# Patient Record
Sex: FEMALE | Race: WHITE | NOT HISPANIC OR LATINO | Employment: OTHER
[De-identification: names, ages, dates, MRNs, and addresses within clinical notes are randomized per-mention and may not be internally consistent; named-entity substitution may affect disease eponyms.]

---

## 2017-01-06 ENCOUNTER — RECORDS - HEALTHEAST (OUTPATIENT)
Dept: ADMINISTRATIVE | Facility: OTHER | Age: 72
End: 2017-01-06

## 2017-01-18 ENCOUNTER — AMBULATORY - HEALTHEAST (OUTPATIENT)
Dept: PODIATRY | Facility: CLINIC | Age: 72
End: 2017-01-18

## 2017-01-18 DIAGNOSIS — L60.0 INGROWN TOENAIL: ICD-10-CM

## 2017-02-10 ENCOUNTER — OFFICE VISIT - HEALTHEAST (OUTPATIENT)
Dept: INTERNAL MEDICINE | Facility: CLINIC | Age: 72
End: 2017-02-10

## 2017-02-10 ENCOUNTER — AMBULATORY - HEALTHEAST (OUTPATIENT)
Dept: INTERNAL MEDICINE | Facility: CLINIC | Age: 72
End: 2017-02-10

## 2017-02-10 DIAGNOSIS — Z01.818 PREOP EXAMINATION: ICD-10-CM

## 2017-02-10 DIAGNOSIS — M17.32 POST-TRAUMATIC OSTEOARTHRITIS OF LEFT KNEE: ICD-10-CM

## 2017-02-10 LAB
ATRIAL RATE - MUSE: 63 BPM
DIASTOLIC BLOOD PRESSURE - MUSE: NORMAL MMHG
INTERPRETATION ECG - MUSE: NORMAL
P AXIS - MUSE: 38 DEGREES
PR INTERVAL - MUSE: 174 MS
QRS DURATION - MUSE: 72 MS
QT - MUSE: 364 MS
QTC - MUSE: 372 MS
R AXIS - MUSE: 23 DEGREES
SYSTOLIC BLOOD PRESSURE - MUSE: NORMAL MMHG
T AXIS - MUSE: 37 DEGREES
VENTRICULAR RATE- MUSE: 63 BPM

## 2017-02-10 ASSESSMENT — MIFFLIN-ST. JEOR: SCORE: 1153.6

## 2017-02-13 ENCOUNTER — COMMUNICATION - HEALTHEAST (OUTPATIENT)
Dept: INTERNAL MEDICINE | Facility: CLINIC | Age: 72
End: 2017-02-13

## 2017-03-02 ENCOUNTER — SURGERY - HEALTHEAST (OUTPATIENT)
Dept: SURGERY | Facility: CLINIC | Age: 72
End: 2017-03-02

## 2017-03-02 ENCOUNTER — HOME CARE/HOSPICE - HEALTHEAST (OUTPATIENT)
Dept: HOME HEALTH SERVICES | Facility: HOME HEALTH | Age: 72
End: 2017-03-02

## 2017-03-02 ENCOUNTER — ANESTHESIA - HEALTHEAST (OUTPATIENT)
Dept: SURGERY | Facility: CLINIC | Age: 72
End: 2017-03-02

## 2017-03-02 ASSESSMENT — MIFFLIN-ST. JEOR
SCORE: 1018.09
SCORE: 1130.92

## 2017-03-06 ENCOUNTER — HOME CARE/HOSPICE - HEALTHEAST (OUTPATIENT)
Dept: HOME HEALTH SERVICES | Facility: HOME HEALTH | Age: 72
End: 2017-03-06

## 2017-03-08 ENCOUNTER — HOME CARE/HOSPICE - HEALTHEAST (OUTPATIENT)
Dept: HOME HEALTH SERVICES | Facility: HOME HEALTH | Age: 72
End: 2017-03-08

## 2017-03-10 ENCOUNTER — HOME CARE/HOSPICE - HEALTHEAST (OUTPATIENT)
Dept: HOME HEALTH SERVICES | Facility: HOME HEALTH | Age: 72
End: 2017-03-10

## 2017-03-13 ENCOUNTER — HOME CARE/HOSPICE - HEALTHEAST (OUTPATIENT)
Dept: HOME HEALTH SERVICES | Facility: HOME HEALTH | Age: 72
End: 2017-03-13

## 2017-03-15 ENCOUNTER — HOME CARE/HOSPICE - HEALTHEAST (OUTPATIENT)
Dept: HOME HEALTH SERVICES | Facility: HOME HEALTH | Age: 72
End: 2017-03-15

## 2017-03-16 ENCOUNTER — HOME CARE/HOSPICE - HEALTHEAST (OUTPATIENT)
Dept: HOME HEALTH SERVICES | Facility: HOME HEALTH | Age: 72
End: 2017-03-16

## 2017-03-20 ENCOUNTER — HOME CARE/HOSPICE - HEALTHEAST (OUTPATIENT)
Dept: HOME HEALTH SERVICES | Facility: HOME HEALTH | Age: 72
End: 2017-03-20

## 2017-04-26 ENCOUNTER — OFFICE VISIT - HEALTHEAST (OUTPATIENT)
Dept: FAMILY MEDICINE | Facility: CLINIC | Age: 72
End: 2017-04-26

## 2017-04-26 DIAGNOSIS — D64.9 ANEMIA: ICD-10-CM

## 2017-04-26 DIAGNOSIS — J01.90: ICD-10-CM

## 2017-04-27 ENCOUNTER — COMMUNICATION - HEALTHEAST (OUTPATIENT)
Dept: INTERNAL MEDICINE | Facility: CLINIC | Age: 72
End: 2017-04-27

## 2017-04-27 ENCOUNTER — OFFICE VISIT - HEALTHEAST (OUTPATIENT)
Dept: INTERNAL MEDICINE | Facility: CLINIC | Age: 72
End: 2017-04-27

## 2017-04-27 DIAGNOSIS — I10 ESSENTIAL HYPERTENSION WITH GOAL BLOOD PRESSURE LESS THAN 140/90: ICD-10-CM

## 2017-04-27 ASSESSMENT — MIFFLIN-ST. JEOR: SCORE: 1153.6

## 2017-05-03 ENCOUNTER — AMBULATORY - HEALTHEAST (OUTPATIENT)
Dept: FAMILY MEDICINE | Facility: CLINIC | Age: 72
End: 2017-05-03

## 2017-05-03 ENCOUNTER — COMMUNICATION - HEALTHEAST (OUTPATIENT)
Dept: INTERNAL MEDICINE | Facility: CLINIC | Age: 72
End: 2017-05-03

## 2017-05-03 ENCOUNTER — AMBULATORY - HEALTHEAST (OUTPATIENT)
Dept: INTERNAL MEDICINE | Facility: CLINIC | Age: 72
End: 2017-05-03

## 2017-05-03 DIAGNOSIS — K29.70 GASTRITIS: ICD-10-CM

## 2017-05-03 DIAGNOSIS — J31.0 RHINITIS: ICD-10-CM

## 2017-05-03 DIAGNOSIS — K92.2 GI BLEED: ICD-10-CM

## 2017-05-05 ENCOUNTER — RECORDS - HEALTHEAST (OUTPATIENT)
Dept: ADMINISTRATIVE | Facility: OTHER | Age: 72
End: 2017-05-05

## 2017-05-08 ENCOUNTER — RECORDS - HEALTHEAST (OUTPATIENT)
Dept: ADMINISTRATIVE | Facility: OTHER | Age: 72
End: 2017-05-08

## 2017-06-01 ENCOUNTER — HOSPITAL ENCOUNTER (OUTPATIENT)
Dept: MAMMOGRAPHY | Facility: CLINIC | Age: 72
Discharge: HOME | End: 2017-06-01
Attending: INTERNAL MEDICINE

## 2017-06-01 DIAGNOSIS — Z12.31 VISIT FOR SCREENING MAMMOGRAM: ICD-10-CM

## 2017-06-27 ENCOUNTER — COMMUNICATION - HEALTHEAST (OUTPATIENT)
Dept: INTERNAL MEDICINE | Facility: CLINIC | Age: 72
End: 2017-06-27

## 2017-06-27 ENCOUNTER — OFFICE VISIT - HEALTHEAST (OUTPATIENT)
Dept: INTERNAL MEDICINE | Facility: CLINIC | Age: 72
End: 2017-06-27

## 2017-06-27 DIAGNOSIS — I10 ESSENTIAL HYPERTENSION WITH GOAL BLOOD PRESSURE LESS THAN 140/90: ICD-10-CM

## 2017-06-27 ASSESSMENT — MIFFLIN-ST. JEOR: SCORE: 1117.31

## 2017-07-28 ENCOUNTER — OFFICE VISIT - HEALTHEAST (OUTPATIENT)
Dept: INTERNAL MEDICINE | Facility: CLINIC | Age: 72
End: 2017-07-28

## 2017-07-28 DIAGNOSIS — I10 ESSENTIAL HYPERTENSION WITH GOAL BLOOD PRESSURE LESS THAN 140/90: ICD-10-CM

## 2017-07-28 ASSESSMENT — MIFFLIN-ST. JEOR: SCORE: 1112.77

## 2017-08-22 ENCOUNTER — OFFICE VISIT - HEALTHEAST (OUTPATIENT)
Dept: INTERNAL MEDICINE | Facility: CLINIC | Age: 72
End: 2017-08-22

## 2017-08-22 DIAGNOSIS — Z01.810 PREOP CARDIOVASCULAR EXAM: ICD-10-CM

## 2017-08-22 ASSESSMENT — MIFFLIN-ST. JEOR: SCORE: 1094.63

## 2017-08-24 ENCOUNTER — COMMUNICATION - HEALTHEAST (OUTPATIENT)
Dept: INTERNAL MEDICINE | Facility: CLINIC | Age: 72
End: 2017-08-24

## 2017-11-17 ENCOUNTER — AMBULATORY - HEALTHEAST (OUTPATIENT)
Dept: NURSING | Facility: CLINIC | Age: 72
End: 2017-11-17

## 2017-11-20 ENCOUNTER — COMMUNICATION - HEALTHEAST (OUTPATIENT)
Dept: TELEHEALTH | Facility: CLINIC | Age: 72
End: 2017-11-20

## 2017-11-20 ENCOUNTER — COMMUNICATION - HEALTHEAST (OUTPATIENT)
Dept: INTERNAL MEDICINE | Facility: CLINIC | Age: 72
End: 2017-11-20

## 2017-11-20 ENCOUNTER — HOSPITAL ENCOUNTER (OUTPATIENT)
Dept: CT IMAGING | Facility: CLINIC | Age: 72
Discharge: HOME | End: 2017-11-20
Attending: INTERNAL MEDICINE

## 2017-11-20 ENCOUNTER — OFFICE VISIT - HEALTHEAST (OUTPATIENT)
Dept: INTERNAL MEDICINE | Facility: CLINIC | Age: 72
End: 2017-11-20

## 2017-11-20 DIAGNOSIS — R10.9 ABDOMINAL PAIN: ICD-10-CM

## 2017-11-20 ASSESSMENT — MIFFLIN-ST. JEOR: SCORE: 1090.09

## 2017-11-21 ENCOUNTER — COMMUNICATION - HEALTHEAST (OUTPATIENT)
Dept: INTERNAL MEDICINE | Facility: CLINIC | Age: 72
End: 2017-11-21

## 2017-11-27 ENCOUNTER — OFFICE VISIT - HEALTHEAST (OUTPATIENT)
Dept: INTERNAL MEDICINE | Facility: CLINIC | Age: 72
End: 2017-11-27

## 2017-11-27 DIAGNOSIS — I10 ESSENTIAL HYPERTENSION: ICD-10-CM

## 2017-11-27 ASSESSMENT — MIFFLIN-ST. JEOR: SCORE: 1108.24

## 2017-12-07 ENCOUNTER — RECORDS - HEALTHEAST (OUTPATIENT)
Dept: ADMINISTRATIVE | Facility: OTHER | Age: 72
End: 2017-12-07

## 2018-01-04 ENCOUNTER — OFFICE VISIT - HEALTHEAST (OUTPATIENT)
Dept: INTERNAL MEDICINE | Facility: CLINIC | Age: 73
End: 2018-01-04

## 2018-01-04 DIAGNOSIS — I10 ESSENTIAL HYPERTENSION: ICD-10-CM

## 2018-01-04 ASSESSMENT — MIFFLIN-ST. JEOR: SCORE: 1112.77

## 2018-03-19 ENCOUNTER — RECORDS - HEALTHEAST (OUTPATIENT)
Dept: ADMINISTRATIVE | Facility: OTHER | Age: 73
End: 2018-03-19

## 2018-06-19 ENCOUNTER — RECORDS - HEALTHEAST (OUTPATIENT)
Dept: ADMINISTRATIVE | Facility: OTHER | Age: 73
End: 2018-06-19

## 2018-06-25 ENCOUNTER — HOSPITAL ENCOUNTER (OUTPATIENT)
Dept: MAMMOGRAPHY | Facility: CLINIC | Age: 73
Discharge: HOME | End: 2018-06-25
Attending: INTERNAL MEDICINE

## 2018-06-25 DIAGNOSIS — Z12.31 VISIT FOR SCREENING MAMMOGRAM: ICD-10-CM

## 2018-08-06 ENCOUNTER — RECORDS - HEALTHEAST (OUTPATIENT)
Dept: ADMINISTRATIVE | Facility: OTHER | Age: 73
End: 2018-08-06

## 2018-08-30 ENCOUNTER — COMMUNICATION - HEALTHEAST (OUTPATIENT)
Dept: INTERNAL MEDICINE | Facility: CLINIC | Age: 73
End: 2018-08-30

## 2018-08-31 ENCOUNTER — OFFICE VISIT - HEALTHEAST (OUTPATIENT)
Dept: INTERNAL MEDICINE | Facility: CLINIC | Age: 73
End: 2018-08-31

## 2018-08-31 DIAGNOSIS — G62.9 PERIPHERAL NEUROPATHY: ICD-10-CM

## 2018-08-31 ASSESSMENT — MIFFLIN-ST. JEOR: SCORE: 1103.7

## 2018-09-12 ENCOUNTER — RECORDS - HEALTHEAST (OUTPATIENT)
Dept: ADMINISTRATIVE | Facility: OTHER | Age: 73
End: 2018-09-12

## 2018-09-14 ENCOUNTER — RECORDS - HEALTHEAST (OUTPATIENT)
Dept: ADMINISTRATIVE | Facility: OTHER | Age: 73
End: 2018-09-14

## 2018-11-02 ENCOUNTER — COMMUNICATION - HEALTHEAST (OUTPATIENT)
Dept: GENERAL RADIOLOGY | Facility: CLINIC | Age: 73
End: 2018-11-02

## 2018-11-05 ENCOUNTER — OFFICE VISIT - HEALTHEAST (OUTPATIENT)
Dept: INTERNAL MEDICINE | Facility: CLINIC | Age: 73
End: 2018-11-05

## 2018-11-05 DIAGNOSIS — Z00.00 ROUTINE GENERAL MEDICAL EXAMINATION AT A HEALTH CARE FACILITY: ICD-10-CM

## 2018-11-05 DIAGNOSIS — Z23 NEED FOR IMMUNIZATION AGAINST INFLUENZA: ICD-10-CM

## 2018-11-05 DIAGNOSIS — R00.2 PALPITATIONS: ICD-10-CM

## 2018-11-05 LAB
ALBUMIN SERPL-MCNC: 4 G/DL (ref 3.5–5)
ALBUMIN UR-MCNC: NEGATIVE MG/DL
ALP SERPL-CCNC: 94 U/L (ref 45–120)
ALT SERPL W P-5'-P-CCNC: 18 U/L (ref 0–45)
ANION GAP SERPL CALCULATED.3IONS-SCNC: 8 MMOL/L (ref 5–18)
APPEARANCE UR: CLEAR
AST SERPL W P-5'-P-CCNC: 19 U/L (ref 0–40)
BILIRUB SERPL-MCNC: 1.3 MG/DL (ref 0–1)
BILIRUB UR QL STRIP: NEGATIVE
BUN SERPL-MCNC: 10 MG/DL (ref 8–28)
CALCIUM SERPL-MCNC: 10.2 MG/DL (ref 8.5–10.5)
CHLORIDE BLD-SCNC: 105 MMOL/L (ref 98–107)
CHOLEST SERPL-MCNC: 196 MG/DL
CO2 SERPL-SCNC: 26 MMOL/L (ref 22–31)
COLOR UR AUTO: YELLOW
CREAT SERPL-MCNC: 0.65 MG/DL (ref 0.6–1.1)
ERYTHROCYTE [DISTWIDTH] IN BLOOD BY AUTOMATED COUNT: 13.3 % (ref 11–14.5)
FASTING STATUS PATIENT QL REPORTED: YES
GFR SERPL CREATININE-BSD FRML MDRD: >60 ML/MIN/1.73M2
GLUCOSE BLD-MCNC: 106 MG/DL (ref 70–125)
GLUCOSE UR STRIP-MCNC: NEGATIVE MG/DL
HCT VFR BLD AUTO: 48.4 % (ref 35–47)
HDLC SERPL-MCNC: 60 MG/DL
HGB BLD-MCNC: 16.2 G/DL (ref 12–16)
HGB UR QL STRIP: NEGATIVE
KETONES UR STRIP-MCNC: NEGATIVE MG/DL
LDLC SERPL CALC-MCNC: 114 MG/DL
LEUKOCYTE ESTERASE UR QL STRIP: NEGATIVE
MCH RBC QN AUTO: 32.6 PG (ref 27–34)
MCHC RBC AUTO-ENTMCNC: 33.4 G/DL (ref 32–36)
MCV RBC AUTO: 97 FL (ref 80–100)
NITRATE UR QL: NEGATIVE
PH UR STRIP: 7.5 [PH] (ref 5–8)
PLATELET # BLD AUTO: 290 THOU/UL (ref 140–440)
PMV BLD AUTO: 6.7 FL (ref 7–10)
POTASSIUM BLD-SCNC: 4 MMOL/L (ref 3.5–5)
PROT SERPL-MCNC: 7.3 G/DL (ref 6–8)
RBC # BLD AUTO: 4.97 MILL/UL (ref 3.8–5.4)
SODIUM SERPL-SCNC: 139 MMOL/L (ref 136–145)
SP GR UR STRIP: 1.01 (ref 1–1.03)
TRIGL SERPL-MCNC: 112 MG/DL
TSH SERPL DL<=0.005 MIU/L-ACNC: 0.8 UIU/ML (ref 0.3–5)
UROBILINOGEN UR STRIP-ACNC: NORMAL
VIT B12 SERPL-MCNC: >2000 PG/ML (ref 213–816)
WBC: 6.1 THOU/UL (ref 4–11)

## 2018-11-05 RX ORDER — LOTEPREDNOL ETABONATE 5 MG/ML
1 SUSPENSION/ DROPS OPHTHALMIC
Refills: 4 | Status: SHIPPED | COMMUNITY
Start: 2018-10-26

## 2018-11-05 ASSESSMENT — MIFFLIN-ST. JEOR: SCORE: 1090.09

## 2018-11-06 ENCOUNTER — COMMUNICATION - HEALTHEAST (OUTPATIENT)
Dept: INTERNAL MEDICINE | Facility: CLINIC | Age: 73
End: 2018-11-06

## 2018-11-06 LAB — 25(OH)D3 SERPL-MCNC: 21.9 NG/ML (ref 30–80)

## 2018-11-14 ENCOUNTER — OFFICE VISIT - HEALTHEAST (OUTPATIENT)
Dept: CARDIOLOGY | Facility: CLINIC | Age: 73
End: 2018-11-14

## 2018-11-14 DIAGNOSIS — R00.2 PALPITATIONS: ICD-10-CM

## 2018-11-14 DIAGNOSIS — I10 ESSENTIAL HYPERTENSION: ICD-10-CM

## 2018-11-14 ASSESSMENT — MIFFLIN-ST. JEOR: SCORE: 1099.17

## 2018-11-15 ENCOUNTER — HOSPITAL ENCOUNTER (OUTPATIENT)
Dept: CARDIOLOGY | Facility: CLINIC | Age: 73
Discharge: HOME | End: 2018-11-15
Attending: INTERNAL MEDICINE

## 2018-11-15 DIAGNOSIS — R00.2 PALPITATIONS: ICD-10-CM

## 2018-11-19 ENCOUNTER — COMMUNICATION - HEALTHEAST (OUTPATIENT)
Dept: CARDIOLOGY | Facility: CLINIC | Age: 73
End: 2018-11-19

## 2018-11-19 DIAGNOSIS — I10 ESSENTIAL HYPERTENSION: ICD-10-CM

## 2018-12-04 ENCOUNTER — COMMUNICATION - HEALTHEAST (OUTPATIENT)
Dept: CARDIOLOGY | Facility: CLINIC | Age: 73
End: 2018-12-04

## 2018-12-06 ENCOUNTER — OFFICE VISIT - HEALTHEAST (OUTPATIENT)
Dept: CARDIOLOGY | Facility: CLINIC | Age: 73
End: 2018-12-06

## 2018-12-06 DIAGNOSIS — I10 UNCONTROLLED HYPERTENSION: ICD-10-CM

## 2018-12-06 ASSESSMENT — MIFFLIN-ST. JEOR: SCORE: 1099.17

## 2018-12-14 ENCOUNTER — COMMUNICATION - HEALTHEAST (OUTPATIENT)
Dept: CARDIOLOGY | Facility: CLINIC | Age: 73
End: 2018-12-14

## 2018-12-14 DIAGNOSIS — I10 UNCONTROLLED HYPERTENSION: ICD-10-CM

## 2018-12-22 ENCOUNTER — AMBULATORY - HEALTHEAST (OUTPATIENT)
Dept: CARDIOLOGY | Facility: CLINIC | Age: 73
End: 2018-12-22

## 2018-12-22 DIAGNOSIS — I10 ESSENTIAL HYPERTENSION: ICD-10-CM

## 2019-01-07 ENCOUNTER — OFFICE VISIT - HEALTHEAST (OUTPATIENT)
Dept: INTERNAL MEDICINE | Facility: CLINIC | Age: 74
End: 2019-01-07

## 2019-01-07 DIAGNOSIS — I10 ESSENTIAL HYPERTENSION: ICD-10-CM

## 2019-01-07 ASSESSMENT — MIFFLIN-ST. JEOR: SCORE: 1099.17

## 2019-01-30 ENCOUNTER — OFFICE VISIT - HEALTHEAST (OUTPATIENT)
Dept: CARDIOLOGY | Facility: CLINIC | Age: 74
End: 2019-01-30

## 2019-01-30 DIAGNOSIS — I10 ESSENTIAL HYPERTENSION: ICD-10-CM

## 2019-01-30 DIAGNOSIS — R00.2 PALPITATIONS: ICD-10-CM

## 2019-01-30 ASSESSMENT — MIFFLIN-ST. JEOR: SCORE: 1108.24

## 2019-02-19 ENCOUNTER — RECORDS - HEALTHEAST (OUTPATIENT)
Dept: ADMINISTRATIVE | Facility: OTHER | Age: 74
End: 2019-02-19

## 2019-03-13 ENCOUNTER — RECORDS - HEALTHEAST (OUTPATIENT)
Dept: ADMINISTRATIVE | Facility: OTHER | Age: 74
End: 2019-03-13

## 2019-03-26 ENCOUNTER — SURGERY - HEALTHEAST (OUTPATIENT)
Dept: SURGERY | Facility: CLINIC | Age: 74
End: 2019-03-26

## 2019-03-26 ENCOUNTER — ANESTHESIA - HEALTHEAST (OUTPATIENT)
Dept: SURGERY | Facility: CLINIC | Age: 74
End: 2019-03-26

## 2019-03-27 ENCOUNTER — COMMUNICATION - HEALTHEAST (OUTPATIENT)
Dept: CARE COORDINATION | Facility: CLINIC | Age: 74
End: 2019-03-27

## 2019-03-29 ENCOUNTER — RECORDS - HEALTHEAST (OUTPATIENT)
Dept: ADMINISTRATIVE | Facility: OTHER | Age: 74
End: 2019-03-29

## 2019-05-06 ENCOUNTER — RECORDS - HEALTHEAST (OUTPATIENT)
Dept: ADMINISTRATIVE | Facility: OTHER | Age: 74
End: 2019-05-06

## 2019-05-09 ENCOUNTER — OFFICE VISIT - HEALTHEAST (OUTPATIENT)
Dept: INTERNAL MEDICINE | Facility: CLINIC | Age: 74
End: 2019-05-09

## 2019-05-09 DIAGNOSIS — I10 ESSENTIAL HYPERTENSION: ICD-10-CM

## 2019-05-09 ASSESSMENT — MIFFLIN-ST. JEOR: SCORE: 1121.85

## 2019-05-17 ENCOUNTER — RECORDS - HEALTHEAST (OUTPATIENT)
Dept: ADMINISTRATIVE | Facility: OTHER | Age: 74
End: 2019-05-17

## 2019-06-14 ENCOUNTER — COMMUNICATION - HEALTHEAST (OUTPATIENT)
Dept: CARDIOLOGY | Facility: CLINIC | Age: 74
End: 2019-06-14

## 2019-06-14 DIAGNOSIS — I10 ESSENTIAL HYPERTENSION: ICD-10-CM

## 2019-08-08 ENCOUNTER — HOSPITAL ENCOUNTER (OUTPATIENT)
Dept: MAMMOGRAPHY | Facility: CLINIC | Age: 74
Discharge: HOME | End: 2019-08-08
Attending: INTERNAL MEDICINE

## 2019-08-08 DIAGNOSIS — Z12.31 VISIT FOR SCREENING MAMMOGRAM: ICD-10-CM

## 2019-08-10 ENCOUNTER — COMMUNICATION - HEALTHEAST (OUTPATIENT)
Dept: CARDIOLOGY | Facility: CLINIC | Age: 74
End: 2019-08-10

## 2019-08-10 DIAGNOSIS — I10 ESSENTIAL HYPERTENSION: ICD-10-CM

## 2019-08-12 ENCOUNTER — COMMUNICATION - HEALTHEAST (OUTPATIENT)
Dept: CARDIOLOGY | Facility: CLINIC | Age: 74
End: 2019-08-12

## 2019-08-12 DIAGNOSIS — I10 ESSENTIAL HYPERTENSION: ICD-10-CM

## 2019-08-20 ENCOUNTER — OFFICE VISIT - HEALTHEAST (OUTPATIENT)
Dept: INTERNAL MEDICINE | Facility: CLINIC | Age: 74
End: 2019-08-20

## 2019-08-20 ENCOUNTER — COMMUNICATION - HEALTHEAST (OUTPATIENT)
Dept: INTERNAL MEDICINE | Facility: CLINIC | Age: 74
End: 2019-08-20

## 2019-08-20 DIAGNOSIS — I10 UNCONTROLLED HYPERTENSION: ICD-10-CM

## 2019-08-20 DIAGNOSIS — I10 ESSENTIAL HYPERTENSION: ICD-10-CM

## 2019-08-20 LAB
CHOLEST SERPL-MCNC: 193 MG/DL
FASTING STATUS PATIENT QL REPORTED: YES
HDLC SERPL-MCNC: 68 MG/DL
LDLC SERPL CALC-MCNC: 102 MG/DL
TRIGL SERPL-MCNC: 117 MG/DL

## 2019-08-20 ASSESSMENT — MIFFLIN-ST. JEOR: SCORE: 1117.31

## 2019-08-23 ENCOUNTER — RECORDS - HEALTHEAST (OUTPATIENT)
Dept: ADMINISTRATIVE | Facility: OTHER | Age: 74
End: 2019-08-23

## 2019-09-28 ENCOUNTER — RECORDS - HEALTHEAST (OUTPATIENT)
Dept: ADMINISTRATIVE | Facility: OTHER | Age: 74
End: 2019-09-28

## 2019-10-08 ENCOUNTER — OFFICE VISIT - HEALTHEAST (OUTPATIENT)
Dept: FAMILY MEDICINE | Facility: CLINIC | Age: 74
End: 2019-10-08

## 2019-10-08 ENCOUNTER — COMMUNICATION - HEALTHEAST (OUTPATIENT)
Dept: SCHEDULING | Facility: CLINIC | Age: 74
End: 2019-10-08

## 2019-10-08 DIAGNOSIS — J01.00 ACUTE NON-RECURRENT MAXILLARY SINUSITIS: ICD-10-CM

## 2019-10-16 ENCOUNTER — OFFICE VISIT - HEALTHEAST (OUTPATIENT)
Dept: FAMILY MEDICINE | Facility: CLINIC | Age: 74
End: 2019-10-16

## 2019-10-16 DIAGNOSIS — J01.40 ACUTE NON-RECURRENT PANSINUSITIS: ICD-10-CM

## 2019-10-16 DIAGNOSIS — H65.02 NON-RECURRENT ACUTE SEROUS OTITIS MEDIA OF LEFT EAR: ICD-10-CM

## 2019-10-28 ENCOUNTER — COMMUNICATION - HEALTHEAST (OUTPATIENT)
Dept: SCHEDULING | Facility: CLINIC | Age: 74
End: 2019-10-28

## 2019-10-28 ENCOUNTER — COMMUNICATION - HEALTHEAST (OUTPATIENT)
Dept: INTERNAL MEDICINE | Facility: CLINIC | Age: 74
End: 2019-10-28

## 2019-10-29 ENCOUNTER — OFFICE VISIT - HEALTHEAST (OUTPATIENT)
Dept: INTERNAL MEDICINE | Facility: CLINIC | Age: 74
End: 2019-10-29

## 2019-10-29 DIAGNOSIS — R05.9 COUGH: ICD-10-CM

## 2019-10-29 ASSESSMENT — MIFFLIN-ST. JEOR: SCORE: 1121.85

## 2019-12-27 ENCOUNTER — OFFICE VISIT - HEALTHEAST (OUTPATIENT)
Dept: FAMILY MEDICINE | Facility: CLINIC | Age: 74
End: 2019-12-27

## 2019-12-27 DIAGNOSIS — R30.0 DYSURIA: ICD-10-CM

## 2019-12-27 LAB
ALBUMIN UR-MCNC: NEGATIVE MG/DL
AMORPH CRY #/AREA URNS HPF: ABNORMAL /[HPF]
APPEARANCE UR: CLEAR
BACTERIA #/AREA URNS HPF: ABNORMAL HPF
BILIRUB UR QL STRIP: NEGATIVE
COLOR UR AUTO: YELLOW
GLUCOSE UR STRIP-MCNC: NEGATIVE MG/DL
HGB UR QL STRIP: NEGATIVE
KETONES UR STRIP-MCNC: NEGATIVE MG/DL
LEUKOCYTE ESTERASE UR QL STRIP: ABNORMAL
MUCOUS THREADS #/AREA URNS LPF: ABNORMAL LPF
NITRATE UR QL: NEGATIVE
PH UR STRIP: 7 [PH] (ref 5–8)
RBC #/AREA URNS AUTO: ABNORMAL HPF
SP GR UR STRIP: 1.02 (ref 1–1.03)
SQUAMOUS #/AREA URNS AUTO: ABNORMAL LPF
UROBILINOGEN UR STRIP-ACNC: ABNORMAL
WBC #/AREA URNS AUTO: ABNORMAL HPF

## 2019-12-28 LAB — BACTERIA SPEC CULT: NO GROWTH

## 2020-01-03 ENCOUNTER — RECORDS - HEALTHEAST (OUTPATIENT)
Dept: ADMINISTRATIVE | Facility: OTHER | Age: 75
End: 2020-01-03

## 2020-01-06 ENCOUNTER — COMMUNICATION - HEALTHEAST (OUTPATIENT)
Dept: SCHEDULING | Facility: CLINIC | Age: 75
End: 2020-01-06

## 2020-01-07 ENCOUNTER — COMMUNICATION - HEALTHEAST (OUTPATIENT)
Dept: TELEHEALTH | Facility: CLINIC | Age: 75
End: 2020-01-07

## 2020-01-07 ENCOUNTER — OFFICE VISIT - HEALTHEAST (OUTPATIENT)
Dept: INTERNAL MEDICINE | Facility: CLINIC | Age: 75
End: 2020-01-07

## 2020-01-07 DIAGNOSIS — I10 ESSENTIAL HYPERTENSION: ICD-10-CM

## 2020-01-07 ASSESSMENT — MIFFLIN-ST. JEOR: SCORE: 1121.85

## 2020-02-05 ENCOUNTER — COMMUNICATION - HEALTHEAST (OUTPATIENT)
Dept: CARDIOLOGY | Facility: CLINIC | Age: 75
End: 2020-02-05

## 2020-02-05 DIAGNOSIS — I10 ESSENTIAL HYPERTENSION: ICD-10-CM

## 2020-02-24 ENCOUNTER — OFFICE VISIT - HEALTHEAST (OUTPATIENT)
Dept: INTERNAL MEDICINE | Facility: CLINIC | Age: 75
End: 2020-02-24

## 2020-02-24 ENCOUNTER — COMMUNICATION - HEALTHEAST (OUTPATIENT)
Dept: TELEHEALTH | Facility: CLINIC | Age: 75
End: 2020-02-24

## 2020-02-24 DIAGNOSIS — Z00.00 ROUTINE GENERAL MEDICAL EXAMINATION AT A HEALTH CARE FACILITY: ICD-10-CM

## 2020-02-24 LAB
ALBUMIN SERPL-MCNC: 3.9 G/DL (ref 3.5–5)
ALBUMIN UR-MCNC: NEGATIVE MG/DL
ALP SERPL-CCNC: 82 U/L (ref 45–120)
ALT SERPL W P-5'-P-CCNC: 18 U/L (ref 0–45)
ANION GAP SERPL CALCULATED.3IONS-SCNC: 8 MMOL/L (ref 5–18)
APPEARANCE UR: CLEAR
AST SERPL W P-5'-P-CCNC: 17 U/L (ref 0–40)
BILIRUB SERPL-MCNC: 0.9 MG/DL (ref 0–1)
BILIRUB UR QL STRIP: NEGATIVE
BUN SERPL-MCNC: 13 MG/DL (ref 8–28)
CALCIUM SERPL-MCNC: 10 MG/DL (ref 8.5–10.5)
CHLORIDE BLD-SCNC: 102 MMOL/L (ref 98–107)
CHOLEST SERPL-MCNC: 186 MG/DL
CO2 SERPL-SCNC: 27 MMOL/L (ref 22–31)
COLOR UR AUTO: YELLOW
CREAT SERPL-MCNC: 0.7 MG/DL (ref 0.6–1.1)
ERYTHROCYTE [DISTWIDTH] IN BLOOD BY AUTOMATED COUNT: 14.1 % (ref 11–14.5)
FASTING STATUS PATIENT QL REPORTED: YES
GFR SERPL CREATININE-BSD FRML MDRD: >60 ML/MIN/1.73M2
GLUCOSE BLD-MCNC: 102 MG/DL (ref 70–125)
GLUCOSE UR STRIP-MCNC: NEGATIVE MG/DL
HCT VFR BLD AUTO: 41.2 % (ref 35–47)
HDLC SERPL-MCNC: 64 MG/DL
HGB BLD-MCNC: 13.7 G/DL (ref 12–16)
HGB UR QL STRIP: NEGATIVE
KETONES UR STRIP-MCNC: NEGATIVE MG/DL
LDLC SERPL CALC-MCNC: 104 MG/DL
LEUKOCYTE ESTERASE UR QL STRIP: NEGATIVE
MCH RBC QN AUTO: 29.6 PG (ref 27–34)
MCHC RBC AUTO-ENTMCNC: 33.3 G/DL (ref 32–36)
MCV RBC AUTO: 89 FL (ref 80–100)
NITRATE UR QL: NEGATIVE
PH UR STRIP: 7 [PH] (ref 5–8)
PLATELET # BLD AUTO: 291 THOU/UL (ref 140–440)
PMV BLD AUTO: 6.5 FL (ref 7–10)
POTASSIUM BLD-SCNC: 4.3 MMOL/L (ref 3.5–5)
PROT SERPL-MCNC: 6.8 G/DL (ref 6–8)
RBC # BLD AUTO: 4.62 MILL/UL (ref 3.8–5.4)
SODIUM SERPL-SCNC: 137 MMOL/L (ref 136–145)
SP GR UR STRIP: 1.01 (ref 1–1.03)
TRIGL SERPL-MCNC: 88 MG/DL
TSH SERPL DL<=0.005 MIU/L-ACNC: 0.53 UIU/ML (ref 0.3–5)
UROBILINOGEN UR STRIP-ACNC: NORMAL
WBC: 6.9 THOU/UL (ref 4–11)

## 2020-02-24 ASSESSMENT — MIFFLIN-ST. JEOR: SCORE: 1131.48

## 2020-02-25 ENCOUNTER — COMMUNICATION - HEALTHEAST (OUTPATIENT)
Dept: INTERNAL MEDICINE | Facility: CLINIC | Age: 75
End: 2020-02-25

## 2020-03-12 ENCOUNTER — COMMUNICATION - HEALTHEAST (OUTPATIENT)
Dept: ADMINISTRATIVE | Facility: CLINIC | Age: 75
End: 2020-03-12

## 2020-05-05 ENCOUNTER — COMMUNICATION - HEALTHEAST (OUTPATIENT)
Dept: CARDIOLOGY | Facility: CLINIC | Age: 75
End: 2020-05-05

## 2020-05-05 DIAGNOSIS — I10 ESSENTIAL HYPERTENSION: ICD-10-CM

## 2020-06-29 ENCOUNTER — RECORDS - HEALTHEAST (OUTPATIENT)
Dept: ADMINISTRATIVE | Facility: OTHER | Age: 75
End: 2020-06-29

## 2020-08-03 ENCOUNTER — COMMUNICATION - HEALTHEAST (OUTPATIENT)
Dept: INTERNAL MEDICINE | Facility: CLINIC | Age: 75
End: 2020-08-03

## 2020-08-03 DIAGNOSIS — I10 ESSENTIAL HYPERTENSION: ICD-10-CM

## 2020-08-03 DIAGNOSIS — I10 UNCONTROLLED HYPERTENSION: ICD-10-CM

## 2020-08-03 RX ORDER — METOPROLOL SUCCINATE 25 MG/1
TABLET, EXTENDED RELEASE ORAL
Qty: 180 TABLET | Refills: 3 | Status: SHIPPED | OUTPATIENT
Start: 2020-08-03 | End: 2021-08-22

## 2020-08-27 ENCOUNTER — HOSPITAL ENCOUNTER (OUTPATIENT)
Dept: MAMMOGRAPHY | Facility: CLINIC | Age: 75
Discharge: HOME | End: 2020-08-27
Attending: INTERNAL MEDICINE

## 2020-08-27 DIAGNOSIS — Z12.31 SCREENING MAMMOGRAM, ENCOUNTER FOR: ICD-10-CM

## 2020-08-31 ENCOUNTER — COMMUNICATION - HEALTHEAST (OUTPATIENT)
Dept: CARDIOLOGY | Facility: CLINIC | Age: 75
End: 2020-08-31

## 2020-09-15 ENCOUNTER — COMMUNICATION - HEALTHEAST (OUTPATIENT)
Dept: CARDIOLOGY | Facility: CLINIC | Age: 75
End: 2020-09-15

## 2020-09-16 ENCOUNTER — OFFICE VISIT - HEALTHEAST (OUTPATIENT)
Dept: CARDIOLOGY | Facility: CLINIC | Age: 75
End: 2020-09-16

## 2020-09-16 DIAGNOSIS — I10 ESSENTIAL HYPERTENSION: ICD-10-CM

## 2020-09-16 DIAGNOSIS — R00.2 PALPITATIONS: ICD-10-CM

## 2020-09-16 ASSESSMENT — MIFFLIN-ST. JEOR: SCORE: 1099.73

## 2020-09-25 ENCOUNTER — RECORDS - HEALTHEAST (OUTPATIENT)
Dept: ADMINISTRATIVE | Facility: OTHER | Age: 75
End: 2020-09-25

## 2020-12-02 ENCOUNTER — RECORDS - HEALTHEAST (OUTPATIENT)
Dept: ADMINISTRATIVE | Facility: OTHER | Age: 75
End: 2020-12-02

## 2020-12-15 ENCOUNTER — COMMUNICATION - HEALTHEAST (OUTPATIENT)
Dept: INTERNAL MEDICINE | Facility: CLINIC | Age: 75
End: 2020-12-15

## 2020-12-17 ENCOUNTER — OFFICE VISIT - HEALTHEAST (OUTPATIENT)
Dept: INTERNAL MEDICINE | Facility: CLINIC | Age: 75
End: 2020-12-17

## 2020-12-17 DIAGNOSIS — D64.9 ANEMIA, UNSPECIFIED TYPE: ICD-10-CM

## 2020-12-17 LAB
BASOPHILS # BLD AUTO: 0.1 THOU/UL (ref 0–0.2)
BASOPHILS NFR BLD AUTO: 2 % (ref 0–2)
CREAT SERPL-MCNC: 0.71 MG/DL (ref 0.6–1.1)
EOSINOPHIL # BLD AUTO: 0.3 THOU/UL (ref 0–0.4)
EOSINOPHIL NFR BLD AUTO: 3 % (ref 0–6)
ERYTHROCYTE [DISTWIDTH] IN BLOOD BY AUTOMATED COUNT: 20.6 % (ref 11–14.5)
FERRITIN SERPL-MCNC: 12 NG/ML (ref 10–130)
GFR SERPL CREATININE-BSD FRML MDRD: >60 ML/MIN/1.73M2
HCT VFR BLD AUTO: 38.3 % (ref 35–47)
HGB BLD-MCNC: 10.8 G/DL (ref 12–16)
IMM GRANULOCYTES # BLD: 0 THOU/UL
IMM GRANULOCYTES NFR BLD: 0 %
LDH SERPL L TO P-CCNC: 231 U/L (ref 125–220)
LYMPHOCYTES # BLD AUTO: 2.4 THOU/UL (ref 0.8–4.4)
LYMPHOCYTES NFR BLD AUTO: 28 % (ref 20–40)
MCH RBC QN AUTO: 23 PG (ref 27–34)
MCHC RBC AUTO-ENTMCNC: 28.2 G/DL (ref 32–36)
MCV RBC AUTO: 82 FL (ref 80–100)
MONOCYTES # BLD AUTO: 0.5 THOU/UL (ref 0–0.9)
MONOCYTES NFR BLD AUTO: 6 % (ref 2–10)
NEUTROPHILS # BLD AUTO: 5.1 THOU/UL (ref 2–7.7)
NEUTROPHILS NFR BLD AUTO: 61 % (ref 50–70)
OVALOCYTES: ABNORMAL
PLAT MORPH BLD: NORMAL
PLATELET # BLD AUTO: 356 THOU/UL (ref 140–440)
PMV BLD AUTO: 9.3 FL (ref 8.5–12.5)
POLYCHROMASIA BLD QL SMEAR: ABNORMAL
RBC # BLD AUTO: 4.7 MILL/UL (ref 3.8–5.4)
RETICS # AUTO: 0.11 MILL/UL (ref 0.01–0.11)
RETICS/RBC NFR AUTO: 2.43 % (ref 0.8–2.7)
TSH SERPL DL<=0.005 MIU/L-ACNC: 2.37 UIU/ML (ref 0.3–5)
VIT B12 SERPL-MCNC: >2000 PG/ML (ref 213–816)
WBC: 8.4 THOU/UL (ref 4–11)

## 2020-12-17 ASSESSMENT — MIFFLIN-ST. JEOR: SCORE: 1081.59

## 2020-12-18 ENCOUNTER — RECORDS - HEALTHEAST (OUTPATIENT)
Dept: ADMINISTRATIVE | Facility: OTHER | Age: 75
End: 2020-12-18

## 2020-12-18 LAB
ALBUMIN PERCENT: 64.3 % (ref 51–67)
ALBUMIN SERPL ELPH-MCNC: 4.6 G/DL (ref 3.2–4.7)
ALPHA 1 PERCENT: 2.7 % (ref 2–4)
ALPHA 2 PERCENT: 8.6 % (ref 5–13)
ALPHA1 GLOB SERPL ELPH-MCNC: 0.2 G/DL (ref 0.1–0.3)
ALPHA2 GLOB SERPL ELPH-MCNC: 0.6 G/DL (ref 0.4–0.9)
B-GLOBULIN SERPL ELPH-MCNC: 0.8 G/DL (ref 0.7–1.2)
BETA PERCENT: 11.5 % (ref 10–17)
DAT, IGG, C3D (HISTORICAL CONVERSION): NORMAL
GAMMA GLOB SERPL ELPH-MCNC: 0.9 G/DL (ref 0.6–1.4)
GAMMA GLOBULIN PERCENT: 12.9 % (ref 9–20)
PATH ICD:: NORMAL
PROT PATTERN SERPL ELPH-IMP: NORMAL
PROT SERPL-MCNC: 7.1 G/DL (ref 6–8)
REVIEWING PATHOLOGIST: NORMAL

## 2020-12-22 LAB
TTG IGA SER-ACNC: 0.4 U/ML
TTG IGG SER-ACNC: <0.6 U/ML

## 2020-12-28 ENCOUNTER — COMMUNICATION - HEALTHEAST (OUTPATIENT)
Dept: INTERNAL MEDICINE | Facility: CLINIC | Age: 75
End: 2020-12-28

## 2021-01-08 ENCOUNTER — COMMUNICATION - HEALTHEAST (OUTPATIENT)
Dept: ADMINISTRATIVE | Facility: HOSPITAL | Age: 76
End: 2021-01-08

## 2021-01-08 ENCOUNTER — OFFICE VISIT - HEALTHEAST (OUTPATIENT)
Dept: INTERNAL MEDICINE | Facility: CLINIC | Age: 76
End: 2021-01-08

## 2021-01-08 DIAGNOSIS — D64.9 ANEMIA, UNSPECIFIED TYPE: ICD-10-CM

## 2021-01-08 ASSESSMENT — PATIENT HEALTH QUESTIONNAIRE - PHQ9: SUM OF ALL RESPONSES TO PHQ QUESTIONS 1-9: 0

## 2021-01-28 ENCOUNTER — AMBULATORY - HEALTHEAST (OUTPATIENT)
Dept: INFUSION THERAPY | Facility: HOSPITAL | Age: 76
End: 2021-01-28

## 2021-01-28 ENCOUNTER — OFFICE VISIT - HEALTHEAST (OUTPATIENT)
Dept: ONCOLOGY | Facility: HOSPITAL | Age: 76
End: 2021-01-28

## 2021-01-28 DIAGNOSIS — Z86.2 HISTORY OF IRON DEFICIENCY ANEMIA: ICD-10-CM

## 2021-01-28 DIAGNOSIS — D50.0 IRON DEFICIENCY ANEMIA DUE TO CHRONIC BLOOD LOSS: ICD-10-CM

## 2021-01-28 LAB
BASOPHILS # BLD AUTO: 0.1 THOU/UL (ref 0–0.2)
BASOPHILS NFR BLD AUTO: 1 % (ref 0–2)
EOSINOPHIL # BLD AUTO: 0.2 THOU/UL (ref 0–0.4)
EOSINOPHIL NFR BLD AUTO: 2 % (ref 0–6)
ERYTHROCYTE [DISTWIDTH] IN BLOOD BY AUTOMATED COUNT: 22.9 % (ref 11–14.5)
HCT VFR BLD AUTO: 43 % (ref 35–47)
HGB BLD-MCNC: 12.2 G/DL (ref 12–16)
IMM GRANULOCYTES # BLD: 0.1 THOU/UL
IMM GRANULOCYTES NFR BLD: 1 %
IRON SATN MFR SERPL: 52 % (ref 20–50)
IRON SERPL-MCNC: 234 UG/DL (ref 42–175)
LYMPHOCYTES # BLD AUTO: 1.8 THOU/UL (ref 0.8–4.4)
LYMPHOCYTES NFR BLD AUTO: 21 % (ref 20–40)
MCH RBC QN AUTO: 23.8 PG (ref 27–34)
MCHC RBC AUTO-ENTMCNC: 28.4 G/DL (ref 32–36)
MCV RBC AUTO: 84 FL (ref 80–100)
MONOCYTES # BLD AUTO: 0.5 THOU/UL (ref 0–0.9)
MONOCYTES NFR BLD AUTO: 6 % (ref 2–10)
NEUTROPHILS # BLD AUTO: 5.8 THOU/UL (ref 2–7.7)
NEUTROPHILS NFR BLD AUTO: 69 % (ref 50–70)
PLATELET # BLD AUTO: 374 THOU/UL (ref 140–440)
PMV BLD AUTO: 9 FL (ref 8.5–12.5)
RBC # BLD AUTO: 5.12 MILL/UL (ref 3.8–5.4)
TIBC SERPL-MCNC: 450 UG/DL (ref 313–563)
TRANSFERRIN SERPL-MCNC: 360 MG/DL (ref 212–360)
WBC: 8.4 THOU/UL (ref 4–11)

## 2021-02-03 ENCOUNTER — AMBULATORY - HEALTHEAST (OUTPATIENT)
Dept: INFUSION THERAPY | Facility: HOSPITAL | Age: 76
End: 2021-02-03

## 2021-02-03 DIAGNOSIS — D50.0 IRON DEFICIENCY ANEMIA DUE TO CHRONIC BLOOD LOSS: ICD-10-CM

## 2021-02-03 DIAGNOSIS — Z86.2 HISTORY OF IRON DEFICIENCY ANEMIA: ICD-10-CM

## 2021-02-03 LAB
HEMOCCULT STL QL: NEGATIVE

## 2021-02-04 ENCOUNTER — COMMUNICATION - HEALTHEAST (OUTPATIENT)
Dept: ONCOLOGY | Facility: HOSPITAL | Age: 76
End: 2021-02-04

## 2021-02-04 ENCOUNTER — COMMUNICATION - HEALTHEAST (OUTPATIENT)
Dept: INTERNAL MEDICINE | Facility: CLINIC | Age: 76
End: 2021-02-04

## 2021-02-08 ENCOUNTER — AMBULATORY - HEALTHEAST (OUTPATIENT)
Dept: NURSING | Facility: CLINIC | Age: 76
End: 2021-02-08

## 2021-02-18 ENCOUNTER — RECORDS - HEALTHEAST (OUTPATIENT)
Dept: ADMINISTRATIVE | Facility: OTHER | Age: 76
End: 2021-02-18

## 2021-02-19 ENCOUNTER — COMMUNICATION - HEALTHEAST (OUTPATIENT)
Dept: ONCOLOGY | Facility: HOSPITAL | Age: 76
End: 2021-02-19

## 2021-02-25 ENCOUNTER — AMBULATORY - HEALTHEAST (OUTPATIENT)
Dept: INFUSION THERAPY | Facility: HOSPITAL | Age: 76
End: 2021-02-25

## 2021-02-25 ENCOUNTER — OFFICE VISIT - HEALTHEAST (OUTPATIENT)
Dept: ONCOLOGY | Facility: HOSPITAL | Age: 76
End: 2021-02-25

## 2021-02-25 ENCOUNTER — RECORDS - HEALTHEAST (OUTPATIENT)
Dept: RADIOLOGY | Facility: CLINIC | Age: 76
End: 2021-02-25

## 2021-02-25 DIAGNOSIS — C21.1 MALIGNANT NEOPLASM OF ANAL CANAL (H): ICD-10-CM

## 2021-02-25 LAB — HIV 1+2 AB+HIV1 P24 AG SERPL QL IA: NEGATIVE

## 2021-02-26 ENCOUNTER — OFFICE VISIT - HEALTHEAST (OUTPATIENT)
Dept: RADIATION ONCOLOGY | Facility: CLINIC | Age: 76
End: 2021-02-26

## 2021-02-26 DIAGNOSIS — C21.1 MALIGNANT NEOPLASM OF ANAL CANAL (H): ICD-10-CM

## 2021-03-01 ENCOUNTER — AMBULATORY - HEALTHEAST (OUTPATIENT)
Dept: NURSING | Facility: CLINIC | Age: 76
End: 2021-03-01

## 2021-03-03 ENCOUNTER — RECORDS - HEALTHEAST (OUTPATIENT)
Dept: ADMINISTRATIVE | Facility: OTHER | Age: 76
End: 2021-03-03

## 2021-03-04 ENCOUNTER — AMBULATORY - HEALTHEAST (OUTPATIENT)
Dept: RADIATION ONCOLOGY | Facility: HOSPITAL | Age: 76
End: 2021-03-04

## 2021-03-04 DIAGNOSIS — C21.1 MALIGNANT NEOPLASM OF ANAL CANAL (H): ICD-10-CM

## 2021-03-05 ENCOUNTER — HOSPITAL ENCOUNTER (OUTPATIENT)
Dept: PET IMAGING | Facility: HOSPITAL | Age: 76
Setting detail: RADIATION/ONCOLOGY SERIES
Discharge: STILL ADMITTED OR ADMINISTRATIVE | End: 2021-03-05
Attending: STUDENT IN AN ORGANIZED HEALTH CARE EDUCATION/TRAINING PROGRAM

## 2021-03-05 DIAGNOSIS — C21.1 MALIGNANT NEOPLASM OF ANAL CANAL (H): ICD-10-CM

## 2021-03-05 LAB — GLUCOSE BLDC GLUCOMTR-MCNC: 109 MG/DL (ref 70–139)

## 2021-03-05 ASSESSMENT — MIFFLIN-ST. JEOR: SCORE: 1074.21

## 2021-03-09 ENCOUNTER — COMMUNICATION - HEALTHEAST (OUTPATIENT)
Dept: ONCOLOGY | Facility: HOSPITAL | Age: 76
End: 2021-03-09

## 2021-03-09 ENCOUNTER — OFFICE VISIT - HEALTHEAST (OUTPATIENT)
Dept: ONCOLOGY | Facility: CLINIC | Age: 76
End: 2021-03-09

## 2021-03-09 ENCOUNTER — COMMUNICATION - HEALTHEAST (OUTPATIENT)
Dept: ONCOLOGY | Facility: CLINIC | Age: 76
End: 2021-03-09

## 2021-03-09 ENCOUNTER — AMBULATORY - HEALTHEAST (OUTPATIENT)
Dept: ONCOLOGY | Facility: CLINIC | Age: 76
End: 2021-03-09

## 2021-03-09 DIAGNOSIS — H15.003 SCLERITIS, BILATERAL: ICD-10-CM

## 2021-03-09 DIAGNOSIS — C21.1 MALIGNANT NEOPLASM OF ANAL CANAL (H): ICD-10-CM

## 2021-03-09 DIAGNOSIS — Z11.59 ENCOUNTER FOR SCREENING FOR OTHER VIRAL DISEASES: ICD-10-CM

## 2021-03-09 LAB
ALBUMIN SERPL-MCNC: 4.1 G/DL (ref 3.5–5)
ALP SERPL-CCNC: 95 U/L (ref 45–120)
ALT SERPL W P-5'-P-CCNC: 19 U/L (ref 0–45)
ANION GAP SERPL CALCULATED.3IONS-SCNC: 7 MMOL/L (ref 5–18)
AST SERPL W P-5'-P-CCNC: 17 U/L (ref 0–40)
BILIRUB SERPL-MCNC: 0.6 MG/DL (ref 0–1)
BUN SERPL-MCNC: 10 MG/DL (ref 8–28)
CALCIUM SERPL-MCNC: 9.9 MG/DL (ref 8.5–10.5)
CHLORIDE BLD-SCNC: 103 MMOL/L (ref 98–107)
CO2 SERPL-SCNC: 26 MMOL/L (ref 22–31)
CREAT SERPL-MCNC: 0.7 MG/DL (ref 0.6–1.1)
GFR SERPL CREATININE-BSD FRML MDRD: >60 ML/MIN/1.73M2
GLUCOSE BLD-MCNC: 90 MG/DL (ref 70–125)
POTASSIUM BLD-SCNC: 4 MMOL/L (ref 3.5–5)
PROT SERPL-MCNC: 7.4 G/DL (ref 6–8)
SODIUM SERPL-SCNC: 136 MMOL/L (ref 136–145)

## 2021-03-09 ASSESSMENT — MIFFLIN-ST. JEOR: SCORE: 1082.84

## 2021-03-12 ENCOUNTER — AMBULATORY - HEALTHEAST (OUTPATIENT)
Dept: LAB | Facility: CLINIC | Age: 76
End: 2021-03-12

## 2021-03-12 DIAGNOSIS — Z11.59 ENCOUNTER FOR SCREENING FOR OTHER VIRAL DISEASES: ICD-10-CM

## 2021-03-13 LAB
SARS-COV-2 PCR COMMENT: NORMAL
SARS-COV-2 RNA SPEC QL NAA+PROBE: NEGATIVE
SARS-COV-2 VIRUS SPECIMEN SOURCE: NORMAL

## 2021-03-14 ENCOUNTER — COMMUNICATION - HEALTHEAST (OUTPATIENT)
Dept: SCHEDULING | Facility: CLINIC | Age: 76
End: 2021-03-14

## 2021-03-15 ENCOUNTER — HOSPITAL ENCOUNTER (OUTPATIENT)
Dept: INTERVENTIONAL RADIOLOGY/VASCULAR | Facility: HOSPITAL | Age: 76
Setting detail: RADIATION/ONCOLOGY SERIES
Discharge: STILL ADMITTED OR ADMINISTRATIVE | End: 2021-03-15
Attending: INTERNAL MEDICINE

## 2021-03-15 DIAGNOSIS — C21.1 MALIGNANT NEOPLASM OF ANAL CANAL (H): ICD-10-CM

## 2021-03-15 ASSESSMENT — MIFFLIN-ST. JEOR: SCORE: 1081.02

## 2021-03-19 ENCOUNTER — AMBULATORY - HEALTHEAST (OUTPATIENT)
Dept: LAB | Facility: CLINIC | Age: 76
End: 2021-03-19

## 2021-03-19 DIAGNOSIS — C21.1 MALIGNANT NEOPLASM OF ANAL CANAL (H): ICD-10-CM

## 2021-03-21 ENCOUNTER — COMMUNICATION - HEALTHEAST (OUTPATIENT)
Dept: SCHEDULING | Facility: CLINIC | Age: 76
End: 2021-03-21

## 2021-03-22 ENCOUNTER — AMBULATORY - HEALTHEAST (OUTPATIENT)
Dept: RADIATION ONCOLOGY | Facility: CLINIC | Age: 76
End: 2021-03-22

## 2021-03-22 ENCOUNTER — AMBULATORY - HEALTHEAST (OUTPATIENT)
Dept: INFUSION THERAPY | Facility: CLINIC | Age: 76
End: 2021-03-22

## 2021-03-22 ENCOUNTER — OFFICE VISIT - HEALTHEAST (OUTPATIENT)
Dept: ONCOLOGY | Facility: CLINIC | Age: 76
End: 2021-03-22

## 2021-03-22 ENCOUNTER — INFUSION - HEALTHEAST (OUTPATIENT)
Dept: INFUSION THERAPY | Facility: CLINIC | Age: 76
End: 2021-03-22

## 2021-03-22 DIAGNOSIS — Z51.11 ENCOUNTER FOR ANTINEOPLASTIC CHEMOTHERAPY: ICD-10-CM

## 2021-03-22 DIAGNOSIS — C21.1 MALIGNANT NEOPLASM OF ANAL CANAL (H): ICD-10-CM

## 2021-03-22 LAB
ALBUMIN SERPL-MCNC: 3.9 G/DL (ref 3.5–5)
ALP SERPL-CCNC: 90 U/L (ref 45–120)
ALT SERPL W P-5'-P-CCNC: 18 U/L (ref 0–45)
ANION GAP SERPL CALCULATED.3IONS-SCNC: 7 MMOL/L (ref 5–18)
AST SERPL W P-5'-P-CCNC: 20 U/L (ref 0–40)
BASOPHILS # BLD AUTO: 0.1 THOU/UL (ref 0–0.2)
BASOPHILS NFR BLD AUTO: 1 % (ref 0–2)
BILIRUB SERPL-MCNC: 0.7 MG/DL (ref 0–1)
BUN SERPL-MCNC: 12 MG/DL (ref 8–28)
CALCIUM SERPL-MCNC: 10.2 MG/DL (ref 8.5–10.5)
CHLORIDE BLD-SCNC: 105 MMOL/L (ref 98–107)
CO2 SERPL-SCNC: 24 MMOL/L (ref 22–31)
CREAT SERPL-MCNC: 0.65 MG/DL (ref 0.6–1.1)
EOSINOPHIL # BLD AUTO: 0.2 THOU/UL (ref 0–0.4)
EOSINOPHIL NFR BLD AUTO: 3 % (ref 0–6)
ERYTHROCYTE [DISTWIDTH] IN BLOOD BY AUTOMATED COUNT: 20.5 % (ref 11–14.5)
GFR SERPL CREATININE-BSD FRML MDRD: >60 ML/MIN/1.73M2
GLUCOSE BLD-MCNC: 86 MG/DL (ref 70–125)
HCT VFR BLD AUTO: 46 % (ref 35–47)
HGB BLD-MCNC: 14 G/DL (ref 12–16)
IMM GRANULOCYTES # BLD: 0 THOU/UL
IMM GRANULOCYTES NFR BLD: 0 %
LYMPHOCYTES # BLD AUTO: 1.1 THOU/UL (ref 0.8–4.4)
LYMPHOCYTES NFR BLD AUTO: 17 % (ref 20–40)
MCH RBC QN AUTO: 26.7 PG (ref 27–34)
MCHC RBC AUTO-ENTMCNC: 30.4 G/DL (ref 32–36)
MCV RBC AUTO: 88 FL (ref 80–100)
MONOCYTES # BLD AUTO: 0.6 THOU/UL (ref 0–0.9)
MONOCYTES NFR BLD AUTO: 8 % (ref 2–10)
NEUTROPHILS # BLD AUTO: 4.6 THOU/UL (ref 2–7.7)
NEUTROPHILS NFR BLD AUTO: 70 % (ref 50–70)
PLATELET # BLD AUTO: 179 THOU/UL (ref 140–440)
PMV BLD AUTO: 8.9 FL (ref 8.5–12.5)
POTASSIUM BLD-SCNC: 4.5 MMOL/L (ref 3.5–5)
PROT SERPL-MCNC: 7.3 G/DL (ref 6–8)
RBC # BLD AUTO: 5.24 MILL/UL (ref 3.8–5.4)
SODIUM SERPL-SCNC: 136 MMOL/L (ref 136–145)
WBC: 6.6 THOU/UL (ref 4–11)

## 2021-03-24 ENCOUNTER — AMBULATORY - HEALTHEAST (OUTPATIENT)
Dept: RADIATION ONCOLOGY | Facility: CLINIC | Age: 76
End: 2021-03-24

## 2021-03-24 ENCOUNTER — INFUSION - HEALTHEAST (OUTPATIENT)
Dept: INFUSION THERAPY | Facility: CLINIC | Age: 76
End: 2021-03-24

## 2021-03-24 DIAGNOSIS — C21.1 MALIGNANT NEOPLASM OF ANAL CANAL (H): ICD-10-CM

## 2021-03-25 ENCOUNTER — COMMUNICATION - HEALTHEAST (OUTPATIENT)
Dept: ONCOLOGY | Facility: CLINIC | Age: 76
End: 2021-03-25

## 2021-03-25 ENCOUNTER — OFFICE VISIT - HEALTHEAST (OUTPATIENT)
Dept: RADIATION ONCOLOGY | Facility: CLINIC | Age: 76
End: 2021-03-25

## 2021-03-25 DIAGNOSIS — C21.1 MALIGNANT NEOPLASM OF ANAL CANAL (H): ICD-10-CM

## 2021-03-26 ENCOUNTER — COMMUNICATION - HEALTHEAST (OUTPATIENT)
Dept: ONCOLOGY | Facility: HOSPITAL | Age: 76
End: 2021-03-26

## 2021-03-26 ENCOUNTER — OFFICE VISIT - HEALTHEAST (OUTPATIENT)
Dept: ONCOLOGY | Facility: CLINIC | Age: 76
End: 2021-03-26

## 2021-03-26 ENCOUNTER — INFUSION - HEALTHEAST (OUTPATIENT)
Dept: INFUSION THERAPY | Facility: CLINIC | Age: 76
End: 2021-03-26

## 2021-03-26 DIAGNOSIS — T45.1X5A CHEMOTHERAPY-INDUCED NAUSEA: ICD-10-CM

## 2021-03-26 DIAGNOSIS — E83.42 HYPOMAGNESEMIA: ICD-10-CM

## 2021-03-26 DIAGNOSIS — R11.0 NAUSEA: ICD-10-CM

## 2021-03-26 DIAGNOSIS — C21.1 MALIGNANT NEOPLASM OF ANAL CANAL (H): ICD-10-CM

## 2021-03-26 DIAGNOSIS — R11.0 CHEMOTHERAPY-INDUCED NAUSEA: ICD-10-CM

## 2021-03-26 DIAGNOSIS — E87.1 HYPONATREMIA: ICD-10-CM

## 2021-03-26 LAB
ANION GAP SERPL CALCULATED.3IONS-SCNC: 9 MMOL/L (ref 5–18)
BASOPHILS # BLD AUTO: 0 THOU/UL (ref 0–0.2)
BASOPHILS NFR BLD AUTO: 0 % (ref 0–2)
BUN SERPL-MCNC: 16 MG/DL (ref 8–28)
CALCIUM SERPL-MCNC: 9.5 MG/DL (ref 8.5–10.5)
CHLORIDE BLD-SCNC: 82 MMOL/L (ref 98–107)
CO2 SERPL-SCNC: 26 MMOL/L (ref 22–31)
CREAT SERPL-MCNC: 0.69 MG/DL (ref 0.6–1.1)
EOSINOPHIL # BLD AUTO: 0 THOU/UL (ref 0–0.4)
EOSINOPHIL NFR BLD AUTO: 0 % (ref 0–6)
ERYTHROCYTE [DISTWIDTH] IN BLOOD BY AUTOMATED COUNT: 17.9 % (ref 11–14.5)
GFR SERPL CREATININE-BSD FRML MDRD: >60 ML/MIN/1.73M2
GLUCOSE BLD-MCNC: 101 MG/DL (ref 70–125)
HCT VFR BLD AUTO: 36.7 % (ref 35–47)
HGB BLD-MCNC: 11.9 G/DL (ref 12–16)
IMM GRANULOCYTES # BLD: 0.1 THOU/UL
IMM GRANULOCYTES NFR BLD: 1 %
LYMPHOCYTES # BLD AUTO: 0.5 THOU/UL (ref 0.8–4.4)
LYMPHOCYTES NFR BLD AUTO: 5 % (ref 20–40)
MAGNESIUM SERPL-MCNC: 1.4 MG/DL (ref 1.8–2.6)
MCH RBC QN AUTO: 26.9 PG (ref 27–34)
MCHC RBC AUTO-ENTMCNC: 32.4 G/DL (ref 32–36)
MCV RBC AUTO: 83 FL (ref 80–100)
MONOCYTES # BLD AUTO: 0.1 THOU/UL (ref 0–0.9)
MONOCYTES NFR BLD AUTO: 1 % (ref 2–10)
NEUTROPHILS # BLD AUTO: 8 THOU/UL (ref 2–7.7)
NEUTROPHILS NFR BLD AUTO: 92 % (ref 50–70)
PLAT MORPH BLD: NORMAL
PLATELET # BLD AUTO: 153 THOU/UL (ref 140–440)
PMV BLD AUTO: 8.6 FL (ref 8.5–12.5)
POTASSIUM BLD-SCNC: 3.5 MMOL/L (ref 3.5–5)
RBC # BLD AUTO: 4.43 MILL/UL (ref 3.8–5.4)
REACTIVE LYMPHS: ABNORMAL
SODIUM SERPL-SCNC: 117 MMOL/L (ref 136–145)
WBC: 8.6 THOU/UL (ref 4–11)

## 2021-03-28 ENCOUNTER — COMMUNICATION - HEALTHEAST (OUTPATIENT)
Dept: SCHEDULING | Facility: CLINIC | Age: 76
End: 2021-03-28

## 2021-03-29 ENCOUNTER — COMMUNICATION - HEALTHEAST (OUTPATIENT)
Dept: ONCOLOGY | Facility: CLINIC | Age: 76
End: 2021-03-29

## 2021-03-29 DIAGNOSIS — K12.31 MUCOSITIS DUE TO CHEMOTHERAPY: ICD-10-CM

## 2021-03-30 ENCOUNTER — COMMUNICATION - HEALTHEAST (OUTPATIENT)
Dept: INTERNAL MEDICINE | Facility: CLINIC | Age: 76
End: 2021-03-30

## 2021-03-30 ENCOUNTER — AMBULATORY - HEALTHEAST (OUTPATIENT)
Dept: INFUSION THERAPY | Facility: CLINIC | Age: 76
End: 2021-03-30

## 2021-03-30 ENCOUNTER — OFFICE VISIT - HEALTHEAST (OUTPATIENT)
Dept: ONCOLOGY | Facility: CLINIC | Age: 76
End: 2021-03-30

## 2021-03-30 DIAGNOSIS — T45.1X5A CHEMOTHERAPY-INDUCED NAUSEA: ICD-10-CM

## 2021-03-30 DIAGNOSIS — I10 ESSENTIAL HYPERTENSION: ICD-10-CM

## 2021-03-30 DIAGNOSIS — E87.1 HYPONATREMIA: ICD-10-CM

## 2021-03-30 DIAGNOSIS — R11.0 CHEMOTHERAPY-INDUCED NAUSEA: ICD-10-CM

## 2021-03-30 DIAGNOSIS — C21.1 MALIGNANT NEOPLASM OF ANAL CANAL (H): ICD-10-CM

## 2021-03-30 LAB
ANION GAP SERPL CALCULATED.3IONS-SCNC: 9 MMOL/L (ref 5–18)
BUN SERPL-MCNC: 11 MG/DL (ref 8–28)
CALCIUM SERPL-MCNC: 8.8 MG/DL (ref 8.5–10.5)
CHLORIDE BLD-SCNC: 98 MMOL/L (ref 98–107)
CO2 SERPL-SCNC: 24 MMOL/L (ref 22–31)
CREAT SERPL-MCNC: 0.61 MG/DL (ref 0.6–1.1)
ERYTHROCYTE [DISTWIDTH] IN BLOOD BY AUTOMATED COUNT: 17.4 % (ref 11–14.5)
GFR SERPL CREATININE-BSD FRML MDRD: >60 ML/MIN/1.73M2
GLUCOSE BLD-MCNC: 54 MG/DL (ref 70–125)
HCT VFR BLD AUTO: 40.2 % (ref 35–47)
HGB BLD-MCNC: 12.5 G/DL (ref 12–16)
MCH RBC QN AUTO: 27.2 PG (ref 27–34)
MCHC RBC AUTO-ENTMCNC: 31.1 G/DL (ref 32–36)
MCV RBC AUTO: 87 FL (ref 80–100)
PLATELET # BLD AUTO: 133 THOU/UL (ref 140–440)
PMV BLD AUTO: 8 FL (ref 8.5–12.5)
POTASSIUM BLD-SCNC: 3.4 MMOL/L (ref 3.5–5)
RBC # BLD AUTO: 4.6 MILL/UL (ref 3.8–5.4)
SODIUM SERPL-SCNC: 131 MMOL/L (ref 136–145)
WBC: 2.6 THOU/UL (ref 4–11)

## 2021-04-02 ENCOUNTER — OFFICE VISIT - HEALTHEAST (OUTPATIENT)
Dept: RADIATION ONCOLOGY | Facility: CLINIC | Age: 76
End: 2021-04-02

## 2021-04-02 ENCOUNTER — AMBULATORY - HEALTHEAST (OUTPATIENT)
Dept: RADIATION ONCOLOGY | Facility: HOSPITAL | Age: 76
End: 2021-04-02

## 2021-04-02 DIAGNOSIS — C21.1 MALIGNANT NEOPLASM OF ANAL CANAL (H): ICD-10-CM

## 2021-04-02 DIAGNOSIS — K12.31 MUCOSITIS DUE TO CHEMOTHERAPY: ICD-10-CM

## 2021-04-05 ENCOUNTER — AMBULATORY - HEALTHEAST (OUTPATIENT)
Dept: INFUSION THERAPY | Facility: CLINIC | Age: 76
End: 2021-04-05

## 2021-04-05 DIAGNOSIS — C21.1 MALIGNANT NEOPLASM OF ANAL CANAL (H): ICD-10-CM

## 2021-04-05 LAB
ALBUMIN SERPL-MCNC: 3.4 G/DL (ref 3.5–5)
ALP SERPL-CCNC: 60 U/L (ref 45–120)
ALT SERPL W P-5'-P-CCNC: 16 U/L (ref 0–45)
ANION GAP SERPL CALCULATED.3IONS-SCNC: 8 MMOL/L (ref 5–18)
AST SERPL W P-5'-P-CCNC: 16 U/L (ref 0–40)
BASOPHILS # BLD AUTO: 0 THOU/UL (ref 0–0.2)
BASOPHILS NFR BLD AUTO: 2 % (ref 0–2)
BILIRUB SERPL-MCNC: 1 MG/DL (ref 0–1)
BUN SERPL-MCNC: 4 MG/DL (ref 8–28)
CALCIUM SERPL-MCNC: 9.2 MG/DL (ref 8.5–10.5)
CHLORIDE BLD-SCNC: 98 MMOL/L (ref 98–107)
CO2 SERPL-SCNC: 24 MMOL/L (ref 22–31)
CREAT SERPL-MCNC: 0.63 MG/DL (ref 0.6–1.1)
EOSINOPHIL COUNT (ABSOLUTE): 0 THOU/UL (ref 0–0.4)
EOSINOPHIL NFR BLD AUTO: 6 % (ref 0–6)
ERYTHROCYTE [DISTWIDTH] IN BLOOD BY AUTOMATED COUNT: 16.9 % (ref 11–14.5)
GFR SERPL CREATININE-BSD FRML MDRD: >60 ML/MIN/1.73M2
GLUCOSE BLD-MCNC: 102 MG/DL (ref 70–125)
HCT VFR BLD AUTO: 36 % (ref 35–47)
HGB BLD-MCNC: 11.2 G/DL (ref 12–16)
IMMATURE GRANULOCYTE % - MAN (DIFF): 0 %
IMMATURE GRANULOCYTE ABSOLUTE - MAN (DIFF): 0 THOU/UL
LYMPHOCYTES # BLD AUTO: 0.2 THOU/UL (ref 0.8–4.4)
LYMPHOCYTES NFR BLD AUTO: 40 % (ref 20–40)
MCH RBC QN AUTO: 27.2 PG (ref 27–34)
MCHC RBC AUTO-ENTMCNC: 31.1 G/DL (ref 32–36)
MCV RBC AUTO: 87 FL (ref 80–100)
MONOCYTES # BLD AUTO: 0.1 THOU/UL (ref 0–0.9)
MONOCYTES NFR BLD AUTO: 28 % (ref 2–10)
PLAT MORPH BLD: ABNORMAL
PLATELET # BLD AUTO: 59 THOU/UL (ref 140–440)
PMV BLD AUTO: 9.5 FL (ref 8.5–12.5)
POTASSIUM BLD-SCNC: 3.7 MMOL/L (ref 3.5–5)
PROT SERPL-MCNC: 6.3 G/DL (ref 6–8)
RBC # BLD AUTO: 4.12 MILL/UL (ref 3.8–5.4)
SODIUM SERPL-SCNC: 130 MMOL/L (ref 136–145)
TOTAL NEUTROPHILS-ABS(DIFF): 0.1 THOU/UL (ref 2–7.7)
TOTAL NEUTROPHILS-REL(DIFF): 24 % (ref 50–70)
WBC: 0.4 THOU/UL (ref 4–11)

## 2021-04-06 ENCOUNTER — COMMUNICATION - HEALTHEAST (OUTPATIENT)
Dept: RADIATION ONCOLOGY | Facility: CLINIC | Age: 76
End: 2021-04-06

## 2021-04-07 ENCOUNTER — COMMUNICATION - HEALTHEAST (OUTPATIENT)
Dept: RADIATION ONCOLOGY | Facility: CLINIC | Age: 76
End: 2021-04-07

## 2021-04-07 ENCOUNTER — COMMUNICATION - HEALTHEAST (OUTPATIENT)
Dept: SCHEDULING | Facility: CLINIC | Age: 76
End: 2021-04-07

## 2021-04-08 ENCOUNTER — OFFICE VISIT - HEALTHEAST (OUTPATIENT)
Dept: RADIATION ONCOLOGY | Facility: CLINIC | Age: 76
End: 2021-04-08

## 2021-04-08 ENCOUNTER — AMBULATORY - HEALTHEAST (OUTPATIENT)
Dept: RADIATION ONCOLOGY | Facility: CLINIC | Age: 76
End: 2021-04-08

## 2021-04-08 DIAGNOSIS — C21.1 MALIGNANT NEOPLASM OF ANAL CANAL (H): ICD-10-CM

## 2021-04-09 ENCOUNTER — INFUSION - HEALTHEAST (OUTPATIENT)
Dept: INFUSION THERAPY | Facility: CLINIC | Age: 76
End: 2021-04-09

## 2021-04-09 ENCOUNTER — COMMUNICATION - HEALTHEAST (OUTPATIENT)
Dept: INTERNAL MEDICINE | Facility: CLINIC | Age: 76
End: 2021-04-09

## 2021-04-09 DIAGNOSIS — C21.1 MALIGNANT NEOPLASM OF ANAL CANAL (H): ICD-10-CM

## 2021-04-12 ENCOUNTER — AMBULATORY - HEALTHEAST (OUTPATIENT)
Dept: RADIATION ONCOLOGY | Facility: CLINIC | Age: 76
End: 2021-04-12

## 2021-04-16 ENCOUNTER — OFFICE VISIT - HEALTHEAST (OUTPATIENT)
Dept: RADIATION ONCOLOGY | Facility: CLINIC | Age: 76
End: 2021-04-16

## 2021-04-16 DIAGNOSIS — C21.1 MALIGNANT NEOPLASM OF ANAL CANAL (H): ICD-10-CM

## 2021-04-19 ENCOUNTER — AMBULATORY - HEALTHEAST (OUTPATIENT)
Dept: INFUSION THERAPY | Facility: CLINIC | Age: 76
End: 2021-04-19

## 2021-04-19 ENCOUNTER — INFUSION - HEALTHEAST (OUTPATIENT)
Dept: INFUSION THERAPY | Facility: CLINIC | Age: 76
End: 2021-04-19

## 2021-04-19 ENCOUNTER — OFFICE VISIT - HEALTHEAST (OUTPATIENT)
Dept: ONCOLOGY | Facility: CLINIC | Age: 76
End: 2021-04-19

## 2021-04-19 DIAGNOSIS — C21.1 MALIGNANT NEOPLASM OF ANAL CANAL (H): ICD-10-CM

## 2021-04-19 DIAGNOSIS — Z51.11 ENCOUNTER FOR ANTINEOPLASTIC CHEMOTHERAPY: ICD-10-CM

## 2021-04-19 DIAGNOSIS — K12.31 MUCOSITIS DUE TO CHEMOTHERAPY: ICD-10-CM

## 2021-04-19 DIAGNOSIS — Z76.89 PREVENTION OF CHEMOTHERAPY-INDUCED NEUTROPENIA: ICD-10-CM

## 2021-04-19 LAB
ALBUMIN SERPL-MCNC: 3.2 G/DL (ref 3.5–5)
ALP SERPL-CCNC: 67 U/L (ref 45–120)
ALT SERPL W P-5'-P-CCNC: 33 U/L (ref 0–45)
ANION GAP SERPL CALCULATED.3IONS-SCNC: 9 MMOL/L (ref 5–18)
AST SERPL W P-5'-P-CCNC: 19 U/L (ref 0–40)
BASOPHILS # BLD AUTO: 0 THOU/UL (ref 0–0.2)
BASOPHILS NFR BLD AUTO: 1 % (ref 0–2)
BILIRUB SERPL-MCNC: 0.4 MG/DL (ref 0–1)
BUN SERPL-MCNC: 10 MG/DL (ref 8–28)
CALCIUM SERPL-MCNC: 9.1 MG/DL (ref 8.5–10.5)
CHLORIDE BLD-SCNC: 108 MMOL/L (ref 98–107)
CO2 SERPL-SCNC: 22 MMOL/L (ref 22–31)
CREAT SERPL-MCNC: 0.59 MG/DL (ref 0.6–1.1)
EOSINOPHIL # BLD AUTO: 0.3 THOU/UL (ref 0–0.4)
EOSINOPHIL NFR BLD AUTO: 6 % (ref 0–6)
ERYTHROCYTE [DISTWIDTH] IN BLOOD BY AUTOMATED COUNT: 20.8 % (ref 11–14.5)
GFR SERPL CREATININE-BSD FRML MDRD: >60 ML/MIN/1.73M2
GLUCOSE BLD-MCNC: 107 MG/DL (ref 70–125)
HCT VFR BLD AUTO: 35.8 % (ref 35–47)
HGB BLD-MCNC: 11 G/DL (ref 12–16)
IMM GRANULOCYTES # BLD: 0 THOU/UL
IMM GRANULOCYTES NFR BLD: 1 %
LYMPHOCYTES # BLD AUTO: 0.6 THOU/UL (ref 0.8–4.4)
LYMPHOCYTES NFR BLD AUTO: 14 % (ref 20–40)
MCH RBC QN AUTO: 28.2 PG (ref 27–34)
MCHC RBC AUTO-ENTMCNC: 30.7 G/DL (ref 32–36)
MCV RBC AUTO: 92 FL (ref 80–100)
MONOCYTES # BLD AUTO: 0.4 THOU/UL (ref 0–0.9)
MONOCYTES NFR BLD AUTO: 8 % (ref 2–10)
NEUTROPHILS # BLD AUTO: 3.1 THOU/UL (ref 2–7.7)
NEUTROPHILS NFR BLD AUTO: 71 % (ref 50–70)
PLATELET # BLD AUTO: 321 THOU/UL (ref 140–440)
PMV BLD AUTO: 8.1 FL (ref 8.5–12.5)
POTASSIUM BLD-SCNC: 4 MMOL/L (ref 3.5–5)
PROT SERPL-MCNC: 6.2 G/DL (ref 6–8)
RBC # BLD AUTO: 3.9 MILL/UL (ref 3.8–5.4)
SODIUM SERPL-SCNC: 139 MMOL/L (ref 136–145)
WBC: 4.4 THOU/UL (ref 4–11)

## 2021-04-19 RX ORDER — AMLODIPINE BESYLATE 10 MG/1
10 TABLET ORAL DAILY
Status: SHIPPED | COMMUNITY
Start: 2021-04-19 | End: 2021-07-14

## 2021-04-19 RX ORDER — LISINOPRIL 20 MG/1
20 TABLET ORAL DAILY
Status: SHIPPED | COMMUNITY
Start: 2021-04-19 | End: 2021-08-23

## 2021-04-21 ENCOUNTER — AMBULATORY - HEALTHEAST (OUTPATIENT)
Dept: INFUSION THERAPY | Facility: CLINIC | Age: 76
End: 2021-04-21

## 2021-04-21 ENCOUNTER — OFFICE VISIT - HEALTHEAST (OUTPATIENT)
Dept: ONCOLOGY | Facility: CLINIC | Age: 76
End: 2021-04-21

## 2021-04-21 DIAGNOSIS — R11.0 CHEMOTHERAPY-INDUCED NAUSEA: ICD-10-CM

## 2021-04-21 DIAGNOSIS — C21.1 MALIGNANT NEOPLASM OF ANAL CANAL (H): ICD-10-CM

## 2021-04-21 DIAGNOSIS — T45.1X5A CHEMOTHERAPY-INDUCED NAUSEA: ICD-10-CM

## 2021-04-21 DIAGNOSIS — Z76.89 PREVENTION OF CHEMOTHERAPY-INDUCED NEUTROPENIA: ICD-10-CM

## 2021-04-21 LAB
ANION GAP SERPL CALCULATED.3IONS-SCNC: 6 MMOL/L (ref 5–18)
BASOPHILS # BLD AUTO: 0 THOU/UL (ref 0–0.2)
BASOPHILS NFR BLD AUTO: 1 % (ref 0–2)
BUN SERPL-MCNC: 9 MG/DL (ref 8–28)
CALCIUM SERPL-MCNC: 9.7 MG/DL (ref 8.5–10.5)
CHLORIDE BLD-SCNC: 107 MMOL/L (ref 98–107)
CO2 SERPL-SCNC: 26 MMOL/L (ref 22–31)
CREAT SERPL-MCNC: 0.65 MG/DL (ref 0.6–1.1)
EOSINOPHIL # BLD AUTO: 0.1 THOU/UL (ref 0–0.4)
EOSINOPHIL NFR BLD AUTO: 4 % (ref 0–6)
ERYTHROCYTE [DISTWIDTH] IN BLOOD BY AUTOMATED COUNT: 21.2 % (ref 11–14.5)
GFR SERPL CREATININE-BSD FRML MDRD: >60 ML/MIN/1.73M2
GLUCOSE BLD-MCNC: 98 MG/DL (ref 70–125)
HCT VFR BLD AUTO: 38.9 % (ref 35–47)
HGB BLD-MCNC: 11.8 G/DL (ref 12–16)
IMM GRANULOCYTES # BLD: 0 THOU/UL
IMM GRANULOCYTES NFR BLD: 0 %
LYMPHOCYTES # BLD AUTO: 0.4 THOU/UL (ref 0.8–4.4)
LYMPHOCYTES NFR BLD AUTO: 16 % (ref 20–40)
MCH RBC QN AUTO: 28.4 PG (ref 27–34)
MCHC RBC AUTO-ENTMCNC: 30.3 G/DL (ref 32–36)
MCV RBC AUTO: 94 FL (ref 80–100)
MONOCYTES # BLD AUTO: 0.2 THOU/UL (ref 0–0.9)
MONOCYTES NFR BLD AUTO: 10 % (ref 2–10)
NEUTROPHILS # BLD AUTO: 1.6 THOU/UL (ref 2–7.7)
NEUTROPHILS NFR BLD AUTO: 69 % (ref 50–70)
PLAT MORPH BLD: NORMAL
PLATELET # BLD AUTO: 267 THOU/UL (ref 140–440)
PMV BLD AUTO: 8.1 FL (ref 8.5–12.5)
POTASSIUM BLD-SCNC: 4.4 MMOL/L (ref 3.5–5)
RBC # BLD AUTO: 4.16 MILL/UL (ref 3.8–5.4)
SODIUM SERPL-SCNC: 139 MMOL/L (ref 136–145)
WBC: 2.3 THOU/UL (ref 4–11)

## 2021-04-23 ENCOUNTER — OFFICE VISIT - HEALTHEAST (OUTPATIENT)
Dept: RADIATION ONCOLOGY | Facility: CLINIC | Age: 76
End: 2021-04-23

## 2021-04-23 ENCOUNTER — INFUSION - HEALTHEAST (OUTPATIENT)
Dept: INFUSION THERAPY | Facility: CLINIC | Age: 76
End: 2021-04-23

## 2021-04-23 DIAGNOSIS — Z76.89 PREVENTION OF CHEMOTHERAPY-INDUCED NEUTROPENIA: ICD-10-CM

## 2021-04-23 DIAGNOSIS — C21.1 MALIGNANT NEOPLASM OF ANAL CANAL (H): ICD-10-CM

## 2021-04-26 ENCOUNTER — COMMUNICATION - HEALTHEAST (OUTPATIENT)
Dept: ONCOLOGY | Facility: CLINIC | Age: 76
End: 2021-04-26

## 2021-04-26 ENCOUNTER — AMBULATORY - HEALTHEAST (OUTPATIENT)
Dept: ONCOLOGY | Facility: CLINIC | Age: 76
End: 2021-04-26

## 2021-04-26 DIAGNOSIS — C21.1 MALIGNANT NEOPLASM OF ANAL CANAL (H): ICD-10-CM

## 2021-04-27 ENCOUNTER — AMBULATORY - HEALTHEAST (OUTPATIENT)
Dept: INFUSION THERAPY | Facility: CLINIC | Age: 76
End: 2021-04-27

## 2021-04-27 ENCOUNTER — COMMUNICATION - HEALTHEAST (OUTPATIENT)
Dept: RADIATION ONCOLOGY | Facility: CLINIC | Age: 76
End: 2021-04-27

## 2021-04-27 ENCOUNTER — OFFICE VISIT - HEALTHEAST (OUTPATIENT)
Dept: ONCOLOGY | Facility: CLINIC | Age: 76
End: 2021-04-27

## 2021-04-27 DIAGNOSIS — K12.31 MUCOSITIS DUE TO CHEMOTHERAPY: ICD-10-CM

## 2021-04-27 DIAGNOSIS — L30.9 PERIANAL DERMATITIS: ICD-10-CM

## 2021-04-27 DIAGNOSIS — C21.1 MALIGNANT NEOPLASM OF ANAL CANAL (H): ICD-10-CM

## 2021-04-27 LAB
ANION GAP SERPL CALCULATED.3IONS-SCNC: 9 MMOL/L (ref 5–18)
BASOPHILS # BLD AUTO: 0 THOU/UL (ref 0–0.2)
BASOPHILS NFR BLD AUTO: 0 % (ref 0–2)
BUN SERPL-MCNC: 10 MG/DL (ref 8–28)
CALCIUM SERPL-MCNC: 9.2 MG/DL (ref 8.5–10.5)
CHLORIDE BLD-SCNC: 104 MMOL/L (ref 98–107)
CO2 SERPL-SCNC: 21 MMOL/L (ref 22–31)
CREAT SERPL-MCNC: 0.74 MG/DL (ref 0.6–1.1)
EOSINOPHIL COUNT (ABSOLUTE): 0.4 THOU/UL (ref 0–0.4)
EOSINOPHIL NFR BLD AUTO: 8 % (ref 0–6)
ERYTHROCYTE [DISTWIDTH] IN BLOOD BY AUTOMATED COUNT: 20 % (ref 11–14.5)
GFR SERPL CREATININE-BSD FRML MDRD: >60 ML/MIN/1.73M2
GLUCOSE BLD-MCNC: 137 MG/DL (ref 70–125)
HCT VFR BLD AUTO: 33 % (ref 35–47)
HGB BLD-MCNC: 10.3 G/DL (ref 12–16)
IMMATURE GRANULOCYTE % - MAN (DIFF): 0 %
IMMATURE GRANULOCYTE ABSOLUTE - MAN (DIFF): 0 THOU/UL
LYMPHOCYTES # BLD AUTO: 0.4 THOU/UL (ref 0.8–4.4)
LYMPHOCYTES NFR BLD AUTO: 8 % (ref 20–40)
MCH RBC QN AUTO: 28.7 PG (ref 27–34)
MCHC RBC AUTO-ENTMCNC: 31.2 G/DL (ref 32–36)
MCV RBC AUTO: 92 FL (ref 80–100)
MONOCYTES # BLD AUTO: 0.1 THOU/UL (ref 0–0.9)
MONOCYTES NFR BLD AUTO: 1 % (ref 2–10)
PLAT MORPH BLD: NORMAL
PLATELET # BLD AUTO: 132 THOU/UL (ref 140–440)
PMV BLD AUTO: 8.6 FL (ref 8.5–12.5)
POTASSIUM BLD-SCNC: 3.7 MMOL/L (ref 3.5–5)
RBC # BLD AUTO: 3.59 MILL/UL (ref 3.8–5.4)
SODIUM SERPL-SCNC: 134 MMOL/L (ref 136–145)
TOTAL NEUTROPHILS-ABS(DIFF): 4.2 THOU/UL (ref 2–7.7)
TOTAL NEUTROPHILS-REL(DIFF): 83 % (ref 50–70)
WBC: 5 THOU/UL (ref 4–11)

## 2021-04-29 ENCOUNTER — OFFICE VISIT - HEALTHEAST (OUTPATIENT)
Dept: RADIATION ONCOLOGY | Facility: CLINIC | Age: 76
End: 2021-04-29

## 2021-04-29 DIAGNOSIS — C21.1 MALIGNANT NEOPLASM OF ANAL CANAL (H): ICD-10-CM

## 2021-05-01 ENCOUNTER — HEALTH MAINTENANCE LETTER (OUTPATIENT)
Age: 76
End: 2021-05-01

## 2021-05-07 ENCOUNTER — COMMUNICATION - HEALTHEAST (OUTPATIENT)
Dept: ONCOLOGY | Facility: CLINIC | Age: 76
End: 2021-05-07

## 2021-05-10 ENCOUNTER — COMMUNICATION - HEALTHEAST (OUTPATIENT)
Dept: RADIATION ONCOLOGY | Facility: CLINIC | Age: 76
End: 2021-05-10

## 2021-05-11 ENCOUNTER — AMBULATORY - HEALTHEAST (OUTPATIENT)
Dept: INFUSION THERAPY | Facility: CLINIC | Age: 76
End: 2021-05-11

## 2021-05-11 ENCOUNTER — INFUSION - HEALTHEAST (OUTPATIENT)
Dept: INFUSION THERAPY | Facility: CLINIC | Age: 76
End: 2021-05-11

## 2021-05-11 ENCOUNTER — OFFICE VISIT - HEALTHEAST (OUTPATIENT)
Dept: ONCOLOGY | Facility: CLINIC | Age: 76
End: 2021-05-11

## 2021-05-11 DIAGNOSIS — L30.9 PERIANAL DERMATITIS: ICD-10-CM

## 2021-05-11 DIAGNOSIS — C21.1 MALIGNANT NEOPLASM OF ANAL CANAL (H): ICD-10-CM

## 2021-05-11 DIAGNOSIS — K12.31 MUCOSITIS DUE TO CHEMOTHERAPY: ICD-10-CM

## 2021-05-11 DIAGNOSIS — R11.0 NAUSEA: ICD-10-CM

## 2021-05-11 DIAGNOSIS — I10 ESSENTIAL HYPERTENSION: ICD-10-CM

## 2021-05-11 LAB
ANION GAP SERPL CALCULATED.3IONS-SCNC: 8 MMOL/L (ref 5–18)
BASOPHILS # BLD AUTO: 0.1 THOU/UL (ref 0–0.2)
BASOPHILS NFR BLD AUTO: 1 % (ref 0–2)
BUN SERPL-MCNC: 8 MG/DL (ref 8–28)
CALCIUM SERPL-MCNC: 9.1 MG/DL (ref 8.5–10.5)
CHLORIDE BLD-SCNC: 105 MMOL/L (ref 98–107)
CO2 SERPL-SCNC: 23 MMOL/L (ref 22–31)
CREAT SERPL-MCNC: 0.55 MG/DL (ref 0.6–1.1)
EOSINOPHIL # BLD AUTO: 0.1 THOU/UL (ref 0–0.4)
EOSINOPHIL NFR BLD AUTO: 2 % (ref 0–6)
ERYTHROCYTE [DISTWIDTH] IN BLOOD BY AUTOMATED COUNT: 23.5 % (ref 11–14.5)
GFR SERPL CREATININE-BSD FRML MDRD: >60 ML/MIN/1.73M2
GLUCOSE BLD-MCNC: 94 MG/DL (ref 70–125)
HCT VFR BLD AUTO: 32.1 % (ref 35–47)
HGB BLD-MCNC: 10.1 G/DL (ref 12–16)
IMM GRANULOCYTES # BLD: 0 THOU/UL
IMM GRANULOCYTES NFR BLD: 0 %
LYMPHOCYTES # BLD AUTO: 1.3 THOU/UL (ref 0.8–4.4)
LYMPHOCYTES NFR BLD AUTO: 27 % (ref 20–40)
MCH RBC QN AUTO: 30.1 PG (ref 27–34)
MCHC RBC AUTO-ENTMCNC: 31.5 G/DL (ref 32–36)
MCV RBC AUTO: 96 FL (ref 80–100)
MONOCYTES # BLD AUTO: 0.6 THOU/UL (ref 0–0.9)
MONOCYTES NFR BLD AUTO: 12 % (ref 2–10)
NEUTROPHILS # BLD AUTO: 2.8 THOU/UL (ref 2–7.7)
NEUTROPHILS NFR BLD AUTO: 58 % (ref 50–70)
PLATELET # BLD AUTO: 343 THOU/UL (ref 140–440)
PMV BLD AUTO: 8.6 FL (ref 8.5–12.5)
POTASSIUM BLD-SCNC: 4.3 MMOL/L (ref 3.5–5)
RBC # BLD AUTO: 3.36 MILL/UL (ref 3.8–5.4)
SODIUM SERPL-SCNC: 136 MMOL/L (ref 136–145)
WBC: 4.8 THOU/UL (ref 4–11)

## 2021-05-14 ENCOUNTER — OFFICE VISIT - HEALTHEAST (OUTPATIENT)
Dept: RADIATION ONCOLOGY | Facility: CLINIC | Age: 76
End: 2021-05-14

## 2021-05-14 DIAGNOSIS — C21.1 MALIGNANT NEOPLASM OF ANAL CANAL (H): ICD-10-CM

## 2021-05-26 ENCOUNTER — RECORDS - HEALTHEAST (OUTPATIENT)
Dept: ADMINISTRATIVE | Facility: CLINIC | Age: 76
End: 2021-05-26

## 2021-05-26 ENCOUNTER — COMMUNICATION - HEALTHEAST (OUTPATIENT)
Dept: SCHEDULING | Facility: CLINIC | Age: 76
End: 2021-05-26

## 2021-05-27 ENCOUNTER — RECORDS - HEALTHEAST (OUTPATIENT)
Dept: ADMINISTRATIVE | Facility: CLINIC | Age: 76
End: 2021-05-27

## 2021-05-27 VITALS — DIASTOLIC BLOOD PRESSURE: 73 MMHG | SYSTOLIC BLOOD PRESSURE: 113 MMHG | OXYGEN SATURATION: 100 % | HEART RATE: 74 BPM

## 2021-05-27 VITALS
SYSTOLIC BLOOD PRESSURE: 99 MMHG | DIASTOLIC BLOOD PRESSURE: 70 MMHG | TEMPERATURE: 98.2 F | HEART RATE: 82 BPM | OXYGEN SATURATION: 98 %

## 2021-05-27 VITALS — HEART RATE: 93 BPM | DIASTOLIC BLOOD PRESSURE: 93 MMHG | SYSTOLIC BLOOD PRESSURE: 137 MMHG | OXYGEN SATURATION: 98 %

## 2021-05-27 VITALS
TEMPERATURE: 98.3 F | DIASTOLIC BLOOD PRESSURE: 83 MMHG | OXYGEN SATURATION: 99 % | HEART RATE: 83 BPM | WEIGHT: 135 LBS | SYSTOLIC BLOOD PRESSURE: 136 MMHG

## 2021-05-27 VITALS
OXYGEN SATURATION: 100 % | DIASTOLIC BLOOD PRESSURE: 73 MMHG | TEMPERATURE: 97.7 F | HEART RATE: 81 BPM | SYSTOLIC BLOOD PRESSURE: 131 MMHG | WEIGHT: 134.9 LBS

## 2021-05-27 ASSESSMENT — PATIENT HEALTH QUESTIONNAIRE - PHQ9: SUM OF ALL RESPONSES TO PHQ QUESTIONS 1-9: 0

## 2021-05-27 NOTE — ANESTHESIA CARE TRANSFER NOTE
Last vitals:   Vitals:    03/26/19 0820   BP: 132/85   Pulse: 93   Resp: 16   Temp: 37.8  C (100  F)   SpO2: 96%     Patient's level of consciousness is drowsy  Spontaneous respirations: yes  Maintains airway independently: yes  Dentition unchanged: yes  Oropharynx: oropharynx clear of all foreign objects    QCDR Measures:  ASA# 20 - Surgical Safety Checklist: WHO surgical safety checklist completed prior to induction    PQRS# 430 - Adult PONV Prevention: 4558F - Pt received => 2 anti-emetic agents (different classes) preop & intraop  ASA# 8 - Peds PONV Prevention: NA - Not pediatric patient, not GA or 2 or more risk factors NOT present  PQRS# 424 - Raisa-op Temp Management: 4559F - At least one body temp DOCUMENTED => 35.5C or 95.9F within required timeframe  PQRS# 426 - PACU Transfer Protocol: - Transfer of care checklist used  ASA# 14 - Acute Post-op Pain: ASA14B - Patient did NOT experience pain >= 7 out of 10

## 2021-05-27 NOTE — PROGRESS NOTES
TCM DISCHARGE FOLLOW UP CALL    Discharge Date:  3/26/2019  Reason for hospital stay (discharge diagnosis)::  (R) hip dislocation s/p closed reduction  Are you feeling better, the same or worse since your discharge?:  Patient is feeling better (hip is sore, Tylenol provides relief. Pt is very careful about hip precautions.)  Do you feel like you have a plan in the event of a health emergency?: Yes (Pt talks with her dtr daily.  Pt is aware of triage.)    (RN) Patient provided with information to call us in the event of a health emergency: No (Pt has clinic phone number)    As part of your discharge plan, were  home care services ordered for you?: No    Did you receive any new medications, or was there a change to your medications?: No    Do you have any follow up visits scheduled with your PCP or Specialist?:  No and Yes, with Specialist (Pt requested 4/2 appt be cancelled. Appt with Ortho 3/29)  I'm glad to hear you're doing well and we want you to continue to do well. Your PCP would like to see you for a follow-up visit. Can we help set that up for your today?: No    (RN) Provided patient the PCP's phone number to call if they have any questions or concerns?: Yes (Pt has clinic phone number)

## 2021-05-27 NOTE — ANESTHESIA POSTPROCEDURE EVALUATION
Patient: Oscar Zhao  CLOSED REDUCTION, HIP  Anesthesia type: No value filed.    Patient location: PACU  Last vitals:   Vitals:    03/26/19 1205   BP: 115/68   Pulse: 74   Resp: 16   Temp: 36.8  C (98.2  F)   SpO2: 93%     Post vital signs: stable  Level of consciousness: awake and responds to simple questions  Post-anesthesia pain: pain controlled  Post-anesthesia nausea and vomiting: no  Pulmonary: unassisted, return to baseline  Cardiovascular: stable and blood pressure at baseline  Hydration: adequate  Anesthetic events: no    QCDR Measures:  ASA# 11 - Raisa-op Cardiac Arrest: ASA11B - Patient did NOT experience unanticipated cardiac arrest  ASA# 12 - Raisa-op Mortality Rate: ASA12B - Patient did NOT die  ASA# 13 - PACU Re-Intubation Rate: NA - No ETT / LMA used for case  ASA# 10 - Composite Anes Safety: ASA10A - No serious adverse event    Additional Notes:

## 2021-05-27 NOTE — PROGRESS NOTES
Hospital discharge follow up call to pt, no answer.  Left VM message reminding pt of appt on 4/2 with Dr. Jones. RN will attempt call back at another time.

## 2021-05-27 NOTE — ANESTHESIA PREPROCEDURE EVALUATION
Anesthesia Evaluation      Patient summary reviewed   History of anesthetic complications     Airway   Mallampati: II  Neck ROM: full   Pulmonary - negative ROS and normal exam    breath sounds clear to auscultation  (-) shortness of breath, sleep apnea, not a smoker                         Cardiovascular - normal exam  Exercise tolerance: > or = 4 METS  (+) hypertension, ,     (-) angina  Rhythm: regular  Rate: normal,      ROS comment: Walks 3 - 4 miles/day       Neuro/Psych - negative ROS     Endo/Other    (+) arthritis,   (-) no obesity, not pregnant     GI/Hepatic/Renal    (+) hiatal hernia,             Dental - normal exam                        Anesthesia Plan  Planned anesthetic: total IV anesthesia and general mask    ASA 2   Induction: intravenous   Anesthetic plan and risks discussed with: patient  Anesthesia plan special considerations: antiemetics,   Post-op plan: routine recovery

## 2021-05-28 ENCOUNTER — RECORDS - HEALTHEAST (OUTPATIENT)
Dept: ADMINISTRATIVE | Facility: CLINIC | Age: 76
End: 2021-05-28

## 2021-05-28 NOTE — PROGRESS NOTES
Office Visit - Follow up    Oscar Zhao   74 y.o. female    Date of Visit: 5/9/2019    Chief Complaint   Patient presents with     Hospital Visit Follow Up     right hip dislocation - closed reduction     Hypertension       Subjective: Hypertension.    Recent hospital stay overnight for right hip dislocation closed reduction right hip excessive bruising.  Prior history of 4 years ago right total hip arthroplasty.    Seen by cardiology Dr. Mercer for hypertension.  Antihypertensive meds reviewed including amlodipine lisinopril and metoprolol.  No blood in stool or urine medication list reviewed well-tolerated normal effects.    Colonoscopy normal on September 7, 2011.  Mammogram allCLEAR June 25, 2018.    Otherwise no new problems she does have multiple drug allergies and these were reviewed including brimonidine or timolol plus levofloxacin and tetracycline and one other medication.    ROS: A comprehensive review of systems was performed and was otherwise negative    Medications:  Prior to Admission medications    Medication Sig Start Date End Date Taking? Authorizing Provider   amLODIPine (NORVASC) 5 MG tablet Take 2 tablets (10 mg total) by mouth daily. 12/14/18  Yes Royce Perez MD   cholecalciferol, vitamin D3, (VITAMIN D3) 2,000 unit capsule Take 2,000 Units by mouth daily.   Yes PROVIDER, HISTORICAL   cyanocobalamin 1000 MCG tablet Take 1,000 mcg by mouth daily.   Yes PROVIDER, HISTORICAL   folic acid (FOLVITE) 1 MG tablet Take 1 mg by mouth daily.  5/2/16  Yes PROVIDER, HISTORICAL   lisinopril (PRINIVIL,ZESTRIL) 20 MG tablet Take 1 tablet (20 mg total) by mouth daily. 12/22/18  Yes Royce Perez MD   LOTEMAX 0.5 % ophthalmic suspension APPLY 1 DROP IN BOTH EYES ONCE A DAY 10/26/18  Yes PROVIDER, HISTORICAL   methotrexate 2.5 MG tablet Take 10 mg by mouth once a week. (mondays)       9/21/17  Yes PROVIDER, HISTORICAL   metoprolol succinate (TOPROL XL) 25 MG Take 2 tablets (50 mg total) by mouth daily. 11/20/18   Yes Royce Perez MD   timolol (BETIMOL) 0.5 % ophthalmic solution Administer 1 drop to both eyes every morning.          Yes PROVIDER, HISTORICAL   acetaminophen (TYLENOL) 325 MG tablet Take 2 tablets (650 mg total) by mouth every 4 (four) hours as needed. 3/26/19 5/9/19  Ezio Hyman,        Allergies:   Allergies   Allergen Reactions     Adalimumab Myalgia     Brimonidine-Timolol Unknown     Redness itching in eyes     Levaquin [Levofloxacin]      Skin burn and couldn't get out bed for 5 days     Tetracyclines Hives       Immunizations:   Immunization History   Administered Date(s) Administered     DT (pediatric) 12/23/1998     Influenza P1t5-70, 01/11/2010     Influenza high dose, seasonal 10/15/2015, 11/28/2016, 11/17/2017, 11/05/2018     Influenza, Seasonal, Inj PF IIV3 12/08/2009     Influenza, inj, historic,unspecified 12/08/2009, 11/02/2011     Influenza, seasonal,quad inj 6-35 mos 11/09/2012, 10/02/2014     Influenza,seasonal, Inj IIV3 11/03/2005, 11/29/2007, 10/28/2008, 10/01/2010, 11/02/2011, 11/09/2012, 11/13/2013, 10/02/2014     Pneumo Conj 13-V (2010&after) 10/15/2015     Pneumo Polysac 23-V 03/06/2009     Td, Adult, Absorbed 06/30/2006     Td,adult,historic,unspecified 06/30/2006     Tdap 09/13/2006, 05/18/2012     ZOSTER, LIVE 08/27/2009       Exam Chest clear to auscultation and percussion.  Heart tones regular rhythm without murmur rub or gallop.  Abdomen soft nontender no organomegaly.  No peritoneal signs.  Extremities free of edema cyanosis or clubbing.  Neck veins nondistended no thyromegaly or scleral icterus noted, carotids full.  Skin warm and dry easily conversant good spirited.  Normal intelligence.  Neurologically intact no gross localizing findings.  120/72 pulse 68 respirations 18 O2 sats 99%.    Assessment and Plan  Hypertension well controlled.  120/72.  RTC 3 months time.    Multiple drug allergies listed and reviewed.    Hip dislocation right side with post procedure  bruising extensive now able to bear weight.  One night hospitalization and now relocated no bruising and discussed ways to prevent.  Increase core strengthening muscle  exercises ordered.    Status post right total hip arthroplasty 4 years prior.    Overweight BMI 26.  Discussed see below.    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

## 2021-05-29 ENCOUNTER — RECORDS - HEALTHEAST (OUTPATIENT)
Dept: ADMINISTRATIVE | Facility: CLINIC | Age: 76
End: 2021-05-29

## 2021-05-29 NOTE — TELEPHONE ENCOUNTER
RN cannot approve Refill Request    RN can NOT refill this medication med is not covered by policy/route to provider. Last office visit: 5/9/2019 Jak Jones MD Last Physical: 11/5/2018 Last MTM visit: Visit date not found Last visit same specialty: 1/30/2019 Royce Perez MD.  Next visit within 3 mo: Visit date not found  Next physical within 3 mo: Visit date not found      Masha Pastrana, [facility] Triage/Med Refill 6/15/2019    Requested Prescriptions   Pending Prescriptions Disp Refills     lisinopril (PRINIVIL,ZESTRIL) 20 MG tablet [Pharmacy Med Name: LISINOPRIL 20MG TABLETS] 30 tablet 0     Sig: TAKE 1 TABLET(20 MG) BY MOUTH DAILY       There is no refill protocol information for this order

## 2021-05-30 ENCOUNTER — RECORDS - HEALTHEAST (OUTPATIENT)
Dept: ADMINISTRATIVE | Facility: CLINIC | Age: 76
End: 2021-05-30

## 2021-05-30 VITALS — BODY MASS INDEX: 26.93 KG/M2 | HEIGHT: 63 IN | WEIGHT: 152 LBS

## 2021-05-30 VITALS — BODY MASS INDEX: 26.05 KG/M2 | HEIGHT: 63 IN | WEIGHT: 147 LBS

## 2021-05-30 VITALS — WEIGHT: 152 LBS | HEIGHT: 63 IN | BODY MASS INDEX: 26.93 KG/M2

## 2021-05-30 VITALS — WEIGHT: 155.1 LBS | BODY MASS INDEX: 27.47 KG/M2

## 2021-05-31 VITALS — BODY MASS INDEX: 25.34 KG/M2 | HEIGHT: 63 IN | WEIGHT: 143 LBS

## 2021-05-31 VITALS — BODY MASS INDEX: 24.45 KG/M2 | HEIGHT: 63 IN | WEIGHT: 138 LBS

## 2021-05-31 VITALS — BODY MASS INDEX: 24.63 KG/M2 | HEIGHT: 63 IN | WEIGHT: 139 LBS

## 2021-05-31 VITALS — HEIGHT: 63 IN | WEIGHT: 142 LBS | BODY MASS INDEX: 25.16 KG/M2

## 2021-05-31 VITALS — WEIGHT: 144 LBS | HEIGHT: 63 IN | BODY MASS INDEX: 25.52 KG/M2

## 2021-05-31 NOTE — TELEPHONE ENCOUNTER
RN cannot approve Refill Request    RN can NOT refill this medication med is not covered by policy/route to provider. Last office visit: 5/9/2019 Jak Jones MD Last Physical: 11/5/2018 Last MTM visit: Visit date not found Last visit same specialty: 1/30/2019 Royce Perez MD.  Next visit within 3 mo: Visit date not found  Next physical within 3 mo: Visit date not found      Paola Dueñas, [facility] Triage/Med Refill 8/10/2019    Requested Prescriptions   Pending Prescriptions Disp Refills     lisinopril (PRINIVIL,ZESTRIL) 20 MG tablet [Pharmacy Med Name: LISINOPRIL 20MG TABLETS] 30 tablet 0     Sig: TAKE 1 TABLET(20 MG) BY MOUTH DAILY       There is no refill protocol information for this order

## 2021-06-01 ENCOUNTER — OFFICE VISIT - HEALTHEAST (OUTPATIENT)
Dept: INTERNAL MEDICINE | Facility: CLINIC | Age: 76
End: 2021-06-01

## 2021-06-01 VITALS — HEIGHT: 63 IN | WEIGHT: 141 LBS | BODY MASS INDEX: 24.98 KG/M2

## 2021-06-01 DIAGNOSIS — N39.0 URINARY TRACT INFECTION WITHOUT HEMATURIA, SITE UNSPECIFIED: ICD-10-CM

## 2021-06-01 LAB
ALBUMIN UR-MCNC: NEGATIVE G/DL
APPEARANCE UR: CLEAR
BACTERIA #/AREA URNS HPF: ABNORMAL /[HPF]
BILIRUB UR QL STRIP: NEGATIVE
COLOR UR AUTO: YELLOW
GLUCOSE UR STRIP-MCNC: NEGATIVE MG/DL
HGB UR QL STRIP: NEGATIVE
KETONES UR STRIP-MCNC: NEGATIVE MG/DL
LEUKOCYTE ESTERASE UR QL STRIP: ABNORMAL
NITRATE UR QL: NEGATIVE
PH UR STRIP: 6.5 [PH] (ref 5–8)
RBC #/AREA URNS AUTO: ABNORMAL HPF
SP GR UR STRIP: 1.02 (ref 1–1.03)
SQUAMOUS #/AREA URNS AUTO: ABNORMAL LPF
UROBILINOGEN UR STRIP-ACNC: ABNORMAL
WBC #/AREA URNS AUTO: ABNORMAL HPF

## 2021-06-01 RX ORDER — AZITHROMYCIN 250 MG/1
TABLET, FILM COATED ORAL
Qty: 6 TABLET | Refills: 1 | Status: SHIPPED | OUTPATIENT
Start: 2021-06-01 | End: 2021-07-14

## 2021-06-01 ASSESSMENT — MIFFLIN-ST. JEOR: SCORE: 1076.49

## 2021-06-02 ENCOUNTER — RECORDS - HEALTHEAST (OUTPATIENT)
Dept: ADMINISTRATIVE | Facility: CLINIC | Age: 76
End: 2021-06-02

## 2021-06-02 ENCOUNTER — RECORDS - HEALTHEAST (OUTPATIENT)
Dept: ADMINISTRATIVE | Facility: OTHER | Age: 76
End: 2021-06-02

## 2021-06-02 VITALS — BODY MASS INDEX: 24.8 KG/M2 | HEIGHT: 63 IN | WEIGHT: 140 LBS

## 2021-06-02 VITALS — HEIGHT: 63 IN | BODY MASS INDEX: 25.16 KG/M2 | WEIGHT: 142 LBS

## 2021-06-02 VITALS — HEIGHT: 63 IN | BODY MASS INDEX: 24.45 KG/M2 | WEIGHT: 138 LBS

## 2021-06-02 VITALS — WEIGHT: 140 LBS | BODY MASS INDEX: 24.8 KG/M2 | HEIGHT: 63 IN

## 2021-06-02 VITALS — WEIGHT: 145 LBS | HEIGHT: 63 IN | BODY MASS INDEX: 25.69 KG/M2

## 2021-06-02 VITALS — HEIGHT: 63 IN | WEIGHT: 140 LBS | BODY MASS INDEX: 24.8 KG/M2

## 2021-06-02 LAB — BACTERIA SPEC CULT: NO GROWTH

## 2021-06-02 NOTE — PROGRESS NOTES
Walk In Care Note                                                                                 Date of Visit: 10/16/2019     Chief Complaint   Oscar Zhao is a(n) 74 y.o. White or  female who presents to Walk In Care with the following complaint(s):  Ear Pain (seen 9 days ago for sinus infection - now left ear quite painful has 1 day left of antibx )       Assessment and Plan   1. Non-recurrent acute serous otitis media of left ear  - amoxicillin-clavulanate (AUGMENTIN) 875-125 mg per tablet; Take 1 tablet by mouth 2 (two) times a day for 10 days.  Dispense: 20 tablet; Refill: 0    2. Acute non-recurrent pansinusitis  - amoxicillin-clavulanate (AUGMENTIN) 875-125 mg per tablet; Take 1 tablet by mouth 2 (two) times a day for 10 days.  Dispense: 20 tablet; Refill: 0      Patient with persistent left ear pain and improving sinus pain/pressure after being treated for sinusitis with amoxicillin 875 mg twice daily x10 days on 10/8/2019.  Patient does have persistent effusion of the left middle ear as well as persistent tenderness of the maxillary sinuses, left greater than right.  Extending antibiotic therapy with Augmentin as listed above.  Recommended that patient take a probiotic while on Augmentin.  Also recommended that patient continue using fluticasone nasal spray for at least the next month.  She may also take pseudoephedrine as needed for ear pressure/congestion.    Counseled patient regarding assessment and plan for evaluation and treatment. Questions were answered. See AVS for the specific written instructions and educational handout(s) regarding middle ear infection and sinusitis that were provided at the conclusion of the visit.     Discussed signs / symptoms that warrant urgent / emergent medical attention.     Follow up with primary care in 2 weeks if symptoms have not improved.     History of Present Illness   Primary symptom: Ear pain  Onset: 2 weeks  Laterality: Left  Progression:  Persisting  Decreased hearing: No  Ear discharge: No  Fevers: No  Chills: No  Upper respiratory symptoms: Has had sinus symptoms, which are improving since starting amoxicillin on 10/8/2019.   Home therapies utilized: Acetaminophen, diphenhydramine, and Flonase.   History of otitis media: No  History of tympanostomy tubes: No  History of otitis externa: No  Recent swimming: No  History of cerumen impaction: Yes  Tobacco user / exposure: No     Review of Systems   Review of Systems   All other systems reviewed and are negative.       Physical Exam   Vitals:    10/16/19 0858   BP: 114/72   Patient Site: Right Arm   Patient Position: Sitting   Cuff Size: Adult Regular   Pulse: 67   Resp: 20   Temp: 98.2  F (36.8  C)   TempSrc: Oral   SpO2: 99%   Weight: 146 lb 8 oz (66.5 kg)     Physical Exam  Vitals signs and nursing note reviewed.   Constitutional:       General: She is not in acute distress.     Appearance: She is well-developed and normal weight. She is not ill-appearing or toxic-appearing.   HENT:      Head: Normocephalic and atraumatic.      Right Ear: Tympanic membrane, ear canal and external ear normal.      Left Ear: Ear canal and external ear normal. A middle ear effusion (cloudy) is present. Tympanic membrane is not erythematous.      Nose: No mucosal edema or rhinorrhea.      Right Sinus: Maxillary sinus tenderness present. No frontal sinus tenderness.      Left Sinus: Maxillary sinus tenderness present. No frontal sinus tenderness.      Mouth/Throat:      Mouth: Mucous membranes are moist. No oral lesions.      Pharynx: No oropharyngeal exudate or posterior oropharyngeal erythema.   Eyes:      General: Lids are normal.      Conjunctiva/sclera: Conjunctivae normal.   Neck:      Musculoskeletal: Neck supple. No edema or erythema.   Cardiovascular:      Rate and Rhythm: Normal rate and regular rhythm.      Heart sounds: S1 normal and S2 normal. No murmur. No friction rub. No gallop.    Pulmonary:      Effort:  Pulmonary effort is normal.      Breath sounds: Normal breath sounds. No stridor. No wheezing, rhonchi or rales.   Lymphadenopathy:      Cervical: No cervical adenopathy.   Skin:     General: Skin is warm and dry.      Coloration: Skin is not pale.      Findings: No rash.   Neurological:      General: No focal deficit present.      Mental Status: She is alert and oriented to person, place, and time.          Diagnostic Studies   Laboratory:  N/A  Radiology:  N/A  Electrocardiogram:  N/A     Procedure Note   N/A     Pertinent History   The following portions of the patient's history were reviewed and updated as appropriate: allergies, current medications, past family history, past medical history, past social history, past surgical history and problem list.    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    Patient has a past medical history of Arthritis, Basal cell carcinoma of anterior chest, Breast cyst, History of anesthesia complications, Osteoporosis, PONV (postoperative nausea and vomiting), and Squamous cell carcinoma.    Patient has a past surgical history that includes pr total abdom hysterectomy; pr total abdom hysterectomy; Hysterectomy; Oophorectomy; Appendectomy; Total hip arthroplasty (Right, 8/18/2015); pr total knee arthroplasty (Left, 3/2/2017); Closed reduction hip dislocation (Right, 3/26/2019); and Breast cyst excision (Right, 1981).    Patient's family history is not on file.    Patient reports that she has never smoked. She has never used smokeless tobacco. She reports current alcohol use of about 14.0 standard drinks of alcohol per week. She reports that she does not use drugs.     Portions of this note have been dictated using voice recognition software. Any grammatical  or context distortions are unintentional and inherent to the software.     Jn Yin MD  [facility] [place] [facility]

## 2021-06-02 NOTE — TELEPHONE ENCOUNTER
Triage note:    74 year old female called with concerns about persistent left ear pain despite treatment for ear infection.    She was treated for sinus infection 10/8/19 with amoxicilline, then she was seen again 10/16/19 for left ear infection and was treated with Augmentin.   Her sinus symptoms having improved, but her ear symptoms have not.      She has constant throbbing pain in her ear which is unchanged since 10/16/19 diagnosis. She rates the pain a '7' on a scale of 1-10. The pain is worse when laying down. She takes Tylenol which helps. She also uses benadryl, coricidin and flonase.  No fever. No other symptoms.    RN triaged to discuss with PCP. Do you want to see her? If so, when?  Or can you give additional advice on treating left ear pain?       *Ok to leave a detailed message upon call back      Reason for Disposition    Earache lasts > 1 hour    Protocols used: EAR - CONGESTION-A-[place]    Padmini Gil RN, [facility] Med Refill/Triage, 10/28/2019 3:00 PM

## 2021-06-02 NOTE — PROGRESS NOTES
Office Visit - Follow up    Oscar Zhao   74 y.o. female    Date of Visit: 10/29/2019    Chief Complaint   Patient presents with     Ear Pain     left       Subjective: Cough.    Ear pain left side.  2 courses of amoxicillin one was clavulanate for 9 days each.    Sinus infection.  Previously seen by a dentist.    Walk-in care at [facility].  Initially prescribed amoxicillin and second visit amoxicillin clavulanate.  Both for 9 days.  Sinus congestion better ear pain left side.    No blood in stool or urine medication list reviewed well-tolerated normal effects.  Flu shot given right deltoid.    ROS: A comprehensive review of systems was performed and was otherwise negative    Medications:  Prior to Admission medications    Medication Sig Start Date End Date Taking? Authorizing Provider   amLODIPine (NORVASC) 5 MG tablet Take 2 tablets (10 mg total) by mouth daily. 8/20/19  Yes Jak Jones MD   cholecalciferol, vitamin D3, (VITAMIN D3) 2,000 unit capsule Take 2,000 Units by mouth daily.   Yes PROVIDER, HISTORICAL   cyanocobalamin 1000 MCG tablet Take 1,000 mcg by mouth daily.   Yes PROVIDER, HISTORICAL   folic acid (FOLVITE) 1 MG tablet Take 1 mg by mouth daily.  5/2/16  Yes PROVIDER, HISTORICAL   lisinopril (PRINIVIL,ZESTRIL) 20 MG tablet Take 1 tablet (20 mg total) by mouth daily. 8/20/19  Yes Jak Jones MD   LOTEMAX 0.5 % ophthalmic suspension APPLY 1 DROP IN BOTH EYES ONCE A DAY 10/26/18  Yes PROVIDER, HISTORICAL   methotrexate 2.5 MG tablet Take 10 mg by mouth once a week. (mondays)       9/21/17  Yes PROVIDER, HISTORICAL   metoprolol succinate (TOPROL XL) 25 MG Take 2 tablets (50 mg total) by mouth daily. 8/12/19  Yes Royce Perez MD   timolol (BETIMOL) 0.5 % ophthalmic solution Administer 1 drop to both eyes every morning.          Yes PROVIDER, HISTORICAL   predniSONE (DELTASONE) 10 mg tablet Take 30 mg by mouth daily for 5 days. 10/29/19 11/3/19  Jak Jones MD        Allergies:   Allergies   Allergen Reactions     Adalimumab Myalgia     Brimonidine-Timolol Unknown     Redness itching in eyes     Levaquin [Levofloxacin]      Skin burn and couldn't get out bed for 5 days     Tetracyclines Hives       Immunizations:   Immunization History   Administered Date(s) Administered     DT (pediatric) 12/23/1998     Influenza [id number], 01/11/2010     Influenza high dose,seasonal,PF, 65+ yrs 10/15/2015, 11/28/2016, 11/17/2017, 11/05/2018, 10/29/2019     Influenza, Seasonal, Inj PF IIV3 12/08/2009     Influenza, inj, historic,unspecified 12/08/2009, 11/02/2011     Influenza, seasonal,quad inj 6-35 mos 11/09/2012, 10/02/2014     Influenza,seasonal, Inj IIV3 11/03/2005, 11/29/2007, 10/28/2008, 10/01/2010, 11/02/2011, 11/09/2012, 11/13/2013, 10/02/2014     Pneumo Conj 13-V (2010&after) 10/15/2015     Pneumo Polysac 23-V 03/06/2009     Td, Adult, Absorbed 06/30/2006     Td,adult,historic,unspecified 06/30/2006     Tdap 09/13/2006, 05/18/2012     ZOSTER, LIVE 08/27/2009       Exam Chest clear to auscultation and percussion.  Heart tones regular rhythm without murmur rub or gallop.  Abdomen soft nontender no organomegaly.  No peritoneal signs.  Extremities free of edema cyanosis or clubbing.  Neck veins nondistended no thyromegaly or scleral icterus noted, carotids full.  Skin warm and dry easily conversant good spirited.  Normal intelligence.  Neurologically intact no gross localizing findings.  Both ear canals were examined and clear but canals were small exam was limited.  Left tympanic membrane closely examined not hot not red light reflex is preserved.    116/70 pulse 68 respirations 18 O2 sats 98% on room air.  BMI is 25.69 weight down 1 pound from previous at 145.    Assessment and Plan  Otitis media left side after 18 days of amoxicillin or amoxicillin clavulanate.  Sinus infection is improved.  Add prednisone 30 mg daily for 5 days.  Flu shot given right deltoid today.    Hypertension  controlled at 116/70.  History of iritis and multiple drug allergies including levofloxacin tetracycline.    If no better in terms of your pain in the next 7 days I did advise [facility] consultation with Dr. COSMO Boss and or Dr. Claude Sears at [phone].      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

## 2021-06-02 NOTE — TELEPHONE ENCOUNTER
Calling and says she has a sinus infection x 5-6 days.  Has had sinus infection before.    No ha.  Nose is running, jaw and teeth aching  Fever unkown.    PCp is out all week so patient decided to go to Walk In Clinic today    Kaylie Dhaliwal, RN, [facility] Nurse Triage/Med Refills RN     Reason for Disposition    Sinus congestion as part of a cold, present < 10 days    Protocols used: SINUS PAIN AND CONGESTION-A-OH

## 2021-06-02 NOTE — TELEPHONE ENCOUNTER
New Appointment Needed  What is the reason for the visit:    Dr Jones told patient to come in at 12:40-to be seen at 1pm on 10/29 Tuesday by him, but his schedule is blocked off and I can't schedule the patient. Can someone please unlblock that slot per Dr. Jones/Patient Please? Thanks.  For Ear Pain   Provider Preference: 12:40 per Dr. Jones   How soon do you need to be seen?:10/29 Tuesday  Waitlist offered?:No  Okay to leave a detailed message:  Yes

## 2021-06-02 NOTE — TELEPHONE ENCOUNTER
Left detailed message for the patient letting her know that the appointment tomorrow afternoon has been scheduled for her to see Dr. Jones.  Asked that she call if she has any further questions.  Lisandra PAREDES CMA/KHUSHBOO....................4:04 PM

## 2021-06-03 ENCOUNTER — COMMUNICATION - HEALTHEAST (OUTPATIENT)
Dept: INTERNAL MEDICINE | Facility: CLINIC | Age: 76
End: 2021-06-03

## 2021-06-03 VITALS
DIASTOLIC BLOOD PRESSURE: 70 MMHG | SYSTOLIC BLOOD PRESSURE: 116 MMHG | BODY MASS INDEX: 25.69 KG/M2 | WEIGHT: 145 LBS | OXYGEN SATURATION: 98 % | HEART RATE: 68 BPM | HEIGHT: 63 IN

## 2021-06-03 VITALS
RESPIRATION RATE: 16 BRPM | HEART RATE: 67 BPM | DIASTOLIC BLOOD PRESSURE: 89 MMHG | OXYGEN SATURATION: 98 % | SYSTOLIC BLOOD PRESSURE: 137 MMHG | TEMPERATURE: 98.7 F | BODY MASS INDEX: 25.77 KG/M2 | WEIGHT: 145.5 LBS

## 2021-06-03 VITALS
DIASTOLIC BLOOD PRESSURE: 68 MMHG | HEIGHT: 63 IN | OXYGEN SATURATION: 100 % | BODY MASS INDEX: 25.69 KG/M2 | WEIGHT: 145 LBS | HEART RATE: 82 BPM | SYSTOLIC BLOOD PRESSURE: 114 MMHG

## 2021-06-03 VITALS
WEIGHT: 146.5 LBS | TEMPERATURE: 98.2 F | DIASTOLIC BLOOD PRESSURE: 72 MMHG | OXYGEN SATURATION: 99 % | HEART RATE: 67 BPM | SYSTOLIC BLOOD PRESSURE: 114 MMHG | RESPIRATION RATE: 20 BRPM | BODY MASS INDEX: 25.95 KG/M2

## 2021-06-03 VITALS
OXYGEN SATURATION: 99 % | DIASTOLIC BLOOD PRESSURE: 80 MMHG | SYSTOLIC BLOOD PRESSURE: 125 MMHG | WEIGHT: 145 LBS | RESPIRATION RATE: 17 BRPM | BODY MASS INDEX: 25.69 KG/M2 | TEMPERATURE: 98.6 F | HEART RATE: 66 BPM

## 2021-06-03 VITALS — WEIGHT: 144 LBS | HEIGHT: 63 IN | BODY MASS INDEX: 25.52 KG/M2

## 2021-06-04 VITALS
HEIGHT: 63 IN | BODY MASS INDEX: 24.98 KG/M2 | WEIGHT: 141 LBS | RESPIRATION RATE: 16 BRPM | SYSTOLIC BLOOD PRESSURE: 120 MMHG | HEART RATE: 68 BPM | DIASTOLIC BLOOD PRESSURE: 80 MMHG

## 2021-06-04 VITALS
OXYGEN SATURATION: 99 % | WEIGHT: 148 LBS | HEART RATE: 65 BPM | SYSTOLIC BLOOD PRESSURE: 114 MMHG | DIASTOLIC BLOOD PRESSURE: 68 MMHG | BODY MASS INDEX: 26.22 KG/M2 | HEIGHT: 63 IN

## 2021-06-05 VITALS
DIASTOLIC BLOOD PRESSURE: 81 MMHG | HEART RATE: 76 BPM | WEIGHT: 140.4 LBS | SYSTOLIC BLOOD PRESSURE: 126 MMHG | BODY MASS INDEX: 25.07 KG/M2 | TEMPERATURE: 98.6 F | OXYGEN SATURATION: 98 %

## 2021-06-05 VITALS
HEART RATE: 80 BPM | OXYGEN SATURATION: 98 % | TEMPERATURE: 97.8 F | DIASTOLIC BLOOD PRESSURE: 76 MMHG | SYSTOLIC BLOOD PRESSURE: 116 MMHG | BODY MASS INDEX: 23.52 KG/M2 | WEIGHT: 132.8 LBS

## 2021-06-05 VITALS
DIASTOLIC BLOOD PRESSURE: 81 MMHG | OXYGEN SATURATION: 98 % | SYSTOLIC BLOOD PRESSURE: 127 MMHG | BODY MASS INDEX: 24.17 KG/M2 | HEART RATE: 81 BPM | HEIGHT: 63 IN | TEMPERATURE: 98.1 F | WEIGHT: 136.4 LBS

## 2021-06-05 VITALS
SYSTOLIC BLOOD PRESSURE: 133 MMHG | WEIGHT: 133.9 LBS | DIASTOLIC BLOOD PRESSURE: 88 MMHG | BODY MASS INDEX: 23.72 KG/M2 | OXYGEN SATURATION: 100 % | HEART RATE: 101 BPM | TEMPERATURE: 97.7 F

## 2021-06-05 VITALS
BODY MASS INDEX: 23.63 KG/M2 | DIASTOLIC BLOOD PRESSURE: 87 MMHG | WEIGHT: 133.4 LBS | TEMPERATURE: 97.5 F | SYSTOLIC BLOOD PRESSURE: 136 MMHG | HEART RATE: 91 BPM | OXYGEN SATURATION: 100 %

## 2021-06-05 VITALS
SYSTOLIC BLOOD PRESSURE: 144 MMHG | HEART RATE: 74 BPM | DIASTOLIC BLOOD PRESSURE: 81 MMHG | OXYGEN SATURATION: 99 % | BODY MASS INDEX: 24.64 KG/M2 | TEMPERATURE: 98.2 F | WEIGHT: 138 LBS

## 2021-06-05 VITALS
WEIGHT: 141 LBS | HEART RATE: 75 BPM | BODY MASS INDEX: 25.18 KG/M2 | DIASTOLIC BLOOD PRESSURE: 80 MMHG | TEMPERATURE: 98.7 F | SYSTOLIC BLOOD PRESSURE: 130 MMHG | OXYGEN SATURATION: 99 %

## 2021-06-05 VITALS
WEIGHT: 134.2 LBS | DIASTOLIC BLOOD PRESSURE: 71 MMHG | TEMPERATURE: 97.9 F | HEART RATE: 87 BPM | OXYGEN SATURATION: 99 % | BODY MASS INDEX: 23.77 KG/M2 | SYSTOLIC BLOOD PRESSURE: 127 MMHG

## 2021-06-05 VITALS
TEMPERATURE: 97.8 F | DIASTOLIC BLOOD PRESSURE: 77 MMHG | WEIGHT: 137.6 LBS | HEART RATE: 77 BPM | OXYGEN SATURATION: 98 % | SYSTOLIC BLOOD PRESSURE: 119 MMHG | BODY MASS INDEX: 24.37 KG/M2

## 2021-06-05 VITALS
WEIGHT: 137.3 LBS | HEART RATE: 71 BPM | SYSTOLIC BLOOD PRESSURE: 132 MMHG | TEMPERATURE: 98.2 F | DIASTOLIC BLOOD PRESSURE: 76 MMHG | BODY MASS INDEX: 24.32 KG/M2 | OXYGEN SATURATION: 99 %

## 2021-06-05 VITALS
SYSTOLIC BLOOD PRESSURE: 118 MMHG | HEART RATE: 70 BPM | WEIGHT: 137 LBS | HEIGHT: 63 IN | DIASTOLIC BLOOD PRESSURE: 70 MMHG | OXYGEN SATURATION: 99 % | TEMPERATURE: 97.2 F | BODY MASS INDEX: 24.27 KG/M2

## 2021-06-05 VITALS
BODY MASS INDEX: 23.77 KG/M2 | OXYGEN SATURATION: 100 % | WEIGHT: 134.2 LBS | HEART RATE: 75 BPM | TEMPERATURE: 97.5 F | DIASTOLIC BLOOD PRESSURE: 96 MMHG | SYSTOLIC BLOOD PRESSURE: 187 MMHG

## 2021-06-05 VITALS — WEIGHT: 136 LBS | BODY MASS INDEX: 24.1 KG/M2 | HEIGHT: 63 IN

## 2021-06-05 VITALS — HEIGHT: 62 IN | WEIGHT: 138 LBS | BODY MASS INDEX: 25.4 KG/M2

## 2021-06-05 VITALS
DIASTOLIC BLOOD PRESSURE: 78 MMHG | SYSTOLIC BLOOD PRESSURE: 91 MMHG | WEIGHT: 132.2 LBS | TEMPERATURE: 97.5 F | DIASTOLIC BLOOD PRESSURE: 57 MMHG | OXYGEN SATURATION: 99 % | SYSTOLIC BLOOD PRESSURE: 142 MMHG | BODY MASS INDEX: 23.26 KG/M2 | OXYGEN SATURATION: 97 % | TEMPERATURE: 98.5 F | HEART RATE: 107 BPM | WEIGHT: 131.3 LBS | HEART RATE: 83 BPM | BODY MASS INDEX: 23.42 KG/M2

## 2021-06-05 VITALS — BODY MASS INDEX: 23.83 KG/M2 | WEIGHT: 134.5 LBS

## 2021-06-05 NOTE — PROGRESS NOTES
Office Visit - Follow up    Oscar Zhao   74 y.o. female    Date of Visit: 1/7/2020    Chief Complaint   Patient presents with     Urinary Tract Infection     follow up        Subjective: Hypertension.    Follow-up urinary tract infection.  Seen at the urgency room.  12/27/2019.  Treated with cephalexin for a 10-day period.  Urine culture finally came back all clear.  Still has lower abdominal discomfort but better today.  She is status post total abdominal hysterectomy.  Annual physical examinations are done through our office including a breast check this coming February 2020.  The patient has symptoms of a bladder infection with urinary urgency and lower pelvic pain the patient had been at [facility] urgent care.  A few white cells were seen on the urinalysis and cephalexin 500 mg I believe 3 times a day were started.  She denies any backache or fever chills no severe dysuria no weight loss or hematuria.  We did discuss ways to prevent urinary tract infection in detail.    Mammogram -August 8, 2019 colonoscopy normal with Dr. Orr September 7, 2011.    No blood in stool or urine no chest pain shortness of breath.  Medication list reviewed reviewed and reconciled.  Multiple drug allergies including adalimumab  brimonidine timolol Levaquin and tetracycline.    ROS: A comprehensive review of systems was performed and was otherwise negative    Medications:  Prior to Admission medications    Medication Sig Start Date End Date Taking? Authorizing Provider   amLODIPine (NORVASC) 5 MG tablet Take 2 tablets (10 mg total) by mouth daily. 8/20/19  Yes Jak Jones MD   cholecalciferol, vitamin D3, (VITAMIN D3) 2,000 unit capsule Take 2,000 Units by mouth daily.   Yes PROVIDER, HISTORICAL   cyanocobalamin 1000 MCG tablet Take 1,000 mcg by mouth daily.   Yes PROVIDER, HISTORICAL   folic acid (FOLVITE) 1 MG tablet Take 1 mg by mouth daily.  5/2/16  Yes PROVIDER, HISTORICAL   lisinopril (PRINIVIL,ZESTRIL) 20 MG  tablet Take 1 tablet (20 mg total) by mouth daily. 8/20/19  Yes Jak Jones MD   LOTEMAX 0.5 % ophthalmic suspension APPLY 1 DROP IN BOTH EYES ONCE A DAY 10/26/18  Yes PROVIDER, HISTORICAL   methotrexate 2.5 MG tablet Take 10 mg by mouth once a week. (mondays)       9/21/17  Yes PROVIDER, HISTORICAL   metoprolol succinate (TOPROL XL) 25 MG Take 2 tablets (50 mg total) by mouth daily. 8/12/19  Yes Royce Perez MD   timolol (BETIMOL) 0.5 % ophthalmic solution Administer 1 drop to both eyes every morning.          Yes PROVIDER, HISTORICAL   cranberry fruit concentrate (AZO CRANBERRY ORAL) Take by mouth.  1/7/20 Yes PROVIDER, HISTORICAL   cephalexin (KEFLEX) 500 MG capsule Take 1 capsule (500 mg total) by mouth 2 (two) times a day for 10 days. For UTI. 12/27/19 1/6/20  Sidney Hodges PA-C       Allergies:   Allergies   Allergen Reactions     Adalimumab Myalgia     Brimonidine-Timolol Unknown     Redness itching in eyes     Levaquin [Levofloxacin]      Skin burn and couldn't get out bed for 5 days     Tetracyclines Hives       Immunizations:   Immunization History   Administered Date(s) Administered     DT (pediatric) 12/23/1998     Influenza [id number], 01/11/2010     Influenza high dose,seasonal,PF, 65+ yrs 10/15/2015, 11/28/2016, 11/17/2017, 11/05/2018, 10/29/2019     Influenza, Seasonal, Inj PF IIV3 12/08/2009     Influenza, inj, historic,unspecified 12/08/2009, 11/02/2011     Influenza, seasonal,quad inj 6-35 mos 11/09/2012, 10/02/2014     Influenza,seasonal, Inj IIV3 11/03/2005, 11/29/2007, 10/28/2008, 10/01/2010, 11/02/2011, 11/09/2012, 11/13/2013, 10/02/2014     Pneumo Conj 13-V (2010&after) 10/15/2015     Pneumo Polysac 23-V 03/06/2009     Td, Adult, Absorbed 06/30/2006     Td,adult,historic,unspecified 06/30/2006     Tdap 09/13/2006, 05/18/2012     ZOSTER, LIVE 08/27/2009       Exam Chest clear to auscultation and percussion.  Heart tones regular rhythm without murmur rub or gallop.  Abdomen soft nontender  no organomegaly.  No peritoneal signs.  Extremities free of edema cyanosis or clubbing.  Neck veins nondistended no thyromegaly or scleral icterus noted, carotids full.  Skin warm and dry easily conversant good spirited.  Normal intelligence.  Neurologically intact no gross localizing findings.  114/68 pulse 82 respirations 18 O2 sats 100% BMI 26 weight 145 pounds unchanged stable.    Assessment and Plan  Hypertension controlled stable 114/68 same meds and cares.    Multiple drug allergies.    Urinary tract infection with negative urine culture.  Better are the symptoms at first cephalexin stable.  Discussed ways to prevent urinary tract infection like avoiding bubble baths plus plenty of fluids frequent urination and or reticulocyte acid ascorbic acid.    Status post hysterectomy for benign disease stable.  Return to clinic February 2020 for full physical exam with breast check.  No need for pelvic or Pap as she is status post hysterectomy.    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

## 2021-06-05 NOTE — TELEPHONE ENCOUNTER
Patient calling.She reports, she was treated with medication, KEFLEX , at the [facility], and she reports, she took it for 10 days, and she still has symptoms of     Frequency, urgency   Slight pain in lower abdomen.  Right side flank now, today.  Afebrile.  Still feels like there is something wrong down there.    Appointment made with Dr. Jones for tomorrow at 1:00pm.    Pamella Tian RN  [facility] Triage/refill nurse

## 2021-06-06 NOTE — PROGRESS NOTES
Annual wellness visit  Assessment and Plan:   Annual wellness visit    1. Routine general medical examination at a health care facility  Annual wellness visit  - 2(CBC w/o Differential)  - Comprehensive Metabolic Panel  - Lipid Cascade  - Thyroid Stimulating Hormone (TSH)  - Urinalysis-UC if Indicated     The patient's current medical problems were reviewed.    I have had an Advance Directives discussion with the patient.  The following health maintenance schedule was reviewed with the patient and provided in printed form in the after visit summary:   Health Maintenance   Topic Date Due     ZOSTER VACCINES (2 of 3) 10/22/2009     DXA SCAN  2011     PNEUMOCOCCAL IMMUNIZATION 65+ LOW/MEDIUM RISK (2 of 2 - PPSV23) 10/15/2016     MEDICARE ANNUAL WELLNESS VISIT  2019     FALL RISK ASSESSMENT  10/29/2020     MAMMOGRAM  2021     COLONOSCOPY  2021     TD 18+ HE  2022     ADVANCE CARE PLANNING  2023     LIPID  2024     HEPATITIS C SCREENING  Completed     INFLUENZA VACCINE RULE BASED  Completed        Subjective:   Chief Complaint: Oscar Zhao is an 75 y.o. female here for an Annual Wellness visit.   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    Non-smoker.    1 glass of wine per day.    Allergies include Humira plus brimonidine plus levofloxacin plus tetracycline the latter causing hives.    Hysterectomy and left total knee arthroplasty and right total hip arthroplasty in the remote past appendectomy.    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 Langston and rheumatologist Dr. Luke Wan.    Mammogram -2019 last colonoscopy dated 2011 normal by Dr. Orr.    Mother  91 dementia Alzheimer's.    Father  92 congestive heart failure.    69 cardiac arrhythmia.  One daughter age 38 very successful in her  career.    Review of Systems:    Please see above.  The rest of the review of systems are negative for all systems.    Patient Care Team:  Jak Jones MD as PCP - General  Reed, Marija PAGE MD as Physician (Hematology and Oncology)  Jak Jones MD as Assigned PCP     Patient Active Problem List   Diagnosis     Essential hypertension     Difficulty Breathing (Dyspnea)     Chest Pain     Osteoarthrosis     S/P total hip arthroplasty     Cough     Primary osteoarthritis of left knee     Dislocation of internal right hip prosthesis (H)     Hip dislocation, right (H)     Past Medical History:   Diagnosis Date     Arthritis      Basal cell carcinoma of anterior chest      Breast cyst      History of anesthesia complications      Osteoporosis      PONV (postoperative nausea and vomiting)      Squamous cell carcinoma       Past Surgical History:   Procedure Laterality Date     APPENDECTOMY       BREAST CYST EXCISION Right 1981    benign     CLOSED REDUCTION HIP DISLOCATION Right 3/26/2019    Procedure: CLOSED REDUCTION, HIP;  Surgeon: Jak Ruiz DO;  Location: [facility];  Service: Orthopedics     HYSTERECTOMY       OOPHORECTOMY       RI TOTAL ABDOM HYSTERECTOMY      Description: Hysterectomy;  Recorded: 08/19/2007;     RI TOTAL ABDOM HYSTERECTOMY      Description: Hysterectomy;  Recorded: 08/19/2007;     RI TOTAL KNEE ARTHROPLASTY Left 3/2/2017    Procedure: LEFT TOTAL KNEE ARTHROPLASTY;  Surgeon: Star Du MD;  Location: [facility] OR;  Service: Orthopedics     TOTAL HIP ARTHROPLASTY Right 8/18/2015    Procedure: HIP TOTAL ARTHROPLASTY, RIGHT;  Surgeon: Star Du MD;  Location: [facility] OR;  Service:       Family History   Problem Relation Age of Onset     Anesthesia problems Neg Hx       Social History     Socioeconomic History     Marital status:      Spouse name: Not on file     Number of children: Not on file     Years of education: Not on file      Highest education level: Not on file   Occupational History     Not on file   Social Needs     Financial resource strain: Not on file     Food insecurity:     Worry: Not on file     Inability: Not on file     Transportation needs:     Medical: Not on file     Non-medical: Not on file   Tobacco Use     Smoking status: Never Smoker     Smokeless tobacco: Never Used   Substance and Sexual Activity     Alcohol use: Yes     Alcohol/week: 14.0 standard drinks     Types: 14 Glasses of wine per week     Drug use: No     Sexual activity: Never   Lifestyle     Physical activity:     Days per week: Not on file     Minutes per session: Not on file     Stress: Not on file   Relationships     Social connections:     Talks on phone: Not on file     Gets together: Not on file     Attends Nondenominational service: Not on file     Active member of club or organization: Not on file     Attends meetings of clubs or organizations: Not on file     Relationship status: Not on file     Intimate partner violence:     Fear of current or ex partner: Not on file     Emotionally abused: Not on file     Physically abused: Not on file     Forced sexual activity: Not on file   Other Topics Concern     Not on file   Social History Narrative     Not on file      Current Outpatient Medications   Medication Sig Dispense Refill     amLODIPine (NORVASC) 5 MG tablet Take 2 tablets (10 mg total) by mouth daily. 180 tablet 3     ascorbic acid, vitamin C, (ASCORBIC ACID WITH MATT HIPS) 500 MG tablet Take 500 mg by mouth daily.       cholecalciferol, vitamin D3, (VITAMIN D3) 2,000 unit capsule Take 2,000 Units by mouth daily.       cyanocobalamin 1000 MCG tablet Take 1,000 mcg by mouth daily.       folic acid (FOLVITE) 1 MG tablet Take 1 mg by mouth daily.        lisinopril (PRINIVIL,ZESTRIL) 20 MG tablet Take 1 tablet (20 mg total) by mouth daily. 90 tablet 3     LOTEMAX 0.5 % ophthalmic suspension APPLY 1 DROP IN BOTH EYES ONCE A DAY  4     methotrexate 2.5  "MG tablet Take 10 mg by mouth once a week. (mondays)             metoprolol succinate (TOPROL XL) 25 MG Take 2 tablets (50 mg total) by mouth daily. 180 tablet 0     timolol (BETIMOL) 0.5 % ophthalmic solution Administer 1 drop to both eyes every morning.              No current facility-administered medications for this visit.       Objective:   Vital Signs:   Visit Vitals  /68 (Patient Site: Right Arm, Patient Position: Sitting)   Pulse 65   Ht 5' 2.75\" (1.594 m)   Wt 148 lb (67.1 kg)   SpO2 99%   BMI 26.43 kg/m         VisionScreening:  No exam data present     PHYSICAL EXAM  Chest clear to auscultation and percussion.  Heart tones regular rhythm without murmur rub or gallop.  Abdomen soft nontender no organomegaly.  No peritoneal signs.  Extremities free of edema cyanosis or clubbing.  Neck veins nondistended no thyromegaly or scleral icterus noted, carotids full.  Skin warm and dry easily conversant good spirited.  Normal intelligence.  Neurologically intact no gross localizing findings.  Skin negative lymph negative neuro negative psych normal HEENT negative back straight no severe spine tenderness rest of examination negative in its entirety including breasts pelvic rectal deferred as she is status post hysterectomy and did not want a Pap pelvic or rectal exam HEENT negative no evidence for scleritis or iritis at this juncture.  No eye injection or discharge.  Good pulse in all 4 extremities no carotid bruits thyromegaly.  The patient has not had any signs or symptoms of spondylitis.  There was no severe spine tenderness spine was straight.  No curvature or scoliosis or kyphosis noted.  Normal lumbar lordosis noted.    Assessment Results 2/24/2020   Activities of Daily Living No help needed   Instrumental Activities of Daily Living No help needed   Mini Cog Total Score 5   Some recent data might be hidden     A Mini-Cog score of 0-2 suggests the possibility of dementia, score of 3-5 suggests no " dementia    Identified Health Risks:     Patient's advanced directive was discussed and I am comfortable with the patient's wishes.

## 2021-06-06 NOTE — PATIENT INSTRUCTIONS - HE
Advance Directive  Patient s advance directive was discussed and I am comfortable with the patient s wishes.  Patient Education   Personalized Prevention Plan  You are due for the preventive services outlined below.  Your care team is available to assist you in scheduling these services.  If you have already completed any of these items, please share that information with your care team to update in your medical record.  Health Maintenance   Topic Date Due     ZOSTER VACCINES (2 of 3) 10/22/2009     DXA SCAN  01/21/2011     PNEUMOCOCCAL IMMUNIZATION 65+ LOW/MEDIUM RISK (2 of 2 - PPSV23) 10/15/2016     MEDICARE ANNUAL WELLNESS VISIT  11/05/2019     FALL RISK ASSESSMENT  10/29/2020     MAMMOGRAM  08/08/2021     COLONOSCOPY  09/07/2021     TD 18+ HE  05/18/2022     ADVANCE CARE PLANNING  11/05/2023     LIPID  08/20/2024     HEPATITIS C SCREENING  Completed     INFLUENZA VACCINE RULE BASED  Completed

## 2021-06-08 NOTE — PROGRESS NOTES
Subjective findings: The patient return to the clinic today for partial nail excision and matrixectomy of the medial and lateral borders of the right great toenail.  She was previously diagnosed with onychocryptosis.    Description: The patient was taken to the treatment room and placed on the treatment table.  Under local anesthesia of 2% lidocaine plain the right fourth prepped and draped in usual manner.  Following the application of a digital tourniquet to the right great told to follow procedure was performed.  Attention was directed to the medial and lateral borders of the right great toenail where utilizing an English anvil nail splitter borders were split from distal to proximal to the level of the eponychial.  The medial and lateral borders were then removed.  Next 3, 30 second applications of phenol were applied to the matrix.  It was then flushed with copious amount of alcohol.  A sterile dressing was applied comprising of bacitracin, Telfa, 2 inch Joon and gauze.  The tourniquet was removed and normal color returned to the right great toe.  The patient appeared to tolerate the procedure and anesthesia well and was discharged in good condition.  She was given both written and verbal postoperative instructions.

## 2021-06-08 NOTE — PROGRESS NOTES
Office Visit - Physical    Oscar Zhao   72 y.o. female    Date of Visit: 2/10/2017    Chief Complaint   Patient presents with     Pre-op Exam     Left total knee arthroplasty  Dr. Rosales Du at [facility] on 3/2/2017       Subjective: Preoperative examination.    Left total knee arthroplasty at [facility] March 2, 2017    Nasal congestion no cough.  Sinusitis.  Z-Colby will be tried.    72-year-old female who just 5-6 days prior to [place] in 2016 she fractured the tibial plateau of her left knee.  Underlying osteoarthritis.  An episode of scleritis with negative rheumatology workup Dr. Luke Wan.  Negative for lupus negative for rheumatoid arthritis.  Anticipates left total knee arthroplasty.    Mammogram negative May 5, 2016.    Colonoscopy negative or normal September 7, 2011 colorectal surgery group presiding.    ROS: A comprehensive review of systems was performed and was otherwise negative    Medications:   Prior to Admission medications    Medication Sig Start Date End Date Taking? Authorizing Provider   folic acid (FOLVITE) 1 MG tablet Take 1 mg by mouth. 5/2/16  Yes PROVIDER, HISTORICAL   methotrexate 25 mg/mL injection  10/4/16  Yes PROVIDER, HISTORICAL   timolol maleate 0.5 % DrpD Place 1 Drop into both eyes once daily. 5/2/16  Yes PROVIDER, HISTORICAL   azithromycin (ZITHROMAX Z-COLBY) 250 MG tablet Take 2 tablets (500 mg) on  Day 1,  followed by 1 tablet (250 mg) once daily on Days 2 through 5. 2/10/17 2/15/17  Jak Jones MD   predniSONE (DELTASONE) 10 MG tablet Take 7 mg by mouth daily. On 8 mgs daily 12/30/15   PROVIDER, HISTORICAL       Allergies:  Allergies   Allergen Reactions     Adalimumab Myalgia     Brimonidine-Timolol Unknown     Redness itching in eyes     Tetracyclines Hives       Immunizations:   Immunization History   Administered Date(s) Administered     DT (pediatric) 12/23/1998     Influenza [id number], 01/11/2010     Influenza high dose, seasonal 10/15/2015,  2016     Influenza, inj, historic 2009, 2011     Influenza, seasonal,quad inj 6-35 mos 2012, 10/02/2014     Pneumo Conj 13-V (2010&after) 10/15/2015     Pneumo Polysac 23-V 2009     Td, historic 2006     Tdap 2012     ZOSTER 2009       Health Maintenance: Immunizations reviewed and up-to-date.    Nonsmoker.    Alcohol intake quite light allergies listed and reviewed.  Note tetracycline caused hives.    Past Medical History: Scleritis.    [facility]  As well as rheumatology consults at least to a number.  HLA-B27 testing has been positive but no other stigmata of ankylosing spondylitis.  Negative serologic workup for lupus and rheumatoid arthritis with Dr. Luke Wan of late per patient's report.    Past Surgical History: Hysterectomy.    Right total hip arthroplasty.    Appendectomy.    Family History: Mother  92.    Father  also 92.  One daughter lives in [place].    age 67;10 years prior.    Social History: Teaches third grade.  Former dancer.    Exam Chest clear to auscultation and percussion.  Heart tones regular rhythm without murmur rub or gallop.  Abdomen soft nontender no organomegaly.  No peritoneal signs.  Extremities free of edema cyanosis or clubbing.  Neck veins nondistended no thyromegaly or scleral icterus noted, carotids full.  Skin warm and dry easily conversant good spirited.  Normal intelligence.  Neurologically intact no gross localizing findings.      Assessment and Plan  Osteoarthrosis with tibial plateau fracture left side.  Needs left total knee arthroplasty.  Electrocardiogram showed sinus mechanism rate 63 normal EKG.  Preoperatively also check hemogram urinalysis and basic metabolic profile today.    Scleritis with history of HLA-B27 positive    Hysterectomy and prior left total hip arthroplasty and appendectomy without anesthetic complications.    Hypertension with mild systolic elevation see  above 142/84.    Total time spent with the patient today was 40 minutes of which greater than 50% was spent in counseling and coordination of care.    Jak Jones MD    Patient Active Problem List   Diagnosis     Essential Hypertension     Difficulty Breathing (Dyspnea)     Chest Pain     Osteoarthrosis     S/P total hip arthroplasty     Cough

## 2021-06-09 NOTE — ANESTHESIA CARE TRANSFER NOTE
Last vitals:   Vitals:    03/02/17 1206   BP: 116/78   Pulse: 92   Resp: 12   Temp: 36.6  C (97.8  F)   SpO2: 99%     Patient's level of consciousness is awake  Spontaneous respirations: yes  Maintains airway independently: yes  Dentition unchanged: yes  Oropharynx: oropharynx clear of all foreign objects    QCDR Measures:  ASA# 20 - Surgical Safety Checklist: ASA20A - Safety Checks Done  PQRS# 430 - Adult PONV Prevention: NA - Not adult patient, not GA or 3 or more risk factors NOT present  ASA# 8 - Peds PONV Prevention: NA - Not pediatric patient, not GA or 2 or more risk factors NOT present  PQRS# 424 - Raisa-op Temp Management: 4559F - At least one body temp DOCUMENTED => 35.5C or 95.9F within required timeframe  PQRS# 426 - PACU Transfer Protocol: - Transfer of care checklist used  ASA# 14 - Acute Post-op Pain: ASA14B - Patient did NOT experience pain >= 7 out of 10    I completed my SBAR handoff to the receiving nurse per policy and procedure.

## 2021-06-09 NOTE — ANESTHESIA PROCEDURE NOTES
Spinal Block    Start time: 3/2/2017 10:28 AM  End time: 3/2/2017 10:33 AM    Staffing:  Performing  Anesthesiologist: ANGEL PALMA    Preanesthetic Checklist  Completed: patient identified, risks, benefits, and alternatives discussed, timeout performed, consent obtained, airway assessed, oxygen available, suction available, emergency drugs available and hand hygiene performed  Spinal Block  Patient position: sitting  Prep: ChloraPrep  Patient monitoring: heart rate, cardiac monitor, continuous pulse ox and blood pressure  Approach: midline  Location: L4-5  Injection technique: single-shot  Needle type: Quincke   Needle gauge: 24 G    Assessment  Sensory level: T8

## 2021-06-09 NOTE — ANESTHESIA POSTPROCEDURE EVALUATION
Patient: Oscar Zhao  LEFT TOTAL KNEE ARTHROPLASTY  Anesthesia type: spinal    Patient location: PACU  Last vitals:   Vitals:    03/02/17 1330   BP:    Pulse: 78   Resp: 11   Temp:    SpO2: 95%     Post vital signs: stable  Level of consciousness: awake and responds to simple questions  Post-anesthesia pain: pain controlled  Post-anesthesia nausea and vomiting: no  Pulmonary: unassisted, return to baseline  Cardiovascular: stable and blood pressure at baseline  Hydration: adequate  Anesthetic events: no    QCDR Measures:  ASA# 11 - Raisa-op Cardiac Arrest: ASA11B - Patient did NOT experience unanticipated cardiac arrest  ASA# 12 - Raisa-op Mortality Rate: ASA12B - Patient did NOT die  ASA# 13 - PACU Re-Intubation Rate: NA - No ETT / LMA used for case  ASA# 10 - Composite Anes Safety: ASA10A - No serious adverse event  ASA# 38 - New Corneal Injury: ASA38A - No new exposure keratitis or corneal abrasion in PACU    Additional Notes:

## 2021-06-09 NOTE — ANESTHESIA PREPROCEDURE EVALUATION
Anesthesia Evaluation      History of anesthetic complications     Airway   Mallampati: I  Neck ROM: full   Pulmonary - negative ROS and normal exam                          Cardiovascular - normal exam  Exercise tolerance: > or = 10 METS  ECG reviewed  Rate: normal,         Neuro/Psych - negative ROS     Endo/Other - negative ROS      GI/Hepatic/Renal - negative ROS      Other findings: Scleritis of left eye, on chronic prednisone therapy. Currently taking 15 mg daily.  Will require stress dose of hydrocortisone.      Dental - normal exam                          Anesthesia Plan  Planned anesthetic: spinal and peripheral nerve block  Popliteal PNB  Adductor Canal PNB for post-operative analgesia  SAB with 25 mcg fentanyl intraoperatively  ASA 2     Anesthetic plan and risks discussed with: patient  Anesthesia plan special considerations: antiemetics,   Post-op plan: routine recovery        Plan SAB for procedure.  Plan and risks discussed, questions answered.    Komal Choudhury M.D.

## 2021-06-09 NOTE — ANESTHESIA PROCEDURE NOTES
Peripheral Block    Patient location during procedure: pre-op  Start time: 3/2/2017 9:15 AM  End time: 3/2/2017 9:20 AM  post-op analgesia per surgeon order as noted in medical record  Staffing:  Performing  Anesthesiologist: ANGEL PALMA  Preanesthetic Checklist  Completed: patient identified, site marked, risks, benefits, and alternatives discussed, timeout performed, consent obtained, airway assessed, oxygen available, suction available, emergency drugs available and hand hygiene performed  Peripheral Block  Block type: other, tibial  Prep: ChloraPrep  Patient position: supine  Patient monitoring: cardiac monitor, continuous pulse oximetry, heart rate and blood pressure  Laterality: left  Injection technique: ultrasound guided    Ultrasound used to visualize needle placement in proximity to nerve being blocked: yes   Permanent ultrasound image captured for medical record    Needle  Needle type: Stimuplex   Needle gauge: 22 G  Needle length: 4 in  no peripheral nerve catheter placed  Assessment  Injection assessment: no difficulty with injection, negative aspiration for heme, no paresthesia on injection and incremental injection

## 2021-06-09 NOTE — ANESTHESIA PROCEDURE NOTES
Peripheral Block    Start time: 3/2/2017 9:20 AM  End time: 3/2/2017 9:25 AM  post-op analgesia per surgeon order as noted in medical record  Staffing:  Performing  Anesthesiologist: ANGEL PALMA  Preanesthetic Checklist  Completed: patient identified, site marked, risks, benefits, and alternatives discussed, timeout performed, consent obtained, airway assessed, oxygen available, suction available, emergency drugs available and hand hygiene performed  Peripheral Block  Block type: saphenous, adductor canal block  Prep: ChloraPrep  Patient position: supine  Patient monitoring: cardiac monitor, continuous pulse oximetry, heart rate and blood pressure  Laterality: left  Injection technique: ultrasound guided    Ultrasound used to visualize needle placement in proximity to nerve being blocked: yes   Permanent ultrasound image captured for medical record    Needle  Needle type: Stimuplex   Needle gauge: 22 G  Needle length: 4 in  no peripheral nerve catheter placed  Assessment  Injection assessment: no difficulty with injection, negative aspiration for heme, no paresthesia on injection and incremental injection

## 2021-06-10 NOTE — PROGRESS NOTES
Office Visit - Follow up    Oscar Zhao   72 y.o. female    Date of Visit: 4/27/2017    Chief Complaint   Patient presents with     Hypertension     blood pressure elevated       Subjective: Hypertension.    Seen yesterday at [facility] walk-in clinic blood pressure 150/100.  Hemoglobin low at 9.5 rheumatologist.  Other testing done there confirms the presence of iron deficiency anemia with serum iron low at 11 ferritin low at 11 and transferrin level 344 with percent saturation low.  Vitamin B12 and folate levels were normal.  The patient was instructed regarding Fergon over-the-counter 324 mg once daily.  Also to do Hemoccult testing ×3 and get [facility] consult for upper GI endoscopy colonoscopy.  Last colonoscopy done 6 years prior normal.  Date of last colonoscopy 9/7/2011 with Dr. Orr mammogram negative 5/5/2016.    No blood in stool no blood in urine medication list reviewed.  Patient had been using nonsteroidal anti-inflammatory drugs after leg fracture with aspirin and walk-in clinic physician thought gastric irritation with esophagitis gastritis duodenitis related to NSAIDs may be the culprit for anemia.  That is iron deficiency I concur.    ROS: A comprehensive review of systems was performed and was otherwise negative    Medications:  Prior to Admission medications    Medication Sig Start Date End Date Taking? Authorizing Provider   cefuroxime (CEFTIN) 500 MG tablet Take 1 tablet (500 mg total) by mouth 2 (two) times a day for 10 days. 4/26/17 5/6/17 Yes Nicky Jimenes MD   folic acid (FOLVITE) 1 MG tablet Take 1 mg by mouth daily.  5/2/16  Yes PROVIDER, HISTORICAL   methotrexate 25 mg/mL injection Inject 25 mg into the shoulder, thigh, or buttocks every 7 days. On Tuesdays 10/4/16  Yes PROVIDER, HISTORICAL   predniSONE (DELTASONE) 10 MG tablet Take 15 mg by mouth daily.  12/30/15  Yes PROVIDER, HISTORICAL   timolol (BETIMOL) 0.5 % ophthalmic solution Administer 1 drop to both  eyes 2 (two) times a day.   Yes PROVIDER, HISTORICAL   omeprazole (PRILOSEC) 40 MG capsule Take 1 capsule (40 mg total) by mouth daily. 4/26/17   Nicky Jimenes MD   aspirin 325 MG tablet Take 1 tablet (325 mg total) by mouth 2 (two) times a day. 3/3/17 4/27/17  Ailyn Matute PA-C   HYDROcodone-acetaminophen 5-325 mg per tablet Take 1 tablet by mouth every 4 (four) hours as needed for pain. 3/4/17 4/27/17  Santiago Guerrero MD   senna-docusate (PERICOLACE) 8.6-50 mg tablet Take 1 tablet by mouth 2 (two) times a day. 3/3/17 4/27/17  Ailyn Matute PA-C       Allergies:   Allergies   Allergen Reactions     Adalimumab Myalgia     Brimonidine-Timolol Unknown     Redness itching in eyes     Tetracyclines Hives       Immunizations:   Immunization History   Administered Date(s) Administered     DT (pediatric) 12/23/1998     Influenza Y2p6-24, 01/11/2010     Influenza high dose, seasonal 10/15/2015, 11/28/2016     Influenza, inj, historic 12/08/2009, 11/02/2011     Influenza, seasonal,quad inj 6-35 mos 11/09/2012, 10/02/2014     Pneumo Conj 13-V (2010&after) 10/15/2015     Pneumo Polysac 23-V 03/06/2009     Td, historic 06/30/2006     Tdap 05/18/2012     ZOSTER 08/27/2009       Exam Chest clear to auscultation and percussion.  Heart tones regular rhythm without murmur rub or gallop.  Abdomen soft nontender no organomegaly.  No peritoneal signs.  Extremities free of edema cyanosis or clubbing.  Neck veins nondistended no thyromegaly or scleral icterus noted, carotids full.  Skin warm and dry easily conversant good spirited.  Normal intelligence.  Neurologically intact no gross localizing findings.    Assessment and Plan  Hypertension control today 136/79 likely labile.  Discussed salt restriction and diet plus regular exercise and weight loss BMI elevated at 29.63.  Overweight.    Iron deficiency anemia related to esophagitis gastritis and/or duodenitis from nonsteroidal anti-inflammatory  drugs like aspirin taken with recent leg fracture.  Hold NSAIDs all NSAIDs and seek out [facility] consult for EGD\colonoscopy.  Do Hemoccult testing ×3 RTC 2 months time for hemoglobin blood pressure checked outside blood pressure measurements encourage.  Not on any antihypertensive meds at this juncture.  RTC 2 months    Time: total time spent with the patient was 25 minutes of which >50% was spent in counseling and coordination of care    The following high BMI interventions were performed this visit: encouragement to exercise    Jak Jones MD    Patient Active Problem List   Diagnosis     Essential Hypertension     Difficulty Breathing (Dyspnea)     Chest Pain     Osteoarthrosis     S/P total hip arthroplasty     Cough     Primary osteoarthritis of left knee

## 2021-06-10 NOTE — PROGRESS NOTES
Called patient with results today - 2 of 3 stool hemoccults positive for blood.  She has had no further black tarry stools, no significant epigastric pains.  Has not yet started the omeprazole due to possible interaction with her methotrexate according to the pharmacist.  She has also not yet scheduled the upper endoscopy - was waiting to see if blood in her stool.  She will schedule that now.    Continues to have nasal congestion.  Suggested that we start the flonase once or twice daily  Suggested she start ranitidine twice a day   These were sent in to the pharmacy of her choice.   Continue iron supplements  Follow up as needed.

## 2021-06-11 NOTE — TELEPHONE ENCOUNTER
Spoke to pt and she has not missed any doses of Metoprolol. Will continue and see Dr. Perez in mid-Sept.

## 2021-06-11 NOTE — TELEPHONE ENCOUNTER
----- Message from Colbyotto Rodriguez sent at 8/31/2020 11:46 AM CDT -----  Regarding: NASRE[facility] PT / RECENT HOSPITALIZATION  General phone call:    Caller: Oscar Robb     Primary cardiologist: NASREEN     Detailed reason for call: Pt was at [facility] yesterday for palpitations and is feeling better today. She's requesting a call back to discuss if it's ok to wait until her 9/16 appt to see NASREEN or should pt be seen sooner? Please advise and call pt back.     Best phone number: [phone]    Best time to contact: Anytime     Ok to leave a detailed message? Yes     Device? No     Additional Info:

## 2021-06-11 NOTE — PROGRESS NOTES
Office Visit - Follow up    Oscar Zhao   72 y.o. female    Date of Visit: 6/27/2017    Chief Complaint   Patient presents with     Hypertension     blood pressusre check       Subjective: Hypertension.  Follow-up anemia as well upper GI endoscopy showed reactive gastropathy colonoscopy is slated for the future.  The patient anticipates selling her A-frame home she is building a home as well.  Before she does a colonoscopy she must get an offer on her home that she currently is living in.    She denies blood in stool or urine or sputum medication list is reviewed generally well-tolerated overall feels better.  Allergies noted in the chart to multiple drugs starting with tetracycline brimonidine and ADA L IM UMAB.  Myalgia.    ROS: A comprehensive review of systems was performed and was otherwise negative    Medications:  Prior to Admission medications    Medication Sig Start Date End Date Taking? Authorizing Provider   folic acid (FOLVITE) 1 MG tablet Take 1 mg by mouth daily.  5/2/16  Yes PROVIDER, HISTORICAL   methotrexate 25 mg/mL injection Inject 25 mg into the shoulder, thigh, or buttocks every 7 days. On Tuesdays 10/4/16  Yes PROVIDER, HISTORICAL   predniSONE (DELTASONE) 10 MG tablet Take 15 mg by mouth daily. Taking 7mg daily 12/30/15  Yes PROVIDER, HISTORICAL   timolol (BETIMOL) 0.5 % ophthalmic solution Administer 1 drop to both eyes 2 (two) times a day.   Yes PROVIDER, HISTORICAL   fluticasone (FLONASE) 50 mcg/actuation nasal spray 1 spray into each nostril daily. Or twice daily 5/3/17   Nicky Jimenes MD   prednisoLONE acetate (PRED-FORTE) 1 % ophthalmic suspension  6/17/17   PROVIDER, HISTORICAL   omeprazole (PRILOSEC) 40 MG capsule Take 1 capsule (40 mg total) by mouth daily. 4/26/17 6/27/17  Nicky Jimenes MD   ranitidine (ZANTAC) 150 MG tablet Take 1 tablet (150 mg total) by mouth 2 (two) times a day. 5/3/17 6/27/17  Nicky Jimenes MD        Allergies:   Allergies   Allergen Reactions     Adalimumab Myalgia     Brimonidine-Timolol Unknown     Redness itching in eyes     Levaquin [Levofloxacin]      Skin burn and couldn't get out bed for 5 days     Tetracyclines Hives       Immunizations:   Immunization History   Administered Date(s) Administered     DT (pediatric) 12/23/1998     Influenza [id number], 01/11/2010     Influenza high dose, seasonal 10/15/2015, 11/28/2016     Influenza, inj, historic 12/08/2009, 11/02/2011     Influenza, seasonal,quad inj 6-35 mos 11/09/2012, 10/02/2014     Pneumo Conj 13-V (2010&after) 10/15/2015     Pneumo Polysac 23-V 03/06/2009     Td, historic 06/30/2006     Tdap 05/18/2012     ZOSTER 08/27/2009       Exam Chest clear to auscultation and percussion.  Heart tones regular rhythm without murmur rub or gallop.  Abdomen soft nontender no organomegaly.  No peritoneal signs.  Extremities free of edema cyanosis or clubbing.  Neck veins nondistended no thyromegaly or scleral icterus noted, carotids full.  Skin warm and dry easily conversant good spirited.  Normal intelligence.  Neurologically intact no gross localizing findings.  No acute distress easily conversing good spirited.  Daughter returning home from [facility] after 4-1/2 years of being away.    No blood in stool or urine medication list reviewed generally well-tolerated.    Assessment and Plan  Hypertension with history of anemia and negative upper GI endoscopy except for reactive gastropathy.    Multiple drug allergies.  Reactive gastropathy negative for H. pylori.  Colonoscopy was normal on 9/7/2011 mammogram allCLEAR June 1, 2017 outside endoscopy report reviewed in detail.  H. pylori negative on reactive gastropathy.  RTC 1 month's time for mild elevation in blood pressure initially 158/85 recheck 128/85 pulse 72 and regular.  RTC 1 month    Time: total time spent with the patient was 25 minutes of which >50% was spent in counseling and  coordination of care    The following high BMI interventions were performed this visit: encouragement to exercise    Jak Jones MD    Patient Active Problem List   Diagnosis     Essential Hypertension     Difficulty Breathing (Dyspnea)     Chest Pain     Osteoarthrosis     S/P total hip arthroplasty     Cough     Primary osteoarthritis of left knee

## 2021-06-11 NOTE — TELEPHONE ENCOUNTER
Wellness Screening Tool  Symptom Screening:  Do you have one of the following NEW symptoms:    Fever (subjective or >100.0)?  No    A new cough?  No    Shortness of breath?  No     Chills? No     New loss of taste or smell? No     Generalized body aches? No     New persistent headache? No     New sore throat? No     Nausea, vomiting, or diarrhea?  No    Within the past 2 weeks, have you been exposed to someone with a known positive illness below:    COVID-19 (known or suspected)?  No    Chicken pox?  No    Mealses?  No    Pertussis?  No    Patient notified of visitor policy- They may have one person accompany them to their appointment, but they will need to wear a mask and will be screened upon arrival for symptoms: Yes  Pt informed to wear a mask: Yes  Pt notified if they develop any symptoms listed above, prior to their appointment, they are to call the clinic directly at [phone] for further instructions.  Yes  Patient's appointment status: Patient will be seen in clinic as scheduled on 9/16

## 2021-06-11 NOTE — TELEPHONE ENCOUNTER
"Dr Perez - please review ED record.  Pt will see you 9/16, unless you feel she needs sooner appt.  Did you want any testing done prior to the appt?  -librado  -----------------------------------------------------------------------    Pt went to ED because she felt her heart racing and was having palpitations.  Pt currently asymptomatic.  Pt takes 50 mg metoprolol succinate daily.      ED suggested episode was brought on by the \"[employer] cookies she wolfed down\" because she usually doesn't eat sweets and only has 1 cup of coffee in the morning.  Review of ED record shows HR > 100 during her time there.  -librado  "

## 2021-06-12 NOTE — PROGRESS NOTES
Office Visit - Follow up    Oscra Zhao   72 y.o. female    Date of Visit: 7/28/2017    Chief Complaint   Patient presents with     Hypertension     blood pressure check     Tingling     left arm on and off       Subjective: Hypertension.  Left arm tingles.  Previous on higher doses of prednisone for episcleritis per Dr. Langston from [place] [facility].  No blood in stool or urine medication list reviewed generally well-tolerated.    ROS: A comprehensive review of systems was performed and was otherwise negative    Medications:  Prior to Admission medications    Medication Sig Start Date End Date Taking? Authorizing Provider   folic acid (FOLVITE) 1 MG tablet Take 1 mg by mouth daily.  5/2/16  Yes PROVIDER, HISTORICAL   methotrexate 25 mg/mL injection Inject 25 mg into the shoulder, thigh, or buttocks every 7 days. On Tuesdays 10/4/16  Yes PROVIDER, HISTORICAL   prednisoLONE acetate (PRED-FORTE) 1 % ophthalmic suspension  6/17/17  Yes PROVIDER, HISTORICAL   predniSONE (DELTASONE) 1 MG tablet Take 1 mg by mouth daily. 4 tablets daily   Yes PROVIDER, HISTORICAL   timolol (BETIMOL) 0.5 % ophthalmic solution Administer 1 drop to both eyes 2 (two) times a day.   Yes PROVIDER, HISTORICAL   fluticasone (FLONASE) 50 mcg/actuation nasal spray 1 spray into each nostril daily. Or twice daily 5/3/17 7/28/17  Nicky Jimenes MD   predniSONE (DELTASONE) 10 MG tablet Take 15 mg by mouth daily. Taking 7mg daily 12/30/15 7/28/17  PROVIDER, HISTORICAL       Allergies:   Allergies   Allergen Reactions     Adalimumab Myalgia     Brimonidine-Timolol Unknown     Redness itching in eyes     Levaquin [Levofloxacin]      Skin burn and couldn't get out bed for 5 days     Tetracyclines Hives       Immunizations:   Immunization History   Administered Date(s) Administered     DT (pediatric) 12/23/1998     Influenza B1y1-41, 01/11/2010     Influenza high dose, seasonal 10/15/2015, 11/28/2016     Influenza, inj, historic  12/08/2009, 11/02/2011     Influenza, seasonal,quad inj 6-35 mos 11/09/2012, 10/02/2014     Pneumo Conj 13-V (2010&after) 10/15/2015     Pneumo Polysac 23-V 03/06/2009     Td, historic 06/30/2006     Tdap 05/18/2012     ZOSTER 08/27/2009       Exam Chest clear to auscultation and percussion.  Heart tones regular rhythm without murmur rub or gallop.  Abdomen soft nontender no organomegaly.  No peritoneal signs.  Extremities free of edema cyanosis or clubbing.  Neck veins nondistended no thyromegaly or scleral icterus noted, carotids full.  Skin warm and dry easily conversant good spirited.  Normal intelligence.  Neurologically intact no gross localizing findings.    Assessment and Plan  Labile hypertension.  Today's pressure 126/74.    Episcleritis.  On prednisone plus methotrexate and steroid drops under the direction of [place] [facility] Dr. Langston    Multiple drug allergies including brimonidine levofloxacin and tetracycline and see chart    Negative mammogram June 1, 2017.    Time: total time spent with the patient was 25 minutes of which >50% was spent in counseling and coordination of care    The following high BMI interventions were performed this visit: encouragement to exercise    Jak Jones MD    Patient Active Problem List   Diagnosis     Essential Hypertension     Difficulty Breathing (Dyspnea)     Chest Pain     Osteoarthrosis     S/P total hip arthroplasty     Cough     Primary osteoarthritis of left knee

## 2021-06-12 NOTE — PROGRESS NOTES
Office Visit - Physical    Oscar Zhao   72 y.o. female    Date of Visit: 8/22/2017    Chief Complaint   Patient presents with     Pre-op Exam     Cataract right 8/30/2017 left 9/13/2017 Dr. Gil at [facility]  fax [phone]       Subjective: Preoperative examination in anticipation of cataract surgery first on the right eye August 30, 2017 done on the left eye September 13, 2017 Dr. Langston [facility] fax number there [phone].    72-year-old female who has had a history of scleritis involving her eyes and requiring steroid therapy presents with impaired vision and cataract formation.  Preoperative examination is needed.    Mammogram done June 1, 2017 allCLEAR colonoscopy normal September 7, 2011.  [facility] presiding.    Non-smoker alcohol intake light social.    ROS: A comprehensive review of systems was performed and was otherwise negative    Medications:   Prior to Admission medications    Medication Sig Start Date End Date Taking? Authorizing Provider   folic acid (FOLVITE) 1 MG tablet Take 1 mg by mouth daily.  5/2/16  Yes PROVIDER, HISTORICAL   methotrexate 25 mg/mL injection Inject 25 mg into the shoulder, thigh, or buttocks every 7 days. On Tuesdays 10/4/16  Yes PROVIDER, HISTORICAL   prednisoLONE acetate (PRED-FORTE) 1 % ophthalmic suspension  6/17/17  Yes PROVIDER, HISTORICAL   predniSONE (DELTASONE) 1 MG tablet Take 1 mg by mouth daily. 4 tablets daily   Yes PROVIDER, HISTORICAL   timolol (BETIMOL) 0.5 % ophthalmic solution Administer 1 drop to both eyes 2 (two) times a day.   Yes PROVIDER, HISTORICAL       Allergies:  Allergies   Allergen Reactions     Adalimumab Myalgia     Brimonidine-Timolol Unknown     Redness itching in eyes     Levaquin [Levofloxacin]      Skin burn and couldn't get out bed for 5 days     Tetracyclines Hives       Immunizations:   Immunization History   Administered Date(s) Administered     DT (pediatric) 12/23/1998     Influenza S3c5-10, 01/11/2010      Influenza high dose, seasonal 10/15/2015, 2016     Influenza, inj, historic 2009, 2011     Influenza, seasonal,quad inj 6-35 mos 2012, 10/02/2014     Pneumo Conj 13-V (2010&after) 10/15/2015     Pneumo Polysac 23-V 2009     Td, historic 2006     Tdap 2012     ZOSTER 2009       Health Maintenance: Immunizations reviewed and up-to-date.  Cycling causes hives    Past Medical History: Scleritis.  Followed at the [place] [facility].  HLA-B27 positive.  Previous negative workup per rheumatologist Dr. Luke Wan for lupus and rheumatoid arthritis.    Past Surgical History: Hysterectomy.    Right total hip arthroplasty and left total knee arthroplasty.    Appendectomy.      Family history mother  age 92 father  also same age.    69 cardiac arrhythmia.  One daughter previously in [place] now in [place].        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  Former dancer.    Exam Chest clear to auscultation and percussion.  Heart tones regular rhythm without murmur rub or gallop.  Abdomen soft nontender no organomegaly.  No peritoneal signs.  Extremities free of edema cyanosis or clubbing.  Neck veins nondistended no thyromegaly or scleral icterus noted, carotids full.  Skin warm and dry easily conversant good spirited.  Normal intelligence.  Neurologically intact no gross localizing findings.    Assessment and Plan  Bilateral cataracts need surgical intervention.  Exacerbated by prednisone therapy for prior history of episcleritis and scleritis involving eyes.  HLA-B27 positive.  Hysterectomy and hypertension the latter control.    Check potassium preop; medically acceptable risk for anticipated surgery.    Total time spent with the patient today was 40 minutes of which greater than 50% was spent in counseling and coordination of care.    Jak Jones MD    Patient Active  Problem List   Diagnosis     Essential Hypertension     Difficulty Breathing (Dyspnea)     Chest Pain     Osteoarthrosis     S/P total hip arthroplasty     Cough     Primary osteoarthritis of left knee

## 2021-06-13 NOTE — TELEPHONE ENCOUNTER
"Patient returning message she received from \"Giselle\" regarding changing her appointment time currently 5:20 pm on 12/17/20 to an earlier time. She is able to come in at the 4:xx time you suggested if you want to update that.  And she thanks you for your assist.  "

## 2021-06-13 NOTE — PROGRESS NOTES
Office Visit - Follow up    Oscar Zhao   75 y.o. female    Date of Visit: 12/17/2020    Chief Complaint   Patient presents with     Hypertension     Follow-up     labs from Dr. Luke Wan     Fatigue     and cold       Subjective: Anemia    Feels cold has fatigue.    Seen by Dr. Luke Wan.  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  In the past she has been treated for uveitis episcleritis no history of pleuritis pericarditis or peritonitis.  The patient has been evaluated by rheumatologist Dr. Luke Wan and most recently found to have a hemoglobin of 10.2 with changing red cell indices that suggest iron deficiency.  We did discuss briefly upper GI endoscopy and colonoscopy but the patient would hear none of that and prefers not to pursue this line of evaluation stool for occult blood will be done.    No gross blood in the stool or urine previously has had melena and a negative work-up the patient also denies any blood in the urine or's gross blood in the stool medication list reviewed reconciled.  She denies chest pain or shortness of breath.  She has had drug allergies including that to ranitidine atenolol plus Levaquin tetracycline and another med ending in and may be causing myalgia.  See chart.    ROS: A comprehensive review of systems was performed and was otherwise negative    Medications:  Prior to Admission medications    Medication Sig Start Date End Date Taking? Authorizing Provider   amLODIPine (NORVASC) 5 MG tablet TAKE 2 TABLETS BY MOUTH DAILY 8/3/20  Yes Jak Jones MD   ascorbic acid, vitamin C, (ASCORBIC ACID WITH MATT HIPS) 500 MG tablet Take 500 mg by mouth daily.   Yes PROVIDER, HISTORICAL   cholecalciferol, vitamin D3, (VITAMIN D3) 2,000 unit capsule Take 2,000 Units by mouth daily.   Yes PROVIDER, HISTORICAL   cyanocobalamin 1000 MCG tablet Take 1,000 mcg by mouth daily.   Yes PROVIDER, HISTORICAL   folic acid (FOLVITE) 1 MG tablet Take 1 mg by mouth daily.  5/2/16   Yes PROVIDER, HISTORICAL   lisinopriL (PRINIVIL,ZESTRIL) 20 MG tablet TAKE 1 TABLET(20 MG) BY MOUTH DAILY 8/3/20  Yes Jak Jones MD   LOTEMAX 0.5 % ophthalmic suspension APPLY 1 DROP IN BOTH EYES ONCE A DAY 10/26/18  Yes PROVIDER, HISTORICAL   methotrexate 2.5 MG tablet Take 10 mg by mouth once a week. (mondays)       9/21/17  Yes PROVIDER, HISTORICAL   metoprolol succinate (TOPROL-XL) 25 MG TAKE 2 TABLETS(50 MG) BY MOUTH DAILY 8/3/20  Yes Jak Jones MD   timolol (BETIMOL) 0.5 % ophthalmic solution Administer 1 drop to both eyes every morning.          Yes PROVIDER, HISTORICAL       Allergies:   Allergies   Allergen Reactions     Adalimumab Myalgia     Brimonidine-Timolol Unknown     Redness itching in eyes     Levaquin [Levofloxacin]      Skin burn and couldn't get out bed for 5 days     Tetracyclines Hives       Immunizations:   Immunization History   Administered Date(s) Administered     DT (pediatric) 12/23/1998     Influenza [id number], 01/11/2010     Influenza high dose,seasonal,PF, 65+ yrs 10/15/2015, 11/28/2016, 11/17/2017, 11/05/2018, 10/29/2019     Influenza, Seasonal, Inj PF IIV3 12/08/2009     Influenza, inj, historic,unspecified 12/08/2009, 11/02/2011     Influenza, seasonal,quad inj 6-35 mos 11/09/2012, 10/02/2014     Influenza,seasonal, Inj IIV3 11/03/2005, 11/29/2007, 10/28/2008, 10/01/2010, 11/02/2011, 11/09/2012, 11/13/2013, 10/02/2014     Pneumo Conj 13-V (2010&after) 10/15/2015     Pneumo Polysac 23-V 03/06/2009     Td, Adult, Absorbed 06/30/2006     Td,adult,historic,unspecified 06/30/2006     Tdap 09/13/2006, 05/18/2012     ZOSTER, LIVE 08/27/2009       Exam Chest clear to auscultation and percussion.  Heart tones regular rhythm without murmur rub or gallop.  Abdomen soft nontender no organomegaly.  No peritoneal signs.  Extremities free of edema cyanosis or clubbing.  Neck veins nondistended no thyromegaly or scleral icterus noted, carotids full.  Skin warm and dry easily  conversant good spirited.  Normal intelligence.  Neurologically intact no gross localizing findings.    62 and three-quarter inches tall 137 pounds down 8 pounds from last visit.  BMI is 24+    118/70 pulse 70 respirations 18 O2 sats 99% on room air temperature this afternoon 97.2 she is neatly attired easily conversant good spirited happy and intelligent the patient does not appear acutely or chronically ill.  Not cachectic.    Assessment and Plan  Anemia uncertain etiology with indices that suggest microcytic hypochromic comprehensive laboratory evaluation of anemia ordered.  Patient was reluctant to pursue upper GI endoscopy or colonoscopy at this juncture.  Stool for occult blood was ordered plus additional blood test.    History of uveitis iritis and episcleritis HLA-B27 related seen by ophthalmology [facility] Dr. Langston as well as rheumatologist Dr. Luke Wan.  No history of chronic kidney disease.    Prior history of melena with negative gastrointestinal work-up including upper GI endoscopy PillCam study and colonoscopy.  Questions regarding iron supplement and whether or not she should take it at this time will be deferred pending results of lab test.    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

## 2021-06-14 NOTE — PROGRESS NOTES
Office Visit - Follow up    Oscar Zhao   72 y.o. female    Date of Visit: 11/27/2017    Chief Complaint   Patient presents with     Abdominal Pain     follow up       Subjective: Hypertension and abdominal pain follow-up.  Symptoms of abdominal pain have subsided.    Suggest [facility] consult.    Significant past health history includes scleritis with methotrexate usage plus low-dose prednisone.    Positive HLA-B27.  Prior history of GI bleed and transient leukocytosis.    No blood in stool or urine medication list reviewed well-tolerated.  Denies chest pain or shortness of breath.  Prior history of GI bleed requiring hospital stay secondary to aspirin and/or medical medications taken postoperative bili.    ROS: A comprehensive review of systems was performed and was otherwise negative    Medications:  Prior to Admission medications    Medication Sig Start Date End Date Taking? Authorizing Provider   folic acid (FOLVITE) 1 MG tablet Take 1 mg by mouth daily.  5/2/16  Yes PROVIDER, HISTORICAL   methotrexate 2.5 MG tablet Take 25 mg by mouth. 9/21/17  Yes PROVIDER, HISTORICAL   prednisoLONE acetate (PRED-FORTE) 1 % ophthalmic suspension  6/17/17  Yes PROVIDER, HISTORICAL   predniSONE (DELTASONE) 1 MG tablet Take 1 mg by mouth daily. 2mg daily   Yes PROVIDER, HISTORICAL   timolol (BETIMOL) 0.5 % ophthalmic solution Administer 1 drop to both eyes 2 (two) times a day.   Yes PROVIDER, HISTORICAL       Allergies:   Allergies   Allergen Reactions     Adalimumab Myalgia     Brimonidine-Timolol Unknown     Redness itching in eyes     Levaquin [Levofloxacin]      Skin burn and couldn't get out bed for 5 days     Tetracyclines Hives       Immunizations:   Immunization History   Administered Date(s) Administered     DT (pediatric) 12/23/1998     Influenza [id number], 01/11/2010     Influenza high dose, seasonal 10/15/2015, 11/28/2016, 11/17/2017     Influenza, Seasonal, Inj PF IIV3 12/08/2009     Influenza, inj,  historic,unspecified 12/08/2009, 11/02/2011     Influenza, seasonal,quad inj 6-35 mos 11/09/2012, 10/02/2014     Influenza,seasonal, Inj IIV3 11/03/2005, 11/29/2007, 10/28/2008, 10/01/2010, 11/02/2011, 11/09/2012, 11/13/2013, 10/02/2014     Pneumo Conj 13-V (2010&after) 10/15/2015     Pneumo Polysac 23-V 03/06/2009     Td, Adult, Absorbed 06/30/2006     Td,adult,historic,unspecified 06/30/2006     Tdap 09/13/2006, 05/18/2012     ZOSTER 08/27/2009       Exam Chest clear to auscultation and percussion.  Heart tones regular rhythm without murmur rub or gallop.  Abdomen soft nontender no organomegaly.  No peritoneal signs.  Extremities free of edema cyanosis or clubbing.  Neck veins nondistended no thyromegaly or scleral icterus noted, carotids full.  Skin warm and dry easily conversant good spirited.  Normal intelligence.  Neurologically intact no gross localizing findings.    Assessment and Plan  Abdominal pain with history of GI bleed and history of scleritis with HLA-B27 positivity.  Remote history of GI bleed related to aspirin and postoperative medication usage.  Multiple drug allergies.  Followed by ophthalmology as well as rheumatology.  Overweight BMI 25+.  Discussed see below RTC 4-6 weeks time after [facility] consult for abdominal pain.    Time: total time spent with the patient was 25 minutes of which >50% was spent in counseling and coordination of care    The following high BMI interventions were performed this visit: encouragement to exercise    Jak Jones MD    Patient Active Problem List   Diagnosis     Essential Hypertension     Difficulty Breathing (Dyspnea)     Chest Pain     Osteoarthrosis     S/P total hip arthroplasty     Cough     Primary osteoarthritis of left knee

## 2021-06-14 NOTE — PROGRESS NOTES
Chief Complaint   Patient presents with     Flu Vaccine     Flu consent and contraindication forms are given/ signed/ reviewed and sent to medical records to scan.   Pt changed her mind and was asking for the high dose flu since she has been getting it last couple years and no reactions to it.   Poonam Berg, [facility] 11/17/2017 9:19 AM

## 2021-06-14 NOTE — PROGRESS NOTES
Oscar Zhao is a 75 y.o. female who is being evaluated via a billable telephone visit.      What phone number would you like to be contacted at? [phone]  How would you like to obtain your AVS? AVS Preference: Mail a copy.  Assessment & Plan     Anemia unspecified.  Ferritin level was low normal.  Reticulocyte count is minimally elevated.  LDH is minimally elevated.  This may point to a hemolytic process mild.  Latest hemoglobin 10.8 with normal red cell indices normal white count and normal platelet count.  The patient has some months yet unspecified specifically autoimmune process that is involved her high iritis episcleritis uveitis that she has been seen at [facility] as well as by rheumatology.  Perhaps this is an autoimmune disease associated with mild chronic anemia.  Discussed in detail with the patient.  She is up-to-date on mammography as well as it was recommended to her she have a colonoscopy repeated.  Famotidine safe for dyspepsia.  In addition I did advise her to take ferrous gluconate plus see hematologist Dr. Altaf Larose in our system for further evaluation and treatment and recommendations regarding this mild anemia.  Hemoglobin 10.8.              21 minutes spent on the date of the encounter doing chart review, review of test results, interpretation of tests, patient visit and documentation            No follow-ups on file.    Jak Jones MD  [facility]     Oscar Zhao is 75 y.o. and presents to clinic today for the following health issues   HPI             Review of Systems  No blood in stool or urine no chest pain or shortness of breath medication list reviewed reconciled in the chart.  The patient does not smoke or abuse alcohol.  We had a good discussion she is a  with one daughter.  The patient feels well generally and she has had no weight loss or lack of appetite.  Occasional dyspepsia better with famotidine.       Objective       Vitals:  No vitals were obtained today due to virtual visit.    Physical Exam  No physical examination done this was a phone visit.            Phone call duration: 21 minutes

## 2021-06-14 NOTE — PROGRESS NOTES
Office Visit - Follow up    Oscar Zhao   72 y.o. female    Date of Visit: 11/20/2017    Chief Complaint   Patient presents with     Abdominal Pain     for one week       Subjective: Abdominal pain.  Mid abdominal pain periumbilical or just superior to the umbilicus.    1 week duration.    Excessive gas no nausea or vomiting pain comes and goes no alleviating factors.  Some radiation to the back no associated diarrhea.    No blood in stool no melena no hematochezia no hematemesis.    History of scleritis followed by Dr. Luke Wan from rheumatology.  [facility].  On methotrexate 25 mg every week.  Seen also at [facility] by Dr. Langston.  She is HLA-B27 positive.  She is on low-dose prednisone by mouth 2 mg daily with 1 drop of Pred Forte into her eyes for episcleritis.    Prior history of GI bleed hemoglobin got as low as 9 no transfusional support.  Uncertain source of the GI bleeding.  In the remote past appendectomy.  No other abdominal surgeries.    Colonoscopy done September 7, 2011 normal.  Prior upper GI endoscopy negative.    Previously the patient had been on methotrexate infusion or injectable now on oral tablets.    Allergies include adalimumab plus brimonidine and levofloxacin and tetracycline the latter causing hives.    ROS: A comprehensive review of systems was performed and was otherwise negative    Medications:  Prior to Admission medications    Medication Sig Start Date End Date Taking? Authorizing Provider   folic acid (FOLVITE) 1 MG tablet Take 1 mg by mouth daily.  5/2/16  Yes PROVIDER, HISTORICAL   methotrexate 2.5 MG tablet Take 25 mg by mouth. 9/21/17  Yes PROVIDER, HISTORICAL   prednisoLONE acetate (PRED-FORTE) 1 % ophthalmic suspension  6/17/17  Yes PROVIDER, HISTORICAL   predniSONE (DELTASONE) 1 MG tablet Take 1 mg by mouth daily. 2mg daily   Yes PROVIDER, HISTORICAL   timolol (BETIMOL) 0.5 % ophthalmic solution Administer 1 drop to both eyes 2 (two) times a day.   Yes  PROVIDER, HISTORICAL   methotrexate 25 mg/mL injection Inject 25 mg into the shoulder, thigh, or buttocks every 7 days. On Tuesdays 10/4/16 11/20/17  PROVIDER, HISTORICAL       Allergies:   Allergies   Allergen Reactions     Adalimumab Myalgia     Brimonidine-Timolol Unknown     Redness itching in eyes     Levaquin [Levofloxacin]      Skin burn and couldn't get out bed for 5 days     Tetracyclines Hives       Immunizations:   Immunization History   Administered Date(s) Administered     DT (pediatric) 12/23/1998     Influenza [id number], 01/11/2010     Influenza high dose, seasonal 10/15/2015, 11/28/2016, 11/17/2017     Influenza, Seasonal, Inj PF IIV3 12/08/2009     Influenza, inj, historic,unspecified 12/08/2009, 11/02/2011     Influenza, seasonal,quad inj 6-35 mos 11/09/2012, 10/02/2014     Influenza,seasonal, Inj IIV3 11/03/2005, 11/29/2007, 10/28/2008, 10/01/2010, 11/02/2011, 11/09/2012, 11/13/2013, 10/02/2014     Pneumo Conj 13-V (2010&after) 10/15/2015     Pneumo Polysac 23-V 03/06/2009     Td, Adult, Absorbed 06/30/2006     Td,adult,historic,unspecified 06/30/2006     Tdap 09/13/2006, 05/18/2012     ZOSTER 08/27/2009       Exam Chest clear to auscultation and percussion.  Heart tones regular rhythm without murmur rub or gallop.  Abdomen soft nontender no organomegaly.  No peritoneal signs.  Extremities free of edema cyanosis or clubbing.  Neck veins nondistended no thyromegaly or scleral icterus noted, carotids full.  Skin warm and dry easily conversant good spirited.  Normal intelligence.  Neurologically intact no gross localizing findings.  Dominant examination showed no areas of extreme tenderness or rebound bowel sounds were active extremities are free of edema the patient denies any diarrhea.    Assessment and Plan  Abdominal pain uncertain etiology needs CT of abdomen plus Pepcid 20 mg twice daily OTC at lunch and with evening snack.  Also check hemogram plus basic metabolic profile [place] profile  urinalysis and serum lipase today.    Scleritis on methotrexate and prednisone.    HLA-B27 positive.    Appendectomy in the remote past.    History of GI bleed.    Multiple drug allergies return to clinic 7-10 days time.    Hypertension controlled.    Time: total time spent with the patient was 40 minutes of which >50% was spent in counseling and coordination of care    The following high BMI interventions were performed this visit: encouragement to exercise    Jak Jones MD    Patient Active Problem List   Diagnosis     Essential Hypertension     Difficulty Breathing (Dyspnea)     Chest Pain     Osteoarthrosis     S/P total hip arthroplasty     Cough     Primary osteoarthritis of left knee

## 2021-06-14 NOTE — CONSULTS
Consults   [facility] Hematology and Oncology Consult Note    Patient: Oscar Zhao  MRN: [id number]  Date of Service: 01/28/2021        Reason for Visit     1. History of iron deficiency anemia  Iron and Transferrin Iron Binding Capacity    Occult Blood, Fecal    HM1(CBC and Differential)    Ambulatory referral to Gastroenterology    HM1(CBC and Differential)    Iron and Transferrin Iron Binding Capacity   2. Iron deficiency anemia due to chronic blood loss   Occult Blood, Fecal    Ambulatory referral to Gastroenterology         Assessment     1.  A very pleasant 76 years old woman who looks at least 10 years younger than her stated age, referred because of slight worsening of her anemia.  2.  Her current hemoglobin done in our clinic today actually shows anemia to have completely resolved and her microcytosis is also improved.  3.  She is due for colonoscopy however.  4.  Currently on oral iron supplementation.  Lab work-up suggesting that she has no trouble with absorption of iron.  Her serum iron is quite elevated most likely from the oral iron supplementation that she is taking.  5.  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  She is on a small dose of methotrexate.    Plan     1.  I would recommend stool occult testing for blood.  2.  We will also check iron studies to make sure she is not iron deficient.  Her last ferritin was 12 which is on the lower limit of normal.  3.  Continue with oral iron supplementation at this time.  4.  Depending upon what the GI work-up shows that she has any ulcer or a bleeding lesion then that would need to be treated.  5.  Follow-up depending upon the GI results.  6.  Continued oral iron supplementation.    Staging History     Cancer Staging  No matching staging information was found for the patient.    History     Patient is a very pleasant 76 years old woman who has been referred to me for evaluation of microcytic anemia  by Dr. Martinez.  The patient otherwise has been in usual state of health.  She went and saw Dr. Jones for her annual visit.  On that visit she was noted to have a hemoglobin down to 10.8.  Interestingly over the last year or so her hemoglobin has been trending slowly downwards.  In addition her mean corpuscular volume has also been going down suggesting iron deficiency as the most likely cause of her anemia.  The patient has been taking some iron pill for the past few weeks.  Clinically she does not feel a whole lot of symptoms from her anemia.  Patient denies any GI bleeding.    The patient does have a past medical history of GI bleeding due to some gastric ulcers etc.  She had colonoscopy in EGD and that confirmed that she was having some GI bleeding.  She was treated and it was resolved.    She was also noted to have some erythrocytosis few years ago which also resolved on its own.    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 for which she sees rheumatologist as well as an ophthalmologist.  She is on methotrexate 10 mg a week.  Previous to that she was taking 25 mg a week.  She also takes multivitamins folic acid etc.  Overall in excellent shape and great health.    Past Medical History     Past Medical History:   Diagnosis Date     Anemia      Arthritis      Basal cell carcinoma of anterior chest      Breast cyst      History of anesthesia complications      Hypertension      Osteoporosis      PONV (postoperative nausea and vomiting)      Squamous cell carcinoma        Past Social History     Social History     Socioeconomic History     Marital status:      Spouse name: Not on file     Number of children: 1     Years of education: Not on file     Highest education level: Not on file   Occupational History     Occupation: Retired Educator   Social Needs     Financial resource strain: Not on file     Food insecurity     Worry: Not on file     Inability: Not on file     Transportation needs     Medical:  Not on file     Non-medical: Not on file   Tobacco Use     Smoking status: Never Smoker     Smokeless tobacco: Never Used   Substance and Sexual Activity     Alcohol use: Yes     Alcohol/week: 4.0 standard drinks     Types: 4 Glasses of wine per week     Drug use: No     Sexual activity: Never   Lifestyle     Physical activity     Days per week: Not on file     Minutes per session: Not on file     Stress: Not on file   Relationships     Social connections     Talks on phone: Not on file     Gets together: Not on file     Attends Sikhism service: Not on file     Active member of club or organization: Not on file     Attends meetings of clubs or organizations: Not on file     Relationship status: Not on file     Intimate partner violence     Fear of current or ex partner: Not on file     Emotionally abused: Not on file     Physically abused: Not on file     Forced sexual activity: Not on file   Other Topics Concern     Not on file   Social History Narrative     Not on file       Family History     Family History   Problem Relation Age of Onset     Alzheimer's disease Mother      Heart disease Father      Pancreatic cancer Brother      Cancer Brother      No Medical Problems Daughter      Anesthesia problems Neg Hx        Medication List     Prior to Admission medications    Medication Sig Start Date End Date Taking? Authorizing Provider   amLODIPine (NORVASC) 5 MG tablet TAKE 2 TABLETS BY MOUTH DAILY 8/3/20  Yes Jak Jones MD   ascorbic acid, vitamin C, (ASCORBIC ACID WITH MATT HIPS) 500 MG tablet Take 500 mg by mouth daily.   Yes PROVIDER, HISTORICAL   cholecalciferol, vitamin D3, (VITAMIN D3) 2,000 unit capsule Take 2,000 Units by mouth daily.   Yes PROVIDER, HISTORICAL   cyanocobalamin 1000 MCG tablet Take 1,000 mcg by mouth daily.   Yes PROVIDER, HISTORICAL   folic acid (FOLVITE) 1 MG tablet Take 1 mg by mouth daily.  5/2/16  Yes PROVIDER, HISTORICAL   lisinopriL (PRINIVIL,ZESTRIL) 20 MG tablet TAKE 1  TABLET(20 MG) BY MOUTH DAILY 8/3/20  Yes Jak Jones MD   LOTEMAX 0.5 % ophthalmic suspension APPLY 1 DROP IN BOTH EYES ONCE A DAY 10/26/18  Yes PROVIDER, HISTORICAL   methotrexate 2.5 MG tablet Take 10 mg by mouth once a week. (mondays)       9/21/17  Yes PROVIDER, HISTORICAL   metoprolol succinate (TOPROL-XL) 25 MG TAKE 2 TABLETS(50 MG) BY MOUTH DAILY 8/3/20  Yes Jak Jones MD   prednisoLONE acetate (PRED-FORTE) 1 % ophthalmic suspension Apply to eye as needed. 12/18/20  Yes PROVIDER, HISTORICAL   timolol (BETIMOL) 0.5 % ophthalmic solution Administer 1 drop to both eyes every morning.          Yes PROVIDER, HISTORICAL       Allergies     Allergies   Allergen Reactions     Adalimumab Myalgia     Brimonidine-Timolol Unknown     Redness itching in eyes     Levaquin [Levofloxacin]      Skin burn and couldn't get out bed for 5 days     Tetracyclines Hives       Review of Systems   A comprehensive review of 14 systems was done. Pertinent findings noted here and in history of present illness. All the rest negative.    General  General (WDL): Exceptions to WDL  Fatigue: Yes - Recent (Less than 3 months)  ENT  ENT (WDL): Exceptions to WDL  Sinus Congestion/Drainage: Yes - Recent (Less than 3 months)  Respiratory  Respiratory (WDL): All respiratory elements are within defined limits  Cardiovascular  Cardiovascular (WDL): All cardiovascular elements are within defined limits  Endocrine  Endocrine (WDL): All endocrine elements are within defined limits  Gastrointestinal  Gastrointestinal (WDL): All gastrointestinal elements are within defined limits  Musculoskeletal  Musculoskeletal (WDL): All musculoskeletal elements are within defined limits  Neurological  Neurological (WDL): All neurological elements are within defined limits  Dominant Hand: Right  Psychological/Emotional  Psychological/Emotional (WDL): All psychological/emotional elements are within defined  limits  Hematological/Lymphatic  Hematological/Lymphatic (WDL): All hematological/lymphatic elements are within defined limits  Dermatological  Dermatologic (WDL): All dermatological elements are within defined limits  Genitourinary/Reproductive  Genitourinary/Reproductive (WDL): All genitourinary/reproductive elements are within defined limits  Reproductive (Females only)     Pain  Currently in Pain: No/denies         Physical Exam     Recent Vitals 1/28/2021   Height -   Weight 140 lbs 6 oz   BSA (m2) 1.68 m2   /81   Pulse 76   Temp 98.6   Temp src 1   SpO2 98   Some recent data might be hidden       Constitutional: Oriented to person, place, and time and appears well-developed.   HEENT:  Normocephalic and atraumatic.  Eyes: Conjunctivae and EOM are normal. Pupils are equal, round, and reactive to light. No discharge.  No scleral icterus. Nose normal. Mouth/Throat: Oropharynx is clear and moist. No oropharyngeal exudate.    NECK: Normal range of motion. Neck supple. No JVD present. No tracheal deviation present. No thyromegaly present.   CARDIOVASCULAR: Normal rate, regular rhythm and intact distal pulses.  Exam reveals no gallop and no friction rub.  Systolic murmur present.  PULMONARY: Effort normal and breath sounds normal. No respiratory distress.No Wheezing or rales.  ABDOMEN: Soft. Bowel sounds are normal. No distension and no mass.  There is no tenderness. There is no rebound and no guarding. No HSM.  MUSCULOSKELETAL: Normal range of motion. No edema and no tenderness. Mild kyphosis, no tenderness.  LYMPH NODES: Has no cervical, supraclavicular, axillary and groin adenopathy.   NEUROLOGICAL: Alert and oriented to person, place, and time. No cranial nerve deficit.  Normal muscle tone. Coordination normal.   GENITOURINARY: Deferred exam.  SKIN: Skin is warm and dry. No rash noted. No erythema. No pallor.   EXTREMITIES: No cyanosis, no clubbing, no edema. No Deformity.  PSYCHIATRIC: Normal mood, affect  and behavior.      Lab Results     Recent Results (from the past 48 hour(s))   Iron and Transferrin Iron Binding Capacity   Result Value Ref Range    Iron 234 (H) 42 - 175 ug/dL    Transferrin 360 212 - 360 mg/dL    Transferrin Saturation, Calculated 52 (H) 20 - 50 %    Transferrin IBC, Calculated 450 313 - 563 ug/dL   HM1 (CBC with Diff)   Result Value Ref Range    WBC 8.4 4.0 - 11.0 thou/uL    RBC 5.12 3.80 - 5.40 mill/uL    Hemoglobin 12.2 12.0 - 16.0 g/dL    Hematocrit 43.0 35.0 - 47.0 %    MCV 84 80 - 100 fL    MCH 23.8 (L) 27.0 - 34.0 pg    MCHC 28.4 (L) 32.0 - 36.0 g/dL    RDW 22.9 (H) 11.0 - 14.5 %    Platelets 374 140 - 440 thou/uL    MPV 9.0 8.5 - 12.5 fL    Neutrophils % 69 50 - 70 %    Lymphocytes % 21 20 - 40 %    Monocytes % 6 2 - 10 %    Eosinophils % 2 0 - 6 %    Basophils % 1 0 - 2 %    Immature Granulocyte % 1 (H) <=0 %    Neutrophils Absolute 5.8 2.0 - 7.7 thou/uL    Lymphocytes Absolute 1.8 0.8 - 4.4 thou/uL    Monocytes Absolute 0.5 0.0 - 0.9 thou/uL    Eosinophils Absolute 0.2 0.0 - 0.4 thou/uL    Basophils Absolute 0.1 0.0 - 0.2 thou/uL    Immature Granulocyte Absolute 0.1 (H) <=0.0 thou/uL       Lab Results   Component Value Date    ALBUMIN 3.9 02/24/2020    ALT 18 02/24/2020    AST 17 02/24/2020    BUN 13 08/30/2020    CALCIUM 10.0 08/30/2020     08/30/2020    CHOL 186 02/24/2020    CO2 19 (L) 08/30/2020    CREATININE 0.71 12/17/2020    GFRAA >60 12/17/2020    GFRNONAA >60 12/17/2020     (H) 08/30/2020    HCT 43.0 01/28/2021    WBC 8.4 01/28/2021    HGB 12.2 01/28/2021    MG 1.9 08/30/2020    PHOS 3.0 01/05/2012     01/28/2021    K 3.8 08/30/2020     08/30/2020     (H) 12/17/2020           Imaging Results     No results found.    I have personally reviewed the images with the patient and compared them to the prior scans and appreciated the difference.  All the patient's questions were answered      Total time spent was 60 minutes, more than half of it was in  face-to-face counseling regarding disease state, review of available images, laboratory values, pathological reports, treatment, options, their side effects and management.    Altaf Jasso MD

## 2021-06-15 NOTE — PROGRESS NOTES
[facility] Hematology and Oncology Consult Note    Patient: Oscar Zhao  MRN: [id number]  Date of Service: 03/09/2021        Reason for Visit    Anal squamous cell cancer.    Assessment/Plan    Ms. Oscar Zhao is a 76 y.o. 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  She had almost no symptoms.  She would just see a little blood on the toilet paper now and then which is thought to be due to hemorrhoids.  Bowel movements are otherwise quite normal.  She is a physically active person even though she is 76 years old.  On physical examination she looks quite good.  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  She has been treated in the past with prednisone, methotrexate, Humira and rituximab for this condition.    I have gone through her past medical records in detail.  I have also reviewed her medical records from colorectal surgery, [place] [facility], [place] [facility], [facility] and [facility].  I have reviewed her labs.  I have personally reviewed the images of the MRI pelvis and the PET CT scan.    1.  I discussed with her that the anal canal cancer is possibly a consequence of HPV infection and also because of the chronic immunosuppression that she has from the autoimmune scleritis due to HLA-B27 positivity and also the treatments that she received for the scleritis which would have caused some long-term immunosuppression.  This is similar to the skin cancers that she has had.  She voiced understanding.    2.  I reviewed with her the images and the report of the PET CT scan 3/5/2021.  Showed her the PET positive lesion  in the anal canal.  I discussed with her that with the information that we have from all the work-up that has been done, we can see that there is no distant metastatic disease.  We are also not seeing any local lymph nodes that are involved.  The lesion itself appears not to invade through the muscularis propria in the MRI.  With all this information, this cancer can be staged as stage II (T2, N0, M0).    I discussed with her about the standard treatment for anal canal cancer.  I discussed with her that surgery is technically possible but the problem is that she will lose the sphincter which will likely result in her having to have a colostomy long-term.  That is why we first recommend going for chemotherapy and radiation with which there is a high chance of a complete response and may be a cure.  If that happens then she does not need surgery but only follow-up.  She was happy to hear that.    3.  I discussed with her about what to expect with radiation treatment for the anal cancer.  We discussed about the local skin and mucosal reaction which can cause significant mucositis and pain.  She feels that she is up to getting through the treatment.  I discussed with her that we will watch her closely and help her with supportive treatments for the pain.    4.  I then discussed with her about the chemotherapy that we will use along with radiation to shrink the cancer and make the radiation more effective.  Discussed with her about how radiation works much better when there is chemotherapy given along with it.  I discussed with her about the standard chemotherapy with mitomycin and infusional 5-FU.  Discussed with her that other chemotherapy regimens have been tried but none have so far been shown to work better than the tried and tested chemotherapy regimen.  We then discussed about the dosages with mitomycin 10 mg per metered square given on day 1 and then fluorouracil 1000 mg per metered square per day in a continuous  infusion given over 4 days.  This is again repeated in the same manner from day 29 to day 36.    5. I discussed with Oscar Zhao about the risks of chemotherapy.  We discussed about the reduction in blood counts. Anemia can cause tiredness, a low platelet count can lead to bleeding problems. A blood transfusion may be needed for anemia or low platelet count. A low white blood count puts a person at risk for infections which may even be life threatening. If there is a fever of 100.5 F or above the Cancer clinic needs to be called immediately day or night as inpatient admission and antibiotics may be needed.    Chemotherapy may cause body aches. It may cause hair loss which can even be permanent. When hair grows back after chemo it may have a different color or texture.  Chemotherapy may cause problems with oral ulcers or pain on swallowing. It may cause nausea and vomiting but and we use antinausea medications to control the nausea.  I discussed with her that occasionally patients with chemotherapy can have a chest pain due to a coronary vasospasm.  This should be considered equivalent to a heart attack.  If she suddenly has chest pain she should go straight to the ER.  It may cause diarrhea or constipation.  Chemotherapy may cause toxicity to the liver or kidneys.    Chemotherapy may cause neuropathy causing numbness and tingling in fingers and toes and unsteadiness on walking. Chemotherapy doses may need to be modified for this problem.  Occasionally chemotherapy may cause problems for the brain. Some people experience problems with memory. Rarely there can be serious brain damage.    Occasionally chemotherapy can cause damage to the bone marrow after some time with reduced blood counts- called myelodysplasia. Rarely chemotherapy can cause a second cancer years later, like a leukemia.    Thus chemotherapy can have serious and even life threatening side effects. Some people can have side effects that are different  from what we discussed as people can react differently to chemotherapy- what we have discussed is not an exhaustive list. However chemotherapy is being recommended as the expected benefits outweigh the risks.    After the detailed discussion Oscar Zhao signed the consent form for chemotherapy.  She was given printed information about the mitomycin and fluorouracil chemotherapy to read.    6.  Discussed with her about hand-foot syndrome that can happen with chemotherapy.  I advised her to start using moisturizing lotion on her palms and soles a couple of times a day.    7.  I asked her to hold the methotrexate after today's dose.  Explained to her that methotrexate along with the other chemotherapy may cause extra bone marrow suppression.  Most likely the above chemotherapy will keep the cellulitis under control because it is by itself immunosuppressive.  She can likely restart methotrexate after she is done with chemotherapy and radiation.    8.  I discussed with her that she will need a Port-A-Cath for chemotherapy.  Explained the rationale to her.  She was agreeable.  I have requested this through interventional radiology.  This will be scheduled before she starts chemotherapy and radiation.    9.  Chemotherapy orders have been initiated.  I have sent prescriptions for prochlorperazine and EMLA cream to her pharmacy.  I discussed with her about how to take the antinausea medications.    10.  I discussed with her that when she completes chemotherapy and radiation, she will have a time of rest for a few weeks.  We will plan to repeat scans 8 to 12 weeks out from completion of chemotherapy and radiation.  She will also need a detailed examination by the colorectal surgeon.  If she has a complete response at that point, she will need only follow-up.  If she does not have a complete response at that point, we may have to consider surgery.  She voiced understanding.    11.  CBC differential and platelets from  1/28/2021 is essentially normal with a hemoglobin of 12.2, normal white blood cell count and normal platelet count.  She has not had a recent CMP done.  This was drawn today and reviewed.  CMP is also essentially within normal limits with a normal kidney function and LFTs.    12.  I have asked for her to be scheduled to start chemotherapy and radiation together.  After discussion with the chemotherapy nurses, this will need to be on a Monday so that the above chemotherapy regimen can be coordinated correctly, because of the need for the continuous IV infusion of 5-FU.  Thus I am anticipating starting on 3/22/2021.  She will have chemotherapy education on that day.  Labs according to treatment plan.  Appointment with MD or NP on the first day.    Time spend >54 minutes total time for the patient.           ECOG Performance   ECOG Performance Status: 0  Distress Assessment  Distress Assessment Score: 1        Problem List    1. Malignant neoplasm of anal canal (H)  IR Port Placement 5+ Years    Education (Chemo Class)    Other (See Scheduling Instructions)    prochlorperazine (COMPAZINE) 10 MG tablet    lidocaine-prilocaine (EMLA) cream    CC OFFICE VISIT LONG    Infusion Appointment    Comprehensive Metabolic Panel   2. Scleritis, bilateral             CC: Jak Jones MD Elizabeth Ester, MD Aneel Damle, MD      ______________________________________________________________________________      Staging History    Cancer Staging  Malignant neoplasm of anal canal (H)  Staging form: Anus, AJCC 8th Edition  - Clinical: Stage IIA (cT2, cN0, cM0) - Signed by Altaf Jasso MD on 2/25/2021      History    Ms. Oscar Zhao is a 76-year-old woman was recently diagnosed with a squamous cell cancer of the anal canal.  She had her colonoscopy for colorectal cancer screening done on 2/18/2021.  She was found to have  a 16 to 20 mm tubular adenoma in the ascending colon which was removed.  She was found to have an  ulcerated mass lesion in the canal which on biopsy showed a moderately differentiated squamous cell cancer.  In the anoscopy examination done by Dr. Reynolds she had a 1 cm x 1 cm ulcerated mass in the right posterior lateral position.  It did not feel it tethered to the sphincter complex.  Secondly she has had an MRI of the pelvis and a PET CT scan done to complete staging.  She has met with Dr. Spencer of radiation oncology and she is planned to get started on radiation in 1- 2 weeks time.    She states that she really did not have any symptoms before the colonoscopy.  She would see occasional bleeding with blood on the toilet paper on wiping after a bowel movement that she thought was from hemorrhoids.  She did not have any pain or irritation in the rectal area.  Even now she really has no symptoms with bowel movements.    She has not noticed any other lumps or bumps.    She is physically quite active.  She lives alone and is able to take care of all her activities.  She was working out at the gym before the COVID-19 pandemic.  Nowadays she walks 3 to 5 miles.  No difficulty going up a flight of stairs.  She believes that she can go up to 3 flights without any problems.    She has had a longstanding problem with scleritis in both eyes.  She has been on different medications to control the autoimmune phenomenon.  She is HLA-B27 positive.  She was on prednisone until recently which was stopped last week.  She is currently taking methotrexate once a week.  Currently both eyes are quiet without any visual problems or eye irritation or discomfort.    No fevers or night sweats.  No lumps or bumps anywhere.  No unusual headaches.  Weight has been stable.  No mouth sores.  No swallowing difficulty.  No nausea or vomiting.  Breathing is fine.  No chest pain or cough.  No abdominal pain.  No difficulty with urination.  No skin rashes.  She does have some numbness and tingling in her forearms and in her lower legs for some time.   She has had a work-up done for peripheral neuropathy in the past but nothing clearly came back positive.  These symptoms have remained stable.    Please see below.  A 14 point review of system is otherwise completely negative.    Past History  Past Medical History:   Diagnosis Date     Anemia      Basal cell carcinoma of anterior chest      Breast cyst      History of anesthesia complications      HLA B27 (HLA B27 positive)      Hypertension      Osteoarthritis      Osteoporosis 2011     Peripheral neuropathy 2018     PONV (postoperative nausea and vomiting)      Scleritis, bilateral     Left .  Treated with corticosteroids,, methotrexate and Humira.  .  Rituximab.     Squamous cell carcinoma of skin of chest      Steroid induced glaucoma, both eyes      Past Surgical History:   Procedure Laterality Date     BREAST CYST EXCISION Right     benign     CATARACT EXTRACTION W/  INTRAOCULAR LENS IMPLANT Right 2017     CATARACT EXTRACTION W/  INTRAOCULAR LENS IMPLANT Left 2017      SECTION       CLOSED REDUCTION HIP DISLOCATION Right 2019    Procedure: CLOSED REDUCTION, HIP;  Surgeon: Jak Ruiz DO;  Location: [facility];  Service: Orthopedics     COLONOSCOPY  2011     COLONOSCOPY  2005     COLONOSCOPY W/ BIOPSIES AND POLYPECTOMY  2021    16 to 20 mm polyp:tubular adenoma in the ascending colon.  Ulcerated mass in the anal canal: Moderately differentiated squamous cell cancer.     ESOPHAGOGASTRODUODENOSCOPY  2017    Large hiatal hernia.  Reactive gastropathy with mucosal erosion.  H. pylori negative.     LAPAROSCOPIC APPENDECTOMY  2011     VA TOTAL KNEE ARTHROPLASTY Left 2017    Procedure: LEFT TOTAL KNEE ARTHROPLASTY;  Surgeon: Star Du MD;  Location: [facility];  Service: Orthopedics     TOTAL ABDOMINAL HYSTERECTOMY W/ BILATERAL SALPINGOOPHORECTOMY       TOTAL HIP ARTHROPLASTY Right  08/18/2015    Procedure: HIP TOTAL ARTHROPLASTY, RIGHT;  Surgeon: Star Du MD;  Location: [facility] OR;  Service:      Family History   Problem Relation Age of Onset     Alzheimer's disease Mother      Heart disease Father      Pancreatic cancer Brother 72     No Medical Problems Daughter      Anesthesia problems Neg Hx      Social History     Socioeconomic History     Marital status:      Spouse name: None     Number of children: 1     Years of education: None     Highest education level: None   Occupational History     Occupation: Retired Educator   Social Needs     Financial resource strain: None     Food insecurity     Worry: None     Inability: None     Transportation needs     Medical: None     Non-medical: None   Tobacco Use     Smoking status: Never Smoker     Smokeless tobacco: Never Used   Substance and Sexual Activity     Alcohol use: Yes     Alcohol/week: 4.0 standard drinks     Types: 4 Glasses of wine per week     Drug use: No     Sexual activity: Never   Lifestyle     Physical activity     Days per week: None     Minutes per session: None     Stress: None   Relationships     Social connections     Talks on phone: None     Gets together: None     Attends Hoahaoism service: None     Active member of club or organization: None     Attends meetings of clubs or organizations: None     Relationship status: None     Intimate partner violence     Fear of current or ex partner: None     Emotionally abused: None     Physically abused: None     Forced sexual activity: None   Other Topics Concern     None   Social History Narrative     None     Allergies    Allergies   Allergen Reactions     Adalimumab Myalgia     Brimonidine-Timolol Unknown     Redness itching in eyes     Levaquin [Levofloxacin]      Skin burn and couldn't get out bed for 5 days     Tetracyclines Hives       Review of Systems  Constitutional  Constitutional (WDL): Exceptions to WDL  Weight Loss: to <10% from baseline,  "intervention not indicated(2 # \"nerves\")  Neurosensory  Neurosensory (WDL): Exceptions to WDL  Peripheral Motor Neuropathy: None  Ataxia: None  Peripheral Sensory Neuropathy: Asymptomatic, loss of deep tendon reflexes or paresthesia(senspry around wrists and ankles-burning, intermittent)  Confusion: None  Syncope: None  Eye   Eye Disorder (WDL): Exceptions to WDL(hx iritis x 40 years, also scleritis)  Ear  Ear Disorder (WDL): All ear disorder elements are within defined limits  Cardiovascular  Cardiovascular (WDL): All cardiovascular elements are within defined limits  Pulmonary  Respiratory (WDL): Within Defined Limits  Gastrointestinal  Gastrointestinal (WDL): Exceptions to WDL  Anorexia: Loss of appetite without alteration in eating habits(with stress)  Genitourinary  Genitourinary (WDL): All genitourinary elements are within defined limits  Lymphatic  Lymph (WDL): All lymph disorder elements are within defined limits  Musculoskeletal and Connective Tissue  Musculoskeletal and Connetive Tissue Disorders (WDL): All Musculoskeletal and Connetive Tissue Disorder elements are within defined limits  Integumentary  Integumentary (WDL): Exceptions to WDL  Rash Maculo-Papular: Macules/papules covering <10% BSA with or without symptoms (e.g., pruritus, burning, tightness)(slight on back \"stress\")  Patient Coping  Patient Coping: Anxiety  Distress Assessment  Distress Assessment Score: 1  Accompanied by     Oral Chemo Adherence       Physical Exam    Recent Vitals 3/9/2021   Height -   Weight 136 lbs 6 oz   BSA (m2) 1.65 m2   /81   Pulse 81   Temp 98.1   Temp src 1   SpO2 98   Some recent data might be hidden       GENERAL: Alert and oriented to time place and person. Seated comfortably. In no distress.  Thin build.  Slightly anxious.  Looks well overall.    HEAD: Atraumatic and normocephalic.    EYES: JUANCARLOS, EOMI.  No pallor.  No icterus.    Oral cavity: no mucosal lesion or tonsillar enlargement.    NECK: supple. " JVP normal.  No thyroid enlargement.    LYMPH NODES: No palpable, cervical, axillary or inguinal lymphadenopathy.    CHEST: clear to auscultation bilaterally.  Resonant to percussion throughout bilaterally.  Symmetrical breath movements bilaterally.    CVS: S1 and S2 are heard. Regular rate and rhythm.  No murmur or gallop or rub heard.  No peripheral edema.    ABDOMEN: Soft. Not tender. Not distended.  No palpable hepatomegaly or splenomegaly.  No other mass palpable.  Bowel sounds heard.    EXTREMITIES: Warm.    SKIN: no rash, or bruising or purpura.  Has a full head of hair.      Lab Results    Recent Results (from the past 168 hour(s))   POCT Glucose    Specimen: Capillary; Blood   Result Value Ref Range    Glucose 109 70 - 139 mg/dL   Comprehensive Metabolic Panel   Result Value Ref Range    Sodium 136 136 - 145 mmol/L    Potassium 4.0 3.5 - 5.0 mmol/L    Chloride 103 98 - 107 mmol/L    CO2 26 22 - 31 mmol/L    Anion Gap, Calculation 7 5 - 18 mmol/L    Glucose 90 70 - 125 mg/dL    BUN 10 8 - 28 mg/dL    Creatinine 0.70 0.60 - 1.10 mg/dL    GFR MDRD Af Amer >60 >60 mL/min/1.73m2    GFR MDRD Non Af Amer >60 >60 mL/min/1.73m2    Bilirubin, Total 0.6 0.0 - 1.0 mg/dL    Calcium 9.9 8.5 - 10.5 mg/dL    Protein, Total 7.4 6.0 - 8.0 g/dL    Albumin 4.1 3.5 - 5.0 g/dL    Alkaline Phosphatase 95 45 - 120 U/L    AST 17 0 - 40 U/L    ALT 19 0 - 45 U/L       Imaging Results    Ct External Imaging Abdomen    Result Date: 2/25/2021  Images were obtained from an external facility.  Click PACS Images hyperlink to view images.  Textual results have been scanned into the media tab.    Nm Pet Ct Skull To Mid Thigh    Result Date: 3/5/2021  EXAM: NM PET CT SKULL TO MID THIGH LOCATION: [facility] DATE/TIME: 3/5/2021 2:27 PM INDICATION: Malignant neoplasm of anal canal. Anal squamous cell carcinoma, staging. Initial treatment strategy. COMPARISON: MR pelvis 02/23/2021 reviewed. TECHNIQUE: Serum glucose level  109 mg/dL. One hour post intravenous administration of 9.5 mCi F-18 FDG, PET imaging was performed from the skull base to the mid thighs utilizing attenuation correction with concurrent axial CT and PET/CT image fusion. Dose reduction techniques were used. FINDINGS: FDG avid mass in the lower anal canal estimated at 1.4 x 1.9 x 1.9 cm associated with marked uptake (SUVmax 7.4), consistent with site of known primary malignancy. A few asymmetrically prominent left axillary lymph nodes with mild activity are likely inflammatory related to recent vaccination. Normal physiologic FDG uptake elsewhere. No FDG avid adenopathy in the abdomen or pelvis. No suspicious focal uptake in the liver or skeleton. Mild senescent intracranial changes. Large esophageal hiatal hernia with much of the stomach in the chest. Mild bibasilar atelectasis and/or scarring. Mild atherosclerotic calcifications. Few colonic diverticula. Right YAMILET. Moderate scattered degenerative changes in the spine.     1. FDG avid anal mass consistent with known primary malignancy. 2. No evidence of metastasis.    Mr External Imaging Pelvis    Result Date: 2/25/2021  Images were obtained from an external facility.  Click PACS Images hyperlink to view images.  Textual results have been scanned into the media tab.        Signed by: Kristen Villa MD

## 2021-06-15 NOTE — TELEPHONE ENCOUNTER
I sent the following e-mail to Maritza:    Vasquez Caoi!    It was nice to talk with you today!  I have attached my  official  welcome letter which includes a 2nd page of handy phone numbers.  ACS ([employer]) has a  Road to Recovery  Program as I had mentioned.  Unfortunately, that is on hold until the covid situation is under better control.  I did attach the flyer for this program.  I also attached a [facility] Resource guide which I believe has a few possible resources for you should the need for transportation arise!    As I mentioned though, please feel free to reach out to me by phone or e-mail.  The staff at [facility] can send me a message too if you are in the clinic and need to talk.    Take Care12  Vanessa  [phone]      Vanessa Kebede, RN, BSN

## 2021-06-15 NOTE — PROGRESS NOTES
Patient here ambulatory for radiation consult for her anal cancer.  Patient states she saw Dr. Jasso yesterday in medical oncology but she will be switching to Dr. Mcbride so all cares can be done here at the [facility].  Patient still has a lot of chemotherapy related questions and still needs an appointment with Dr. Mcbride.  25 minutes spent in review of radiation process and potential side effects.  Written information given for review: ACS RT book, Jenifer RT handout, RT to pelvis handout, full bladder instructions reviewed and given to patient, insurance PA handout and welcome letter.   Seen by Dr. Spencer.  Plan RTC for follow up and/or CT simulation as directed by physician.

## 2021-06-15 NOTE — PROGRESS NOTES
[facility] Cancer Care Progress Note    Patient: Oscar Zhao  MRN: [id number]  Date of Service: 3/1/2021        Reason for visit      1. Malignant neoplasm of anal canal (H)        Assessment     1.  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  2.  HIV status unknown.  3.  No other high risk habits/features.  4.  Good general health otherwise.    Plan     I had lengthy discussion with the patient.  Reviewed her scans, pathology, natural history with the patient.    1.  Check HIV status.  2.  Radiation oncology consultation  3.  Rectal surgery consultation  4.  Most likely she is going require concurrent chemotherapy and radiation.  Given information about mitomycin and 5-FU.  5.  She is going to follow-up with Dr. Mcbride since she lives closer to [facility] and she is going require radiation and protracted chemotherapy for 5 days.    Clinical stage      Cancer Staging  Malignant neoplasm of anal canal (H)  Staging form: Anus, AJCC 8th Edition  - Clinical: Stage IIA (cT2, cN0, cM0) - Signed by Altaf Jasso MD on 2/25/2021      History     Oscar Zhao is a very pleasant 76 y.o. old female with a new diagnosis of squamous cell carcinoma of the anus.  This was diagnosed when she underwent endoscopy on 18 February 2021.  There was a lesion seen in the anal canal as well as some induration outside of the splinter as well.  The biopsies came back positive for squamous cell carcinoma.  P16 status is unknown.    The endoscopy was done because the patient had initially presented with some anemia type of symptoms.  She had had some occasional blood in the stools as well.  She initially thought that she was having hemorrhoids.    The patient otherwise has no high risk features or habits.    Past Medical History     Past Medical History:   Diagnosis Date     Anemia      Arthritis      Basal cell carcinoma of anterior chest      Breast cyst      History of anesthesia  complications      Hypertension      Osteoporosis      PONV (postoperative nausea and vomiting)      Squamous cell carcinoma            Review of Systems   Constitutional  Constitutional (WDL): All constitutional elements are within defined limits  Neurosensory  Neurosensory (WDL): All neurosensory elements are within defined limits(sensory neuropathy under control)  Cardiovascular  Cardiovascular (WDL): All cardiovascular elements are within defined limits  Pulmonary  Respiratory (WDL): Within Defined Limits  Gastrointestinal  Gastrointestinal (WDL): All gastrointestinal elements are within defined limits  Genitourinary  Genitourinary (WDL): All genitourinary elements are within defined limits  Integumentary  Integumentary (WDL): All integumentary elements are within defined limits  Patient Coping  Patient Coping: Accepting  Accompanied by  Accompanied by: Alone    ECOG performance status and Distress Assessment      ECOG Performance:    ECOG Performance Status: 0    Distress Assessment  Distress Assessment Score: No distress:     Pain Status  Currently in Pain: No/denies        Vital Signs     Vitals:    02/25/21 1115   BP: 130/80   Pulse: 75   Temp: 98.7  F (37.1  C)   SpO2: 99%       Physical Exam     GENERAL: No acute distress. Cooperative in conversation.   HEENT: Pupils are equal, round and reactive. Oral mucosa is clean and intact. No ulcerations or mucositis noted. No bleeding noted.  RESP:Chest symmetric lungs are clear bilaterally per auscultation. Regular respiratory rate. No wheezes or rhonchi.  CV: Normal S1 S2 Regular, rate and rhythm. No murmurs.  ABD: Nondistended, soft, nontender. Positive bowel sounds. No organomegaly.   EXTREMITIES: No lower extremity edema.   NEURO: Non- focal. Alert and oriented x3.  Cranial nerves appear intact.  PSYCH: Within normal limits. No depression or anxiety.  SKIN: Warm dry intact.    LYMPH NODES: Bilateral cervical, supraclavicular, axillary lymph node examination  was done.  Negative for any palpable adenopathy.      Lab Results     Results for orders placed or performed in visit on 02/25/21   HIV Antigen/Antibody Diagnostic [place]   Result Value Ref Range    HIV Antigen / Antibody Negative Negative         Imaging Results     Ct External Imaging Abdomen    Result Date: 2/25/2021  Images were obtained from an external facility.  Click PACS Images hyperlink to view images.  Textual results have been scanned into the media tab.    Mr External Imaging Pelvis    Result Date: 2/25/2021  Images were obtained from an external facility.  Click PACS Images hyperlink to view images.  Textual results have been scanned into the media tab.        Altaf Jasso MD

## 2021-06-15 NOTE — TELEPHONE ENCOUNTER
PA Initiation    Medication: Emla Cream  Insurance Company: [email or web address]  Pharmacy Filling Rx: [facility]  Start Date: GABO GALLO Ervin: [id number] - PA Case ID: [id number] - Rx #: [id number]

## 2021-06-15 NOTE — TELEPHONE ENCOUNTER
[employer]    [facility] records  2/23/21 & 2/18/21 - Correspondence - letter to patient Dr. Todd  2/18/21 - Colonoscopy procedure report  2/18/21 - Pathology results    Imaging - [facility]  2/23/21 - CT C/A/P and MRI Pelvis reports    Images available in Nil

## 2021-06-15 NOTE — PROGRESS NOTES
[facility] Radiation Oncology Consult Note    Patient: Oscar Zhao  MRN: [id number]  Date of Service: 02/26/2021    Assessment / Impression:  Malignant neoplasm of anal canal (H)  (primary encounter diagnosis)  Malignant neoplasm of anal canal (H)    Staging form: Anus, AJCC 8th Edition    - Clinical: Stage IIA (cT2, cN0, cM0) - Signed by Altaf Jasso MD on 2/25/2021        Plan:  Discussed definitive chemoradiation therapy with patient.  The indications for, process of, alternatives, potential side effects and complications of RT to the anus were discussed.    She asked multiple questions which were answered to her satisfaction and she verbalized understanding.    She wishes to proceed with definitive chemoradiation therapy and consent is signed today.  She will return to clinic next week for CT simulation for RT planning.   Anticipate 5400 cGy in 27 fractions with concurrent chemotherapy.    MRI pelvis reviewed with [facility] radiology-wanted to review left external iliac lymph node which is quite small but appeared to have some contrast-enhancement (series 20 image 42) that was also identified on sagittal sections measuring up to 11 mm in greatest dimension.  After discussion plan to proceed with PET/CT for lymph node staging.    She will be seeing Dr. Mcbride to discuss chemotherapy.    Reviewed role of vaginal dilator in pelvic RT and ultimately she will consider.     Thank you for allowing me to participate in the care of this patient. Feel free to contact me with all questions or concerns.   Total time of this visit, including time spent face-to-face with patient and or via video/audio, and also in preparing for today's visit for MDM and documentation. Medical decision-making included consideration and possible diagnoses, management options, complex record review, review of diagnostic tests and information, consideration and discussion of significant complications based on comorbidities, discussion with  providers involved in the care of the patient.  160 Minutes spent.     This note has been generated using voice recognition software. There may be some errors that were not identified at the time of documented.  Intent of Therapy: Curative  Side effects that may occur during or within weeks after Radiation Therapy      Fatigue and general weakness    Nausea, vomiting and decrease in appetite    Pain on urination, frequent urge to urinate, blood in the urine, difficulty starting and decrease in the urine stream, retention of urine, and leakage of urine    Diarrhea, frequent, urgent and painful bowel movements, and blood in the stool    Darkening, irritation, itchiness, redness, dryness,and peeling of the skin of the pelvis     Loss of hair on the pelvis and groin    Side effects that may occur months or years after Radiation Therapy      Development of another tumor or cancer    Thickening, telangiectasias (development of spider like blood vessels in the skin) and ulceration of the skin of the pelvis    Decrease in function of the kidneys and liver    Ulcer and bleeding from the intestine or rectum    Obstruction of the bowel    Fistula of the rectum    Swelling of the feet and legs    Poor healing after a trauma or surgery in the irradiated area    Nerve damage resulting in loss of strength and sensation    Infertiility (sterility)    Vaginal wall dryness, stenosis (hardening), narrowing or fistula formation    The risks, benefits and alternatives to radiation therapy were outlined with the patient. All questions were answered and a consent was signed.      Subjective:  HPI: Oscar Zhao is a 76 y.o. female with Patient presents with:  HE Cancer: Consult with Dr. Spencer    Due to anemia patient was referred to heme-onc 1/8/2021.  Due to being overdue for colonoscopy which she was referred to GI.  2/18/2021 patient underwent colonoscopy.  Findings: Ascending colon polyp 16 to 20 mm status post complete polypectomy.   She was noted to have an ulcerated mass in it anus which was biopsied mass was noted to be palpable hard and nodular.  She was also noted to have internal hemorrhoids without bleeding.  Pathology:  Invasive moderately differentiated squamous cell carcinoma    She underwent staging 2/23/2021 with CT chest abdomen pelvis which noted incidental 4 mm right upper lobe nodule for which follow-up was recommended.  MRI of the pelvis noted a 25 x 18 x 15 mm mucosal thickening centered in the left posterior laterally anorectal junction there were no abnormal or suspicious lymph nodes or visible involvement of the levator ani, puborectalis, or external sphincter musculature.    She reports no symptoms other than prior noticing mild hematochezia with bowel movements (blood on paper).  She denies tenderness or pain with bowel movements.  She has regular bowel movements and does not struggle with constipation or diarrhea.  She has no difficulty with continence either bladder or bowel.  She has no history of inflammatory bowel disease.  She underwent complete hysterectomy with no residual cervix.  She has good bladder function with no nocturia.  She has no sexual activity or concern.  She is self described is very active and vibrant.  She is family history of longevity.    Due to proximity of [facility] closer to home she is going to be seen by heme-onc at [facility] which she will try to schedule today.    She has not yet met with colorectal surgery but has a scheduled appointment.  She had difficulty sleeping last night due to concerns of colostomy as part of her treatment.  The thought of colostomy is very distressing to her and she does not know if she would pursue colostomy even if recommended.  Reviewed predictors for colostomy and reviewed large tumor diameter (greater than 5 cm), which she does not have, as well as current sphincter function and no visible involvement of the anal sphincter are all favorable.  (Cisplatin  based therapy has been reported to be statistically significantly associated with a higher rate of colostomy and then [facility] based therapy)    She is  but her daughter lives nearby and is willing to assist and offer support during treatment.      Prior Radiation: No  Concurrent Chemotherapy: Yes    Current Outpatient Medications:  amLODIPine (NORVASC) 5 MG tablet, TAKE 2 TABLETS BY MOUTH DAILY, Disp: 180 tablet, Rfl: 3  ascorbic acid, vitamin C, (ASCORBIC ACID WITH MATT HIPS) 500 MG tablet, Take 500 mg by mouth daily., Disp: , Rfl:   cholecalciferol, vitamin D3, (VITAMIN D3) 2,000 unit capsule, Take 2,000 Units by mouth daily., Disp: , Rfl:   cyanocobalamin 1000 MCG tablet, Take 1,000 mcg by mouth daily., Disp: , Rfl:   folic acid (FOLVITE) 1 MG tablet, Take 1 mg by mouth daily. , Disp: , Rfl:   lisinopriL (PRINIVIL,ZESTRIL) 20 MG tablet, TAKE 1 TABLET(20 MG) BY MOUTH DAILY, Disp: 90 tablet, Rfl: 3  LOTEMAX 0.5 % ophthalmic suspension, APPLY 1 DROP IN BOTH EYES ONCE A DAY, Disp: , Rfl: 4  methotrexate 2.5 MG tablet, Take 10 mg by mouth once a week. (mondays), Disp: , Rfl:   metoprolol succinate (TOPROL-XL) 25 MG, TAKE 2 TABLETS(50 MG) BY MOUTH DAILY, Disp: 180 tablet, Rfl: 3  prednisoLONE acetate (PRED-FORTE) 1 % ophthalmic suspension, Apply to eye as needed., Disp: , Rfl:   timolol (BETIMOL) 0.5 % ophthalmic solution, Administer 1 drop to both eyes every morning. , Disp: , Rfl:     No current facility-administered medications for this visit.     Past Medical History:  No date: Anemia  No date: Arthritis  No date: Basal cell carcinoma of anterior chest  No date: Breast cyst  No date: History of anesthesia complications  No date: Hypertension  No date: Osteoporosis  No date: PONV (postoperative nausea and vomiting)  No date: Squamous cell carcinoma  Past Surgical History:  No date: APPENDECTOMY  1981: BREAST CYST EXCISION; Right      Comment:  benign  3/26/2019: CLOSED REDUCTION HIP DISLOCATION; Right       Comment:  Procedure: CLOSED REDUCTION, HIP;  Surgeon: Jak Ruiz DO;  Location: [facility];  Service:                Orthopedics  02/18/2021: COLONOSCOPY  No date: HYSTERECTOMY  No date: OOPHORECTOMY  No date: AZ TOTAL ABDOM HYSTERECTOMY      Comment:  Description: Hysterectomy;  Recorded: 08/19/2007;  No date: AZ TOTAL ABDOM HYSTERECTOMY      Comment:  Description: Hysterectomy;  Recorded: 08/19/2007;  3/2/2017: AZ TOTAL KNEE ARTHROPLASTY; Left      Comment:  Procedure: LEFT TOTAL KNEE ARTHROPLASTY;  Surgeon:                Star Du MD;  Location: [facility] Main OR;                 Service: Orthopedics  8/18/2015: TOTAL HIP ARTHROPLASTY; Right      Comment:  Procedure: HIP TOTAL ARTHROPLASTY, RIGHT;  Surgeon:                Star Du MD;  Location: [facility];                 Service:   Adalimumab, Brimonidine-timolol, Levaquin [levofloxacin], and Tetracyclines  Review of patient's family history indicates:  Problem: Alzheimer's disease      Relation: Mother          Age of Onset: (Not Specified)  Problem: Heart disease      Relation: Father          Age of Onset: (Not Specified)  Problem: Pancreatic cancer      Relation: Brother          Age of Onset: (Not Specified)  Problem: Cancer      Relation: Brother          Age of Onset: (Not Specified)  Problem: No Medical Problems      Relation: Daughter          Age of Onset: (Not Specified)  Problem: Anesthesia problems      Relation: Neg Hx          Age of Onset: (Not Specified)    Social History    Socioeconomic History      Marital status:       Spouse name: Not on file      Number of children: 1      Years of education: Not on file      Highest education level: Not on file    Occupational History      Occupation: Retired Educator    Social Needs      Financial resource strain: Not on file      Food insecurity        Worry: Not on file        Inability: Not on file      Transportation needs         Medical: Not on file        Non-medical: Not on file    Tobacco Use      Smoking status: Never Smoker      Smokeless tobacco: Never Used    Substance and Sexual Activity      Alcohol use: Yes        Alcohol/week: 4.0 standard drinks        Types: 4 Glasses of wine per week      Drug use: No      Sexual activity: Never    Lifestyle      Physical activity        Days per week: Not on file        Minutes per session: Not on file      Stress: Not on file    Relationships      Social connections        Talks on phone: Not on file        Gets together: Not on file        Attends Hinduism service: Not on file        Active member of club or organization: Not on file        Attends meetings of clubs or organizations: Not on file        Relationship status: Not on file      Intimate partner violence        Fear of current or ex partner: Not on file        Emotionally abused: Not on file        Physically abused: Not on file        Forced sexual activity: Not on file    Other Topics      Concerns:        Not on file    Social History Narrative      Not on file       Objective:  Physical Exam  There were no vitals filed for this visit.  Gen: Alert, in NAD pleasant interactive, well nourished, appears younger than stated age  Eyes: EOMI, sclera anicteric     Head: NC/AT  Pulm:  No wheezing, stridor or respiratory distress  Musculoskeletal: Normal muscle bulk and tone  Skin: Normal tone and turgor  Neurologic:  face symmetric, speech fluent, no focal motor deficits, gait normal and unaided  Psychiatric: Appropriate mood and affect  Pelvic: ROSY deferred today, will be placing anal bb at time of CT Sim and will examin                                Recent Labs: Recent Results (from the past 168 hour(s))  -HIV Antigen/Antibody Diagnostic [place]       Result                                            Value                         Ref Range                       HIV Antigen / Antibody                            Negative                       Negative                           Imaging: Imaging results 6 weeks:    Personally reviewed imaging-left external iliac lymph node seen series 11 image 20 also with some contrast enhancement noted on series 20 image 42.  Can be seen more prominently series 4 image 43 measuring up to 1.1 cm.    Ct External Imaging Abdomen  Result Date: 2/25/2021  Images were obtained from an external facility.  Click PACS Images hyperlink to view images.  Textual results have been scanned into the media tab.  Chest: There is a nonspecific 4 mm opacity in the right upper lobe.  Lungs otherwise clear.  No intrathoracic lymphadenopathy by size criteria.    Abdomen: Liver contains a probable tiny cyst but otherwise within normal limits.  No abdominal pelvic lymphadenopathy by size criteria.  Negative for ascites.    Pelvis: Status post hysterectomy.  Urinary bladder is thin-walled grossly normal.    Conclusion: No evidence of maegan or distant metastasis within the chest abdomen or pelvis.  A probable tiny hepatic cyst is noted incidentally.  A nonspecific 4 mm opacity in the right lung is indeterminate etiology, but it as an isolated observation, it statistically likely benign.  Recommend follow-up CT chest in 12 months.      Mr External Imaging Pelvis  Result Date: 2/25/2021  Images were obtained from an external facility.  Click PACS Images hyperlink to view images.  Textual results have been scanned into the media tab.    Interpretation: There is a focal mucosal thickening, centered left posterior laterally at the anorectal junction with indistinct margins but measuring roughly 25 x 18 x 15 mm.  The muscularis propria appears to be grossly intact with no flank tumoral extension into the mesorectal or intersphincteric space.  No visible involvement of the levator ani, puborectalis, or external sphincter musculature.  There are no abnormal/suspicious lymph nodes in the pelvis.  No extramural vascular invasion.  No abnormal  fluid collections.  Status post hysterectomy.  Urinary bladder is thin-walled, grossly normal.    Conclusion: Focal thickening of the left posterior lateral anal rectal mucosa likely corresponds to patient's biopsy-proven SCC.  The muscularis propria appears to be intact, with no shirin extension into the mesorectal or intersphincteric space.  No visible involvement of the levator ani, puborectalis, or external sphincter musculature.  No maegan or distant metastases identified in the pelvis.    Pathology:   No results found for this or any previous visit (from the past 8760 hour(s)).  [facility] 2/18/2021:  Anal mass biopsy: Invasive moderately differentiated squamous cell carcinoma    Colon, Ascending polyp: Tubular adenoma consistent with advanced adenoma due to size; negative for high-grade dysplasia and invasive malignancy      Signed by: Patsy Spencer MD

## 2021-06-15 NOTE — TELEPHONE ENCOUNTER
Date: 03/09/2021  JULIANA GALLO  [address] [place] [address]  Member Name: JULIANA GALLO  Member ID Number: [id number]  Thank you for trusting your Medicare prescription drug coverage to [employer] ([employer]). As  our member, we want to help you get the most value from your prescription drug coverage and  help you understand how your coverage works.  As a member of [employer], we are pleased to inform you that, upon review of the  information provided by you or your doctor, we have approved the requested coverage for the  following prescription drug(s):  LIDOCAINE-PRILOCAINE Cream  Type of coverage approved: Prior Authorization  This approval authorizes your coverage from 12/09/2020 - 06/07/2021, unless we notify you  otherwise, and as long as the following conditions apply:    you remain enrolled in our Medicare Part D prescription drug plan,    your physician or other prescriber continues to prescribe the medication for you, and    the medication continues to be safe for treating your condition.  Depending upon the strength and/or formulation of the drug prescribed by your physician,  different quantity limits or safety edits may apply. Please consult your Medicare Part D plan s  formulary for the specific quantity limit.  If you have not already filled your prescription for this approved drug, you may do so at a

## 2021-06-15 NOTE — TELEPHONE ENCOUNTER
Please check on this for me and please assist w/ any all records we may have to help the upcoming consultation

## 2021-06-15 NOTE — PRE-PROCEDURE
Procedure Name: port   Date/Time: 3/15/2021 10:32 AM  Written consent obtained?: Yes  Risks and benefits: Risks, benefits and alternatives were discussed  Consent given by: patient  Expected level of sedation: moderate  ASA Class: Class 3- Severe systemic disease, definite functional limitations  Mallampati: Grade 1- soft palate, uvula, tonsillar pillars, and posterior pharyngeal wall visible  Patient states understanding of procedure being performed: Yes  Patient's understanding of procedure matches consent: Yes  Procedure consent matches procedure scheduled: Yes  Appropriately NPO: yes  Lungs: lungs clear with good breath sounds bilaterally  Heart: normal heart sounds and rate  History & Physical reviewed: Abbreviated history and physical done prior to moderate sedation  Statement of review: I have reviewed the lab findings, diagnostic data, medications, and the plan for sedation

## 2021-06-15 NOTE — TELEPHONE ENCOUNTER
Oscar had called and asked the question about taking prophylactic Amoxicillin before a dental cleaning based on a hip replacement. I told her the Amoxicillin is not an issue and dental cleanings in and of themselves are not concerning either. She does not begin tx until 3/22. I did tell her it would be great if they could get her in and done before the 22nd so it would be a mute point. She agreed and is going to call her dentist and see if it can be done. She just wanted to be sure that the Amoxicillin was ok to take and I told her it was, being that it is associated with a hip replacement and if that provider advises it then she should do what is advised. She thanked me for calling her back. Lazaro RN, OCN, CBCN

## 2021-06-15 NOTE — PROGRESS NOTES
[facility] Radiation Oncology  CT simulation    Patient: Oscar Zhao  MRN: [id number]  Date of Service: 03/04/2021    Comments:    DIAGNOSIS: Stage IIA (cT2, cN0, cM0) SCC anal canal    Simulation for a definitive course of radiotherapy with concurrent chemotherapy to the anal canal and pelvic lymph nodes.  The GTV includes the anal canal lesion.  The CTV 5400 includes GTV and margin.  CTV 4500 includes bilateral pelvic lymph nodes.  PTVs includes the respective CTVs plus a margin.    The patient was placed in the supine position on the CT simulator couch.  Vac-Wilbur bag used for immobilization.  Patient used tampon for anterior displacement anterior vaginal wall.   BB was placed on the Anal verge.  A planning CT of the pelvis with full bladder and empty rectum was performed.  The procedure was well tolerated.    Anticipate 5400 cGy in 27 fractions, start date 2/18/2021.    complete documentation for simulation, treatment planning, calculations, , final prescription can be found in ARIA.

## 2021-06-16 PROBLEM — E87.1 ACUTE HYPONATREMIA: Status: ACTIVE | Noted: 2021-03-26

## 2021-06-16 PROBLEM — Z51.11 ENCOUNTER FOR ANTINEOPLASTIC CHEMOTHERAPY: Status: ACTIVE | Noted: 2021-03-22

## 2021-06-16 PROBLEM — L30.9 PERIANAL DERMATITIS: Status: ACTIVE | Noted: 2021-04-06

## 2021-06-16 PROBLEM — Z76.89 PREVENTION OF CHEMOTHERAPY-INDUCED NEUTROPENIA: Status: ACTIVE | Noted: 2021-04-05

## 2021-06-16 PROBLEM — R11.0 NAUSEA: Status: ACTIVE | Noted: 2021-03-26

## 2021-06-16 NOTE — PROGRESS NOTES
RADIATION ONCOLOGY WEEKLY TREATMENT VISIT NOTE      Assessment / Impression       1. Malignant neoplasm of anal canal (H)       Cancer Staging  Malignant neoplasm of anal canal (H)  Staging form: Anus, AJCC 8th Edition  - Clinical: Stage IIA (cT2, cN0, cM0) - Signed by Altaf Jasso MD on 2/25/2021     Tolerating radiation therapy well.  All questions and concerns addressed.  Hospitalized for febrile neutropenia with some difficulties with mucositis and dehydration.     Plan:     Continue radiation treatment as prescribed.  Radiation: Site: Anal/Pelvis  Stereotactic Radiosurgery: No  Concurrent Therapy: Yes  Today's Dose: 2400  Total Dose for Pelvis: 5400  Today's Fraction/Total Fraction Pelvis: 12/27    Hospitalized for febrile neutropenia, discharge planned for today  Had 2 day break from treatment, Resume RT today   Oral mucositis improving, PO intake better  Continue current skin care- has topical pain creams and sitz bath supplies for when needed.     Subjective:      HPI: Oscar Zhao is a 76 y.o. female with    1. Malignant neoplasm of anal canal (H)       Hospitalized for febrile neutropenia, discharge planned for today. Has had sacral HSV (non-genital) (shingles) in the past, maybe 2x per year and has been correlated with her iritis and immune suppression.  Never had an outbreak this bad but is healing.    Dicussed this episode correlates with initiation of treatment and effect on immune system and her neutropenia.  She has not had any iritis symptoms.   No nausea since week one.  Appetite getting better, been eating well in hospital.  Mucositis is improving, using MMW.  Had diarrhea x4 while on Mag pills, once Mag discontinues her diarrhea has resolved.  No pain with BM.  Has noted small amounts of heme on TP.     The following portions of the patient's history were reviewed and updated as appropriate: allergies, current medications, past family history, past medical history, past social history,  past surgical history and problem list.    Assessment                  Body Site: Pelvis Site: Anal/Pelvis  Stereotactic Radiosurgery: No  Concurrent Therapy: Yes  Today's Dose: 2400  Total Dose for Pelvis: 5400  Today's Fraction/Total Fraction Pelvis:   Drainage: 0: Absent  Diarrhea W/O Colostomy: 1: Increase of less than 4 stools/day over pretreatment  Constipation: 0: None  Proctitis: 0: None  Urinary frequency and/or urgency: 0: Normal  Urinary Retention: 0 : Normal  Urinary Incontinence: 0: None  Dysuria: 0: None  Urine Color Change: 0: Normal                                            Sexuality Alteration                 Emotional Alteration Copin: Effective  Comfort Alteration KPS: 70% Cannot do active work, but can care for self  Fatigue (ONS scale) : 3: Mild Fatigue  Pain Location: mucositis  Pain Intensity. Rate degree of pain ranging from 0 (no pain) to 10 (severe pain) : 5  Pain Description: Burning - Burning, hot, fire type pain  Pain Intervention: 2: Nonsteroidal anti-inflammatory agents or non-opiods  Effectiveness of pain intervention: 4: Pain relieved 100%   Nutrition Alteration Anorexia: 2: Oral intake significantly decreased  Nausea: 2: Oral intake significantly decreased  Vomitin: None  Skin Alteration Skin Sensation: 1:Pruitis  Skin Reaction: 0: None  AUA Assessment                                  Accompanied by       Objective:     Exam:     Vitals:    21 1143   Weight: 134 lb 8 oz (61 kg)       Wt Readings from Last 8 Encounters:   21 132 lb (59.9 kg)   21 134 lb 8 oz (61 kg)   21 133 lb 6.4 oz (60.5 kg)   21 136 lb 11.2 oz (62 kg)   21 137 lb 9.6 oz (62.4 kg)   21 137 lb 4.8 oz (62.3 kg)   03/15/21 136 lb (61.7 kg)   21 136 lb 6.4 oz (61.9 kg)       General: Alert and oriented, in no acute distress  Jerine has mild Erythema.  With non-genital HSV crusting and healing lesions of sacral (S3-4) dermatomes     Treatment  Summary to Date    Aria chart and setup information reviewed    Patsy Spencer MD

## 2021-06-16 NOTE — TELEPHONE ENCOUNTER
PA Initiation    Medication: ondansetron  Insurance Company: [employer]  Pharmacy Filling Rx: ana maria  Start Date: bucky GALLO Ervin: [id number]

## 2021-06-16 NOTE — TELEPHONE ENCOUNTER
Placed call to inpatient floor about patient radiation treatment.  Talked with [place] and let staff know we would not be doing the radiation treatment today.  Dr. Spencer will be back in clinic on Thursday 4/8/21 and will reevaluate when to resume treatments.

## 2021-06-16 NOTE — PROGRESS NOTES
[facility] Hematology and Oncology Progress Note    Patient: Oscar Zhao  MRN: [id number]  Date of Service: 04/19/2021        Reason for Visit    Anal squamous cell cancer.    Assessment/Plan  Cancer Staging  Malignant neoplasm of anal canal (H)  Staging form: Anus, AJCC 8th Edition  - Clinical: Stage IIA (cT2, cN0, cM0) - Signed by Altaf Jasso MD on 2/25/2021      Ms. Oscar Zhao is a 76 y.o. 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 She would just see a little blood on the toilet paper now and then which is thought to be due to hemorrhoids.     With the information that we have from all the work-up that has been done, we can see that there is no distant metastatic disease.  We are also not seeing any local lymph nodes that are involved.  The lesion itself appears not to invade through the muscularis propria in the MRI.  With all this information, this cancer can be staged as stage II (T2, N0, M0).      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  She has been treated in the past with prednisone, methotrexate, Humira and rituximab for this condition.    She started on chemotherapy and radiation on 3/22/2021.  Chemotherapy was with mitomycin and fluorouracil infusion.  The fluorouracil pump was disconnected on 3/26/2021.  A week after that she was admitted to the hospital with hyponatremia thought to be due to poor oral intake from nausea and vomiting.    She had another admission to the hospital on 4/5/2021 with febrile neutropenia.  She was treated with antibiotics, antiviral cream for what appeared to be a this outbreak on her buttocks and G-CSF and  she could be discharged on 4/8/2021.    1.  She is here for the second session of chemotherapy.  She will complete radiation on 4/29/2021.  I discussed with her that she has done very well and we are in the last part of the treatment.  We are still aiming for a cure.  Even though she had some setbacks with the 2 admissions to the hospital, I think overall she has done well and has tolerated chemotherapy and radiation.  She wanted to proceed with treatment today.    2.  Labs from today were reviewed and her CMP is quite stable.  Creatinine 0.59, electrolytes are now normal.  LFTs are normal except for a mild hypoalbuminemia at 3.2 but even that is improved.  When we look at the CBC differential and platelets, hemoglobin is improved to 11.  WBC count is now normal at 4.4 with an ANC of 3.1 and the platelet count is normal at 321,000.  Labs are adequate to proceed with chemotherapy.  We will proceed with full dose of the second session of mitomycin and 5-FU chemotherapy today.    3.  Since that she had neutropenic fever with the last cycle of chemotherapy, we will give her Neulasta after this session of chemotherapy.  Orders for the on pro on body injector were signed.  She will have the one pro injector put on her when she comes for having the fluorouracil pump disconnected on Friday, 4/23/2021.    4.  I discussed with her about how to manage the nausea after chemotherapy.  Encouraged her to use the prochlorperazine and ondansetron early even when she has mild nausea.  Discussed with her about the importance of eating adequately.  She voiced understanding.    5.  I encouraged her to take the olanzapine 5 mg tablets to deal with both the anxiety and also for the nausea.  I advised her to take it around 7 PM so that she does not feel to hung over the next day.  I explained to her that feeling last for only a couple of days and then her body adjusts.  Olanzapine will be very helpful to deal with the nausea that she feels  and also the anxiety.  She will need to take it every day.  She voiced understanding.    6.  I asked her to hold the folic acid tablets that she was taking.  I explained to her that sometimes when she is on this type of chemotherapy, folic acid supplementation can make the chemotherapy caused the blood counts to fall even lower.  She voiced understanding.    7.  For the oral mucositis, she will use salt and soda rinses several times a day.  If it becomes more painful then she can use Magic mouthwash.  I asked her to give us a call when she needs a new prescription sent.    8.  She would like to be seen in the clinic jail through the chemotherapy session to manage her side effects.  Return to clinic with MD or NP on 4/21/2021.  With a CBC differential platelets and BMP and in the infusion room, in case she needs IV fluids.    Time spend >40 minutes total time for the patient.       ECOG Performance   ECOG Performance Status: 0  Distress Assessment  Distress Assessment Score: Unable to rate        Problem List    1. Malignant neoplasm of anal canal (H)  CC OFFICE VISIT LONG    Basic Metabolic Panel    HM1(CBC and Differential)    [facility] RED   2. Encounter for antineoplastic chemotherapy     3. Mucositis due to chemotherapy     4. Prevention of chemotherapy-induced neutropenia             CC: Jak Jones MD Elizabeth Ester, MD Aneel Damle, MD      ______________________________________________________________________________      History    Ms. Oscar Zhao he is here for follow-up alone.    She says that overall she feels better than when she was in the hospital.  Energy has improved.  She has been doing a lot of work at home and has also been doing a lot of gardening.  She feels that her energy is back to normal.  However she feels quite anxious about getting started on chemotherapy again.  She admits that she was having a little bit of difficulty sleeping.  She took the olanzapine tablet once and then  she states that she overslept the next day morning and so she did not take it again.    She says that there are still a couple of areas inside her mouth which feels sore.  She is eating well.  She has gained 3 pounds she is happy about that.    She states that her daughter is going to stay with her when she is receiving chemotherapy so she can have better support.    She says that her bottom is also feeling a lot better.  She is not taking any pain medications now.  She is just using the topical creams.  She feels that is working well.  Stools are quite soft and almost liquid.  She is happy with that.  She is not seeing any blood in the stools.    No fevers or night sweats.  No lumps or bumps anywhere.  No unusual headaches.  No eyesight problems.  Swallowing is fine.  No nausea or vomiting.  No chest pain or palpitation.  No shortness of breath.  No abdominal pain.  No difficulty with urination.  No skin rashes.  No numbness or tingling.    Please see below.  A 14 point review of system is otherwise completely negative.    Past History  Past Medical History:   Diagnosis Date     Anemia      Basal cell carcinoma of anterior chest      Breast cyst      History of anesthesia complications      HLA B27 (HLA B27 positive)      Hypertension      Osteoarthritis      Osteoporosis 2011     Peripheral neuropathy 2018     PONV (postoperative nausea and vomiting)      Scleritis, bilateral     Left .  Treated with corticosteroids,, methotrexate and Humira.  .  Rituximab.     Squamous cell carcinoma of skin of chest      Steroid induced glaucoma, both eyes      Past Surgical History:   Procedure Laterality Date     BREAST CYST EXCISION Right     benign     CATARACT EXTRACTION W/  INTRAOCULAR LENS IMPLANT Right 2017     CATARACT EXTRACTION W/  INTRAOCULAR LENS IMPLANT Left 2017      SECTION       CLOSED REDUCTION HIP DISLOCATION Right 2019    Procedure: CLOSED REDUCTION, HIP;   Surgeon: Jak Ruiz DO;  Location: [facility] OR;  Service: Orthopedics     COLONOSCOPY  09/07/2011     COLONOSCOPY  09/27/2005     COLONOSCOPY W/ BIOPSIES AND POLYPECTOMY  02/18/2021    16 to 20 mm polyp:tubular adenoma in the ascending colon.  Ulcerated mass in the anal canal: Moderately differentiated squamous cell cancer.     ESOPHAGOGASTRODUODENOSCOPY  05/05/2017    Large hiatal hernia.  Reactive gastropathy with mucosal erosion.  H. pylori negative.     IR PORT PLACEMENT >5 YEARS  03/15/2021    Right IJ port-a-cath.     LAPAROSCOPIC APPENDECTOMY  04/28/2011     IN TOTAL KNEE ARTHROPLASTY Left 03/02/2017    Procedure: LEFT TOTAL KNEE ARTHROPLASTY;  Surgeon: Star Du MD;  Location: [facility] OR;  Service: Orthopedics     TOTAL ABDOMINAL HYSTERECTOMY W/ BILATERAL SALPINGOOPHORECTOMY  1996     TOTAL HIP ARTHROPLASTY Right 08/18/2015    Procedure: HIP TOTAL ARTHROPLASTY, RIGHT;  Surgeon: Star Du MD;  Location: [facility] OR;  Service:          Allergies    Allergies   Allergen Reactions     Adalimumab Myalgia     Brimonidine-Timolol Unknown     Redness itching in eyes     Levaquin [Levofloxacin]      Skin burn and couldn't get out bed for 5 days     Tetracyclines Hives       Review of Systems  Constitutional  Constitutional (WDL): Exceptions to WDL  Fatigue: Fatigue not relieved by rest - Limiting instrumental ADL  Fever: None  Chills: None  Weight Gain: None  Weight Loss: None  Neurosensory  Neurosensory (WDL): All neurosensory elements are within defined limits  Eye   Eye Disorder (WDL): Assessment not pertinent to visit  Ear  Ear Disorder (WDL): Assessment not pertinent to visit  Cardiovascular  Cardiovascular (WDL): All cardiovascular elements are within defined limits  Pulmonary  Respiratory (WDL): Within Defined Limits  Gastrointestinal  Gastrointestinal (WDL): Exceptions to WDL  Diarrhea: Increase of <4 stools per day over baseline, mild increase in ostomy output  compared to baseline(varies)  Genitourinary  Genitourinary (WDL): All genitourinary elements are within defined limits  Lymphatic  Lymph (WDL): Assessment not pertinent to visit  Musculoskeletal and Connective Tissue     Integumentary  Integumentary (WDL): Exceptions to WDL  Patient Coping     Distress Assessment  Distress Assessment Score: Unable to rate  Accompanied by     Oral Chemo Adherence       Physical Exam    Recent Vitals 4/19/2021   Height -   Weight 134 lbs 3 oz   BSA (m2) 1.65 m2   /71   Pulse 87   Temp 97.9   Temp src 1   SpO2 99   Some recent data might be hidden       GENERAL: Alert and oriented to time place and person. Seated comfortably.  She is not in any physical distress but appears anxious.  Thin build.    HEAD: Atraumatic and normocephalic.    EYES: JUANCARLOS, EOMI.  No pallor.  No icterus.    Oral cavity: no tonsillar enlargement.  Few patchy areas of mucositis in her mouth but no ulceration.    NECK: supple. JVP normal.  No thyroid enlargement.    LYMPH NODES: No palpable, cervical, axillary or inguinal lymphadenopathy.    CHEST: clear to auscultation bilaterally.  Resonant to percussion throughout bilaterally.  Symmetrical breath movements bilaterally.  The Port-A-Cath over the right upper chest looks unremarkable.    CVS: S1 and S2 are heard. Regular rate and rhythm.  No murmur or gallop or rub heard.  No peripheral edema.    ABDOMEN: Soft. Not tender. Not distended.  No palpable hepatomegaly or splenomegaly.  No other mass palpable.  Bowel sounds heard.    EXTREMITIES: Warm.    SKIN: no rash, or bruising or purpura.  Has a full head of hair.        Lab Results    Recent Results (from the past 168 hour(s))   Comprehensive Metabolic Panel   Result Value Ref Range    Sodium 139 136 - 145 mmol/L    Potassium 4.0 3.5 - 5.0 mmol/L    Chloride 108 (H) 98 - 107 mmol/L    CO2 22 22 - 31 mmol/L    Anion Gap, Calculation 9 5 - 18 mmol/L    Glucose 107 70 - 125 mg/dL    BUN 10 8 - 28 mg/dL     Creatinine 0.59 (L) 0.60 - 1.10 mg/dL    GFR MDRD Af Amer >60 >60 mL/min/1.73m2    GFR MDRD Non Af Amer >60 >60 mL/min/1.73m2    Bilirubin, Total 0.4 0.0 - 1.0 mg/dL    Calcium 9.1 8.5 - 10.5 mg/dL    Protein, Total 6.2 6.0 - 8.0 g/dL    Albumin 3.2 (L) 3.5 - 5.0 g/dL    Alkaline Phosphatase 67 45 - 120 U/L    AST 19 0 - 40 U/L    ALT 33 0 - 45 U/L   HM1 (CBC with Diff)   Result Value Ref Range    WBC 4.4 4.0 - 11.0 thou/uL    RBC 3.90 3.80 - 5.40 mill/uL    Hemoglobin 11.0 (L) 12.0 - 16.0 g/dL    Hematocrit 35.8 35.0 - 47.0 %    MCV 92 80 - 100 fL    MCH 28.2 27.0 - 34.0 pg    MCHC 30.7 (L) 32.0 - 36.0 g/dL    RDW 20.8 (H) 11.0 - 14.5 %    Platelets 321 140 - 440 thou/uL    MPV 8.1 (L) 8.5 - 12.5 fL    Neutrophils % 71 (H) 50 - 70 %    Lymphocytes % 14 (L) 20 - 40 %    Monocytes % 8 2 - 10 %    Eosinophils % 6 0 - 6 %    Basophils % 1 0 - 2 %    Immature Granulocyte % 1 (H) <=0 %    Neutrophils Absolute 3.1 2.0 - 7.7 thou/uL    Lymphocytes Absolute 0.6 (L) 0.8 - 4.4 thou/uL    Monocytes Absolute 0.4 0.0 - 0.9 thou/uL    Eosinophils Absolute 0.3 0.0 - 0.4 thou/uL    Basophils Absolute 0.0 0.0 - 0.2 thou/uL    Immature Granulocyte Absolute 0.0 <=0.0 thou/uL       Imaging Results    Xr Chest 1 View Portable    Result Date: 4/6/2021  EXAM: XR CHEST 1 VIEW PORTABLE LOCATION: [facility] DATE/TIME: 4/5/2021 11:53 PM INDICATION: Neutropenic fever COMPARISON: 8/30/2020. FINDINGS: Right chest wall port. No pneumothorax. The heart size is normal. Minimal scarring at the left lung base. Lungs otherwise clear. Hiatal hernia.     No acute abnormality.        Signed by: Kristen Villa MD

## 2021-06-16 NOTE — TELEPHONE ENCOUNTER
Patient in clinic for daily treatment for her anal cancer and asked medical oncology if she could get a Rx for the magic mouthwash given her as inpatient over the weekend by Dr. Mcbride.  Request sent to covering NP for Rx to be sent electronically.

## 2021-06-16 NOTE — PATIENT INSTRUCTIONS - HE
Please stop taking the folic acid tablets for the time being.    Please take the olanzapine (Zyprexa) tablets at night around 7 PM.  I think this will help with both the nausea and the anxiety.    Take the prochlorperazine and Zofran tablets as needed even if you feel mildly nauseated.

## 2021-06-16 NOTE — TELEPHONE ENCOUNTER
Called pt to inform that we will not tx today d/t low white count and admission d/t fever. Spoke with Dr. Hoskins and agrees with plan. Pt. Appreciative of call. Informed pt that we will add on any missed days at the end. Informed pt that we will reevaluate tomorrow and make a decision day by day, pt. Verbalizes understanding.

## 2021-06-16 NOTE — PROGRESS NOTES
RADIATION ONCOLOGY WEEKLY TREATMENT VISIT NOTE      Assessment / Impression       1. Malignant neoplasm of anal canal (H)  senna-docusate (SENNOSIDES-DOCUSATE SODIUM) 8.6-50 mg tablet    magnesium citrate solution     Cancer Staging  Malignant neoplasm of anal canal (H)  Staging form: Anus, AJCC 8th Edition  - Clinical: Stage IIA (cT2, cN0, cM0) - Signed by Altaf Jasso MD on 2/25/2021     Tolerating radiation therapy well.  All questions and concerns addressed.  Constipation (no bowel movement in 3 days however patient reported back prior to leaving clinic she was able to have a bowel movement)    Plan:     Continue radiation treatment as prescribed.  Radiation: Site: Anal/Pelvis  Stereotactic Radiosurgery: No  Concurrent Therapy: Yes  Today's Dose: 4600  Total Dose for Pelvis: 5400  Today's Fraction/Total Fraction Pelvis: 23/27    Constipation-discussed bowel regimen with docusate senna and mag citrate if needed prescription sent; however, following large soft bowel movement prior to leaving clinic she has information but does not need to begin bowel regimen today    Subjective:      HPI: Oscar Zhao is a 76 y.o. female with    1. Malignant neoplasm of anal canal (H)  senna-docusate (SENNOSIDES-DOCUSATE SODIUM) 8.6-50 mg tablet    magnesium citrate solution     Patient tolerated second round of chemotherapy much better.  She has been using her antiemetics which have helped significantly.  However she is experiencing significant constipation with no bowel movement in 3 days.  She has not taken anything.  Prior to scheduled Zofran use she was having 1 soft bowel movement per day.  Oral intake is decreased she has purchased Ensure to help with increasing calories.  She denies significant pain.  She is using nonnarcotic topical cream as needed with good effect.  Hematochezia has ceased.  No significant difficulty with urination no hematuria or dysuria.  She reports some musculoskeletal discomfort in her jaw  which she attributes due to the lack of mouthguard and clenching.  She reports some chest soreness with deep breaths and with raising her arms which she attributes to muscle strain.  Stable vitals.  Her daughter is with her today and has been supportive during this second round of chemo.  Answered questions about follow-up care and plan.    The following portions of the patient's history were reviewed and updated as appropriate: allergies, current medications, past family history, past medical history, past social history, past surgical history and problem list.    Assessment                  Body Site: Pelvis Site: Anal/Pelvis  Stereotactic Radiosurgery: No  Concurrent Therapy: Yes  Today's Dose: 4600  Total Dose for Pelvis: 5400  Today's Fraction/Total Fraction Pelvis:   Drainage: 0: Absent  Diarrhea W/O Colostomy: 0: None  Constipation: 1: Requiring stool softner or dietary modifation  Proctitis: 0: None  Urinary frequency and/or urgency: 0: Normal  Urinary Retention: 0 : Normal  Urinary Incontinence: 0: None  Dysuria: 0: None  Urine Color Change: 0: Normal                                            Sexuality Alteration                 Emotional Alteration Copin: Effective  Comfort Alteration KPS: 80% Can perform normal activity with effort, some signs of disease  Fatigue (ONS scale) : 8: Extreme Fatigue  Pain Location: chest muscular  Pain Intensity. Rate degree of pain ranging from 0 (no pain) to 10 (severe pain) : 8  Pain Description: Dull intermittent - Dull type of ache which is intermittent  Pain Intervention: 0: None(repositioning)  Effectiveness of pain intervention: 4: Pain relieved 100%   Nutrition Alteration Anorexia: 2: Oral intake significantly decreased  Nausea: 2: Oral intake significantly decreased  Vomitin: None  Skin Alteration Skin Sensation: 2: Burning  Skin Reaction: 2: Bright erythema  AUA Assessment                                  Accompanied by       Objective:      Exam:     Vitals:    04/23/21 0941   BP: 142/78   Pulse: 83   Temp: 97.5  F (36.4  C)   TempSrc: Oral   SpO2: 97%   Weight: 132 lb 3.2 oz (60 kg)       Wt Readings from Last 8 Encounters:   04/23/21 132 lb 3.2 oz (60 kg)   04/21/21 132 lb 12.8 oz (60.2 kg)   04/19/21 134 lb 3.2 oz (60.9 kg)   04/16/21 134 lb 3.2 oz (60.9 kg)   04/08/21 132 lb (59.9 kg)   04/08/21 134 lb 8 oz (61 kg)   04/02/21 133 lb 6.4 oz (60.5 kg)   03/27/21 136 lb 11.2 oz (62 kg)       General: Alert and oriented, in no acute distress  Oscar has moderate Erythema.    Treatment Summary to Date    Aria chart and setup information reviewed    Patsy Spencer MD

## 2021-06-16 NOTE — PROGRESS NOTES
[facility] Hematology and Oncology Progress Note    Patient: Oscar Zhao  MRN: [id number]  Date of Service: 03/22/2021        Reason for Visit    Anal squamous cell cancer.    Assessment/Plan  Cancer Staging  Malignant neoplasm of anal canal (H)  Staging form: Anus, AJCC 8th Edition  - Clinical: Stage IIA (cT2, cN0, cM0) - Signed by Altaf Jasso MD on 2/25/2021      Ms. Oscar Zhao is a 76 y.o. 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 She would just see a little blood on the toilet paper now and then which is thought to be due to hemorrhoids.     With the information that we have from all the work-up that has been done, we can see that there is no distant metastatic disease.  We are also not seeing any local lymph nodes that are involved.  The lesion itself appears not to invade through the muscularis propria in the MRI.  With all this information, this cancer can be staged as stage II (T2, N0, M0).      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  She has been treated in the past with prednisone, methotrexate, Humira and rituximab for this condition.      1.  She is starting chemotherapy and radiation today.  She is a bit anxious about getting started with treatment but otherwise she looks well on physical examination.  Labs from today were reviewed: CBC differential and platelets are essentially normal with a hemoglobin of 14, normal white blood cell count and a normal platelet count.  Differential count is also normal.  When we look at the CMP, creatinine is normal at 0.65.  Electrolytes are normal and LFTs are normal.    2.  I went through the  schedule for a treatment with her.  I also went through the side effects with her one more time.  She was particularly worried about the possibility of having a heart attack with the 5-FU infusion.  I explained to her that it is a rare side effect.  Explained that it is caused by coronary vasospasm.  The main thing that I want her to remember is that if she has chest pain, then she needs to go to an ER promptly and then there is a good possibility that she will be treated appropriately and will recover.  She voiced understanding.  She was agreeable to proceeding to chemotherapy today.    3.  Discussed with the chemotherapy nurses.  We will proceed with the first cycle of mitomycin and 5-FU today.  She will start with chemotherapy education with the nurses.  She will receive mitomycin and then she will have the 5-FU pump put on her.  She will be going for radiation later today.  She will be coming back on Friday, March 26 to have the fluorouracil pump disconnected.    4.  Return to clinic with MD or NP in 2 weeks to see how she is doing with chemotherapy and radiation.  Labs from the treatment plan are already signed and ready.    5.  She will be scheduled for the next chemotherapy session, 4 weeks out from now.    Time spend >30 minutes total time for the patient.           ECOG Performance   ECOG Performance Status: 0  Distress Assessment  Distress Assessment Score: 2        Problem List    1. Malignant neoplasm of anal canal (H)  CC OFFICE VISIT LONG    CC pump visit (DC pump and flush port)    CC OFFICE VISIT LONG   2. Encounter for antineoplastic chemotherapy             CC: Jak Jones MD Elizabeth Ester, MD Aneel Damle, MD      ______________________________________________________________________________      History    Ms. Oscar Zhao is here for follow-up alone.    She has had the Port-A-Cath placed.  She says that it is healing well.  Some mild soreness there which is under good control.   She is a bit nervous to get started with chemotherapy and radiation today.  She has had an opportunity to read the printed information that I gave and she has some questions for me.  She was particularly worried about the possibility of having a heart attack with the fluorouracil infusion.    Otherwise she states that her energy is good.  She is eating okay.  Bowel movements are normal.  She is not complaining of any pain now.  Breathing is fine.  No chest pain or cough.  No palpitation.  No numbness or tingling.    Please see below.  A 14 point review of system is otherwise completely negative.    Past History  Past Medical History:   Diagnosis Date     Anemia      Basal cell carcinoma of anterior chest      Breast cyst      History of anesthesia complications      HLA B27 (HLA B27 positive)      Hypertension      Osteoarthritis      Osteoporosis 2011     Peripheral neuropathy 2018     PONV (postoperative nausea and vomiting)      Scleritis, bilateral     Left .  Treated with corticosteroids,, methotrexate and Humira.  .  Rituximab.     Squamous cell carcinoma of skin of chest      Steroid induced glaucoma, both eyes      Past Surgical History:   Procedure Laterality Date     BREAST CYST EXCISION Right     benign     CATARACT EXTRACTION W/  INTRAOCULAR LENS IMPLANT Right 2017     CATARACT EXTRACTION W/  INTRAOCULAR LENS IMPLANT Left 2017      SECTION       CLOSED REDUCTION HIP DISLOCATION Right 2019    Procedure: CLOSED REDUCTION, HIP;  Surgeon: Jak Ruiz DO;  Location: [facility] OR;  Service: Orthopedics     COLONOSCOPY  2011     COLONOSCOPY  2005     COLONOSCOPY W/ BIOPSIES AND POLYPECTOMY  2021    16 to 20 mm polyp:tubular adenoma in the ascending colon.  Ulcerated mass in the anal canal: Moderately differentiated squamous cell cancer.     ESOPHAGOGASTRODUODENOSCOPY  2017    Large hiatal hernia.  Reactive gastropathy  with mucosal erosion.  H. pylori negative.     IR PORT PLACEMENT >5 YEARS  03/15/2021    Right IJ port-a-cath.     LAPAROSCOPIC APPENDECTOMY  04/28/2011     [place] TOTAL KNEE ARTHROPLASTY Left 03/02/2017    Procedure: LEFT TOTAL KNEE ARTHROPLASTY;  Surgeon: Star Du MD;  Location: [facility] Main OR;  Service: Orthopedics     TOTAL ABDOMINAL HYSTERECTOMY W/ BILATERAL SALPINGOOPHORECTOMY  1996     TOTAL HIP ARTHROPLASTY Right 08/18/2015    Procedure: HIP TOTAL ARTHROPLASTY, RIGHT;  Surgeon: Star Du MD;  Location: [facility] Main OR;  Service:          Allergies    Allergies   Allergen Reactions     Adalimumab Myalgia     Brimonidine-Timolol Unknown     Redness itching in eyes     Levaquin [Levofloxacin]      Skin burn and couldn't get out bed for 5 days     Tetracyclines Hives       Review of Systems  Constitutional  Constitutional (WDL): All constitutional elements are within defined limits  Neurosensory  Neurosensory (WDL): All neurosensory elements are within defined limits  Eye   Eye Disorder (WDL): Exceptions to WDL(hx iritis)  Ear  Ear Disorder (WDL): All ear disorder elements are within defined limits  Cardiovascular  Cardiovascular (WDL): All cardiovascular elements are within defined limits  Pulmonary  Respiratory (WDL): Within Defined Limits  Gastrointestinal  Gastrointestinal (WDL): All gastrointestinal elements are within defined limits  Genitourinary  Genitourinary (WDL): All genitourinary elements are within defined limits  Lymphatic  Lymph (WDL): All lymph disorder elements are within defined limits  Musculoskeletal and Connective Tissue  Musculoskeletal and Connetive Tissue Disorders (WDL): All Musculoskeletal and Connetive Tissue Disorder elements are within defined limits  Integumentary  Integumentary (WDL): All integumentary elements are within defined limits  Patient Coping  Patient Coping: Anxiety  Distress Assessment  Distress Assessment Score: 2  Accompanied by  Accompanied by:  Alone  Oral Chemo Adherence       Physical Exam    Recent Vitals 3/22/2021   Height -   Weight 137 lbs 5 oz   BSA (m2) 1.66 m2   /76   Pulse 71   Temp 98.2   Temp src 1   SpO2 99   Some recent data might be hidden       GENERAL: Alert and oriented to time place and person. Seated comfortably. In no distress.  Normal build.  She looks well for her age.  Anxious.    HEAD: Atraumatic and normocephalic.    EYES: JUANCARLOS, EOMI.  No pallor.  No icterus.    Oral cavity: no mucosal lesion or tonsillar enlargement.    NECK: supple. JVP normal.  No thyroid enlargement.    LYMPH NODES: No palpable, cervical, axillary or inguinal lymphadenopathy.    CHEST: clear to auscultation bilaterally.  Resonant to percussion throughout bilaterally.  Symmetrical breath movements bilaterally.  The new Port-A-Cath site of the right upper chest looks unremarkable.    CVS: S1 and S2 are heard. Regular rate and rhythm.  No murmur or gallop or rub heard.  No peripheral edema.    ABDOMEN: Soft. Not tender. Not distended.  No palpable hepatomegaly or splenomegaly.  No other mass palpable.  Bowel sounds heard.    EXTREMITIES: Warm.    SKIN: no rash, or bruising or purpura.  Has a full head of hair.        Lab Results    Recent Results (from the past 168 hour(s))   COVID-19 Virus PCR MRF    Specimen: Respiratory   Result Value Ref Range    COVID-19 VIRUS SPECIMEN SOURCE Nasopharyngeal     2019-nCOV       Test received-See reflex to IDDL test SARS CoV2 (COVID-19) Virus RT-PCR   SARS-CoV-2 (COVID-19)-PCR    Specimen: Respiratory   Result Value Ref Range    SARS-CoV-2 Virus Specimen Source Nasopharyngeal     SARS-CoV-2 PCR Result NEGATIVE     SARS-COV-2 PCR COMMENT       Testing was performed using the Aptima SARS-CoV-2 Assay on the [employer]   Comprehensive Metabolic Panel   Result Value Ref Range    Sodium 136 136 - 145 mmol/L    Potassium 4.5 3.5 - 5.0 mmol/L    Chloride 105 98 - 107 mmol/L    CO2 24 22 - 31 mmol/L    Anion Gap, Calculation 7 5 -  18 mmol/L    Glucose 86 70 - 125 mg/dL    BUN 12 8 - 28 mg/dL    Creatinine 0.65 0.60 - 1.10 mg/dL    GFR MDRD Af Amer >60 >60 mL/min/1.73m2    GFR MDRD Non Af Amer >60 >60 mL/min/1.73m2    Bilirubin, Total 0.7 0.0 - 1.0 mg/dL    Calcium 10.2 8.5 - 10.5 mg/dL    Protein, Total 7.3 6.0 - 8.0 g/dL    Albumin 3.9 3.5 - 5.0 g/dL    Alkaline Phosphatase 90 45 - 120 U/L    AST 20 0 - 40 U/L    ALT 18 0 - 45 U/L   HM1 (CBC with Diff)   Result Value Ref Range    WBC 6.6 4.0 - 11.0 thou/uL    RBC 5.24 3.80 - 5.40 mill/uL    Hemoglobin 14.0 12.0 - 16.0 g/dL    Hematocrit 46.0 35.0 - 47.0 %    MCV 88 80 - 100 fL    MCH 26.7 (L) 27.0 - 34.0 pg    MCHC 30.4 (L) 32.0 - 36.0 g/dL    RDW 20.5 (H) 11.0 - 14.5 %    Platelets 179 140 - 440 thou/uL    MPV 8.9 8.5 - 12.5 fL    Neutrophils % 70 50 - 70 %    Lymphocytes % 17 (L) 20 - 40 %    Monocytes % 8 2 - 10 %    Eosinophils % 3 0 - 6 %    Basophils % 1 0 - 2 %    Immature Granulocyte % 0 <=0 %    Neutrophils Absolute 4.6 2.0 - 7.7 thou/uL    Lymphocytes Absolute 1.1 0.8 - 4.4 thou/uL    Monocytes Absolute 0.6 0.0 - 0.9 thou/uL    Eosinophils Absolute 0.2 0.0 - 0.4 thou/uL    Basophils Absolute 0.1 0.0 - 0.2 thou/uL    Immature Granulocyte Absolute 0.0 <=0.0 thou/uL       Imaging Results    Ir Port Placement 5+ Years    Result Date: 3/15/2021  [place] [facility] LOCATION: [facility] DATE: 3/15/2021 PROCEDURE: IMPLANTABLE VENOUS CHEST PORT PLACEMENT (POWER INJECTABLE) INTERVENTIONAL RADIOLOGIST: Wilfredo Hinds MD. INDICATION: 76-year-old female with colorectal carcinoma. CONSENT: The risks, benefits and alternatives of implantable venous chest port placement were discussed with the patient  in detail. All questions were answered. Informed consent was given to proceed with the procedure. MODERATE SEDATION: Versed 2 mg IV; Fentanyl 100 mcg IV.  Under physician supervision, Versed and fentanyl were administered for moderate sedation. Pulse oximetry, heart rate and  blood pressure were continuously monitored by an independent trained observer. The physician spent 30 minutes of face-to-face sedation time with the patient. CONTRAST: None. ANTIBIOTICS: Ancef 2 g IV. ADDITIONAL MEDICATIONS: None. FLUOROSCOPIC TIME: 0.3 minutes. RADIATION DOSE: Air Kerma: 7 mGy. COMPLICATIONS: No immediate complications. STERILE BARRIER TECHNIQUE: Maximum sterile barrier technique was used. Cutaneous antisepsis was performed at the operative site with application of 2% chlorhexidine and large sterile drape. Prior to the procedure, the  and assistant performed hand hygiene and wore hat, mask, sterile gown, and sterile gloves during the entire procedure. PROCEDURE:  Using real-time ultrasound guidance the right internal jugular vein was accessed. A subcutaneous pocket was created and irrigated with sterile normal saline. The catheter tubing was tunneled in an antegrade fashion from the port pocket to the dermatotomy site. Over a guidewire, and under direct fluoroscopic visualization a peel-away sheath was advanced over the wire. Through the peel-away sheath, the catheter tubing was advanced until the tip was at the cavoatrial junction. The catheter tubing was cut to length and attached firmly to the port. The port was placed within the subcutaneous pocket and tested. The port pocket incision was closed with layered absorbable suture and surgical glue. The dermatotomy site was closed with surgical glue. FINDINGS: Ultrasound demonstrates an anechoic and compressible jugular vein. A permanent image was stored. At the completion of the study the port tip lies near the cavoatrial junction.     1.  Successful implantable venous chest port placement as detailed above. 2.  The implantable venous chest port was placed under fluoroscopic guidance and is ready for use immediately. ____________________________________________________________________ CPT codes for physician reference only: 26160 [id number]  [id number]     Ct External Imaging Abdomen    Result Date: 2/25/2021  Images were obtained from an external facility.  Click PACS Images hyperlink to view images.  Textual results have been scanned into the media tab.    Nm Pet Ct Skull To Mid Thigh    Result Date: 3/5/2021  EXAM: NM PET CT SKULL TO MID THIGH LOCATION: [facility] DATE/TIME: 3/5/2021 2:27 PM INDICATION: Malignant neoplasm of anal canal. Anal squamous cell carcinoma, staging. Initial treatment strategy. COMPARISON: MR pelvis 02/23/2021 reviewed. TECHNIQUE: Serum glucose level 109 mg/dL. One hour post intravenous administration of 9.5 mCi F-18 FDG, PET imaging was performed from the skull base to the mid thighs utilizing attenuation correction with concurrent axial CT and PET/CT image fusion. Dose reduction techniques were used. FINDINGS: FDG avid mass in the lower anal canal estimated at 1.4 x 1.9 x 1.9 cm associated with marked uptake (SUVmax 7.4), consistent with site of known primary malignancy. A few asymmetrically prominent left axillary lymph nodes with mild activity are likely inflammatory related to recent vaccination. Normal physiologic FDG uptake elsewhere. No FDG avid adenopathy in the abdomen or pelvis. No suspicious focal uptake in the liver or skeleton. Mild senescent intracranial changes. Large esophageal hiatal hernia with much of the stomach in the chest. Mild bibasilar atelectasis and/or scarring. Mild atherosclerotic calcifications. Few colonic diverticula. Right YAMILET. Moderate scattered degenerative changes in the spine.     1. FDG avid anal mass consistent with known primary malignancy. 2. No evidence of metastasis.    Mr External Imaging Pelvis    Result Date: 2/25/2021  Images were obtained from an external facility.  Click PACS Images hyperlink to view images.  Textual results have been scanned into the media tab.        Signed by: Kristen Villa MD

## 2021-06-16 NOTE — PATIENT INSTRUCTIONS - HE
Recent Results (from the past 24 hour(s))   Basic Metabolic Panel   Result Value Ref Range    Sodium 139 136 - 145 mmol/L    Potassium 4.4 3.5 - 5.0 mmol/L    Chloride 107 98 - 107 mmol/L    CO2 26 22 - 31 mmol/L    Anion Gap, Calculation 6 5 - 18 mmol/L    Glucose 98 70 - 125 mg/dL    Calcium 9.7 8.5 - 10.5 mg/dL    BUN 9 8 - 28 mg/dL    Creatinine 0.65 0.60 - 1.10 mg/dL    GFR MDRD Af Amer >60 >60 mL/min/1.73m2    GFR MDRD Non Af Amer >60 >60 mL/min/1.73m2   HM1 (CBC with Diff)   Result Value Ref Range    WBC 2.3 (L) 4.0 - 11.0 thou/uL    RBC 4.16 3.80 - 5.40 mill/uL    Hemoglobin 11.8 (L) 12.0 - 16.0 g/dL    Hematocrit 38.9 35.0 - 47.0 %    MCV 94 80 - 100 fL    MCH 28.4 27.0 - 34.0 pg    MCHC 30.3 (L) 32.0 - 36.0 g/dL    RDW 21.2 (H) 11.0 - 14.5 %    Platelets 267 140 - 440 thou/uL    MPV 8.1 (L) 8.5 - 12.5 fL    Neutrophils % 69 50 - 70 %    Lymphocytes % 16 (L) 20 - 40 %    Monocytes % 10 2 - 10 %    Eosinophils % 4 0 - 6 %    Basophils % 1 0 - 2 %    Immature Granulocyte % 0 <=0 %    Neutrophils Absolute 1.6 (L) 2.0 - 7.7 thou/uL    Lymphocytes Absolute 0.4 (L) 0.8 - 4.4 thou/uL    Monocytes Absolute 0.2 0.0 - 0.9 thou/uL    Eosinophils Absolute 0.1 0.0 - 0.4 thou/uL    Basophils Absolute 0.0 0.0 - 0.2 thou/uL    Immature Granulocyte Absolute 0.0 <=0.0 thou/uL   Manual Differential   Result Value Ref Range    Platelet Estimate Normal Normal

## 2021-06-16 NOTE — PROGRESS NOTES
RADIATION ONCOLOGY WEEKLY TREATMENT VISIT NOTE      Assessment / Impression       1. Malignant neoplasm of anal canal (H)  fluconazole (DIFLUCAN) 100 MG tablet    oxyCODONE-acetaminophen (PERCOCET/ENDOCET) 5-325 mg per tablet      Tolerating radiation therapy well.  All questions and concerns addressed.    Plan:     Continue radiation treatment as prescribed.    The patient has the white patches on the oral mucosa with significant mouth sore consistent with a fungal infection.  A prescription of Diflucan is given to the patient.    The patient did experience worsening pain in the oromucosa and anal skin area.  I will prescribe Percocet for better pain control.    Subjective:      HPI: Oscar Zhao is a 76 y.o. female with    1. Malignant neoplasm of anal canal (H)  fluconazole (DIFLUCAN) 100 MG tablet    oxyCODONE-acetaminophen (PERCOCET/ENDOCET) 5-325 mg per tablet       The following portions of the patient's history were reviewed and updated as appropriate: allergies, current medications, past family history, past medical history, past social history, past surgical history and problem list.    Assessment                  Body Site: Pelvis Site: Anal/Pelvis  Stereotactic Radiosurgery: No  Concurrent Therapy: Yes  Today's Dose: 200  Total Dose for Pelvis: 5400  Today's Fraction/Total Fraction Pelvis: 10/27  Drainage: 0: Absent  Diarrhea W/O Colostomy: 1: Increase of less than 4 stools/day over pretreatment  Constipation: 0: None  Proctitis: 0: None  Urinary frequency and/or urgency: 0: Normal  Urinary Retention: 0 : Normal  Urinary Incontinence: 0: None  Dysuria: 0: None                                            Sexuality Alteration                 Emotional Alteration Copin: Effective  Comfort Alteration KPS: 80% Can perform normal activity with effort, some signs of disease  Fatigue (ONS scale) : 3: Mild Fatigue  Pain Intensity. Rate degree of pain ranging from 0 (no pain) to 10 (severe pain) :  10  Pain Description: Burning - Burning, hot, fire type pain  Pain Intervention: 2: Nonsteroidal anti-inflammatory agents or non-opiods  Effectiveness of pain intervention: 0: No relief   Nutrition Alteration Anorexia: 2: Oral intake significantly decreased  Nausea: 2: Oral intake significantly decreased  Vomitin: None  Skin Alteration Skin Sensation: 1:Pruitis(itches/hurts, rash on buttocks denisse)  AUA Assessment                                  Accompanied by       Objective:     Exam: The patient has the white patches in the oral mucosa consistent with oral fungal infection.    Vitals:    21 1101 21 1119   BP: 151/86 136/87   Pulse: 91    Temp: 97.5  F (36.4  C)    TempSrc: Oral    SpO2: 100%    Weight: 133 lb 6.4 oz (60.5 kg)        Wt Readings from Last 8 Encounters:   21 133 lb 6.4 oz (60.5 kg)   21 136 lb 11.2 oz (62 kg)   21 137 lb 9.6 oz (62.4 kg)   21 137 lb 4.8 oz (62.3 kg)   03/15/21 136 lb (61.7 kg)   21 136 lb 6.4 oz (61.9 kg)   21 138 lb (62.6 kg)   21 138 lb (62.6 kg)       General: Alert and oriented, in no acute distress  Jerine has moderate Erythema.  Aria chart and setup information reviewed    Marilu Betancur MD

## 2021-06-16 NOTE — PROGRESS NOTES
[facility] Hematology and Oncology Progress Note    Patient: Oscar Zhao  MRN: [id number]  Date of Service: 03/30/2021        Reason for Visit:    Hospital follow up - D9C1 concurrent 5FU mitomycin chemotherapy with EBRT    Assessment/Plan:    Cancer Staging    1. Malignant neoplasm of anal canal (H)    Clinical Stage IIA (cT2, cN0, cM0)     76 y.o.     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  She has been treated in the past with prednisone, methotrexate, Humira and rituximab for this condition.    One year history of blood on toilet paper when wiping.    02/18/21 colonoscopy during w/u for anemia:    1.5-2 cm polyp of the ascending colon removed - pathology c/w tubular adenoma.      Ulcerated anal canal lesion - path c/w moderately differentiated squamous cell carcinoma.    02/23/21 MRI pelvis - focal thickening (L) posterolateral anorectal mucosa.  Muscularis propria appears intact without extension into the mesorecat or intersphincteric space.  No maegan or distant mets in the pelvis.    02/25/21 consult with Ashlee Sandy - recommended definitive chemoradiation therapy.    02/26/21 consult with Elder Farmer - recommended definitive chemoradiation therapy.    03/05/21 NM PET - FDG avid anal mass. No evidence of metastasis.    03/15/21 IR - Port-A-Cath placed.      03/22/21 - D1C1 concurrent 5FU mitomycin chemotherapy with EBRT    03/26 - 03/28/21 hospitalized with nausea and dehydration.  Found to be hyponatremic @ 117.  Treated with IVFs, started on Pepcid twice daily, and discharged home.    03/30/21:    1400 cGy/7 fx of a planned 5400 cGy / 27 fx EBRT.    CBC - mild leukopenia and thrombocytopenia.  HgB WNL.    Pancytopenic precautions reviewed.    BMP - mild  "improved hyponatremia.  Mild hypokalemia.  Low BS [no breakfast].    Instructed to add free table salt to her meals.    She will add a daily banana to her diet.    Yanick presents alone. She looks and states that she feels good today, eating better and taking fluids well.  Mouth still a bit sore - managed with Magic Mouthwash.  No nausea on Pepcid twice daily.  Not needing any antiemetics.  No pain, \"some discomfort, nothing major\" not requiring any analgesia.  States she had quit eating or taking oral fluids when hospitalized D1C1 concurrent 5FU mitomycin chemotherapy with EBRT as she was having gagging due to increased phlegm and heartburn.  She is yet noting soft/formed bowel movements.      Yanick looks and feels quite good today, D9C1 concurrent 5FU mitomycin chemotherapy with EBRT.  She's eating and taking oral fluids adequately.  She continues on twice daily Pepcid.  VSS including orthostatic BP.  Not tachycardic.    I discussed adding Zyprexia for added nausea/vomiting protection for C2 concurrent 5FU mitomycin chemotherapy with EBRT - Yanick has agreed to give it a try.     A Rx for Zyprexia 5 mg at bedtime to start 3-5 days prior to C2 chemotherapy was Eprescribed [#30 with 1 refill] to her pharmacy.    She was instructed to continue with the twice daily Pepcid 20 mg until a few weeks after she completes chemotherapy.    She was instructed to continue with prior anti-emetics prn (Compazine).    Mouth sores:    G1    No candidiasis.     Managed adequately with Magic Mouthwash prn four times a day.    No rectal pain - applying topical creams as instructed by Elder.    04/05/21 D15C1 f/u with labs per Treatment Plan.    04/19/21 - f/u D1C2 concurrent 5FU mitomycin chemotherapy with EBRT with labs per Treatment Plan.    F/u Dr Reynolds, [facility], ~6 weeks after completes chemoradiation.    Restaging imaging 8-12 weeks s/p completing definitive EBRT concurrent with chemotherapy.      If she has a complete response she " "will need only follow-up.  If she does not have a complete response, surgery will be considered.    ECOG Performance:     ECOG Performance Status: 0    Distress Assessment:    Distress Assessment Score: 2        Total Time 28 minutes.    Alberto Fang, CNP     CC: Jak Jones MD Elizabeth Ester, MD Aren Reynolds MD  ______________________________________________________________________________    Interim History:    Ms. Oscar Zhao, \"Yanick\", presents alone.  She looks and states that she feels good today, eating better and taking fluids well.  Mouth still a bit sore - managed with Magic Mouthwash.  No nausea on Pepcid twice daily.  Not needing any antiemetics.  No pain, \"some discomfort, nothing major\" not requiring any analgesia.  States she had quit eating or taking oral fluids when hospitalized D1C1 concurrent 5FU mitomycin chemotherapy with EBRT as she was having gagging due to increased phlegm and heartburn.  She is yet noting soft/formed bowel movements.  She denies cough, fever, chills, unusual headaches, visual or mentation disturbance, chest pain or palpitations, numbness or tingling, rash.    Past History:    Past Medical History:   Diagnosis Date     Anemia      Basal cell carcinoma of anterior chest      Breast cyst      History of anesthesia complications      HLA B27 (HLA B27 positive)      Hypertension      Osteoarthritis      Osteoporosis 2011     Peripheral neuropathy 2018     PONV (postoperative nausea and vomiting)      Scleritis, bilateral     Left .  Treated with corticosteroids,, methotrexate and Humira.  .  Rituximab.     Squamous cell carcinoma of skin of chest      Steroid induced glaucoma, both eyes      Past Surgical History:   Procedure Laterality Date     BREAST CYST EXCISION Right     benign     CATARACT EXTRACTION W/  INTRAOCULAR LENS IMPLANT Right 2017     CATARACT EXTRACTION W/  INTRAOCULAR LENS IMPLANT Left 2017      " SECTION  1989     CLOSED REDUCTION HIP DISLOCATION Right 03/26/2019    Procedure: CLOSED REDUCTION, HIP;  Surgeon: Jak Ruiz DO;  Location: [facility] Main OR;  Service: Orthopedics     COLONOSCOPY  09/07/2011     COLONOSCOPY  09/27/2005     COLONOSCOPY W/ BIOPSIES AND POLYPECTOMY  02/18/2021    16 to 20 mm polyp:tubular adenoma in the ascending colon.  Ulcerated mass in the anal canal: Moderately differentiated squamous cell cancer.     ESOPHAGOGASTRODUODENOSCOPY  05/05/2017    Large hiatal hernia.  Reactive gastropathy with mucosal erosion.  H. pylori negative.     IR PORT PLACEMENT >5 YEARS  03/15/2021    Right IJ port-a-cath.     LAPAROSCOPIC APPENDECTOMY  04/28/2011     NM TOTAL KNEE ARTHROPLASTY Left 03/02/2017    Procedure: LEFT TOTAL KNEE ARTHROPLASTY;  Surgeon: Star Du MD;  Location: [facility] Main OR;  Service: Orthopedics     TOTAL ABDOMINAL HYSTERECTOMY W/ BILATERAL SALPINGOOPHORECTOMY  1996     TOTAL HIP ARTHROPLASTY Right 08/18/2015    Procedure: HIP TOTAL ARTHROPLASTY, RIGHT;  Surgeon: Star Du MD;  Location: [facility] OR;  Service:        Allergies:    Allergies   Allergen Reactions     Adalimumab Myalgia     Brimonidine-Timolol Unknown     Redness itching in eyes     Levaquin [Levofloxacin]      Skin burn and couldn't get out bed for 5 days     Tetracyclines Hives       Review of Systems  Constitutional  Constitutional (WDL): Exceptions to WDL  Fatigue: Fatigue relieved by rest  Neurosensory  Neurosensory (WDL): All neurosensory elements are within defined limits  Eye   Eye Disorder (WDL): Assessment not pertinent to visit  Ear  Ear Disorder (WDL): Assessment not pertinent to visit  Cardiovascular  Cardiovascular (WDL): All cardiovascular elements are within defined limits  Pulmonary  Respiratory (WDL): Within Defined Limits  Gastrointestinal  Gastrointestinal (WDL): Exceptions to WDL  Anorexia: Loss of appetite without alteration in eating habits  Nausea:  None(better with pepcid)  Vomiting: None  Genitourinary  Genitourinary (WDL): All genitourinary elements are within defined limits  Lymphatic  Lymph (WDL): Assessment not pertinent to visit  Musculoskeletal and Connective Tissue  Musculoskeletal and Connetive Tissue Disorders (WDL): All Musculoskeletal and Connetive Tissue Disorder elements are within defined limits  Integumentary  Integumentary (WDL): All integumentary elements are within defined limits  Patient Coping  Patient Coping: Open/discussion  Distress Assessment  Distress Assessment Score: 2  Accompanied by  Accompanied by: Alone  Oral Chemo Adherence       Physical Exam:    Recent Vitals 3/30/2021   Height -   Weight -   BSA (m2) -   /73   Pulse 74   Temp -   Temp src -   SpO2 100   Some recent data might be hidden       GENERAL:   Very pleasant.  Alert and oriented.  Comfortable.  In no distress.      HEENT:   Atraumatic and normocephalic.  JAVIER.  EOMI.  No pallor.  No icterus.  Mild mucositis.  No candidiasis.     LYMPH NODES:  No palpable cervical, axillary or inguinal lymphadenopathy.    CHEST:   Lungs clear to auscultation bilaterally.    Port-A-Cath site of the right upper chest looks unremarkable.    CVS:    S1 and S2 heard.  Regular rate and rhythm.  No murmur, gallop or rub heard.  No peripheral edema.    ABDOMEN:   Soft.  Not tender.  Not distended.  No palpable hepatomegaly or splenomegaly.    EXTREMITIES:  Warm.  No PHIL.    SKIN:    No rash, bruising or purpura noted.  Full head of hair.    Lab Results:    Reviewed with Yanick.    Recent Results (from the past 24 hour(s))   Basic Metabolic Panel   Result Value Ref Range    Sodium 131 (L) 136 - 145 mmol/L    Potassium 3.4 (L) 3.5 - 5.0 mmol/L    Chloride 98 98 - 107 mmol/L    CO2 24 22 - 31 mmol/L    Anion Gap, Calculation 9 5 - 18 mmol/L    Glucose 54 (LL) 70 - 125 mg/dL    Calcium 8.8 8.5 - 10.5 mg/dL    BUN 11 8 - 28 mg/dL    Creatinine 0.61 0.60 - 1.10 mg/dL    GFR MDRD Af Amer >60  >60 mL/min/1.73m2    GFR MDRD Non Af Amer >60 >60 mL/min/1.73m2   HM2(CBC w/o Differential)   Result Value Ref Range    WBC 2.6 (L) 4.0 - 11.0 thou/uL    RBC 4.60 3.80 - 5.40 mill/uL    Hemoglobin 12.5 12.0 - 16.0 g/dL    Hematocrit 40.2 35.0 - 47.0 %    MCV 87 80 - 100 fL    MCH 27.2 27.0 - 34.0 pg    MCHC 31.1 (L) 32.0 - 36.0 g/dL    RDW 17.4 (H) 11.0 - 14.5 %    Platelets 133 (L) 140 - 440 thou/uL    MPV 8.0 (L) 8.5 - 12.5 fL     Imaging Results:    No new imaging.

## 2021-06-16 NOTE — PROGRESS NOTES
Patient is here for follow-up of anal cancer, recent hospitalization.Concurrent radiation, D1C2 5FU-Mitomycin.  Tomasa Gonzalez RN

## 2021-06-16 NOTE — PROGRESS NOTES
Patient here ambulatory for daily radiation therapy treatment and asked to speak with nursing about her medications.  Patient states she was taken off 2 of her 3 blood pressure medications while in the hospital and told to resume when blood pressure comes back up.  Patient has plans to call her primary doctor to discuss this.  Told patient that as she is coming in for doctor visits to the cancer center that we will recheck her blood pressure and if the medical oncology team feels she needs to hold during chemotherapy they will direct her and notify her primary.    Patient wanted to review her medications for nausea and anxiety.  Went through these with her and when to take each.  Patient also had questions about whether she would get a medication with her chemotherapy this time to help keep her white count from dropping so low.  Patient has appointment on Monday with medical oncology before starting chemotherapy and told patient they would review this and let her know if there was going to be any changes to her treatment plan at that time.    Patient appreciative of information and left ambulatory.

## 2021-06-16 NOTE — PROGRESS NOTES
Pt here today for pump discontinue. She reports being very nauseous all week with very little food/water intake. She reports being down 2lbs since Monday. NP updated, labs drawn, NS 1L given, 2gm Mag given as well. She will be directly admitted to , report given. She took 8mg PO zofran at 1300 of her own supply before going up to the unit.

## 2021-06-16 NOTE — PATIENT INSTRUCTIONS - HE
Recent Results (from the past 24 hour(s))   Basic Metabolic Panel   Result Value Ref Range    Sodium 131 (L) 136 - 145 mmol/L    Potassium 3.4 (L) 3.5 - 5.0 mmol/L    Chloride 98 98 - 107 mmol/L    CO2 24 22 - 31 mmol/L    Anion Gap, Calculation 9 5 - 18 mmol/L    Glucose 54 (LL) 70 - 125 mg/dL    Calcium 8.8 8.5 - 10.5 mg/dL    BUN 11 8 - 28 mg/dL    Creatinine 0.61 0.60 - 1.10 mg/dL    GFR MDRD Af Amer >60 >60 mL/min/1.73m2    GFR MDRD Non Af Amer >60 >60 mL/min/1.73m2   HM2(CBC w/o Differential)   Result Value Ref Range    WBC 2.6 (L) 4.0 - 11.0 thou/uL    RBC 4.60 3.80 - 5.40 mill/uL    Hemoglobin 12.5 12.0 - 16.0 g/dL    Hematocrit 40.2 35.0 - 47.0 %    MCV 87 80 - 100 fL    MCH 27.2 27.0 - 34.0 pg    MCHC 31.1 (L) 32.0 - 36.0 g/dL    RDW 17.4 (H) 11.0 - 14.5 %    Platelets 133 (L) 140 - 440 thou/uL    MPV 8.0 (L) 8.5 - 12.5 fL

## 2021-06-16 NOTE — TELEPHONE ENCOUNTER
Received MTM referral from Hospital Discharge Transitions of Care     Patient was not reachable after several attempts, will route to MTM Pharmacist/Provider as an FYI. Left MTM scheduling information on patients voicemail.    Thank you for the referral,    Dipak Ceron, MTM coordinator

## 2021-06-16 NOTE — PROGRESS NOTES
Patient here for daily radiation therapy treatment for her anal cancer and is receiving concurrent chemotherapy.  Patient states she was having nausea and dry heaves last night and this morning felt a lump in her groin.  Patient concerned that she developed a hernia with the dry heaves.  Patient feeling better this morning after taking nausea medication.  No more dry heaves.  Patient also states she has sore muscles in her chest from the dry heaves as well.  Patient denies pain to area of the groin lump.  Assessed area while lying on treatment table and no obvious protruding lumps or discoloration noted.  Patient felt it seemed more while standing this morning when she first got up.  Told patient to watch area and let us know if it changes or becomes painful in any way.  Patient is scheduled for a visit with Dr. Spencer on Friday but she will be here tomorrow if patient needs to be seen sooner.  Patient agreeable with this and will let us know if it changes.

## 2021-06-16 NOTE — TELEPHONE ENCOUNTER
Received MTM referral from Hospital Discharge Transitions of Care     Patient declined.    Thank you for the referral,    Dipak Ceron MTM coordinator

## 2021-06-16 NOTE — PROGRESS NOTES
Called Yanick to update that she is severely neutropenic, also has thrombocytopenia and hyponatremia as below. She was given verbal instructions for neutropenic precautions, and to watch for bleeding bruising. Also to increase oral Na intake and limit free water. She expressed undersatnding. Poonam Diez    Recent Results (from the past 24 hour(s))   Comprehensive Metabolic Panel   Result Value Ref Range    Sodium 130 (L) 136 - 145 mmol/L    Potassium 3.7 3.5 - 5.0 mmol/L    Chloride 98 98 - 107 mmol/L    CO2 24 22 - 31 mmol/L    Anion Gap, Calculation 8 5 - 18 mmol/L    Glucose 102 70 - 125 mg/dL    BUN 4 (L) 8 - 28 mg/dL    Creatinine 0.63 0.60 - 1.10 mg/dL    GFR MDRD Af Amer >60 >60 mL/min/1.73m2    GFR MDRD Non Af Amer >60 >60 mL/min/1.73m2    Bilirubin, Total 1.0 0.0 - 1.0 mg/dL    Calcium 9.2 8.5 - 10.5 mg/dL    Protein, Total 6.3 6.0 - 8.0 g/dL    Albumin 3.4 (L) 3.5 - 5.0 g/dL    Alkaline Phosphatase 60 45 - 120 U/L    AST 16 0 - 40 U/L    ALT 16 0 - 45 U/L   HM1 (CBC with Diff)   Result Value Ref Range    WBC 0.4 (LL) 4.0 - 11.0 thou/uL    RBC 4.12 3.80 - 5.40 mill/uL    Hemoglobin 11.2 (L) 12.0 - 16.0 g/dL    Hematocrit 36.0 35.0 - 47.0 %    MCV 87 80 - 100 fL    MCH 27.2 27.0 - 34.0 pg    MCHC 31.1 (L) 32.0 - 36.0 g/dL    RDW 16.9 (H) 11.0 - 14.5 %    Platelets 59 (L) 140 - 440 thou/uL    MPV 9.5 8.5 - 12.5 fL   Manual Differential   Result Value Ref Range    Total Neutrophils % 24 (L) 50 - 70 %    Lymphocytes % 40 20 - 40 %    Monocytes % 28 (H) 2 - 10 %    Eosinophils %  6 0 - 6 %    Basophils % 2 0 - 2 %    Immature Granulocyte % - Manual 0 <=0 %    Total Neutrophils Absolute 0.1 (L) 2.0 - 7.7 thou/ul    Lymphocytes Absolute 0.2 (L) 0.8 - 4.4 thou/uL    Monocytes Absolute 0.1 0.0 - 0.9 thou/uL    Eosinophils Absolute 0.0 0.0 - 0.4 thou/uL    Basophils Absolute 0.0 0.0 - 0.2 thou/uL    Immature Granulocyte Absolute - Manual 0.0 <=0.0 thou/uL    Platelet Estimate Decreased (!) Normal

## 2021-06-16 NOTE — PROGRESS NOTES
Pt arrived at infusion clinic for her pump dicontinue and Neulasta On-Pro as ordered. Pt reports no issues with pump. Pump was discontinued at 1115 which is 1 hour early-ok per Dr. Villa. Pt states she began having quite a bit of diarrhea today. She had been constipated but now is having diarrhea. Pt instructed on Imodium but is hesitant to use it considering her constipation. Pt was instructed to keep hydrated and call over the weekend if diarrhea is not controlled.   Pt/daughter instructed on Neulasta On-Pro and watched video. Pt given written information and instructed to call if On-Pro leaks or falls off. Pt left infusion clinic via ambulatory and will RTC as sched.

## 2021-06-16 NOTE — PROGRESS NOTES
Oscar presents for D1C1 Mitomycin + 5FU following provider visit. Her port was accessed easily with prakash blood return. She was given verbal and written chemotherapy education and asked appropriate questions answered to the best of my ability. She tolerated the Mitocmycin over 15 minutes without issues, and the 5FU pump was connected and double checked. She will RTC  On Wed, 3/24 following her Rad tx for a battery change to the CADD pump; and RTC again on 3/26 for pump discharge at 11:30. Her total infusion is 96 hours. Pump was connected at 1130 today. Upon completion of her tx today, she was escorted to the lobby and checked in for rad onc. Poonam Diez

## 2021-06-16 NOTE — PROGRESS NOTES
Patient is here for follow-up of anal cancer.   D1C1 5Fu/mitomycin, concurrent radiation. Tomasa Gonzalez RN

## 2021-06-16 NOTE — PROGRESS NOTES
RADIATION ONCOLOGY WEEKLY TREATMENT VISIT NOTE      Assessment / Impression       1. Malignant neoplasm of anal canal (H)       Cancer Staging  Malignant neoplasm of anal canal (H)  Staging form: Anus, AJCC 8th Edition  - Clinical: Stage IIA (cT2, cN0, cM0) - Signed by Altaf Jasso MD on 2/25/2021     Tolerating radiation therapy well.  All questions and concerns addressed.  Grade 2 RT dermatitis    Plan:     Continue radiation treatment as prescribed.  Radiation: Site: Anal/Pelvis  Stereotactic Radiosurgery: No  Concurrent Therapy: Yes  Today's Dose: 3600  Total Dose for Pelvis: 5400  Today's Fraction/Total Fraction Pelvis: 18/27    Continue current skin care, has not needed morphine gel yet, but has when she needs it    Subjective:      HPI: Oscar Zhao is a 76 y.o. female with    1. Malignant neoplasm of anal canal (H)       This week, she states, is going better than expected.  She has slight increase in skin irritation but no significant pain currently only using topical gabapentin/lidocaine which is helpful and has not needed morphine gel.  Not doing sitz but has epsom salts if needed for baths.  Other skin breakout improving and healing.     The following portions of the patient's history were reviewed and updated as appropriate: allergies, current medications, past family history, past medical history, past social history, past surgical history and problem list.    Assessment                  Body Site: Pelvis Site: Anal/Pelvis  Stereotactic Radiosurgery: No  Concurrent Therapy: Yes  Today's Dose: 3600  Total Dose for Pelvis: 5400  Today's Fraction/Total Fraction Pelvis: 18/27  Drainage: 0: Absent  Diarrhea W/O Colostomy: 0: None  Constipation: 0: None  Proctitis: 0: None  Urinary frequency and/or urgency: 0: Normal  Urinary Retention: 0 : Normal  Urinary Incontinence: 0: None  Dysuria: 0: None  Urine Color Change: 0: Normal                                            Sexuality Alteration                  Emotional Alteration Copin: Effective  Comfort Alteration KPS: 90% Can perform normal activity, minor signs of disease  Fatigue (ONS scale) : 3: Mild Fatigue  Pain Location: denies- mucositis improved   Nutrition Alteration Anorexia: 0: None  Nausea: 0: None  Vomitin: None  Skin Alteration Skin Sensation: 1:Pruitis  Skin Reaction: 1: Faint erythema or dry desquamation  AUA Assessment                                  Accompanied by       Objective:     Exam:     Vitals:    21 0950   BP: (!) 187/96   Pulse: 75   Temp: 97.5  F (36.4  C)   TempSrc: Oral   SpO2: 100%   Weight: 134 lb 3.2 oz (60.9 kg)       Wt Readings from Last 8 Encounters:   21 134 lb 3.2 oz (60.9 kg)   21 132 lb (59.9 kg)   21 134 lb 8 oz (61 kg)   21 133 lb 6.4 oz (60.5 kg)   21 136 lb 11.2 oz (62 kg)   21 137 lb 9.6 oz (62.4 kg)   21 137 lb 4.8 oz (62.3 kg)   03/15/21 136 lb (61.7 kg)       General: Alert and oriented, in no acute distress  Jerine has moderate Erythema and hyperpigmentation.  No skin break down.  Scabbed lesions all healing.  No concerns for infection     Treatment Summary to Date    Aria chart and setup information reviewed    Patsy Spencer MD

## 2021-06-16 NOTE — PROGRESS NOTES
[facility] Hematology and Oncology Progress Note    Patient: Oscra Zhao  MRN: [id number]  Date of Service: 04/21/2021        Reason for Visit:    D3C2 concurrent 5FU + mitomycin chemotherapy with EBRT    Oncologic History:    1. Malignant neoplasm of anal canal (H)    Clinical Stage IIA (cT2, cN0, cM0)     76 y.o.     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  She has been treated in the past with prednisone, methotrexate, Humira and rituximab for this condition.    One year history of blood on toilet paper when wiping.    02/18/21 colonoscopy during w/u for anemia:    1.5-2 cm polyp of the ascending colon removed - pathology c/w tubular adenoma.      Ulcerated anal canal lesion - path c/w moderately differentiated squamous cell carcinoma.    02/23/21 MRI pelvis - focal thickening (L) posterolateral anorectal mucosa.  Muscularis propria appears intact without extension into the mesorecat or intersphincteric space.  No maegan or distant mets in the pelvis.    02/25/21 consult with Ashlee Sandy - recommended definitive chemoradiation therapy.    02/26/21 consult with Elder Farmer - recommended definitive chemoradiation therapy.    03/05/21 NM PET - FDG avid anal mass. No evidence of metastasis.    03/15/21 IR - Port-A-Cath placed.      03/22/21 - D1C1 concurrent 5FU mitomycin chemotherapy with EBRT.    03/26 - 03/28/21 hospitalized with nausea and dehydration.  Found to be hyponatremic @ 117.  Treated with IVFs, started on Pepcid twice daily, and discharged home.    04/05 - 04/08/21 hospitalized D15C1 with neutropenic fever [ANC 0.1].    04/19/21 - D1C2 concurrent 5FU mitomycin chemotherapy with EBRT.    Assessment/Plan:    1. 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    76 y.o.     04/21/21:    4200 cGy/21 fx of a planned 5400 cGy / 27 fx EBRT.    CBC - WBC 2.3.  ANC 1.6.  Plts WNL.  HgB increasing @ 11.8.    BMP -WNL.    Yanick presents alone. She looks and states that she feels good so far D3C2 chemotherapy.  She has an appetite, is eating better and taking fluids well - weight down ~5 pounds from start of treatment.  Last week she states she had a \"normal week\", she worked in her garden, etc.  She resumed her anti-hypertensive meds - BP and HR look good.  No mouth sores - managed with Magic Mouthwash when noted.  No nausea - has never used any Compazine and used only one Zofran preventatively this cycle, taking 5 mg Zyprexia at bedtime and Pepcid 1-2 x daily.  States the Zyprexia is giving her a full night of sleep.  No pain - not requiring any analgesia.  She is yet noting soft/formed bowel movements and occasional loose stools - does not require imodium.  States she is starting to lose some hair.  She denies cough, fever, chills, unusual headaches, visual or mentation disturbance, bowel or bladder issues, rash.       Yanick looks and feels quite good today, D3C2 concurrent 5FU mitomycin chemotherapy with EBRT.  She's eating and taking oral fluids adequately.  Metabolic panel WNL.  Mild neutropenia.  VSS.     She was hospitalized D4C1 with heartburn, nausea, uncontrolled gagging and subsequent dehydration.    She is currently eating and taking oral fluids well with no nausea or gagging.     Continue 5 mg Zyprexia at bedtime.     Continue 1-2 x/day Pepcid, 20 mg, at least until she is a few weeks out from completing chemotherapy.    Continue Zofran prn.    She was hospitalized D15C1 with neutropenic fever [ANC 0.1].    She will be given Neulasta-onpro this cycle when she has her pump removed on Friday.    Neutropenic precautions reviewed.    Mouth sores:    None at this time.    No " candidiasis.     When previously noted, managed adequately with Magic Mouthwash and ora-gel prn four times a day.    No rectal pain - applying topical creams as instructed by Bea.     She has received both SARS-covid Moderna vaccines.    21 - f/u D9C2 concurrent 5FU mitomycin chemotherapy with EBRT with CBC and BMP - if she is feeling well she may cancel.    F/u Dr Reynolds, [facility], ~6 weeks after completes chemoradiation.    Restaging imaging 8-12 weeks s/p completing definitive EBRT concurrent with chemotherapy.      If she has a complete response she will need only follow-up.  If she does not have a complete response, surgery will be considered.    ECOG Performance:     ECOG Performance Status: 1    Distress Assessment:    Distress Assessment Score: 1        Total Time 26 minutes.    Alberto Fang, CNP     CC: Jak Jones MD Elizabeth Ester, MD Aren Reynolds MD  ______________________________________________________________    Past History:    Past Medical History:   Diagnosis Date     Anemia      Basal cell carcinoma of anterior chest      Breast cyst      History of anesthesia complications      HLA B27 (HLA B27 positive)      Hypertension      Osteoarthritis      Osteoporosis 2011     Peripheral neuropathy 2018     PONV (postoperative nausea and vomiting)      Scleritis, bilateral     Left .  Treated with corticosteroids,, methotrexate and Humira.  .  Rituximab.     Squamous cell carcinoma of skin of chest      Steroid induced glaucoma, both eyes      Past Surgical History:   Procedure Laterality Date     BREAST CYST EXCISION Right     benign     CATARACT EXTRACTION W/  INTRAOCULAR LENS IMPLANT Right 2017     CATARACT EXTRACTION W/  INTRAOCULAR LENS IMPLANT Left 2017      SECTION       CLOSED REDUCTION HIP DISLOCATION Right 2019    Procedure: CLOSED REDUCTION, HIP;  Surgeon: aJk Ruiz DO;  Location: [facility] OR;   Service: Orthopedics     COLONOSCOPY  09/07/2011     COLONOSCOPY  09/27/2005     COLONOSCOPY W/ BIOPSIES AND POLYPECTOMY  02/18/2021    16 to 20 mm polyp:tubular adenoma in the ascending colon.  Ulcerated mass in the anal canal: Moderately differentiated squamous cell cancer.     ESOPHAGOGASTRODUODENOSCOPY  05/05/2017    Large hiatal hernia.  Reactive gastropathy with mucosal erosion.  H. pylori negative.     IR PORT PLACEMENT >5 YEARS  03/15/2021    Right IJ port-a-cath.     LAPAROSCOPIC APPENDECTOMY  04/28/2011     NM TOTAL KNEE ARTHROPLASTY Left 03/02/2017    Procedure: LEFT TOTAL KNEE ARTHROPLASTY;  Surgeon: Star Du MD;  Location: [facility] Main OR;  Service: Orthopedics     TOTAL ABDOMINAL HYSTERECTOMY W/ BILATERAL SALPINGOOPHORECTOMY  1996     TOTAL HIP ARTHROPLASTY Right 08/18/2015    Procedure: HIP TOTAL ARTHROPLASTY, RIGHT;  Surgeon: Star Du MD;  Location: [facility] Main OR;  Service:        Allergies:    Allergies   Allergen Reactions     Adalimumab Myalgia     Brimonidine-Timolol Unknown     Redness itching in eyes     Levaquin [Levofloxacin]      Skin burn and couldn't get out bed for 5 days     Tetracyclines Hives       Review of Systems  Constitutional  Constitutional (WDL): Exceptions to WDL  Fatigue: Fatigue not relieved by rest - Limiting instrumental ADL  Neurosensory  Neurosensory (WDL): All neurosensory elements are within defined limits  Eye   Eye Disorder (WDL): Assessment not pertinent to visit  Ear  Ear Disorder (WDL): Assessment not pertinent to visit  Cardiovascular  Cardiovascular (WDL): All cardiovascular elements are within defined limits  Pulmonary     Gastrointestinal  Gastrointestinal (WDL): Exceptions to WDL  Anorexia: Loss of appetite without alteration in eating habits  Diarrhea: Increase of <4 stools per day over baseline, mild increase in ostomy output compared to baseline  Nausea: Loss of appetite without alteration in eating habits(just this  am)  Genitourinary  Genitourinary (WDL): All genitourinary elements are within defined limits  Lymphatic  Lymph (WDL): All lymph disorder elements are within defined limits  Musculoskeletal and Connective Tissue  Musculoskeletal and Connetive Tissue Disorders (WDL): Exceptions to WDL  Muscle Weakness : Symptomatic, perceived by patient but not evident on physical exam  Integumentary  Integumentary (WDL): All integumentary elements are within defined limits  Patient Coping  Patient Coping: Accepting;Open/discussion  Distress Assessment  Distress Assessment Score: 1  Accompanied by  Accompanied by: Alone  Oral Chemo Adherence       Physical Exam:    Recent Vitals 4/21/2021   Height -   Weight 132 lbs 13 oz   BSA (m2) 1.64 m2   /76   Pulse 80   Temp 97.8   Temp src 1   SpO2 98   Some recent data might be hidden       GENERAL:   Very pleasant.  Alert and oriented.  Comfortable.  In no distress.      HEENT:   Atraumatic and normocephalic.  JAVIER.  EOMI.  No pallor.  No icterus.  Mild mucositis.  No candidiasis.     LYMPH NODES:  No palpable cervical, axillary or inguinal lymphadenopathy.    CHEST:   Lungs clear to auscultation bilaterally.    Port-A-Cath site of the right upper chest looks unremarkable.    CVS:    S1 and S2 heard.  Regular rate and rhythm.  No murmur, gallop or rub heard.  No peripheral edema.    ABDOMEN:   Soft.  Not tender or distended.  No palpable hepatomegaly or splenomegaly.    EXTREMITIES:  Warm.  No PHIL.    SKIN:    No rash, bruising or purpura noted.  Full head of hair.    Lab Results:    Reviewed with Yanick.    Recent Results (from the past 24 hour(s))   Basic Metabolic Panel   Result Value Ref Range    Sodium 139 136 - 145 mmol/L    Potassium 4.4 3.5 - 5.0 mmol/L    Chloride 107 98 - 107 mmol/L    CO2 26 22 - 31 mmol/L    Anion Gap, Calculation 6 5 - 18 mmol/L    Glucose 98 70 - 125 mg/dL    Calcium 9.7 8.5 - 10.5 mg/dL    BUN 9 8 - 28 mg/dL    Creatinine 0.65 0.60 - 1.10 mg/dL    GFR  MDRD Af Amer >60 >60 mL/min/1.73m2    GFR MDRD Non Af Amer >60 >60 mL/min/1.73m2   HM1 (CBC with Diff)   Result Value Ref Range    WBC 2.3 (L) 4.0 - 11.0 thou/uL    RBC 4.16 3.80 - 5.40 mill/uL    Hemoglobin 11.8 (L) 12.0 - 16.0 g/dL    Hematocrit 38.9 35.0 - 47.0 %    MCV 94 80 - 100 fL    MCH 28.4 27.0 - 34.0 pg    MCHC 30.3 (L) 32.0 - 36.0 g/dL    RDW 21.2 (H) 11.0 - 14.5 %    Platelets 267 140 - 440 thou/uL    MPV 8.1 (L) 8.5 - 12.5 fL    Neutrophils % 69 50 - 70 %    Lymphocytes % 16 (L) 20 - 40 %    Monocytes % 10 2 - 10 %    Eosinophils % 4 0 - 6 %    Basophils % 1 0 - 2 %    Immature Granulocyte % 0 <=0 %    Neutrophils Absolute 1.6 (L) 2.0 - 7.7 thou/uL    Lymphocytes Absolute 0.4 (L) 0.8 - 4.4 thou/uL    Monocytes Absolute 0.2 0.0 - 0.9 thou/uL    Eosinophils Absolute 0.1 0.0 - 0.4 thou/uL    Basophils Absolute 0.0 0.0 - 0.2 thou/uL    Immature Granulocyte Absolute 0.0 <=0.0 thou/uL   Manual Differential   Result Value Ref Range    Platelet Estimate Normal Normal     Imaging Results:    No new imaging.

## 2021-06-16 NOTE — PROGRESS NOTES
Patient ambulated into [facility] Care by herself. While [facility] was in the [facility], her PORT was accessed and labs drawn. Premeds administered and PPE donned. Mitomycin was double checked with hCerie Pires RN. Patient tolerated infusion without any problems. New batteries placed in CADD pump and 5 - FU chemotherapy double checked with Cherie Pires RN. Reviewed after hours call information and all questions answered. Patient will return on Wednesday 4/21/2021 to have new batteries placed in the CADD pump.

## 2021-06-16 NOTE — TELEPHONE ENCOUNTER
[facility] to report ongoing nausea with no relief from Compazine. She has also recently started shaking. She has taken the Compazine twice so far today and does not feel like it is helping. No vomiting or diarrhea.   Pt instructed to stop Compazine. Pt willing to try something else for nausea.   Spoke with Alberto HAZEL who gave verbal order for Zofran. Rx sent to pt's Pharmacy. Pt made aware.

## 2021-06-16 NOTE — PROGRESS NOTES
[facility] Hematology and Oncology Progress Note    Patient: Oscar Zhao  MRN: [id number]  Date of Service: 03/26/2021        Reason for Visit    1. Chemo-induced nausea and dehydration      Assessment/Plan  1. Chemo/RT-induced nausea and dehydration  Refractory despite compazine and Zofran. Hypotensive, likely from volume depletion. Is on 3 antihypertensives, which may need to be held temporarily with her hypotension.    Received 1 liter NS, IV Aloxi and 12 mg IV dex in infusion today.   Had recommended dex 4 mg daily Saturday and Sunday, Pepcid 20 mg daily and alternating Compazine/Zofran 8 mg every 3-4 hrs with patient ahead of decision to admit. This can be done as inpatient.    2. Anal cancer, undergoing chemoRT  Just started chemoRT 3/22 (in her first week). Receiving mitomycin + 5FU infusion with concurrent daily RT. This is curative-intent treatment. Will not need RT over weekend, but if she remains in hospital next week, would try to continue RT daily.     3.   Acute hyponatremia  Shaky and weak, no acute neuro/mental status change symptoms. No cardiac symptoms, but is hypotensive.  Mitomycin can cause renal toxicities, but creatinine is normal. Hyponatremia is not a typical side effect of either chemo she is on.   May be hypovolemic/dehydration.  Was given 1 L normal saline in infusion today, will need ongoing hydration.    Given acute change and severity, will need direct admission for close monitoring, work-up and correction as inpatient.  Consider Nephrology consult.    Dr. Maldonado accepted admission to med surg. Patient will be escorted to Admissions.     4.   Hypomagnesemia  Will give 2 gm IV while awaiting admission.  ______________________________________________________________________________    History of Present Illness/ Interval History    Ms. Oscar Zhao  is a 76 y.o. receiving concurrent chemoRT for stage IIA anal cancer.   She started both therapies this Monday. On mitomycin day 1 and  5FU over 5 day infusion.     Oscar came into Infusion today for her 5FU pump disconnect reporting refractory nausea despite taking her antiemetics scheduled. Nausea and loss of appetite/dysguesia started day 1 and has persisted every day with a few episodes of mild emesis. She started taking compazine the first few days, but no benefit. Yesterday, she was prescribed Zofran 4 mg and has been taking every 6 hrs with little benefit. She is not eating and drinking well as it does not appeal to her. History of hiatel hernia, but no clear reflux symptoms. Has not tried an entiemetic. Took her BP meds this morning. Feels overall shaky and weak. No mental status changes/lethargy. No chest pain nor palpitations.       Past History  Past Medical History:   Diagnosis Date     Anemia      Basal cell carcinoma of anterior chest      Breast cyst      History of anesthesia complications      HLA B27 (HLA B27 positive)      Hypertension      Osteoarthritis      Osteoporosis 2011     Peripheral neuropathy 2018     PONV (postoperative nausea and vomiting)      Scleritis, bilateral     Left .  Treated with corticosteroids,, methotrexate and Humira.  .  Rituximab.     Squamous cell carcinoma of skin of chest      Steroid induced glaucoma, both eyes          Past Surgical History:   Procedure Laterality Date     BREAST CYST EXCISION Right     benign     CATARACT EXTRACTION W/  INTRAOCULAR LENS IMPLANT Right 2017     CATARACT EXTRACTION W/  INTRAOCULAR LENS IMPLANT Left 2017      SECTION       CLOSED REDUCTION HIP DISLOCATION Right 2019    Procedure: CLOSED REDUCTION, HIP;  Surgeon: Jak Ruiz DO;  Location: [facility];  Service: Orthopedics     COLONOSCOPY  2011     COLONOSCOPY  2005     COLONOSCOPY W/ BIOPSIES AND POLYPECTOMY  2021    16 to 20 mm polyp:tubular adenoma in the ascending colon.  Ulcerated mass in the anal canal: Moderately  differentiated squamous cell cancer.     ESOPHAGOGASTRODUODENOSCOPY  05/05/2017    Large hiatal hernia.  Reactive gastropathy with mucosal erosion.  H. pylori negative.     IR PORT PLACEMENT >5 YEARS  03/15/2021    Right IJ port-a-cath.     LAPAROSCOPIC APPENDECTOMY  04/28/2011     [place] TOTAL KNEE ARTHROPLASTY Left 03/02/2017    Procedure: LEFT TOTAL KNEE ARTHROPLASTY;  Surgeon: Star Du MD;  Location: [facility] OR;  Service: Orthopedics     TOTAL ABDOMINAL HYSTERECTOMY W/ BILATERAL SALPINGOOPHORECTOMY  1996     TOTAL HIP ARTHROPLASTY Right 08/18/2015    Procedure: HIP TOTAL ARTHROPLASTY, RIGHT;  Surgeon: Star Du MD;  Location: [facility] OR;  Service:        Physical Exam    Recent Vitals 3/26/2021   Height -   Weight -   BSA (m2) -   BP 87/70   Pulse 82   Temp 98.2   Temp src 1   SpO2 98   Some recent data might be hidden       GENERAL: Alert and oriented to time place and person. Seated comfortably, a bit shaky. In no distress.   HEAD: Atraumatic and normocephalic. No alopecia.  EYES: JUANCARLOS, EOMI. No erythema. No icterus.  ORAL CAVITY: Moist. No mucosal lesion or tonsillar enlargement.  NECK: supple. No thyroid enlargement.  CHEST: clear to auscultation bilaterally. Resonant to percussion throughout bilaterally. Symmetrical breath movements bilaterally.  CVS: S1 and S2 are heard. Regular rate and rhythm. No murmur or gallop or rub heard.  ABDOMEN: Soft. Not tender. Not distended. No palpable hepatomegaly or splenomegaly. No other mass palpable. Bowel sounds present.  EXTREMITIES: Warm. No peripheral edema.  SKIN: no rash, or bruising or purpura.   NEURO: No gross deficit noted. Non-antalgic gait.    Lab Results    Recent Results (from the past 168 hour(s))   Comprehensive Metabolic Panel   Result Value Ref Range    Sodium 136 136 - 145 mmol/L    Potassium 4.5 3.5 - 5.0 mmol/L    Chloride 105 98 - 107 mmol/L    CO2 24 22 - 31 mmol/L    Anion Gap, Calculation 7 5 - 18 mmol/L    Glucose  86 70 - 125 mg/dL    BUN 12 8 - 28 mg/dL    Creatinine 0.65 0.60 - 1.10 mg/dL    GFR MDRD Af Amer >60 >60 mL/min/1.73m2    GFR MDRD Non Af Amer >60 >60 mL/min/1.73m2    Bilirubin, Total 0.7 0.0 - 1.0 mg/dL    Calcium 10.2 8.5 - 10.5 mg/dL    Protein, Total 7.3 6.0 - 8.0 g/dL    Albumin 3.9 3.5 - 5.0 g/dL    Alkaline Phosphatase 90 45 - 120 U/L    AST 20 0 - 40 U/L    ALT 18 0 - 45 U/L   HM1 (CBC with Diff)   Result Value Ref Range    WBC 6.6 4.0 - 11.0 thou/uL    RBC 5.24 3.80 - 5.40 mill/uL    Hemoglobin 14.0 12.0 - 16.0 g/dL    Hematocrit 46.0 35.0 - 47.0 %    MCV 88 80 - 100 fL    MCH 26.7 (L) 27.0 - 34.0 pg    MCHC 30.4 (L) 32.0 - 36.0 g/dL    RDW 20.5 (H) 11.0 - 14.5 %    Platelets 179 140 - 440 thou/uL    MPV 8.9 8.5 - 12.5 fL    Neutrophils % 70 50 - 70 %    Lymphocytes % 17 (L) 20 - 40 %    Monocytes % 8 2 - 10 %    Eosinophils % 3 0 - 6 %    Basophils % 1 0 - 2 %    Immature Granulocyte % 0 <=0 %    Neutrophils Absolute 4.6 2.0 - 7.7 thou/uL    Lymphocytes Absolute 1.1 0.8 - 4.4 thou/uL    Monocytes Absolute 0.6 0.0 - 0.9 thou/uL    Eosinophils Absolute 0.2 0.0 - 0.4 thou/uL    Basophils Absolute 0.1 0.0 - 0.2 thou/uL    Immature Granulocyte Absolute 0.0 <=0.0 thou/uL   Basic Metabolic Panel   Result Value Ref Range    Sodium 117 (LL) 136 - 145 mmol/L    Potassium 3.5 3.5 - 5.0 mmol/L    Chloride 82 (L) 98 - 107 mmol/L    CO2 26 22 - 31 mmol/L    Anion Gap, Calculation 9 5 - 18 mmol/L    Glucose 101 70 - 125 mg/dL    Calcium 9.5 8.5 - 10.5 mg/dL    BUN 16 8 - 28 mg/dL    Creatinine 0.69 0.60 - 1.10 mg/dL    GFR MDRD Af Amer >60 >60 mL/min/1.73m2    GFR MDRD Non Af Amer >60 >60 mL/min/1.73m2   Magnesium   Result Value Ref Range    Magnesium 1.4 (L) 1.8 - 2.6 mg/dL       Imaging    Ir Port Placement 5+ Years    Result Date: 3/15/2021  [facility] LOCATION: [facility] DATE: 3/15/2021 PROCEDURE: IMPLANTABLE VENOUS CHEST PORT PLACEMENT (POWER INJECTABLE) INTERVENTIONAL RADIOLOGIST:  Wilfredo Hinds MD. INDICATION: 76-year-old female with colorectal carcinoma. CONSENT: The risks, benefits and alternatives of implantable venous chest port placement were discussed with the patient  in detail. All questions were answered. Informed consent was given to proceed with the procedure. MODERATE SEDATION: Versed 2 mg IV; Fentanyl 100 mcg IV.  Under physician supervision, Versed and fentanyl were administered for moderate sedation. Pulse oximetry, heart rate and blood pressure were continuously monitored by an independent trained observer. The physician spent 30 minutes of face-to-face sedation time with the patient. CONTRAST: None. ANTIBIOTICS: Ancef 2 g IV. ADDITIONAL MEDICATIONS: None. FLUOROSCOPIC TIME: 0.3 minutes. RADIATION DOSE: Air Kerma: 7 mGy. COMPLICATIONS: No immediate complications. STERILE BARRIER TECHNIQUE: Maximum sterile barrier technique was used. Cutaneous antisepsis was performed at the operative site with application of 2% chlorhexidine and large sterile drape. Prior to the procedure, the  and assistant performed hand hygiene and wore hat, mask, sterile gown, and sterile gloves during the entire procedure. PROCEDURE:  Using real-time ultrasound guidance the right internal jugular vein was accessed. A subcutaneous pocket was created and irrigated with sterile normal saline. The catheter tubing was tunneled in an antegrade fashion from the port pocket to the dermatotomy site. Over a guidewire, and under direct fluoroscopic visualization a peel-away sheath was advanced over the wire. Through the peel-away sheath, the catheter tubing was advanced until the tip was at the cavoatrial junction. The catheter tubing was cut to length and attached firmly to the port. The port was placed within the subcutaneous pocket and tested. The port pocket incision was closed with layered absorbable suture and surgical glue. The dermatotomy site was closed with surgical glue. FINDINGS: Ultrasound  demonstrates an anechoic and compressible jugular vein. A permanent image was stored. At the completion of the study the port tip lies near the cavoatrial junction.     1.  Successful implantable venous chest port placement as detailed above. 2.  The implantable venous chest port was placed under fluoroscopic guidance and is ready for use immediately. ____________________________________________________________________ CPT codes for physician reference only: 84051 [id number]     Nm Pet Ct Skull To Mid Thigh    Result Date: 3/5/2021  EXAM: NM PET CT SKULL TO MID THIGH LOCATION: [facility] DATE/TIME: 3/5/2021 2:27 PM INDICATION: Malignant neoplasm of anal canal. Anal squamous cell carcinoma, staging. Initial treatment strategy. COMPARISON: MR pelvis 02/23/2021 reviewed. TECHNIQUE: Serum glucose level 109 mg/dL. One hour post intravenous administration of 9.5 mCi F-18 FDG, PET imaging was performed from the skull base to the mid thighs utilizing attenuation correction with concurrent axial CT and PET/CT image fusion. Dose reduction techniques were used. FINDINGS: FDG avid mass in the lower anal canal estimated at 1.4 x 1.9 x 1.9 cm associated with marked uptake (SUVmax 7.4), consistent with site of known primary malignancy. A few asymmetrically prominent left axillary lymph nodes with mild activity are likely inflammatory related to recent vaccination. Normal physiologic FDG uptake elsewhere. No FDG avid adenopathy in the abdomen or pelvis. No suspicious focal uptake in the liver or skeleton. Mild senescent intracranial changes. Large esophageal hiatal hernia with much of the stomach in the chest. Mild bibasilar atelectasis and/or scarring. Mild atherosclerotic calcifications. Few colonic diverticula. Right YAMILET. Moderate scattered degenerative changes in the spine.     1. FDG avid anal mass consistent with known primary malignancy. 2. No evidence of metastasis.      Billing  Total  time: 50 min; including review of EMR, reports, diagnostics and ordering/coordination of care    Signed by: Erin Lombardi

## 2021-06-16 NOTE — PROGRESS NOTES
RADIATION ONCOLOGY WEEKLY TREATMENT VISIT NOTE      Assessment / Impression       1. Malignant neoplasm of anal canal (H)  morphine 0.1% Gel    HE TPCL PAIN - KETO/DAYO/JEOVANNY/LIDO/PENTOX 10/2/6/5/3%     Cancer Staging  Malignant neoplasm of anal canal (H)  Staging form: Anus, AJCC 8th Edition  - Clinical: Stage IIA (cT2, cN0, cM0) - Signed by Altaf Jasso MD on 2/25/2021     Tolerating radiation therapy well.  All questions and concerns addressed.  Mild tenderness  Mild Nausea  Left inguinal bulge after dry heaves (no pain)    Plan:     Continue radiation treatment as prescribed.  Radiation: Site: Anal/Pelvis  Stereotactic Radiosurgery: No  Concurrent Therapy: Yes (5FU & mitomycin)  Today's Dose: 800  Total Dose for Pelvis: 5400  Today's Fraction/Total Fraction Pelvis: 4/27    Info on Sitz baths provided  Topical morphine gel and topical gabapentin/lidocaine mix Rx provided to take to compounding pharmacy ([place])  Will try antiemetic  Appears she has non- incarcerated inguinal hernia- discussed compressive garments and later referral to gen surgery after treatment as well as concerning symptoms that she should present to ER for urgent evaluation. Will add PCP as FYI.     Subjective:      HPI: Oscar Zhao is a 76 y.o. female with    1. Malignant neoplasm of anal canal (H)  morphine 0.1% Gel    HE TPCL PAIN - KETO/DAYO/JEOVANNY/LIDO/PENTOX 10/2/6/5/3%     She is tolerating radiation therapy well but is seen today prior to treatment as her bladder is fairly empty.  She reports having dry heaves after expectorating some clear mucus which resulted in a pop and bulge in her left groin.  She has no pain related to this bulge.  She does note some mild nausea but has not been taking her antiemetics.  She notes some slight tenderness in the perianal region but no significant pain.    The following portions of the patient's history were reviewed and updated as appropriate: allergies, current medications, past family  history, past medical history, past social history, past surgical history and problem list.    Assessment                  Body Site: Pelvis Site: Anal/Pelvis  Stereotactic Radiosurgery: No  Concurrent Therapy: Yes(5FU & mitomycin)  Today's Dose: 800  Total Dose for Pelvis: 5400  Today's Fraction/Total Fraction Pelvis:   Drainage: 0: Absent  Diarrhea W/O Colostomy: 0: None  Constipation: 0: None  Proctitis: 0: None  Urinary frequency and/or urgency: 0: Normal  Urinary Retention: 0 : Normal  Urinary Incontinence: 0: None  Dysuria: 0: None  Nocturia: 1  Urine Color Change: 0: Normal                                            Sexuality Alteration                 Emotional Alteration Copin: Effective  Comfort Alteration KPS: 70% Cannot do active work, but can care for self  Fatigue (ONS scale) : 7: Extreme Fatigue  Pain Location: denies   Nutrition Alteration Anorexia: 2: Oral intake significantly decreased  Nausea: 1: Able to eat  Vomitin: None(dry heaves)  Skin Alteration Skin Sensation: 0: No problem  Skin Reaction: 0: None  AUA Assessment                                  Accompanied by       Objective:     Exam:     Vitals:    21 0945   BP: 119/77   Pulse: 77   Temp: 97.8  F (36.6  C)   TempSrc: Oral   SpO2: 98%   Weight: 137 lb 9.6 oz (62.4 kg)       Wt Readings from Last 8 Encounters:   21 137 lb 9.6 oz (62.4 kg)   21 137 lb 4.8 oz (62.3 kg)   03/15/21 136 lb (61.7 kg)   21 136 lb 6.4 oz (61.9 kg)   21 138 lb (62.6 kg)   21 138 lb (62.6 kg)   21 141 lb (64 kg)   21 140 lb 6.4 oz (63.7 kg)       General: Alert and oriented, in no acute distress  Jerine has no Erythema.  Reducible bulge in the left inguinal region consistent with inguinal hernia, nontender.    Treatment Summary to Date    [facility] chart and setup information reviewed    Patsy Spencer MD

## 2021-06-17 NOTE — PROGRESS NOTES
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    Malignant neoplasm of anal canal (H)    Staging form: Anus, AJCC 8th Edition    - Clinical: Stage IIA (cT2, cN0, cM0) - Signed by Altaf Jasso MD on 2/25/2021        Body site: Female Pelvis   SITE TREATED: Anal canal and regional lymph nodes   TOTAL DOSE: 5400 cGy in 27 fractions to the primary  4500 cGy in 27 fractions to the pelvic lymph nodes  NUMBER OF FRACTIONS: 27  DATES COMPLETED: 3/22/2021-4/29/2021  CONCURRENT CHEMOTHERAPY: Yes-mitomycin-C and 5-FU  ADJUVANT THERAPY:No     Interval since completion of radiation therapy: Approximately 2 weeks    Recovering from acute side effects of radiation therapy      Plan:  Return to clinic in about 6 weeks following restaging scans, sooner as needed (scheduled follow-up in 2 weeks is not likely needed due to current healing)  She is scheduled with colorectal for follow-up on June 2.  She has been scheduled for restaging CT and MRI 6/25/2021.  She has seen heme-onc and has additional heme-onc follow-up 6/28/2021.    Continue current skin care  Will monitor night sweats  Reviewed fatigue should continue to improve  Discussed potential long-term side effects  Will review vaginal dilator use (was given prior to initiation of radiation) with her at next follow-up    Total time of this visit, including time spent face-to-face with patient and or via video/audio, and also in preparing for today's visit for MDM and documentation. Medical decision-making included consideration and possible diagnoses, management options, complex record review, review of diagnostic tests and information, consideration and discussion of significant complications based on comorbidities, discussion with providers involved in the  care of the patient.    50 Minutes spent.   This note has been generated using voice recognition software. There may be some errors that were not identified at the time of documented.    Subjective:     HPI: Oscar Zhao is a 76 y.o. female with Patient presents with:  HE Cancer: Follow up with Dr. Spencer  .  Oscar tolerated the treatment without unexpected side effects.  She had expected grade 3 radiation dermatitis; Grade 2 diarrhea;  Grade 1 dysuria.  She was hospitalized for 5/20/2021 with febrile neutropenia, poorly controlled nausea and dehydration, was treated with antibiotics and antiviral therapy due to concern of a shingles outbreak which healed during the course of treatment.    She reports she is recovering well from acute side effects of chemoradiation.  She reports her skin is healing well with one area not well-healed.  She has no pain/not using morphine gel.  She feels sits baths have helped significantly in the healing process.  Diarrhea has resolved.  She is having regular bowel movements with no hematochezia.  No dysuria.  No vaginal irritation or pain.  She continues to have some fatigue, mornings she feels energetic but by the afternoon she is quite tired.  She has noticed 3 episodes of night sweats.  No history of night sweats through with menopause.  She has felt well supported through this, her daughter has been a big help.  She has been gardening and is going to get back to working out.    Current Outpatient Medications:  acyclovir (ZOVIRAX) 5 % cream, Apply 4 times daily for 5 days, Disp: 5 g, Rfl: 0  alum/mag hydrox-simethicone-diphenhydramine-lidocaine (MAGIC MOUTHWASH) suspension, Swish and spit 15 mL 3 (three) times a day., Disp: 240 mL, Rfl: 2  amLODIPine (NORVASC) 10 MG tablet, Take 10 mg by mouth daily., Disp: , Rfl:    famotidine (PEPCID) 20 MG tablet, Take 1 tablet (20 mg total) by mouth 2 (two) times a day., Disp: , Rfl: 0  lisinopriL (PRINIVIL,ZESTRIL) 20 MG tablet, Take 20 mg  by mouth daily., Disp: , Rfl:   LOTEMAX 0.5 % ophthalmic suspension, Administer 1 drop to both eyes every other day. , Disp: , Rfl: 4  metoprolol succinate (TOPROL-XL) 25 MG, TAKE 2 TABLETS(50 MG) BY MOUTH DAILY, Disp: 180 tablet, Rfl: 3  OLANZapine (ZYPREXA) 5 MG tablet, Take 1 tablet (5 mg total) by mouth at bedtime. (Patient taking differently: Take 5 mg by mouth at bedtime as needed. ), Disp: 30 tablet, Rfl: 1  ondansetron (ZOFRAN) 4 MG tablet, Take 4 mg by mouth every 8 (eight) hours as needed for nausea., Disp: , Rfl:   prochlorperazine (COMPAZINE) 10 MG tablet, Take 10 mg by mouth every 6 (six) hours as needed for nausea., Disp: , Rfl:   senna-docusate (SENNOSIDES-DOCUSATE SODIUM) 8.6-50 mg tablet, Take 2 tablets by mouth 2 (two) times a day as needed for constipation., Disp: , Rfl: 0  timolol (BETIMOL) 0.5 % ophthalmic solution, Administer 1 drop to both eyes 2 (two) times a day. , Disp: , Rfl:     No current facility-administered medications for this visit.   Facility-Administered Medications Ordered in Other Visits:  sodium chloride flush 10 mL (NS), 10 mL, Intravenous, Q8H FIXED TIMES, Kristen Villa MD  sodium chloride flush 10 mL (NS), 10 mL, Intravenous, Line Care, Kristen Villa MD  sodium chloride flush 20 mL (NS), 20 mL, Intravenous, Line Care, Kristen Villa MD, 20 mL at 04/27/21 1000      The following portions of the patient's history were reviewed and updated as appropriate: allergies, current medications, past family history, past medical history, past social history, past surgical history and problem list.        Objective:    Physical Exam    There were no vitals filed for this visit.     Gen: Alert, in NAD pleasant interactive, well nourished  Eyes: EOMI, sclera anicteric  Pulm: No wheezing, stridor or respiratory distress  Skin: Healing rash and radiation dermatitis with 2 small scabs remaining on right medial buttock.  No residual additional areas of unhealed desquamation.  No concerning  signs of infection.  Neurologic: A/Ox3, face symmetric, speech fluent, Gait normal and unaided  Psychiatric: Appropriate mood and affect    Recent Labs: Recent Results (from the past 168 hour(s))  -Basic Metabolic Panel       Result                                            Value                         Ref Range                       Sodium                                            136                           136 - 145 mmol/L                Potassium                                         4.3                           3.5 - 5.0 mmol/L                Chloride                                          105                           98 - 107 mmol/L                 CO2                                               23                            22 - 31 mmol/L                  Anion Gap, Calculation                            8                             5 - 18 mmol/L                   Glucose                                           94                            70 - 125 mg/dL                  Calcium                                           9.1                           8.5 - 10.5 mg/dL                BUN                                               8                             8 - 28 mg/dL                    Creatinine                                        0.55 (L)                      0.60 - 1.10 mg/dL               GFR MDRD Af Amer                                  >60                           >60 mL/min/1.73m2               GFR MDRD Non Af Amer                              >60                           >60 mL/min/1.73m2          -HM1 (CBC with Diff)       Result                                            Value                         Ref Range                       WBC                                               4.8                           4.0 - 11.0 thou/uL              RBC                                               3.36 (L)                      3.80 - 5.40 mill/uL             Hemoglobin                                         10.1 (L)                      12.0 - 16.0 g/dL                Hematocrit                                        32.1 (L)                      35.0 - 47.0 %                   MCV                                               96                            80 - 100 fL                     MCH                                               30.1                          27.0 - 34.0 pg                  MCHC                                              31.5 (L)                      32.0 - 36.0 g/dL                RDW                                               23.5 (H)                      11.0 - 14.5 %                   Platelets                                         343                           140 - 440 thou/uL               MPV                                               8.6                           8.5 - 12.5 fL                   Neutrophils %                                     58                            50 - 70 %                       Lymphocytes %                                     27                            20 - 40 %                       Monocytes %                                       12 (H)                        2 - 10 %                        Eosinophils %                                     2                             0 - 6 %                         Basophils %                                       1                             0 - 2 %                         Immature Granulocyte %                            0                             <=0 %                           Neutrophils Absolute                              2.8                           2.0 - 7.7 thou/uL               Lymphocytes Absolute                              1.3                           0.8 - 4.4 thou/uL               Monocytes Absolute                                0.6                           0.0 - 0.9 thou/uL               Eosinophils Absolute                              0.1                            0.0 - 0.4 thou/uL               Basophils Absolute                                0.1                           0.0 - 0.2 thou/uL               Immature Granulocyte Absolute                     0.0                           <=0.0 thou/uL                Imaging: Imaging results 30 days: No results found.    Signed by: Patsy Sepncer MD

## 2021-06-17 NOTE — PROGRESS NOTES
[facility] Hematology and Oncology Progress Note    Patient: Oscar Zhao  MRN: [id number]  Date of Service: 05/11/2021        Reason for Visit    Anal squamous cell cancer.    Assessment/Plan  Cancer Staging  Malignant neoplasm of anal canal (H)  Staging form: Anus, AJCC 8th Edition  - Clinical: Stage IIA (cT2, cN0, cM0) - Signed by Altaf Jasso MD on 2/25/2021      Ms. Oscar Zhao is a 76 y.o. 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 She would just see a little blood on the toilet paper now and then which is thought to be due to hemorrhoids.     With the information that we have from all the work-up that has been done, we can see that there is no distant metastatic disease.  We are also not seeing any local lymph nodes that are involved.  The lesion itself appears not to invade through the muscularis propria in the MRI.  With all this information, this cancer can be staged as stage II (T2, N0, M0).      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  She has been treated in the past with prednisone, methotrexate, Humira and rituximab for this condition.    She started on chemotherapy and radiation on 3/22/2021.  Chemotherapy was with mitomycin and fluorouracil infusion.  The fluorouracil pump was disconnected on 3/26/2021.  A week after that she was admitted to the hospital with hyponatremia thought to be due to poor oral intake from nausea and vomiting.    She had another admission to the hospital on 4/5/2021 with febrile neutropenia.  She was treated with antibiotics, antiviral cream for what appeared to be a this outbreak on her buttocks and G-CSF and  she could be discharged on 4/8/2021.    She started the second session of chemotherapy with mitomycin and 5-FU on 4/19/2021.  The 5-FU infusion was completed on 4/23/2021 and she received a Neulasta on 4/24/2021.  She completed radiation on 4/29/2021.    1.  She is here for follow-up about 2 weeks after completion of chemotherapy and radiation.  Overall she appears to be making good progress.  She does not want me to do examination of the perianal area but she reports that the pain due to mucositis has subsided giving just 1 site with some irritation.  The skin has also healed very nicely per her report.  Given how happy she looks, I think I believe her.    2.  Labs from today were reviewed.  Basic metabolic panel is completely normal with a creatinine of 0.55 and normal electrolytes.  CBC differential and platelets show that her hemoglobin is fairly stable at 10.1.  WBC count, differential and platelets are normal.    3.  I discussed with her about continuing to use the topical creams until the irritation even that one spot subsides.  I think that will happen in just a few days to a week or so.  After that she can return to her regular activities.    4.  Discussed with her that her blood pressures are running high.  She had stopped taking the antihypertensives when her blood pressures were low when she was not eating well etc.  Since that she has recovered, her blood pressures are also gone up.  I asked her to restart all her antihypertensives.  Recommended that she should follow-up with her primary doctor.     5.  Discussed with her that she will need follow-up with colorectal surgery about 2 months after completion of chemotherapy and radiation for a total physical exam.  I will ask our  to contact colorectal surgery to coordinate that appointment for her.  Around that time she will also need a CT scan of the chest abdomen and pelvis with contrast done.  Dr. Spencer recommended an MRI scan of the pelvis  also to be done around that time.  I have ordered these scans so that they can be scheduled in time.    6.  I discussed with her that if in the above evaluation, she has a complete response to the chemotherapy and radiation for anal cancer, then we can remove the Port-A-Cath.    7.  Follow-up: Return to clinic with me towards the end of June after having the CT scan and MRI done.  Labs for that appointment: CBC differential platelets and CMP.  She will be scheduled for a Port-A-Cath flush also.    Time spend >40 minutes total time for the patient.       ECOG Performance   ECOG Performance Status: 1  Distress Assessment  Distress Assessment Score: No distress        Problem List    1. Malignant neoplasm of anal canal (H)  MR Pelvis With Without Contrast    CT Chest Abdomen Pelvis With Oral With IV Cont    HM1(CBC and Differential)    Comprehensive Metabolic Panel   2. Essential hypertension     3. Perianal dermatitis     4. Mucositis due to chemotherapy             CC: Jak Jones MD Elizabeth Ester, MD Aren Reynolds MD      ______________________________________________________________________________      History    Ms. Oscar Zhao is here for follow-up alone.    She says that she is doing a lot better.  She is very happy that she is done with chemotherapy and radiation.  The mucositis and perianal dermatitis are both improving quite a bit.  She says that she has only 1 spot in her rectal area that still causes some irritation.  Everything else has healed.  She would like to leave the examination of the perianal area to Dr. Spencer when she sees her later this week.  Diarrhea stopped about 8 days ago.  She has 1 soft bowel movement daily which she feels that she is tolerating very well.  She says that she is eating normally.  She is not losing anymore weight.  Nausea and vomiting is not a problem.    She says that her breathing is fine she.  She has an occasional cough like clearing her throat.  This  is chronic for her.  No chest pain.    No abdominal discomfort.    She admits that she has not been taking her antihypertensives.  She was a bit surprised to see the elevated blood pressure today.    Please see below.  A 14 point review of system is otherwise completely negative.      Past History  Past Medical History:   Diagnosis Date     Anemia      Basal cell carcinoma of anterior chest      Breast cyst      History of anesthesia complications      HLA B27 (HLA B27 positive)      Hypertension      Osteoarthritis      Osteoporosis 2011     Peripheral neuropathy 2018     PONV (postoperative nausea and vomiting)      Scleritis, bilateral     Left .  Treated with corticosteroids,, methotrexate and Humira.  .  Rituximab.     Squamous cell carcinoma of skin of chest      Steroid induced glaucoma, both eyes      Past Surgical History:   Procedure Laterality Date     BREAST CYST EXCISION Right     benign     CATARACT EXTRACTION W/  INTRAOCULAR LENS IMPLANT Right 2017     CATARACT EXTRACTION W/  INTRAOCULAR LENS IMPLANT Left 2017      SECTION       CLOSED REDUCTION HIP DISLOCATION Right 2019    Procedure: CLOSED REDUCTION, HIP;  Surgeon: Jak Ruiz DO;  Location: [facility];  Service: Orthopedics     COLONOSCOPY  2011     COLONOSCOPY  2005     COLONOSCOPY W/ BIOPSIES AND POLYPECTOMY  2021    16 to 20 mm polyp:tubular adenoma in the ascending colon.  Ulcerated mass in the anal canal: Moderately differentiated squamous cell cancer.     ESOPHAGOGASTRODUODENOSCOPY  2017    Large hiatal hernia.  Reactive gastropathy with mucosal erosion.  H. pylori negative.     IR PORT PLACEMENT >5 YEARS  03/15/2021    Right IJ port-a-cath.     LAPAROSCOPIC APPENDECTOMY  2011     MI TOTAL KNEE ARTHROPLASTY Left 2017    Procedure: LEFT TOTAL KNEE ARTHROPLASTY;  Surgeon: Star Du MD;  Location: [facility] OR;  Service:  Orthopedics     TOTAL ABDOMINAL HYSTERECTOMY W/ BILATERAL SALPINGOOPHORECTOMY  1996     TOTAL HIP ARTHROPLASTY Right 08/18/2015    Procedure: HIP TOTAL ARTHROPLASTY, RIGHT;  Surgeon: Star Du MD;  Location: [facility] OR;  Service:          Allergies    Allergies   Allergen Reactions     Adalimumab Myalgia     Brimonidine-Timolol Unknown     Redness itching in eyes     Levaquin [Levofloxacin]      Skin burn and couldn't get out bed for 5 days     Tetracyclines Hives       Review of Systems  Constitutional  Constitutional (WDL): Exceptions to WDL  Fatigue: Fatigue relieved by rest  Fever: None  Chills: None  Weight Gain: 5 - <10% from baseline(2LB)  Weight Loss: None  Neurosensory  Neurosensory (WDL): Exceptions to WDL  Peripheral Motor Neuropathy: None  Ataxia: None  Peripheral Sensory Neuropathy: Asymptomatic, loss of deep tendon reflexes or paresthesia  Confusion: None  Syncope: None  Eye   Eye Disorder (WDL): All eye disorder elements are within defined limits  Ear  Ear Disorder (WDL): All ear disorder elements are within defined limits  Cardiovascular  Cardiovascular (WDL): All cardiovascular elements are within defined limits  Pulmonary  Respiratory (WDL): Within Defined Limits  Gastrointestinal  Gastrointestinal (WDL): Exceptions to WDL  Anorexia: Loss of appetite without alteration in eating habits  Constipation: None  Diarrhea: None(last 8 days ago, none since)  Dysphagia: None  Esophagitis: None  Nausea: None  Pharyngitis: None  Vomiting: None  Dysgeusia: None  Dry Mouth: None  Genitourinary  Genitourinary (WDL): All genitourinary elements are within defined limits  Lymphatic  Lymph (WDL): All lymph disorder elements are within defined limits  Musculoskeletal and Connective Tissue  Musculoskeletal and Connetive Tissue Disorders (WDL): Exceptions to WDL  Arthralgia: None  Bone Pain: None  Muscle Weakness : Symptomatic, perceived by patient but not evident on physical exam  Myalgia:  None  Integumentary  Integumentary (WDL): All integumentary elements are within defined limits  Patient Coping  Patient Coping: Accepting;Open/discussion  Distress Assessment  Distress Assessment Score: No distress  Accompanied by     Oral Chemo Adherence       Physical Exam    Recent Vitals 5/11/2021   Weight 135 lbs   BSA (m2) 1.65 m2   /83   Pulse 83   Temp 98.3   Temp src 1   SpO2 99   Some recent data might be hidden       GENERAL: Alert and oriented to time place and person. Seated comfortably. In no distress.  He looks well overall.  She looks a lot more happier.    HEAD: Atraumatic and normocephalic.    EYES: JUANCARLOS, EOMI.  No pallor.  No icterus.    Oral cavity: no mucosal lesion or tonsillar enlargement.    NECK: supple. JVP normal.  No thyroid enlargement.    LYMPH NODES: No palpable, cervical, axillary or inguinal lymphadenopathy.    CHEST: clear to auscultation bilaterally.  Resonant to percussion throughout bilaterally.  Symmetrical breath movements bilaterally.  Port-A-Cath over the right chest looks unremarkable.    CVS: S1 and S2 are heard. Regular rate and rhythm.  No murmur or gallop or rub heard.  No peripheral edema.    ABDOMEN: Soft. Not tender. Not distended.  No palpable hepatomegaly or splenomegaly.  No other mass palpable.  Bowel sounds heard.    EXTREMITIES: Warm.    SKIN: no rash, or bruising or purpura.  Has a full head of hair.        Lab Results    Recent Results (from the past 24 hour(s))   Basic Metabolic Panel    Collection Time: 05/11/21 10:39 AM   Result Value Ref Range    Sodium 136 136 - 145 mmol/L    Potassium 4.3 3.5 - 5.0 mmol/L    Chloride 105 98 - 107 mmol/L    CO2 23 22 - 31 mmol/L    Anion Gap, Calculation 8 5 - 18 mmol/L    Glucose 94 70 - 125 mg/dL    Calcium 9.1 8.5 - 10.5 mg/dL    BUN 8 8 - 28 mg/dL    Creatinine 0.55 (L) 0.60 - 1.10 mg/dL    GFR MDRD Af Amer >60 >60 mL/min/1.73m2    GFR MDRD Non Af Amer >60 >60 mL/min/1.73m2   HM1 (CBC with Diff)    Collection  Time: 05/11/21 10:39 AM   Result Value Ref Range    WBC 4.8 4.0 - 11.0 thou/uL    RBC 3.36 (L) 3.80 - 5.40 mill/uL    Hemoglobin 10.1 (L) 12.0 - 16.0 g/dL    Hematocrit 32.1 (L) 35.0 - 47.0 %    MCV 96 80 - 100 fL    MCH 30.1 27.0 - 34.0 pg    MCHC 31.5 (L) 32.0 - 36.0 g/dL    RDW 23.5 (H) 11.0 - 14.5 %    Platelets 343 140 - 440 thou/uL    MPV 8.6 8.5 - 12.5 fL    Neutrophils % 58 50 - 70 %    Lymphocytes % 27 20 - 40 %    Monocytes % 12 (H) 2 - 10 %    Eosinophils % 2 0 - 6 %    Basophils % 1 0 - 2 %    Immature Granulocyte % 0 <=0 %    Neutrophils Absolute 2.8 2.0 - 7.7 thou/uL    Lymphocytes Absolute 1.3 0.8 - 4.4 thou/uL    Monocytes Absolute 0.6 0.0 - 0.9 thou/uL    Eosinophils Absolute 0.1 0.0 - 0.4 thou/uL    Basophils Absolute 0.1 0.0 - 0.2 thou/uL    Immature Granulocyte Absolute 0.0 <=0.0 thou/uL         Imaging Results    No results found.      Signed by: rKisten Villa MD

## 2021-06-17 NOTE — TELEPHONE ENCOUNTER
Telephone Encounter by Ivy Estrella at 3/26/2021 12:32 PM     Author: Ivy Estrella Service: -- Author Type: Financial Resource Guide    Filed: 3/26/2021 12:32 PM Encounter Date: 3/26/2021 Status: Signed    : Ivy Estrella (Financial Resource Guide)

## 2021-06-17 NOTE — TELEPHONE ENCOUNTER
"Patient called and LM, asking if she can come in today and get her blood pressure checked.    She was scheduled on 5/03 and cancelled.   I called patient, she said she \"just got to worrying\" about her blood pressure.   Offered appt with Erin BRITO CNP for labs and possible IV fluids.   Patient said she is feeling well. Diarrhea pretty much subsided on 5/04/21.   She gets slightly lightheaded if she moves too quickly.  She is drinking fluids, out at grocery store now, generally \"I can feel my body healing.\"  She declines to come in today. She has appointment on 5/11/21.   Tomasa Gonzalez RN     "

## 2021-06-17 NOTE — TELEPHONE ENCOUNTER
"Oscar calls today to report that she has been having diarrhea today \"all day\" about 10 episodes. She has only taken 3 tabs of imodium. She is able to eat and drink, and is taking nausea medication. She is not dizzy or weak. She says \"its just annoying.     She is also having shaking chills. She took her temp and it was 97.9. In the 5 minutes we were talking the chills settled down. I discussed with Alberto Fang NP. He advised she needs to treat the diarrhea as rx'd and take 2 after first epidode and 1 after each consecutive up to 8/day. She expressed understanding. Advised to eat BRAT diet and focus on fluids, monitor temp. Her daughter is coming to stay with her. She tells me she wants to avoid hosptialization. She will focus on staying hydrated and watch for fever of 100.5/ If she is febrile she knows to go to ED or call on call provider. She has OV with Dr. Villa in the morning. Poonam Diez    "

## 2021-06-17 NOTE — PATIENT INSTRUCTIONS - HE
Patient Instructions by Sidney Hodges PA-C at 12/27/2019 10:20 AM     Author: Sidney Hodges PA-C Service: -- Author Type: Physician Assistant    Filed: 12/27/2019 11:36 AM Encounter Date: 12/27/2019 Status: Signed    : Sidney Hodges PA-C (Physician Assistant)       Increased fluids and rest.  Discussed signs and symptoms of ascending urinary tract infection symptoms to include pyelonephritis. Instructed to turn to clinic if there are increased fever chills night sweats fatigue abdominal pain or flank pain  Antibiotic as written. Risks and benefits of medication discussed.  Indication for return to clinic.    As a result of our visit today, here are the action plans for you:    1. Medication(s) to stop: There are no discontinued medications.    2. Medication(s) to start or change:   Medications Ordered   Medications   ? cephalexin (KEFLEX) 500 MG capsule     Sig: Take 1 capsule (500 mg total) by mouth 2 (two) times a day for 10 days. For UTI.     Dispense:  20 capsule     Refill:  0       3. Other instructions: Yes      Urinary Tract Infections in Women    Urinary tract infections (UTIs) are most often caused by bacteria (germs). These bacteria enter the urinary tract. The bacteria may come from outside the body. Or they may travel from the skin outside the rectum or vagina into the urethra. Female anatomy makes it easier for bacteria from the bowel to enter a womans urinary tract, which is the most common source of UTI. This means women develop UTIs more often than men. Pain in or around the urinary tract is a common UTI symptom. But the only way to know for sure if you have a UTI for the health care provider to test your urine. The two tests that may be done are the urinalysis and urine culture.  Types of UTIs    Cystitis: A bladder infection (cystitis) is the most common UTI in women. You may have urgent or frequent urination. You may also have pain, burning when you urinate, and bloody urine.    Urethritis:  This is an inflamed urethra, which is the tube that carries urine from the bladder to outside the body. You may have lower stomach or back pain. You may also have urgent or frequent urination.    Pyelonephritis: This is a kidney infection. If not treated, it can be serious and damage your kidneys. In severe cases, you may be hospitalized. You may have a fever and lower back pain.  Medications to treat a UTI  Most UTIs are treated with antibiotics. These kill the bacteria. The length of time you need to take them depends on the type of infection. It may be as short as 3 days. If you have repeated UTIs, a low-dose antibiotic may be needed for several months. Take antibiotics exactly as directed. Dont stop taking them until all of the medication is gone. If you stop taking the antibiotic too soon, the infection may not go away, and you may develop a resistance to the antibiotic. This can make it much harder to treat.  Lifestyle changes to treat and prevent UTIs  The lifestyle changes below will help get rid of your UTI. They may also help prevent future UTIs.    Drink plenty of fluids. This includes water, juice, or other caffeine-free drinks. Fluids help flush bacteria out of your body.    Empty your bladder. Always empty your bladder when you feel the urge to urinate. And always urinate before going to sleep. Urine that stays in your bladder can lead to infection. Try to urinate before and after sex as well.    Practice good personal hygiene. Wipe yourself from front to back after using the toilet. This helps keep bacteria from getting into the urethra.    Use condoms during sex. These help prevent UTIs caused by sexually transmitted bacteria. Also, avoid using spermicides during sex. These can increase the risk of UTIs. Choose other forms of birth control instead. For women who tend to get UTIs after sex, a low-dose of a preventive antibiotic may be used. Be sure to discuss this option with your health care  provider.    Follow up with your health care provider as directed. He or she may test to make sure the infection has cleared. If necessary, additional treatment may be started.  Date Last Reviewed: 9/8/2014 2000-2016 [employer]. [address] [place] [address], [place] [address]. All rights reserved. This information is not intended as a substitute for professional medical care. Always follow your healthcare professional's instructions.

## 2021-06-17 NOTE — PROGRESS NOTES
[facility] Hematology and Oncology Progress Note    Patient: Oscar Zhao  MRN: [id number]  Date of Service: 04/27/2021        Reason for Visit    Anal squamous cell cancer.    Assessment/Plan  Cancer Staging  Malignant neoplasm of anal canal (H)  Staging form: Anus, AJCC 8th Edition  - Clinical: Stage IIA (cT2, cN0, cM0) - Signed by Altaf Jasso MD on 2/25/2021      Ms. Oscar Zhao is a 76 y.o. 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 She would just see a little blood on the toilet paper now and then which is thought to be due to hemorrhoids.     With the information that we have from all the work-up that has been done, we can see that there is no distant metastatic disease.  We are also not seeing any local lymph nodes that are involved.  The lesion itself appears not to invade through the muscularis propria in the MRI.  With all this information, this cancer can be staged as stage II (T2, N0, M0).      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  She has been treated in the past with prednisone, methotrexate, Humira and rituximab for this condition.    She started on chemotherapy and radiation on 3/22/2021.  Chemotherapy was with mitomycin and fluorouracil infusion.  The fluorouracil pump was disconnected on 3/26/2021.  A week after that she was admitted to the hospital with hyponatremia thought to be due to poor oral intake from nausea and vomiting.    She had another admission to the hospital on 4/5/2021 with febrile neutropenia.  She was treated with antibiotics, antiviral cream for what appeared to be a this outbreak on her buttocks and G-CSF and  she could be discharged on 4/8/2021.    She started the second session of chemotherapy with mitomycin and 5-FU on 4/19/2021.  The 5-FU infusion was completed on 4/23/2021 and she received a Neulasta on 4/24/2021.    1.  Today her main complaint is about the frequent small-volume loose stools that she is having because of an urge to go to the bathroom frequently.  Yesterday she had some larger volume stools like diarrhea but that has mostly subsided.  She does not look dehydrated.  Reviewed the labs from today with her.  Basic metabolic panel shows that her sodium is 134 almost normal.  Creatinine is normal at 0.74.  Other electrolytes are acceptable.  I discussed the blood counts with the laboratory tech.  Provisionally hemogram shows a hemoglobin of 10.3 which is slightly lower than last week.  Platelet count is also slightly lower at 132,000 but both are quite acceptable.  White count is normal at 5 with an ANC of 3700.  The patient was very happy to see this.    2.  For the oral mucositis I encouraged her to use salt and soda rinses frequently.  Happy to hear that the nausea and vomiting is under good control.    3.  I discussed with her that what she is feeling with frequent loose stools is actually not diarrhea but rather the irritation from the radiation of the anal area making her feel like going to the bathroom frequently.  I am not surprised that the Imodium is not very helpful in that situation.  Advised her that she is okay to go to the bathroom frequently but she has to be careful when she cleans after that.  Very frequent use of ordinary toilet paper can lead to ulceration of the skin.  Encouraged her to use wet wipes or to clean with water.  Explained to her about how to use the shower or to do sitz bath's.  After that she can use the topical creams that she is already using.  I explained to her that she can expect that this symptom is likely to get worse this week while she is undergoing radiation but  it should start improving when radiation is completed on Thursday.  Expect that things will start getting better next week.  Discussed with her about being judicious about drinking fluid.  Encouraged her to start eating again.  She can go for a more bland diet so that irritation from spicy food does not cause loose stools or perianal irritation.  She voiced understanding.    4.  I told her that we will continue to follow her closely to manage her side effects from treatment.  After that she will need evaluation with a scan at 8 to 12 weeks and also follow-up with colorectal surgery for an examination and to make sure that she has had a good response to the chemotherapy and radiation.  She voiced understanding.    5.  Follow-up: I have asked for her to be scheduled next week and a week after for a provider visit, labs and provisionally IV fluids in the infusion room.  For the next week I have ordered a CBC differential platelets and basic metabolic panel.    Time spend >30 minutes total time for the patient.         ECOG Performance   ECOG Performance Status: 1  Distress Assessment  Distress Assessment Score: 7(diarrhea issues cause stress)        Problem List    1. Malignant neoplasm of anal canal (H)  HM1(CBC and Differential)    Basic Metabolic Panel    JIC RED    HM1(CBC and Differential)    Basic Metabolic Panel   2. Mucositis due to chemotherapy     3. Perianal dermatitis             CC: Jak Jones MD Elizabeth Ester, MD Aneel Damle, MD      ______________________________________________________________________________      History    Ms. Oscar Zhao is here for follow-up alone.    She completed chemotherapy with the fluorouracil infusion on April 23 and received Neulasta through the on body injector on April 24.  She feels that she tolerated the Neulasta well.  She had only some tingling feeling in her bones after that.    Her main concern is about frequent stools.  She says that she felt  really good on Sunday and had some homemade enchilada with beans.  After that she started having some loose stools.  Initially she had some larger volume stools but that has subsided.  Now she has a frequent urge to go to the bathroom and then she has a small quantities of stool coming out.  She has been trying Imodium without much success.  She has been trying not to eat much.  She has been drinking plenty of fluids.  She is worried about her sodium level.    Nausea and vomiting has not been an issue.    Last night she had some chills but she did not have any fever.  She is also worried about infection.    She has some sores in her mouth.    The bottom does feel sore.  She states that there is one area where there is a bit of skin peeling.  That was looked at by radiation oncology and she does not want me to do a genital area examination in today.    No fevers or night sweats.  No lumps or bumps anywhere.  No dizziness.  Breathing is fine.  No chest pain or cough.  No nausea or vomiting.  No blood in stool or black stools.  No difficulty with urination.  No skin rashes.  No numbness or tingling.    Please see below.  A 14 point review of system is otherwise completely negative.    Past History  Past Medical History:   Diagnosis Date     Anemia      Basal cell carcinoma of anterior chest      Breast cyst      History of anesthesia complications      HLA B27 (HLA B27 positive)      Hypertension      Osteoarthritis      Osteoporosis 07/19/2011     Peripheral neuropathy 09/12/2018     PONV (postoperative nausea and vomiting)      Scleritis, bilateral     Left 2010.  Treated with corticosteroids,, methotrexate and Humira.  2015.  Rituximab.     Squamous cell carcinoma of skin of chest      Steroid induced glaucoma, both eyes      Past Surgical History:   Procedure Laterality Date     BREAST CYST EXCISION Right 1981    benign     CATARACT EXTRACTION W/  INTRAOCULAR LENS IMPLANT Right 08/30/2017     CATARACT EXTRACTION W/   INTRAOCULAR LENS IMPLANT Left 2017      SECTION       CLOSED REDUCTION HIP DISLOCATION Right 2019    Procedure: CLOSED REDUCTION, HIP;  Surgeon: Jak Ruiz DO;  Location: [facility] OR;  Service: Orthopedics     COLONOSCOPY  2011     COLONOSCOPY  2005     COLONOSCOPY W/ BIOPSIES AND POLYPECTOMY  2021    16 to 20 mm polyp:tubular adenoma in the ascending colon.  Ulcerated mass in the anal canal: Moderately differentiated squamous cell cancer.     ESOPHAGOGASTRODUODENOSCOPY  2017    Large hiatal hernia.  Reactive gastropathy with mucosal erosion.  H. pylori negative.     IR PORT PLACEMENT >5 YEARS  03/15/2021    Right IJ port-a-cath.     LAPAROSCOPIC APPENDECTOMY  2011     RI TOTAL KNEE ARTHROPLASTY Left 2017    Procedure: LEFT TOTAL KNEE ARTHROPLASTY;  Surgeon: Star Du MD;  Location: [facility];  Service: Orthopedics     TOTAL ABDOMINAL HYSTERECTOMY W/ BILATERAL SALPINGOOPHORECTOMY       TOTAL HIP ARTHROPLASTY Right 2015    Procedure: HIP TOTAL ARTHROPLASTY, RIGHT;  Surgeon: Star Du MD;  Location: [facility] OR;  Service:          Allergies    Allergies   Allergen Reactions     Adalimumab Myalgia     Brimonidine-Timolol Unknown     Redness itching in eyes     Levaquin [Levofloxacin]      Skin burn and couldn't get out bed for 5 days     Tetracyclines Hives       Review of Systems  Constitutional  Constitutional (WDL): Exceptions to WDL  Fatigue: Fatigue not relieved by rest - Limiting instrumental ADL  Chills: Mild sensation of cold, shivering, chattering of teeth  Neurosensory  Neurosensory (WDL): Exceptions to WDL  Peripheral Sensory Neuropathy: Asymptomatic, loss of deep tendon reflexes or paresthesia(hands chronic)  Eye   Eye Disorder (WDL): All eye disorder elements are within defined limits  Ear  Ear Disorder (WDL): All ear disorder elements are within defined  limits  Cardiovascular  Cardiovascular (WDL): All cardiovascular elements are within defined limits  Edema: No  Pulmonary  Respiratory (WDL): Within Defined Limits  Gastrointestinal  Gastrointestinal (WDL): Exceptions to WDL  Anorexia: Loss of appetite without alteration in eating habits  Diarrhea: Increase of 4 - 6 stools per day over baseline, moderate increase in ostomy output compared to baseline(diarrhea from 9am to 10pm yesterday over 15 episodes)  Nausea: None  Genitourinary  Genitourinary (WDL): All genitourinary elements are within defined limits  Lymphatic  Lymph (WDL): All lymph disorder elements are within defined limits  Musculoskeletal and Connective Tissue  Musculoskeletal and Connetive Tissue Disorders (WDL): Exceptions to WDL  Muscle Weakness : Symptomatic, perceived by patient but not evident on physical exam  Integumentary  Integumentary (WDL): All integumentary elements are within defined limits  Patient Coping  Patient Coping: Accepting;Open/discussion  Distress Assessment  Distress Assessment Score: 7(diarrhea issues cause stress)  Accompanied by  Accompanied by: Alone  Oral Chemo Adherence       Physical Exam    Recent Vitals 4/27/2021   Weight 131 lbs 5 oz   BSA (m2) 1.63 m2   BP 91/57   Pulse 107   Temp 98.5   Temp src 1   SpO2 99   Some recent data might be hidden       GENERAL: Alert and oriented to time place and person. Seated comfortably. In no distress.  Anxious but looks well overall.    HEAD: Atraumatic and normocephalic.    EYES: JUANCARLOS, EOMI.  No pallor.  No icterus.    Oral cavity: no tonsillar enlargement.  Mucositis with some superficial ulceration on the sides of the tongue.    NECK: supple. JVP normal.  No thyroid enlargement.    LYMPH NODES: No palpable, cervical, axillary or inguinal lymphadenopathy.    CHEST: clear to auscultation bilaterally.  Resonant to percussion throughout bilaterally.  Symmetrical breath movements bilaterally.    Port-A-Cath over the right upper chest  looks unremarkable.    CVS: S1 and S2 are heard. Regular rate and rhythm.  No murmur or gallop or rub heard.  No peripheral edema.    ABDOMEN: Soft. Not tender. Not distended.  No palpable hepatomegaly or splenomegaly.  No other mass palpable.  Bowel sounds heard.    EXTREMITIES: Warm.    SKIN: no rash, or bruising or purpura.  Has a full head of hair.      Lab Results    Recent Results (from the past 24 hour(s))   Basic Metabolic Panel    Collection Time: 04/27/21  8:27 AM   Result Value Ref Range    Sodium 134 (L) 136 - 145 mmol/L    Potassium 3.7 3.5 - 5.0 mmol/L    Chloride 104 98 - 107 mmol/L    CO2 21 (L) 22 - 31 mmol/L    Anion Gap, Calculation 9 5 - 18 mmol/L    Glucose 137 (H) 70 - 125 mg/dL    Calcium 9.2 8.5 - 10.5 mg/dL    BUN 10 8 - 28 mg/dL    Creatinine 0.74 0.60 - 1.10 mg/dL    GFR MDRD Af Amer >60 >60 mL/min/1.73m2    GFR MDRD Non Af Amer >60 >60 mL/min/1.73m2   HM1 (CBC with Diff)    Collection Time: 04/27/21  8:27 AM   Result Value Ref Range    WBC 5.0 4.0 - 11.0 thou/uL    RBC 3.59 (L) 3.80 - 5.40 mill/uL    Hemoglobin 10.3 (L) 12.0 - 16.0 g/dL    Hematocrit 33.0 (L) 35.0 - 47.0 %    MCV 92 80 - 100 fL    MCH 28.7 27.0 - 34.0 pg    MCHC 31.2 (L) 32.0 - 36.0 g/dL    RDW 20.0 (H) 11.0 - 14.5 %    Platelets 132 (L) 140 - 440 thou/uL    MPV 8.6 8.5 - 12.5 fL   Manual Differential    Collection Time: 04/27/21  8:27 AM   Result Value Ref Range    Total Neutrophils % 83 (H) 50 - 70 %    Lymphocytes % 8 (L) 20 - 40 %    Monocytes % 1 (L) 2 - 10 %    Eosinophils %  8 (H) 0 - 6 %    Basophils % 0 0 - 2 %    Immature Granulocyte % - Manual 0 <=0 %    Total Neutrophils Absolute 4.2 2.0 - 7.7 thou/ul    Lymphocytes Absolute 0.4 (L) 0.8 - 4.4 thou/uL    Monocytes Absolute 0.1 0.0 - 0.9 thou/uL    Eosinophils Absolute 0.4 0.0 - 0.4 thou/uL    Basophils Absolute 0.0 0.0 - 0.2 thou/uL    Immature Granulocyte Absolute - Manual 0.0 <=0.0 thou/uL    Platelet Estimate Normal Normal         Imaging Results    Xr Chest  1 View Portable    Result Date: 4/6/2021  EXAM: XR CHEST 1 VIEW PORTABLE LOCATION: [facility] DATE/TIME: 4/5/2021 11:53 PM INDICATION: Neutropenic fever COMPARISON: 8/30/2020. FINDINGS: Right chest wall port. No pneumothorax. The heart size is normal. Minimal scarring at the left lung base. Lungs otherwise clear. Hiatal hernia.     No acute abnormality.        Signed by: Kristen Villa MD

## 2021-06-17 NOTE — PROGRESS NOTES
Patient here ambulatory for follow up s/p radiation for her anal cancer.  Patient states she is doing well and has only one small area left that is open.  Patient is having some night sweats and has questions about follow up as well.  Seen by Dr. Spencer.  Plan RTC for follow up as directed by physician.

## 2021-06-17 NOTE — PROGRESS NOTES
Patient here ambulatory for final radiation therapy treatment for her anal cancer.  Patient having lots of diarrhea this morning and has taken 5 imodium to get through treatment.  States she is having some skin irritation now but has all the supplies she needs for this.  Written discharge instructions reviewed and given to patient.  Follow up with Dr. Spencer in about 4 to 6 weeks.  Patient verbalized understanding and left ambulatory.

## 2021-06-17 NOTE — TELEPHONE ENCOUNTER
Called patient for routine follow up call s/p radiation for her anal cancer.  Patient states she is healing well and doing good.  Confirmed follow up appointment with patient for this week and patient has call back for questions.

## 2021-06-17 NOTE — TELEPHONE ENCOUNTER
Patient called and left a message on the nurses confidential voicemail asking for a call back.  Called patient and she states she is having frequent watery stools every 10-15 minutes.  Patient states she thought that she wasn't emptying her bladder but states she was able to urinate a small amount.  Patient feels she is dehydrated from the frequent loose stools.  Patient took 7 Imodium pills yesterday and had just taken a couple pills before my call.  Told patient she could take up to 8 Imodium pills per day as needed to slow down her bowels.  Told patient if this doesn't help then we would ask the doctor if there is a prescription to help slow down the hyperactivity of her bowels.  Encouraged patient to drink plenty of fluids with electrolytes such as powerade or Gatorade.  Patient states she is doing that.    Reminded patient that if she is unable to urinate she should go to the ER to be catheterized.  Also reminder her that there is a doctor on call after hours if she had more questions.    Told patient to let us know tomorrow how she is doing while here in the clinic.  Patient scheduled to see Dr. Spencer on Thursday for her final radiation treatment.

## 2021-06-17 NOTE — PATIENT INSTRUCTIONS - HE
Patient Instructions by Sidney Hodges PA-C at 10/8/2019  8:50 AM     Author: Sidney Hodges PA-C Service: -- Author Type: Physician Assistant    Filed: 10/8/2019  9:32 AM Encounter Date: 10/8/2019 Status: Signed    : Sidney Hodges PA-C (Physician Assistant)       Over-the-counter nasal steroid spray, follow packaging directions  Over-the-counter Mucinex, follow packaging directions  Hot packs 3 times per day to the forehead and face over the tender sinuses  Distal saline salt water or saline irrigation with Bristol Morales  Antibiotic as written below.  Risks and benefits of the medication were gone over.  Indication for return to see urgent care or family practice provider for reevaluation and treatment.      Acute Bacterial Rhinosinusitis (ABRS)  Acute bacterial rhinosinusitis (ABRS) is an infection of your nasal cavity and sinuses. Its caused by bacteria. Acute means that youve had symptoms for less than 12 weeks.  Understanding your sinuses  The nasal cavity is the large air-filled space behind your nose. The sinuses are a group of spaces formed by the bones of your face. They connect with your nasal cavity. ABRS causes the tissue lining these spaces to become inflamed. Mucus may not drain normally. This leads to facial pain and other symptoms.  What causes ABRS?  ABRS most often follows an upper respiratory infection caused by a virus. Bacteria then infect the lining of your nasal cavity and sinuses. But you can also get ABRS if you have:    Nasal allergies    Long-term nasal swelling and congestion not caused by allergies    Blockage in the nose  Symptoms of ABRS  The symptoms of ABRS may be different for each person, and can include:    Nasal congestion    Runny nose    Fluid draining from the nose down the throat (postnasal drip)    Headache    Cough    Pain in the sinuses    Thick, colored fluid from the nose (mucus)    Fever  Diagnosing ABRS  ABRS may be diagnosed if youve had an upper respiratory  infection like a cold and cough for longer than 10 to 14 days. Your health care provider will ask about your symptoms and your medical history. The provider will check your vital signs, including your temperature. Youll have a physical exam. The health care provider will check your ears, nose, and throat. You likely wont need any tests. If ABRS comes back, you may have a culture or other tests.  Treatment for ABRS  Treatment may include:    Antibiotic medicine. This is for symptoms that last for at least 10 to 14 days.    Nasal corticosteroid medicine. Drops or spray used in the nose can lessen swelling and congestion.    Over-the-counter pain medicine. This is to lessen sinus pain and pressure.    Nasal decongestant medicine. Spray or drops may help to lessen congestion. Do not use them for more than a few days.    Salt wash (saline irrigation). This can help to loosen mucus.  Possible complications of ABRS  ABRS may come back or become long-term (chronic).  In rare cases, ABRS may cause complications such as:     Inflamed tissue around the brain and spinal cord (meningitis)    Inflamed tissue around the eyes (orbital cellulitis)    Inflamed bones around the sinuses (osteitis)  These problems may need to be treated in a hospital with intravenous (IV) antibiotic medicine or surgery.  When to call the health care provider  Call your health care provider if you have any of the following:    Symptoms that dont get better, or get worse    Symptoms that dont get better after 3 to 5 days on antibiotics    Trouble seeing    Swelling around your eyes    Confusion or trouble staying awake   Date Last Reviewed: 3/3/2015    1535-1009 [employer]. [address] [place] [address], [place] [address]. All rights reserved. This information is not intended as a substitute for professional medical care. Always follow your healthcare professional's instructions.          Use of over-the-counter Zyrtec.  Follow packaging  directions  Use of over-the-counter Flonase  Frequent swallowing drinking and chewing gum to help create low-grade suction on the eustachian tube.  Elevate head at nighttime so it does not increase pressure  Use of over-the-counter Tylenol as needed for comfort.  Return to primary care provider for reevaluation treatment if any complication or new symptoms should present.        Patient Education     Earache, No Infection (Adult)  Earaches can happen without an infection. This occurs when air and fluid build up behind the eardrum causing a feeling of fullness and discomfort and reduced hearing. This is called otitis media with effusion (OME) or serous otitis media. It means there is fluid in the middle ear. It is not the same as acute otitis media, which is typically from infection.  OME can happen when you have a cold if congestion blocks the passage that drains the middle ear. This passage is called the eustachian tube. OME may also occur with nasal allergies or after a bacterial middle ear infection.    The pain or discomfort may come and go. You may hear clicking or popping sounds when you chew or swallow. You may feel that your balance is off. Or you may hear ringing in the ear.  It often takes from several weeks up to 3 months for the fluid to clear on its own. Oral pain relievers and ear drops help if there is pain. Decongestants and antihistamines sometimes help. Antibiotics don't help since there is no infection. Your doctor may prescribe a nasal spray to help reduce swelling in the nose and eustachian tube. This can allow the ear to drain.  If your OME doesn't improve after 3 months, surgery may be used to drain the fluid and insert a small tube in the eardrum to allow continued drainage.  Because the middle ear fluid can become infected, it is important to watch for signs of an ear infection which may develop later. These signs include increased ear pain, fever, or drainage from the ear.  Home care  The  following guidelines will help you care for yourself at home:    You may use over-the-counter medicine as directed to control pain, unless another medicine was prescribed. If you have chronic liver or kidney disease or ever had a stomach ulcer or GI bleeding, talk with your doctor before using these medicines. Aspirin should never be used in anyone under 18 years of age who is ill with a fever. It may cause severe liver damage.    You may use over-the-counter decongestants such as phenylephrine or pseudoephedrine. But they are not always helpful. Don't use nasal spray decongestants more than 3 days. Longer use can make congestion worse. Prescription nasal sprays from your doctor don't typically have those restrictions.    Antihistamines may help if you are also having allergy symptoms.    You may use medicines such as guaifenesin to thin mucus and promote drainage.  Follow-up care  Follow up with your healthcare provider or as advised if you are not feeling better after 3 days.  When to seek medical advice  Call your healthcare provider right away if any of the following occur:    Your ear pain gets worse or does not start to improve     Fever of 100.4 F (38 C) or higher, or as directed by your healthcare provider    Fluid or blood draining from the ear    Headache or sinus pain    Stiff neck    Unusual drowsiness or confusion  Date Last Reviewed: 10/1/2016    5295-5591 The [employer]". [address] [place] [address], [place] [address]. All rights reserved. This information is not intended as a substitute for professional medical care. Always follow your healthcare professional's instructions.

## 2021-06-17 NOTE — PATIENT INSTRUCTIONS - HE
Recent Results (from the past 24 hour(s))   Basic Metabolic Panel    Collection Time: 04/27/21  8:27 AM   Result Value Ref Range    Sodium 134 (L) 136 - 145 mmol/L    Potassium 3.7 3.5 - 5.0 mmol/L    Chloride 104 98 - 107 mmol/L    CO2 21 (L) 22 - 31 mmol/L    Anion Gap, Calculation 9 5 - 18 mmol/L    Glucose 137 (H) 70 - 125 mg/dL    Calcium 9.2 8.5 - 10.5 mg/dL    BUN 10 8 - 28 mg/dL    Creatinine 0.74 0.60 - 1.10 mg/dL    GFR MDRD Af Amer >60 >60 mL/min/1.73m2    GFR MDRD Non Af Amer >60 >60 mL/min/1.73m2

## 2021-06-17 NOTE — PATIENT INSTRUCTIONS - HE
Patient Instructions by Jn Yin MD at 10/16/2019  8:50 AM     Author: Jn Yin MD Service: -- Author Type: Physician    Filed: 10/16/2019  9:15 AM Encounter Date: 10/16/2019 Status: Addendum    : Jn Yin MD (Physician)    Related Notes: Original Note by Jn Yin MD (Physician) filed at 10/16/2019  9:14 AM       - Take the full course of Augmentin as prescribed.   - Take a probiotic while taking this antibiotic. This can be purchased over the counter at your local pharmacy.   - Continue Flonase daily for the next month.   - May use Sudafed as needed for ear pressure / congestion.   Patient Education     Middle Ear Infection (Adult)  You have an infection of the middle ear, the space behind the eardrum. This is also called acute otitis media (AOM). Sometimes it is caused by the common cold. This is because congestion can block the internal passage (eustachian tube) that drains fluid from the middle ear. When the middle ear fills with fluid, bacteria can grow there and cause an infection. Oral antibiotics are used to treat this illness, not ear drops. Symptoms usually start to improve within 1 to 2 days of treatment.    Home care  The following are general care guidelines:    Finish all of the antibiotic medicine given, even though you may feel better after the first few days.    You may use over-the-counter medicine, such as acetaminophen or ibuprofen, to control pain and fever, unless something else was prescribed. If you have chronic liver or kidney disease or have ever had a stomach ulcer or gastrointestinal bleeding, talk with your healthcare provider before using these medicines. Do not give aspirin to anyone under 18 years of age who has a fever. It may cause severe illness or death.  Follow-up care  Follow up with your healthcare provider, or as advised, in 2 weeks if all symptoms have not gotten better, or if hearing doesn't go back to normal within 1  month.  When to seek medical advice  Call your healthcare provider right away if any of these occur:    Ear pain gets worse or does not improve after 3 days of treatment    Unusual drowsiness or confusion    Neck pain, stiff neck, or headache    Fluid or blood draining from the ear canal    Fever of 100.4 F (38 C) or as advised     Seizure  Date Last Reviewed: 6/1/2016 2000-2017 [employer]. [address] [place] [address]. All rights reserved. This information is not intended as a substitute for professional medical care. Always follow your healthcare professional's instructions.           Patient Education     Sinusitis (Antibiotic Treatment)    The sinuses are air-filled spaces within the bones of the face. They connect to the inside of the nose. Sinusitis is an inflammation of the tissue that lines the sinuses. Sinusitis can occur during a cold. It can also happen due to allergies to pollens and other particles in the air. Sinusitis can cause symptoms of sinus congestion and a feeling of fullness. A sinus infection causes fever, headache, and facial pain. There is often green or yellow fluid draining from the nose or into the back of the throat (post-nasal drip). You have been given antibiotics to treat this condition.  Home care    Take the full course of antibiotics as instructed. Do not stop taking them, even when you feel better.    Drink plenty of water, hot tea, and other liquids. This may help thin nasal mucus. It also may help your sinuses drain fluids.    Heat may help soothe painful areas of your face. Use a towel soaked in hot water. Or,  the shower and direct the warm spray onto your face. Using a vaporizer along with a menthol rub at night may also help soothe symptoms.     An expectorant with guaifenesin may help thin nasal mucus and help your sinuses drain fluids.    You can use an over-the-counter decongestant, unless a similar medicine was prescribed to you.  Nasal sprays work the fastest. Use one that contains phenylephrine or oxymetazoline. First blow your nose gently. Then use the spray. Do not use these medicines more often than directed on the label. If you do, your symptoms may get worse. You may also take pills that contain pseudoephedrine. Dont use products that combine multiple medicines. This is because side effects may be increased. Read labels. You can also ask the pharmacist for help. (People with high blood pressure should not use decongestants. They can raise blood pressure.)    Over-the-counter antihistamines may help if allergies contributed to your sinusitis.      Do not use nasal rinses or irrigation during an acute sinus infection, unless your healthcare provider tells you to. Rinsing may spread the infection to other areas in your sinuses.    Use acetaminophen or ibuprofen to control pain, unless another pain medicine was prescribed to you. If you have chronic liver or kidney disease or ever had a stomach ulcer, talk with your healthcare provider before using these medicines. (Aspirin should never be taken by anyone under age 18 who is ill with a fever. It may cause severe liver damage.)    Don't smoke. This can make symptoms worse.  Follow-up care  Follow up with your healthcare provider or our staff if you are better in 1 week.  When to seek medical advice  Call your healthcare provider if any of these occur:    Facial pain or headache that gets worse    Stiff neck    Unusual drowsiness or confusion    Swelling of your forehead or eyelids    Vision problems, such as blurred or double vision    Fever of 100.4 F (38 C) or higher, or as directed by your healthcare provider    Seizure    Breathing problems    Symptoms don't go away in 10 days  Prevention  Here are steps you can take to help prevent an infection:    Keep good hand washing habits.    Dont have close contact with people who have sore throats, colds, or other upper respiratory  infections.    Dont smoke, and stay away from secondhand smoke.    Stay up to date with of your vaccines.  Date Last Reviewed: 11/1/2017 2000-2017 [employer]. [address] [place] [address], [place] [address]. All rights reserved. This information is not intended as a substitute for professional medical care. Always follow your healthcare professional's instructions.

## 2021-06-17 NOTE — PROGRESS NOTES
Radiation Treatment Summary    Patient: Oscar Zhao   MRN: [id number]  : 1945  Care Provider: Patsy Spencer    Date of Service: 2021      HISTORY: Oscar Zhao was treated with IMRT radiation therapy.    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  She had expected grade 3 radiation dermatitis; Grade 2 diarrhea;  Grade 1 dysuria.  She was hospitalized 2021 with febrile neutropenia, poorly controlled nausea and dehydration, was treated with antibiotics and antiviral therapy for suspected shingles outbreak which healed during the course of treatment.      PLAN: Discharge instructions were given and Oscar knows to call if questions/issues arise. Oscar will be seen in f/u in 2 weeks without a scan.  She will follow up with colorectal surgery in about 6 weeks and MRI in approximately 2 months.      Signed by: Patsy Spencer MD

## 2021-06-17 NOTE — TELEPHONE ENCOUNTER
Telephone Encounter by Bethanie Ruiz RN at 2/19/2021  2:49 PM     Author: Bethanie Ruiz RN Service: -- Author Type: Registered Nurse    Filed: 2/19/2021  4:32 PM Encounter Date: 2/19/2021 Status: Signed    : Bethanie Ruiz RN (Registered Nurse)       ..New Patient Oncology Nurse Navigator Note     Referring provider: Madhu Todd MD     Referring Clinic/Organization: [facility]     Referred to (specialty): Med & Rad Onc    Requested provider (if applicable): [facility] for Med Onc, N/A for Rad Onc     Date Referral Received: 2/19/2021     Evaluation for : Anal Squamous Cell Carcinoma     Clinical History (per Nurse review of records provided):    1/28/2021 Office visit Altaf Jasso MD:  1.  A very pleasant 76 years old woman who looks at least 10 years younger than her stated age, referred because of slight worsening of her anemia.  2.  Her current hemoglobin done in our clinic today actually shows anemia to have completely resolved and her microcytosis is also improved.  3.  She is due for colonoscopy however.  4.  Currently on oral iron supplementation.  Lab work-up suggesting that she has no trouble with absorption of iron.  Her serum iron is quite elevated most likely from the oral iron supplementation that she is taking.  5.  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  She is on a small dose of methotrexate.   Ambulatory referral to Gastroenterology     2/19/2921 Referral from [facility]:            Other records not available in [facility].     Clinical Assessment / Barriers to Care (Per Nurse):   Pt is a very articulate 76 year old woman. She has an appointment with the Colon & Rectal Surgeon on 3/3/2021 and wants to have the oncology consults (Med and Rad) done prior to this if possible. She prefers the  location, but since she just saw Dr. Jasso a couple of weeks ago for benign heme, and he has the first available appointment, she agrees to go  to [place] location for appt with him.  Emailed her all appt information. Because she recently saw Dr. Jasso, she has done the pt health history intake form already.         Records Location (Care Everywhere, Media, etc.): [facility] and [facility]      Records Needed: images and path from [facility]     Additional testing needed prior to consult: CT Abdomen & Pelvis, CT chest, MRI pelvis - per [facility] referral. These per Sandra Jasso and Johanna.

## 2021-06-18 NOTE — PATIENT INSTRUCTIONS - HE
Patient Instructions by Altaf Jasso MD at 2/25/2021 10:45 AM     Author: Altaf Jasso MD Service: -- Author Type: Physician    Filed: 2/25/2021 12:13 PM Encounter Date: 2/25/2021 Status: Addendum    : Altaf Jasso MD (Physician)    Related Notes: Original Note by Altaf Jasso MD (Physician) filed at 2/25/2021 12:10 PM       Patient Education   Patient Education   Fluorouracil Solution for injection  What is this medicine?  FLUOROURACIL, 5-FU (flure oh YOOR a leta) is a chemotherapy drug. It slows the growth of cancer cells. This medicine is used to treat many types of cancer like breast cancer, colon or rectal cancer, pancreatic cancer, and stomach cancer.  This medicine may be used for other purposes; ask your health care provider or pharmacist if you have questions.  What should I tell my health care provider before I take this medicine?  They need to know if you have any of these conditions:    blood disorders    dihydropyrimidine dehydrogenase (DPD) deficiency    infection (especially a virus infection such as chickenpox, cold sores, or herpes)    kidney disease    liver disease    malnourished, poor nutrition    recent or ongoing radiation therapy    an unusual or allergic reaction to fluorouracil, other chemotherapy, other medicines, foods, dyes, or preservatives    pregnant or trying to get pregnant    breast-feeding  How should I use this medicine?  This drug is given as an infusion or injection into a vein. It is administered in a hospital or clinic by a specially trained health care professional.  Talk to your pediatrician regarding the use of this medicine in children. Special care may be needed.  Overdosage: If you think you have taken too much of this medicine contact a poison control center or emergency room at once.  NOTE: This medicine is only for you. Do not share this medicine with others.  What if I miss a dose?  It is important not to miss your dose. Call your doctor or  health care professional if you are unable to keep an appointment.  What may interact with this medicine?    allopurinol    cimetidine    dapsone    digoxin    hydroxyurea    leucovorin    levamisole    medicines for seizures like ethotoin, fosphenytoin, phenytoin    medicines to increase blood counts like filgrastim, pegfilgrastim, sargramostim    medicines that treat or prevent blood clots like warfarin, enoxaparin, and dalteparin    methotrexate    metronidazole    pyrimethamine    some other chemotherapy drugs like busulfan, cisplatin, estramustine, vinblastine    trimethoprim    trimetrexate    vaccines  Talk to your doctor or health care professional before taking any of these medicines:    acetaminophen    aspirin    ibuprofen    ketoprofen    naproxen  This list may not describe all possible interactions. Give your health care provider a list of all the medicines, herbs, non-prescription drugs, or dietary supplements you use. Also tell them if you smoke, drink alcohol, or use illegal drugs. Some items may interact with your medicine.  What should I watch for while using this medicine?  Visit your doctor for checks on your progress. This drug may make you feel generally unwell. This is not uncommon, as chemotherapy can affect healthy cells as well as cancer cells. Report any side effects. Continue your course of treatment even though you feel ill unless your doctor tells you to stop.  In some cases, you may be given additional medicines to help with side effects. Follow all directions for their use.  Call your doctor or health care professional for advice if you get a fever, chills or sore throat, or other symptoms of a cold or flu. Do not treat yourself. This drug decreases your body's ability to fight infections. Try to avoid being around people who are sick.  This medicine may increase your risk to bruise or bleed. Call your doctor or health care professional if you notice any unusual bleeding.  Be careful  brushing and flossing your teeth or using a toothpick because you may get an infection or bleed more easily. If you have any dental work done, tell your dentist you are receiving this medicine.  Avoid taking products that contain aspirin, acetaminophen, ibuprofen, naproxen, or ketoprofen unless instructed by your doctor. These medicines may hide a fever.  Do not become pregnant while taking this medicine. Women should inform their doctor if they wish to become pregnant or think they might be pregnant. There is a potential for serious side effects to an unborn child. Talk to your health care professional or pharmacist for more information. Do not breast-feed an infant while taking this medicine.  Men should inform their doctor if they wish to father a child. This medicine may lower sperm counts.  Do not treat diarrhea with over the counter products. Contact your doctor if you have diarrhea that lasts more than 2 days or if it is severe and watery.  This medicine can make you more sensitive to the sun. Keep out of the sun. If you cannot avoid being in the sun, wear protective clothing and use sunscreen. Do not use sun lamps or tanning beds/booths.  What side effects may I notice from receiving this medicine?  Side effects that you should report to your doctor or health care professional as soon as possible:    allergic reactions like skin rash, itching or hives, swelling of the face, lips, or tongue    low blood counts - this medicine may decrease the number of white blood cells, red blood cells and platelets. You may be at increased risk for infections and bleeding.    signs of infection - fever or chills, cough, sore throat, pain or difficulty passing urine    signs of decreased platelets or bleeding - bruising, pinpoint red spots on the skin, black, tarry stools, blood in the urine    signs of decreased red blood cells - unusually weak or tired, fainting spells, lightheadedness    breathing problems    changes in  vision    chest pain    mouth sores    nausea and vomiting    pain, swelling, redness at site where injected    pain, tingling, numbness in the hands or feet    redness, swelling, or sores on hands or feet    stomach pain    unusual bleeding  Side effects that usually do not require medical attention (report to your doctor or health care professional if they continue or are bothersome):    changes in finger or toe nails    diarrhea    dry or itchy skin    hair loss    headache    loss of appetite    sensitivity of eyes to the light    stomach upset    unusually teary eyes  This list may not describe all possible side effects. Call your doctor for medical advice about side effects. You may report side effects to FDA at 5-223-FDA-2356.  Where should I keep my medicine?  This drug is given in a hospital or clinic and will not be stored at home.  NOTE:This sheet is a summary. It may not cover all possible information. If you have questions about this medicine, talk to your doctor, pharmacist, or health care provider. Copyright  2015 [employer]           Mitomycin injection  Brand Name: Mutamycin  What is this medicine?  MITOMYCIN (mye toe MYE sin) is a chemotherapy drug. This medicine is used to treat cancer of the stomach and pancreas.  How should I use this medicine?  This drug is given as an injection or infusion into a vein. It is administered in a hospital or clinic by a specially trained health care professional.  Talk to your pediatrician regarding the use of this medicine in children. Special care may be needed.  What side effects may I notice from receiving this medicine?  Side effects that you should report to your doctor or health care professional as soon as possible:    allergic reactions like skin rash, itching or hives, swelling of the face, lips, or tongue    low blood counts - this medicine may decrease the number of white blood cells, red blood cells and platelets. You may be at increased risk for  infections and bleeding.    signs of infection - fever or chills, cough, sore throat, pain or difficulty passing urine    signs of decreased platelets or bleeding - bruising, pinpoint red spots on the skin, black, tarry stools, blood in the urine    signs of decreased red blood cells - unusually weak or tired, fainting spells, lightheadedness    breathing problems    changes in vision    chest pain    confusion    dry cough    high blood pressure    mouth sores    pain, swelling, redness at site where injected    pain, tingling, numbness in the hands or feet    seizures    swelling of the ankles, feet, hands    trouble passing urine or change in the amount of urine  Side effects that usually do not require medical attention (report to your doctor or health care professional if they continue or are bothersome):    diarrhea    green to blue color of urine    hair loss    loss of appetite    nausea, vomiting  What may interact with this medicine?    medicines to increase blood counts like filgrastim, pegfilgrastim, sargramostim    vaccines    What if I miss a dose?  It is important not to miss your dose. Call your doctor or health care professional if you are unable to keep an appointment.  Where should I keep my medicine?  This drug is given in a hospital or clinic and will not be stored at home.  What should I tell my health care provider before I take this medicine?  They need to know if you have any of these conditions:    anemia    bleeding disorder    infection (especially a virus infection such as chickenpox, cold sores, or herpes)    kidney disease    low blood counts like low platelets, red blood cells, white blood cells    recent radiation therapy    an unusual or allergic reaction to mitomycin, other chemotherapy agents, other medicines, foods, dyes, or preservatives    pregnant or trying to get pregnant    breast-feeding  What should I watch for while using this medicine?  Your condition will be monitored  carefully while you are receiving this medicine. You will need important blood work done while you are taking this medicine.  This drug may make you feel generally unwell. This is not uncommon, as chemotherapy can affect healthy cells as well as cancer cells. Report any side effects. Continue your course of treatment even though you feel ill unless your doctor tells you to stop.  Call your doctor or health care professional for advice if you get a fever, chills or sore throat, or other symptoms of a cold or flu. Do not treat yourself. This drug decreases your body's ability to fight infections. Try to avoid being around people who are sick.  This medicine may increase your risk to bruise or bleed. Call your doctor or health care professional if you notice any unusual bleeding.  Be careful brushing and flossing your teeth or using a toothpick because you may get an infection or bleed more easily. If you have any dental work done, tell your dentist you are receiving this medicine.  Avoid taking products that contain aspirin, acetaminophen, ibuprofen, naproxen, or ketoprofen unless instructed by your doctor. These medicines may hide a fever.  Do not become pregnant while taking this medicine. Women should inform their doctor if they wish to become pregnant or think they might be pregnant. There is a potential for serious side effects to an unborn child. Talk to your health care professional or pharmacist for more information. Do not breast-feed an infant while taking this medicine.  NOTE:This sheet is a summary. It may not cover all possible information. If you have questions about this medicine, talk to your doctor, pharmacist, or health care provider. Copyright  2018 [employer]         Patient Education     Vascular Access Port Implantation   Port implantation is surgery to place (implant) a port under the skin. For vascular access, it's placed into a vein. The port allows medicines or nutrition to be sent right into  your bloodstream. Blood can also be taken or given through the port. During the procedure, a long, thin tube (catheter) is threaded into one of your large veins. The tube is then attached to the port. This usually sits under the skin of your chest and causes a small bump. To use the port, a special needle is passed through your skin and into the port. The needle can stay in your skin for up to 7 days, if needed. A port can stay in place for weeks or months or longer.    Why is a vascular access port needed?  A vascular access port may allow healthcare providers to give you:    Chemotherapy or other cancer-fighting medicines    IV treatments, such as antibiotics or nutrition    Hemodialysis for kidney failure  The port may also be used to draw blood.  Before the procedure  Follow any instructions you are given on how to get ready.  Tell your provider about any medicines you are taking. This includes:    All prescription medicines    Over-the-counter medicines such as aspirin or ibuprofen    Herbs, vitamins, and other supplements  Also be sure your provider knows:    If you are pregnant or think you may be pregnant    If you are allergic to any medicines or substances, especially local anesthesia or iodine    Your full health history, including why you will need the port. Also tell your provider if you have a condition that make your blood more likely to form clots.    If you plan on doing any contact sports  During the procedure    Before the procedure, an IV may be put into a vein in your arm or hand. This gives you fluids and medicines. You may be given medicine through the IV to help you relax during the procedure. This is called sedation. But some surgeons place ports using general anesthesia.    The chest is used most often for the port. In some cases, your belly (abdomen) or arm will be used instead.    The skin over the insertion area is numbed with local anesthesia.    Ultrasound or X-rays are used to help  the healthcare provider guide the catheter into the correct place during the procedure.    A cut (incision) is made in the skin where the port will be placed. A small pocket for the port is formed under the skin.    A second small incision is made in the skin near the first incision. A tunnel under the skin is created. The catheter is put through the tunnel and into the blood vessel.    The skin is closed over the port. It's held shut with stitches or surgical glue or tape. The second small incision is also closed.    A chest X-ray may be done to make sure the port is placed correctly.    After the procedure  You may be taken to a recovery room to recover from the sedation. Nurses will check on you as you rest. If you have pain, nurses can give you medicine. If you are not staying in the hospital overnight, you will be sent home a few hours after the procedure is done. A healthcare provider will tell you when you can go home. An adult family member or friend will need to drive you home.  Recovering at home    Take pain medicine as directed by your healthcare provider.    Take it easy for 24 hours after the procedure. Don't do any physical activity or heavy lifting until your healthcare provider says its OK.    Keep the port clean and dry. Ask when you can shower again. You will need to keep the port dry by covering it when you shower.    Care for the insertion site as you are directed.    Dont swim, bathe, or do other activities that cause water to cover the insertion site.    Your port needs to be flushed at least every 4 to 6 weeks to keep it from getting blocked with blood clots. Talk with your healthcare provider about who will do this for you.    In some cases, you may be taught how to flush it yourself.    Risks and possible complications of implantation    Bleeding    Infection of the insertion site    Damage to a blood vessel    Nerve injury or irritation     Skin breakdown over the port    Rarely, collapsed  lung (for chest port placements)    Risks and possible complications of having a port    Blocked  port or catheter    Leakage or breakage of the port or catheter    The port moves out of position    Blood clot    Skin or bloodstream infection    Skin breakdown over the port    When to seek medical care  Call your healthcare provider right away if you have any of the following:    A fever of 100.4 F (38.0 C) or higher, or as directed by your healthcare provider    Swelling, bleeding, or a collection of blood (hematoma) around the port insertion site    The skin near the port is red, warm, swollen, or broken    Shoulder pain or arm numbness, weakness, or tingling on the side where the port is located    You feel a heart flutter or racing heart    Swollen arm, if the port is placed in your arm    Cough, shortness of breath, or trouble taking a full, deep breath  Date Last Reviewed: 1/1/2019 2000-2019 [employer] [facility]. [address] [place] [address]. All rights reserved. This information is not intended as a substitute for professional medical care. Always follow your healthcare professional's instructions.

## 2021-06-19 NOTE — LETTER
Letter by Jak Jones MD at      Author: Jak Jones MD Service: -- Author Type: --    Filed:  Encounter Date: 8/20/2019 Status: (Other)         Oscar SHAH Pavel  [address] [address]             August 20, 2019         Dear Ms. Zhao,    Below are the results from your recent visit:    Resulted Orders   Lipid Cascade   Result Value Ref Range    Cholesterol 193 <=199 mg/dL    Triglycerides 117 <=149 mg/dL    HDL Cholesterol 68 >=50 mg/dL    LDL Calculated 102 <=129 mg/dL    Patient Fasting > 8hrs? Yes        All very good results    Please call with questions or contact us using [employer].    Sincerely,        Electronically signed by Jak Jones MD

## 2021-06-20 NOTE — LETTER
Letter by Royce Perez MD at      Author: Royce Perez MD Service: -- Author Type: --    Filed:  Encounter Date: 3/12/2020 Status: (Other)         Oscar Zhao  [address] [facility] [address]      March 12, 2020      Dear Oscar,    This letter is to remind you that you will be due for your follow up appointment with Dr. Royce Perez in April, 2020 . To help ensure you are in the best health possible, a regular follow-up with your cardiologist is essential.     Please call our Patient Scheduling Line at [phone] to schedule your appointment at your earliest convenience.  If you have recently scheduled an appointment, please disregard this letter.    We look forward to seeing you again. As always, we are available at the number  above for any questions or concerns you may have.      Sincerely,     The Physicians and Staff of [facility]

## 2021-06-20 NOTE — PROGRESS NOTES
Office Visit - Follow up    Oscar Zhao   73 y.o. female    Date of Visit: 8/31/2018    Chief Complaint   Patient presents with     Numbness     more of burning feeling lower legs and lower arms  painful at times       Subjective: Peripheral neuropathy with lower legs and lower arms feel like they are burning and painful worse at night.    History of epi scleritis iritis and HLA-B27.  Previously on methotrexate now tapering off.  Followed by ophthalmology as well as rheumatology Dr. Luke Wan presfranklin.    No blood in stool or urine no chest pain or shortness of breath.    One daughter now living in the [place] having moved or transferred back for [place] from [place].    Mammogram allCLEAR June 25, 2018 colonoscopy allCLEAR normal September 7, 2011 Dr. Orr [facility]franklin [facility]    ROS: A comprehensive review of systems was performed and was otherwise negative    Medications:  Prior to Admission medications    Medication Sig Start Date End Date Taking? Authorizing Provider   folic acid (FOLVITE) 1 MG tablet Take 1 mg by mouth daily.  5/2/16  Yes PROVIDER, HISTORICAL   prednisoLONE acetate (PRED-FORTE) 1 % ophthalmic suspension  6/17/17  Yes PROVIDER, HISTORICAL   timolol (BETIMOL) 0.5 % ophthalmic solution Administer 1 drop to both eyes 2 (two) times a day.   Yes PROVIDER, HISTORICAL   methotrexate 2.5 MG tablet Take 25 mg by mouth. 4 tablets weekly 9/21/17   PROVIDER, HISTORICAL   predniSONE (DELTASONE) 1 MG tablet Take 1 mg by mouth daily. 2mg daily  8/31/18  PROVIDER, HISTORICAL       Allergies:   Allergies   Allergen Reactions     Adalimumab Myalgia     Brimonidine-Timolol Unknown     Redness itching in eyes     Levaquin [Levofloxacin]      Skin burn and couldn't get out bed for 5 days     Tetracyclines Hives       Immunizations:   Immunization History   Administered Date(s) Administered     DT (pediatric) 12/23/1998     Influenza [id number], 01/11/2010     Influenza high  dose, seasonal 10/15/2015, 11/28/2016, 11/17/2017     Influenza, Seasonal, Inj PF IIV3 12/08/2009     Influenza, inj, historic,unspecified 12/08/2009, 11/02/2011     Influenza, seasonal,quad inj 6-35 mos 11/09/2012, 10/02/2014     Influenza,seasonal, Inj IIV3 11/03/2005, 11/29/2007, 10/28/2008, 10/01/2010, 11/02/2011, 11/09/2012, 11/13/2013, 10/02/2014     Pneumo Conj 13-V (2010&after) 10/15/2015     Pneumo Polysac 23-V 03/06/2009     Td, Adult, Absorbed 06/30/2006     Td,adult,historic,unspecified 06/30/2006     Tdap 09/13/2006, 05/18/2012     ZOSTER, LIVE 08/27/2009       Exam Chest clear to auscultation and percussion.  Heart tones regular rhythm without murmur rub or gallop.  Abdomen soft nontender no organomegaly.  No peritoneal signs.  Extremities free of edema cyanosis or clubbing.  Neck veins nondistended no thyromegaly or scleral icterus noted, carotids full.  Skin warm and dry easily conversant good spirited.  Normal intelligence.  Neurologically intact no gross localizing findings.    Assessment and Plan  Peripheral neuropathy with multiple drug allergies as listed and reviewed plus history of scleritis episcleritis and iritis and history of positive HLA-B27.  Neuro consult RTC 2 months time discuss gabapentin briefly with the patient.  Possible treatment arm.  Patient declined gabapentin at this juncture.    Time: total time spent with the patient was 25 minutes of which >50% was spent in counseling and coordination of care    The following high BMI interventions were performed this visit: encouragement to exercise    Jak Jones MD    Patient Active Problem List   Diagnosis     Essential Hypertension     Difficulty Breathing (Dyspnea)     Chest Pain     Osteoarthrosis     S/P total hip arthroplasty     Cough     Primary osteoarthritis of left knee

## 2021-06-20 NOTE — LETTER
Letter by Jak Jones MD at      Author: Jak Jones MD Service: -- Author Type: --    Filed:  Encounter Date: 2/25/2020 Status: (Other)         Oscar Zhao  [address]  [facility] [address]             February 25, 2020         Dear Ms. Pavel,    Below are the results from your recent visit:    Resulted Orders   HM2(CBC w/o Differential)   Result Value Ref Range    WBC 6.9 4.0 - 11.0 thou/uL    RBC 4.62 3.80 - 5.40 mill/uL    Hemoglobin 13.7 12.0 - 16.0 g/dL    Hematocrit 41.2 35.0 - 47.0 %    MCV 89 80 - 100 fL    MCH 29.6 27.0 - 34.0 pg    MCHC 33.3 32.0 - 36.0 g/dL    RDW 14.1 11.0 - 14.5 %    Platelets 291 140 - 440 thou/uL    MPV 6.5 (L) 7.0 - 10.0 fL   Comprehensive Metabolic Panel   Result Value Ref Range    Sodium 137 136 - 145 mmol/L    Potassium 4.3 3.5 - 5.0 mmol/L    Chloride 102 98 - 107 mmol/L    CO2 27 22 - 31 mmol/L    Anion Gap, Calculation 8 5 - 18 mmol/L    Glucose 102 70 - 125 mg/dL    BUN 13 8 - 28 mg/dL    Creatinine 0.70 0.60 - 1.10 mg/dL    GFR MDRD Af Amer >60 >60 mL/min/1.73m2    GFR MDRD Non Af Amer >60 >60 mL/min/1.73m2    Bilirubin, Total 0.9 0.0 - 1.0 mg/dL    Calcium 10.0 8.5 - 10.5 mg/dL    Protein, Total 6.8 6.0 - 8.0 g/dL    Albumin 3.9 3.5 - 5.0 g/dL    Alkaline Phosphatase 82 45 - 120 U/L    AST 17 0 - 40 U/L    ALT 18 0 - 45 U/L    Narrative    Fasting Glucose reference range is 70-99 mg/dL per  American Diabetes Association (ADA) guidelines.   Lipid Cascade   Result Value Ref Range    Cholesterol 186 <=199 mg/dL    Triglycerides 88 <=149 mg/dL    HDL Cholesterol 64 >=50 mg/dL    LDL Calculated 104 <=129 mg/dL    Patient Fasting > 8hrs? Yes    Thyroid Stimulating Hormone (TSH)   Result Value Ref Range    TSH 0.53 0.30 - 5.00 uIU/mL   Urinalysis-UC if Indicated   Result Value Ref Range    Color, UA Yellow Colorless, Yellow, Straw, Light Yellow    Clarity, UA Clear Clear    Glucose, UA Negative Negative    Bilirubin, UA Negative Negative    Ketones, UA  Negative Negative    Specific Gravity, UA 1.015 1.005 - 1.030    Blood, UA Negative Negative    pH, UA 7.0 5.0 - 8.0    Protein, UA Negative Negative mg/dL    Urobilinogen, UA 0.2 E.U./dL 0.2 E.U./dL, 1.0 E.U./dL    Nitrite, UA Negative Negative    Leukocytes, UA Negative Negative    Narrative    Microscopic not indicated  UC not indicated       All very good results    Please call with questions or contact us using [employer].    Sincerely,        Electronically signed by Jak Jones MD

## 2021-06-21 NOTE — LETTER
Letter by Jak Jones MD at      Author: Jak Jones MD Service: -- Author Type: --    Filed:  Encounter Date: 12/28/2020 Status: (Other)         Oscar Zhao  [address]  [facility] [address]             December 28, 2020         Dear Ms. Zhao,    Below are the results from your recent visit:    Resulted Orders   Reticulocytes   Result Value Ref Range    Retic Absolute Count 0.114 (H) 0.010 - 0.110 mill/uL    Retic Ct Pct 2.43 0.8 - 2.7 %   Lactate Dehydrogenase (LDH)   Result Value Ref Range    LD (LDH) 231 (H) 125 - 220 U/L   Direct Antiglobulin Test   Result Value Ref Range    SHIN, IgG,-C3d NEG Negative   Ferritin   Result Value Ref Range    Ferritin 12 10 - 130 ng/mL   Vitamin B12   Result Value Ref Range    Vitamin B-12 >2,000 (H) 213 - 816 pg/mL   Thyroid Stimulating Hormone (TSH)   Result Value Ref Range    TSH 2.37 0.30 - 5.00 uIU/mL   Creatinine   Result Value Ref Range    Creatinine 0.71 0.60 - 1.10 mg/dL    GFR MDRD Af Amer >60 >60 mL/min/1.73m2    GFR MDRD Non Af Amer >60 >60 mL/min/1.73m2   Electrophoresis, Protein, Serum, Cascade   Result Value Ref Range    Albumin % 64.3 51.0 - 67.0 %    Albumin  4.6 3.2 - 4.7 g/dL    Alpha 1 % 2.7 2.0 - 4.0 %    Alpha 1 0.2 0.1 - 0.3 g/dL    Alpha 2 % 8.6 5.0 - 13.0 %    Alpha 2 0.6 0.4 - 0.9 g/dL    % Beta 11.5 10.0 - 17.0 %    Beta 0.8 0.7 - 1.2 g/dL    Gamma Globulin % 12.9 9.0 - 20.0 %    Gamma Globulin 0.9 0.6 - 1.4 g/dL    ELP Comment Normal serum protein electrophoresis      Protein, Total 7.1 6.0 - 8.0 g/dL    Path ICD: D64.9     Interpreted By: Ezio Hansen MD     Tissue Transglutaminase,IgA & IgG   Result Value Ref Range    Tissue Transglutaminase IgA AB 0.4 0.0-<7.0 U/mL    Tissue Transglutaminase IgG AB <0.6 0.0-<7.0 U/mL    Narrative      < 7 U/mL = Negative    7-10 U/mL = Equivocal    > 10 U/mL = Positive    Positive results for the tTG and/or gliadin antibodies indicate  possible celiac disease and a small intestinal  biopsy may be  indicated. Antibody levels decrease in patients on gluten-free  diets, therefore negative results do not exclude celiac disease.   HM1 (CBC with Diff)   Result Value Ref Range    WBC 8.4 4.0 - 11.0 thou/uL    RBC 4.70 3.80 - 5.40 mill/uL    Hemoglobin 10.8 (L) 12.0 - 16.0 g/dL    Hematocrit 38.3 35.0 - 47.0 %    MCV 82 80 - 100 fL    MCH 23.0 (L) 27.0 - 34.0 pg    MCHC 28.2 (L) 32.0 - 36.0 g/dL    RDW 20.6 (H) 11.0 - 14.5 %    Platelets 356 140 - 440 thou/uL    MPV 9.3 8.5 - 12.5 fL    Neutrophils % 61 50 - 70 %    Lymphocytes % 28 20 - 40 %    Monocytes % 6 2 - 10 %    Eosinophils % 3 0 - 6 %    Basophils % 2 0 - 2 %    Immature Granulocyte % 0 <=0 %    Neutrophils Absolute 5.1 2.0 - 7.7 thou/uL    Lymphocytes Absolute 2.4 0.8 - 4.4 thou/uL    Monocytes Absolute 0.5 0.0 - 0.9 thou/uL    Eosinophils Absolute 0.3 0.0 - 0.4 thou/uL    Basophils Absolute 0.1 0.0 - 0.2 thou/uL    Immature Granulocyte Absolute 0.0 <=0.0 thou/uL   Manual Differential   Result Value Ref Range    Platelet Estimate Normal Normal    Ovalocytes 1+ (!) Negative    Polychromasia 1+ (!) Negative       All very good results          Mild microcytic hypochromic anemia consistent with iron deficiency.  Any studies still pending.  No studies thus far received and reviewed not illuminating as the cause.         Please call with questions or contact us using [employer].    Sincerely,        Electronically signed by Jak Jones MD

## 2021-06-21 NOTE — PROGRESS NOTES
Assessment and Plan:   Annual wellness visit.        1. Need for immunization against influenza  Annual wellness visit  - Influenza High Dose, Seasonal 65+ yrs    2. Routine general medical examination at a health care facility  Annual wellness visit  - HM2(CBC w/o Differential)  - Comprehensive Metabolic Panel  - Lipid Cascade  - Thyroid Stimulating Hormone (TSH)  - Urinalysis-UC if Indicated  - Vitamin D, Total (25-Hydroxy)  - Vitamin B12     The patient's current medical problems were reviewed.    I have had an Advance Directives discussion with the patient.  The following health maintenance schedule was reviewed with the patient and provided in printed form in the after visit summary:   Health Maintenance   Topic Date Due     DXA SCAN  01/21/2011     FALL RISK ASSESSMENT  11/05/2019     MAMMOGRAM  06/25/2020     ADVANCE DIRECTIVES DISCUSSED WITH PATIENT  08/07/2020     COLONOSCOPY  09/07/2021     TD 18+ HE  05/18/2022     PNEUMOCOCCAL POLYSACCHARIDE VACCINE AGE 65 AND OVER  Completed     INFLUENZA VACCINE RULE BASED  Completed     PNEUMOCOCCAL CONJUGATE VACCINE FOR ADULTS (PCV13 OR PREVNAR)  Completed     ZOSTER VACCINE  Completed        Subjective:   Chief Complaint: Oscar Zhao is an 73 y.o. female here for an Annual Wellness visit.   HPI: Annual wellness visit.  Physical exam.  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  Seen by multiple specialists in the past including neurology and rheumatology.  Osteoporosis diagnosed previously DEXA scan again recommended.  Questions regarding MS.    Heart pounds at night in the middle of the night suggest cardiac consultation.  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  The patient also uses methotrexate 4 tablets weekly 2.5 mg each tab.  Allergies listed including a [place] move Mab plus brimonidine timolol and Levaquin and tetracycline.  Flu shot given left deltoid.    3D mammogram  negative 2018 dense breast but no cancer.  Normal colonoscopy 2011 with colorectal surgery group with Dr. Patsy Dominguez presiding.    Non-smoker no excess alcohol.  Allergies listed and reviewed history of cataract surgery with Dr. Flores at [facility] [place].    Immunizations reviewed and up-to-date.  Cycling causes hives.    Epi scleritis seen at the [facility].  Seen by Dr. Luke Wan from rheumatology for HLA-B27 rheumatologic ailment.    Hysterectomy and prior right total hip arthroplasty and left total knee arthroplasty have been done.    Appendectomy.    Mother  92 as did father same age.    suddenly cardiac arrhythmia.  One daughter previously in [place] now in [place] needs to lose weight.    Former  volunteer work now.  Former dancer.    Sensory neuropathy peripheral lower extremities reassurance given no evidence for motor component offered gabapentin patient declined.  Arthritis strength Tylenol permissible to use on a regular basis.  It may help neuropathic symptoms but does nothing to the underlying process which may be related to her autoimmune HLA-B27 rheumatologic element.    Review of Systems:    Please see above.  The rest of the review of systems are negative for all systems.    Patient Care Team:  Jak Jones MD as PCP - General  Marija Mcbride MD as Physician (Hematology and Oncology)     Patient Active Problem List   Diagnosis     Essential Hypertension     Difficulty Breathing (Dyspnea)     Chest Pain     Osteoarthrosis     S/P total hip arthroplasty     Cough     Primary osteoarthritis of left knee     Past Medical History:   Diagnosis Date     Arthritis      Basal cell carcinoma of anterior chest      Breast cyst      History of anesthesia complications      Osteoporosis      PONV (postoperative nausea and vomiting)      Squamous cell carcinoma       Past Surgical History:   Procedure Laterality  "Date     APPENDECTOMY       BREAST CYST EXCISION Right     benign     HYSTERECTOMY       OOPHORECTOMY       GA TOTAL ABDOM HYSTERECTOMY      Description: Hysterectomy;  Recorded: 08/19/2007;     GA TOTAL ABDOM HYSTERECTOMY      Description: Hysterectomy;  Recorded: 08/19/2007;     GA TOTAL KNEE ARTHROPLASTY Left 3/2/2017    Procedure: LEFT TOTAL KNEE ARTHROPLASTY;  Surgeon: Star Du MD;  Location: [facility] Main OR;  Service: Orthopedics     TOTAL HIP ARTHROPLASTY Right 8/18/2015    Procedure: HIP TOTAL ARTHROPLASTY, RIGHT;  Surgeon: Star Du MD;  Location: [facility] Main OR;  Service:       Family History   Problem Relation Age of Onset     Anesthesia problems Neg Hx       Social History     Social History     Marital status:      Spouse name: N/A     Number of children: N/A     Years of education: N/A     Occupational History     Not on file.     Social History Main Topics     Smoking status: Never Smoker     Smokeless tobacco: Never Used     Alcohol use 8.4 oz/week     14 Glasses of wine per week     Drug use: No     Sexual activity: No     Other Topics Concern     Not on file     Social History Narrative      Current Outpatient Prescriptions   Medication Sig Dispense Refill     folic acid (FOLVITE) 1 MG tablet Take 1 mg by mouth daily.        methotrexate 2.5 MG tablet Take 25 mg by mouth. 4 tablets weekly       timolol (BETIMOL) 0.5 % ophthalmic solution Administer 1 drop to both eyes 2 (two) times a day.       LOTEMAX 0.5 % ophthalmic suspension APPLY 1 DROP IN BOTH EYES ONCE A DAY  4     No current facility-administered medications for this visit.       Objective:   Vital Signs:   Visit Vitals     /90     Pulse 67     Ht 5' 3\" (1.6 m)     Wt 138 lb (62.6 kg)     SpO2 98%     BMI 24.45 kg/m2        VisionScreening:  No exam data present     PHYSICAL EXAM  Chest clear to auscultation and percussion.  Heart tones regular rhythm without murmur rub or gallop.  Abdomen soft " nontender no organomegaly.  No peritoneal signs.  Extremities free of edema cyanosis or clubbing.  Neck veins nondistended no thyromegaly or scleral icterus noted, carotids full.  Skin warm and dry easily conversant good spirited.  Normal intelligence.  Neurologically intact no gross localizing findings.  Skin negative lymph negative neuro negative psych normal HEENT negative back straight no severe spine tenderness.  Patient wished to defer on Pap pelvic and rectal as she has had a history and palpation good pulses noted in all 4 extremities no carotid bruits no thyromegaly.  Back straight no severe spine tenderness.      Blood pressure elevation x2 158/98 recheck 150/90.  Suggest recheck blood pressure in 1-2 months time with outside blood pressure readings to be brought in.  Advised salt restriction and diet maintenance of an optimal weight which she has already achieved.  BMI is 224.45.  Pulse 67 regular respirations 18 unlabored O2 sats 98%.  Assessment Results 11/5/2018   Activities of Daily Living No help needed   Instrumental Activities of Daily Living No help needed   Mini Cog Total Score 2   Some recent data might be hidden     A Mini-Cog score of 0-2 suggests the possibility of dementia, score of 3-5 suggests no dementia    Identified Health Risks:     The patient reports that she drinks more than one alcoholic drink per day but denies binge or excessive drinking. She was counseled and given information about possible harmful effects of excessive alcohol intake.  Patient's advanced directive was discussed and I am comfortable with the patient's wishes.        The patient was provided with appropriate referrals to address her memory problem.

## 2021-06-21 NOTE — LETTER
Letter by Jak Jones MD at      Author: Jak Jones MD Service: -- Author Type: --    Filed:  Encounter Date: 2/4/2021 Status: (Other)         Oscar Zhao  [address]  [place] [facility] [address]             February 4, 2021         Dear Ms. Zhao,    Below are the results from your recent visit:    Resulted Orders   Reticulocytes   Result Value Ref Range    Retic Absolute Count 0.114 (H) 0.010 - 0.110 mill/uL    Retic Ct Pct 2.43 0.8 - 2.7 %   Lactate Dehydrogenase (LDH)   Result Value Ref Range    LD (LDH) 231 (H) 125 - 220 U/L   Direct Antiglobulin Test   Result Value Ref Range    SHIN, IgG,-C3d NEG Negative   Ferritin   Result Value Ref Range    Ferritin 12 10 - 130 ng/mL   Vitamin B12   Result Value Ref Range    Vitamin B-12 >2,000 (H) 213 - 816 pg/mL   Thyroid Stimulating Hormone (TSH)   Result Value Ref Range    TSH 2.37 0.30 - 5.00 uIU/mL   Creatinine   Result Value Ref Range    Creatinine 0.71 0.60 - 1.10 mg/dL    GFR MDRD Af Amer >60 >60 mL/min/1.73m2    GFR MDRD Non Af Amer >60 >60 mL/min/1.73m2   Occult Blood, Fecal   Result Value Ref Range    Occult Blood, Stool #1 Negative Negative   Electrophoresis, Protein, Serum, Cascade   Result Value Ref Range    Albumin % 64.3 51.0 - 67.0 %    Albumin  4.6 3.2 - 4.7 g/dL    Alpha 1 % 2.7 2.0 - 4.0 %    Alpha 1 0.2 0.1 - 0.3 g/dL    Alpha 2 % 8.6 5.0 - 13.0 %    Alpha 2 0.6 0.4 - 0.9 g/dL    % Beta 11.5 10.0 - 17.0 %    Beta 0.8 0.7 - 1.2 g/dL    Gamma Globulin % 12.9 9.0 - 20.0 %    Gamma Globulin 0.9 0.6 - 1.4 g/dL    ELP Comment Normal serum protein electrophoresis      Protein, Total 7.1 6.0 - 8.0 g/dL    Path ICD: D64.9     Interpreted By: Ezio Hansen MD     Tissue Transglutaminase,IgA & IgG   Result Value Ref Range    Tissue Transglutaminase IgA AB 0.4 0.0-<7.0 U/mL    Tissue Transglutaminase IgG AB <0.6 0.0-<7.0 U/mL    Narrative      < 7 U/mL = Negative    7-10 U/mL = Equivocal    > 10 U/mL = Positive    Positive results for the  tTG and/or gliadin antibodies indicate  possible celiac disease and a small intestinal biopsy may be  indicated. Antibody levels decrease in patients on gluten-free  diets, therefore negative results do not exclude celiac disease.   HM1 (CBC with Diff)   Result Value Ref Range    WBC 8.4 4.0 - 11.0 thou/uL    RBC 4.70 3.80 - 5.40 mill/uL    Hemoglobin 10.8 (L) 12.0 - 16.0 g/dL    Hematocrit 38.3 35.0 - 47.0 %    MCV 82 80 - 100 fL    MCH 23.0 (L) 27.0 - 34.0 pg    MCHC 28.2 (L) 32.0 - 36.0 g/dL    RDW 20.6 (H) 11.0 - 14.5 %    Platelets 356 140 - 440 thou/uL    MPV 9.3 8.5 - 12.5 fL    Neutrophils % 61 50 - 70 %    Lymphocytes % 28 20 - 40 %    Monocytes % 6 2 - 10 %    Eosinophils % 3 0 - 6 %    Basophils % 2 0 - 2 %    Immature Granulocyte % 0 <=0 %    Neutrophils Absolute 5.1 2.0 - 7.7 thou/uL    Lymphocytes Absolute 2.4 0.8 - 4.4 thou/uL    Monocytes Absolute 0.5 0.0 - 0.9 thou/uL    Eosinophils Absolute 0.3 0.0 - 0.4 thou/uL    Basophils Absolute 0.1 0.0 - 0.2 thou/uL    Immature Granulocyte Absolute 0.0 <=0.0 thou/uL   Manual Differential   Result Value Ref Range    Platelet Estimate Normal Normal    Ovalocytes 1+ (!) Negative    Polychromasia 1+ (!) Negative       All very good results    Please call with questions or contact us using [employer].    Sincerely,        Electronically signed by Jak Jones MD

## 2021-06-21 NOTE — LETTER
Letter by Bethanie Ruiz RN at      Author: Bethanie Ruiz RN Service: -- Author Type: --    Filed:  Encounter Date: 2/19/2021 Status: (Other)       Dear Maritza,     Thank you for choosing [facility] for your care.  We are committed to providing you with the highest quality and compassionate healthcare services.  The following information pertains to your first appointment with our clinic.    Date/Time of appointment: Friday February 26, 2021 at 11:00 AM, arriving at 10:45 AM  Note: This allows time to complete forms, possible labs and for nursing assessment.    Name of your Physician: Dr. Patsy Spencer    What to bring to your appointment:  Current Medication/Allergy List   Any paperwork or films from your physician that we have asked you to bring.  Your current insurance card(s) and photo ID.    Parking:  Please refer to the map included to direct you to [facility].  The Radiation Oncology parking lot is west of the main entrance, this is free parking and is right next to the Cancer Care Entrance.  Come in the Cancer Care Entrance and check in.      We hope these instructions are helpful to you.  If you have any questions or concerns, please call us at [phone].  It is our pleasure to assist you.    Warm Regards,  Bethanie Ruiz RN  Nurse Navigator  [phone]

## 2021-06-21 NOTE — LETTER
Letter by Bethanie Ruiz RN at      Author: Bethanie Ruiz RN Service: -- Author Type: --    Filed:  Encounter Date: 2/19/2021 Status: (Other)       Dear Maritza,     Thank you for choosing [facility] for your care.  We are committed to providing you with the highest quality and compassionate healthcare services.  The following information pertains to your first appointment with our clinic.    Date/Time of appointment: Thursday February 25, 2021 at 11:00 AM, arriving at 10:30 AM  Note: This allows time to complete forms, possible labs and for nursing assessment.     Name of your Physician: Altaf Jasso MD    What to bring to your appointment:  Current Medication/Allergy List   Any paperwork or films from your physician that we have asked you to bring.  Your current insurance card(s) and photo ID.    Parking:  Please refer to the map included to direct you.  The [facility] is located at the [address] end of [facility] in [place], [place].    After turning onto [address] from [facility] [address] [facility], take a right turn at the first stop sign.  We have designated parking on the left, identified as parking for Cancer Care patients (Lot D).     The Code to Enter Lot D is: 0201. This code changes monthly and will always coincide with the current month followed by 01. For example August will be 0801.  The month will continue to change but the 01 will remain constant.  If lot D is full please use Parking Lot A, directly across the street.    Please enter the Cancer Care Center on the north end of the [facility].  You will see a sign on the building.        For Medical Oncology or Hematology appointments, please take the elevator to the second floor to check in.     Also please note appointments can last 1.5-2 hours.      We hope these instructions are helpful to you.  If you have any questions or concerns, please call us at [phone].  It is our pleasure to assist  you.    Warm Regards,  Bethanie Ruiz RN  Nurse Navigator  [phone]

## 2021-06-22 NOTE — PROGRESS NOTES
Office Visit - Follow up    Oscar Zhao   73 y.o. female    Date of Visit: 1/7/2019    Chief Complaint   Patient presents with     Hypertension     Dizziness     Vertigo     Ear Fullness     left ear aches       Subjective: Hypertension.    Emergency room follow-up from visit of December 4, 2018 1 blood pressure diastolic was over 100.    Benign positional vertigo and left ear pain also noted.  Seen by Dr. Mercer on December 6, 2018.  Amlodipine 5 mg added to lisinopril at 10 mg.  Medication list reviewed well-tolerated normal effects.    Allergy includes Idella Mab plus brimonidine plus Levaquin and tetracycline.    Mammogram allCLEAR June 25, 2018 colonoscopy normal with Dr. Orr on September 7, 2011.  No blood in stool or urine no chest pain or shortness of breath medication list reviewed well-tolerated normal effects.    ROS: A comprehensive review of systems was performed and was otherwise negative    Medications:  Prior to Admission medications    Medication Sig Start Date End Date Taking? Authorizing Provider   amLODIPine (NORVASC) 5 MG tablet Take 2 tablets (10 mg total) by mouth daily. 12/14/18  Yes Royce Perez MD   cholecalciferol, vitamin D3, (VITAMIN D3) 2,000 unit capsule Take 2,000 Units by mouth daily.   Yes PROVIDER, HISTORICAL   cyanocobalamin 1000 MCG tablet Take 1,000 mcg by mouth daily.   Yes PROVIDER, HISTORICAL   folic acid (FOLVITE) 1 MG tablet Take 1 mg by mouth daily.  5/2/16  Yes PROVIDER, HISTORICAL   lisinopril (PRINIVIL,ZESTRIL) 20 MG tablet Take 1 tablet (20 mg total) by mouth daily. 12/22/18  Yes Royce Perez MD   LOTEMAX 0.5 % ophthalmic suspension APPLY 1 DROP IN BOTH EYES ONCE A DAY 10/26/18  Yes PROVIDER, HISTORICAL   methotrexate 2.5 MG tablet Take 10 mg by mouth once a week .       9/21/17  Yes PROVIDER, HISTORICAL   metoprolol succinate (TOPROL XL) 25 MG Take 2 tablets (50 mg total) by mouth daily. 11/20/18  Yes Royce Perez MD   timolol (BETIMOL) 0.5 % ophthalmic solution  Administer 1 drop to both eyes 2 (two) times a day.   Yes PROVIDER, HISTORICAL       Allergies:   Allergies   Allergen Reactions     Adalimumab Myalgia     Brimonidine-Timolol Unknown     Redness itching in eyes     Levaquin [Levofloxacin]      Skin burn and couldn't get out bed for 5 days     Tetracyclines Hives       Immunizations:   Immunization History   Administered Date(s) Administered     DT (pediatric) 12/23/1998     Influenza [id number], 01/11/2010     Influenza high dose, seasonal 10/15/2015, 11/28/2016, 11/17/2017, 11/05/2018     Influenza, Seasonal, Inj PF IIV3 12/08/2009     Influenza, inj, historic,unspecified 12/08/2009, 11/02/2011     Influenza, seasonal,quad inj 6-35 mos 11/09/2012, 10/02/2014     Influenza,seasonal, Inj IIV3 11/03/2005, 11/29/2007, 10/28/2008, 10/01/2010, 11/02/2011, 11/09/2012, 11/13/2013, 10/02/2014     Pneumo Conj 13-V (2010&after) 10/15/2015     Pneumo Polysac 23-V 03/06/2009     Td, Adult, Absorbed 06/30/2006     Td,adult,historic,unspecified 06/30/2006     Tdap 09/13/2006, 05/18/2012     ZOSTER, LIVE 08/27/2009       Exam Chest clear to auscultation and percussion.  Heart tones regular rhythm without murmur rub or gallop.  Abdomen soft nontender no organomegaly.  No peritoneal signs.  Extremities free of edema cyanosis or clubbing.  Neck veins nondistended no thyromegaly or scleral icterus noted, carotids full.  Skin warm and dry easily conversant good spirited.  Normal intelligence.  Neurologically intact no gross localizing findings.    Assessment and Plan  Hypertension controlled.  132/74 pulse 64 respirations 18 O2 sats 98%.  Continue same meds and cares reemphasized salt restriction diet.    Vertigo benign positional reassured.    Left ear pain negative exam.    Multiple drug allergies see list above.    Time: total time spent with the patient was 25 minutes of which >50% was spent in counseling and coordination of care    The following high BMI interventions were performed  this visit: encouragement to exercise    Jak Jones MD    Patient Active Problem List   Diagnosis     Essential Hypertension     Difficulty Breathing (Dyspnea)     Chest Pain     Osteoarthrosis     S/P total hip arthroplasty     Cough     Primary osteoarthritis of left knee

## 2021-06-25 ENCOUNTER — HOSPITAL ENCOUNTER (OUTPATIENT)
Dept: CT IMAGING | Facility: CLINIC | Age: 76
Discharge: HOME | End: 2021-06-25
Attending: INTERNAL MEDICINE
Payer: MEDICARE

## 2021-06-25 ENCOUNTER — HOSPITAL ENCOUNTER (OUTPATIENT)
Dept: MRI IMAGING | Facility: CLINIC | Age: 76
Discharge: HOME | End: 2021-06-25
Attending: INTERNAL MEDICINE
Payer: MEDICARE

## 2021-06-25 DIAGNOSIS — C21.1 MALIGNANT NEOPLASM OF ANAL CANAL (H): ICD-10-CM

## 2021-06-25 LAB
CREAT BLD-MCNC: 0.7 MG/DL (ref 0.6–1.1)
GFR SERPL CREATININE-BSD FRML MDRD: >60 ML/MIN/1.73M2

## 2021-06-25 NOTE — TELEPHONE ENCOUNTER
Triage call:   Sinus symptoms that have been ongoing for 2 weeks  Cough, mucous, runny nose  Patient has pain in her upper left teeth- started a couple days ago  States mucous is clear/white but has a lot of drainage  Denies fever  Chest pain from coughing- productive clear white mucous  States she is able to breath at times through her nose after blowing it but states she blows her nose frequently  Denies additional symptoms  Advised for her sinus symptoms that she should be seen today or tomorrow- advised a virtual visit.      State she also has a hernia- LLQ  - states that it bulges out  - able to push hernia back in  - not painful  - denies additional symptoms  States hernia new within the last month and has not seen her PCP for this    Triage protocol indicates to be seen within the next 3 days. Reviewed additional care advice with patient and she verbalizes understanding. Patient only wants to see her PCP. Assisted in transferring to scheduling. No available appointments with PCP until June 8th per scheduling. Provider please advise- are there openings where patient can be seen sooner?     *Ok to leave a detailed message upon call back    Alycia Pritchard RN BSBA [facility] Triage/Med Refill 5/26/2021 1:41 PM    COVID 19 Nurse Triage Plan/Patient Instructions    Please be aware that novel coronavirus (COVID-19) may be circulating in the community. If you develop symptoms such as fever, cough, or SOB or if you have concerns about the presence of another infection including coronavirus (COVID-19), please contact your health care provider or visit  [email or web address].    Disposition/Instructions    Virtual Visit with provider recommended. Reference Visit Selection Guide. and In-Person Visit with provider recommended. Reference Visit Selection Guide.    Thank you for taking steps to prevent the spread of this virus.  o Limit your contact with others.  o Wear a simple mask to cover your  cough.  o Wash your hands well and often.    Resources    [facility]: About COVID-19: [email or web address]    CDC: What to Do If You're Sick: www.cdc.gov/coronavirus/2019-ncov/about/steps-when-sick.html    CDC: Ending Home Isolation: www.cdc.gov/coronavirus/2019-ncov/hcp/disposition-in-home-patients.html     CDC: Caring for Someone: www.cdc.gov/coronavirus/2019-ncov/if-you-are-sick/care-for-someone.html     [facility]: Interim Guidance for Hospital Discharge to Home: www.health.[email or web address].[place]/diseases/coronavirus/hcp/hospdischarge.pdf    [facility] clinical trials (COVID-19 research studies): [email or web address]-clinical-trials     Below are the COVID-19 hotlines at the [place] Department of Health ([facility]). Interpreters are available.   o For health questions: Call 113-441-9872 or 1-189.760.2830 (7 a.m. to 7 p.m.)  o For questions about schools and childcare: Call 685-312-6468 or 1-799.354.7850 (7 a.m. to 7 p.m.)         Reason for Disposition    Sinus congestion (pressure, fullness) present > 10 days    [1] New-onset hernia suspected (reducible bulge in groin or abdomen; non-tender) AND [2] NO pain or vomiting    Additional Information    Negative: Sounds like a life-threatening emergency to the triager    Negative: Difficulty breathing, and not from stuffy nose (e.g., not relieved by cleaning out the nose)    Negative: SEVERE headache and has fever    Negative: Patient sounds very sick or weak to the triager    Negative: SEVERE sinus pain    Negative: Severe headache    Negative: Redness or swelling on the cheek, forehead, or around the eye    Negative: Fever > 103 F (39.4 C)    Negative: Fever > 101 F (38.3 C) and over 60 years of age    Negative: Fever > 100.0 F (37.8 C) and has diabetes mellitus or a weak immune system (e.g., HIV positive, cancer chemotherapy, organ transplant, splenectomy, chronic steroids)    Negative: Fever > 100.0 F (37.8 C) and bedridden (e.g., nursing home  patient, stroke, chronic illness, recovering from surgery)    Negative: Fever present > 3 days (72 hours)    Negative: Fever returns after gone for over 24 hours and symptoms worse or not improved    Negative: Sinus pain (not just congestion) and fever    Negative: Earache    Negative: [1] Swelling of scrotum AND [2] has not previously been diagnosed with a hernia    Negative: SEVERE abdominal pain    Negative: Hernia is painful or tender to touch    Negative: [1] Vomiting AND [2] can't reduce the hernia    Negative: [1] Vomiting AND [2] abdomen looks much more swollen than usual    Negative: [1] Swollen lump in groin AND [2] pulsating (like heartbeat)    Negative: Patient sounds very sick or weak to the triager    Negative: [1] Constant abdominal pain AND [2] present > 2 hours  (NO pain or tenderness of hernia)    Negative: Can't reduce the hernia (NO pain, local tenderness, or vomiting)    Protocols used: SINUS PAIN AND CONGESTION-A-OH, HERNIA-A-AH

## 2021-06-25 NOTE — TELEPHONE ENCOUNTER
I contacted patient to schedule. Patient refused to see any other provider and will only see Dr. Jones for anything. Patient was willing to wait until next week. I scheduled patient with Dr. Jones.

## 2021-06-25 NOTE — PROGRESS NOTES
"    Assessment & Plan     UTI check UA UC.    Sinusitis try Z-Colby.    Anal cancer under treatment completed chemotherapy radiation therapy about 4-1/2 weeks ago asymptomatic to presentation found as a nodule on colonoscopy.    Multiple drug allergies noted.  Reviewed.    Left femoral hernia needs surgical correction with Dr. Sidney Celaya.  Reducible.    Review of external notes as documented in note  25 minutes spent on the date of the encounter doing chart review, interpretation of tests and patient visit        No follow-ups on file.    Jak Jones MD  [facility]   Oscar Zhao is 76 y.o. and presents today for the following health issues   HPI         Anal cancer completed chemotherapy and radiation therapy about 4-1/2 to 5 weeks ago.  Still weak but getting stronger.  Nausea vomiting resulted in left femoral hernia easily reducible small.    Review of Systems  No blood in stool or urine medication list reviewed reconciled.  Some minimal urinary incontinence.  Wonders about UTI.  UA UC pending      Objective    /66 (Patient Site: Right Arm, Patient Position: Sitting)   Pulse 80   Temp 97  F (36.1  C)   Ht 5' 3\" (1.6 m)   Wt 135 lb (61.2 kg)   SpO2 97%   BMI 23.91 kg/m    Body mass index is 23.91 kg/m .  Physical Exam  Chest clear neck veins nondistended no carotid bruits heart tones normal abdomen benign extremities free of edema.  Left femoral hernia noted reducible.  It is small.              "

## 2021-06-26 NOTE — PROGRESS NOTES
Progress Notes by Royce Perez MD at 12/6/2018  3:10 PM     Author: Royce Perez MD Service: -- Author Type: Physician    Filed: 12/6/2018  3:46 PM Encounter Date: 12/6/2018 Status: Signed    : Royce Perez MD (Physician)           Click to link to [facility]     [facility] HEART [facility] NOTE       Assessment/Plan:   1.  Palpitations: The patient had no palpitations.  Holter monitor was normal.  Continue to observe.    2.  Uncontrolled hypertension: Her blood pressure is still not controlled, 170/90 mmHg today.  Continue metoprolol succinate 50 mg daily.  Not able to increase metoprolol due to heart rate.  Add amlodipine 5 mg daily for better blood pressure control.  She will monitor her blood pressure and pulse in next 7 days and call me back if her blood pressure is not controlled less than 140/90 mmHg.    Thank you for the opportunity to be involved in the care of Oscar Zhao. If you have any questions, please feel free to contact me.  I will see the patient again in 2 months.    Much or all of the text in this note was generated through the use of Dragon Dictate voice-to-text software. Errors in spelling or words which seem out of context are unintentional.   Sound alike errors, in particular, may have escaped editing.       History of Present Illness:   It is my pleasure to see Oscar Zhao at the [facility] clinic for evaluation of Follow-up. Oscar Zhao is a 73 y.o. female with a medical history of arthritis and essential hypertension.    Oscar is referred to cardiology clinic for evaluation of for palpitations.  Palpitations were resolved.  Holter monitor was essentially normal.  The patient had no chest pain, shortness of breath, orthopnea, PND or leg edema.  She did have one episode of vertigo, resolved dated by itself.  Her blood pressures is still not controll.  Today it is 170/90 mmHg.  Her heart rate is a 60 bpm the patient currently is on metoprolol succinate 50 mg daily.       Past Medical History:     Patient Active Problem List   Diagnosis   ? Essential Hypertension   ? Difficulty Breathing (Dyspnea)   ? Chest Pain   ? Osteoarthrosis   ? S/P total hip arthroplasty   ? Cough   ? Primary osteoarthritis of left knee       Past Surgical History:     Past Surgical History:   Procedure Laterality Date   ? APPENDECTOMY     ? BREAST CYST EXCISION Right     benign   ? HYSTERECTOMY     ? OOPHORECTOMY     ? MO TOTAL ABDOM HYSTERECTOMY      Description: Hysterectomy;  Recorded: 08/19/2007;   ? MO TOTAL ABDOM HYSTERECTOMY      Description: Hysterectomy;  Recorded: 08/19/2007;   ? MO TOTAL KNEE ARTHROPLASTY Left 3/2/2017    Procedure: LEFT TOTAL KNEE ARTHROPLASTY;  Surgeon: Star Du MD;  Location: [facility];  Service: Orthopedics   ? TOTAL HIP ARTHROPLASTY Right 8/18/2015    Procedure: HIP TOTAL ARTHROPLASTY, RIGHT;  Surgeon: Star Du MD;  Location: [facility];  Service:        Family History:     Family History   Problem Relation Age of Onset   ? Anesthesia problems Neg Hx        Social History:    reports that  has never smoked. she has never used smokeless tobacco. She reports that she drinks about 8.4 oz of alcohol per week. She reports that she does not use drugs.    Review of Systems:   General: WNL  Eyes: WNL  Ears/Nose/Throat: WNL  Lungs: WNL  Heart: WNL  Stomach: WNL  Bladder: WNL  Muscle/Joints: WNL        Mental Health: WNL     Blood: WNL    Meds:     Current Outpatient Medications:   ?  cholecalciferol, vitamin D3, (VITAMIN D3) 2,000 unit capsule, Take 2,000 Units by mouth daily., Disp: , Rfl:   ?  cyanocobalamin 1000 MCG tablet, Take 1,000 mcg by mouth daily., Disp: , Rfl:   ?  folic acid (FOLVITE) 1 MG tablet, Take 1 mg by mouth daily. , Disp: , Rfl:   ?  LOTEMAX 0.5 % ophthalmic suspension, APPLY 1 DROP IN BOTH EYES ONCE A DAY, Disp: , Rfl: 4  ?  methotrexate 2.5 MG tablet, Take 10 mg by mouth once a week .   , Disp: , Rfl:   ?  metoprolol  "succinate (TOPROL XL) 25 MG, Take 2 tablets (50 mg total) by mouth daily., Disp: 60 tablet, Rfl: 11  ?  timolol (BETIMOL) 0.5 % ophthalmic solution, Administer 1 drop to both eyes 2 (two) times a day., Disp: , Rfl:   ?  amLODIPine (NORVASC) 5 MG tablet, Take 1 tablet (5 mg total) by mouth daily., Disp: 30 tablet, Rfl: 11     Allergies:   Adalimumab; Brimonidine-timolol; Levaquin [levofloxacin]; and Tetracyclines    Objective:      Physical Exam  140 lb (63.5 kg)  5' 3\" (1.6 m)  Body mass index is 24.8 kg/m .  BP (!) 162/100 (Patient Site: Left Arm, Patient Position: Sitting, Cuff Size: Adult Regular)   Pulse 60   Resp 12   Ht 5' 3\" (1.6 m)   Wt 140 lb (63.5 kg)   BMI 24.80 kg/m      General Appearance:   Awake, Alert, No acute distress.   HEENT:  Pupil equal, reactive to light. No scleral icterus; the mucous membranes were moist. No oral ulcers or thrush.    Neck: No cervical bruits. No JVD. No thyromegaly. No lymph node enlargement or tenderness.   Chest: The spine was straight. The chest was symmetric.   Lungs:   Respirations unlabored. Lungs are clear to auscultation. No crackles. No wheezing.   Cardiovascular:   RRR, normal first and second heart sounds with no murmurs. No rubs or gallops.    Abdomen:  Soft. No tenderness. Non-distended. Bowels sounds are present   Extremities: Equal posterior tibial pulses. No leg edema.   Skin: No rashes or ulcers. Warm, Dry.   Musculoskeletal: No tenderness. No deformity.   Neurologic: Mood and affect are appropriate. No focal deficits.         EKG:  Personally reivewed  Normal sinus rhythm  Normal ECG    Holter on 11-:  CONCLUSION:  Normal Holter.    Lab Review   Lab Results   Component Value Date     12/04/2018    K 4.0 12/04/2018     12/04/2018    CO2 26 12/04/2018    BUN 10 12/04/2018    CREATININE 0.68 12/04/2018    CALCIUM 10.0 12/04/2018     Lab Results   Component Value Date    WBC 9.3 12/04/2018    WBC 7.3 08/07/2015    HGB 15.1 12/04/2018    " HCT 46.0 12/04/2018    MCV 98 12/04/2018     12/04/2018     Lab Results   Component Value Date    CHOL 196 11/05/2018    TRIG 112 11/05/2018    HDL 60 11/05/2018     Lab Results   Component Value Date    TSH 0.89 12/04/2018

## 2021-06-26 NOTE — PROGRESS NOTES
Progress Notes by Royce Perez MD at 11/14/2018  7:50 AM     Author: Royce Perez MD Service: -- Author Type: Physician    Filed: 11/14/2018  8:33 AM Encounter Date: 11/14/2018 Status: Signed    : Royce Perez MD (Physician)           Click to link to [facility] HEART [facility] NOTE    Thank you, Dr. Jones, for asking me to see Oscar Zhao in consultation at [facility] to evaluate palpitations.      Assessment/Plan:   1.  Palpitations: The patient only had palpitation episodes during the night, not daytime.  She drinks 1-2 glass of wine almost at a daily basis.  The differential diagnosis of her palpitations could be caused by paroxysmal atrial fibrillation versus hypertension related.  The patient only had occasional episodes, could be once a month.  We discussed the evaluation.  We will control her blood pressure.  Also request a Holter monitor for evaluation of palpitations.    2.  Essential hypertension: She had multiple measurement of her blood pressure higher than normal range.  Most likely she developed essential hypertension.  Her blood pressure is 170/100 mmHg today.  Started metoprolol XL 25 mg daily.  She will monitor her blood pressure and pulse in next 10 days and call me back if her blood pressure is not controlled less than 140/90 mmHg.    Thank you for the opportunity to be involved in the care of Oscar Zhao. If you have any questions, please feel free to contact me.  I will see the patient again in 4 weeks.    Much or all of the text in this note was generated through the use of Dragon Dictate voice-to-text software. Errors in spelling or words which seem out of context are unintentional.   Sound alike errors, in particular, may have escaped editing.       History of Present Illness:   It is my pleasure to see Oscar Zhao at the [facility] for evaluation of Palpitations. Oscar Zhao is a 73 y.o. female with a medical history of  arthritis.    Oscar is referred to cardiology clinic for evaluation of for palpitations.  The patient states that she woke up from midnight with racing heart rate.  Usually she could following sleep after she drinks some water.  She is not quite sure how long her palpitation lasted.  She never had episodes during daytime.  She did not have snore.  She has been fine elevated blood pressure recently, but not on medications.  She does exercise regularly.  She had no shortness of breath, chest pain, palpitations during exercise.  She had no dizziness, orthopnea, PND or leg edema.  Her blood pressure this morning is 170/100 mmHg.  Her pause is 70s.    Past Medical History:     Patient Active Problem List   Diagnosis   ? Essential Hypertension   ? Difficulty Breathing (Dyspnea)   ? Chest Pain   ? Osteoarthrosis   ? S/P total hip arthroplasty   ? Cough   ? Primary osteoarthritis of left knee       Past Surgical History:     Past Surgical History:   Procedure Laterality Date   ? APPENDECTOMY     ? BREAST CYST EXCISION Right     benign   ? HYSTERECTOMY     ? OOPHORECTOMY     ? AL TOTAL ABDOM HYSTERECTOMY      Description: Hysterectomy;  Recorded: 08/19/2007;   ? AL TOTAL ABDOM HYSTERECTOMY      Description: Hysterectomy;  Recorded: 08/19/2007;   ? AL TOTAL KNEE ARTHROPLASTY Left 3/2/2017    Procedure: LEFT TOTAL KNEE ARTHROPLASTY;  Surgeon: Star Du MD;  Location: [facility];  Service: Orthopedics   ? TOTAL HIP ARTHROPLASTY Right 8/18/2015    Procedure: HIP TOTAL ARTHROPLASTY, RIGHT;  Surgeon: Star Du MD;  Location: [facility] OR;  Service:        Family History:     Family History   Problem Relation Age of Onset   ? Anesthesia problems Neg Hx        Social History:    reports that  has never smoked. she has never used smokeless tobacco. She reports that she drinks about 8.4 oz of alcohol per week. She reports that she does not use drugs.    Review of Systems:   General: WNL  Eyes:  "WNL  Ears/Nose/Throat: WNL  Lungs: WNL  Heart: WNL  Stomach: WNL  Bladder: WNL  Muscle/Joints: WNL  Skin: WNL  Nervous System: WNL  Mental Health: WNL     Blood: WNL    Meds:     Current Outpatient Medications:   ?  cholecalciferol, vitamin D3, (VITAMIN D3) 2,000 unit capsule, Take 2,000 Units by mouth daily., Disp: , Rfl:   ?  cyanocobalamin 1000 MCG tablet, Take 1,000 mcg by mouth daily., Disp: , Rfl:   ?  folic acid (FOLVITE) 1 MG tablet, Take 1 mg by mouth daily. , Disp: , Rfl:   ?  LOTEMAX 0.5 % ophthalmic suspension, APPLY 1 DROP IN BOTH EYES ONCE A DAY, Disp: , Rfl: 4  ?  methotrexate 2.5 MG tablet, Take 10 mg by mouth once a week .   , Disp: , Rfl:   ?  timolol (BETIMOL) 0.5 % ophthalmic solution, Administer 1 drop to both eyes 2 (two) times a day., Disp: , Rfl:   ?  metoprolol succinate (TOPROL XL) 25 MG, Take 1 tablet (25 mg total) by mouth daily., Disp: 30 tablet, Rfl: 11     Allergies:   Adalimumab; Brimonidine-timolol; Levaquin [levofloxacin]; and Tetracyclines    Objective:      Physical Exam  140 lb (63.5 kg)  5' 3\" (1.6 m)  Body mass index is 24.8 kg/m .  BP (!) 168/100 (Patient Site: Left Arm, Patient Position: Sitting, Cuff Size: Adult Regular)   Pulse 76   Resp 16   Ht 5' 3\" (1.6 m)   Wt 140 lb (63.5 kg)   BMI 24.80 kg/m      General Appearance:   Awake, Alert, No acute distress.   HEENT:  Pupil equal, reactive to light. No scleral icterus; the mucous membranes were moist. No oral ulcers or thrush.    Neck: No cervical bruits. No JVD. No thyromegaly. No lymph node enlargement or tenderness.   Chest: The spine was straight. The chest was symmetric.   Lungs:   Respirations unlabored. Lungs are clear to auscultation. No crackles. No wheezing.   Cardiovascular:   RRR, normal first and second heart sounds with no murmurs. No rubs or gallops.    Abdomen:  Soft. No tenderness. Non-distended. Bowels sounds are present   Extremities: Equal posterior tibial pulses. No leg edema.   Skin: No rashes or " ulcers. Warm, Dry.   Musculoskeletal: No tenderness. No deformity.   Neurologic: Mood and affect are appropriate. No focal deficits.         EKG:  Personally reivewed  Normal sinus rhythm  Normal ECG    Lab Review   Lab Results   Component Value Date     11/05/2018    K 4.0 11/05/2018     11/05/2018    CO2 26 11/05/2018    BUN 10 11/05/2018    CREATININE 0.65 11/05/2018    CALCIUM 10.2 11/05/2018     Lab Results   Component Value Date    WBC 6.1 11/05/2018    WBC 7.3 08/07/2015    HGB 16.2 (H) 11/05/2018    HCT 48.4 (H) 11/05/2018    MCV 97 11/05/2018     11/05/2018     Lab Results   Component Value Date    CHOL 196 11/05/2018    TRIG 112 11/05/2018    HDL 60 11/05/2018     Lab Results   Component Value Date    TSH 0.80 11/05/2018

## 2021-06-27 NOTE — PROGRESS NOTES
Progress Notes by Royce Perez MD at 1/30/2019  4:10 PM     Author: Royce Perez MD Service: -- Author Type: Physician    Filed: 1/30/2019  2:37 PM Encounter Date: 1/30/2019 Status: Signed    : Royce Perez MD (Physician)           Click to link to [facility] NOTE       Assessment/Plan:   1.  Palpitations: The patient had no palpitations.  Holter monitor was normal.  Continue to observe.    2.  Benign essential hypertension hypertension: Her blood pressure has been controlled after adding amlodipine 5 mg daily at last visit.  Continue metoprolol succinate 50 mg daily, amlodipine 5 mg daily, lisinopril 20 mg daily.         Thank you for the opportunity to be involved in the care of Oscar Zhao. If you have any questions, please feel free to contact me.  I will see the patient again in 12 months.    Much or all of the text in this note was generated through the use of Dragon Dictate voice-to-text software. Errors in spelling or words which seem out of context are unintentional.   Sound alike errors, in particular, may have escaped editing.       History of Present Illness:   It is my pleasure to see Oscar Zhao at the [facility] clinic for evaluation of Follow-up. Oscar Zhao is a 74 y.o. female with a medical history of arthritis and essential hypertension.    Oscar states that her palpitations has been now resolved.  She feels fine at this time.  Her blood pressure has been much better controlled now.  She had no chest pain, shortness of breath, orthopnea, PND or leg edema.  She didn't have vertigo since last visit.  Her blood pressures and heart rate are in normal range over last few weeks.      Past Medical History:     Patient Active Problem List   Diagnosis   ? Essential Hypertension   ? Difficulty Breathing (Dyspnea)   ? Chest Pain   ? Osteoarthrosis   ? S/P total hip arthroplasty   ? Cough   ? Primary osteoarthritis of left knee       Past Surgical  History:     Past Surgical History:   Procedure Laterality Date   ? APPENDECTOMY     ? BREAST CYST EXCISION Right     benign   ? HYSTERECTOMY     ? OOPHORECTOMY     ? AR TOTAL ABDOM HYSTERECTOMY      Description: Hysterectomy;  Recorded: 08/19/2007;   ? AR TOTAL ABDOM HYSTERECTOMY      Description: Hysterectomy;  Recorded: 08/19/2007;   ? AR TOTAL KNEE ARTHROPLASTY Left 3/2/2017    Procedure: LEFT TOTAL KNEE ARTHROPLASTY;  Surgeon: Star Du MD;  Location: [facility] Main OR;  Service: Orthopedics   ? TOTAL HIP ARTHROPLASTY Right 8/18/2015    Procedure: HIP TOTAL ARTHROPLASTY, RIGHT;  Surgeon: Star Du MD;  Location: [facility] OR;  Service:        Family History:     Family History   Problem Relation Age of Onset   ? Anesthesia problems Neg Hx        Social History:    reports that  has never smoked. she has never used smokeless tobacco. She reports that she drinks about 4.2 oz of alcohol per week. She reports that she does not use drugs.    Review of Systems:   General: WNL  Eyes: WNL  Ears/Nose/Throat: WNL  Lungs: WNL  Heart: WNL  Stomach: WNL  Bladder: WNL  Muscle/Joints: WNL  Skin: WNL  Nervous System: WNL  Mental Health: WNL     Blood: WNL    Meds:     Current Outpatient Medications:   ?  amLODIPine (NORVASC) 5 MG tablet, Take 2 tablets (10 mg total) by mouth daily., Disp: 180 tablet, Rfl: 3  ?  cholecalciferol, vitamin D3, (VITAMIN D3) 2,000 unit capsule, Take 2,000 Units by mouth daily., Disp: , Rfl:   ?  cyanocobalamin 1000 MCG tablet, Take 1,000 mcg by mouth daily., Disp: , Rfl:   ?  folic acid (FOLVITE) 1 MG tablet, Take 1 mg by mouth daily. , Disp: , Rfl:   ?  lisinopril (PRINIVIL,ZESTRIL) 20 MG tablet, Take 1 tablet (20 mg total) by mouth daily., Disp: 30 tablet, Rfl: 6  ?  LOTEMAX 0.5 % ophthalmic suspension, APPLY 1 DROP IN BOTH EYES ONCE A DAY, Disp: , Rfl: 4  ?  methotrexate 2.5 MG tablet, Take 10 mg by mouth once a week .   , Disp: , Rfl:   ?  metoprolol succinate (TOPROL XL)  "25 MG, Take 2 tablets (50 mg total) by mouth daily., Disp: 60 tablet, Rfl: 11  ?  timolol (BETIMOL) 0.5 % ophthalmic solution, Administer 1 drop to both eyes every morning.    , Disp: , Rfl:      Allergies:   Adalimumab; Brimonidine-timolol; Levaquin [levofloxacin]; and Tetracyclines    Objective:      Physical Exam  142 lb (64.4 kg)  5' 3\" (1.6 m)  Body mass index is 25.15 kg/m .  /70 (Patient Site: Right Arm, Patient Position: Sitting, Cuff Size: Adult Regular)   Pulse 68   Resp 16   Ht 5' 3\" (1.6 m)   Wt 142 lb (64.4 kg)   BMI 25.15 kg/m      General Appearance:   Awake, Alert, No acute distress.   HEENT:  Pupil equal, reactive to light. No scleral icterus; the mucous membranes were moist. No oral ulcers or thrush.    Neck: No cervical bruits. No JVD. No thyromegaly. No lymph node enlargement or tenderness.   Chest: The spine was straight. The chest was symmetric.   Lungs:   Respirations unlabored. Lungs are clear to auscultation. No crackles. No wheezing.   Cardiovascular:   RRR, normal first and second heart sounds with no murmurs. No rubs or gallops.    Abdomen:  Soft. No tenderness. Non-distended. Bowels sounds are present   Extremities: Equal posterior tibial pulses. No leg edema.   Skin: No rashes or ulcers. Warm, Dry.   Musculoskeletal: No tenderness. No deformity.   Neurologic: Mood and affect are appropriate. No focal deficits.         EKG:  Personally reivewed  Normal sinus rhythm  Normal ECG    Holter on 11-:  CONCLUSION:  Normal Holter.    Lab Review   Lab Results   Component Value Date     12/04/2018    K 4.0 12/04/2018     12/04/2018    CO2 26 12/04/2018    BUN 10 12/04/2018    CREATININE 0.68 12/04/2018    CALCIUM 10.0 12/04/2018     Lab Results   Component Value Date    WBC 9.3 12/04/2018    WBC 7.3 08/07/2015    HGB 15.1 12/04/2018    HCT 46.0 12/04/2018    MCV 98 12/04/2018     12/04/2018     Lab Results   Component Value Date    CHOL 196 11/05/2018    TRIG " 112 11/05/2018    HDL 60 11/05/2018     Lab Results   Component Value Date    TSH 0.89 12/04/2018

## 2021-06-28 ENCOUNTER — OFFICE VISIT - HEALTHEAST (OUTPATIENT)
Dept: ONCOLOGY | Facility: CLINIC | Age: 76
End: 2021-06-28

## 2021-06-28 ENCOUNTER — MEDICAL CORRESPONDENCE (OUTPATIENT)
Dept: HEALTH INFORMATION MANAGEMENT | Facility: CLINIC | Age: 76
End: 2021-06-28

## 2021-06-28 ENCOUNTER — AMBULATORY - HEALTHEAST (OUTPATIENT)
Dept: INFUSION THERAPY | Facility: CLINIC | Age: 76
End: 2021-06-28

## 2021-06-28 DIAGNOSIS — C21.1 MALIGNANT NEOPLASM OF ANAL CANAL (H): ICD-10-CM

## 2021-06-28 LAB
ALBUMIN SERPL-MCNC: 3.9 G/DL (ref 3.5–5)
ALP SERPL-CCNC: 64 U/L (ref 45–120)
ALT SERPL W P-5'-P-CCNC: 15 U/L (ref 0–45)
ANION GAP SERPL CALCULATED.3IONS-SCNC: 7 MMOL/L (ref 5–18)
AST SERPL W P-5'-P-CCNC: 17 U/L (ref 0–40)
BASOPHILS # BLD AUTO: 0.1 THOU/UL (ref 0–0.2)
BASOPHILS NFR BLD AUTO: 1 % (ref 0–2)
BILIRUB SERPL-MCNC: 1.3 MG/DL (ref 0–1)
BUN SERPL-MCNC: 9 MG/DL (ref 8–28)
CALCIUM SERPL-MCNC: 9.8 MG/DL (ref 8.5–10.5)
CHLORIDE BLD-SCNC: 106 MMOL/L (ref 98–107)
CO2 SERPL-SCNC: 24 MMOL/L (ref 22–31)
CREAT SERPL-MCNC: 0.69 MG/DL (ref 0.6–1.1)
EOSINOPHIL # BLD AUTO: 0.2 THOU/UL (ref 0–0.4)
EOSINOPHIL NFR BLD AUTO: 3 % (ref 0–6)
ERYTHROCYTE [DISTWIDTH] IN BLOOD BY AUTOMATED COUNT: 14.5 % (ref 11–14.5)
GFR SERPL CREATININE-BSD FRML MDRD: >60 ML/MIN/1.73M2
GLUCOSE BLD-MCNC: 108 MG/DL (ref 70–125)
HCT VFR BLD AUTO: 41 % (ref 35–47)
HGB BLD-MCNC: 13 G/DL (ref 12–16)
IMM GRANULOCYTES # BLD: 0 THOU/UL
IMM GRANULOCYTES NFR BLD: 0 %
LYMPHOCYTES # BLD AUTO: 1.3 THOU/UL (ref 0.8–4.4)
LYMPHOCYTES NFR BLD AUTO: 24 % (ref 20–40)
MCH RBC QN AUTO: 31.1 PG (ref 27–34)
MCHC RBC AUTO-ENTMCNC: 31.7 G/DL (ref 32–36)
MCV RBC AUTO: 98 FL (ref 80–100)
MONOCYTES # BLD AUTO: 0.6 THOU/UL (ref 0–0.9)
MONOCYTES NFR BLD AUTO: 11 % (ref 2–10)
NEUTROPHILS # BLD AUTO: 3.5 THOU/UL (ref 2–7.7)
NEUTROPHILS NFR BLD AUTO: 61 % (ref 50–70)
PLATELET # BLD AUTO: 241 THOU/UL (ref 140–440)
PMV BLD AUTO: 8.3 FL (ref 8.5–12.5)
POTASSIUM BLD-SCNC: 4.3 MMOL/L (ref 3.5–5)
PROT SERPL-MCNC: 6.7 G/DL (ref 6–8)
RBC # BLD AUTO: 4.18 MILL/UL (ref 3.8–5.4)
SODIUM SERPL-SCNC: 137 MMOL/L (ref 136–145)
WBC: 5.7 THOU/UL (ref 4–11)

## 2021-06-28 NOTE — PROGRESS NOTES
Progress Notes by Sidney Hodges PA-C at 12/27/2019 10:20 AM     Author: Sidney Hodges PA-C Service: -- Author Type: Physician Assistant    Filed: 12/27/2019 11:41 AM Encounter Date: 12/27/2019 Status: Signed    : Sidney Hodges PA-C (Physician Assistant)       Subjective:      Patient ID: Oscar Zhao is a 74 y.o. female.    Chief Complaint:    HPI  Oscar Zhao is a 74 y.o. female who presents today complaining of three day history of irritative voiding symptoms to include urinary hesitancy urgency frequency and dysuria.  Patient denies gross hematuria.  Denies fever chills night sweats fatigue or other red flag symptoms to include back or flank pain and no vaginal discharge.  She has used pyridium for discomfort on 12/24. She has had previous urinary tract infections does feel similar to how her other symptoms have been.        Past Medical History:   Diagnosis Date   ? Arthritis    ? Basal cell carcinoma of anterior chest    ? Breast cyst    ? History of anesthesia complications    ? Osteoporosis    ? PONV (postoperative nausea and vomiting)    ? Squamous cell carcinoma        Past Surgical History:   Procedure Laterality Date   ? APPENDECTOMY     ? BREAST CYST EXCISION Right 1981    benign   ? CLOSED REDUCTION HIP DISLOCATION Right 3/26/2019    Procedure: CLOSED REDUCTION, HIP;  Surgeon: Jak Ruiz DO;  Location: [facility];  Service: Orthopedics   ? HYSTERECTOMY     ? OOPHORECTOMY     ? [place] TOTAL ABDOM HYSTERECTOMY      Description: Hysterectomy;  Recorded: 08/19/2007;   ? [place] TOTAL ABDOM HYSTERECTOMY      Description: Hysterectomy;  Recorded: 08/19/2007;   ? NJ TOTAL KNEE ARTHROPLASTY Left 3/2/2017    Procedure: LEFT TOTAL KNEE ARTHROPLASTY;  Surgeon: Star Du MD;  Location: [facility] Main OR;  Service: Orthopedics   ? TOTAL HIP ARTHROPLASTY Right 8/18/2015    Procedure: HIP TOTAL ARTHROPLASTY, RIGHT;  Surgeon: Star Du MD;  Location: [facility] OR;  Service:         Family History   Problem Relation Age of Onset   ? Anesthesia problems Neg Hx        Social History     Tobacco Use   ? Smoking status: Never Smoker   ? Smokeless tobacco: Never Used   Substance Use Topics   ? Alcohol use: Yes     Alcohol/week: 14.0 standard drinks     Types: 14 Glasses of wine per week   ? Drug use: No       Review of Systems  As above in HPI, otherwise balance of Review of Systems are negative.    Objective:     /89 (Patient Site: Right Arm, Patient Position: Sitting, Cuff Size: Adult Regular)   Pulse 67   Temp 98.7  F (37.1  C) (Oral)   Resp 16   Wt 145 lb 8 oz (66 kg)   SpO2 98%   Breastfeeding No   BMI 25.77 kg/m      Physical Exam  Constitutional:       General: She is not in acute distress.     Appearance: She is well-developed. She is not diaphoretic.   HENT:      Head: Normocephalic and atraumatic.      Mouth/Throat:      Pharynx: No oropharyngeal exudate.   Eyes:      General: No scleral icterus.     Pupils: Pupils are equal, round, and reactive to light.   Neck:      Musculoskeletal: Normal range of motion and neck supple.   Cardiovascular:      Rate and Rhythm: Normal rate and regular rhythm.   Pulmonary:      Effort: Pulmonary effort is normal.      Breath sounds: Normal breath sounds.   Abdominal:      Palpations: Abdomen is soft. There is no mass.      Tenderness: There is no abdominal tenderness. There is no guarding or rebound.      Comments: No CVA tenderness to percussion  There is suprapubic tenderness to palpation and does reproduce the sense to urinate   Skin:     General: Skin is dry.      Findings: No rash.   Neurological:      Mental Status: She is alert and oriented to person, place, and time.       Lab:  Recent Results (from the past 24 hour(s))   Urinalysis-UC if Indicated   Result Value Ref Range    Color, UA Yellow Colorless, Yellow, Straw, Light Yellow    Clarity, UA Clear Clear    Glucose, UA Negative Negative    Bilirubin, UA Negative Negative     Ketones, UA Negative Negative    Specific Gravity, UA 1.025 1.005 - 1.030    Blood, UA Negative Negative    pH, UA 7.0 5.0 - 8.0    Protein, UA Negative Negative mg/dL    Urobilinogen, UA 0.2 E.U./dL 0.2 E.U./dL, 1.0 E.U./dL    Nitrite, UA Negative Negative    Leukocytes, UA Trace (!) Negative    Bacteria, UA Few (!) None Seen hpf    RBC, UA 0-2 None Seen, 0-2 hpf    WBC, UA 5-10 (!) None Seen, 0-5 hpf    Squam Epithel, UA 0-5 None Seen, 0-5 lpf    Amorphous, UA Few (!) None Seen    Mucus, UA Moderate (!) None Seen lpf     Urine culture is to follow.  Assessment:     Procedures    The encounter diagnosis was Dysuria.    Plan:     1. Dysuria  Urinalysis-UC if Indicated    cephalexin (KEFLEX) 500 MG capsule    Culture, Urine         Patient Instructions     Increased fluids and rest.  Discussed signs and symptoms of ascending urinary tract infection symptoms to include pyelonephritis. Instructed to turn to clinic if there are increased fever chills night sweats fatigue abdominal pain or flank pain  Antibiotic as written. Risks and benefits of medication discussed.  Indication for return to clinic.    As a result of our visit today, here are the action plans for you:    1. Medication(s) to stop: There are no discontinued medications.    2. Medication(s) to start or change:   Medications Ordered   Medications   ? cephalexin (KEFLEX) 500 MG capsule     Sig: Take 1 capsule (500 mg total) by mouth 2 (two) times a day for 10 days. For UTI.     Dispense:  20 capsule     Refill:  0       3. Other instructions: Yes      Urinary Tract Infections in Women    Urinary tract infections (UTIs) are most often caused by bacteria (germs). These bacteria enter the urinary tract. The bacteria may come from outside the body. Or they may travel from the skin outside the rectum or vagina into the urethra. Female anatomy makes it easier for bacteria from the bowel to enter a womans urinary tract, which is the most common source of UTI. This  means women develop UTIs more often than men. Pain in or around the urinary tract is a common UTI symptom. But the only way to know for sure if you have a UTI for the health care provider to test your urine. The two tests that may be done are the urinalysis and urine culture.  Types of UTIs    Cystitis: A bladder infection (cystitis) is the most common UTI in women. You may have urgent or frequent urination. You may also have pain, burning when you urinate, and bloody urine.    Urethritis: This is an inflamed urethra, which is the tube that carries urine from the bladder to outside the body. You may have lower stomach or back pain. You may also have urgent or frequent urination.    Pyelonephritis: This is a kidney infection. If not treated, it can be serious and damage your kidneys. In severe cases, you may be hospitalized. You may have a fever and lower back pain.  Medications to treat a UTI  Most UTIs are treated with antibiotics. These kill the bacteria. The length of time you need to take them depends on the type of infection. It may be as short as 3 days. If you have repeated UTIs, a low-dose antibiotic may be needed for several months. Take antibiotics exactly as directed. Dont stop taking them until all of the medication is gone. If you stop taking the antibiotic too soon, the infection may not go away, and you may develop a resistance to the antibiotic. This can make it much harder to treat.  Lifestyle changes to treat and prevent UTIs  The lifestyle changes below will help get rid of your UTI. They may also help prevent future UTIs.    Drink plenty of fluids. This includes water, juice, or other caffeine-free drinks. Fluids help flush bacteria out of your body.    Empty your bladder. Always empty your bladder when you feel the urge to urinate. And always urinate before going to sleep. Urine that stays in your bladder can lead to infection. Try to urinate before and after sex as well.    Practice good  personal hygiene. Wipe yourself from front to back after using the toilet. This helps keep bacteria from getting into the urethra.    Use condoms during sex. These help prevent UTIs caused by sexually transmitted bacteria. Also, avoid using spermicides during sex. These can increase the risk of UTIs. Choose other forms of birth control instead. For women who tend to get UTIs after sex, a low-dose of a preventive antibiotic may be used. Be sure to discuss this option with your health care provider.    Follow up with your health care provider as directed. He or she may test to make sure the infection has cleared. If necessary, additional treatment may be started.  Date Last Reviewed: 9/8/2014 2000-2016 [employer]. [address] [place] [address], [place] [address]. All rights reserved. This information is not intended as a substitute for professional medical care. Always follow your healthcare professional's instructions.

## 2021-06-28 NOTE — PROGRESS NOTES
Progress Notes by Sidney Hodges PA-C at 10/8/2019  8:50 AM     Author: Sidney Hodges PA-C Service: -- Author Type: Physician Assistant    Filed: 10/8/2019  9:39 AM Encounter Date: 10/8/2019 Status: Signed    : Sidney Hodges PA-C (Physician Assistant)       Subjective:      Patient ID: Oscar Zhao is a 74 y.o. female.    Chief Complaint:    HPI     Oscar Zhao is a 74 y.o. female who presents today complaining of 2 day of acute onset facial frontal and maxillary pain and pressure that is radiating to the teeth and to the jaw.  Patient has a headache that is made worse with dependency and nasal congestion and left ear pain.  Postnasal drainage.      Denies fever chills night sweats epistaxis or other constitutional symptoms.    Has used over-the-counter Benadryl and Coricidin with some relief.    She is a non-smoker not exposed any secondhand smoke.    Past Medical History:   Diagnosis Date   ? Arthritis    ? Basal cell carcinoma of anterior chest    ? Breast cyst    ? History of anesthesia complications    ? Osteoporosis    ? PONV (postoperative nausea and vomiting)    ? Squamous cell carcinoma        Past Surgical History:   Procedure Laterality Date   ? APPENDECTOMY     ? BREAST CYST EXCISION Right 1981    benign   ? CLOSED REDUCTION HIP DISLOCATION Right 3/26/2019    Procedure: CLOSED REDUCTION, HIP;  Surgeon: Jak Ruiz DO;  Location: [facility];  Service: Orthopedics   ? HYSTERECTOMY     ? OOPHORECTOMY     ? ID TOTAL ABDOM HYSTERECTOMY      Description: Hysterectomy;  Recorded: 08/19/2007;   ? ID TOTAL ABDOM HYSTERECTOMY      Description: Hysterectomy;  Recorded: 08/19/2007;   ? ID TOTAL KNEE ARTHROPLASTY Left 3/2/2017    Procedure: LEFT TOTAL KNEE ARTHROPLASTY;  Surgeon: Star Du MD;  Location: [facility] OR;  Service: Orthopedics   ? TOTAL HIP ARTHROPLASTY Right 8/18/2015    Procedure: HIP TOTAL ARTHROPLASTY, RIGHT;  Surgeon: Star Du MD;  Location: [facility] OR;  Service:        Family History   Problem Relation Age of Onset   ? Anesthesia problems Neg Hx        Social History     Tobacco Use   ? Smoking status: Never Smoker   ? Smokeless tobacco: Never Used   Substance Use Topics   ? Alcohol use: Yes     Alcohol/week: 14.0 standard drinks     Types: 14 Glasses of wine per week   ? Drug use: No       Review of Systems  As above in HPI, otherwise balance of Review of Systems are negative.    Objective:     /80   Pulse 66   Temp 98.6  F (37  C) (Oral)   Resp 17   Wt 145 lb (65.8 kg)   SpO2 99%   Breastfeeding? No   BMI 25.69 kg/m      Physical Exam  General: Patient is resting comfortably no acute distress is afebrile  HEENT: Head is normocephalic atraumatic   She has left-sided frontal and maxillary sinus tenderness to percussion with the left maxillary being the most involved.  eyes are PERRL EOMI sclera anicteric   TMs on the left is with dull cone of light reflex and fluid in the middle ear.  Throat is with mild posterior pharyngeal wall drainage  No cervical lymphadenopathy present  LUNGS: Clear to auscultation bilaterally  HEART: Regular rate and rhythm  Skin: Without rash non-diaphoretic      Assessment:     Procedures    The encounter diagnosis was Acute non-recurrent maxillary sinusitis.    Plan:     1. Acute non-recurrent maxillary sinusitis  amoxicillin (AMOXIL) 875 MG tablet       Had a conversation with the patient stating that we will treat her with an antibiotic and decongestants to include Flonase and continuation with Coricidin.  Indication for return was gone over questions were answered patient satisfaction before discharge.    Patient Instructions     Over-the-counter nasal steroid spray, follow packaging directions  Over-the-counter Mucinex, follow packaging directions  Hot packs 3 times per day to the forehead and face over the tender sinuses  Distal saline salt water or saline irrigation with Stacy Morales  Antibiotic as written  below.  Risks and benefits of the medication were gone over.  Indication for return to see urgent care or family practice provider for reevaluation and treatment.      Acute Bacterial Rhinosinusitis (ABRS)  Acute bacterial rhinosinusitis (ABRS) is an infection of your nasal cavity and sinuses. Its caused by bacteria. Acute means that youve had symptoms for less than 12 weeks.  Understanding your sinuses  The nasal cavity is the large air-filled space behind your nose. The sinuses are a group of spaces formed by the bones of your face. They connect with your nasal cavity. ABRS causes the tissue lining these spaces to become inflamed. Mucus may not drain normally. This leads to facial pain and other symptoms.  What causes ABRS?  ABRS most often follows an upper respiratory infection caused by a virus. Bacteria then infect the lining of your nasal cavity and sinuses. But you can also get ABRS if you have:    Nasal allergies    Long-term nasal swelling and congestion not caused by allergies    Blockage in the nose  Symptoms of ABRS  The symptoms of ABRS may be different for each person, and can include:    Nasal congestion    Runny nose    Fluid draining from the nose down the throat (postnasal drip)    Headache    Cough    Pain in the sinuses    Thick, colored fluid from the nose (mucus)    Fever  Diagnosing ABRS  ABRS may be diagnosed if youve had an upper respiratory infection like a cold and cough for longer than 10 to 14 days. Your health care provider will ask about your symptoms and your medical history. The provider will check your vital signs, including your temperature. Youll have a physical exam. The health care provider will check your ears, nose, and throat. You likely wont need any tests. If ABRS comes back, you may have a culture or other tests.  Treatment for ABRS  Treatment may include:    Antibiotic medicine. This is for symptoms that last for at least 10 to 14 days.    Nasal corticosteroid medicine.  Drops or spray used in the nose can lessen swelling and congestion.    Over-the-counter pain medicine. This is to lessen sinus pain and pressure.    Nasal decongestant medicine. Spray or drops may help to lessen congestion. Do not use them for more than a few days.    Salt wash (saline irrigation). This can help to loosen mucus.  Possible complications of ABRS  ABRS may come back or become long-term (chronic).  In rare cases, ABRS may cause complications such as:     Inflamed tissue around the brain and spinal cord (meningitis)    Inflamed tissue around the eyes (orbital cellulitis)    Inflamed bones around the sinuses (osteitis)  These problems may need to be treated in a hospital with intravenous (IV) antibiotic medicine or surgery.  When to call the health care provider  Call your health care provider if you have any of the following:    Symptoms that dont get better, or get worse    Symptoms that dont get better after 3 to 5 days on antibiotics    Trouble seeing    Swelling around your eyes    Confusion or trouble staying awake   Date Last Reviewed: 3/3/2015    4950-4299 [employer]. [address] [place] [address]. All rights reserved. This information is not intended as a substitute for professional medical care. Always follow your healthcare professional's instructions.          Use of over-the-counter Zyrtec.  Follow packaging directions  Use of over-the-counter Flonase  Frequent swallowing drinking and chewing gum to help create low-grade suction on the eustachian tube.  Elevate head at nighttime so it does not increase pressure  Use of over-the-counter Tylenol as needed for comfort.  Return to primary care provider for reevaluation treatment if any complication or new symptoms should present.        Patient Education     Earache, No Infection (Adult)  Earaches can happen without an infection. This occurs when air and fluid build up behind the eardrum causing a feeling of fullness  and discomfort and reduced hearing. This is called otitis media with effusion (OME) or serous otitis media. It means there is fluid in the middle ear. It is not the same as acute otitis media, which is typically from infection.  OME can happen when you have a cold if congestion blocks the passage that drains the middle ear. This passage is called the eustachian tube. OME may also occur with nasal allergies or after a bacterial middle ear infection.    The pain or discomfort may come and go. You may hear clicking or popping sounds when you chew or swallow. You may feel that your balance is off. Or you may hear ringing in the ear.  It often takes from several weeks up to 3 months for the fluid to clear on its own. Oral pain relievers and ear drops help if there is pain. Decongestants and antihistamines sometimes help. Antibiotics don't help since there is no infection. Your doctor may prescribe a nasal spray to help reduce swelling in the nose and eustachian tube. This can allow the ear to drain.  If your OME doesn't improve after 3 months, surgery may be used to drain the fluid and insert a small tube in the eardrum to allow continued drainage.  Because the middle ear fluid can become infected, it is important to watch for signs of an ear infection which may develop later. These signs include increased ear pain, fever, or drainage from the ear.  Home care  The following guidelines will help you care for yourself at home:    You may use over-the-counter medicine as directed to control pain, unless another medicine was prescribed. If you have chronic liver or kidney disease or ever had a stomach ulcer or GI bleeding, talk with your doctor before using these medicines. Aspirin should never be used in anyone under 18 years of age who is ill with a fever. It may cause severe liver damage.    You may use over-the-counter decongestants such as phenylephrine or pseudoephedrine. But they are not always helpful. Don't use  nasal spray decongestants more than 3 days. Longer use can make congestion worse. Prescription nasal sprays from your doctor don't typically have those restrictions.    Antihistamines may help if you are also having allergy symptoms.    You may use medicines such as guaifenesin to thin mucus and promote drainage.  Follow-up care  Follow up with your healthcare provider or as advised if you are not feeling better after 3 days.  When to seek medical advice  Call your healthcare provider right away if any of the following occur:    Your ear pain gets worse or does not start to improve     Fever of 100.4 F (38 C) or higher, or as directed by your healthcare provider    Fluid or blood draining from the ear    Headache or sinus pain    Stiff neck    Unusual drowsiness or confusion  Date Last Reviewed: 10/1/2016    8993-8485 [employer]. [address] [place] [address], [place] [address]. All rights reserved. This information is not intended as a substitute for professional medical care. Always follow your healthcare professional's instructions.

## 2021-06-28 NOTE — PROGRESS NOTES
Office Visit - Follow up    Oscar Zhao   72 y.o. female    Date of Visit: 1/4/2018    Chief Complaint   Patient presents with     Back Pain     lower - for one month       Subjective: Hypertension.    Follow-up after seeing [facility].  Intestinal issues I have cleared nicely with the addition of yogurt in the diet plus high fiber like Metamucil suggested.  Low back pain in new house on concrete floors discussed.    Colonoscopy dated September 7, 2011 normal with Dr. [facility].  No blood in stool or urine medication list reviewed well-tolerated.    ROS: A comprehensive review of systems was performed and was otherwise negative    Medications:  Prior to Admission medications    Medication Sig Start Date End Date Taking? Authorizing Provider   folic acid (FOLVITE) 1 MG tablet Take 1 mg by mouth daily.  5/2/16  Yes PROVIDER, HISTORICAL   methotrexate 2.5 MG tablet Take 25 mg by mouth. 9/21/17  Yes PROVIDER, HISTORICAL   prednisoLONE acetate (PRED-FORTE) 1 % ophthalmic suspension  6/17/17  Yes PROVIDER, HISTORICAL   predniSONE (DELTASONE) 1 MG tablet Take 1 mg by mouth daily. 2mg daily   Yes PROVIDER, HISTORICAL   timolol (BETIMOL) 0.5 % ophthalmic solution Administer 1 drop to both eyes 2 (two) times a day.   Yes PROVIDER, HISTORICAL       Allergies:   Allergies   Allergen Reactions     Adalimumab Myalgia     Brimonidine-Timolol Unknown     Redness itching in eyes     Levaquin [Levofloxacin]      Skin burn and couldn't get out bed for 5 days     Tetracyclines Hives       Immunizations:   Immunization History   Administered Date(s) Administered     DT (pediatric) 12/23/1998     Influenza [id number], 01/11/2010     Influenza high dose, seasonal 10/15/2015, 11/28/2016, 11/17/2017     Influenza, Seasonal, Inj PF IIV3 12/08/2009     Influenza, inj, historic,unspecified 12/08/2009, 11/02/2011     Influenza, seasonal,quad inj 6-35 mos 11/09/2012, 10/02/2014     Influenza,seasonal, Inj IIV3 11/03/2005,  11/29/2007, 10/28/2008, 10/01/2010, 11/02/2011, 11/09/2012, 11/13/2013, 10/02/2014     Pneumo Conj 13-V (2010&after) 10/15/2015     Pneumo Polysac 23-V 03/06/2009     Td, Adult, Absorbed 06/30/2006     Td,adult,historic,unspecified 06/30/2006     Tdap 09/13/2006, 05/18/2012     ZOSTER 08/27/2009       Exam Chest clear to auscultation and percussion.  Heart tones regular rhythm without murmur rub or gallop.  Abdomen soft nontender no organomegaly.  No peritoneal signs.  Extremities free of edema cyanosis or clubbing.  Neck veins nondistended no thyromegaly or scleral icterus noted, carotids full.  Skin warm and dry easily conversant good spirited.  Normal intelligence.  Neurologically intact no gross localizing findings.    Assessment and Plan  Irritable bowel syndrome better with yogurt and high-fiber diet Metamucil suggested.    Hypertension control.    Multiple drug allergies reviewed.    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  Improved at this time.    Time: total time spent with the patient was 25 minutes of which >50% was spent in counseling and coordination of care    The following high BMI interventions were performed this visit: encouragement to exercise return to clinic spring 2018 for full physical exam.    Jak Jones MD    Patient Active Problem List   Diagnosis     Essential Hypertension     Difficulty Breathing (Dyspnea)     Chest Pain     Osteoarthrosis     S/P total hip arthroplasty     Cough     Primary osteoarthritis of left knee      Patient baseline mental status

## 2021-06-29 NOTE — PROGRESS NOTES
Progress Notes by Royce Perez MD at 9/16/2020  8:50 AM     Author: Royce Perez MD Service: -- Author Type: Physician    Filed: 9/16/2020  9:16 AM Encounter Date: 9/16/2020 Status: Signed    : Royce Perez MD (Physician)           Click to link to [facility] HEART [facility] NOTE       Assessment/Plan:   1.  Palpitations: The patient's palpitations are secondary to sinus tachycardia.  The reason for this sinus tachycardia is not clear.  Her thyroid function is normal.  The patient has no anemia.  Holter monitor was normal.  Continue metoprolol XL 50 mg daily.  Heart rate are well controlled.    2.  Benign essential hypertension hypertension: Her blood pressure has been controlled with metoprolol succinate 50 mg daily, amlodipine 5 mg daily, lisinopril 20 mg daily.         Thank you for the opportunity to be involved in the care of Oscar Zhao. If you have any questions, please feel free to contact me.  I will see the patient again as needed.    Much or all of the text in this note was generated through the use of Dragon Dictate voice-to-text software. Errors in spelling or words which seem out of context are unintentional.   Sound alike errors, in particular, may have escaped editing.       History of Present Illness:   It is my pleasure to see Oscar Zhao at the [facility] for evaluation of Follow-up. Osacr Zhao is a 75 y.o. female with a medical history of arthritis, sinus tachycardia and essential hypertension.    Oscar states that her palpitations has been controlled well with metoprolol.  She developed palpitations again 2 weeks ago and had ER evaluation which was not impressive.  Finally, she found her medications were mixed up.  She restarted metoprolol again, her palpitations was resolved.   She had no chest pain, shortness of breath, orthopnea, PND or leg edema.  She didn't have vertigo since last visit.  Her blood pressures and heart rate are well  controlled.      Past Medical History:     Patient Active Problem List   Diagnosis   ? Essential hypertension   ? Difficulty Breathing (Dyspnea)   ? Chest Pain   ? Osteoarthrosis   ? S/P total hip arthroplasty   ? Cough   ? Primary osteoarthritis of left knee   ? Dislocation of internal right hip prosthesis (H)   ? Hip dislocation, right (H)       Past Surgical History:     Past Surgical History:   Procedure Laterality Date   ? APPENDECTOMY     ? BREAST CYST EXCISION Right 1981    benign   ? CLOSED REDUCTION HIP DISLOCATION Right 3/26/2019    Procedure: CLOSED REDUCTION, HIP;  Surgeon: Jak Ruiz DO;  Location: [facility];  Service: Orthopedics   ? HYSTERECTOMY     ? OOPHORECTOMY     ? SD TOTAL ABDOM HYSTERECTOMY      Description: Hysterectomy;  Recorded: 08/19/2007;   ? SD TOTAL ABDOM HYSTERECTOMY      Description: Hysterectomy;  Recorded: 08/19/2007;   ? SD TOTAL KNEE ARTHROPLASTY Left 3/2/2017    Procedure: LEFT TOTAL KNEE ARTHROPLASTY;  Surgeon: Star Du MD;  Location: [facility];  Service: Orthopedics   ? TOTAL HIP ARTHROPLASTY Right 8/18/2015    Procedure: HIP TOTAL ARTHROPLASTY, RIGHT;  Surgeon: Star Du MD;  Location: [facility];  Service:        Family History:     Family History   Problem Relation Age of Onset   ? Anesthesia problems Neg Hx        Social History:    reports that she has never smoked. She has never used smokeless tobacco. She reports current alcohol use of about 14.0 standard drinks of alcohol per week. She reports that she does not use drugs.    Review of Systems:   General: WNL  Eyes: WNL  Ears/Nose/Throat: WNL  Lungs: WNL  Heart: WNL  Stomach: WNL  Bladder: WNL  Muscle/Joints: WNL  Skin: WNL  Nervous System: WNL  Mental Health: WNL     Blood: WNL    Meds:     Current Outpatient Medications:   ?  amLODIPine (NORVASC) 5 MG tablet, TAKE 2 TABLETS BY MOUTH DAILY, Disp: 180 tablet, Rfl: 3  ?  ascorbic acid, vitamin C, (ASCORBIC ACID WITH  "MATT HIPS) 500 MG tablet, Take 500 mg by mouth daily., Disp: , Rfl:   ?  cholecalciferol, vitamin D3, (VITAMIN D3) 2,000 unit capsule, Take 2,000 Units by mouth daily., Disp: , Rfl:   ?  cyanocobalamin 1000 MCG tablet, Take 1,000 mcg by mouth daily., Disp: , Rfl:   ?  folic acid (FOLVITE) 1 MG tablet, Take 1 mg by mouth daily. , Disp: , Rfl:   ?  lisinopriL (PRINIVIL,ZESTRIL) 20 MG tablet, TAKE 1 TABLET(20 MG) BY MOUTH DAILY, Disp: 90 tablet, Rfl: 3  ?  LOTEMAX 0.5 % ophthalmic suspension, APPLY 1 DROP IN BOTH EYES ONCE A DAY, Disp: , Rfl: 4  ?  methotrexate 2.5 MG tablet, Take 10 mg by mouth once a week. (mondays)   , Disp: , Rfl:   ?  metoprolol succinate (TOPROL-XL) 25 MG, TAKE 2 TABLETS(50 MG) BY MOUTH DAILY, Disp: 180 tablet, Rfl: 3  ?  timolol (BETIMOL) 0.5 % ophthalmic solution, Administer 1 drop to both eyes every morning.    , Disp: , Rfl:      Allergies:   Adalimumab; Brimonidine-timolol; Levaquin [levofloxacin]; and Tetracyclines    Objective:      Physical Exam  141 lb (64 kg)  5' 2.75\" (1.594 m)  Body mass index is 25.18 kg/m .  /80 (Patient Site: Left Arm, Patient Position: Sitting, Cuff Size: Adult Regular)   Pulse 68   Resp 16   Ht 5' 2.75\" (1.594 m)   Wt 141 lb (64 kg)   BMI 25.18 kg/m      General Appearance:   Awake, Alert, No acute distress.   HEENT:  Pupil equal, reactive to light. No scleral icterus; the mucous membranes were moist. No oral ulcers or thrush.    Neck: No cervical bruits. No JVD. No thyromegaly. No lymph node enlargement or tenderness.   Chest: The spine was straight. The chest was symmetric.   Lungs:   Respirations unlabored. Lungs are clear to auscultation. No crackles. No wheezing.   Cardiovascular:   RRR, normal first and second heart sounds with no murmurs. No rubs or gallops.    Abdomen:  Soft. No tenderness. Non-distended. Bowels sounds are present   Extremities: Equal posterior tibial pulses. No leg edema.   Skin: No rashes or ulcers. Warm, Dry. "   Musculoskeletal: No tenderness. No deformity.   Neurologic: Mood and affect are appropriate. No focal deficits.         EKG:  Personally reivewed  Sinus tachycardia   Low voltage QRS   Cannot rule out Anterior infarct (cited on or before 04-DEC-2018)   Abnormal ECG   When compared with ECG of 26-MAR-2019 01:29,   Nonspecific T wave abnormality now evident in Lateral leads    Holter on 11-:  CONCLUSION:  Normal Holter.    Lab Review   Lab Results   Component Value Date     08/30/2020    K 3.8 08/30/2020     08/30/2020    CO2 19 (L) 08/30/2020    BUN 13 08/30/2020    CREATININE 0.74 08/30/2020    CALCIUM 10.0 08/30/2020     Lab Results   Component Value Date    WBC 6.6 08/30/2020    WBC 7.3 08/07/2015    HGB 11.9 (L) 08/30/2020    HCT 39.2 08/30/2020    MCV 83 08/30/2020     08/30/2020     Lab Results   Component Value Date    CHOL 186 02/24/2020    TRIG 88 02/24/2020    HDL 64 02/24/2020     Lab Results   Component Value Date    TSH 2.16 08/30/2020

## 2021-07-02 DIAGNOSIS — Z11.59 ENCOUNTER FOR SCREENING FOR OTHER VIRAL DISEASES: Primary | ICD-10-CM

## 2021-07-02 NOTE — H&P
H&P by Doreen Carty CNP at 3/15/2021 10:30 AM     Author: Doreen Carty CNP Service: Interventional Radiology Author Type: Nurse Practitioner    Filed: 3/15/2021 10:31 AM Date of Service: 3/15/2021 10:30 AM Status: Signed    : Doreen Carty CNP (Nurse Practitioner)         Interventional Radiology - History and Physical  3/15/2021    Procedure Requested: port placement -RIGHT side  Requesting Provider: Kristen Villa MD    HPI: Oscar Zhao is a 76 y.o. 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 Presents for port placement.    NPO Status: MN  Anticoagulation/Antiplatelets/Bleeding tendencies: NONE   Antibiotics: Ancef     Review of Systems: A comprehensive 10-point review of systems was performed. All systems were reviewed and negative with exception to those reported in the HPI.    PMH:  Past Medical History:   Diagnosis Date   ? Anemia    ? Basal cell carcinoma of anterior chest    ? Breast cyst    ? History of anesthesia complications    ? HLA B27 (HLA B27 positive)    ? Hypertension    ? Osteoarthritis    ? Osteoporosis 2011   ? Peripheral neuropathy 2018   ? PONV (postoperative nausea and vomiting)    ? Scleritis, bilateral     Left .  Treated with corticosteroids,, methotrexate and Humira.  .  Rituximab.   ? Squamous cell carcinoma of skin of chest    ? Steroid induced glaucoma, both eyes        PSH:  Past Surgical History:   Procedure Laterality Date   ? BREAST CYST EXCISION Right     benign   ? CATARACT EXTRACTION W/  INTRAOCULAR LENS IMPLANT Right 2017   ? CATARACT EXTRACTION W/  INTRAOCULAR LENS IMPLANT Left 2017   ?  SECTION     ? CLOSED REDUCTION HIP DISLOCATION Right 2019    Procedure: CLOSED REDUCTION, HIP;  Surgeon: Jak Ruiz DO;  Location: [facility] OR;  Service: Orthopedics   ? COLONOSCOPY  2011   ? COLONOSCOPY  2005   ? COLONOSCOPY W/ BIOPSIES AND POLYPECTOMY  2021     16 to 20 mm polyp:tubular adenoma in the ascending colon.  Ulcerated mass in the anal canal: Moderately differentiated squamous cell cancer.   ? ESOPHAGOGASTRODUODENOSCOPY  05/05/2017    Large hiatal hernia.  Reactive gastropathy with mucosal erosion.  H. pylori negative.   ? LAPAROSCOPIC APPENDECTOMY  04/28/2011   ? PORTACATH PLACEMENT     ? WA TOTAL KNEE ARTHROPLASTY Left 03/02/2017    Procedure: LEFT TOTAL KNEE ARTHROPLASTY;  Surgeon: Star Du MD;  Location: [facility] Main OR;  Service: Orthopedics   ? TOTAL ABDOMINAL HYSTERECTOMY W/ BILATERAL SALPINGOOPHORECTOMY  1996   ? TOTAL HIP ARTHROPLASTY Right 08/18/2015    Procedure: HIP TOTAL ARTHROPLASTY, RIGHT;  Surgeon: Star Du MD;  Location: [facility] Main OR;  Service:        ALLERGIES  Adalimumab, Brimonidine-timolol, Levaquin [levofloxacin], and Tetracyclines  Allergies   Allergen Reactions   ? Adalimumab Myalgia   ? Brimonidine-Timolol Unknown     Redness itching in eyes   ? Levaquin [Levofloxacin]      Skin burn and couldn't get out bed for 5 days   ? Tetracyclines Hives       MEDICATIONS:  Current Outpatient Medications on File Prior to Encounter   Medication Sig Dispense Refill   ? amLODIPine (NORVASC) 5 MG tablet TAKE 2 TABLETS BY MOUTH DAILY 180 tablet 3   ? LOTEMAX 0.5 % ophthalmic suspension APPLY 1 DROP IN BOTH EYES ONCE A DAY  4   ? metoprolol succinate (TOPROL-XL) 25 MG TAKE 2 TABLETS(50 MG) BY MOUTH DAILY 180 tablet 3   ? timolol (BETIMOL) 0.5 % ophthalmic solution Administer 1 drop to both eyes every morning.            ? ascorbic acid, vitamin C, (ASCORBIC ACID WITH MATT HIPS) 500 MG tablet Take 500 mg by mouth daily.     ? cholecalciferol, vitamin D3, (VITAMIN D3) 2,000 unit capsule Take 2,000 Units by mouth daily.     ? cyanocobalamin 1000 MCG tablet Take 1,000 mcg by mouth daily.     ? folic acid (FOLVITE) 1 MG tablet Take 1 mg by mouth daily.      ? lidocaine-prilocaine (EMLA) cream Apply to port site 1 hour before port  "needle access. 15 g 1   ? lisinopriL (PRINIVIL,ZESTRIL) 20 MG tablet TAKE 1 TABLET(20 MG) BY MOUTH DAILY 90 tablet 3   ? methotrexate 2.5 MG tablet Take 10 mg by mouth once a week. (mondays)           ? prednisoLONE acetate (PRED-FORTE) 1 % ophthalmic suspension Apply to eye as needed.     ? prochlorperazine (COMPAZINE) 10 MG tablet Take 1 tablet (10 mg total) by mouth every 6 (six) hours as needed (For breakthrough nausea/vomiting). 30 tablet 1     No current facility-administered medications on file prior to encounter.        LABS:  NONE   EXAM:  /86   Pulse 66   Temp 98.7  F (37.1  C)   Resp 16   Ht 5' 3\" (1.6 m)   Wt 136 lb (61.7 kg)   SpO2 96%   BMI 24.09 kg/m    General: Stable. In no acute distress  Neuro: A&O x3.  Moves all extremities equally.  Resp: Lungs CTA bilaterally.  Cardio: S1S2 and reg, without murmur, clicks or rubs.  Abdomen: +BS. Soft, non-distended and non-tender.    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  PLAN:    Port placement     The procedure, risks/benefits and moderate sedation were discussed with patient,  all questions answered and patient agrees to proceed with the procedure.      Doreen Carty, CNP  Interventional Radiology  [phone]       "

## 2021-07-04 NOTE — LETTER
Letter by Jak Jones MD at      Author: Jak Jones MD Service: -- Author Type: --    Filed:  Encounter Date: 6/3/2021 Status: (Other)         Oscar Zhao  [address] [facility] [address]             Asmita 3, 2021         Dear Ms. Pavel,    Below are the results from your recent visit:    Resulted Orders   Urinalysis-UC if Indicated   Result Value Ref Range    Color, UA Yellow Colorless, Yellow, Straw, Light Yellow    Clarity, UA Clear Clear    Glucose, UA Negative Negative    Protein, UA Negative Negative    Bilirubin, UA Negative Negative    Urobilinogen, UA 0.2 E.U./dL 0.2 E.U./dL, 1.0 E.U./dL    pH, UA 6.5 5.0 - 8.0    Blood, UA Negative Negative    Ketones, UA Negative Negative    Nitrite, UA Negative Negative    Leukocytes, UA Trace (!) Negative    Specific Gravity, UA 1.025 1.005 - 1.030    RBC, UA None Seen None Seen, 0-2 hpf    WBC, UA 0-5 None Seen, 0-5 hpf    Bacteria, UA None Seen None Seen    Squam Epithel, UA None Seen None Seen, 0-5 lpf    Narrative    Urine Culture ordered based on [facility]' criteria       All very good results    Please call with questions or contact us using [employer].    Sincerely,        Electronically signed by Jak Jones MD

## 2021-07-04 NOTE — ADDENDUM NOTE
Addendum Note by Dennise Kang RN at 4/27/2021  8:45 AM     Author: Dennise Kang RN Service: -- Author Type: Registered Nurse    Filed: 4/27/2021 10:00 AM Encounter Date: 4/27/2021 Status: Signed    : Dennise Kang RN (Registered Nurse)    Addended by: DENNISE KANG on: 4/27/2021 10:00 AM        Modules accepted: Orders, SmartSet

## 2021-07-05 PROBLEM — L30.9 PERIANAL DERMATITIS: Status: RESOLVED | Noted: 2021-04-06 | Resolved: 2021-06-28

## 2021-07-05 PROBLEM — Z76.89 PREVENTION OF CHEMOTHERAPY-INDUCED NEUTROPENIA: Status: RESOLVED | Noted: 2021-04-05 | Resolved: 2021-06-28

## 2021-07-05 PROBLEM — E87.1 ACUTE HYPONATREMIA: Status: RESOLVED | Noted: 2021-03-26 | Resolved: 2021-06-28

## 2021-07-05 PROBLEM — Z51.11 ENCOUNTER FOR ANTINEOPLASTIC CHEMOTHERAPY: Status: RESOLVED | Noted: 2021-03-22 | Resolved: 2021-06-28

## 2021-07-05 PROBLEM — R11.0 NAUSEA: Status: RESOLVED | Noted: 2021-03-26 | Resolved: 2021-06-28

## 2021-07-06 VITALS
HEIGHT: 63 IN | BODY MASS INDEX: 23.92 KG/M2 | WEIGHT: 135 LBS | SYSTOLIC BLOOD PRESSURE: 122 MMHG | HEART RATE: 80 BPM | TEMPERATURE: 97 F | OXYGEN SATURATION: 97 % | DIASTOLIC BLOOD PRESSURE: 66 MMHG

## 2021-07-06 VITALS
HEART RATE: 72 BPM | TEMPERATURE: 98.1 F | SYSTOLIC BLOOD PRESSURE: 114 MMHG | WEIGHT: 135.2 LBS | BODY MASS INDEX: 23.95 KG/M2 | OXYGEN SATURATION: 99 % | DIASTOLIC BLOOD PRESSURE: 71 MMHG

## 2021-07-07 ENCOUNTER — AMBULATORY - HEALTHEAST (OUTPATIENT)
Dept: FAMILY MEDICINE | Facility: CLINIC | Age: 76
End: 2021-07-07

## 2021-07-07 DIAGNOSIS — Z11.59 ENCOUNTER FOR SCREENING FOR OTHER VIRAL DISEASES: ICD-10-CM

## 2021-07-07 NOTE — PATIENT INSTRUCTIONS - HE
Recent Results (from the past 24 hour(s))   Comprehensive Metabolic Panel    Collection Time: 06/28/21 11:16 AM   Result Value Ref Range    Sodium 137 136 - 145 mmol/L    Potassium 4.3 3.5 - 5.0 mmol/L    Chloride 106 98 - 107 mmol/L    CO2 24 22 - 31 mmol/L    Anion Gap, Calculation 7 5 - 18 mmol/L    Glucose 108 70 - 125 mg/dL    BUN 9 8 - 28 mg/dL    Creatinine 0.69 0.60 - 1.10 mg/dL    GFR MDRD Af Amer >60 >60 mL/min/1.73m2    GFR MDRD Non Af Amer >60 >60 mL/min/1.73m2    Bilirubin, Total 1.3 (H) 0.0 - 1.0 mg/dL    Calcium 9.8 8.5 - 10.5 mg/dL    Protein, Total 6.7 6.0 - 8.0 g/dL    Albumin 3.9 3.5 - 5.0 g/dL    Alkaline Phosphatase 64 45 - 120 U/L    AST 17 0 - 40 U/L    ALT 15 0 - 45 U/L   HM1 (CBC with Diff)    Collection Time: 06/28/21 11:16 AM   Result Value Ref Range    WBC 5.7 4.0 - 11.0 thou/uL    RBC 4.18 3.80 - 5.40 mill/uL    Hemoglobin 13.0 12.0 - 16.0 g/dL    Hematocrit 41.0 35.0 - 47.0 %    MCV 98 80 - 100 fL    MCH 31.1 27.0 - 34.0 pg    MCHC 31.7 (L) 32.0 - 36.0 g/dL    RDW 14.5 11.0 - 14.5 %    Platelets 241 140 - 440 thou/uL    MPV 8.3 (L) 8.5 - 12.5 fL    Neutrophils % 61 50 - 70 %    Lymphocytes % 24 20 - 40 %    Monocytes % 11 (H) 2 - 10 %    Eosinophils % 3 0 - 6 %    Basophils % 1 0 - 2 %    Immature Granulocyte % 0 <=0 %    Neutrophils Absolute 3.5 2.0 - 7.7 thou/uL    Lymphocytes Absolute 1.3 0.8 - 4.4 thou/uL    Monocytes Absolute 0.6 0.0 - 0.9 thou/uL    Eosinophils Absolute 0.2 0.0 - 0.4 thou/uL    Basophils Absolute 0.1 0.0 - 0.2 thou/uL    Immature Granulocyte Absolute 0.0 <=0.0 thou/uL

## 2021-07-07 NOTE — PROGRESS NOTES
"Oncology Rooming Note    06/28/21 11:25 AM    Oscar Zhao is a 76 y.o. female who presents for:    Chief Complaint   Patient presents with     HE Cancer       Initial Vitals: /71   Pulse 72   Temp 98.1  F (36.7  C) (Oral)   Wt 135 lb 3.2 oz (61.3 kg)   SpO2 99%   BMI 23.95 kg/m       Estimated body mass index is 23.95 kg/m  as calculated from the following:    Height as of 6/1/21: 5' 3\" (1.6 m).    Weight as of this encounter: 135 lb 3.2 oz (61.3 kg).     Body surface area is 1.65 meters squared.      Allergies reviewed: Yes  Medications reviewed: Yes    Refills needed: No    Pharmacy name entered into EPIC:       Clinical concerns: tegan Pfeiffer RN      "

## 2021-07-07 NOTE — PROGRESS NOTES
[facility] Hematology and Oncology Progress Note    Patient: Oscar Zhao  MRN: [id number]  Date of Service: 06/28/2021        Reason for Visit    Anal squamous cell cancer.    Assessment/Plan  Cancer Staging  Malignant neoplasm of anal canal (H)  Staging form: Anus, AJCC 8th Edition  - Clinical: Stage IIA (cT2, cN0, cM0) - Signed by Altaf Jasso MD on 2/25/2021      Ms. Oscar Zhao is a 76 y.o. 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 She would just see a little blood on the toilet paper now and then which was thought to be due to hemorrhoids.     The lesion itself appears not to invade through the muscularis propria in the MRI.  Staged as stage II (T2, N0, M0).      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  She has been treated in the past with prednisone, methotrexate, Humira and rituximab for this condition.    She started on chemotherapy and radiation on 3/22/2021.  Chemotherapy was with mitomycin and fluorouracil infusion.  The fluorouracil pump was disconnected on 3/26/2021.  A week after that she was admitted to the hospital with hyponatremia thought to be due to poor oral intake from nausea and vomiting.    She had another admission to the hospital on 4/5/2021 with febrile neutropenia.  She was treated with antibiotics, antiviral cream for what appeared to be a this outbreak on her buttocks and G-CSF and she could be discharged on 4/8/2021.    She started the second session of chemotherapy with mitomycin and 5-FU on 4/19/2021.  The 5-FU infusion was completed on 4/23/2021 and she received a Neulasta on 4/24/2021.  She completed radiation on  4/29/2021.    1.  She is here for follow-up with me, about 2 months after completion of chemotherapy and radiation.  She admits to feeling anxious about the results of the CT scan and the MRI pelvis.  However on physical examination, she does look quite good.    2.  Labs from today were reviewed with her.  CBC differential and platelets show that the hemoglobin has normalized at 13.  White count is normal at 5.7 with a normal differential.  Platelets are normal at 241,000.  CMP is also stable with a creatinine of 0.69 and normal electrolytes.  LFTs are normal except for a mildly elevated bilirubin at 1.3.  She has had a similar mild bilirubin elevations in the past for a long time.  Suspect she has a slight benign problem with bilirubin excretion likely Gilbert's syndrome.  Needs only follow-up.    3.  I reviewed with her the images and the report of the CT scan of the chest abdomen and pelvis.  There was no evidence of metastatic disease anywhere.  There is only some minimal residual enhancement seen in the left side of the anus.  I then reviewed with her the images and the report of the MRI pelvis and compared it to the previous MRI that she had before start of treatment in February.  The anal mass has resolved with only a minimal wall thickening at the anorectal junction seen on the left side.  She was very happy to see the results.    4.  I discussed with her that in the imaging everything looks very good.  No progression of disease seen.  There is only some mild thickening or enhancement seen on the left side of the anal canal at the anorectal junction.  I suspect she still has some inflammation in that area from the chemotherapy and radiation as evidenced by the pain that she has on having a bowel movement.  I discussed with her that she should keep the appointment with Dr. Reynolds in July.  The detailed anal area examination is the key.  In the examination if there is no residual cancer seen, then she has had a  complete response.  She has promised to keep that appointment.    5.  She would like to have the Port-A-Cath removed.  We discussed the pros and cons.  She would rather have it removed than coming in for Port-A-Cath flushes.  I am asking for the Port-A-Cath to be removed by interventional radiology.  This will be scheduled soon.    6.  Regarding the end of mid abdominal pain that she has today, I suspect she has some anxiety about the appointment today adding on to her chronic reflux symptoms.  Advised her to continue with Pepcid.  I think this will subside as the news that the cancer response has been very good sinks in for her.  I reviewed with her that we do not see anything else suspicious in the CT scan.  She still has the large hiatal hernia that is known from earlier.  She voiced understanding.    7.  Reassured her that the hair will grow back.  I suggested to her that after she hears the good news in the evaluation with colorectal surgery, she should proceeded to have her hair styled and then she will feel much better.    8.  Advised her to start taking a tablespoon of Citrucel daily in a full glass of water.  This will make her stools soft and make it easier for her to have a bowel movement with less pain.    9.  Discussed with her about the follow-up plan that is needed for the anal cancer.  The standard is that even after she is confirmed to have a complete response in the rectal examination, she should have a ROSY every 3 to 6 months for 5 years.  Anoscopy is also needed every 6 to 12 months for 3 years.  Thus the main follow-up is with colorectal surgery.  She will need a CT scan of the chest abdomen and pelvis plus or minus an MRI pelvis annually for 3 years.  That can be done with medical oncology or with colorectal surgery according to her preference.  She says that she would like to come back and see us in 6 months.    10.  Follow-up: Return to clinic with MD or NP in 6 months.  Labs: CBC  differential platelets and CMP.    Time spend >40 minutes total time for the patient.       ECOG Performance   ECOG Performance Status: 0  Distress Assessment  Distress Assessment Score: No distress        Problem List    1. Malignant neoplasm of anal canal (H)  IR Port Removal Right    HM1(CBC and Differential)    Comprehensive Metabolic Panel    methylcellulose (CITRUCEL)           CC: Jak Jones MD Elizabeth Ester, MD Aren Reynolds MD      ______________________________________________________________________________      History    Ms. Oscar Zhao is here for follow-up alone.    She says that overall she is feeling much better but today she has some discomfort in the mid abdomen.  It is intermittent and dull.  On asking her, it is a similar to the reflux symptoms that she has had in the past.    She says that the rectal area pain has mostly subsided.  She feels a bit of pain still when she has to have a bowel movement.  She says that the bowel movements have changed somewhat.  They tend to be not over.  No blood in stool or black stools.    She did have a follow-up appointment with Dr. Reynolds on 6/2/2021.  She has another follow-up appointment in July, where the detailed examination of the anal area is planned.    She admits to feeling anxious about the results of the CT scan and MRI scan.    Has some occasional urinary leakage.  This is not really new for her.    She complains of having some thinning of hair after the chemotherapy.    Review of systems.  No fever or night sweats.  No loss of weight.  No lumps or bumps anywhere.  No unusual headaches or eyesight issues.  No dizziness.  No bleeding from the nose.  No sores in the mouth. No problems with swallowing.  No chest pain. No shortness of breath. No cough.  No nausea or vomiting.  No diarrhea or constipation.  No blood in stool or black colored stools.  No dysuria or frequency.  No numbness or tingling in hands or feet.  No skin rashes.  A  14 point review of systems is otherwise negative.          Past History  Past Medical History:   Diagnosis Date     Anemia      Basal cell carcinoma of anterior chest      Breast cyst      History of anesthesia complications      HLA B27 (HLA B27 positive)      Hypertension      Osteoarthritis      Osteoporosis 2011     Peripheral neuropathy 2018     PONV (postoperative nausea and vomiting)      Scleritis, bilateral     Left .  Treated with corticosteroids,, methotrexate and Humira.  .  Rituximab.     Squamous cell carcinoma of skin of chest      Steroid induced glaucoma, both eyes      Past Surgical History:   Procedure Laterality Date     BREAST CYST EXCISION Right     benign     CATARACT EXTRACTION W/  INTRAOCULAR LENS IMPLANT Right 2017     CATARACT EXTRACTION W/  INTRAOCULAR LENS IMPLANT Left 2017      SECTION       CLOSED REDUCTION HIP DISLOCATION Right 2019    Procedure: CLOSED REDUCTION, HIP;  Surgeon: Jak Ruiz DO;  Location: [facility] OR;  Service: Orthopedics     COLONOSCOPY  2011     COLONOSCOPY  2005     COLONOSCOPY W/ BIOPSIES AND POLYPECTOMY  2021    16 to 20 mm polyp:tubular adenoma in the ascending colon.  Ulcerated mass in the anal canal: Moderately differentiated squamous cell cancer.     ESOPHAGOGASTRODUODENOSCOPY  2017    Large hiatal hernia.  Reactive gastropathy with mucosal erosion.  H. pylori negative.     IR PORT PLACEMENT >5 YEARS  03/15/2021    Right IJ port-a-cath.     LAPAROSCOPIC APPENDECTOMY  2011     [place] TOTAL KNEE ARTHROPLASTY Left 2017    Procedure: LEFT TOTAL KNEE ARTHROPLASTY;  Surgeon: Star Du MD;  Location: [facility] OR;  Service: Orthopedics     TOTAL ABDOMINAL HYSTERECTOMY W/ BILATERAL SALPINGOOPHORECTOMY       TOTAL HIP ARTHROPLASTY Right 2015    Procedure: HIP TOTAL ARTHROPLASTY, RIGHT;  Surgeon: Star Du MD;  Location: [facility]  OR;  Service:          Allergies    Allergies   Allergen Reactions     Adalimumab Myalgia     Brimonidine-Timolol Unknown     Redness itching in eyes     Levaquin [Levofloxacin]      Skin burn and couldn't get out bed for 5 days     Tetracyclines Hives         Physical Exam    Recent Vitals 6/28/2021   Height -   Weight 135 lbs 3 oz   BSA (m2) 1.65 m2   /71   Pulse 72   Temp 98.1   Temp src 1   SpO2 99   Some recent data might be hidden       GENERAL: Alert and oriented to time place and person. Seated comfortably. In no distress.  She looks well overall.  Normal build.    HEAD: Atraumatic and normocephalic.    EYES: JUANCARLOS, EOMI.  No pallor.  No icterus.    Oral cavity: no mucosal lesion or tonsillar enlargement.    NECK: supple. JVP normal.  No thyroid enlargement.    LYMPH NODES: No palpable, cervical, axillary or inguinal lymphadenopathy.    CHEST: clear to auscultation bilaterally.  Symmetrical breath movements bilaterally.  Port-A-Cath over the the right upper chest looks unremarkable.    CVS: S1 and S2 are heard. Regular rate and rhythm.  No murmur or gallop or rub heard.  No peripheral edema.    ABDOMEN: Soft. Not tender. Not distended.  No palpable hepatomegaly or splenomegaly.  No other mass palpable.  Bowel sounds heard.    EXTREMITIES: Warm.    SKIN: no rash, or bruising or purpura.  Has a full head of hair.          Lab Results    Recent Results (from the past 24 hour(s))   Comprehensive Metabolic Panel    Collection Time: 06/28/21 11:16 AM   Result Value Ref Range    Sodium 137 136 - 145 mmol/L    Potassium 4.3 3.5 - 5.0 mmol/L    Chloride 106 98 - 107 mmol/L    CO2 24 22 - 31 mmol/L    Anion Gap, Calculation 7 5 - 18 mmol/L    Glucose 108 70 - 125 mg/dL    BUN 9 8 - 28 mg/dL    Creatinine 0.69 0.60 - 1.10 mg/dL    GFR MDRD Af Amer >60 >60 mL/min/1.73m2    GFR MDRD Non Af Amer >60 >60 mL/min/1.73m2    Bilirubin, Total 1.3 (H) 0.0 - 1.0 mg/dL    Calcium 9.8 8.5 - 10.5 mg/dL    Protein, Total 6.7  6.0 - 8.0 g/dL    Albumin 3.9 3.5 - 5.0 g/dL    Alkaline Phosphatase 64 45 - 120 U/L    AST 17 0 - 40 U/L    ALT 15 0 - 45 U/L   HM1 (CBC with Diff)    Collection Time: 06/28/21 11:16 AM   Result Value Ref Range    WBC 5.7 4.0 - 11.0 thou/uL    RBC 4.18 3.80 - 5.40 mill/uL    Hemoglobin 13.0 12.0 - 16.0 g/dL    Hematocrit 41.0 35.0 - 47.0 %    MCV 98 80 - 100 fL    MCH 31.1 27.0 - 34.0 pg    MCHC 31.7 (L) 32.0 - 36.0 g/dL    RDW 14.5 11.0 - 14.5 %    Platelets 241 140 - 440 thou/uL    MPV 8.3 (L) 8.5 - 12.5 fL    Neutrophils % 61 50 - 70 %    Lymphocytes % 24 20 - 40 %    Monocytes % 11 (H) 2 - 10 %    Eosinophils % 3 0 - 6 %    Basophils % 1 0 - 2 %    Immature Granulocyte % 0 <=0 %    Neutrophils Absolute 3.5 2.0 - 7.7 thou/uL    Lymphocytes Absolute 1.3 0.8 - 4.4 thou/uL    Monocytes Absolute 0.6 0.0 - 0.9 thou/uL    Eosinophils Absolute 0.2 0.0 - 0.4 thou/uL    Basophils Absolute 0.1 0.0 - 0.2 thou/uL    Immature Granulocyte Absolute 0.0 <=0.0 thou/uL         Imaging Results    Ct Chest Abdomen Pelvis With Oral With Iv Cont    Result Date: 6/25/2021  EXAM: CT CHEST ABDOMEN PELVIS W ORAL W IV CONTRAST LOCATION: [facility] DATE/TIME: 6/25/2021 9:47 AM INDICATION: Neoplasm: abdomen Anal cancer after completion of chemotherapy and radiation. COMPARISON: PET/CT on 03/05/2021. TECHNIQUE: CT scan of the chest, abdomen, and pelvis was performed before and after injection of IV contrast. Multiplanar reformats were obtained. Dose reduction techniques were used. CONTRAST: Iopamidol (Isovue-370) 100mL FINDINGS: LUNGS AND PLEURA: The lungs are clear. No pulmonary nodules. MEDIASTINUM/AXILLAE: Large hiatal hernia. No lymphadenopathy. CORONARY ARTERY CALCIFICATION: None. HEPATOBILIARY: Normal. PANCREAS: Normal. SPLEEN: Normal. ADRENAL GLANDS: Normal. KIDNEYS/BLADDER: Normal. BOWEL: Minimal residual enhancement left side of upper anus. Otherwise negative. LYMPH NODES: Normal. VASCULATURE: Unremarkable.  PELVIC ORGANS: Normal. MUSCULOSKELETAL: Degenerative changes in the spine. Right hip arthroplasty. No bone lesions.     No evidence of metastatic disease in the chest, abdomen, or pelvis.    Mr Pelvis With Without Contrast    Result Date: 6/25/2021  EXAM: MRI PELVIS WITHOUT AND WITH IV CONTRAST LOCATION: [facility] DATE/TIME: 6/25/2021 9:41 AM INDICATION: Malignant neoplasm, anus or anal canal. Follow-up of anal cancer after chemotherapy and radiation. COMPARISON: Outside MRI 02/23/2021 TECHNIQUE: Routine MRI pelvis without and with IV contrast. Axial, sagittal, and coronal high-resolution T2 and post gadolinium T1 with fat saturation. CONTRAST: Gadavist 6 mL FINDINGS: There is minimal wall thickening of the anorectal junction on the left, measuring 6 mm in thickness. Otherwise, previously seen upper anal mass has resolved. There is no pelvic or inguinal lymphadenopathy. Right hip arthroplasty again noted.     1.  Previously seen anal mass has nearly completely resolved. 2.  No evidence of metastatic disease in the pelvis.        Signed by: Kristen Villa MD

## 2021-07-09 ENCOUNTER — COMMUNICATION - HEALTHEAST (OUTPATIENT)
Dept: ADMINISTRATIVE | Facility: CLINIC | Age: 76
End: 2021-07-09

## 2021-07-10 ENCOUNTER — AMBULATORY - HEALTHEAST (OUTPATIENT)
Dept: FAMILY MEDICINE | Facility: CLINIC | Age: 76
End: 2021-07-10

## 2021-07-10 DIAGNOSIS — Z11.59 ENCOUNTER FOR SCREENING FOR OTHER VIRAL DISEASES: ICD-10-CM

## 2021-07-13 ENCOUNTER — RECORDS - HEALTHEAST (OUTPATIENT)
Dept: ADMINISTRATIVE | Facility: CLINIC | Age: 76
End: 2021-07-13

## 2021-07-14 ENCOUNTER — HOSPITAL ENCOUNTER (OUTPATIENT)
Dept: INTERVENTIONAL RADIOLOGY/VASCULAR | Facility: HOSPITAL | Age: 76
Discharge: HOME | End: 2021-07-14
Attending: INTERNAL MEDICINE | Admitting: RADIOLOGY
Payer: MEDICARE

## 2021-07-14 VITALS
OXYGEN SATURATION: 97 % | RESPIRATION RATE: 14 BRPM | TEMPERATURE: 97.8 F | BODY MASS INDEX: 23.92 KG/M2 | HEIGHT: 63 IN | DIASTOLIC BLOOD PRESSURE: 84 MMHG | SYSTOLIC BLOOD PRESSURE: 122 MMHG | WEIGHT: 135 LBS | HEART RATE: 83 BPM

## 2021-07-14 DIAGNOSIS — C21.1 MALIGNANT NEOPLASM OF ANAL CANAL (H): ICD-10-CM

## 2021-07-14 PROCEDURE — 250N000009 HC RX 250: Performed by: NURSE PRACTITIONER

## 2021-07-14 PROCEDURE — 99152 MOD SED SAME PHYS/QHP 5/>YRS: CPT

## 2021-07-14 PROCEDURE — 36590 REMOVAL TUNNELED CV CATH: CPT | Mod: TC

## 2021-07-14 PROCEDURE — 250N000011 HC RX IP 250 OP 636: Performed by: NURSE PRACTITIONER

## 2021-07-14 RX ORDER — AMLODIPINE BESYLATE 10 MG/1
10 TABLET ORAL DAILY
COMMUNITY
End: 2021-09-14

## 2021-07-14 RX ORDER — NALOXONE HYDROCHLORIDE 0.4 MG/ML
0.4 INJECTION, SOLUTION INTRAMUSCULAR; INTRAVENOUS; SUBCUTANEOUS
Status: DISCONTINUED | OUTPATIENT
Start: 2021-07-14 | End: 2021-07-15 | Stop reason: HOSPADM

## 2021-07-14 RX ORDER — NALOXONE HYDROCHLORIDE 0.4 MG/ML
0.2 INJECTION, SOLUTION INTRAMUSCULAR; INTRAVENOUS; SUBCUTANEOUS
Status: DISCONTINUED | OUTPATIENT
Start: 2021-07-14 | End: 2021-07-15 | Stop reason: HOSPADM

## 2021-07-14 RX ORDER — FENTANYL CITRATE 50 UG/ML
25-50 INJECTION, SOLUTION INTRAMUSCULAR; INTRAVENOUS EVERY 5 MIN PRN
Status: DISCONTINUED | OUTPATIENT
Start: 2021-07-14 | End: 2021-07-15 | Stop reason: HOSPADM

## 2021-07-14 RX ORDER — FLUMAZENIL 0.1 MG/ML
0.2 INJECTION, SOLUTION INTRAVENOUS
Status: DISCONTINUED | OUTPATIENT
Start: 2021-07-14 | End: 2021-07-15 | Stop reason: HOSPADM

## 2021-07-14 RX ADMIN — LIDOCAINE HYDROCHLORIDE 10 ML: 10 INJECTION, SOLUTION INFILTRATION; PERINEURAL at 12:35

## 2021-07-14 RX ADMIN — FENTANYL CITRATE 50 MCG: 50 INJECTION, SOLUTION INTRAMUSCULAR; INTRAVENOUS at 12:25

## 2021-07-14 RX ADMIN — FENTANYL CITRATE 25 MCG: 50 INJECTION, SOLUTION INTRAMUSCULAR; INTRAVENOUS at 12:31

## 2021-07-14 RX ADMIN — MIDAZOLAM HYDROCHLORIDE 0.5 MG: 1 INJECTION, SOLUTION INTRAMUSCULAR; INTRAVENOUS at 12:29

## 2021-07-14 RX ADMIN — MIDAZOLAM HYDROCHLORIDE 1 MG: 1 INJECTION, SOLUTION INTRAMUSCULAR; INTRAVENOUS at 12:22

## 2021-07-14 ASSESSMENT — MIFFLIN-ST. JEOR: SCORE: 1076.49

## 2021-07-14 NOTE — IP AVS SNAPSHOT
[facility] Interventional Radiology  [address] [place] [address]  Phone: [phone]  Fax: [phone]                                    After Visit Summary   7/14/2021    Oscar Zhao    MRN: [id number]           After Visit Summary Signature Page    I have received my discharge instructions, and my questions have been answered. I have discussed any challenges I see with this plan with the nurse or doctor.    ..........................................................................................................................................  Patient/Patient Representative Signature      ..........................................................................................................................................  Patient Representative Print Name and Relationship to Patient    ..................................................               ................................................  Date                                   Time    ..........................................................................................................................................  Reviewed by Signature/Title    ...................................................              ..............................................  Date                                               Time          22EPIC Rev 08/18

## 2021-07-14 NOTE — PRE-PROCEDURE
GENERAL PRE-PROCEDURE:   Procedure:  Port removal  Date/Time:  7/14/2021 11:31 AM    Written consent obtained?: Yes    Risks and benefits: Risks, benefits and alternatives were discussed    DC Plan: Appropriate discharge home plan in place for patients who are going home after procedure   Consent given by:  Patient  Patient states understanding of procedure being performed: Yes    Patient's understanding of procedure matches consent: Yes    Procedure consent matches procedure scheduled: Yes    Expected level of sedation:  Moderate  Appropriately NPO:  Yes  ASA Class:  Class 2- mild systemic disease, no acute problems, no functional limitations  Mallampati  :  Grade 1- soft palate, uvula, tonsillar pillars, and posterior pharyngeal wall visible  Lungs:  Lungs clear with good breath sounds bilaterally  Heart:  Normal heart sounds and rate  History & Physical reviewed:  History and physical reviewed and no updates needed  Statement of review:  I have reviewed the lab findings, diagnostic data, medications, and the plan for sedation

## 2021-07-14 NOTE — DISCHARGE INSTRUCTIONS
Port Removal Discharge Instructions:  You had your port-a-catheter removed today. Please follow the below instructions following your port removal:    Care Instructions:  - Avoid tub baths, Jacuzzi and pool soaks for 10 days.  - You may shower beginning tomorrow. Do not scrub site until well healed; pat dry.  - If you experience significant bleeding at site, apply pressure with hands above the clavicle bone, sit upright.    Seek medical assistance for any of the following:   - Uncontrolled bleeding.  - You have a fever (greater than 101 F (38.3C)).  - Purulent (yellow/green/foul smelling) drainage from previous catheter insertion site.  - Increasing redness at previous catheter insertion site.  - Increasing pain at previous catheter insertion site.  - Increasing swelling at previous catheter insertion site.    Call [facility] ([phone]) with questions or concerns.

## 2021-07-14 NOTE — PRE-PROCEDURE
GENERAL PRE-PROCEDURE:   Procedure:  Port removal  Date/Time:  7/14/2021 11:55 AM    Written consent obtained?: Yes    Risks and benefits: Risks, benefits and alternatives were discussed    Consent given by:  Patient  Patient states understanding of procedure being performed: Yes    Patient's understanding of procedure matches consent: Yes    Procedure consent matches procedure scheduled: Yes    Expected level of sedation:  Moderate  Appropriately NPO:  Yes  ASA Class:  Class 3- Severe systemic disease, definite functional limitations  Mallampati  :  Grade 2- soft palate, base of uvula, tonsillar pillars, and portion of posterior pharyngeal wall visible  Lungs:  Lungs clear with good breath sounds bilaterally  Heart:  Normal heart sounds and rate  History & Physical reviewed:  History and physical reviewed and no updates needed  Statement of review:  I have reviewed the lab findings, diagnostic data, medications, and the plan for sedation

## 2021-07-14 NOTE — IP AVS SNAPSHOT
After Visit Summary Template Not Found    This Print Group is only intended to be used in the After Visit Summary and can only be used in a report that uses a released After Visit Summary Template.                       MRN:[id number]                      After Visit Summary   7/14/2021    Oscar Zhao    MRN: [id number]           Visit Information        Provider Department      7/14/2021 12:00 PM [facility] RAD[facility]OLOG[facility] NURSE; J[facility]  [facility] Interventional Radiology           Review of your medicines      UNREVIEWED medicines. Ask your doctor about these medicines       Dose / Directions   amLODIPine 10 MG tablet  Commonly known as: NORVASC  Ask about: Which instructions should I use?      Dose: 10 mg  Take 10 mg by mouth daily  Refills: 0     famotidine 20 MG tablet  Commonly known as: PEPCID  Used for: Nausea      Dose: 20 mg  [FAMOTIDINE (PEPCID) 20 MG TABLET] Take 1 tablet (20 mg total) by mouth 2 (two) times a day.  Refills: 0     lisinopril 20 MG tablet  Commonly known as: ZESTRIL      Dose: 20 mg  [LISINOPRIL (PRINIVIL,ZESTRIL) 20 MG TABLET] Take 20 mg by mouth daily.  Refills: 0     Lotemax 0.5 % ophthalmic suspension  Generic drug: loteprednol      Dose: 1 drop  [LOTEMAX 0.5 % OPHTHALMIC SUSPENSION] Administer 1 drop to both eyes every other day.  Refills: 4     methylcellulose powder  Commonly known as: CITRUCEL  Used for: Malignant neoplasm of anal canal (H)      Dose: 2 g  [METHYLCELLULOSE (CITRUCEL)] Take 2 g by mouth daily. 1 tablespoon in 1 glass of water.  Refills: 0     metoprolol succinate ER 25 MG 24 hr tablet  Commonly known as: TOPROL-XL  Used for: Essential hypertension      [METOPROLOL SUCCINATE (TOPROL-XL) 25 MG] TAKE 2 TABLETS(50 MG) BY MOUTH DAILY  Quantity: 180 tablet  Refills: 3     timolol hemihydrate 0.5 % ophthalmic solution  Commonly known as: BETIMOL      Dose: 1 drop  [TIMOLOL (BETIMOL) 0.5 % OPHTHALMIC SOLUTION] Administer 1 drop to both eyes 2 (two) times  a day.  Refills: 0              Protect others around you: Learn how to safely use, store and throw away your medicines at www.disposemymeds.org.       Follow-ups after your visit       Your next 10 appointments already scheduled    Bob 10, 2022 11:00 AM  LAB with [facility] INFUSION CLINIC LAB  [facility] ([facility] ) [address] [place] [id number] [phone]   Please do not eat 10-12 hours before your appointment if you are coming in fasting for labs on lipids, cholesterol, or glucose (sugar). Does not apply to pregnant women. Water, tea and black coffee (with nothing added) is okay. Do not drink other fluids, diet soda or gum. If you have concerns about taking your medications, please send a message by clicking on Secure Messaging, Message Your Care Team.     Bob 10, 2022 11:30 AM  Return Visit with Kristen Villa MD  [facility] ([facility] ) [address] [place] [id number] [phone]         Care Instructions       Further instructions from your care team       Port Removal Discharge Instructions:  You had your port-a-catheter removed today. Please follow the below instructions following your port removal:    Care Instructions:  - Avoid tub baths, Jacuzzi and pool soaks for 10 days.  - You may shower beginning tomorrow. Do not scrub site until well healed; pat dry.  - If you experience significant bleeding at site, apply pressure with hands above the clavicle bone, sit upright.    Seek medical assistance for any of the following:   - Uncontrolled bleeding.  - You have a fever (greater than 101 F (38.3C)).  - Purulent (yellow/green/foul smelling) drainage from previous catheter insertion site.  - Increasing redness at previous catheter insertion site.  - Increasing pain at previous catheter insertion site.  - Increasing swelling at previous catheter insertion site.    Call  "[facility] ([phone]) with questions or concerns.      Additional Information About Your Visit       UXFLIPharPycno Information    [facility] gives you secure access to your electronic health record. If you see a primary care provider, you can also send messages to your care team and make appointments. If you have questions, please call your primary care clinic.  If you do not have a primary care provider, please call [phone] and they will assist you.       Care EveryWhere ID    This is your Care EveryWhere ID. This could be used by other organizations to access your [facility] medical records  [id number]       Your Vitals Were  Most recent update: 7/14/2021  1:36 PM    Blood Pressure   122/84          Pulse   83          Temperature   97.8  F (36.6  C)          Respirations   14          Height   1.6 m (5' 3\")             Weight   61.2 kg (135 lb)    Pulse Oximetry   97%    BMI (Body Mass Index)   23.91 kg/m           Primary Care Provider Office Phone # Fax #    Jak Jones [phone]      Equal Access to Services    [facility]: Hadii aad ku hadasho Soomaali, waaxda luqadaha, qaybta kaalmada adeegyada, mayank mckeon . So [facility] [phone].    ATENCIÓN: Si habla español, tiene a swift disposición servicios gratuitos de asistencia lingüística. Llame al [phone].    We comply with applicable federal and state civil rights laws, including the [place] Human Rights Act. We do not discriminate on the basis of race, color, creed, Lutheran, national origin, marital status, age, disability, sex, sexual orientation, or gender identity.    If you would like an itemization of your charges they will now be available in [facility] 30 days after discharge. To access the itemized statements in [employer] go to billing/billing summary. From there select view account. There will be multiple tabs showing an overview of your account, detail, payments, and communications. From " the communications tab you can see your monthly statements, your itemized statements, and any billing letters generated for your account. If you do not have a [facility] account and need help getting access please contact [employer] support at [phone].  If you would prefer to have your itemized statements mailed please contact our automated itemized bill request line at [phone] option  2.       Thank you!    Thank you for choosing [facility] for your care. Our goal is always to provide you with excellent care. Hearing back from our patients is one way we can continue to improve our services. Please take a few minutes to complete the written survey that you may receive in the mail after you visit with us. Thank you!            Medication List      ASK your doctor about these medications          Morning Afternoon Evening Bedtime As Needed    amLODIPine 10 MG tablet  Also known as: NORVASC  INSTRUCTIONS: Take 10 mg by mouth daily  Ask about: Which instructions should I use?                     famotidine 20 MG tablet  Also known as: PEPCID  INSTRUCTIONS: [FAMOTIDINE (PEPCID) 20 MG TABLET] Take 1 tablet (20 mg total) by mouth 2 (two) times a day.                     lisinopril 20 MG tablet  Also known as: ZESTRIL  INSTRUCTIONS: [LISINOPRIL (PRINIVIL,ZESTRIL) 20 MG TABLET] Take 20 mg by mouth daily.                     Lotemax 0.5 % ophthalmic suspension  INSTRUCTIONS: [LOTEMAX 0.5 % OPHTHALMIC SUSPENSION] Administer 1 drop to both eyes every other day.  Generic drug: loteprednol                     methylcellulose powder  Also known as: CITRUCEL  INSTRUCTIONS: [METHYLCELLULOSE (CITRUCEL)] Take 2 g by mouth daily. 1 tablespoon in 1 glass of water.                     metoprolol succinate ER 25 MG 24 hr tablet  Also known as: TOPROL-XL  INSTRUCTIONS: [METOPROLOL SUCCINATE (TOPROL-XL) 25 MG] TAKE 2 TABLETS(50 MG) BY MOUTH DAILY                     timolol hemihydrate 0.5 % ophthalmic solution  Also known as:  BETIMOL  INSTRUCTIONS: [TIMOLOL (BETIMOL) 0.5 % OPHTHALMIC SOLUTION] Administer 1 drop to both eyes 2 (two) times a day.

## 2021-07-14 NOTE — PROGRESS NOTES
Pt given discharge instructions and IV DC'd.  Site covered with gauze and coban. Pt left stable with her daughter.

## 2021-07-14 NOTE — PROCEDURES
[facility]    Procedure: IR Procedure Note    Date/Time: 7/14/2021 12:46 PM  Performed by: Pramod Garzon MD  Authorized by: Pramod Garzon MD     UNIVERSAL PROTOCOL   Site Marked: NA  Prior Images Obtained and Reviewed:  Yes  Required items: Required blood products, implants, devices and special equipment available    Patient identity confirmed:  Verbally with patient, arm band, provided demographic data and hospital-assigned identification number  Patient was reevaluated immediately before administering moderate or deep sedation or anesthesia  Confirmation Checklist:  Patient's identity using two indicators, relevant allergies, procedure was appropriate and matched the consent or emergent situation and correct equipment/implants were available  Time out: Immediately prior to the procedure a time out was called    Universal Protocol: the Joint Commission Universal Protocol was followed    Preparation: Patient was prepped and draped in usual sterile fashion    ESBL (mL):  2         ANESTHESIA    Anesthesia: Local infiltration  Local Anesthetic:  Lidocaine 1% without epinephrine and lidocaine 1% with epinephrine      SEDATION    Patient Sedated: Yes    Sedation Type:  Moderate (conscious) sedation  Sedation:  Fentanyl and midazolam  Vital signs: Vital signs monitored during sedation    See dictated procedure note for full details.  Findings: R internal jugular port removed without complication    Specimens: none    Complications: None    Condition: Stable    PROCEDURE   Patient Tolerance:  Patient tolerated the procedure well with no immediate complications    Length of time physician/provider present for 1:1 monitoring during sedation: 30

## 2021-07-14 NOTE — H&P
Interventional Radiology - Pre-Procedure Note:  7/14/2021    Procedure Requested: Port Removal  Requested by: Kristen Villa MD    History and Physical Reviewed: H&P documented within 30 days (by Patsy Spencer MD on 07/09/2021).  I have personally reviewed the patient's medical history and have updated the medical record as necessary.    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 She has completed her therapy and a port removal has been requested.      NPO: [place]  ANTICOAGULANTS: None  ANTIBIOTICS: None    ALLERGIES  Allergies   Allergen Reactions     Adalimumab Muscle Pain (Myalgia)     Brimonidine-Timolol [Brimonidine Tartrate-Timolol] Unknown     Redness itching in eyes     Levaquin [Levofloxacin] Unknown     Skin burn and couldn't get out bed for 5 days     Tetracyclines Hives         LABS:  No results found for: INR   Hemoglobin   Date Value Ref Range Status   05/11/2021 10.1 (L) 12.0 - 16.0 g/dL Final   ]  Platelet Count   Date Value Ref Range Status   05/11/2021 343 140 - 440 thou/uL Final     Creatinine   Date Value Ref Range Status   05/11/2021 0.55 (L) 0.60 - 1.10 mg/dL Final     Potassium   Date Value Ref Range Status   05/11/2021 4.3 3.5 - 5.0 mmol/L Final         EXAM:  There were no vitals taken for this visit.  General:  Stable.  In no acute distress.    Neuro:  A&O x 3. Moves all extremities equally.  Resp:  Lungs clear to auscultation bilaterally.  Cardio:  S1S2 and reg, without murmur, clicks or rubs  Skin:  Without excoriations, ecchymosis, erythema, lesions or open sores on RIGHT  .    Pre-Sedation Assessment:  PE completed by Pramod Garzon MD.       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 She has completed her therapy and a port removal has been requested.     Proceed with port removal.     Procedure, risks/benefits, details,  alternatives, and sedation reviewed with patient and she verbalized understanding. All questions answered. OK to proceed with above radiology procedure.     Consent and PE completed by Pramod Garzon MD.     Katherin Mishra CNP  Interventional Radiology

## 2021-07-21 ENCOUNTER — RECORDS - HEALTHEAST (OUTPATIENT)
Dept: ADMINISTRATIVE | Facility: CLINIC | Age: 76
End: 2021-07-21

## 2021-07-21 ENCOUNTER — TRANSFERRED RECORDS (OUTPATIENT)
Dept: HEALTH INFORMATION MANAGEMENT | Facility: CLINIC | Age: 76
End: 2021-07-21

## 2021-07-22 NOTE — LETTER
Letter by Royce Perez MD at      Author: Royce Perez MD Service: -- Author Type: --    Filed:  Encounter Date: 7/9/2021 Status: (Other)         Oscar Zhao  [address] [facility] [address]      July 9, 2021      Dear Oscar,    This letter is to remind you that you will be due for your follow up appointment with Dr. Royce Perez in September, 2021. To help ensure you are in the best health possible, a regular follow-up with your cardiologist is essential.     Please call our Patient Scheduling Line at [phone] to schedule your appointment at your earliest convenience.  If you have recently scheduled an appointment, please disregard this letter.    We look forward to seeing you again. As always, we are available at the number  above for any questions or concerns you may have.      Sincerely,     The Physicians and Staff of [facility]

## 2021-07-29 ENCOUNTER — COMMUNICATION - HEALTHEAST (OUTPATIENT)
Dept: CARDIOLOGY | Facility: CLINIC | Age: 76
End: 2021-07-29

## 2021-08-03 PROBLEM — S73.004A HIP DISLOCATION, RIGHT (H): Status: RESOLVED | Noted: 2019-03-25 | Resolved: 2021-03-09

## 2021-08-03 PROBLEM — T84.020A: Status: RESOLVED | Noted: 2019-03-26 | Resolved: 2021-03-09

## 2021-08-13 ENCOUNTER — TRANSFERRED RECORDS (OUTPATIENT)
Dept: HEALTH INFORMATION MANAGEMENT | Facility: CLINIC | Age: 76
End: 2021-08-13

## 2021-08-18 DIAGNOSIS — I10 ESSENTIAL HYPERTENSION: ICD-10-CM

## 2021-08-19 DIAGNOSIS — I10 ESSENTIAL HYPERTENSION: Primary | ICD-10-CM

## 2021-08-22 RX ORDER — METOPROLOL SUCCINATE 25 MG/1
50 TABLET, EXTENDED RELEASE ORAL DAILY
Qty: 180 TABLET | Refills: 3 | Status: SHIPPED | OUTPATIENT
Start: 2021-08-22 | End: 2022-08-15

## 2021-08-22 NOTE — TELEPHONE ENCOUNTER
"Disp Refills Start End DARIEL    metoprolol succinate (TOPROL-XL) 25  tablet 3 8/3/2020  No   Sig: TAKE 2 TABLETS(50 MG) BY MOUTH DAILY   Sent to pharmacy as: metoprolol succinate ER 25 mg tablet,extended release 24 hr (TOPROL-XL)   E-Prescribing Status: Receipt confirmed by pharmacy (8/3/2020  3:07 PM CDT)       Last Written Prescription Date:  8/3/20  Last Fill Quantity: 180,  # refills: 3   Last office visit provider:  6/1/21     Requested Prescriptions   Pending Prescriptions Disp Refills     metoprolol succinate ER (TOPROL-XL) 25 MG 24 hr tablet [Pharmacy Med Name: METOPROLOL ER SUCCINATE 25MG TABS] 180 tablet 3     Sig: TAKE 2 TABLETS(50 MG) BY MOUTH DAILY       Beta-Blockers Protocol Passed - 8/18/2021 12:09 PM        Passed - Blood pressure under 140/90 in past 12 months     BP Readings from Last 3 Encounters:   07/14/21 122/84   07/09/21 122/74   06/28/21 114/71                 Passed - Patient is age 6 or older        Passed - Recent (12 mo) or future (30 days) visit within the authorizing provider's specialty     Patient has had an office visit with the authorizing provider or a provider within the authorizing providers department within the previous 12 mos or has a future within next 30 days. See \"Patient Info\" tab in inbasket, or \"Choose Columns\" in Meds & Orders section of the refill encounter.              Passed - Medication is active on med list           lisinopril (ZESTRIL) 20 MG tablet [Pharmacy Med Name: LISINOPRIL 20MG TABLETS] 90 tablet      Sig: TAKE 1 TABLET(20 MG) BY MOUTH DAILY       ACE Inhibitors (Including Combos) Protocol Passed - 8/18/2021 12:09 PM        Passed - Blood pressure under 140/90 in past 12 months     BP Readings from Last 3 Encounters:   07/14/21 122/84   07/09/21 122/74   06/28/21 114/71                 Passed - Recent (12 mo) or future (30 days) visit within the authorizing provider's specialty     Patient has had an office visit with the authorizing provider or a " "provider within the authorizing providers department within the previous 12 mos or has a future within next 30 days. See \"Patient Info\" tab in inbasket, or \"Choose Columns\" in Meds & Orders section of the refill encounter.              Passed - Medication is active on med list        Passed - Patient is age 18 or older        Passed - No active pregnancy on record        Passed - Normal serum creatinine on file in past 12 months     Recent Labs   Lab Test 06/28/21  1116   CR 0.69       Ok to refill medication if creatinine is low          Passed - Normal serum potassium on file in past 12 months     Recent Labs   Lab Test 06/28/21  1116   POTASSIUM 4.3             Passed - No positive pregnancy test within past 12 months             Alberto May RN 08/22/21 10:55 AM  "

## 2021-08-22 NOTE — TELEPHONE ENCOUNTER
"  Disp Refills Start End DARIEL    lisinopriL (PRINIVIL,ZESTRIL) 20 MG tablet     --   Sig - Route: Take 20 mg by mouth daily. - Oral   Class: Historical Med   lisinopriL (PRINIVIL,ZESTRIL) 20 MG tablet [[id number]]  Patient-reported historical medication  Ordering date: 04/19/21 0858 Authorized by: PROVIDER, HISTORICAL   Frequency: DAILY  - Until Discontinued       Routing refill request to provider for review/approval because:  Medication is reported/historical    Last Written Prescription Date:  ???  Last Fill Quantity: ???,  # refills: ???   Last office visit provider:  6/1/21     Requested Prescriptions   Pending Prescriptions Disp Refills     lisinopril (ZESTRIL) 20 MG tablet [Pharmacy Med Name: LISINOPRIL 20MG TABLETS] 90 tablet      Sig: TAKE 1 TABLET(20 MG) BY MOUTH DAILY       ACE Inhibitors (Including Combos) Protocol Passed - 8/18/2021 12:09 PM        Passed - Blood pressure under 140/90 in past 12 months     BP Readings from Last 3 Encounters:   07/14/21 122/84   07/09/21 122/74   06/28/21 114/71                 Passed - Recent (12 mo) or future (30 days) visit within the authorizing provider's specialty     Patient has had an office visit with the authorizing provider or a provider within the authorizing providers department within the previous 12 mos or has a future within next 30 days. See \"Patient Info\" tab in inbasket, or \"Choose Columns\" in Meds & Orders section of the refill encounter.              Passed - Medication is active on med list        Passed - Patient is age 18 or older        Passed - No active pregnancy on record        Passed - Normal serum creatinine on file in past 12 months     Recent Labs   Lab Test 06/28/21  1116   CR 0.69       Ok to refill medication if creatinine is low          Passed - Normal serum potassium on file in past 12 months     Recent Labs   Lab Test 06/28/21  1116   POTASSIUM 4.3             Passed - No positive pregnancy test within past 12 months         Signed " "Prescriptions Disp Refills    metoprolol succinate ER (TOPROL-XL) 25 MG 24 hr tablet 180 tablet 3     Sig: Take 2 tablets (50 mg) by mouth daily       Beta-Blockers Protocol Passed - 8/18/2021 12:09 PM        Passed - Blood pressure under 140/90 in past 12 months     BP Readings from Last 3 Encounters:   07/14/21 122/84   07/09/21 122/74   06/28/21 114/71                 Passed - Patient is age 6 or older        Passed - Recent (12 mo) or future (30 days) visit within the authorizing provider's specialty     Patient has had an office visit with the authorizing provider or a provider within the authorizing providers department within the previous 12 mos or has a future within next 30 days. See \"Patient Info\" tab in inbasket, or \"Choose Columns\" in Meds & Orders section of the refill encounter.              Passed - Medication is active on med list             Alberto May RN 08/22/21 10:57 AM  "

## 2021-08-23 RX ORDER — LISINOPRIL 20 MG/1
TABLET ORAL
Qty: 90 TABLET | Refills: 11 | Status: SHIPPED | OUTPATIENT
Start: 2021-08-23 | End: 2022-11-16

## 2021-08-23 RX ORDER — AMLODIPINE BESYLATE 5 MG/1
TABLET ORAL
Qty: 180 TABLET | Refills: 11 | Status: SHIPPED | OUTPATIENT
Start: 2021-08-23 | End: 2022-11-16

## 2021-08-23 NOTE — TELEPHONE ENCOUNTER
" amLODIPine (NORVASC) 10 MG tablet  10 mg, DAILY         Summary: Take 10 mg by mouth daily   Dose, Route, Frequency: 10 mg, Oral, DAILY  Ord/Sold: 7/14/2021 (O)  Report  Adh:   Taking:   Long-term:   Med Dose History  Edit       Patient Sig: Take 10 mg by mouth daily       Ordered on: 7/14/2021       Authorized by: PATIENT REPORTED        Routing refill request to provider for review/approval because:  Drug not active on patient's medication list. Different dose listed on med list.  Medication is reported/historical    Last Written Prescription Date:  ???  Last Fill Quantity: ???,  # refills: ???   Last office visit provider:  06/01/2021 with Dr Jones.    Requested Prescriptions   Pending Prescriptions Disp Refills     amLODIPine (NORVASC) 5 MG tablet [Pharmacy Med Name: AMLODIPINE BESYLATE 5MG TABLETS] 180 tablet      Sig: TAKE 2 TABLETS BY MOUTH DAILY       Calcium Channel Blockers Protocol  Passed - 8/19/2021  5:14 AM        Passed - Blood pressure under 140/90 in past 12 months     BP Readings from Last 3 Encounters:   07/14/21 122/84   07/09/21 122/74   06/28/21 114/71                 Passed - Recent (12 mo) or future (30 days) visit within the authorizing provider's specialty     Patient has had an office visit with the authorizing provider or a provider within the authorizing providers department within the previous 12 mos or has a future within next 30 days. See \"Patient Info\" tab in inbasket, or \"Choose Columns\" in Meds & Orders section of the refill encounter.              Passed - Medication is active on med list        Passed - Patient is age 18 or older        Passed - No active pregnancy on record        Passed - Normal serum creatinine on file in past 12 months     Recent Labs   Lab Test 06/28/21  1116   CR 0.69       Ok to refill medication if creatinine is low          Passed - No positive pregnancy test in past 12 months             Charlotte Huerta 08/23/21 12:44 AM  "

## 2021-08-30 ENCOUNTER — TRANSFERRED RECORDS (OUTPATIENT)
Dept: HEALTH INFORMATION MANAGEMENT | Facility: CLINIC | Age: 76
End: 2021-08-30

## 2021-09-01 DIAGNOSIS — Z11.59 ENCOUNTER FOR SCREENING FOR OTHER VIRAL DISEASES: ICD-10-CM

## 2021-09-03 ENCOUNTER — LAB (OUTPATIENT)
Dept: LAB | Facility: CLINIC | Age: 76
End: 2021-09-03
Payer: MEDICARE

## 2021-09-03 DIAGNOSIS — Z11.59 ENCOUNTER FOR SCREENING FOR OTHER VIRAL DISEASES: ICD-10-CM

## 2021-09-03 PROCEDURE — U0005 INFEC AGEN DETEC AMPLI PROBE: HCPCS

## 2021-09-03 PROCEDURE — U0003 INFECTIOUS AGENT DETECTION BY NUCLEIC ACID (DNA OR RNA); SEVERE ACUTE RESPIRATORY SYNDROME CORONAVIRUS 2 (SARS-COV-2) (CORONAVIRUS DISEASE [COVID-19]), AMPLIFIED PROBE TECHNIQUE, MAKING USE OF HIGH THROUGHPUT TECHNOLOGIES AS DESCRIBED BY CMS-2020-01-R: HCPCS

## 2021-09-04 LAB — SARS-COV-2 RNA RESP QL NAA+PROBE: NEGATIVE

## 2021-09-06 ENCOUNTER — ANESTHESIA EVENT (OUTPATIENT)
Dept: SURGERY | Facility: HOSPITAL | Age: 76
End: 2021-09-06
Payer: MEDICARE

## 2021-09-07 ENCOUNTER — HOSPITAL ENCOUNTER (OUTPATIENT)
Facility: HOSPITAL | Age: 76
Discharge: HOME | End: 2021-09-07
Attending: SURGERY | Admitting: SURGERY
Payer: MEDICARE

## 2021-09-07 ENCOUNTER — ANESTHESIA (OUTPATIENT)
Dept: SURGERY | Facility: HOSPITAL | Age: 76
End: 2021-09-07
Payer: MEDICARE

## 2021-09-07 VITALS
OXYGEN SATURATION: 95 % | HEART RATE: 72 BPM | SYSTOLIC BLOOD PRESSURE: 112 MMHG | DIASTOLIC BLOOD PRESSURE: 72 MMHG | BODY MASS INDEX: 23.86 KG/M2 | WEIGHT: 134.7 LBS | RESPIRATION RATE: 16 BRPM | TEMPERATURE: 98.8 F

## 2021-09-07 DIAGNOSIS — K41.30: Primary | ICD-10-CM

## 2021-09-07 PROCEDURE — 250N000011 HC RX IP 250 OP 636: Performed by: NURSE ANESTHETIST, CERTIFIED REGISTERED

## 2021-09-07 PROCEDURE — 272N000001 HC OR GENERAL SUPPLY STERILE: Performed by: SURGERY

## 2021-09-07 PROCEDURE — 999N000141 HC STATISTIC PRE-PROCEDURE NURSING ASSESSMENT: Performed by: SURGERY

## 2021-09-07 PROCEDURE — 710N000012 HC RECOVERY PHASE 2, PER MINUTE: Performed by: SURGERY

## 2021-09-07 PROCEDURE — 370N000017 HC ANESTHESIA TECHNICAL FEE, PER MIN: Performed by: SURGERY

## 2021-09-07 PROCEDURE — 360N000075 HC SURGERY LEVEL 2, PER MIN: Performed by: SURGERY

## 2021-09-07 PROCEDURE — 250N000009 HC RX 250: Performed by: NURSE ANESTHETIST, CERTIFIED REGISTERED

## 2021-09-07 PROCEDURE — 250N000009 HC RX 250: Performed by: SURGERY

## 2021-09-07 PROCEDURE — 250N000011 HC RX IP 250 OP 636: Performed by: SURGERY

## 2021-09-07 PROCEDURE — 258N000003 HC RX IP 258 OP 636: Performed by: NURSE ANESTHETIST, CERTIFIED REGISTERED

## 2021-09-07 PROCEDURE — 258N000003 HC RX IP 258 OP 636: Performed by: ANESTHESIOLOGY

## 2021-09-07 RX ORDER — ONDANSETRON 4 MG/1
4 TABLET, ORALLY DISINTEGRATING ORAL EVERY 30 MIN PRN
Status: DISCONTINUED | OUTPATIENT
Start: 2021-09-07 | End: 2021-09-07 | Stop reason: HOSPADM

## 2021-09-07 RX ORDER — ONDANSETRON 2 MG/ML
4 INJECTION INTRAMUSCULAR; INTRAVENOUS EVERY 30 MIN PRN
Status: DISCONTINUED | OUTPATIENT
Start: 2021-09-07 | End: 2021-09-07 | Stop reason: HOSPADM

## 2021-09-07 RX ORDER — HYDROCODONE BITARTRATE AND ACETAMINOPHEN 5; 325 MG/1; MG/1
1 TABLET ORAL EVERY 6 HOURS PRN
Qty: 18 TABLET | Refills: 0 | Status: SHIPPED | OUTPATIENT
Start: 2021-09-07 | End: 2021-09-10

## 2021-09-07 RX ORDER — NALOXONE HYDROCHLORIDE 1 MG/ML
0.4 INJECTION INTRAMUSCULAR; INTRAVENOUS; SUBCUTANEOUS
Status: DISCONTINUED | OUTPATIENT
Start: 2021-09-07 | End: 2021-09-07 | Stop reason: HOSPADM

## 2021-09-07 RX ORDER — LIDOCAINE HYDROCHLORIDE 20 MG/ML
INJECTION, SOLUTION INFILTRATION; PERINEURAL PRN
Status: DISCONTINUED | OUTPATIENT
Start: 2021-09-07 | End: 2021-09-07

## 2021-09-07 RX ORDER — CLOPIDOGREL BISULFATE 75 MG/1
75 TABLET ORAL DAILY
Status: CANCELLED | OUTPATIENT
Start: 2021-09-07

## 2021-09-07 RX ORDER — KETAMINE HYDROCHLORIDE 50 MG/ML
INJECTION, SOLUTION INTRAMUSCULAR; INTRAVENOUS PRN
Status: DISCONTINUED | OUTPATIENT
Start: 2021-09-07 | End: 2021-09-07

## 2021-09-07 RX ORDER — NALOXONE HYDROCHLORIDE 1 MG/ML
0.2 INJECTION INTRAMUSCULAR; INTRAVENOUS; SUBCUTANEOUS
Status: DISCONTINUED | OUTPATIENT
Start: 2021-09-07 | End: 2021-09-07 | Stop reason: HOSPADM

## 2021-09-07 RX ORDER — HALOPERIDOL 5 MG/ML
1 INJECTION INTRAMUSCULAR
Status: DISCONTINUED | OUTPATIENT
Start: 2021-09-07 | End: 2021-09-07 | Stop reason: HOSPADM

## 2021-09-07 RX ORDER — FENTANYL CITRATE 50 UG/ML
50 INJECTION, SOLUTION INTRAMUSCULAR; INTRAVENOUS EVERY 5 MIN PRN
Status: DISCONTINUED | OUTPATIENT
Start: 2021-09-07 | End: 2021-09-07 | Stop reason: HOSPADM

## 2021-09-07 RX ORDER — DEXAMETHASONE SODIUM PHOSPHATE 10 MG/ML
INJECTION, SOLUTION INTRAMUSCULAR; INTRAVENOUS PRN
Status: DISCONTINUED | OUTPATIENT
Start: 2021-09-07 | End: 2021-09-07

## 2021-09-07 RX ORDER — FENTANYL CITRATE 50 UG/ML
INJECTION, SOLUTION INTRAMUSCULAR; INTRAVENOUS PRN
Status: DISCONTINUED | OUTPATIENT
Start: 2021-09-07 | End: 2021-09-07

## 2021-09-07 RX ORDER — ASPIRIN 81 MG/1
81 TABLET, CHEWABLE ORAL DAILY
Status: CANCELLED | OUTPATIENT
Start: 2021-09-07

## 2021-09-07 RX ORDER — CEFAZOLIN SODIUM 2 G/100ML
2 INJECTION, SOLUTION INTRAVENOUS SEE ADMIN INSTRUCTIONS
Status: DISCONTINUED | OUTPATIENT
Start: 2021-09-07 | End: 2021-09-07 | Stop reason: HOSPADM

## 2021-09-07 RX ORDER — LIDOCAINE 40 MG/G
CREAM TOPICAL
Status: DISCONTINUED | OUTPATIENT
Start: 2021-09-07 | End: 2021-09-07 | Stop reason: HOSPADM

## 2021-09-07 RX ORDER — ONDANSETRON 2 MG/ML
INJECTION INTRAMUSCULAR; INTRAVENOUS PRN
Status: DISCONTINUED | OUTPATIENT
Start: 2021-09-07 | End: 2021-09-07

## 2021-09-07 RX ORDER — SODIUM CHLORIDE, SODIUM LACTATE, POTASSIUM CHLORIDE, CALCIUM CHLORIDE 600; 310; 30; 20 MG/100ML; MG/100ML; MG/100ML; MG/100ML
INJECTION, SOLUTION INTRAVENOUS CONTINUOUS
Status: DISCONTINUED | OUTPATIENT
Start: 2021-09-07 | End: 2021-09-07 | Stop reason: HOSPADM

## 2021-09-07 RX ORDER — FENTANYL CITRATE 50 UG/ML
25 INJECTION, SOLUTION INTRAMUSCULAR; INTRAVENOUS EVERY 5 MIN PRN
Status: DISCONTINUED | OUTPATIENT
Start: 2021-09-07 | End: 2021-09-07 | Stop reason: HOSPADM

## 2021-09-07 RX ORDER — PROPOFOL 10 MG/ML
INJECTION, EMULSION INTRAVENOUS CONTINUOUS PRN
Status: DISCONTINUED | OUTPATIENT
Start: 2021-09-07 | End: 2021-09-07

## 2021-09-07 RX ORDER — CEFAZOLIN SODIUM 2 G/100ML
2 INJECTION, SOLUTION INTRAVENOUS
Status: COMPLETED | OUTPATIENT
Start: 2021-09-07 | End: 2021-09-07

## 2021-09-07 RX ORDER — HYDROMORPHONE HCL IN WATER/PF 6 MG/30 ML
0.2 PATIENT CONTROLLED ANALGESIA SYRINGE INTRAVENOUS EVERY 5 MIN PRN
Status: DISCONTINUED | OUTPATIENT
Start: 2021-09-07 | End: 2021-09-07 | Stop reason: HOSPADM

## 2021-09-07 RX ORDER — MEPERIDINE HYDROCHLORIDE 25 MG/ML
12.5 INJECTION INTRAMUSCULAR; INTRAVENOUS; SUBCUTANEOUS
Status: DISCONTINUED | OUTPATIENT
Start: 2021-09-07 | End: 2021-09-07 | Stop reason: HOSPADM

## 2021-09-07 RX ADMIN — MIDAZOLAM HYDROCHLORIDE 1 MG: 1 INJECTION, SOLUTION INTRAMUSCULAR; INTRAVENOUS at 10:12

## 2021-09-07 RX ADMIN — DEXAMETHASONE SODIUM PHOSPHATE 10 MG: 10 INJECTION, SOLUTION INTRAMUSCULAR; INTRAVENOUS at 10:30

## 2021-09-07 RX ADMIN — PROPOFOL 125 MCG/KG/MIN: 10 INJECTION, EMULSION INTRAVENOUS at 10:20

## 2021-09-07 RX ADMIN — LIDOCAINE HYDROCHLORIDE 60 MG: 20 INJECTION, SOLUTION INFILTRATION; PERINEURAL at 10:18

## 2021-09-07 RX ADMIN — FENTANYL CITRATE 25 MCG: 50 INJECTION, SOLUTION INTRAMUSCULAR; INTRAVENOUS at 10:22

## 2021-09-07 RX ADMIN — FENTANYL CITRATE 25 MCG: 50 INJECTION, SOLUTION INTRAMUSCULAR; INTRAVENOUS at 10:29

## 2021-09-07 RX ADMIN — SODIUM CHLORIDE, POTASSIUM CHLORIDE, SODIUM LACTATE AND CALCIUM CHLORIDE 100 ML/HR: 600; 310; 30; 20 INJECTION, SOLUTION INTRAVENOUS at 09:26

## 2021-09-07 RX ADMIN — PHENYLEPHRINE HYDROCHLORIDE 100 MCG: 10 INJECTION INTRAVENOUS at 10:36

## 2021-09-07 RX ADMIN — PHENYLEPHRINE HYDROCHLORIDE 100 MCG: 10 INJECTION INTRAVENOUS at 10:48

## 2021-09-07 RX ADMIN — PHENYLEPHRINE HYDROCHLORIDE 100 MCG: 10 INJECTION INTRAVENOUS at 10:42

## 2021-09-07 RX ADMIN — PHENYLEPHRINE HYDROCHLORIDE 100 MCG: 10 INJECTION INTRAVENOUS at 10:51

## 2021-09-07 RX ADMIN — CEFAZOLIN SODIUM 2 G: 2 INJECTION, SOLUTION INTRAVENOUS at 10:22

## 2021-09-07 RX ADMIN — ONDANSETRON 4 MG: 2 INJECTION INTRAMUSCULAR; INTRAVENOUS at 10:12

## 2021-09-07 RX ADMIN — KETAMINE HYDROCHLORIDE 25 MG: 50 INJECTION, SOLUTION INTRAMUSCULAR; INTRAVENOUS at 10:20

## 2021-09-07 NOTE — ANESTHESIA CARE TRANSFER NOTE
Patient: Oscar Zhao    Procedure(s):  LEFT FEMORAL HERNIA REPAIR    Diagnosis: Femoral hernia [K41.90]  Diagnosis Additional Information: No value filed.    Anesthesia Type:   MAC     Note:    Oropharynx: oropharynx clear of all foreign objects  Level of Consciousness: awake  Oxygen Supplementation: room air    Independent Airway: airway patency satisfactory and stable  Dentition: dentition unchanged  Vital Signs Stable: post-procedure vital signs reviewed and stable  Report to RN Given: handoff report given  Patient transferred to:  Recovery    Handoff Report: Identifed the Patient, Identified the Reponsible Provider, Reviewed the pertinent medical history, Discussed the surgical course, Reviewed Intra-OP anesthesia mangement and issues during anesthesia, Set expectations for post-procedure period and Allowed opportunity for questions and acknowledgement of understanding      Vitals:  Vitals Value Taken Time   /70 09/07/21 1106   Temp     Pulse 71 09/07/21 1107   Resp     SpO2 94 % 09/07/21 1107   Vitals shown include unvalidated device data.    Electronically Signed By: PARTH Estes CRNA  September 7, 2021  11:08 AM

## 2021-09-07 NOTE — ANESTHESIA POSTPROCEDURE EVALUATION
Patient: Oscar Zhao    Procedure(s):  LEFT FEMORAL HERNIA REPAIR    Diagnosis:Femoral hernia [K41.90]  Diagnosis Additional Information: No value filed.    Anesthesia Type:  MAC    Note:  Disposition: Outpatient   Postop Pain Control: Uneventful            Sign Out: Well controlled pain   PONV: No   Neuro/Psych: Uneventful            Sign Out: Acceptable/Baseline neuro status   Airway/Respiratory: Uneventful            Sign Out: Acceptable/Baseline resp. status   CV/Hemodynamics: Uneventful            Sign Out: Acceptable CV status; No obvious hypovolemia; No obvious fluid overload   Other NRE: NONE   DID A NON-ROUTINE EVENT OCCUR? No           Last vitals:  Vitals Value Taken Time   /67 09/07/21 1145   Temp 37.1  C (98.8  F) 09/07/21 1105   Pulse 68 09/07/21 1155   Resp     SpO2 95 % 09/07/21 1155   Vitals shown include unvalidated device data.    Electronically Signed By: Eleazar Moreno MD  September 7, 2021  11:56 AM

## 2021-09-07 NOTE — DISCHARGE INSTRUCTIONS
You may take Tylenol every 4 hours as needed for pain instead of your pain med, or alternate tylenol with Ibuprofen. (may take Ibuprofen every 6 hours as needed,)

## 2021-09-07 NOTE — OP NOTE
OPERATIVE REPORT    Oscar Zhao   [facility]  Medical Record #:  [id number]  YOB: 1945  Age:  76 year old    PROCEDURE DATE:  9/7/2021    PREOPERATIVE DIAGNOSIS: Left groin hernia status post still incision with significant scarring the area potential either femoral or inguinal hernia    POSTOPERATIVE DIAGNOSIS: Left inguinal hernia    PROCEDURE: Left inguinal hernia repair procedure the    OPERATING SURGEON:  Sidney Vargas MD    ASSISTANT: Technician    ANESTHESIA: MAC    DESCRIPTION OF PROCEDURE: With the patient in supine position under intravenous sedative anesthesia the abdomen is recommended sterile fashion.  Left lower quadrant incision made in the area of previous low oblique leak incision and at the partially sure is I dissected free and it became apparent that the hernia and the location of an inguinal hernia immediately above the normal canal.  External oblique aponeurosis was opened and the round ligament structures identified isolated and divided ligated with 3-0 silk suture.  Hernia is reduced and repair accomplished manner Akhil approximately transversely to 7  segment.  The wound is irrigated and closed in external Bleich layer with running 3-0 Vicryl and skin closed with a running 3-0 Vicryl running 4-0 Vicryl subcuticular suture and Dermabond.  Estimated blood loss was minimal there were no complications and the patient tolerated procedure well.  Sponge and counts are correct x2.    EBL:  [unfilled]    SPECIMENS:  * No specimens in log *    Sidney vargas md  [facility]

## 2021-09-07 NOTE — ANESTHESIA PREPROCEDURE EVALUATION
Anesthesia Pre-Procedure Evaluation    Patient: Oscar Zhao   MRN: [id number] : 1945        Preoperative Diagnosis: Femoral hernia [K41.90]   Procedure : Procedure(s):  LEFT FEMORAL HERNIA REPAIR     Past Medical History:   Diagnosis Date     Basal cell carcinoma of anterior chest      Breast cyst      History of anesthesia complications      HLA B27 (HLA B27 positive)      Hypertension      Osteoporosis 2011     Peripheral neuropathy 2018     PONV (postoperative nausea and vomiting)      Scleritis, bilateral     Left .  Treated with corticosteroids,, methotrexate and Humira.  .  Rituximab.     Squamous cell carcinoma of skin of chest      Steroid induced glaucoma, both eyes       Past Surgical History:   Procedure Laterality Date     BREAST CYST EXCISION Right     benign     C TOTAL KNEE ARTHROPLASTY Left 2017    Procedure: LEFT TOTAL KNEE ARTHROPLASTY;  Surgeon: Star Du MD;  Location: [facility] Main OR;  Service: Orthopedics      SECTION       CLOSED REDUCTION HIP Right 2019    Procedure: CLOSED REDUCTION, HIP;  Surgeon: Jak Ruiz DO;  Location: [facility] OR;  Service: Orthopedics     COLONOSCOPY  2011     COLONOSCOPY  2005     COLONOSCOPY W/ BIOPSIES AND POLYPECTOMY  2021    16 to 20 mm polyp:tubular adenoma in the ascending colon.  Ulcerated mass in the anal canal: Moderately differentiated squamous cell cancer.     ESOPHAGOSCOPY, GASTROSCOPY, DUODENOSCOPY (EGD), COMBINED  2017    Large hiatal hernia.  Reactive gastropathy with mucosal erosion.  H. pylori negative.     HYSTERECTOMY TOTAL ABDOMINAL, BILATERAL SALPINGO-OOPHORECTOMY, COMBINED       IR CHEST PORT PLACEMENT > 5 YRS OF AGE  3/15/2021     IR PORT PLACEMENT >5 YEARS  03/15/2021    Right IJ port-a-cath.     IR PORT REMOVAL RIGHT  2021     LAPAROSCOPIC APPENDECTOMY  2011     PHACOEMULSIFICATION CLEAR CORNEA WITH STANDARD  INTRAOCULAR LENS IMPLANT Right 08/30/2017     PHACOEMULSIFICATION CLEAR CORNEA WITH STANDARD INTRAOCULAR LENS IMPLANT Left 09/13/2017     TOTAL HIP ARTHROPLASTY Right 08/18/2015    Procedure: HIP TOTAL ARTHROPLASTY, RIGHT;  Surgeon: Star Du MD;  Location: [facility] Main OR;  Service:       Allergies   Allergen Reactions     Adalimumab Muscle Pain (Myalgia)     Brimonidine-Timolol [Brimonidine Tartrate-Timolol] Unknown     Redness itching in eyes     Levaquin [Levofloxacin] Unknown     Skin burn and couldn't get out bed for 5 days     Tetracyclines Hives      Social History     Tobacco Use     Smoking status: Never Smoker     Smokeless tobacco: Never Used   Substance Use Topics     Alcohol use: Yes     Alcohol/week: 4.0 standard drinks     Comment: wine      Wt Readings from Last 1 Encounters:   09/07/21 61.1 kg (134 lb 11.2 oz)        Anesthesia Evaluation   Pt has had prior anesthetic.         ROS/MED HX  ENT/Pulmonary:  - neg pulmonary ROS     Neurologic:  - neg neurologic ROS     Cardiovascular:     (+) hypertension-----    METS/Exercise Tolerance:     Hematologic:  - neg hematologic  ROS     Musculoskeletal:  - neg musculoskeletal ROS     GI/Hepatic:  - neg GI/hepatic ROS     Renal/Genitourinary:  - neg Renal ROS     Endo:  - neg endo ROS     Psychiatric/Substance Use:  - neg psychiatric ROS     Infectious Disease:  - neg infectious disease ROS     Malignancy:  - neg malignancy ROS (+) Malignancy, History of Other.Other CA Anal status post Chemo.    Other:  - neg other ROS          Physical Exam    Airway  airway exam normal      Mallampati: I   TM distance: < 3 FB   Neck ROM: full   Mouth opening: > 3 cm    Respiratory Devices and Support         Dental  no notable dental history         Cardiovascular   cardiovascular exam normal       Rhythm and rate: regular and normal     Pulmonary   pulmonary exam normal        breath sounds clear to auscultation           OUTSIDE LABS:  CBC:   Lab Results    Component Value Date    WBC 5.7 06/28/2021    WBC 4.8 05/11/2021    HGB 13.0 06/28/2021    HGB 10.1 (L) 05/11/2021    HCT 41.0 06/28/2021    HCT 32.1 (L) 05/11/2021     06/28/2021     05/11/2021     BMP:   Lab Results   Component Value Date     06/28/2021     05/11/2021    POTASSIUM 4.3 06/28/2021    POTASSIUM 4.3 05/11/2021    CHLORIDE 106 06/28/2021    CHLORIDE 105 05/11/2021    CO2 24 06/28/2021    CO2 23 05/11/2021    BUN 9 06/28/2021    BUN 8 05/11/2021    CR 0.69 06/28/2021    CR 0.7 06/25/2021     06/28/2021    GLC 94 05/11/2021     COAGS: No results found for: PTT, INR, FIBR  POC: No results found for: BGM, HCG, HCGS  HEPATIC:   Lab Results   Component Value Date    ALBUMIN 3.9 06/28/2021    PROTTOTAL 6.7 06/28/2021    ALT 15 06/28/2021    AST 17 06/28/2021    ALKPHOS 64 06/28/2021    BILITOTAL 1.3 (H) 06/28/2021     OTHER:   Lab Results   Component Value Date    LACT 0.7 04/05/2021    A1C 5.3 10/02/2014    MICHEL 9.8 06/28/2021    PHOS 3.1 04/08/2021    MAG 1.7 (L) 04/07/2021    TSH 0.46 03/26/2021    CRP 4.0 (H) 04/07/2021       Anesthesia Plan    ASA Status:  2   NPO Status:  NPO Appropriate    Anesthesia Type: MAC.     - Reason for MAC: straight local not clinically adequate              Consents    Anesthesia Plan(s) and associated risks, benefits, and realistic alternatives discussed. Questions answered and patient/representative(s) expressed understanding.     - Discussed with:  Patient      - Patient is DNR/DNI Status: No         Postoperative Care    Pain management: Multi-modal analgesia.   PONV prophylaxis: Ondansetron (or other 5HT-3)     Comments:                Eleazar Moreno MD

## 2021-09-14 ENCOUNTER — OFFICE VISIT (OUTPATIENT)
Dept: INTERNAL MEDICINE | Facility: CLINIC | Age: 76
End: 2021-09-14
Payer: MEDICARE

## 2021-09-14 ENCOUNTER — HOSPITAL ENCOUNTER (OUTPATIENT)
Dept: MAMMOGRAPHY | Facility: CLINIC | Age: 76
Discharge: HOME | End: 2021-09-14
Attending: INTERNAL MEDICINE | Admitting: INTERNAL MEDICINE
Payer: MEDICARE

## 2021-09-14 VITALS
SYSTOLIC BLOOD PRESSURE: 110 MMHG | WEIGHT: 136 LBS | HEART RATE: 82 BPM | DIASTOLIC BLOOD PRESSURE: 62 MMHG | BODY MASS INDEX: 24.1 KG/M2 | OXYGEN SATURATION: 96 % | HEIGHT: 63 IN

## 2021-09-14 DIAGNOSIS — I10 ESSENTIAL HYPERTENSION: Primary | ICD-10-CM

## 2021-09-14 DIAGNOSIS — Z12.31 VISIT FOR SCREENING MAMMOGRAM: ICD-10-CM

## 2021-09-14 PROCEDURE — 90662 IIV NO PRSV INCREASED AG IM: CPT | Performed by: INTERNAL MEDICINE

## 2021-09-14 PROCEDURE — 77063 BREAST TOMOSYNTHESIS BI: CPT

## 2021-09-14 PROCEDURE — G0008 ADMIN INFLUENZA VIRUS VAC: HCPCS | Performed by: INTERNAL MEDICINE

## 2021-09-14 PROCEDURE — 99213 OFFICE O/P EST LOW 20 MIN: CPT | Mod: 25 | Performed by: INTERNAL MEDICINE

## 2021-09-14 RX ORDER — METHYLCELLULOSE 2 G/19G
2 POWDER, FOR SOLUTION ORAL
COMMUNITY
Start: 2021-06-28 | End: 2023-06-20

## 2021-09-14 RX ORDER — TIMOLOL MALEATE 5 MG/ML
1 SOLUTION/ DROPS OPHTHALMIC DAILY
COMMUNITY

## 2021-09-14 ASSESSMENT — MIFFLIN-ST. JEOR: SCORE: 1081.02

## 2021-09-14 NOTE — PROGRESS NOTES
"Assessment/Plan:    Hypertension controlled 110/62 mild cough with lisinopril discussed reassured.  Not disabling.    Perianal rectal cancer status post treatment no sign of recurrence.  Followed by Dr. Perez colorectal specialist.    Recent femoral inguinal hernia repair left side Dr. Sidney Celaya presiding no post procedure complications.    Multiple drug allergies.    Constipation with abdominal bloating.  Recent imaging studies negative for anal cancer recurrence.  Suggest Colace 100 mg daily plus Citrucel as she is on plus plenty of fluids and exercise.    25 minutes spent on the date of the encounter doing chart review, patient visit and documentation     Subjective:  Oscar Zhao is a 76 year old female presents for the following health issues stomach upset bothers hernia repair recently left femoral September 7.  Constipation using Citrucel suggest Colace.  Bowel movements are different radiation chemotherapy for perianal cancer.  Occasional urinary incontinence mild no dysuria no blood in the urine.  Suggestions for flu vaccine now suggestions for COVID-19 Materna vaccine now Shingrix vaccine because of potential side effects of feeling ill for 2 to 4 days wait till after the first of the year suggested.    ROS:  No blood in stool or urine medication list reviewed reconciled in the chart non-smoker no excess alcohol multiple drug allergies noted reviewed.    Objective:  /62 (BP Location: Right arm, Patient Position: Sitting)   Pulse 82   Ht 1.6 m (5' 3\")   Wt 61.7 kg (136 lb)   SpO2 96%   BMI 24.09 kg/m    Chest clear neck veins nondistended no carotid bruits no lymphadenopathy of lymph bearing areas care is coming in she appears well easily conversant good spirited intelligent heart tones normal abdomen benign extremities free of edema no thyromegaly  "

## 2021-12-09 ENCOUNTER — TRANSFERRED RECORDS (OUTPATIENT)
Dept: HEALTH INFORMATION MANAGEMENT | Facility: CLINIC | Age: 76
End: 2021-12-09
Payer: MEDICARE

## 2022-01-10 ENCOUNTER — LAB (OUTPATIENT)
Dept: INFUSION THERAPY | Facility: CLINIC | Age: 77
End: 2022-01-10
Attending: INTERNAL MEDICINE
Payer: MEDICARE

## 2022-01-10 DIAGNOSIS — C21.1 MALIGNANT NEOPLASM OF ANAL CANAL (H): ICD-10-CM

## 2022-01-10 LAB
ALBUMIN SERPL-MCNC: 4 G/DL (ref 3.5–5)
ALP SERPL-CCNC: 86 U/L (ref 45–120)
ALT SERPL W P-5'-P-CCNC: 19 U/L (ref 0–45)
ANION GAP SERPL CALCULATED.3IONS-SCNC: 11 MMOL/L (ref 5–18)
AST SERPL W P-5'-P-CCNC: 18 U/L (ref 0–40)
BASOPHILS # BLD AUTO: 0 10E3/UL (ref 0–0.2)
BASOPHILS NFR BLD AUTO: 1 %
BILIRUB SERPL-MCNC: 0.6 MG/DL (ref 0–1)
BUN SERPL-MCNC: 14 MG/DL (ref 8–28)
CALCIUM SERPL-MCNC: 10.4 MG/DL (ref 8.5–10.5)
CHLORIDE BLD-SCNC: 103 MMOL/L (ref 98–107)
CO2 SERPL-SCNC: 22 MMOL/L (ref 22–31)
CREAT SERPL-MCNC: 0.76 MG/DL (ref 0.6–1.1)
EOSINOPHIL # BLD AUTO: 0.1 10E3/UL (ref 0–0.7)
EOSINOPHIL NFR BLD AUTO: 3 %
ERYTHROCYTE [DISTWIDTH] IN BLOOD BY AUTOMATED COUNT: 13.1 % (ref 10–15)
GFR SERPL CREATININE-BSD FRML MDRD: 81 ML/MIN/1.73M2
GLUCOSE BLD-MCNC: 82 MG/DL (ref 70–125)
HCT VFR BLD AUTO: 41.1 % (ref 35–47)
HGB BLD-MCNC: 12.9 G/DL (ref 11.7–15.7)
IMM GRANULOCYTES # BLD: 0 10E3/UL
IMM GRANULOCYTES NFR BLD: 0 %
LYMPHOCYTES # BLD AUTO: 1.2 10E3/UL (ref 0.8–5.3)
LYMPHOCYTES NFR BLD AUTO: 23 %
MCH RBC QN AUTO: 29.7 PG (ref 26.5–33)
MCHC RBC AUTO-ENTMCNC: 31.4 G/DL (ref 31.5–36.5)
MCV RBC AUTO: 95 FL (ref 78–100)
MONOCYTES # BLD AUTO: 0.6 10E3/UL (ref 0–1.3)
MONOCYTES NFR BLD AUTO: 11 %
NEUTROPHILS # BLD AUTO: 3.4 10E3/UL (ref 1.6–8.3)
NEUTROPHILS NFR BLD AUTO: 62 %
NRBC # BLD AUTO: 0 10E3/UL
NRBC BLD AUTO-RTO: 0 /100
PLATELET # BLD AUTO: 256 10E3/UL (ref 150–450)
POTASSIUM BLD-SCNC: 4.6 MMOL/L (ref 3.5–5)
PROT SERPL-MCNC: 7 G/DL (ref 6–8)
RBC # BLD AUTO: 4.35 10E6/UL (ref 3.8–5.2)
SODIUM SERPL-SCNC: 136 MMOL/L (ref 136–145)
WBC # BLD AUTO: 5.4 10E3/UL (ref 4–11)

## 2022-01-10 PROCEDURE — 36415 COLL VENOUS BLD VENIPUNCTURE: CPT

## 2022-01-10 PROCEDURE — 85025 COMPLETE CBC W/AUTO DIFF WBC: CPT

## 2022-01-10 PROCEDURE — 80053 COMPREHEN METABOLIC PANEL: CPT

## 2022-01-12 ENCOUNTER — HOSPITAL ENCOUNTER (OUTPATIENT)
Dept: MRI IMAGING | Facility: CLINIC | Age: 77
End: 2022-01-12
Attending: STUDENT IN AN ORGANIZED HEALTH CARE EDUCATION/TRAINING PROGRAM
Payer: MEDICARE

## 2022-01-12 ENCOUNTER — HOSPITAL ENCOUNTER (OUTPATIENT)
Dept: CT IMAGING | Facility: CLINIC | Age: 77
End: 2022-01-12
Attending: STUDENT IN AN ORGANIZED HEALTH CARE EDUCATION/TRAINING PROGRAM
Payer: MEDICARE

## 2022-01-12 DIAGNOSIS — C21.1 MALIGNANT NEOPLASM OF ANAL CANAL (H): ICD-10-CM

## 2022-01-12 PROCEDURE — A9585 GADOBUTROL INJECTION: HCPCS | Performed by: STUDENT IN AN ORGANIZED HEALTH CARE EDUCATION/TRAINING PROGRAM

## 2022-01-12 PROCEDURE — 71250 CT THORAX DX C-: CPT | Mod: MG

## 2022-01-12 PROCEDURE — 74183 MRI ABD W/O CNTR FLWD CNTR: CPT | Mod: MG

## 2022-01-12 PROCEDURE — 255N000002 HC RX 255 OP 636: Performed by: STUDENT IN AN ORGANIZED HEALTH CARE EDUCATION/TRAINING PROGRAM

## 2022-01-12 RX ORDER — GADOBUTROL 604.72 MG/ML
7 INJECTION INTRAVENOUS ONCE
Status: COMPLETED | OUTPATIENT
Start: 2022-01-12 | End: 2022-01-12

## 2022-01-12 RX ADMIN — GADOBUTROL 7 ML: 604.72 INJECTION INTRAVENOUS at 12:37

## 2022-01-13 ENCOUNTER — OFFICE VISIT (OUTPATIENT)
Dept: RADIATION ONCOLOGY | Facility: CLINIC | Age: 77
End: 2022-01-13
Attending: INTERNAL MEDICINE
Payer: MEDICARE

## 2022-01-13 VITALS
HEART RATE: 70 BPM | SYSTOLIC BLOOD PRESSURE: 111 MMHG | RESPIRATION RATE: 16 BRPM | BODY MASS INDEX: 24.76 KG/M2 | OXYGEN SATURATION: 98 % | WEIGHT: 139.8 LBS | DIASTOLIC BLOOD PRESSURE: 70 MMHG

## 2022-01-13 DIAGNOSIS — C21.1 MALIGNANT NEOPLASM OF ANAL CANAL (H): Primary | ICD-10-CM

## 2022-01-13 PROCEDURE — G0463 HOSPITAL OUTPT CLINIC VISIT: HCPCS

## 2022-01-13 PROCEDURE — 99215 OFFICE O/P EST HI 40 MIN: CPT | Performed by: STUDENT IN AN ORGANIZED HEALTH CARE EDUCATION/TRAINING PROGRAM

## 2022-01-13 ASSESSMENT — PAIN SCALES - GENERAL: PAINLEVEL: NO PAIN (0)

## 2022-01-13 NOTE — PROGRESS NOTES
Pt ambulatory to radiation clinic for follow up. CT done on 1/12. Further recommendations and orders per provider.

## 2022-01-13 NOTE — PROGRESS NOTES
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      Malignant neoplasm of anal canal (H) [C21.1]   Cancer Staging    Staging form: Anus, AJCC 8th Edition    - Clinical: Stage IIA (cT2, cN0, cM0)     Body site: Female Pelvis   SITE TREATED: Anal canal and regional lymph nodes   TOTAL DOSE: 5400 cGy in 27 fractions to the primary  4500 cGy in 27 fractions to the pelvic lymph nodes  NUMBER OF FRACTIONS: 27  DATES COMPLETED: 3/22/2021-4/29/2021  CONCURRENT CHEMOTHERAPY: Yes-mitomycin-C and 5-FU  ADJUVANT THERAPY:No     Complete response on anoscopy   CT re-staging with no evidence of disease, MRI final read pending  Recovered from acute side effects of chemoradiation and improvement in urinary incontinence with pelvic floor strengthening exercises    Plan:   Urinary incontinence improving and minor with continued Pilates. She will contact us should she desire referral to PT for pelvic floor therapy.    Continue follow-up with colorectal surgery as scheduled    Continue follow-up with heme-onc as scheduled next week    RTC in 6 months, sooner if concerns- annual restaging imaging out to 3 yrs.    Total time of this visit, including time spent face-to-face with patient and or via video/audio, and also in preparing for today's visit for MDM and documentation. Medical decision-making included consideration and possible diagnoses, management options, complex record review, review of diagnostic tests and information, consideration and discussion of significant complications based on comorbidities, discussion with providers involved in the care of the patient.     45 Minutes spent.     Subjective:      HPI: Oscar Zhao is a 76 year old female with No chief complaint on file.    She has been seen by colorectal surgery with  anoscopy which demonstrated a complete response.  She reports bowel movements have normalized she takes Colace as needed.  No hematochezia.  Urinary control has improved with minimal incontinence with Pilates exercises.  No concerns for vaginal dryness.  No perineal or perianal skin concerns.  She overall feels well and her and her daughter traveled out of the country for the holidays.    She is s/p Leftinginal hernia repair in 9/2021.  Surgery and recovery went well with resolution of left inguinal bulge and no concerns.        Current Outpatient Medications   Medication Sig Dispense Refill     amLODIPine (NORVASC) 5 MG tablet TAKE 2 TABLETS BY MOUTH DAILY 180 tablet 11     famotidine (PEPCID) 20 MG tablet [FAMOTIDINE (PEPCID) 20 MG TABLET] Take 1 tablet (20 mg total) by mouth 2 (two) times a day.  0     lisinopril (ZESTRIL) 20 MG tablet TAKE 1 TABLET(20 MG) BY MOUTH DAILY 90 tablet 11     LOTEMAX 0.5 % ophthalmic suspension [LOTEMAX 0.5 % OPHTHALMIC SUSPENSION] Administer 1 drop to both eyes every other day.   4     methylcellulose (CITRUCEL) powder Take 2 g by mouth       metoprolol succinate ER (TOPROL-XL) 25 MG 24 hr tablet Take 2 tablets (50 mg) by mouth daily 180 tablet 3     timolol maleate (TIMOPTIC) 0.5 % ophthalmic solution 1 drop         The following portions of the patient's history were reviewed and updated as appropriate: allergies, current medications, past family history, past medical history, past social history, past surgical history and problem list.    Review of Systems    General     EENT     Respiratory       Cardiovascular     Gastrointestinal     Musculoskeletal     Integumentary                 Neurological     Genitourinary/Reproductive     Lymphatic        Pain       AUA Assessment                                                                         Accompanied by         Objective:     Physical Exam    There were no vitals filed for this visit.     Gen: Alert, in NAD pleasant  interactive, well nourished  Eyes:  EOMI, sclera anicteric  Pulm: No wheezing, stridor or respiratory distress  Musculoskeletal: Normal muscle bulk and tone  Skin: Normal tone and turgor, warm, dry, intact  : Exam deferred as anoscopy scheduled and skin has fully recovered as noted at last visit  Neurologic: A/O, speech fluent, no focal motor deficits, Gait normal and unaided  Psychiatric: Appropriate mood and affect    Recent Labs:   Recent Results (from the past 168 hour(s))   Comprehensive metabolic panel   Result Value Ref Range    Sodium 136 136 - 145 mmol/L    Potassium 4.6 3.5 - 5.0 mmol/L    Chloride 103 98 - 107 mmol/L    Carbon Dioxide (CO2) 22 22 - 31 mmol/L    Anion Gap 11 5 - 18 mmol/L    Urea Nitrogen 14 8 - 28 mg/dL    Creatinine 0.76 0.60 - 1.10 mg/dL    Calcium 10.4 8.5 - 10.5 mg/dL    Glucose 82 70 - 125 mg/dL    Alkaline Phosphatase 86 45 - 120 U/L    AST 18 0 - 40 U/L    ALT 19 0 - 45 U/L    Protein Total 7.0 6.0 - 8.0 g/dL    Albumin 4.0 3.5 - 5.0 g/dL    Bilirubin Total 0.6 0.0 - 1.0 mg/dL    GFR Estimate 81 >60 mL/min/1.73m2   CBC with platelets and differential   Result Value Ref Range    WBC Count 5.4 4.0 - 11.0 10e3/uL    RBC Count 4.35 3.80 - 5.20 10e6/uL    Hemoglobin 12.9 11.7 - 15.7 g/dL    Hematocrit 41.1 35.0 - 47.0 %    MCV 95 78 - 100 fL    MCH 29.7 26.5 - 33.0 pg    MCHC 31.4 (L) 31.5 - 36.5 g/dL    RDW 13.1 10.0 - 15.0 %    Platelet Count 256 150 - 450 10e3/uL    % Neutrophils 62 %    % Lymphocytes 23 %    % Monocytes 11 %    % Eosinophils 3 %    % Basophils 1 %    % Immature Granulocytes 0 %    NRBCs per 100 WBC 0 <1 /100    Absolute Neutrophils 3.4 1.6 - 8.3 10e3/uL    Absolute Lymphocytes 1.2 0.8 - 5.3 10e3/uL    Absolute Monocytes 0.6 0.0 - 1.3 10e3/uL    Absolute Eosinophils 0.1 0.0 - 0.7 10e3/uL    Absolute Basophils 0.0 0.0 - 0.2 10e3/uL    Absolute Immature Granulocytes 0.0 <=0.4 10e3/uL    Absolute NRBCs 0.0 10e3/uL     Personally reviewed imaging official read on MRI  pending but on review of imaging personally did not appreciate concerns of disease recurrence or persistence.  Imaging: Imaging results 6 weeks:      MRI 1/12/2022:    IMPRESSION:  1.  Minimal post treatment fibrosis at the left side of the anorectal junction.     2.  Nothing concerning for residual or recurrent tumor.     3.  No lymphadenopathy.    :CT Chest w/o Contrast    Result Date: 1/12/2022  EXAM: CT CHEST W/O CONTRAST LOCATION: [facility] DATE/TIME: 1/12/2022 9:41 AM INDICATION: restaging anal SCC s/p CRT COMPARISON: 6/25/2021 and 8/15/2011. TECHNIQUE: CT chest without IV contrast. Multiplanar reformats were obtained. Dose reduction techniques were used. CONTRAST: None. FINDINGS: LUNGS AND PLEURA: Mild benign apical scarring and minimal atelectasis in the lung bases including the left lower lobe adjacent to the hiatal hernia. No suspicious pulmonary nodules. 4 mm subpleural nodule in the right upper lobe on series 4, image #103 is minimally changed from 8/15/2011 and benign. MEDIASTINUM/AXILLAE: The heart is normal in size. Thoracic aorta is nonaneurysmal. No suspicious adenopathy. Moderate size hiatal hernia is stable. CORONARY ARTERY CALCIFICATION: None. UPPER ABDOMEN: Moderately large hiatal hernia. Normal adrenal glands. No suspicious findings. MUSCULOSKELETAL: Mild scoliosis and degenerative changes of the spine. No suspicious bone lesion.     IMPRESSION: 1.  No evidence of metastatic disease within the chest.             Signed by: Patsy Spencer MD

## 2022-01-13 NOTE — LETTER
1/13/2022         RE: Oscar Zhao  [address]  [place] [address]        Dear Colleague,    Thank you for referring your patient, Oscar Zhao, to the [facility] RADIATION ONCOLOGY [place]. Please see a copy of my visit note below.      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]   Cancer Staging    Staging form: Anus, AJCC 8th Edition    - Clinical: Stage IIA (cT2, cN0, cM0)     Body site: Female Pelvis   SITE TREATED: Anal canal and regional lymph nodes   TOTAL DOSE: 5400 cGy in 27 fractions to the primary  4500 cGy in 27 fractions to the pelvic lymph nodes  NUMBER OF FRACTIONS: 27  DATES COMPLETED: 3/22/2021-4/29/2021  CONCURRENT CHEMOTHERAPY: Yes-mitomycin-C and 5-FU  ADJUVANT THERAPY:No     Complete response on anoscopy   CT re-staging with no evidence of disease, MRI final read pending  Recovered from acute side effects of chemoradiation and improvement in urinary incontinence with pelvic floor strengthening exercises    Plan:   Urinary incontinence improving and minor with continued Pilates. She will contact us should she desire referral to PT for pelvic floor therapy.    Continue follow-up with colorectal surgery as scheduled    Continue follow-up with heme-onc as scheduled next week    RTC in 6 months, sooner if concerns- annual restaging imaging out to 3 yrs.    Total time of this visit, including time spent face-to-face with patient and or via video/audio, and also in preparing for today's visit for MDM and documentation. Medical decision-making included consideration and possible diagnoses, management options, complex record review, review of diagnostic tests and information, consideration and discussion of significant  complications based on comorbidities, discussion with providers involved in the care of the patient.     45 Minutes spent.     Subjective:      HPI: Oscar Zhao is a 76 year old female with No chief complaint on file.    She has been seen by colorectal surgery with anoscopy which demonstrated a complete response.  She reports bowel movements have normalized she takes Colace as needed.  No hematochezia.  Urinary control has improved with minimal incontinence with Pilates exercises.  No concerns for vaginal dryness.  No perineal or perianal skin concerns.  She overall feels well and her and her daughter traveled out of the country for the holidays.    She is s/p Leftinginal hernia repair in 9/2021.  Surgery and recovery went well with resolution of left inguinal bulge and no concerns.        Current Outpatient Medications   Medication Sig Dispense Refill     amLODIPine (NORVASC) 5 MG tablet TAKE 2 TABLETS BY MOUTH DAILY 180 tablet 11     famotidine (PEPCID) 20 MG tablet [FAMOTIDINE (PEPCID) 20 MG TABLET] Take 1 tablet (20 mg total) by mouth 2 (two) times a day.  0     lisinopril (ZESTRIL) 20 MG tablet TAKE 1 TABLET(20 MG) BY MOUTH DAILY 90 tablet 11     LOTEMAX 0.5 % ophthalmic suspension [LOTEMAX 0.5 % OPHTHALMIC SUSPENSION] Administer 1 drop to both eyes every other day.   4     methylcellulose (CITRUCEL) powder Take 2 g by mouth       metoprolol succinate ER (TOPROL-XL) 25 MG 24 hr tablet Take 2 tablets (50 mg) by mouth daily 180 tablet 3     timolol maleate (TIMOPTIC) 0.5 % ophthalmic solution 1 drop         The following portions of the patient's history were reviewed and updated as appropriate: allergies, current medications, past family history, past medical history, past social history, past surgical history and problem list.    Review of Systems    General     EENT     Respiratory       Cardiovascular     Gastrointestinal     Musculoskeletal     Integumentary                 Neurological      Genitourinary/Reproductive     Lymphatic        Pain       AUA Assessment                                                                         Accompanied by         Objective:     Physical Exam    There were no vitals filed for this visit.     Gen: Alert, in NAD pleasant interactive, well nourished  Eyes:  EOMI, sclera anicteric  Pulm: No wheezing, stridor or respiratory distress  Musculoskeletal: Normal muscle bulk and tone  Skin: Normal tone and turgor, warm, dry, intact  : Exam deferred as anoscopy scheduled and skin has fully recovered as noted at last visit  Neurologic: A/O, speech fluent, no focal motor deficits, Gait normal and unaided  Psychiatric: Appropriate mood and affect    Recent Labs:   Recent Results (from the past 168 hour(s))   Comprehensive metabolic panel   Result Value Ref Range    Sodium 136 136 - 145 mmol/L    Potassium 4.6 3.5 - 5.0 mmol/L    Chloride 103 98 - 107 mmol/L    Carbon Dioxide (CO2) 22 22 - 31 mmol/L    Anion Gap 11 5 - 18 mmol/L    Urea Nitrogen 14 8 - 28 mg/dL    Creatinine 0.76 0.60 - 1.10 mg/dL    Calcium 10.4 8.5 - 10.5 mg/dL    Glucose 82 70 - 125 mg/dL    Alkaline Phosphatase 86 45 - 120 U/L    AST 18 0 - 40 U/L    ALT 19 0 - 45 U/L    Protein Total 7.0 6.0 - 8.0 g/dL    Albumin 4.0 3.5 - 5.0 g/dL    Bilirubin Total 0.6 0.0 - 1.0 mg/dL    GFR Estimate 81 >60 mL/min/1.73m2   CBC with platelets and differential   Result Value Ref Range    WBC Count 5.4 4.0 - 11.0 10e3/uL    RBC Count 4.35 3.80 - 5.20 10e6/uL    Hemoglobin 12.9 11.7 - 15.7 g/dL    Hematocrit 41.1 35.0 - 47.0 %    MCV 95 78 - 100 fL    MCH 29.7 26.5 - 33.0 pg    MCHC 31.4 (L) 31.5 - 36.5 g/dL    RDW 13.1 10.0 - 15.0 %    Platelet Count 256 150 - 450 10e3/uL    % Neutrophils 62 %    % Lymphocytes 23 %    % Monocytes 11 %    % Eosinophils 3 %    % Basophils 1 %    % Immature Granulocytes 0 %    NRBCs per 100 WBC 0 <1 /100    Absolute Neutrophils 3.4 1.6 - 8.3 10e3/uL    Absolute Lymphocytes 1.2 0.8 - 5.3  10e3/uL    Absolute Monocytes 0.6 0.0 - 1.3 10e3/uL    Absolute Eosinophils 0.1 0.0 - 0.7 10e3/uL    Absolute Basophils 0.0 0.0 - 0.2 10e3/uL    Absolute Immature Granulocytes 0.0 <=0.4 10e3/uL    Absolute NRBCs 0.0 10e3/uL     Personally reviewed imaging official read on MRI pending but on review of imaging personally did not appreciate concerns of disease recurrence or persistence.  Imaging: Imaging results 6 weeks:      MRI 1/12/2022:  Final read pending    :CT Chest w/o Contrast    Result Date: 1/12/2022  EXAM: CT CHEST W/O CONTRAST LOCATION: [facility] DATE/TIME: 1/12/2022 9:41 AM INDICATION: restaging anal SCC s/p CRT COMPARISON: 6/25/2021 and 8/15/2011. TECHNIQUE: CT chest without IV contrast. Multiplanar reformats were obtained. Dose reduction techniques were used. CONTRAST: None. FINDINGS: LUNGS AND PLEURA: Mild benign apical scarring and minimal atelectasis in the lung bases including the left lower lobe adjacent to the hiatal hernia. No suspicious pulmonary nodules. 4 mm subpleural nodule in the right upper lobe on series 4, image #103 is minimally changed from 8/15/2011 and benign. MEDIASTINUM/AXILLAE: The heart is normal in size. Thoracic aorta is nonaneurysmal. No suspicious adenopathy. Moderate size hiatal hernia is stable. CORONARY ARTERY CALCIFICATION: None. UPPER ABDOMEN: Moderately large hiatal hernia. Normal adrenal glands. No suspicious findings. MUSCULOSKELETAL: Mild scoliosis and degenerative changes of the spine. No suspicious bone lesion.     IMPRESSION: 1.  No evidence of metastatic disease within the chest.             Signed by: Patsy Spencer MD    Pt ambulatory to radiation clinic for follow up. CT done on 1/12. Further recommendations and orders per provider.      Again, thank you for allowing me to participate in the care of your patient.        Sincerely,        Patsy Spencer MD

## 2022-01-14 PROBLEM — C21.1 MALIGNANT NEOPLASM OF ANAL CANAL (H): Status: ACTIVE | Noted: 2021-02-25

## 2022-01-18 ENCOUNTER — ONCOLOGY VISIT (OUTPATIENT)
Dept: ONCOLOGY | Facility: CLINIC | Age: 77
End: 2022-01-18
Attending: INTERNAL MEDICINE
Payer: MEDICARE

## 2022-01-18 VITALS
SYSTOLIC BLOOD PRESSURE: 137 MMHG | OXYGEN SATURATION: 97 % | DIASTOLIC BLOOD PRESSURE: 84 MMHG | HEART RATE: 67 BPM | BODY MASS INDEX: 24.82 KG/M2 | TEMPERATURE: 97.6 F | WEIGHT: 140.1 LBS | RESPIRATION RATE: 16 BRPM

## 2022-01-18 DIAGNOSIS — C21.1 MALIGNANT NEOPLASM OF ANAL CANAL (H): Primary | ICD-10-CM

## 2022-01-18 PROCEDURE — 99214 OFFICE O/P EST MOD 30 MIN: CPT | Performed by: INTERNAL MEDICINE

## 2022-01-18 PROCEDURE — G0463 HOSPITAL OUTPT CLINIC VISIT: HCPCS

## 2022-01-18 ASSESSMENT — PAIN SCALES - GENERAL: PAINLEVEL: NO PAIN (0)

## 2022-01-18 NOTE — LETTER
1/18/2022         RE: Oscar Zhao  [address]  [place] [address]        Dear Colleague,    Thank you for referring your patient, Oscar Zhao, to the [facility] CANCER CENTER [place]. Please see a copy of my visit note below.    [facility] Hematology and Oncology Progress Note    Patient: Oscar Zhao  MRN: [id number]  Date of Service: Jan 18, 2022         Reason for Visit    Follow-up of anal squamous cell cancer.    Assessment and Plan    Cancer Staging  Malignant neoplasm of anal canal (H)  Staging form: Anus, AJCC 8th Edition  - Clinical stage from 2/20/2021: Stage IIA (cT2, cN0, cM0) - Signed by Patsy Spencer MD on 1/13/2022      ECOG Performance    0 - Independent     Pain  Pain Score: No Pain (0)      Ms.Jerine PABLO Zhao is a 76 year old woman who was diagnosed with a moderately differentiated cancer of the anal canal in the colonoscopy that was done on 2/18/2021. She would just see a little blood on the toilet paper now and then which was thought to be due to hemorrhoids.      The lesion itself appears not to invade through the muscularis propria in the MRI.  Staged as stage II (T2, N0, M0).      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  She has been treated in the past with prednisone, methotrexate, Humira and rituximab for this condition.     She started on chemotherapy and radiation on 3/22/2021.  Chemotherapy was with mitomycin and fluorouracil infusion.  The fluorouracil pump was disconnected on 3/26/2021.  A week after that she was admitted to the hospital with hyponatremia thought to be due to poor oral intake from nausea and vomiting.     She had another admission to the hospital on 4/5/2021 with  febrile neutropenia.  She was treated with antibiotics, antiviral cream for what appeared to be a this outbreak on her buttocks and G-CSF and she could be discharged on 4/8/2021.     She started the second session of chemotherapy with mitomycin and 5-FU on 4/19/2021.  The 5-FU infusion was completed on 4/23/2021 and she received a Neulasta on 4/24/2021.  She completed radiation on 4/29/2021.    1.  She is here for follow-up with me after about 7 months.  She appears to be doing well overall.  She feels that she is back to her normal activity level compared to when she was on chemotherapy and radiation and she is happy about that.  In the interim she had an inguinal hernia surgery which went well.  She has healed well.  On physical examination today everything looks good.  She has been following up with colorectal surgery and apparently they have not seen anything worrisome also.    2. She had her labs drawn on 1/10/2022.  These were reviewed with her.  CMP showed a creatinine of 0.76, normal LFTs and normal electrolytes.  In the CBC depression platelets she had a hemoglobin of 12.9, platelet count of 256,000 and a white count of 5.4 with a normal differential.    3. I reviewed with her the images and the report of the CT scan of the chest from 1/12/2022.  She had some apical scarring and a 4 mm subpleural nodule in the right upper lobe which looks unchanged.  Nothing concerning for metastatic disease anywhere.  She had an MR scan of the rectum with and without contrast also done on 1/12/2022.  I reviewed the images and the report with her.  The only finding was some minimal fibrosis seen at the left side of the anorectal junction likely from the radiation.  Nothing concerning for residual or recurrent tumor.  She was very happy to see the results.    4.  I discussed with her that she has done well with chemotherapy and radiation and has achieved a complete response.  I discussed with her about the follow-up that is  needed (per NCCN guidelines).  Recommend having a rectal exam every 3 to 6 months for 5 years.  She also needs physical exam of the inguinal regions and anoscopy every 6 to 12 months for 3 years.  This can be done with colorectal surgery.  She needs chest and abdominal/pelvic imaging with a CT scan or MRI annually for 3 years at least.  The chest imaging will follow the tiny pulmonary nodule also.  At this point she plans to continue her follow-up with Dr. Spencer of radiation oncology and also with Dr. Reynolds of colorectal surgery.    5. Today I discussed with the patient that I will be leaving the practice and moving out of state.  The last day of work is March 17.  Arrangements will be made by [employer] for continued follow-up with my colleagues if any help with medical oncology is needed down the line.  Questions were answered.  She voiced understanding.    Time spend >30 minutes total time for the patient.             Encounter Diagnoses:    Problem List Items Addressed This Visit        Digestive    Malignant neoplasm of anal canal (H) - Primary             CC: MD Patsy Degroot, MD Aren Reynolds MD  ______________________________________________________________________________    History of Present Illness    Ms. Oscar Zhao is here for follow-up alone.    She states that she is doing well overall.  She feels that she has recovered her energy from the time when she was on chemotherapy and radiation.  She is eating well.  Bowel movements are back to normal.  No complaints of pain anywhere.  She states that she has followed up with Dr. Dumont and she has also followed up with Dr. Reynolds of colorectal surgery.      Review of systems.  No fever or night sweats.  No loss of weight.  No lumps or bumps anywhere.  No unusual headaches or eyesight issues.  No dizziness.  No bleeding from the nose.  No sores in the mouth. No problems with swallowing.  No chest pain. No shortness of breath. No  cough.  No abdominal pain. No nausea or vomiting.  No diarrhea or constipation.  No blood in stool or black colored stools.  No problems passing urine.  No numbness or tingling in hands or feet.  No skin rashes.  A 14 point review of systems is otherwise negative.        Past History    Past Medical History:   Diagnosis Date     Basal cell carcinoma of anterior chest      Breast cyst      History of anesthesia complications      HLA B27 (HLA B27 positive)      Hypertension      Osteoporosis 2011     Peripheral neuropathy 2018     PONV (postoperative nausea and vomiting)      Scleritis, bilateral     Left .  Treated with corticosteroids,, methotrexate and Humira.  .  Rituximab.     Squamous cell carcinoma of skin of chest      Steroid induced glaucoma, both eyes        Past Surgical History:   Procedure Laterality Date     BREAST CYST EXCISION Right     benign      SECTION       CLOSED REDUCTION HIP Right 2019    Procedure: CLOSED REDUCTION, HIP;  Surgeon: Jak Ruiz DO;  Location: [facility];  Service: Orthopedics     COLONOSCOPY  2011     COLONOSCOPY  2005     COLONOSCOPY W/ BIOPSIES AND POLYPECTOMY  2021    16 to 20 mm polyp:tubular adenoma in the ascending colon.  Ulcerated mass in the anal canal: Moderately differentiated squamous cell cancer.     ESOPHAGOSCOPY, GASTROSCOPY, DUODENOSCOPY (EGD), COMBINED  2017    Large hiatal hernia.  Reactive gastropathy with mucosal erosion.  H. pylori negative.     HERNIORRHAPHY FEMORAL Left 2021    Procedure: LEFT FEMORAL HERNIA REPAIR;  Surgeon: Sidney Murray MD;  Location: [facility]     HYSTERECTOMY TOTAL ABDOMINAL, BILATERAL SALPINGO-OOPHORECTOMY, COMBINED       IR CHEST PORT PLACEMENT > 5 YRS OF AGE  3/15/2021     IR PORT PLACEMENT >5 YEARS  03/15/2021    Right IJ port-a-cath.     IR PORT REMOVAL RIGHT  2021     LAPAROSCOPIC APPENDECTOMY  2011      PHACOEMULSIFICATION CLEAR CORNEA WITH STANDARD INTRAOCULAR LENS IMPLANT Right 08/30/2017     PHACOEMULSIFICATION CLEAR CORNEA WITH STANDARD INTRAOCULAR LENS IMPLANT Left 09/13/2017     TOTAL HIP ARTHROPLASTY Right 08/18/2015    Procedure: HIP TOTAL ARTHROPLASTY, RIGHT;  Surgeon: Star Du MD;  Location: [facility];  Service:      [facility] TOTAL KNEE ARTHROPLASTY Left 03/02/2017    Procedure: LEFT TOTAL KNEE ARTHROPLASTY;  Surgeon: Star Du MD;  Location: [facility];  Service: Orthopedics         Physical Exam    /84 (Patient Position: Sitting)   Pulse 67   Temp 97.6  F (36.4  C) (Oral)   Resp 16   Wt 63.5 kg (140 lb 1.6 oz)   SpO2 97%   BMI 24.82 kg/m        GENERAL: Alert and oriented to time place and person. Seated comfortably. In no distress.  Normal build.  Very pleasant.    HEAD: Atraumatic and normocephalic.    EYES: JUANCARLOS, EOMI.  No pallor.  No icterus.    Oral cavity: no mucosal lesion or tonsillar enlargement.    NECK: supple. JVP normal.  No thyroid enlargement.    LYMPH NODES: No palpable, cervical, axillary or inguinal lymphadenopathy.    CHEST: clear to auscultation bilaterally.  Resonant to percussion throughout bilaterally.  Symmetrical breath movements bilaterally.    CVS: S1 and S2 are heard. Regular rate and rhythm.  No murmur or gallop or rub heard.  No peripheral edema.    ABDOMEN: Soft. Not tender. Not distended.  No palpable hepatomegaly or splenomegaly.  No other mass palpable.  Bowel sounds heard.    EXTREMITIES: Warm.    SKIN: no rash, or bruising or purpura.  Has a full head of hair.    Lab Results    Recent Results (from the past 240 hour(s))   Comprehensive metabolic panel    Collection Time: 01/10/22 11:09 AM   Result Value Ref Range    Sodium 136 136 - 145 mmol/L    Potassium 4.6 3.5 - 5.0 mmol/L    Chloride 103 98 - 107 mmol/L    Carbon Dioxide (CO2) 22 22 - 31 mmol/L    Anion Gap 11 5 - 18 mmol/L    Urea Nitrogen 14 8 - 28 mg/dL    Creatinine  0.76 0.60 - 1.10 mg/dL    Calcium 10.4 8.5 - 10.5 mg/dL    Glucose 82 70 - 125 mg/dL    Alkaline Phosphatase 86 45 - 120 U/L    AST 18 0 - 40 U/L    ALT 19 0 - 45 U/L    Protein Total 7.0 6.0 - 8.0 g/dL    Albumin 4.0 3.5 - 5.0 g/dL    Bilirubin Total 0.6 0.0 - 1.0 mg/dL    GFR Estimate 81 >60 mL/min/1.73m2   CBC with platelets and differential    Collection Time: 01/10/22 11:09 AM   Result Value Ref Range    WBC Count 5.4 4.0 - 11.0 10e3/uL    RBC Count 4.35 3.80 - 5.20 10e6/uL    Hemoglobin 12.9 11.7 - 15.7 g/dL    Hematocrit 41.1 35.0 - 47.0 %    MCV 95 78 - 100 fL    MCH 29.7 26.5 - 33.0 pg    MCHC 31.4 (L) 31.5 - 36.5 g/dL    RDW 13.1 10.0 - 15.0 %    Platelet Count 256 150 - 450 10e3/uL    % Neutrophils 62 %    % Lymphocytes 23 %    % Monocytes 11 %    % Eosinophils 3 %    % Basophils 1 %    % Immature Granulocytes 0 %    NRBCs per 100 WBC 0 <1 /100    Absolute Neutrophils 3.4 1.6 - 8.3 10e3/uL    Absolute Lymphocytes 1.2 0.8 - 5.3 10e3/uL    Absolute Monocytes 0.6 0.0 - 1.3 10e3/uL    Absolute Eosinophils 0.1 0.0 - 0.7 10e3/uL    Absolute Basophils 0.0 0.0 - 0.2 10e3/uL    Absolute Immature Granulocytes 0.0 <=0.4 10e3/uL    Absolute NRBCs 0.0 10e3/uL         Imaging    CT Chest w/o Contrast    Result Date: 1/12/2022  EXAM: CT CHEST W/O CONTRAST LOCATION: [facility] DATE/TIME: 1/12/2022 9:41 AM INDICATION: restaging anal SCC s/p CRT COMPARISON: 6/25/2021 and 8/15/2011. TECHNIQUE: CT chest without IV contrast. Multiplanar reformats were obtained. Dose reduction techniques were used. CONTRAST: None. FINDINGS: LUNGS AND PLEURA: Mild benign apical scarring and minimal atelectasis in the lung bases including the left lower lobe adjacent to the hiatal hernia. No suspicious pulmonary nodules. 4 mm subpleural nodule in the right upper lobe on series 4, image #103 is minimally changed from 8/15/2011 and benign. MEDIASTINUM/AXILLAE: The heart is normal in size. Thoracic aorta is nonaneurysmal.  No suspicious adenopathy. Moderate size hiatal hernia is stable. CORONARY ARTERY CALCIFICATION: None. UPPER ABDOMEN: Moderately large hiatal hernia. Normal adrenal glands. No suspicious findings. MUSCULOSKELETAL: Mild scoliosis and degenerative changes of the spine. No suspicious bone lesion.     IMPRESSION: 1.  No evidence of metastatic disease within the chest.     MR Rectum wo & w Contrast    Result Date: 1/13/2022  EXAM: MRI PELVIS WITHOUT AND WITH IV CONTRAST/MRI RECTUM LOCATION: [facility] DATE/TIME: 1/12/2022 10:18 AM INDICATION: Restaging anal SCC s/p CRT COMPARISON: 02/23/2021 MRI and 06/25/2021 MRI. TECHNIQUE: Routine MRI rectal cancer protocol including T1 in/out phase, diffusion, thin section high resolution T2 and if IV contrast used, post contrast T1. CONTRAST: 7m lgadavist FINDINGS: LOCAL TUMOR: Very minimal residual thickening left side anal rectal junction has dark T2 signal and likely fibrosis process treatment changes. Best seen on series 11 image 12. Nothing for residual or recurrent tumor. REGIONAL NODES: No pelvic or inguinal lymphadenopathy. METASTASES: Nothing for metastatic disease within the pelvis.     IMPRESSION: 1.  Minimal post treatment fibrosis at the left side of the anorectal junction. 2.  Nothing concerning for residual or recurrent tumor. 3.  No lymphadenopathy.         Signed by: Kristen Villa MD        Again, thank you for allowing me to participate in the care of your patient.        Sincerely,        Kristen Villa MD

## 2022-01-18 NOTE — PROGRESS NOTES
[facility] Hematology and Oncology Progress Note    Patient: Oscar Zhao  MRN: [id number]  Date of Service: Jan 18, 2022         Reason for Visit    Follow-up of anal squamous cell cancer.    Assessment and Plan    Cancer Staging  Malignant neoplasm of anal canal (H)  Staging form: Anus, AJCC 8th Edition  - Clinical stage from 2/20/2021: Stage IIA (cT2, cN0, cM0) - Signed by Patsy Spencer MD on 1/13/2022      ECOG Performance    0 - Independent     Pain  Pain Score: No Pain (0)      Ms.Jerine PABLO Zhao is a 76 year old woman who was diagnosed with a moderately differentiated cancer of the anal canal in the colonoscopy that was done on 2/18/2021. She would just see a little blood on the toilet paper now and then which was thought to be due to hemorrhoids.      The lesion itself appears not to invade through the muscularis propria in the MRI.  Staged as stage II (T2, N0, M0).      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  She has been treated in the past with prednisone, methotrexate, Humira and rituximab for this condition.     She started on chemotherapy and radiation on 3/22/2021.  Chemotherapy was with mitomycin and fluorouracil infusion.  The fluorouracil pump was disconnected on 3/26/2021.  A week after that she was admitted to the hospital with hyponatremia thought to be due to poor oral intake from nausea and vomiting.     She had another admission to the hospital on 4/5/2021 with febrile neutropenia.  She was treated with antibiotics, antiviral cream for what appeared to be a this outbreak on her buttocks and G-CSF and she could be discharged on 4/8/2021.     She started the second session of chemotherapy with mitomycin and 5-FU on  4/19/2021.  The 5-FU infusion was completed on 4/23/2021 and she received a Neulasta on 4/24/2021.  She completed radiation on 4/29/2021.    1.  She is here for follow-up with me after about 7 months.  She appears to be doing well overall.  She feels that she is back to her normal activity level compared to when she was on chemotherapy and radiation and she is happy about that.  In the interim she had an inguinal hernia surgery which went well.  She has healed well.  On physical examination today everything looks good.  She has been following up with colorectal surgery and apparently they have not seen anything worrisome also.    2. She had her labs drawn on 1/10/2022.  These were reviewed with her.  CMP showed a creatinine of 0.76, normal LFTs and normal electrolytes.  In the CBC depression platelets she had a hemoglobin of 12.9, platelet count of 256,000 and a white count of 5.4 with a normal differential.    3. I reviewed with her the images and the report of the CT scan of the chest from 1/12/2022.  She had some apical scarring and a 4 mm subpleural nodule in the right upper lobe which looks unchanged.  Nothing concerning for metastatic disease anywhere.  She had an MR scan of the rectum with and without contrast also done on 1/12/2022.  I reviewed the images and the report with her.  The only finding was some minimal fibrosis seen at the left side of the anorectal junction likely from the radiation.  Nothing concerning for residual or recurrent tumor.  She was very happy to see the results.    4.  I discussed with her that she has done well with chemotherapy and radiation and has achieved a complete response.  I discussed with her about the follow-up that is needed (per NCCN guidelines).  Recommend having a rectal exam every 3 to 6 months for 5 years.  She also needs physical exam of the inguinal regions and anoscopy every 6 to 12 months for 3 years.  This can be done with colorectal surgery.  She needs chest  and abdominal/pelvic imaging with a CT scan or MRI annually for 3 years at least.  The chest imaging will follow the tiny pulmonary nodule also.  At this point she plans to continue her follow-up with Dr. Spencer of radiation oncology and also with Dr. Reynolds of colorectal surgery.    5. Today I discussed with the patient that I will be leaving the practice and moving out of state.  The last day of work is March 17.  Arrangements will be made by Viola for continued follow-up with my colleagues if any help with medical oncology is needed down the line.  Questions were answered.  She voiced understanding.    Time spend >30 minutes total time for the patient.             Encounter Diagnoses:    Problem List Items Addressed This Visit        Digestive    Malignant neoplasm of anal canal (H) - Primary             CC: MD Patsy Degroot MD Aneel Damle, MD  ______________________________________________________________________________    History of Present Illness    Ms. Oscar Zhao is here for follow-up alone.    She states that she is doing well overall.  She feels that she has recovered her energy from the time when she was on chemotherapy and radiation.  She is eating well.  Bowel movements are back to normal.  No complaints of pain anywhere.  She states that she has followed up with Dr. Dumont and she has also followed up with Dr. Reynolds of colorectal surgery.      Review of systems.  No fever or night sweats.  No loss of weight.  No lumps or bumps anywhere.  No unusual headaches or eyesight issues.  No dizziness.  No bleeding from the nose.  No sores in the mouth. No problems with swallowing.  No chest pain. No shortness of breath. No cough.  No abdominal pain. No nausea or vomiting.  No diarrhea or constipation.  No blood in stool or black colored stools.  No problems passing urine.  No numbness or tingling in hands or feet.  No skin rashes.  A 14 point review of systems is otherwise  negative.        Past History    Past Medical History:   Diagnosis Date     Basal cell carcinoma of anterior chest      Breast cyst      History of anesthesia complications      HLA B27 (HLA B27 positive)      Hypertension      Osteoporosis 2011     Peripheral neuropathy 2018     PONV (postoperative nausea and vomiting)      Scleritis, bilateral     Left .  Treated with corticosteroids,, methotrexate and Humira.  .  Rituximab.     Squamous cell carcinoma of skin of chest      Steroid induced glaucoma, both eyes        Past Surgical History:   Procedure Laterality Date     BREAST CYST EXCISION Right 1981    benign      SECTION       CLOSED REDUCTION HIP Right 2019    Procedure: CLOSED REDUCTION, HIP;  Surgeon: Jak Ruiz DO;  Location: [facility];  Service: Orthopedics     COLONOSCOPY  2011     COLONOSCOPY  2005     COLONOSCOPY W/ BIOPSIES AND POLYPECTOMY  2021    16 to 20 mm polyp:tubular adenoma in the ascending colon.  Ulcerated mass in the anal canal: Moderately differentiated squamous cell cancer.     ESOPHAGOSCOPY, GASTROSCOPY, DUODENOSCOPY (EGD), COMBINED  2017    Large hiatal hernia.  Reactive gastropathy with mucosal erosion.  H. pylori negative.     HERNIORRHAPHY FEMORAL Left 2021    Procedure: LEFT FEMORAL HERNIA REPAIR;  Surgeon: Sidney Murray MD;  Location: [facility] [place]     HYSTERECTOMY TOTAL ABDOMINAL, BILATERAL SALPINGO-OOPHORECTOMY, COMBINED       IR CHEST PORT PLACEMENT > 5 YRS OF AGE  3/15/2021     IR PORT PLACEMENT >5 YEARS  03/15/2021    Right IJ port-a-cath.     IR PORT REMOVAL RIGHT  2021     LAPAROSCOPIC APPENDECTOMY  2011     PHACOEMULSIFICATION CLEAR CORNEA WITH STANDARD INTRAOCULAR LENS IMPLANT Right 2017     PHACOEMULSIFICATION CLEAR CORNEA WITH STANDARD INTRAOCULAR LENS IMPLANT Left 2017     TOTAL HIP ARTHROPLASTY Right 2015    Procedure: HIP TOTAL ARTHROPLASTY, RIGHT;   Surgeon: Star Du MD;  Location: [facility] OR;  Service:      [facility] TOTAL KNEE ARTHROPLASTY Left 03/02/2017    Procedure: LEFT TOTAL KNEE ARTHROPLASTY;  Surgeon: Star Du MD;  Location: [facility] Main OR;  Service: Orthopedics         Physical Exam    /84 (Patient Position: Sitting)   Pulse 67   Temp 97.6  F (36.4  C) (Oral)   Resp 16   Wt 63.5 kg (140 lb 1.6 oz)   SpO2 97%   BMI 24.82 kg/m        GENERAL: Alert and oriented to time place and person. Seated comfortably. In no distress.  Normal build.  Very pleasant.    HEAD: Atraumatic and normocephalic.    EYES: JUANCARLOS, EOMI.  No pallor.  No icterus.    Oral cavity: no mucosal lesion or tonsillar enlargement.    NECK: supple. JVP normal.  No thyroid enlargement.    LYMPH NODES: No palpable, cervical, axillary or inguinal lymphadenopathy.    CHEST: clear to auscultation bilaterally.  Resonant to percussion throughout bilaterally.  Symmetrical breath movements bilaterally.    CVS: S1 and S2 are heard. Regular rate and rhythm.  No murmur or gallop or rub heard.  No peripheral edema.    ABDOMEN: Soft. Not tender. Not distended.  No palpable hepatomegaly or splenomegaly.  No other mass palpable.  Bowel sounds heard.    EXTREMITIES: Warm.    SKIN: no rash, or bruising or purpura.  Has a full head of hair.    Lab Results    Recent Results (from the past 240 hour(s))   Comprehensive metabolic panel    Collection Time: 01/10/22 11:09 AM   Result Value Ref Range    Sodium 136 136 - 145 mmol/L    Potassium 4.6 3.5 - 5.0 mmol/L    Chloride 103 98 - 107 mmol/L    Carbon Dioxide (CO2) 22 22 - 31 mmol/L    Anion Gap 11 5 - 18 mmol/L    Urea Nitrogen 14 8 - 28 mg/dL    Creatinine 0.76 0.60 - 1.10 mg/dL    Calcium 10.4 8.5 - 10.5 mg/dL    Glucose 82 70 - 125 mg/dL    Alkaline Phosphatase 86 45 - 120 U/L    AST 18 0 - 40 U/L    ALT 19 0 - 45 U/L    Protein Total 7.0 6.0 - 8.0 g/dL    Albumin 4.0 3.5 - 5.0 g/dL    Bilirubin Total 0.6 0.0 - 1.0  mg/dL    GFR Estimate 81 >60 mL/min/1.73m2   CBC with platelets and differential    Collection Time: 01/10/22 11:09 AM   Result Value Ref Range    WBC Count 5.4 4.0 - 11.0 10e3/uL    RBC Count 4.35 3.80 - 5.20 10e6/uL    Hemoglobin 12.9 11.7 - 15.7 g/dL    Hematocrit 41.1 35.0 - 47.0 %    MCV 95 78 - 100 fL    MCH 29.7 26.5 - 33.0 pg    MCHC 31.4 (L) 31.5 - 36.5 g/dL    RDW 13.1 10.0 - 15.0 %    Platelet Count 256 150 - 450 10e3/uL    % Neutrophils 62 %    % Lymphocytes 23 %    % Monocytes 11 %    % Eosinophils 3 %    % Basophils 1 %    % Immature Granulocytes 0 %    NRBCs per 100 WBC 0 <1 /100    Absolute Neutrophils 3.4 1.6 - 8.3 10e3/uL    Absolute Lymphocytes 1.2 0.8 - 5.3 10e3/uL    Absolute Monocytes 0.6 0.0 - 1.3 10e3/uL    Absolute Eosinophils 0.1 0.0 - 0.7 10e3/uL    Absolute Basophils 0.0 0.0 - 0.2 10e3/uL    Absolute Immature Granulocytes 0.0 <=0.4 10e3/uL    Absolute NRBCs 0.0 10e3/uL         Imaging    CT Chest w/o Contrast    Result Date: 1/12/2022  EXAM: CT CHEST W/O CONTRAST LOCATION: [facility] DATE/TIME: 1/12/2022 9:41 AM INDICATION: restaging anal SCC s/p CRT COMPARISON: 6/25/2021 and 8/15/2011. TECHNIQUE: CT chest without IV contrast. Multiplanar reformats were obtained. Dose reduction techniques were used. CONTRAST: None. FINDINGS: LUNGS AND PLEURA: Mild benign apical scarring and minimal atelectasis in the lung bases including the left lower lobe adjacent to the hiatal hernia. No suspicious pulmonary nodules. 4 mm subpleural nodule in the right upper lobe on series 4, image #103 is minimally changed from 8/15/2011 and benign. MEDIASTINUM/AXILLAE: The heart is normal in size. Thoracic aorta is nonaneurysmal. No suspicious adenopathy. Moderate size hiatal hernia is stable. CORONARY ARTERY CALCIFICATION: None. UPPER ABDOMEN: Moderately large hiatal hernia. Normal adrenal glands. No suspicious findings. MUSCULOSKELETAL: Mild scoliosis and degenerative changes of the spine.  No suspicious bone lesion.     IMPRESSION: 1.  No evidence of metastatic disease within the chest.     MR Rectum wo & w Contrast    Result Date: 1/13/2022  EXAM: MRI PELVIS WITHOUT AND WITH IV CONTRAST/MRI RECTUM LOCATION: [facility] DATE/TIME: 1/12/2022 10:18 AM INDICATION: Restaging anal SCC s/p CRT COMPARISON: 02/23/2021 MRI and 06/25/2021 MRI. TECHNIQUE: Routine MRI rectal cancer protocol including T1 in/out phase, diffusion, thin section high resolution T2 and if IV contrast used, post contrast T1. CONTRAST: 7m lgadavist FINDINGS: LOCAL TUMOR: Very minimal residual thickening left side anal rectal junction has dark T2 signal and likely fibrosis process treatment changes. Best seen on series 11 image 12. Nothing for residual or recurrent tumor. REGIONAL NODES: No pelvic or inguinal lymphadenopathy. METASTASES: Nothing for metastatic disease within the pelvis.     IMPRESSION: 1.  Minimal post treatment fibrosis at the left side of the anorectal junction. 2.  Nothing concerning for residual or recurrent tumor. 3.  No lymphadenopathy.         Signed by: Kristen Villa MD

## 2022-01-19 ENCOUNTER — MYC MEDICAL ADVICE (OUTPATIENT)
Dept: CARE COORDINATION | Facility: CLINIC | Age: 77
End: 2022-01-19
Payer: MEDICARE

## 2022-01-19 NOTE — PROGRESS NOTES
Assessment/Plan:   Sinusitis, subacute vs chronic.  Trial of different antibiotic, consider flonase though taking oral prednisone  Elevated BP - on occasion high in doctor's offices, trending higher lately  Palpitations, occasional sound of heart beat in ears and occasionally rapid heart beat noted at night on two occasions - had wine each night before bed those nights. Heart rate steady and regular at this time.    Anemia - today's blood at the rheumatologist showed Hgb down to 9.5 (was 14 in January, had knee replacement surgery in March and the post op hemoglobin was 11.7).  Took aspirin daily for a month post op and had some black tarry stools on occasion, none now.  No other evidence of bleeding.  Not microcytic on today's indices - but had been macrocytic in the past.  Suspect bleeding from GI source, likely ulcer/gastritis/esophagitis - fairly stable with no visible bleeding source now.  Needs close follow up and further evaluation.  Will check iron level and stores, add PPI.      Fluids, rest, steam, humidifier  Nasal saline to moisten nasal passages  Ceftin twice a day for 10 days  Yogurt, probiotics  Omeprazole once daily  Stool guaiac studies x 3  Follow up with primary MD within this week, sooner or to the ER if any sign of GI bleeding, fainting, headache with vision or neuro changes, racing heart or irregular heart beat  Labs pending - iron, ferritin, TIBC, folate, B12      Subjective:      Oscar Zhao is a 72 y.o. female who presents with multiple concerns.  Initially she was coming in for evaluation of worsening congestion and face pain, sinus symptoms.  She has had trouble off and on with this all winter - never had trouble like this before.  She was treated for a sinus infection in November and again in January, both times with azithromycin.  She seemed to get better for awhile but did not think it went all the way away.  She has continued to have congestion, sometimes pain and face pressure, more  Pt aware  so this week and occasionally nose bleeds.  Continuous post nasal drainage.  Some headache.  Has been noticing her heartbeat in her ears at night more frequently this week - pounding at times.  Regular rate.  Goes away when she gets up and walks around.  No vision changes, no CT, no leg swelling or pain.  No shortness of breath or wheezing or cough.  She had an appointment this morning with the rheumatologist Dr. Wan for follow up of her scleritis - for which she takes prednisone and methotrexate and sees ophthalmology as well - and her blood pressure was elevated (154/88).  She has had elevated blood pressures in the clinic before, then they usually go down.  She is feeling anxious and this time the blood pressure has not gone down.  Just before coming in to the room just now she received a call from the rheumatologist letting her know about blood work done this morning -  Specifically that her hemoglobin is down to 9.5.  She has blood checked every 3 months and last time it was 14.  7 weeks ago she had surgery for total knee replacement.  There was a lot of bruising afterwards and she was taking aspirin for 4 weeks post op.  During that time she does recall a few black tarry stools - that resolved and she has had no further change in her stools, no epigastric pain or increased GERD/heartburn.  She has healed well from her surgery and is getting excellent range of motion back.  Review of her records show that her post op hemoglobin was 11.7.  We did review her morning labs via Care Everywhere.  No fevers.  No abdominal pain.  No vertigo.  Some ongoing fatigue and anxiety.  Initial BP here was also elevated. 156/100.  She takes no antihypertensive medications.      Allergies   Allergen Reactions     Adalimumab Myalgia     Brimonidine-Timolol Unknown     Redness itching in eyes     Tetracyclines Hives     Current Outpatient Prescriptions on File Prior to Visit   Medication Sig Dispense Refill     folic acid  (FOLVITE) 1 MG tablet Take 1 mg by mouth daily.        predniSONE (DELTASONE) 10 MG tablet Take 15 mg by mouth daily.        timolol (BETIMOL) 0.5 % ophthalmic solution Administer 1 drop to both eyes 2 (two) times a day.       methotrexate 25 mg/mL injection Inject 25 mg into the shoulder, thigh, or buttocks every 7 days. On Tuesdays       No current facility-administered medications on file prior to visit.      Patient Active Problem List   Diagnosis     Essential Hypertension     Difficulty Breathing (Dyspnea)     Chest Pain     Osteoarthrosis     S/P total hip arthroplasty     Cough     Primary osteoarthritis of left knee       Objective:     BP (!) 158/100  Pulse 98  Temp 98.2  F (36.8  C) (Oral)   Resp 16  Wt 155 lb 1.6 oz (70.4 kg)  SpO2 100%  Breastfeeding? No  BMI 27.47 kg/m2    Physical  General Appearance: Alert, cooperative, no distress, anxious  Head: Normocephalic, without obvious abnormality, atraumatic  Eyes: Conjunctivae are normal. Extraocular movements are intact. PERRLA.  No nystagmus  Ears: Normal TMs and external ear canals, both ears  Nose:  Congestion with sinus tenderness.  Throat: Throat is normal with mucus posteriorly.  No exudate.  No significant lesions  Neck: No adenopathy; no carotid bruit or JVD, supple  Lungs: Clear to auscultation bilaterally, respirations unlabored  Heart: Regular rate and rhythm, S1 and S2 normal, no murmur  Abdomen: Soft, non-tender  Extremities: No lower extremity edema or calf tenderness, healing surgical scar left knee, good range of motion, no redness, no warmth or tenderness  Skin: Skin color, texture, turgor normal, no rashes or lesions  Psychiatric: Patient has a normal mood and affect though somewhat anxious.

## 2022-01-19 NOTE — TELEPHONE ENCOUNTER
SURV care plan sent to [facility] for patient to review.   Will follow up with any questions and provide any resources needed.    Emelia EAGLE RN 1/19/2022 11:42 AM

## 2022-01-26 ENCOUNTER — TRANSFERRED RECORDS (OUTPATIENT)
Dept: HEALTH INFORMATION MANAGEMENT | Facility: CLINIC | Age: 77
End: 2022-01-26
Payer: MEDICARE

## 2022-01-28 NOTE — TELEPHONE ENCOUNTER
Spoke with Oscar today regarding cancer treatment summary sent to [facility].   Patient stated she has not received summary as her computer is not working.   Will mail summary to patient.   Writer did review summary with patient.   Revived treatment, long/late effects, follow up care, healthy lifestyle tips, resources available, [facility] web site.   Patient stated she is doing fine and with the great help of her cancer team she has all she needs.   She was happy to hear there is resources for patients  At this time Oscar is doing fine and does not have any needs.   Oscar apprciated call.   Emelia Amezquita RN on 1/28/2022 at 2:09 PM

## 2022-03-01 ENCOUNTER — TRANSFERRED RECORDS (OUTPATIENT)
Dept: HEALTH INFORMATION MANAGEMENT | Facility: CLINIC | Age: 77
End: 2022-03-01
Payer: MEDICARE

## 2022-04-20 ENCOUNTER — TELEPHONE (OUTPATIENT)
Dept: INTERNAL MEDICINE | Facility: CLINIC | Age: 77
End: 2022-04-20
Payer: MEDICARE

## 2022-04-20 NOTE — TELEPHONE ENCOUNTER
Patient would like to know Dr. Jones's recommendation for covid booster.    Should patient get it? Should she wait?

## 2022-04-27 ENCOUNTER — TRANSFERRED RECORDS (OUTPATIENT)
Dept: HEALTH INFORMATION MANAGEMENT | Facility: CLINIC | Age: 77
End: 2022-04-27
Payer: MEDICARE

## 2022-05-20 ENCOUNTER — VIRTUAL VISIT (OUTPATIENT)
Dept: INTERNAL MEDICINE | Facility: CLINIC | Age: 77
End: 2022-05-20
Payer: MEDICARE

## 2022-05-20 DIAGNOSIS — R53.83 OTHER FATIGUE: Primary | ICD-10-CM

## 2022-05-20 PROCEDURE — 99442 PR PHYSICIAN TELEPHONE EVALUATION 11-20 MIN: CPT | Mod: 95 | Performed by: INTERNAL MEDICINE

## 2022-05-20 NOTE — PROGRESS NOTES
Oscar Zhao is a 77 year oldwho is being evaluated via a billable telephone visit.       What phone number would you like to be contacted at? [phone]      Assessment & Plan  Fatigue history of cancer of the anus malignant neoplasm chemotherapy 1 year ago.  Just bought 100 pound dog.  Sharing with her daughter today check hemogram comprehensive metabolic profile TSH urinalysis office visit subsequently     11 minutes spent on the date of the encounter doing chart review, patient visit and documentation  and I spoke with the patient directly 5 minutes       Subjective   Fatigue without any other constitutional B symptoms like fever night sweats anorexia or weight loss prior history of malignant neoplasm in the anus.  Chemotherapy 1 year ago completed.  Goes out in the morning fatigue by noon he has to lie down.     Review of Systems   No blood in stool or urine med list reviewed reconciled.       Jak Jones MD

## 2022-05-22 ENCOUNTER — HEALTH MAINTENANCE LETTER (OUTPATIENT)
Age: 77
End: 2022-05-22

## 2022-05-24 ENCOUNTER — LAB (OUTPATIENT)
Dept: LAB | Facility: CLINIC | Age: 77
End: 2022-05-24
Payer: MEDICARE

## 2022-05-24 DIAGNOSIS — R53.83 OTHER FATIGUE: ICD-10-CM

## 2022-05-24 LAB
ALBUMIN SERPL-MCNC: 3.5 G/DL (ref 3.5–5)
ALBUMIN UR-MCNC: NEGATIVE MG/DL
ALP SERPL-CCNC: 87 U/L (ref 45–120)
ALT SERPL W P-5'-P-CCNC: 14 U/L (ref 0–45)
ANION GAP SERPL CALCULATED.3IONS-SCNC: 8 MMOL/L (ref 5–18)
APPEARANCE UR: CLEAR
AST SERPL W P-5'-P-CCNC: 15 U/L (ref 0–40)
BILIRUB SERPL-MCNC: 0.8 MG/DL (ref 0–1)
BILIRUB UR QL STRIP: NEGATIVE
BUN SERPL-MCNC: 10 MG/DL (ref 8–28)
CALCIUM SERPL-MCNC: 9.8 MG/DL (ref 8.5–10.5)
CHLORIDE BLD-SCNC: 105 MMOL/L (ref 98–107)
CO2 SERPL-SCNC: 24 MMOL/L (ref 22–31)
COLOR UR AUTO: YELLOW
CREAT SERPL-MCNC: 0.67 MG/DL (ref 0.6–1.1)
ERYTHROCYTE [DISTWIDTH] IN BLOOD BY AUTOMATED COUNT: 14.6 % (ref 10–15)
GFR SERPL CREATININE-BSD FRML MDRD: 90 ML/MIN/1.73M2
GLUCOSE BLD-MCNC: 84 MG/DL (ref 70–125)
GLUCOSE UR STRIP-MCNC: NEGATIVE MG/DL
HCT VFR BLD AUTO: 39.2 % (ref 35–47)
HGB BLD-MCNC: 12.2 G/DL (ref 11.7–15.7)
HGB UR QL STRIP: NEGATIVE
KETONES UR STRIP-MCNC: NEGATIVE MG/DL
LEUKOCYTE ESTERASE UR QL STRIP: NEGATIVE
MCH RBC QN AUTO: 28.2 PG (ref 26.5–33)
MCHC RBC AUTO-ENTMCNC: 31.1 G/DL (ref 31.5–36.5)
MCV RBC AUTO: 91 FL (ref 78–100)
NITRATE UR QL: NEGATIVE
PH UR STRIP: 5.5 [PH] (ref 5–8)
PLATELET # BLD AUTO: 212 10E3/UL (ref 150–450)
POTASSIUM BLD-SCNC: 4.1 MMOL/L (ref 3.5–5)
PROT SERPL-MCNC: 6.9 G/DL (ref 6–8)
RBC # BLD AUTO: 4.32 10E6/UL (ref 3.8–5.2)
SODIUM SERPL-SCNC: 137 MMOL/L (ref 136–145)
SP GR UR STRIP: <=1.005 (ref 1–1.03)
TSH SERPL DL<=0.005 MIU/L-ACNC: 1.21 UIU/ML (ref 0.3–5)
UROBILINOGEN UR STRIP-ACNC: 0.2 E.U./DL
WBC # BLD AUTO: 4.5 10E3/UL (ref 4–11)

## 2022-05-24 PROCEDURE — 36415 COLL VENOUS BLD VENIPUNCTURE: CPT

## 2022-05-24 PROCEDURE — 80053 COMPREHEN METABOLIC PANEL: CPT

## 2022-05-24 PROCEDURE — 84443 ASSAY THYROID STIM HORMONE: CPT

## 2022-05-24 PROCEDURE — 85027 COMPLETE CBC AUTOMATED: CPT

## 2022-05-24 PROCEDURE — 81003 URINALYSIS AUTO W/O SCOPE: CPT

## 2022-07-11 ENCOUNTER — LAB (OUTPATIENT)
Dept: FAMILY MEDICINE | Facility: CLINIC | Age: 77
End: 2022-07-11
Payer: MEDICARE

## 2022-07-11 DIAGNOSIS — Z20.822 CLOSE EXPOSURE TO 2019 NOVEL CORONAVIRUS: ICD-10-CM

## 2022-07-11 PROCEDURE — U0005 INFEC AGEN DETEC AMPLI PROBE: HCPCS

## 2022-07-11 PROCEDURE — 99207 PR NO CHARGE LOS: CPT

## 2022-07-11 PROCEDURE — U0003 INFECTIOUS AGENT DETECTION BY NUCLEIC ACID (DNA OR RNA); SEVERE ACUTE RESPIRATORY SYNDROME CORONAVIRUS 2 (SARS-COV-2) (CORONAVIRUS DISEASE [COVID-19]), AMPLIFIED PROBE TECHNIQUE, MAKING USE OF HIGH THROUGHPUT TECHNOLOGIES AS DESCRIBED BY CMS-2020-01-R: HCPCS

## 2022-07-12 LAB — SARS-COV-2 RNA RESP QL NAA+PROBE: NEGATIVE

## 2022-07-14 ENCOUNTER — OFFICE VISIT (OUTPATIENT)
Dept: RADIATION ONCOLOGY | Facility: CLINIC | Age: 77
End: 2022-07-14
Attending: INTERNAL MEDICINE
Payer: MEDICARE

## 2022-07-14 VITALS
HEART RATE: 78 BPM | SYSTOLIC BLOOD PRESSURE: 118 MMHG | RESPIRATION RATE: 16 BRPM | BODY MASS INDEX: 24.98 KG/M2 | OXYGEN SATURATION: 99 % | TEMPERATURE: 98 F | WEIGHT: 141 LBS | DIASTOLIC BLOOD PRESSURE: 72 MMHG

## 2022-07-14 DIAGNOSIS — C21.1 MALIGNANT NEOPLASM OF ANAL CANAL (H): Primary | ICD-10-CM

## 2022-07-14 PROCEDURE — G0463 HOSPITAL OUTPT CLINIC VISIT: HCPCS

## 2022-07-14 PROCEDURE — 99215 OFFICE O/P EST HI 40 MIN: CPT | Performed by: STUDENT IN AN ORGANIZED HEALTH CARE EDUCATION/TRAINING PROGRAM

## 2022-07-14 ASSESSMENT — PAIN SCALES - GENERAL: PAINLEVEL: NO PAIN (0)

## 2022-07-14 NOTE — PROGRESS NOTES
[facility] Radiation Oncology Follow Up Note    Patient: Oscar Zhao  MRN: [id number]  Date of Service: 07/14/2022    Assessment / Impression     Malignant neoplasm of anal canal (H) [C21.1]   Cancer Staging  Malignant neoplasm of anal canal (H)  Staging form: Anus, AJCC 8th Edition  - Clinical stage from 2/20/2021: Stage IIA (cT2, cN0, cM0) - Signed by Patsy Spencer MD on 1/13/2022          Body site: Female Pelvis   SITE TREATED: Anal canal and regional lymph nodes   TOTAL DOSE: 5400 cGy in 27 fractions to the primary  4500 cGy in 27 fractions to the pelvic lymph nodes  NUMBER OF FRACTIONS: 27  DATES COMPLETED: 3/22/2021-4/29/2021  CONCURRENT CHEMOTHERAPY: Yes-mitomycin-C and 5-FU  ADJUVANT THERAPY:No      No clinical evidence of recurrence.  Last restaging was January 2022 with no evidence of recurrence restaging ordered for January 2023.    Plan:   MRI pelvis and CT chest restaging planned for January 2023  Return to clinic following restaging, sooner if needed  Discussed Gas-X if desired for trying to improve increase in bowel gas.    Total time of this visit, including time spent face-to-face with patient and or via video/audio, and also in preparing for today's visit for MDM and documentation. Medical decision-making included consideration and possible diagnoses, management options, complex record review, review of diagnostic tests and information, consideration and discussion of significant complications based on comorbidities, discussion with providers involved in the care of the patient.     40 Minutes spent.     Subjective:      HPI: Oscar Zhao is a 77 year old female with   Chief Complaint   Patient presents with     Oncology Clinic Visit     Follow up     She reports she is doing well.  She continues to have notable fatigue that is persisted since her treatment.  In general she feels she sleeps well but is woken at night by her cat, and sometimes her new adopted dog and her cat wake  her up as early as 4:30 in the morning.  She feels pretty wiped out by 1:00 in the afternoon.  She continues to have looser stools but no diarrhea for the last couple months.  She does feel her gas is worse.  Her urinary incontinence is significantly improved with Pilates.  She reports no ongoing incontinence issues.  She continues to be quite active and works out with a  2 times per week.  She has no pelvic skin concerns.    She continues to see colorectal surgery with no evidence of recurrence.    Current Outpatient Medications   Medication Sig Dispense Refill     amLODIPine (NORVASC) 5 MG tablet TAKE 2 TABLETS BY MOUTH DAILY 180 tablet 11     famotidine (PEPCID) 20 MG tablet [FAMOTIDINE (PEPCID) 20 MG TABLET] Take 1 tablet (20 mg total) by mouth 2 (two) times a day. (Patient not taking: Reported on 5/20/2022)  0     lisinopril (ZESTRIL) 20 MG tablet TAKE 1 TABLET(20 MG) BY MOUTH DAILY 90 tablet 11     LOTEMAX 0.5 % ophthalmic suspension [LOTEMAX 0.5 % OPHTHALMIC SUSPENSION] Administer 1 drop to both eyes every other day.   4     methylcellulose (CITRUCEL) powder Take 2 g by mouth (Patient not taking: Reported on 5/20/2022)       metoprolol succinate ER (TOPROL-XL) 25 MG 24 hr tablet Take 2 tablets (50 mg) by mouth daily 180 tablet 3     timolol maleate (TIMOPTIC) 0.5 % ophthalmic solution 1 drop         The following portions of the patient's history were reviewed and updated as appropriate: allergies, current medications, past family history, past medical history, past social history, past surgical history and problem list.    Review of Systems    General  Constitutional  Constitutional (WDL): Exceptions to WDL  Fatigue: Fatigue not relieved by rest OR limiting instrumental ADL  EENT  Eye Disorders  Eye Disorder (WDL): Assessment not pertinent to visit  Ear Disorders  Ear Disorder (WDL): Assessment not pertinent to visit  Respiratory    Respiratory  Respiratory (WDL): All respiratory elements are within  defined limits  Cardiovascular  Cardiovascular  Cardiovascular (WDL): All cardiovascular elements are within defined limits  Gastrointestinal  Gastrointestinal  Gastrointestinal (WDL): Exceptions to WDL  Diarrhea: Increase of less than 4 stools per day over baseline OR mild increase in ostomy output compared to baseline (occassionally)  Musculoskeletal  Musculoskeletal and Connective Tissue Disorders  Musculoskeletal & Connective (WDL): All musculoskeletal & connective elements are within defined limits  Integumentary              Integumentary  Integumentary (WDL): All integumentary elements are within defined limits  Neurological  Neurosensory  Neurosensory (WDL): All neurosensory elements are within defined limits  Genitourinary/Reproductive  Genitourinary  Genitourinary (WDL): All genitourinary elements are within defined limits  Lymphatic   Lymph System Disorders  Lymph (WDL): All lymph elements are within defined limits    Pain   Pain Score: No Pain (0)   AUA Assessment                                                                         Accompanied by  Accompanied By: self only      Objective:     Physical Exam    Vitals:    07/14/22 1037   BP: 118/72   Pulse: 78   Resp: 16   Temp: 98  F (36.7  C)   SpO2: 99%   Weight: 64 kg (141 lb)   PainSc: No Pain (0)        Gen: Alert, in NAD pleasant interactive, well nourished  Eyes:  EOMI, sclera anicteric  HENT     Head: NC/AT  Pulm: No wheezing, stridor or respiratory distress  CV: Well-perfused, no cyanosis  Musculoskeletal: Normal muscle bulk and tone  Skin: Normal tone and turgor, warm, dry, intact, bruising on shins  Neurologic: A/Ox3,speech fluent, no focal motor deficits. Gait normal and unaided  /GI: Exam deferred  Psychiatric: Appropriate mood and affect    Recent Labs:   Recent Results (from the past 168 hour(s))   Asymptomatic COVID-19 Virus (Coronavirus) by PCR Nose    Specimen: Nose; Swab   Result Value Ref Range    SARS CoV2 PCR Negative Negative,  Testing sent to reference lab. Results will be returned via unsolicited result       Imaging: Imaging results 6 weeks:No results found.    Signed by: Patsy Spencer MD

## 2022-07-14 NOTE — PROGRESS NOTES
Pt ambulatory to clinic for follow up. Doing well, still lots of fatigue. Further recommendations and orders per provider.

## 2022-07-14 NOTE — LETTER
7/14/2022         RE: Oscar Zhao  [address]  [place] [address]        Dear Colleague,    Thank you for referring your patient, Oscar Zhao, to the [facility] RADIATION ONCOLOGY [place]. Please see a copy of my visit note below.      [facility] Radiation Oncology Follow Up Note    Patient: Oscar Zhao  MRN: [id number]  Date of Service: 07/14/2022    Assessment / Impression     Malignant neoplasm of anal canal (H) [C21.1]   Cancer Staging  Malignant neoplasm of anal canal (H)  Staging form: Anus, AJCC 8th Edition  - Clinical stage from 2/20/2021: Stage IIA (cT2, cN0, cM0) - Signed by Patsy Spencer MD on 1/13/2022          Body site: Female Pelvis   SITE TREATED: Anal canal and regional lymph nodes   TOTAL DOSE: 5400 cGy in 27 fractions to the primary  4500 cGy in 27 fractions to the pelvic lymph nodes  NUMBER OF FRACTIONS: 27  DATES COMPLETED: 3/22/2021-4/29/2021  CONCURRENT CHEMOTHERAPY: Yes-mitomycin-C and 5-FU  ADJUVANT THERAPY:No      No clinical evidence of recurrence.  Last restaging was January 2022 with no evidence of recurrence restaging ordered for January 2023.    Plan:   MRI pelvis and CT chest restaging planned for January 2023  Return to clinic following restaging, sooner if needed  Discussed Gas-X if desired for trying to improve increase in bowel gas.    Total time of this visit, including time spent face-to-face with patient and or via video/audio, and also in preparing for today's visit for MDM and documentation. Medical decision-making included consideration and possible diagnoses, management options, complex record review, review of diagnostic tests and information, consideration and discussion of significant complications based on comorbidities, discussion with providers involved in the care of the patient.     40 Minutes spent.     Subjective:      HPI: Oscar Zhao is a 77 year old female with   Chief Complaint   Patient presents with     Oncology  Clinic Visit     Follow up     She reports she is doing well.  She continues to have notable fatigue that is persisted since her treatment.  In general she feels she sleeps well but is woken at night by her cat, and sometimes her new adopted dog and her cat wake her up as early as 4:30 in the morning.  She feels pretty wiped out by 1:00 in the afternoon.  She continues to have looser stools but no diarrhea for the last couple months.  She does feel her gas is worse.  Her urinary incontinence is significantly improved with Pilates.  She reports no ongoing incontinence issues.  She continues to be quite active and works out with a  2 times per week.  She has no pelvic skin concerns.    She continues to see colorectal surgery with no evidence of recurrence.    Current Outpatient Medications   Medication Sig Dispense Refill     amLODIPine (NORVASC) 5 MG tablet TAKE 2 TABLETS BY MOUTH DAILY 180 tablet 11     famotidine (PEPCID) 20 MG tablet [FAMOTIDINE (PEPCID) 20 MG TABLET] Take 1 tablet (20 mg total) by mouth 2 (two) times a day. (Patient not taking: Reported on 5/20/2022)  0     lisinopril (ZESTRIL) 20 MG tablet TAKE 1 TABLET(20 MG) BY MOUTH DAILY 90 tablet 11     LOTEMAX 0.5 % ophthalmic suspension [LOTEMAX 0.5 % OPHTHALMIC SUSPENSION] Administer 1 drop to both eyes every other day.   4     methylcellulose (CITRUCEL) powder Take 2 g by mouth (Patient not taking: Reported on 5/20/2022)       metoprolol succinate ER (TOPROL-XL) 25 MG 24 hr tablet Take 2 tablets (50 mg) by mouth daily 180 tablet 3     timolol maleate (TIMOPTIC) 0.5 % ophthalmic solution 1 drop         The following portions of the patient's history were reviewed and updated as appropriate: allergies, current medications, past family history, past medical history, past social history, past surgical history and problem list.    Review of Systems    General  Constitutional  Constitutional (WDL): Exceptions to WDL  Fatigue: Fatigue not relieved by  rest OR limiting instrumental ADL  EENT  Eye Disorders  Eye Disorder (WDL): Assessment not pertinent to visit  Ear Disorders  Ear Disorder (WDL): Assessment not pertinent to visit  Respiratory    Respiratory  Respiratory (WDL): All respiratory elements are within defined limits  Cardiovascular  Cardiovascular  Cardiovascular (WDL): All cardiovascular elements are within defined limits  Gastrointestinal  Gastrointestinal  Gastrointestinal (WDL): Exceptions to WDL  Diarrhea: Increase of less than 4 stools per day over baseline OR mild increase in ostomy output compared to baseline (occassionally)  Musculoskeletal  Musculoskeletal and Connective Tissue Disorders  Musculoskeletal & Connective (WDL): All musculoskeletal & connective elements are within defined limits  Integumentary              Integumentary  Integumentary (WDL): All integumentary elements are within defined limits  Neurological  Neurosensory  Neurosensory (WDL): All neurosensory elements are within defined limits  Genitourinary/Reproductive  Genitourinary  Genitourinary (WDL): All genitourinary elements are within defined limits  Lymphatic   Lymph System Disorders  Lymph (WDL): All lymph elements are within defined limits    Pain   Pain Score: No Pain (0)   AUA Assessment                                                                         Accompanied by  Accompanied By: self only      Objective:     Physical Exam    Vitals:    07/14/22 1037   BP: 118/72   Pulse: 78   Resp: 16   Temp: 98  F (36.7  C)   SpO2: 99%   Weight: 64 kg (141 lb)   PainSc: No Pain (0)        Gen: Alert, in NAD pleasant interactive, well nourished  Eyes:  EOMI, sclera anicteric  HENT     Head: NC/AT  Pulm: No wheezing, stridor or respiratory distress  CV: Well-perfused, no cyanosis  Musculoskeletal: Normal muscle bulk and tone  Skin: Normal tone and turgor, warm, dry, intact, bruising on shins  Neurologic: A/Ox3,speech fluent, no focal motor deficits. Gait normal and  unaided  /GI: Exam deferred  Psychiatric: Appropriate mood and affect    Recent Labs:   Recent Results (from the past 168 hour(s))   Asymptomatic COVID-19 Virus (Coronavirus) by PCR Nose    Specimen: Nose; Swab   Result Value Ref Range    SARS CoV2 PCR Negative Negative, Testing sent to reference lab. Results will be returned via unsolicited result       Imaging: Imaging results 6 weeks:No results found.    Signed by: Patsy Spencer MD    Pt ambulatory to clinic for follow up. Doing well, still lots of fatigue. Further recommendations and orders per provider.      Again, thank you for allowing me to participate in the care of your patient.        Sincerely,        Patsy Spencer MD

## 2022-07-27 ENCOUNTER — TRANSFERRED RECORDS (OUTPATIENT)
Dept: HEALTH INFORMATION MANAGEMENT | Facility: CLINIC | Age: 77
End: 2022-07-27

## 2022-08-11 ENCOUNTER — TRANSFERRED RECORDS (OUTPATIENT)
Dept: HEALTH INFORMATION MANAGEMENT | Facility: CLINIC | Age: 77
End: 2022-08-11

## 2022-08-14 DIAGNOSIS — I10 ESSENTIAL HYPERTENSION: ICD-10-CM

## 2022-08-15 RX ORDER — METOPROLOL SUCCINATE 25 MG/1
TABLET, EXTENDED RELEASE ORAL
Qty: 180 TABLET | Refills: 3 | Status: SHIPPED | OUTPATIENT
Start: 2022-08-15 | End: 2023-08-22

## 2022-08-15 NOTE — TELEPHONE ENCOUNTER
"Last Written Prescription Date:  8/22/21  Last Fill Quantity: 180,  # refills: 3   Last office visit provider:  5/20/22     Requested Prescriptions   Pending Prescriptions Disp Refills     metoprolol succinate ER (TOPROL XL) 25 MG 24 hr tablet [Pharmacy Med Name: METOPROLOL ER SUCCINATE 25MG TABS] 180 tablet 3     Sig: TAKE 2 TABLETS(50 MG) BY MOUTH DAILY       Beta-Blockers Protocol Passed - 8/15/2022  9:12 AM        Passed - Blood pressure under 140/90 in past 12 months     BP Readings from Last 3 Encounters:   07/14/22 118/72   01/18/22 137/84   01/13/22 111/70                 Passed - Patient is age 6 or older        Passed - Recent (12 mo) or future (30 days) visit within the authorizing provider's specialty     Patient has had an office visit with the authorizing provider or a provider within the authorizing providers department within the previous 12 mos or has a future within next 30 days. See \"Patient Info\" tab in inbasket, or \"Choose Columns\" in Meds & Orders section of the refill encounter.              Passed - Medication is active on med list             Alberto May RN 08/15/22 9:12 AM  "

## 2022-09-08 ENCOUNTER — TRANSFERRED RECORDS (OUTPATIENT)
Dept: HEALTH INFORMATION MANAGEMENT | Facility: CLINIC | Age: 77
End: 2022-09-08

## 2022-09-25 ENCOUNTER — HEALTH MAINTENANCE LETTER (OUTPATIENT)
Age: 77
End: 2022-09-25

## 2022-10-08 ENCOUNTER — OFFICE VISIT (OUTPATIENT)
Dept: FAMILY MEDICINE | Facility: CLINIC | Age: 77
End: 2022-10-08
Payer: MEDICARE

## 2022-10-08 VITALS
RESPIRATION RATE: 16 BRPM | OXYGEN SATURATION: 97 % | DIASTOLIC BLOOD PRESSURE: 76 MMHG | SYSTOLIC BLOOD PRESSURE: 115 MMHG | BODY MASS INDEX: 24.62 KG/M2 | HEART RATE: 78 BPM | WEIGHT: 139 LBS | TEMPERATURE: 97.8 F

## 2022-10-08 DIAGNOSIS — R53.82 CHRONIC FATIGUE: Primary | ICD-10-CM

## 2022-10-08 PROCEDURE — 99213 OFFICE O/P EST LOW 20 MIN: CPT | Performed by: STUDENT IN AN ORGANIZED HEALTH CARE EDUCATION/TRAINING PROGRAM

## 2022-10-08 ASSESSMENT — ANXIETY QUESTIONNAIRES
1. FEELING NERVOUS, ANXIOUS, OR ON EDGE: SEVERAL DAYS
5. BEING SO RESTLESS THAT IT IS HARD TO SIT STILL: NOT AT ALL
2. NOT BEING ABLE TO STOP OR CONTROL WORRYING: SEVERAL DAYS
GAD7 TOTAL SCORE: 3
6. BECOMING EASILY ANNOYED OR IRRITABLE: NOT AT ALL
7. FEELING AFRAID AS IF SOMETHING AWFUL MIGHT HAPPEN: SEVERAL DAYS
4. TROUBLE RELAXING: NOT AT ALL
3. WORRYING TOO MUCH ABOUT DIFFERENT THINGS: NOT AT ALL

## 2022-10-08 NOTE — PATIENT INSTRUCTIONS
Melatonin comes in different dosages. 3 mg, 5 mg, and sometimes 10 mg. I recommend taking the smallest dose you need to get a restful night's sleep. You can take up to 10 mg if you need. Take 1-2 hours before bed.

## 2022-10-08 NOTE — PROGRESS NOTES
Assessment & Plan     Chronic fatigue  Patient endorsed 1 year of feeling fatigue exacerbated this summer.  Of note she has a history of stage II anal cancer status post chemo therapy.  But she said that she felt fatigued during and after chemo which she has completed last year.  Had a visit with her primary care in May of this year due to same complaint.  Blood work was completed which was all within normal limits.  Unlikely to be due to sleep apnea, heart failure, no new medication changes.  Thyroid function within normal limits in May.  No symptoms to suggest thyroid dysfunction.  No history of anemia and CBC in May was normal.  Screen for depression and anxiety today.  Does have anxious thoughts but is able to control them.  No substance use concerns.  Discussed sleep hygiene and fatigue possibly due to poor sleep habits.  Recommended starting melatonin and sleep hygiene habits given in AVS.  Continue follow-up with primary care as previously scheduled.    Jamee Salas MD  [facility]    Chinmay Moore is a 77 year old , presenting for the following health issues:  Fatigue (Not feeling well overall, light headed feels really tired )      HPI   Fatigue  - Has an appt with PCP in mid November but wanted to get this fatigue figured out  - Has a history of HTN and Stage 2A anal cancer s/p chemotherapy  - Had bloodwork done by PCP (all WNL, CBC, BMP, TSH, UA), 5/24/22  - Has been tired a lot during this summer. Doesn't feel sick but feels exhausted so quickly. Think's it's her heart.   - Used to be a walker but now her legs ache easily. Works out with  2x a week and that goes well.  - Mood seems to be okay. Finds things to do well - daughter and her share a great esau. Gardening.   - Sleep isn't as good as it has been. Wakes up once or twice during the night. Worries about the safety of her daughter, as a mom. Daughter travels often for work and worries about her.  -  Goes to bed at 9p, wakes up at 2am, then can put herself back to sleep usually. Then wakes up at 4:30-5:30am.   - H/O anal cancer, s/p chemotherapy and seems to be in remission.   - JOHN completed today. GAD7 score: 3     Review of Systems   Constitutional, HEENT, cardiovascular, pulmonary, gi and gu systems are negative, except as otherwise noted.      Objective    /76   Pulse 78   Temp 97.8  F (36.6  C) (Oral)   Resp 16   Wt 63 kg (139 lb)   SpO2 97%   BMI 24.62 kg/m    Body mass index is 24.62 kg/m .  Physical Exam   GENERAL: healthy, alert and no distress  EYES: Eyes grossly normal to inspection, PERRL and conjunctivae and sclerae normal  RESP: lungs clear to auscultation - no rales, rhonchi or wheezes  CV: regular rate and rhythm, normal S1 S2, no S3 or S4, no murmur, click or rub, no peripheral edema and peripheral pulses strong  MS: no gross musculoskeletal defects noted, no edema  NEURO: Normal strength and tone, mentation intact and speech normal  PSYCH: mentation appears normal and affect normal/bright

## 2022-11-02 ENCOUNTER — HOSPITAL ENCOUNTER (OUTPATIENT)
Dept: MAMMOGRAPHY | Facility: CLINIC | Age: 77
Discharge: HOME | End: 2022-11-02
Attending: INTERNAL MEDICINE | Admitting: INTERNAL MEDICINE
Payer: MEDICARE

## 2022-11-02 DIAGNOSIS — Z12.31 VISIT FOR SCREENING MAMMOGRAM: ICD-10-CM

## 2022-11-02 PROCEDURE — 77067 SCR MAMMO BI INCL CAD: CPT

## 2022-11-03 ENCOUNTER — TRANSFERRED RECORDS (OUTPATIENT)
Dept: HEALTH INFORMATION MANAGEMENT | Facility: CLINIC | Age: 77
End: 2022-11-03

## 2022-11-07 ENCOUNTER — OFFICE VISIT (OUTPATIENT)
Dept: INTERNAL MEDICINE | Facility: CLINIC | Age: 77
End: 2022-11-07
Payer: MEDICARE

## 2022-11-07 VITALS
HEART RATE: 70 BPM | OXYGEN SATURATION: 99 % | TEMPERATURE: 97.9 F | SYSTOLIC BLOOD PRESSURE: 116 MMHG | WEIGHT: 139.1 LBS | DIASTOLIC BLOOD PRESSURE: 76 MMHG | BODY MASS INDEX: 24.64 KG/M2

## 2022-11-07 DIAGNOSIS — H15.003 SCLERITIS, BILATERAL: Primary | ICD-10-CM

## 2022-11-07 PROCEDURE — 90662 IIV NO PRSV INCREASED AG IM: CPT | Performed by: INTERNAL MEDICINE

## 2022-11-07 PROCEDURE — 99213 OFFICE O/P EST LOW 20 MIN: CPT | Mod: 25 | Performed by: INTERNAL MEDICINE

## 2022-11-07 PROCEDURE — G0008 ADMIN INFLUENZA VIRUS VAC: HCPCS | Performed by: INTERNAL MEDICINE

## 2022-11-07 ASSESSMENT — ENCOUNTER SYMPTOMS
FATIGUE: 1
BACK PAIN: 1

## 2022-11-07 ASSESSMENT — PAIN SCALES - GENERAL: PAINLEVEL: MILD PAIN (2)

## 2022-11-07 NOTE — PROGRESS NOTES
Assessment/Plan:    Sciatica bilateral.  Discussed arthritis strength Tylenol 2 tablets 3 times daily on a scheduled basis plus core strengthening exercises.  Started Pilates.    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  Seen by Dr. Looney colorectal surgery group previously XRT given no sign of recurrence allCLEAR.  Metamucil suggested.    Generalized osteoarthritis strengthening the muscles around the joints is helpful plus arthritis strength Tylenol 2 tablets 3 times a day on a scheduled basis.    15 minutes spent on the date of the encounter doing chart review, patient visit and documentation     Subjective:  Oscar Zhao is a 77 year old female presents for the following health issues history as above.  Lower back pain from sciatica.  Increase core strengthening with Pilates may help.  Unusual case of squamous cell carcinoma and anus.  XRT given Metamucil suggested.  Followed by Dr. Looney colorectal surgery group no sign of recurrence with recent examination by colorectal surgeon Dr. Looney.    ROS:  No blood in stool or urine med list reviewed reconciled weight stable.  Appetite good.  Not cachectic    Objective:  /76 (BP Location: Right arm, Patient Position: Sitting, Cuff Size: Adult Regular)   Pulse 70   Temp 97.9  F (36.6  C) (Oral)   Wt 63.1 kg (139 lb 1.6 oz)   SpO2 99%   BMI 24.64 kg/m    Chest is clear heart tones normal abdomen benign extremities free of edema neck veins nondistended no thyromegaly no carotid bruits easily conversant good spirited intelligent she appears healthy well.  Some discomfort at the base of her right thumb may be related to osteoarthritis.    Previous treatment for iritis uveitis by [facility] Dr. Aditya Latif.  Steroid drops currently quiescent.    Jak Jones MD  Internal Medicine    Answers for HPI/ROS submitted by the patient on 11/7/2022  How many servings of fruits and vegetables do you eat daily?: 4 or more  On average, how many sweetened  beverages do you drink each day (Examples: soda, juice, sweet tea, etc.  Do NOT count diet or artificially sweetened beverages)?: 0  How many minutes a day do you exercise enough to make your heart beat faster?: 20 to 29  How many days a week do you exercise enough to make your heart beat faster?: 3 or less  How many days per week do you miss taking your medication?: 0  What is the reason for your visit today?: sciatica  When did your symptoms begin?: More than a month  What are your symptoms?: pain  How would you describe these symptoms?: Severe  Are your symptoms:: Improving  Have you had these symptoms before?: No

## 2022-11-07 NOTE — PROGRESS NOTES
{PROVIDER CHARTING PREFERENCE:029295}    Chinmay Moore is a 77 year old{ACCOMPANIED BY STATEMENT (Optional):239452}, presenting for the following health issues:  No chief complaint on file.      History of Present Illness       Reason for visit:  Sciatica  Symptom onset:  More than a month  Symptoms include:  Pain  Symptom intensity:  Severe  Symptom progression:  Improving  Had these symptoms before:  No    She eats 4 or more servings of fruits and vegetables daily.She consumes 0 sweetened beverage(s) daily.She exercises with enough effort to increase her heart rate 20 to 29 minutes per day.  She exercises with enough effort to increase her heart rate 3 or less days per week.   She is taking medications regularly.       {SUPERLIST (Optional):680239}  {additonal problems for provider to add (Optional):562183}    Review of Systems   {ROS COMP (Optional):385888}      Objective    There were no vitals taken for this visit.  There is no height or weight on file to calculate BMI.  Physical Exam   {Exam List (Optional):084870}    {Diagnostic Test Results (Optional):215880}    {AMBULATORY ATTESTATION (Optional):656367}

## 2022-11-07 NOTE — PROGRESS NOTES
{PROVIDER CHARTING PREFERENCE:196654}    Subjective   Oscar is a 77 year old, presenting for the following health issues:  Back Pain (She thinks she has sciatia.  Started in August.), Arthritis (Right thumb pain.  ), Fatigue (Feeling tired and not sleeping well.), and Imm/Inj (Flu shot.)      Back Pain     Arthritis  Associated symptoms include fatigue.   Fatigue  Associated symptoms include fatigue.   Imm/Inj  Associated symptoms include fatigue.        {SUPERLIST (Optional):208245}  {additonal problems for provider to add (Optional):684332}    Review of Systems   Constitutional: Positive for fatigue.   Musculoskeletal: Positive for arthritis and back pain.      {ROS COMP (Optional):819794}      Objective    /76 (BP Location: Right arm, Patient Position: Sitting, Cuff Size: Adult Regular)   Pulse 70   Temp 97.9  F (36.6  C) (Oral)   Wt 63.1 kg (139 lb 1.6 oz)   SpO2 99%   BMI 24.64 kg/m    Body mass index is 24.64 kg/m .  Physical Exam   {Exam List (Optional):637785}    {Diagnostic Test Results (Optional):654895}    {AMBULATORY ATTESTATION (Optional):729078}

## 2022-11-16 DIAGNOSIS — I10 ESSENTIAL HYPERTENSION: ICD-10-CM

## 2022-11-17 RX ORDER — AMLODIPINE BESYLATE 5 MG/1
10 TABLET ORAL DAILY
Qty: 180 TABLET | Refills: 2 | Status: SHIPPED | OUTPATIENT
Start: 2022-11-17 | End: 2023-08-22

## 2022-11-17 RX ORDER — LISINOPRIL 20 MG/1
20 TABLET ORAL DAILY
Qty: 90 TABLET | Refills: 2 | Status: SHIPPED | OUTPATIENT
Start: 2022-11-17 | End: 2023-08-22

## 2022-11-17 NOTE — TELEPHONE ENCOUNTER
"Last Written Prescription Date:  8/23/21  Last Fill Quantity: 90/180,  # refills: 11   Last office visit provider:  11/7/22     Requested Prescriptions   Pending Prescriptions Disp Refills     lisinopril (ZESTRIL) 20 MG tablet 90 tablet 11     Sig: Take 1 tablet (20 mg) by mouth daily       ACE Inhibitors (Including Combos) Protocol Passed - 11/17/2022  1:56 PM        Passed - Blood pressure under 140/90 in past 12 months     BP Readings from Last 3 Encounters:   11/07/22 116/76   10/08/22 115/76   07/14/22 118/72                 Passed - Recent (12 mo) or future (30 days) visit within the authorizing provider's specialty     Patient has had an office visit with the authorizing provider or a provider within the authorizing providers department within the previous 12 mos or has a future within next 30 days. See \"Patient Info\" tab in inbasket, or \"Choose Columns\" in Meds & Orders section of the refill encounter.              Passed - Medication is active on med list        Passed - Patient is age 18 or older        Passed - No active pregnancy on record        Passed - Normal serum creatinine on file in past 12 months     Recent Labs   Lab Test 05/24/22  0904   CR 0.67       Ok to refill medication if creatinine is low          Passed - Normal serum potassium on file in past 12 months     Recent Labs   Lab Test 05/24/22  0904   POTASSIUM 4.1             Passed - No positive pregnancy test within past 12 months           amLODIPine (NORVASC) 5 MG tablet 180 tablet 11     Sig: Take 2 tablets (10 mg) by mouth daily       Calcium Channel Blockers Protocol  Passed - 11/17/2022  1:56 PM        Passed - Blood pressure under 140/90 in past 12 months     BP Readings from Last 3 Encounters:   11/07/22 116/76   10/08/22 115/76   07/14/22 118/72                 Passed - Recent (12 mo) or future (30 days) visit within the authorizing provider's specialty     Patient has had an office visit with the authorizing provider or a " "provider within the authorizing providers department within the previous 12 mos or has a future within next 30 days. See \"Patient Info\" tab in inbasket, or \"Choose Columns\" in Meds & Orders section of the refill encounter.              Passed - Medication is active on med list        Passed - Patient is age 18 or older        Passed - No active pregnancy on record        Passed - Normal serum creatinine on file in past 12 months     Recent Labs   Lab Test 05/24/22  0904   CR 0.67       Ok to refill medication if creatinine is low          Passed - No positive pregnancy test in past 12 months             Alberto May RN 11/17/22 1:56 PM  "

## 2023-01-10 ENCOUNTER — HOSPITAL ENCOUNTER (OUTPATIENT)
Dept: CT IMAGING | Facility: CLINIC | Age: 78
Discharge: HOME | End: 2023-01-10
Attending: STUDENT IN AN ORGANIZED HEALTH CARE EDUCATION/TRAINING PROGRAM | Admitting: STUDENT IN AN ORGANIZED HEALTH CARE EDUCATION/TRAINING PROGRAM
Payer: MEDICARE

## 2023-01-10 DIAGNOSIS — C21.1 MALIGNANT NEOPLASM OF ANAL CANAL (H): ICD-10-CM

## 2023-01-10 LAB
CREAT BLD-MCNC: 0.7 MG/DL (ref 0.6–1.1)
GFR SERPL CREATININE-BSD FRML MDRD: >60 ML/MIN/1.73M2

## 2023-01-10 PROCEDURE — 82565 ASSAY OF CREATININE: CPT

## 2023-01-10 PROCEDURE — 250N000011 HC RX IP 250 OP 636: Performed by: STUDENT IN AN ORGANIZED HEALTH CARE EDUCATION/TRAINING PROGRAM

## 2023-01-10 PROCEDURE — G1004 CDSM NDSC: HCPCS

## 2023-01-10 RX ORDER — IOPAMIDOL 755 MG/ML
100 INJECTION, SOLUTION INTRAVASCULAR ONCE
Status: COMPLETED | OUTPATIENT
Start: 2023-01-10 | End: 2023-01-10

## 2023-01-10 RX ADMIN — IOPAMIDOL 100 ML: 755 INJECTION, SOLUTION INTRAVENOUS at 11:48

## 2023-01-11 ENCOUNTER — TELEPHONE (OUTPATIENT)
Dept: RADIATION ONCOLOGY | Facility: CLINIC | Age: 78
End: 2023-01-11

## 2023-01-11 DIAGNOSIS — C21.1 MALIGNANT NEOPLASM OF ANAL CANAL (H): Primary | ICD-10-CM

## 2023-01-11 NOTE — TELEPHONE ENCOUNTER
Patient called and left voicemail message that she had to cancel her MRI due to weather and that order expires so she needs a new order to reschedule.  Patient also due for follow up tomorrow but wanted to know if that should also be reschedule until after the MRI.  Message sent to Dr. Spencer to place a new order.  Message also sent to scheduling team to call patient and assist with rescheduling appointments.

## 2023-01-24 ENCOUNTER — APPOINTMENT (OUTPATIENT)
Dept: RADIOLOGY | Facility: CLINIC | Age: 78
End: 2023-01-24
Attending: EMERGENCY MEDICINE
Payer: MEDICARE

## 2023-01-24 ENCOUNTER — HOSPITAL ENCOUNTER (EMERGENCY)
Facility: CLINIC | Age: 78
Discharge: HOME | End: 2023-01-24
Attending: EMERGENCY MEDICINE | Admitting: EMERGENCY MEDICINE
Payer: MEDICARE

## 2023-01-24 VITALS
WEIGHT: 135 LBS | HEIGHT: 63 IN | DIASTOLIC BLOOD PRESSURE: 78 MMHG | TEMPERATURE: 97.5 F | RESPIRATION RATE: 16 BRPM | SYSTOLIC BLOOD PRESSURE: 118 MMHG | BODY MASS INDEX: 23.92 KG/M2 | HEART RATE: 88 BPM | OXYGEN SATURATION: 98 %

## 2023-01-24 DIAGNOSIS — S73.004A DISLOCATION OF RIGHT HIP, INITIAL ENCOUNTER (H): ICD-10-CM

## 2023-01-24 LAB
HOLD SPECIMEN: NORMAL

## 2023-01-24 PROCEDURE — 27265 TREAT HIP DISLOCATION: CPT | Mod: RT

## 2023-01-24 PROCEDURE — 99285 EMERGENCY DEPT VISIT HI MDM: CPT | Mod: 25

## 2023-01-24 PROCEDURE — 73502 X-RAY EXAM HIP UNI 2-3 VIEWS: CPT

## 2023-01-24 PROCEDURE — 99152 MOD SED SAME PHYS/QHP 5/>YRS: CPT

## 2023-01-24 PROCEDURE — 999N000157 HC STATISTIC RCP TIME EA 10 MIN

## 2023-01-24 PROCEDURE — 96375 TX/PRO/DX INJ NEW DRUG ADDON: CPT

## 2023-01-24 PROCEDURE — 96374 THER/PROPH/DIAG INJ IV PUSH: CPT | Mod: 59

## 2023-01-24 PROCEDURE — 250N000011 HC RX IP 250 OP 636: Performed by: EMERGENCY MEDICINE

## 2023-01-24 PROCEDURE — 999N000065 XR PELVIS PORT 1/2 VIEWS

## 2023-01-24 RX ORDER — MORPHINE SULFATE 4 MG/ML
4 INJECTION, SOLUTION INTRAMUSCULAR; INTRAVENOUS ONCE
Status: COMPLETED | OUTPATIENT
Start: 2023-01-24 | End: 2023-01-24

## 2023-01-24 RX ORDER — PROPOFOL 10 MG/ML
40 INJECTION, EMULSION INTRAVENOUS ONCE
Status: COMPLETED | OUTPATIENT
Start: 2023-01-24 | End: 2023-01-24

## 2023-01-24 RX ORDER — PROPOFOL 10 MG/ML
INJECTION, EMULSION INTRAVENOUS DAILY PRN
Status: COMPLETED | OUTPATIENT
Start: 2023-01-24 | End: 2023-01-24

## 2023-01-24 RX ADMIN — PROPOFOL 20 MG: 10 INJECTION, EMULSION INTRAVENOUS at 12:49

## 2023-01-24 RX ADMIN — PROPOFOL 20 MG: 10 INJECTION, EMULSION INTRAVENOUS at 12:47

## 2023-01-24 RX ADMIN — PROPOFOL 40 MG: 10 INJECTION, EMULSION INTRAVENOUS at 12:45

## 2023-01-24 RX ADMIN — MORPHINE SULFATE 4 MG: 4 INJECTION, SOLUTION INTRAMUSCULAR; INTRAVENOUS at 10:09

## 2023-01-24 RX ADMIN — PROPOFOL 20 MG: 10 INJECTION, EMULSION INTRAVENOUS at 12:48

## 2023-01-24 ASSESSMENT — ENCOUNTER SYMPTOMS
NUMBNESS: 1
FEVER: 0
DIAPHORESIS: 0
CHILLS: 0
ARTHRALGIAS: 1
COUGH: 0

## 2023-01-24 ASSESSMENT — ACTIVITIES OF DAILY LIVING (ADL)
ADLS_ACUITY_SCORE: 39
ADLS_ACUITY_SCORE: 35
ADLS_ACUITY_SCORE: 35

## 2023-01-24 NOTE — ED PROVIDER NOTES
EMERGENCY DEPARTMENT ENCOUNTER      NAME: Oscar Zhao  AGE: 78 year old female  YOB: 1945  MRN: [id number]  EVALUATION DATE & TIME: 1/24/2023  9:56 AM    PCP: Jak Jones    ED PROVIDER: Jennifer Celaya MD      Chief Complaint   Patient presents with     Hip Pain         FINAL IMPRESSION:  1. Dislocation of right hip, initial encounter (H)          ED COURSE & MEDICAL DECISION MAKING:    Pertinent Labs & Imaging studies reviewed. (See chart for details)  78 year old female presents to the Emergency Department for evaluation of right hip pain.  The patient has a history of previous total hip arthroplasty over 10 years ago.  She reports that she also has a history of right hip dislocation 4 years ago.  She reports that she came to this emergency department, they attempted to reduce that at the bedside, ultimately needed to take her to the OR.  Patient has not had any issues since then.  She reports that today she was bending over to cut the bottoms of her pants when she had sudden onset of pain in her right hip and has been unable to bear weight since then.  On exam, she is neurovascular intact.  Her right lower extremity is shortened.  I suspect that she does have a dislocated hip.  We will plan for x-ray, and if confirmed to be dislocated we will plan for procedural sedation and closed reduction at the bedside.  She otherwise denies injury elsewhere.    Patient with lateral and superior dislocation of the femoral component of the right YAMILET.  Procedural sedation was performed and close reduction of the right hip.  Patient did require BVM, otherwise no complications.  Patient was placed in a knee immobilizer, discussed precautions and close follow-up with [facility].    9:53 AM I met with the patient to gather history and to perform my initial exam. I discussed the plan for care while in the Emergency Department. PPE (gloves and surgical mask) was worn during patient encounters.    12:23 PM I set up to put patient's dislocated hip back into place.  12:41 PM I performed hip reduction.  1:59 PM I reevaluated patient and discussed discharge.    At the conclusion of the encounter I discussed the results of all of the tests and the disposition. The questions were answered. The patient or family acknowledged understanding and was agreeable with the care plan.       Medical Decision Making    History:    Supplemental history from: EMS    External Record(s) reviewed: Documented in chart, if applicable.    Work Up:    Chart documentation includes differential considered and any EKGs or imaging independently interpreted by provider, where specified.    In additional to work up documented, I considered the following work up: Documented in chart, if applicable.    External consultation:    Discussion of management with another provider: Documented in chart, if applicable    Complicating factors:    Care impacted by chronic illness: N/A    Care affected by social determinants of health: N/A    Disposition considerations: Discharge. No recommendations on prescription strength medication(s). I considered admission, but discharged patient after significant clinical improvement.      MEDICATIONS GIVEN IN THE EMERGENCY:  Medications   propofol (DIPRIVAN) injection 10 mg/mL vial (20 mg Intravenous Given 1/24/23 1249)   morphine (PF) injection 4 mg (4 mg Intravenous Given 1/24/23 1009)   propofol (DIPRIVAN) injection 10 mg/mL vial (40 mg Intravenous Given 1/24/23 1245)       NEW PRESCRIPTIONS STARTED AT TODAY'S ER VISIT  New Prescriptions    No medications on file          =================================================================    HPI    Patient information was obtained from: Patient    Use of : N/A        Oscar Zhao is a 78 year old female with a pertinent history of total right hip arthroplasty (2015), total left knee arthroplasty (2017), closed right hip reduction (2019), and  hypertension who presents to this ED via EMS for evaluation of hip pain.    Patient states that at 0900 (~1 hour ago) she had her right leg up on a stool to cut off strings on the ends of her pants when she felt her hip pop. She notes that she did not fall but twisted her leg and lowered herself to the ground and climbed to her bed using her hands and left leg to call EMS. Patient states that ~4 years ago she had her right hip replaced due to arthritis. Pain radiates down her right leg only with movement and at rest she notes it feels sore. Endorses numbness in her toes. Denies cough, congestion, fever, chills, and diaphoresis. Denies use of blood thinners.    Of note, patient has had her hip dislocate before and states that they had a difficult time getting it back into place. She had to undergo anesthesia and notes she had fingerprint bruises getting it back into place.      REVIEW OF SYSTEMS   Review of Systems   Constitutional: Negative for chills, diaphoresis and fever.   HENT: Negative for congestion.    Respiratory: Negative for cough.    Musculoskeletal: Positive for arthralgias (right hip).   Neurological: Positive for numbness (toes).        PAST MEDICAL HISTORY:  Past Medical History:   Diagnosis Date     Basal cell carcinoma of anterior chest      Breast cyst      History of anesthesia complications      HLA B27 (HLA B27 positive)      Hypertension      Osteoporosis 2011     Peripheral neuropathy 2018     PONV (postoperative nausea and vomiting)      Scleritis, bilateral     Left .  Treated with corticosteroids,, methotrexate and Humira.  .  Rituximab.     Squamous cell carcinoma of skin of chest      Steroid induced glaucoma, both eyes        PAST SURGICAL HISTORY:  Past Surgical History:   Procedure Laterality Date     BREAST CYST EXCISION Right     benign      SECTION       CLOSED REDUCTION HIP Right 2019    Procedure: CLOSED REDUCTION, HIP;  Surgeon:  Jak Ruiz DO;  Location: [facility];  Service: Orthopedics     COLONOSCOPY  09/07/2011     COLONOSCOPY  09/27/2005     COLONOSCOPY W/ BIOPSIES AND POLYPECTOMY  02/18/2021    16 to 20 mm polyp:tubular adenoma in the ascending colon.  Ulcerated mass in the anal canal: Moderately differentiated squamous cell cancer.     ESOPHAGOSCOPY, GASTROSCOPY, DUODENOSCOPY (EGD), COMBINED  05/05/2017    Large hiatal hernia.  Reactive gastropathy with mucosal erosion.  H. pylori negative.     HERNIORRHAPHY FEMORAL Left 9/7/2021    Procedure: LEFT FEMORAL HERNIA REPAIR;  Surgeon: Sidney Murray MD;  [place] [facility] [place]     HYSTERECTOMY TOTAL ABDOMINAL, BILATERAL SALPINGO-OOPHORECTOMY, COMBINED  1996     IR CHEST PORT PLACEMENT > 5 YRS OF AGE  3/15/2021     IR PORT PLACEMENT >5 YEARS  03/15/2021    Right IJ port-a-cath.     IR PORT REMOVAL RIGHT  7/14/2021     LAPAROSCOPIC APPENDECTOMY  04/28/2011     PHACOEMULSIFICATION CLEAR CORNEA WITH STANDARD INTRAOCULAR LENS IMPLANT Right 08/30/2017     PHACOEMULSIFICATION CLEAR CORNEA WITH STANDARD INTRAOCULAR LENS IMPLANT Left 09/13/2017     TOTAL HIP ARTHROPLASTY Right 08/18/2015    Procedure: HIP TOTAL ARTHROPLASTY, RIGHT;  Surgeon: Star Du MD;  Location: [facility];  Service:      [facility] TOTAL KNEE ARTHROPLASTY Left 03/02/2017    Procedure: LEFT TOTAL KNEE ARTHROPLASTY;  Surgeon: Star Du MD;  Location: [facility] OR;  Service: Orthopedics           CURRENT MEDICATIONS:    amLODIPine (NORVASC) 5 MG tablet  famotidine (PEPCID) 20 MG tablet  lisinopril (ZESTRIL) 20 MG tablet  LOTEMAX 0.5 % ophthalmic suspension  methylcellulose (CITRUCEL) powder  metoprolol succinate ER (TOPROL XL) 25 MG 24 hr tablet  timolol maleate (TIMOPTIC) 0.5 % ophthalmic solution        ALLERGIES:  Allergies   Allergen Reactions     Adalimumab Muscle Pain (Myalgia)     Brimonidine-Timolol [Brimonidine Tartrate-Timolol] Unknown     Redness itching in eyes      "Levaquin [Levofloxacin] Unknown     Skin burn and couldn't get out bed for 5 days     Tetracyclines Hives       FAMILY HISTORY:  Family History   Problem Relation Age of Onset     Alzheimer Disease Mother      Heart Disease Father      Pancreatic Cancer Brother 72.00     No Known Problems Daughter      Anesthesia Reaction No family hx of        SOCIAL HISTORY:   Social History     Socioeconomic History     Marital status:      Number of children: 1   Tobacco Use     Smoking status: Never     Smokeless tobacco: Never   Substance and Sexual Activity     Alcohol use: Yes     Alcohol/week: 4.0 standard drinks     Comment: wine     Drug use: No     Sexual activity: Never       VITALS:  /79   Pulse 77   Temp 97.5  F (36.4  C) (Oral)   Resp 16   Ht 1.6 m (5' 3\")   Wt 61.2 kg (135 lb)   SpO2 93%   BMI 23.91 kg/m      PHYSICAL EXAM    Constitutional: Well developed, Well nourished, NAD  HENT: Normocephalic, Atraumatic, Bilateral external ears normal, Oropharynx normal, mucous membranes moist, Nose normal.   Neck- Normal range of motion, No tenderness, Supple, No stridor.  Eyes: PERRL, EOMI, Conjunctiva normal, No discharge.   Respiratory: Normal breath sounds, No respiratory distress  Cardiovascular: Normal heart rate, Regular rhythm  GI: Bowel sounds normal, Soft, No tenderness,   Musculoskeletal: No edema. Good range of motion in all major joints. No tenderness to palpation. Right lower extremity shortened, slightly internally rotated.  Integument: Warm, Dry, No erythema, No rash  Neurologic: Alert & oriented x 3, Normal motor function, Normal sensory function, No focal deficits noted. Normal gait.   Psychiatric: Affect normal, Judgment normal, Mood normal.      LAB:  All pertinent labs reviewed and interpreted.  Results for orders placed or performed during the hospital encounter of 01/24/23   XR Pelvis and Hip Right 2 Views    Impression    IMPRESSION: There is lateral and superior dislocation of the " femoral component of the right total hip arthroplasty in relation to the acetabular component. Diffuse bone demineralization. Mild osteoarthrosis of the left hip. Degenerative changes in the   visualized spine.   XR Pelvis Port 1/2 Views    Impression    IMPRESSION: Interval reduction in the dislocation of the right hip joint seen on the previous examination. This is now in anatomic location. Postoperative changes right total hip arthroplasty. No evidence for fracture. Mild demineralization.   Extra Blue Top Tube   Result Value Ref Range    Hold Specimen JIC    Extra Red Top Tube   Result Value Ref Range    Hold Specimen JIC    Extra Green Top (Lithium Heparin) Tube   Result Value Ref Range    Hold Specimen JIC    Extra Purple Top Tube   Result Value Ref Range    Hold Specimen JIC        RADIOLOGY:  Reviewed all pertinent imaging. Please see official radiology report.  XR Pelvis Port 1/2 Views   Final Result   IMPRESSION: Interval reduction in the dislocation of the right hip joint seen on the previous examination. This is now in anatomic location. Postoperative changes right total hip arthroplasty. No evidence for fracture. Mild demineralization.      XR Pelvis and Hip Right 2 Views   Final Result   IMPRESSION: There is lateral and superior dislocation of the femoral component of the right total hip arthroplasty in relation to the acetabular component. Diffuse bone demineralization. Mild osteoarthrosis of the left hip. Degenerative changes in the    visualized spine.        [facility]    -Dislocation - Lower Extremity    Date/Time: 1/24/2023 2:50 PM  Performed by: Jennifer Celaya MD  Authorized by: Jennifer Celaya MD     Risks, benefits and alternatives discussed.    ED EVALUATION:      I have performed an Emergency Department Evaluation including taking a history and physical examination, this evaluation will be documented in the electronic medical record for this ED  encounter.      ASA Class: Class 1- healthy patient    Mallampati: Grade 1- soft palate, uvula, tonsillar pillars, and posterior pharyngeal wall visible    NPO Status: yes    UNIVERSAL PROTOCOL   Site Marked: Yes  Prior Images Obtained and Reviewed:  Yes  Required items: Required blood products, implants, devices and special equipment available    Patient identity confirmed:  Verbally with patient, hospital-assigned identification number and arm band  Patient was reevaluated immediately before administering moderate or deep sedation or anesthesia  Confirmation Checklist:  Patient's identity using two indicators  Time out: Immediately prior to the procedure a time out was called    Universal Protocol: the Joint Commission Universal Protocol was followed    Preparation: Patient was prepped and draped in usual sterile fashion      LOCATION     Location:  Hip    Hip injury location:  R hip    Hip dislocation type: anterior      Etiology: spontaneous      Prosthesis: yes      PRE PROCEDURE DETAILS:     Distal perfusion: normal      Range of motion: reduced      PROCEDURE DETAILS      Manipulation performed: yes      Hip reduction method:  Direct traction and traction and counter traction    Reduction successful: yes      Reduction confirmed with imaging: yes      POST PROCEDURE DETAILS      Neurological function: normal      Distal perfusion: normal      Range of motion: improved        PROCEDURE    Patient complications:  Apnea  Length of time physician/provider present for 1:1 monitoring during sedation: 15        I, Vee Marshall, am serving as a scribe to document services personally performed by Jennifer Celaya, based on my observation and the provider's statements to me. I, Jennifer Celaya MD, attest that Vee Marshall is acting in a scribe capacity, has observed my performance of the services and has documented them in accordance with my direction.    Jennifer Celaya MD  Emergency Medicine  [facility]  [facility] EMERGENCY ROOM  [address] [address]  [phone]     Jennifer Celaya MD  01/24/23 [id number]

## 2023-01-24 NOTE — ED NOTES
Bed: [facility]-01  Expected date:   Expected time:   Means of arrival: Ambulance  Comments:  [place] hip

## 2023-01-24 NOTE — DISCHARGE INSTRUCTIONS
Please call for follow-up appointment with [facility].  Call them tomorrow to set up a follow-up appointment.

## 2023-01-24 NOTE — ED TRIAGE NOTES
Pt arrives with a possible right hip location.  Pt reports she was pending over and felt it dislocate.  Pt reports this happened approximately 4 years ago. Pt denies any falls.  Pt is shortened and rotated on the right side.  Pt reports right foot numbness.  Good pedal pulses.

## 2023-01-24 NOTE — PROGRESS NOTES
RT attended conscious sedation, ambu, suction, oxygen, etco2, airway cart, and oral airway present at bedside. Pt required BVM shortly and woke up after a few min. Weaned to RA. RT dismissed by RN

## 2023-01-25 ENCOUNTER — TRANSFERRED RECORDS (OUTPATIENT)
Dept: HEALTH INFORMATION MANAGEMENT | Facility: CLINIC | Age: 78
End: 2023-01-25

## 2023-02-02 ENCOUNTER — HOSPITAL ENCOUNTER (OUTPATIENT)
Dept: MRI IMAGING | Facility: CLINIC | Age: 78
Discharge: HOME | End: 2023-02-02
Attending: STUDENT IN AN ORGANIZED HEALTH CARE EDUCATION/TRAINING PROGRAM | Admitting: STUDENT IN AN ORGANIZED HEALTH CARE EDUCATION/TRAINING PROGRAM
Payer: MEDICARE

## 2023-02-02 DIAGNOSIS — C21.1 MALIGNANT NEOPLASM OF ANAL CANAL (H): ICD-10-CM

## 2023-02-02 PROCEDURE — 255N000002 HC RX 255 OP 636: Performed by: STUDENT IN AN ORGANIZED HEALTH CARE EDUCATION/TRAINING PROGRAM

## 2023-02-02 PROCEDURE — A9585 GADOBUTROL INJECTION: HCPCS | Performed by: STUDENT IN AN ORGANIZED HEALTH CARE EDUCATION/TRAINING PROGRAM

## 2023-02-02 PROCEDURE — 250N000011 HC RX IP 250 OP 636: Performed by: STUDENT IN AN ORGANIZED HEALTH CARE EDUCATION/TRAINING PROGRAM

## 2023-02-02 PROCEDURE — 74183 MRI ABD W/O CNTR FLWD CNTR: CPT | Mod: MG

## 2023-02-02 RX ORDER — GADOBUTROL 604.72 MG/ML
6 INJECTION INTRAVENOUS ONCE
Status: COMPLETED | OUTPATIENT
Start: 2023-02-02 | End: 2023-02-02

## 2023-02-02 RX ADMIN — GADOBUTROL 6 ML: 604.72 INJECTION INTRAVENOUS at 11:19

## 2023-02-02 RX ADMIN — GLUCAGON HYDROCHLORIDE 0.5 MG: KIT at 10:45

## 2023-02-02 RX ADMIN — GLUCAGON HYDROCHLORIDE 0.5 MG: KIT at 11:18

## 2023-02-09 ENCOUNTER — OFFICE VISIT (OUTPATIENT)
Dept: RADIATION ONCOLOGY | Facility: CLINIC | Age: 78
End: 2023-02-09
Attending: STUDENT IN AN ORGANIZED HEALTH CARE EDUCATION/TRAINING PROGRAM
Payer: MEDICARE

## 2023-02-09 VITALS
TEMPERATURE: 97.7 F | WEIGHT: 140.3 LBS | BODY MASS INDEX: 24.85 KG/M2 | OXYGEN SATURATION: 100 % | SYSTOLIC BLOOD PRESSURE: 121 MMHG | HEART RATE: 66 BPM | DIASTOLIC BLOOD PRESSURE: 80 MMHG | RESPIRATION RATE: 16 BRPM

## 2023-02-09 DIAGNOSIS — C21.1 MALIGNANT NEOPLASM OF ANAL CANAL (H): Primary | ICD-10-CM

## 2023-02-09 PROCEDURE — 99215 OFFICE O/P EST HI 40 MIN: CPT | Performed by: STUDENT IN AN ORGANIZED HEALTH CARE EDUCATION/TRAINING PROGRAM

## 2023-02-09 PROCEDURE — G0463 HOSPITAL OUTPT CLINIC VISIT: HCPCS | Performed by: STUDENT IN AN ORGANIZED HEALTH CARE EDUCATION/TRAINING PROGRAM

## 2023-02-09 NOTE — LETTER
2/9/2023         RE: sOcar Zhao  [address]  [place] [address]        Dear Colleague,    Thank you for referring your patient, Oscar Zhao, to the [facility]. Please see a copy of my visit note below.      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  Restaging ordered for January 2024.  Plan:   Return to clinic in 1 year with restaging CT chest and MRI pelvis    Follow-up with colorectal surgery in April as scheduled    Follow-up with heme-onc, assisted with rescheduling since MD Wilfredo matias    Will resume Metamucil for bowel consistency    Total time of this visit, including time spent face-to-face with patient and or via video/audio, and also in preparing for today's visit for MDM and documentation. Medical decision-making included consideration and possible diagnoses, management options, complex record review, review of diagnostic tests and information, consideration and discussion of significant complications based on comorbidities, discussion with providers involved in the care of the patient.     45 Minutes spent.     Subjective:      HPI: Oscar SHAH  Pavel is a 78 year old female with   Chief Complaint   Patient presents with     Oncology Clinic Visit     Follow up with Dr. Spencer   Due to anemia patient was referred to heme-onc 1/8/2021.  Due to being overdue for colonoscopy which she was referred to GI.  2/18/2021 patient underwent colonoscopy.  Findings: Ascending colon polyp 16 to 20 mm status post complete polypectomy.  She was noted to have an ulcerated mass in it anus which was biopsied mass was noted to be palpable hard and nodular.  She was also noted to have internal hemorrhoids without bleeding.  Pathology:  Invasive moderately differentiated squamous cell carcinoma     She underwent staging 2/23/2021 with CT chest abdomen pelvis which noted incidental 4 mm right upper lobe nodule for which follow-up was recommended.  MRI of the pelvis noted a 25 x 18 x 15 mm mucosal thickening centered in the left posterior laterally anorectal junction there were no abnormal or suspicious lymph nodes or visible involvement of the levator ani, puborectalis, or external sphincter musculature.    She completed concurrent chemoradiation therapy as above.  Her restaging since completion has been negative and she returns to clinic today for routine follow-up visit.    She reports she was just seen by colorectal surgery 1/30/2023 with no evidence of disease recurrence or additional concerns.  Next follow-up with them is in April which is 2 years out from COVID completion of treatment.  Her bowels are improved but she continues to have episodes of larger volume of stools but no diarrhea.  She was using Metamucil for a while which was beneficial and she will resume.  No urinary concerns or incontinence.  She has been doing Pilates which helps with core muscle strength as well as her back.  She was recently seen in the ED for hip dislocation and has been doing physical therapy with improvement as well.    Since MD Villalobos left [facility] she has not been back to see heme-onc and  would like to get back into see them.    She reports new sensation of pins-and-needles to the scalp primarily just to the right of midline which she wonders if it is associated with her sensory neuropathy.  No other paresthesias.  No scalp lesions or rash associated.    Current Outpatient Medications   Medication Sig Dispense Refill     amLODIPine (NORVASC) 5 MG tablet Take 2 tablets (10 mg) by mouth daily 180 tablet 2     famotidine (PEPCID) 20 MG tablet [FAMOTIDINE (PEPCID) 20 MG TABLET] Take 1 tablet (20 mg total) by mouth 2 (two) times a day.  0     lisinopril (ZESTRIL) 20 MG tablet Take 1 tablet (20 mg) by mouth daily 90 tablet 2     LOTEMAX 0.5 % ophthalmic suspension Place 1 drop into both eyes every 48 hours  4     metoprolol succinate ER (TOPROL XL) 25 MG 24 hr tablet TAKE 2 TABLETS(50 MG) BY MOUTH DAILY 180 tablet 3     timolol maleate (TIMOPTIC) 0.5 % ophthalmic solution 1 drop       methylcellulose (CITRUCEL) powder Take 2 g by mouth (Patient not taking: Reported on 5/20/2022)         The following portions of the patient's history were reviewed and updated as appropriate: allergies, current medications, past family history, past medical history, past social history, past surgical history and problem list.    Review of Systems    General  Constitutional  Constitutional (WDL): Exceptions to WDL  Fatigue: Fatigue relieved by rest  EENT  Eye Disorders  Eye Disorder (WDL): Assessment not pertinent to visit  Ear Disorders  Ear Disorder (WDL): Assessment not pertinent to visit  Respiratory    Respiratory  Respiratory (WDL): All respiratory elements are within defined limits  Cardiovascular  Cardiovascular  Cardiovascular (WDL): All cardiovascular elements are within defined limits  Gastrointestinal  Gastrointestinal  Gastrointestinal (WDL): Exceptions to WDL  Diarrhea: Increase of less than 4 stools per day over baseline OR mild increase in ostomy output compared to baseline  (occassionally)  Musculoskeletal  Musculoskeletal and Connective Tissue Disorders  Musculoskeletal & Connective (WDL): All musculoskeletal & connective elements are within defined limits  Integumentary              Integumentary  Integumentary (WDL): All integumentary elements are within defined limits  Neurological  Neurosensory  Neurosensory (WDL): All neurosensory elements are within defined limits  Genitourinary/Reproductive  Genitourinary  Genitourinary (WDL): All genitourinary elements are within defined limits  Lymphatic   Lymph System Disorders  Lymph (WDL): All lymph elements are within defined limits    Pain   Pain Score: Mild Pain (2)   AUA Assessment                                                                         Accompanied by  Accompanied By: self only      Objective:     Physical Exam    Vitals:    02/09/23 1000 02/09/23 1037   BP:  121/80   Pulse:  66   Resp:  16   Temp:  97.7  F (36.5  C)   TempSrc:  Oral   SpO2:  100%   Weight:  63.6 kg (140 lb 4.8 oz)   PainSc: Mild Pain (2)    PainLoc: Hip      Gen: Alert, in NAD pleasant interactive, well nourished, thin  Eyes:  EOMI, sclera anicteric  HENT     Head: NC/AT  Pulm: No wheezing, stridor or respiratory distress  Skin: Normal tone and turgor, warm, dry, intact  Neurologic: A/Ox3, speech fluent, no focal motor deficits, Gait normal and unaided  Psychiatric: Appropriate mood and affect    Recent Labs: No results found for this or any previous visit (from the past 168 hour(s)).    Personally reviewed imaging  Imaging: Imaging results 6 weeks:CT Chest/Abdomen/Pelvis w Contrast    Result Date: 1/11/2023  EXAM: CT CHEST/ABDOMEN/PELVIS W CONTRAST LOCATION: [facility] DATE/TIME: 1/10/2023 11:58 AM INDICATION: Anal carcinoma, initial workup COMPARISON: CT chest from 1/12/2022, abdomen pelvis from 3/5/2022. 8/15/2011. TECHNIQUE: CT scan of the chest, abdomen, and pelvis was performed following injection of IV contrast.  Multiplanar reformats were obtained. Dose reduction techniques were used. CONTRAST: Isovue 370 100mL FINDINGS: LUNGS AND PLEURA: No change in the 4 mm subpleural nodule in the right upper lobe as seen on image 67 of series 4. No suspicious lung lesion. Mild scarring in the lung apices and both lung bases is stable. No acute infiltrate or pleural effusion. MEDIASTINUM/AXILLAE: Heterogeneous thyroid gland. No mediastinal or hilar lymphadenopathy. Stable heart size. No pericardial effusion. The thoracic aorta is normal in caliber. Large hiatal hernia with entire stomach located above the diaphragm similar to  previous. CORONARY ARTERY CALCIFICATION: None. HEPATOBILIARY: Tiny low dense lesion in the right hepatic lobe on image 117 of series 3 is stable to smaller in size compared 2011 and consistent with a benign hemangioma. No suspicious liver lesion. The gallbladder is present. No biliary dilatation. PANCREAS: Normal. SPLEEN: Normal. ADRENAL GLANDS: Stable diffuse thickening of the left adrenal gland and small nodule or area of thickening in the junction of the medial and lateral limbs of the right adrenal gland compared 2011 likely related to adenoma formation. No suspicious lesion. KIDNEYS/BLADDER: No hydronephrosis or suspicious renal mass. Urinary bladder is not well distended and partially obscured by the right hip arthroplasty. BOWEL: The stomach is located within the chest. The small bowel is normal in caliber without signs of inflammation. There is diverticulosis without diverticulitis. Small stool burden. MRI is more sensitive to detect subtle perianal abnormalities. No abnormalities seen on today's CT. LYMPH NODES: No retroperitoneal, pelvic or inguinal lymphadenopathy. VASCULATURE: No abdominal aortic aneurysm. The celiac trunk is moderately narrowed at the origin similar to previous. PELVIC ORGANS: The uterus and ovaries are nonvisualized. No pelvic free fluid or suspicious mass. MUSCULOSKELETAL:  Chondrocalcinosis the pubic symphysis. Right hip arthroplasty is intact and well seated. The bones appear demineralized. Degenerative changes noted in the spine and pelvic joints. No suspicious bone lesion is seen. Small fat-containing umbilical hernia.     IMPRESSION: 1.  History of anal cancer status post chemotherapy and radiation therapy. Stable appearance of the chest, abdomen and pelvis without findings to suggest residual or recurrent disease.    XR Pelvis and Hip Right 2 Views    Result Date: 1/24/2023  EXAM: XR PELVIS AND HIP RIGHT 2 VIEWS LOCATION: [facility] DATE/TIME: 1/24/2023 12:08 PM INDICATION: Right hip pain, HO. COMPARISON: None.     IMPRESSION: There is lateral and superior dislocation of the femoral component of the right total hip arthroplasty in relation to the acetabular component. Diffuse bone demineralization. Mild osteoarthrosis of the left hip. Degenerative changes in the visualized spine.    XR Pelvis Port 1/2 Views    Result Date: 1/24/2023  EXAM: XR PELVIS PORT 1/2 VIEWS LOCATION: [facility] DATE/TIME: 1/24/2023 1:25 PM INDICATION: Post right hip reduction. COMPARISON: 1/24/2023     IMPRESSION: Interval reduction in the dislocation of the right hip joint seen on the previous examination. This is now in anatomic location. Postoperative changes right total hip arthroplasty. No evidence for fracture. Mild demineralization.    MR Rectum wo & w Contrast    Result Date: 2/2/2023  EXAM: MRI PELVIS WITHOUT AND WITH IV CONTRAST/MRI RECTUM LOCATION: [facility] DATE/TIME: 2/2/2023 11:28 AM INDICATION: restaging anal SCC s p CRT COMPARISON: None. TECHNIQUE: Routine MRI rectal cancer protocol including T1 in/out phase, diffusion, thin section high resolution T2 and if IV contrast used, post contrast T1. CONTRAST: 6ml of Gadavist FINDINGS: LOCAL TUMOR: There is posttreatment fibrotic change at the anal rectal junction  "similar to previous. Nothing concerning for local tumor recurrence. LYMPH NODES: No lymphadenopathy in the pelvis. ADDITIONAL FINDINGS: Nothing for metastatic disease within the pelvis. Incidental left ischio tuberosity bursitis with small amount of fluid. Present on older studies but slightly increased and now more air..     IMPRESSION: 1.  Post treatment changes left anal rectal junction. 2.  Nothing concerning for tumor recurrence. 3.  No lymphadenopathy. 4.  Incidental left ischio tuberosity bursitis. Likely chronic.       Signed by: Patsy Spencer MD           Oncology Rooming Note    February 9, 2023 10:47 AM   Oscar Zhao is a 78 year old female who presents for:    Chief Complaint   Patient presents with     Oncology Clinic Visit     Follow up with Dr. Spencer     Initial Vitals: /80 (BP Location: Right arm, Patient Position: Sitting, Cuff Size: Adult Regular)   Pulse 66   Temp 97.7  F (36.5  C) (Oral)   Resp 16   Wt 63.6 kg (140 lb 4.8 oz)   SpO2 100%   BMI 24.85 kg/m   Estimated body mass index is 24.85 kg/m  as calculated from the following:    Height as of 1/24/23: 1.6 m (5' 3\").    Weight as of this encounter: 63.6 kg (140 lb 4.8 oz). Body surface area is 1.68 meters squared.  Mild Pain (2) Comment: Data Unavailable   No LMP recorded. Patient is postmenopausal.  Allergies reviewed: Yes  Medications reviewed: Yes    Medications: Medication refills not needed today.  Pharmacy name entered into [employer]: [id number] [address]  AT [facility]    Clinical concerns: Follow up s/p radiation for her rectal cancer,   Dr. Spencer was notified.      Ofelia Perez RN                Again, thank you for allowing me to participate in the care of your patient.        Sincerely,        Patsy Spencer MD    "

## 2023-02-09 NOTE — PROGRESS NOTES
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  Restaging ordered for January 2024.  Plan:   Return to clinic in 1 year with restaging CT chest and MRI pelvis    Follow-up with colorectal surgery in April as scheduled    Follow-up with mary-onc, assisted with rescheduling since MD Wilfredo matias    Will resume Metamucil for bowel consistency    Total time of this visit, including time spent face-to-face with patient and or via video/audio, and also in preparing for today's visit for MDM and documentation. Medical decision-making included consideration and possible diagnoses, management options, complex record review, review of diagnostic tests and information, consideration and discussion of significant complications based on comorbidities, discussion with providers involved in the care of the patient.     45 Minutes spent.     Subjective:      HPI: Oscar Zhao is a 78 year old female with   Chief Complaint   Patient presents with     Oncology Clinic Visit     Follow up with Dr. Spencer   Due to anemia patient was referred to heme-onc 1/8/2021.  Due to being overdue for colonoscopy which she was referred to  GI.  2/18/2021 patient underwent colonoscopy.  Findings: Ascending colon polyp 16 to 20 mm status post complete polypectomy.  She was noted to have an ulcerated mass in it anus which was biopsied mass was noted to be palpable hard and nodular.  She was also noted to have internal hemorrhoids without bleeding.  Pathology:  Invasive moderately differentiated squamous cell carcinoma     She underwent staging 2/23/2021 with CT chest abdomen pelvis which noted incidental 4 mm right upper lobe nodule for which follow-up was recommended.  MRI of the pelvis noted a 25 x 18 x 15 mm mucosal thickening centered in the left posterior laterally anorectal junction there were no abnormal or suspicious lymph nodes or visible involvement of the levator ani, puborectalis, or external sphincter musculature.    She completed concurrent chemoradiation therapy as above.  Her restaging since completion has been negative and she returns to clinic today for routine follow-up visit.    She reports she was just seen by colorectal surgery 1/30/2023 with no evidence of disease recurrence or additional concerns.  Next follow-up with them is in April which is 2 years out from COVID completion of treatment.  Her bowels are improved but she continues to have episodes of larger volume of stools but no diarrhea.  She was using Metamucil for a while which was beneficial and she will resume.  No urinary concerns or incontinence.  She has been doing Pilates which helps with core muscle strength as well as her back.  She was recently seen in the ED for hip dislocation and has been doing physical therapy with improvement as well.    Since MD Villalobos left [facility] she has not been back to see heme-onc and would like to get back into see them.    She reports new sensation of pins-and-needles to the scalp primarily just to the right of midline which she wonders if it is associated with her sensory neuropathy.  No other paresthesias.  No scalp lesions or  rash associated.    Current Outpatient Medications   Medication Sig Dispense Refill     amLODIPine (NORVASC) 5 MG tablet Take 2 tablets (10 mg) by mouth daily 180 tablet 2     famotidine (PEPCID) 20 MG tablet [FAMOTIDINE (PEPCID) 20 MG TABLET] Take 1 tablet (20 mg total) by mouth 2 (two) times a day.  0     lisinopril (ZESTRIL) 20 MG tablet Take 1 tablet (20 mg) by mouth daily 90 tablet 2     LOTEMAX 0.5 % ophthalmic suspension Place 1 drop into both eyes every 48 hours  4     metoprolol succinate ER (TOPROL XL) 25 MG 24 hr tablet TAKE 2 TABLETS(50 MG) BY MOUTH DAILY 180 tablet 3     timolol maleate (TIMOPTIC) 0.5 % ophthalmic solution 1 drop       methylcellulose (CITRUCEL) powder Take 2 g by mouth (Patient not taking: Reported on 5/20/2022)         The following portions of the patient's history were reviewed and updated as appropriate: allergies, current medications, past family history, past medical history, past social history, past surgical history and problem list.    Review of Systems    General  Constitutional  Constitutional (WDL): Exceptions to WDL  Fatigue: Fatigue relieved by rest  EENT  Eye Disorders  Eye Disorder (WDL): Assessment not pertinent to visit  Ear Disorders  Ear Disorder (WDL): Assessment not pertinent to visit  Respiratory    Respiratory  Respiratory (WDL): All respiratory elements are within defined limits  Cardiovascular  Cardiovascular  Cardiovascular (WDL): All cardiovascular elements are within defined limits  Gastrointestinal  Gastrointestinal  Gastrointestinal (WDL): Exceptions to WDL  Diarrhea: Increase of less than 4 stools per day over baseline OR mild increase in ostomy output compared to baseline (occassionally)  Musculoskeletal  Musculoskeletal and Connective Tissue Disorders  Musculoskeletal & Connective (WDL): All musculoskeletal & connective elements are within defined limits  Integumentary              Integumentary  Integumentary (WDL): All integumentary elements are  within defined limits  Neurological  Neurosensory  Neurosensory (WDL): All neurosensory elements are within defined limits  Genitourinary/Reproductive  Genitourinary  Genitourinary (WDL): All genitourinary elements are within defined limits  Lymphatic   Lymph System Disorders  Lymph (WDL): All lymph elements are within defined limits    Pain   Pain Score: Mild Pain (2)   AUA Assessment                                                                         Accompanied by  Accompanied By: self only      Objective:     Physical Exam    Vitals:    02/09/23 1000 02/09/23 1037   BP:  121/80   Pulse:  66   Resp:  16   Temp:  97.7  F (36.5  C)   TempSrc:  Oral   SpO2:  100%   Weight:  63.6 kg (140 lb 4.8 oz)   PainSc: Mild Pain (2)    PainLoc: Hip      Gen: Alert, in NAD pleasant interactive, well nourished, thin  Eyes:  EOMI, sclera anicteric  HENT     Head: NC/AT  Pulm: No wheezing, stridor or respiratory distress  Skin: Normal tone and turgor, warm, dry, intact  Neurologic: A/Ox3, speech fluent, no focal motor deficits, Gait normal and unaided  Psychiatric: Appropriate mood and affect    Recent Labs: No results found for this or any previous visit (from the past 168 hour(s)).    Personally reviewed imaging  Imaging: Imaging results 6 weeks:CT Chest/Abdomen/Pelvis w Contrast    Result Date: 1/11/2023  EXAM: CT CHEST/ABDOMEN/PELVIS W CONTRAST LOCATION: [facility] DATE/TIME: 1/10/2023 11:58 AM INDICATION: Anal carcinoma, initial workup COMPARISON: CT chest from 1/12/2022, abdomen pelvis from 3/5/2022. 8/15/2011. TECHNIQUE: CT scan of the chest, abdomen, and pelvis was performed following injection of IV contrast. Multiplanar reformats were obtained. Dose reduction techniques were used. CONTRAST: Isovue 370 100mL FINDINGS: LUNGS AND PLEURA: No change in the 4 mm subpleural nodule in the right upper lobe as seen on image 67 of series 4. No suspicious lung lesion. Mild scarring in the lung apices and  both lung bases is stable. No acute infiltrate or pleural effusion. MEDIASTINUM/AXILLAE: Heterogeneous thyroid gland. No mediastinal or hilar lymphadenopathy. Stable heart size. No pericardial effusion. The thoracic aorta is normal in caliber. Large hiatal hernia with entire stomach located above the diaphragm similar to  previous. CORONARY ARTERY CALCIFICATION: None. HEPATOBILIARY: Tiny low dense lesion in the right hepatic lobe on image 117 of series 3 is stable to smaller in size compared 2011 and consistent with a benign hemangioma. No suspicious liver lesion. The gallbladder is present. No biliary dilatation. PANCREAS: Normal. SPLEEN: Normal. ADRENAL GLANDS: Stable diffuse thickening of the left adrenal gland and small nodule or area of thickening in the junction of the medial and lateral limbs of the right adrenal gland compared 2011 likely related to adenoma formation. No suspicious lesion. KIDNEYS/BLADDER: No hydronephrosis or suspicious renal mass. Urinary bladder is not well distended and partially obscured by the right hip arthroplasty. BOWEL: The stomach is located within the chest. The small bowel is normal in caliber without signs of inflammation. There is diverticulosis without diverticulitis. Small stool burden. MRI is more sensitive to detect subtle perianal abnormalities. No abnormalities seen on today's CT. LYMPH NODES: No retroperitoneal, pelvic or inguinal lymphadenopathy. VASCULATURE: No abdominal aortic aneurysm. The celiac trunk is moderately narrowed at the origin similar to previous. PELVIC ORGANS: The uterus and ovaries are nonvisualized. No pelvic free fluid or suspicious mass. MUSCULOSKELETAL: Chondrocalcinosis the pubic symphysis. Right hip arthroplasty is intact and well seated. The bones appear demineralized. Degenerative changes noted in the spine and pelvic joints. No suspicious bone lesion is seen. Small fat-containing umbilical hernia.     IMPRESSION: 1.  History of anal cancer  status post chemotherapy and radiation therapy. Stable appearance of the chest, abdomen and pelvis without findings to suggest residual or recurrent disease.    XR Pelvis and Hip Right 2 Views    Result Date: 1/24/2023  EXAM: XR PELVIS AND HIP RIGHT 2 VIEWS LOCATION: [facility] DATE/TIME: 1/24/2023 12:08 PM INDICATION: Right hip pain, HO. COMPARISON: None.     IMPRESSION: There is lateral and superior dislocation of the femoral component of the right total hip arthroplasty in relation to the acetabular component. Diffuse bone demineralization. Mild osteoarthrosis of the left hip. Degenerative changes in the visualized spine.    XR Pelvis Port 1/2 Views    Result Date: 1/24/2023  EXAM: XR PELVIS PORT 1/2 VIEWS LOCATION: [facility] DATE/TIME: 1/24/2023 1:25 PM INDICATION: Post right hip reduction. COMPARISON: 1/24/2023     IMPRESSION: Interval reduction in the dislocation of the right hip joint seen on the previous examination. This is now in anatomic location. Postoperative changes right total hip arthroplasty. No evidence for fracture. Mild demineralization.    MR Rectum wo & w Contrast    Result Date: 2/2/2023  EXAM: MRI PELVIS WITHOUT AND WITH IV CONTRAST/MRI RECTUM LOCATION: [facility] DATE/TIME: 2/2/2023 11:28 AM INDICATION: restaging anal SCC s p CRT COMPARISON: None. TECHNIQUE: Routine MRI rectal cancer protocol including T1 in/out phase, diffusion, thin section high resolution T2 and if IV contrast used, post contrast T1. CONTRAST: 6ml of Gadavist FINDINGS: LOCAL TUMOR: There is posttreatment fibrotic change at the anal rectal junction similar to previous. Nothing concerning for local tumor recurrence. LYMPH NODES: No lymphadenopathy in the pelvis. ADDITIONAL FINDINGS: Nothing for metastatic disease within the pelvis. Incidental left ischio tuberosity bursitis with small amount of fluid. Present on older studies but slightly  increased and now more air..     IMPRESSION: 1.  Post treatment changes left anal rectal junction. 2.  Nothing concerning for tumor recurrence. 3.  No lymphadenopathy. 4.  Incidental left ischio tuberosity bursitis. Likely chronic.       Signed by: Patsy Spencer MD

## 2023-02-09 NOTE — PROGRESS NOTES
"Oncology Rooming Note    February 9, 2023 10:47 AM   Oscar Zhao is a 78 year old female who presents for:    Chief Complaint   Patient presents with     Oncology Clinic Visit     Follow up with Dr. Spencer     Initial Vitals: /80 (BP Location: Right arm, Patient Position: Sitting, Cuff Size: Adult Regular)   Pulse 66   Temp 97.7  F (36.5  C) (Oral)   Resp 16   Wt 63.6 kg (140 lb 4.8 oz)   SpO2 100%   BMI 24.85 kg/m   Estimated body mass index is 24.85 kg/m  as calculated from the following:    Height as of 1/24/23: 1.6 m (5' 3\").    Weight as of this encounter: 63.6 kg (140 lb 4.8 oz). Body surface area is 1.68 meters squared.  Mild Pain (2) Comment: Data Unavailable   No LMP recorded. Patient is postmenopausal.  Allergies reviewed: Yes  Medications reviewed: Yes    Medications: Medication refills not needed today.  Pharm[facility] entered into [id number] [address] AT [facility]    Clinical concerns: Follow up s/p radiation for her rectal cancer,   Dr. Spencer was notified.      Ofelia Perez RN            "

## 2023-02-21 ENCOUNTER — ONCOLOGY VISIT (OUTPATIENT)
Dept: ONCOLOGY | Facility: CLINIC | Age: 78
End: 2023-02-21
Attending: NURSE PRACTITIONER
Payer: MEDICARE

## 2023-02-21 VITALS
OXYGEN SATURATION: 99 % | SYSTOLIC BLOOD PRESSURE: 110 MMHG | DIASTOLIC BLOOD PRESSURE: 82 MMHG | RESPIRATION RATE: 16 BRPM | HEIGHT: 63 IN | HEART RATE: 65 BPM | WEIGHT: 136 LBS | BODY MASS INDEX: 24.1 KG/M2

## 2023-02-21 DIAGNOSIS — Z85.048 HISTORY OF RECTAL OR ANAL CANCER: Primary | ICD-10-CM

## 2023-02-21 DIAGNOSIS — C21.1 MALIGNANT NEOPLASM OF ANAL CANAL (H): ICD-10-CM

## 2023-02-21 PROCEDURE — 99214 OFFICE O/P EST MOD 30 MIN: CPT | Performed by: NURSE PRACTITIONER

## 2023-02-21 PROCEDURE — G0463 HOSPITAL OUTPT CLINIC VISIT: HCPCS | Performed by: NURSE PRACTITIONER

## 2023-02-21 ASSESSMENT — PAIN SCALES - GENERAL: PAINLEVEL: NO PAIN (0)

## 2023-02-21 NOTE — LETTER
"    2/21/2023         RE: Oscar Zhao  [address]  [facility] [address]        Dear Colleague,    Thank you for referring your patient, Oscar Zhao, to the [facility]. Please see a copy of my visit note below.    Oncology Rooming Note    February 21, 2023 10:52 AM   Oscar Zhao is a 78 year old female who presents for:    Chief Complaint   Patient presents with     Oncology Clinic Visit     Malignant neoplasm of anal canal     Initial Vitals: /82   Pulse 65   Resp 16   Ht 1.6 m (5' 3\")   Wt 61.7 kg (136 lb)   SpO2 99%   BMI 24.09 kg/m   Estimated body mass index is 24.09 kg/m  as calculated from the following:    Height as of this encounter: 1.6 m (5' 3\").    Weight as of this encounter: 61.7 kg (136 lb). Body surface area is 1.66 meters squared.  No Pain (0) Comment: Data Unavailable   No LMP recorded. Patient is postmenopausal.  Allergies reviewed: Yes  Medications reviewed: Yes    Medications: Medication refills not needed today.  Pharmacy name entered into [employer]: [facility][id number] [address]  AT [facility]    Clinical concerns: 1 year follow up      Bree Alexandra              [facility] Hematology and Oncology Progress Note    Patient: Oscar Zhao  MRN: [id number]  Date of Service: 04/21/2021        Reason for Visit:    Scheduled f/up    Assessment & Plan:    1. 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 nearing 2 years s/p definitive treatment with chemo + EBRT.     78 year old.  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  She has been treated in the past with prednisone, methotrexate, Humira and rituximab for this condition.    Oscar presents alone.  " "Overall she has been feeling quite good since her f/up a year ago.  She lives alone, but \"shares\" a 100 pound [place] 3-year old rescue dog with her 33-year old daughter who lives in the .  Appetite good - weight down a couple ppounds this past year.  She resumed Metamucil last week for improved bowel consistency ... states it helps.  She has occasional \"autimmune-related\" peripheral neuropathy that manifests as \"prickly\" sensations.  She denies cough, fever, chills, unusual headaches, visual or mentation disturbance, bowel or bladder issues, rash..    02/02/2023 MR rectum - post treatment changes left anal rectal junction. Nothing concerning for tumor recurrence. No lymphadenopathy. Incidental left ischio tuberosity bursitis. Likely chronic.    01/10/2023 CT CAP - stable appearance of the chest, abdomen and pelvis without findings to suggest residual or recurrent disease.    05/24/2023 CMP - WNL.    05/24/2023 CBC - WNL.    Clinically and radiographically ZOYA.  Excellent metabolic panel and hematologies.      I again reviewed follow-up per NCCN guidelines:    Rectal exam every 3 to 6 months for 5 years including physical exam of the inguinal regions and anoscopy every 6 to 12 months for 3 years - managed through colorectal surgery, Dr. Reynolds. Follow-up scheduled in April.    CT CAP and MRI-rectum annually for at least 3 years - managed through [facility] and [facility].    Return to clinic in 1 year with restaging CT CAP and MRI pelvis (ordered through [facility]) and yearly CBC and CMP (being obtained by PCP generally).    Oncologic History:    1. Malignant neoplasm of anal canal     Clinical Stage IIA (cT2, cN0, cM0)     One year history of blood on toilet paper when wiping.    02/18/21 colonoscopy during w/u for anemia - 1.5-2 cm polyp of the ascending colon removed - pathology c/w tubular adenoma.  Ulcerated anal canal lesion - path c/w moderately differentiated squamous cell carcinoma.    02/23/21 MRI pelvis - focal " thickening (L) posterolateral anorectal mucosa.  Muscularis propria appears intact without extension into the mesorecat or intersphincteric space.  No maegan or distant mets in the pelvis.    03/05/21 NM PET - FDG avid anal mass. No evidence of metastasis.      03/22 - 4/29/2021 completed concurrent 5FU + mitomycin chemotherapy with 5400 cGy in 27 fractions to the anal canal and 4500 cGy in 27 fractions to the regional lymph nodes.    06/25/2021 MR pelvis - previously seen anal mass has nearly completely resolved. No evidence of metastatic disease in the pelvis.    06/25/2021 CT CAP - no evidence of metastatic disease.    01/12/2022 CT chest - no evidence of metastatic disease.    01/12/2022 MR rectum - minimal post treatment fibrosis at the left side of the anorectal junction. Nothing concerning for residual or recurrent tumor. No lymphadenopathy.    01/10/2023 CT CAP - stable appearance of the chest, abdomen and pelvis without findings to suggest residual or recurrent disease.    02/02/2023 MR rectum - post treatment changes left anal rectal junction. Nothing concerning for tumor recurrence. No lymphadenopathy. Incidental left ischio tuberosity bursitis. Likely chronic.    ECOG Performance:     ECOG Performance Status: 1    Distress Assessment:    Distress Assessment Score: 1      (Z85.048) History of rectal or anal cancer  (primary encounter diagnosis)    (C21.1) Malignant neoplasm of anal canal (H)        33 minutes of time were spent on the date of this encounter with chart review, face to face time with the patient, examination, recommendations, documentation, and communication of the plan of care to the care team.    Alberto Fang, CNP     CC: Jak Jones MD Elizabeth Ester, MD Aneel Damle, MD  ______________________________________________________________    Past History:    Past Medical History:   Diagnosis Date     Anemia      Basal cell carcinoma of anterior chest      Breast cyst       History of anesthesia complications      HLA B27 (HLA B27 positive)      Hypertension      Osteoarthritis      Osteoporosis 2011     Peripheral neuropathy 2018     PONV (postoperative nausea and vomiting)      Scleritis, bilateral     Left .  Treated with corticosteroids,, methotrexate and Humira.  .  Rituximab.     Squamous cell carcinoma of skin of chest      Steroid induced glaucoma, both eyes      Past Surgical History:   Procedure Laterality Date     BREAST CYST EXCISION Right     benign     CATARACT EXTRACTION W/  INTRAOCULAR LENS IMPLANT Right 2017     CATARACT EXTRACTION W/  INTRAOCULAR LENS IMPLANT Left 2017      SECTION       CLOSED REDUCTION HIP DISLOCATION Right 2019    Procedure: CLOSED REDUCTION, HIP;  Surgeon: Jak Ruiz DO;  Location: [facility];  Service: Orthopedics     COLONOSCOPY  2011     COLONOSCOPY  2005     COLONOSCOPY W/ BIOPSIES AND POLYPECTOMY  2021    16 to 20 mm polyp:tubular adenoma in the ascending colon.  Ulcerated mass in the anal canal: Moderately differentiated squamous cell cancer.     ESOPHAGOGASTRODUODENOSCOPY  2017    Large hiatal hernia.  Reactive gastropathy with mucosal erosion.  H. pylori negative.     IR PORT PLACEMENT >5 YEARS  03/15/2021    Right IJ port-a-cath.     LAPAROSCOPIC APPENDECTOMY  2011     SC TOTAL KNEE ARTHROPLASTY Left 2017    Procedure: LEFT TOTAL KNEE ARTHROPLASTY;  Surgeon: Star Du MD;  Location: [facility];  Service: Orthopedics     TOTAL ABDOMINAL HYSTERECTOMY W/ BILATERAL SALPINGOOPHORECTOMY       TOTAL HIP ARTHROPLASTY Right 2015    Procedure: HIP TOTAL ARTHROPLASTY, RIGHT;  Surgeon: Star Du MD;  Location: [facility];  Service:      Allergies:    Allergies   Allergen Reactions     Adalimumab Myalgia     Brimonidine-Timolol Unknown     Redness itching in eyes     Levaquin [Levofloxacin]      Skin  "burn and couldn't get out bed for 5 days     Tetracyclines Hives     Review of Systems:    No fever or night sweats.  No loss of weight.  No lumps or bumps anywhere.  No unusual headaches or eyesight issues.  No dizziness.  No bleeding from the nose.  No sores in the mouth. No problems with swallowing.  No chest pain. No shortness of breath. No cough.  No abdominal pain. No nausea or vomiting.  No diarrhea or constipation.  No blood in stool or black colored stools.  No problems passing urine.  No numbness or tingling in hands or feet.  No skin rashes.    A 14 point review of systems is otherwise negative.    Physical Exam:    /82   Pulse 65   Resp 16   Ht 1.6 m (5' 3\")   Wt 61.7 kg (136 lb)   SpO2 99%   BMI 24.09 kg/m      GENERAL:   Very pleasant.  Alert and oriented.  Comfortable.  In no distress.      HEENT:   Atraumatic and normocephalic.  JAVIER.  EOMI.  No pallor.  No icterus.  Mild mucositis.  No candidiasis.     LYMPH NODES:  No palpable cervical, axillary or inguinal lymphadenopathy.    CHEST:   Lungs clear to auscultation bilaterally.    CVS:    S1 and S2 heard.  Regular rate and rhythm.  No murmur, gallop or rub heard.  No peripheral edema.    ABDOMEN:   Soft.  Not tender.  Not distended.  No palpable hepatomegaly or splenomegaly.    EXTREMITIES:  Warm.  No PHIL.    SKIN:    No rash, bruising or purpura noted.  Full head of hair.    Lab Results:    No new labs.    Imaging Results:    Reviewed with Yanick.    MR Rectum wo & w Contrast  Narrative: EXAM: MRI PELVIS WITHOUT AND WITH IV CONTRAST/MRI RECTUM  LOCATION: [facility]  DATE/TIME: 2/2/2023 11:28 AM    INDICATION: restaging anal SCC s p CRT  COMPARISON: None.    TECHNIQUE: Routine MRI rectal cancer protocol including T1 in/out phase, diffusion, thin section high resolution T2 and if IV contrast used, post contrast T1.  CONTRAST: 6ml of Gadavist    FINDINGS:  LOCAL TUMOR: There is posttreatment fibrotic change at the " anal rectal junction similar to previous. Nothing concerning for local tumor recurrence.    LYMPH NODES:  No lymphadenopathy in the pelvis.    ADDITIONAL FINDINGS: Nothing for metastatic disease within the pelvis.    Incidental left ischio tuberosity bursitis with small amount of fluid. Present on older studies but slightly increased and now more air..  Impression: IMPRESSION:     1.  Post treatment changes left anal rectal junction.    2.  Nothing concerning for tumor recurrence.    3.  No lymphadenopathy.    4.  Incidental left ischio tuberosity bursitis. Likely chronic.      EXAM: CT CHEST/ABDOMEN/PELVIS W CONTRAST  LOCATION: [facility]  DATE/TIME: 1/10/2023 11:58 AM     INDICATION: Anal carcinoma, initial workup  COMPARISON: CT chest from 1/12/2022, abdomen pelvis from 3/5/2022. 8/15/2011.  TECHNIQUE: CT scan of the chest, abdomen, and pelvis was performed following injection of IV contrast. Multiplanar reformats were obtained. Dose reduction techniques were used.   CONTRAST: Isovue 370 100mL      FINDINGS:   LUNGS AND PLEURA: No change in the 4 mm subpleural nodule in the right upper lobe as seen on image 67 of series 4. No suspicious lung lesion. Mild scarring in the lung apices and both lung bases is stable. No acute infiltrate or pleural effusion.     MEDIASTINUM/AXILLAE: Heterogeneous thyroid gland. No mediastinal or hilar lymphadenopathy. Stable heart size. No pericardial effusion. The thoracic aorta is normal in caliber. Large hiatal hernia with entire stomach located above the diaphragm similar to   previous.     CORONARY ARTERY CALCIFICATION: None.     HEPATOBILIARY: Tiny low dense lesion in the right hepatic lobe on image 117 of series 3 is stable to smaller in size compared 2011 and consistent with a benign hemangioma. No suspicious liver lesion. The gallbladder is present. No biliary dilatation.     PANCREAS: Normal.     SPLEEN: Normal.     ADRENAL GLANDS: Stable diffuse  thickening of the left adrenal gland and small nodule or area of thickening in the junction of the medial and lateral limbs of the right adrenal gland compared 2011 likely related to adenoma formation. No suspicious lesion.     KIDNEYS/BLADDER: No hydronephrosis or suspicious renal mass. Urinary bladder is not well distended and partially obscured by the right hip arthroplasty.     BOWEL: The stomach is located within the chest. The small bowel is normal in caliber without signs of inflammation. There is diverticulosis without diverticulitis. Small stool burden. MRI is more sensitive to detect subtle perianal abnormalities. No   abnormalities seen on today's CT.     LYMPH NODES: No retroperitoneal, pelvic or inguinal lymphadenopathy.     VASCULATURE: No abdominal aortic aneurysm. The celiac trunk is moderately narrowed at the origin similar to previous.     PELVIC ORGANS: The uterus and ovaries are nonvisualized. No pelvic free fluid or suspicious mass.     MUSCULOSKELETAL: Chondrocalcinosis the pubic symphysis. Right hip arthroplasty is intact and well seated. The bones appear demineralized. Degenerative changes noted in the spine and pelvic joints. No suspicious bone lesion is seen. Small fat-containing   umbilical hernia.                                                                      IMPRESSION:  1.  History of anal cancer status post chemotherapy and radiation therapy. Stable appearance of the chest, abdomen and pelvis without findings to suggest residual or recurrent disease.             Again, thank you for allowing me to participate in the care of your patient.        Sincerely,        Alberto Fang, CNP

## 2023-02-21 NOTE — PROGRESS NOTES
"[facility] Hematology and Oncology Progress Note    Patient: Oscar Zhao  MRN: [id number]  Date of Service: 04/21/2021        Reason for Visit:    Scheduled f/up    Assessment & Plan:    1. 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 nearing 2 years s/p definitive treatment with chemo + EBRT.     78 year old.  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  She has been treated in the past with prednisone, methotrexate, Humira and rituximab for this condition.    Oscar presents alone.  Overall she has been feeling quite good since her f/up a year ago.  She lives alone, but \"shares\" a 100 pound [place] 3-year old rescue dog with her 33-year old daughter who lives in the .  Appetite good - weight down a couple ppounds this past year.  She resumed Metamucil last week for improved bowel consistency ... states it helps.  She has occasional \"autimmune-related\" peripheral neuropathy that manifests as \"prickly\" sensations.  She denies cough, fever, chills, unusual headaches, visual or mentation disturbance, bowel or bladder issues, rash..    02/02/2023 MR rectum - post treatment changes left anal rectal junction. Nothing concerning for tumor recurrence. No lymphadenopathy. Incidental left ischio tuberosity bursitis. Likely chronic.    01/10/2023 CT CAP - stable appearance of the chest, abdomen and pelvis without findings to suggest residual or recurrent disease.    05/24/2023 CMP - WNL.    05/24/2023 CBC - WNL.    Clinically and radiographically ZOYA.  Excellent metabolic panel and hematologies.      I again reviewed follow-up per NCCN guidelines:    Rectal exam every 3 to 6 months for 5 years including physical exam of the inguinal regions and anoscopy every 6 to 12 " months for 3 years - managed through colorectal surgery, Dr. Reynolds. Follow-up scheduled in April.    CT CAP and MRI-rectum annually for at least 3 years - managed through [facility].    Return to clinic in 1 year with restaging CT CAP and [facility] and yearly CBC and CMP (being obtained by PCP generally).    Oncologic History:    1. Malignant neoplasm of anal canal     Clinical Stage IIA (cT2, cN0, cM0)     One year history of blood on toilet paper when wiping.    02/18/21 colonoscopy during w/u for anemia - 1.5-2 cm polyp of the ascending colon removed - pathology c/w tubular adenoma.  Ulcerated anal canal lesion - path c/w moderately differentiated squamous cell carcinoma.    02/23/21 MRI pelvis - focal thickening (L) posterolateral anorectal mucosa.  Muscularis propria appears intact without extension into the mesorecat or intersphincteric space.  No maegan or distant mets in the pelvis.    03/05/21 NM PET - FDG avid anal mass. No evidence of metastasis.      03/22 - 4/29/2021 completed concurrent 5FU + mitomycin chemotherapy with 5400 cGy in 27 fractions to the anal canal and 4500 cGy in 27 fractions to the regional lymph nodes.    06/25/2021 MR pelvis - previously seen anal mass has nearly completely resolved. No evidence of metastatic disease in the pelvis.    06/25/2021 CT CAP - no evidence of metastatic disease.    01/12/2022 CT chest - no evidence of metastatic disease.    01/12/2022 MR rectum - minimal post treatment fibrosis at the left side of the anorectal junction. Nothing concerning for residual or recurrent tumor. No lymphadenopathy.    01/10/2023 CT CAP - stable appearance of the chest, abdomen and pelvis without findings to suggest residual or recurrent disease.    02/02/2023 MR rectum - post treatment changes left anal rectal junction. Nothing concerning for tumor recurrence. No lymphadenopathy. Incidental left ischio tuberosity bursitis. Likely chronic.    ECOG  Performance:     ECOG Performance Status: 1    Distress Assessment:    Distress Assessment Score: 1      (Z85.048) History of rectal or anal cancer  (primary encounter diagnosis)    (C21.1) Malignant neoplasm of anal canal (H)        33 minutes of time were spent on the date of this encounter with chart review, face to face time with the patient, examination, recommendations, documentation, and communication of the plan of care to the care team.    Alberto Fang, CNP     CC: Jak Jones MD Elizabeth Ester, MD Aren Reynolds MD  ______________________________________________________________    Past History:    Past Medical History:   Diagnosis Date     Anemia      Basal cell carcinoma of anterior chest      Breast cyst      History of anesthesia complications      HLA B27 (HLA B27 positive)      Hypertension      Osteoarthritis      Osteoporosis 2011     Peripheral neuropathy 2018     PONV (postoperative nausea and vomiting)      Scleritis, bilateral     Left .  Treated with corticosteroids,, methotrexate and Humira.  .  Rituximab.     Squamous cell carcinoma of skin of chest      Steroid induced glaucoma, both eyes      Past Surgical History:   Procedure Laterality Date     BREAST CYST EXCISION Right     benign     CATARACT EXTRACTION W/  INTRAOCULAR LENS IMPLANT Right 2017     CATARACT EXTRACTION W/  INTRAOCULAR LENS IMPLANT Left 2017      SECTION       CLOSED REDUCTION HIP DISLOCATION Right 2019    Procedure: CLOSED REDUCTION, HIP;  Surgeon: Jak Ruiz DO;  Location: [facility];  Service: Orthopedics     COLONOSCOPY  2011     COLONOSCOPY  2005     COLONOSCOPY W/ BIOPSIES AND POLYPECTOMY  2021    16 to 20 mm polyp:tubular adenoma in the ascending colon.  Ulcerated mass in the anal canal: Moderately differentiated squamous cell cancer.     ESOPHAGOGASTRODUODENOSCOPY  2017    Large hiatal hernia.  Reactive  "gastropathy with mucosal erosion.  H. pylori negative.     IR PORT PLACEMENT >5 YEARS  03/15/2021    Right IJ port-a-cath.     LAPAROSCOPIC APPENDECTOMY  04/28/2011     MT TOTAL KNEE ARTHROPLASTY Left 03/02/2017    Procedure: LEFT TOTAL KNEE ARTHROPLASTY;  Surgeon: Star Du MD;  Location: [facility] Main OR;  Service: Orthopedics     TOTAL ABDOMINAL HYSTERECTOMY W/ BILATERAL SALPINGOOPHORECTOMY  1996     TOTAL HIP ARTHROPLASTY Right 08/18/2015    Procedure: HIP TOTAL ARTHROPLASTY, RIGHT;  Surgeon: Star Du MD;  Location: [facility] Main OR;  Service:      Allergies:    Allergies   Allergen Reactions     Adalimumab Myalgia     Brimonidine-Timolol Unknown     Redness itching in eyes     Levaquin [Levofloxacin]      Skin burn and couldn't get out bed for 5 days     Tetracyclines Hives     Review of Systems:    No fever or night sweats.  No loss of weight.  No lumps or bumps anywhere.  No unusual headaches or eyesight issues.  No dizziness.  No bleeding from the nose.  No sores in the mouth. No problems with swallowing.  No chest pain. No shortness of breath. No cough.  No abdominal pain. No nausea or vomiting.  No diarrhea or constipation.  No blood in stool or black colored stools.  No problems passing urine.  No numbness or tingling in hands or feet.  No skin rashes.    A 14 point review of systems is otherwise negative.    Physical Exam:    /82   Pulse 65   Resp 16   Ht 1.6 m (5' 3\")   Wt 61.7 kg (136 lb)   SpO2 99%   BMI 24.09 kg/m      GENERAL:   Very pleasant.  Alert and oriented.  Comfortable.  In no distress.      HEENT:   Atraumatic and normocephalic.  JAVIER.  EOMI.  No pallor.  No icterus.  Mild mucositis.  No candidiasis.     LYMPH NODES:  No palpable cervical, axillary or inguinal lymphadenopathy.    CHEST:   Lungs clear to auscultation bilaterally.    CVS:    S1 and S2 heard.  Regular rate and rhythm.  No murmur, gallop or rub heard.  No peripheral edema.    ABDOMEN:   Soft.  " Not tender.  Not distended.  No palpable hepatomegaly or splenomegaly.    EXTREMITIES:  Warm.  No PHIL.    SKIN:    No rash, bruising or purpura noted.  Full head of hair.    Lab Results:    No new labs.    Imaging Results:    Reviewed with Yanick.    MR Rectum wo & w Contrast  Narrative: EXAM: MRI PELVIS WITHOUT AND WITH IV CONTRAST/MRI RECTUM  LOCATION: [facility]  DATE/TIME: 2/2/2023 11:28 AM    INDICATION: restaging anal SCC s p CRT  COMPARISON: None.    TECHNIQUE: Routine MRI rectal cancer protocol including T1 in/out phase, diffusion, thin section high resolution T2 and if IV contrast used, post contrast T1.  CONTRAST: 6ml of Gadavist    FINDINGS:  LOCAL TUMOR: There is posttreatment fibrotic change at the anal rectal junction similar to previous. Nothing concerning for local tumor recurrence.    LYMPH NODES:  No lymphadenopathy in the pelvis.    ADDITIONAL FINDINGS: Nothing for metastatic disease within the pelvis.    Incidental left ischio tuberosity bursitis with small amount of fluid. Present on older studies but slightly increased and now more air..  Impression: IMPRESSION:     1.  Post treatment changes left anal rectal junction.    2.  Nothing concerning for tumor recurrence.    3.  No lymphadenopathy.    4.  Incidental left ischio tuberosity bursitis. Likely chronic.      EXAM: CT CHEST/ABDOMEN/PELVIS W CONTRAST  LOCATION: [facility]  DATE/TIME: 1/10/2023 11:58 AM     INDICATION: Anal carcinoma, initial workup  COMPARISON: CT chest from 1/12/2022, abdomen pelvis from 3/5/2022. 8/15/2011.  TECHNIQUE: CT scan of the chest, abdomen, and pelvis was performed following injection of IV contrast. Multiplanar reformats were obtained. Dose reduction techniques were used.   CONTRAST: Isovue 370 100mL      FINDINGS:   LUNGS AND PLEURA: No change in the 4 mm subpleural nodule in the right upper lobe as seen on image 67 of series 4. No suspicious lung lesion. Mild  scarring in the lung apices and both lung bases is stable. No acute infiltrate or pleural effusion.     MEDIASTINUM/AXILLAE: Heterogeneous thyroid gland. No mediastinal or hilar lymphadenopathy. Stable heart size. No pericardial effusion. The thoracic aorta is normal in caliber. Large hiatal hernia with entire stomach located above the diaphragm similar to   previous.     CORONARY ARTERY CALCIFICATION: None.     HEPATOBILIARY: Tiny low dense lesion in the right hepatic lobe on image 117 of series 3 is stable to smaller in size compared 2011 and consistent with a benign hemangioma. No suspicious liver lesion. The gallbladder is present. No biliary dilatation.     PANCREAS: Normal.     SPLEEN: Normal.     ADRENAL GLANDS: Stable diffuse thickening of the left adrenal gland and small nodule or area of thickening in the junction of the medial and lateral limbs of the right adrenal gland compared 2011 likely related to adenoma formation. No suspicious lesion.     KIDNEYS/BLADDER: No hydronephrosis or suspicious renal mass. Urinary bladder is not well distended and partially obscured by the right hip arthroplasty.     BOWEL: The stomach is located within the chest. The small bowel is normal in caliber without signs of inflammation. There is diverticulosis without diverticulitis. Small stool burden. MRI is more sensitive to detect subtle perianal abnormalities. No   abnormalities seen on today's CT.     LYMPH NODES: No retroperitoneal, pelvic or inguinal lymphadenopathy.     VASCULATURE: No abdominal aortic aneurysm. The celiac trunk is moderately narrowed at the origin similar to previous.     PELVIC ORGANS: The uterus and ovaries are nonvisualized. No pelvic free fluid or suspicious mass.     MUSCULOSKELETAL: Chondrocalcinosis the pubic symphysis. Right hip arthroplasty is intact and well seated. The bones appear demineralized. Degenerative changes noted in the spine and pelvic joints. No suspicious bone lesion is seen.  Small fat-containing   umbilical hernia.                                                                      IMPRESSION:  1.  History of anal cancer status post chemotherapy and radiation therapy. Stable appearance of the chest, abdomen and pelvis without findings to suggest residual or recurrent disease.

## 2023-02-21 NOTE — PROGRESS NOTES
"Oncology Rooming Note    February 21, 2023 10:52 AM   Oscar Zhao is a 78 year old female who presents for:    Chief Complaint   Patient presents with     Oncology Clinic Visit     Malignant neoplasm of anal canal     Initial Vitals: /82   Pulse 65   Resp 16   Ht 1.6 m (5' 3\")   Wt 61.7 kg (136 lb)   SpO2 99%   BMI 24.09 kg/m   Estimated body mass index is 24.09 kg/m  as calculated from the following:    Height as of this encounter: 1.6 m (5' 3\").    Weight as of this encounter: 61.7 kg (136 lb). Body surface area is 1.66 meters squared.  No Pain (0) Comment: Data Unavailable   No LMP recorded. Patient is postmenopausal.  Allergies reviewed: Yes  Medications reviewed: Yes    Medications: Medication refills not needed today.  [facility] into upad[id number] [place], [place] - [address] AT [facility]    Clinical concerns: 1 year follow up      Bree Alexandra            "

## 2023-02-22 DIAGNOSIS — C21.1 MALIGNANT NEOPLASM OF ANAL CANAL (H): Primary | ICD-10-CM

## 2023-05-01 ENCOUNTER — NURSE TRIAGE (OUTPATIENT)
Dept: NURSING | Facility: CLINIC | Age: 78
End: 2023-05-01
Payer: MEDICARE

## 2023-05-01 NOTE — TELEPHONE ENCOUNTER
Provider Recommendation Follow Up:   Reached patient/caregiver. Informed of provider's recommendations. Patient verbalized understanding and agrees with the plan.     Per PCP recommendations: Try over-the-counter famotidine 20 mg twice daily with lunch and at bedtime snack.  Please make a phone visit with me when you can.  Please call patient .  BENJI VILLAGOMEZ    Assisted in connecting with scheduling for recommended phone visit.     Alycia Pritchard RN BSN 5/1/2023 8:44 AM

## 2023-05-01 NOTE — TELEPHONE ENCOUNTER
"Nurse Triage SBAR    Is this a 2nd Level Triage? YES, LICENSED PRACTITIONER REVIEW IS REQUIRED    Situation: Patient has been having stomach discomfort for the last 2-3 weeks    Background: states history of stomach ulcers after her knee replacement- states she has a hiatal hernia as well    Assessment: 2-3 weeks of stomach discomfort  Queasy feeling- feeling like she is hungry- not sharp   States certain foods are bothering her- pizza and states she has not been eating the way she normally does- has been eating more fried stuff    Last night she took a famotidine- had pain and felt \"I just needed to go something\" state was gurgling and had a lot of gas   Comes and goes   Mild to moderate pain Around her belly button- localized and doesn't radiate or travel elsewhere  Eating makes the pain better   having an empty belly makes the pain worse  Normal brown colored BMs   No vomiting  Denies additional symptoms         Protocol Recommended Disposition:   See in Office Today    Recommendation: Advised to be seen in the clinic today- reviewed additional care advice with patient and she verbalizes understanding. Patient would prefer to only see her PCP. No appointments available until June.     Patient would like a message sent to her PCP today- can she be worked into the schedule or advice over the phone?      Routed to provider    Does the patient meet one of the following criteria for ADS visit consideration? 16+ years old, with an [facility] PCP     TIP  Providers, please consider if this condition is appropriate for management at one of our Acute and Diagnostic Services sites.     If patient is a good candidate, please use dotphrase <dot>triageresponse and select Refer to ADS to document.    Reason for Disposition    MODERATE pain (e.g., interferes with normal activities that comes and goes (cramps) lasts > 24 hours  (Exception: Pain with Vomiting or Diarrhea - see that Protocol.)    Additional Information    Negative: " Passed out (i.e., fainted, collapsed and was not responding)    Negative: Shock suspected (e.g., cold/pale/clammy skin, too weak to stand, low BP, rapid pulse)    Negative: Sounds like a life-threatening emergency to the triager    Negative: Chest pain    Negative: Pain is mainly in upper abdomen (if needed ask: 'is it mainly above the belly button?')    Negative: Abdominal pain and pregnant < 20 weeks    Negative: Abdominal pain and pregnant 20 or more weeks    Negative: SEVERE abdominal pain (e.g., excruciating)    Negative: Vomiting red blood or black (coffee ground) material    Negative: Bloody, black, or tarry bowel movements  (Exception: Chronic-unchanged black-grey bowel movements and is taking iron pills or Pepto-Bismol.)    Negative: Constant abdominal pain lasting > 2 hours    Negative: Vomiting bile (green color)    Negative: Patient sounds very sick or weak to the triager    Negative: Vomiting and abdomen looks much more swollen than usual    Negative: White of the eyes have turned yellow (i.e., jaundice)    Negative: Blood in urine (red, pink, or tea-colored)    Negative: Fever > 103 F (39.4 C)    Negative: Fever > 101 F (38.3 C) and over 60 years of age    Negative: Fever > 100.0 F (37.8 C) and has diabetes mellitus or a weak immune system (e.g., HIV positive, cancer chemotherapy, organ transplant, splenectomy, chronic steroids)    Negative: Fever > 100.0 F (37.8 C) and bedridden (e.g., nursing home patient, stroke, chronic illness, recovering from surgery)    Negative: Pregnant or could be pregnant (i.e., missed last menstrual period)    Protocols used: ABDOMINAL PAIN - FEMALE-A-OH

## 2023-05-04 ENCOUNTER — VIRTUAL VISIT (OUTPATIENT)
Dept: INTERNAL MEDICINE | Facility: CLINIC | Age: 78
End: 2023-05-04
Payer: MEDICARE

## 2023-05-04 DIAGNOSIS — R10.13 DYSPEPSIA: Primary | ICD-10-CM

## 2023-05-04 PROCEDURE — 99441 PR PHYSICIAN TELEPHONE EVALUATION 5-10 MIN: CPT | Mod: 95 | Performed by: INTERNAL MEDICINE

## 2023-05-04 NOTE — PROGRESS NOTES
Oscar Zhoa is a 78 year oldwho is being evaluated via a billable telephone visit.       What phone number would you like to be contacted at? [phone]      Assessment & Plan  Dyspepsia with history of peptic ulcer disease.  Continue famotidine 20 mg twice daily with lunch and bedtime snack.  For 12 weeks at least then switch to maintenance therapy famotidine 20 mg at bedtime.  We had a good discussion.     11 minutes spent on the date of the encounter doing chart review, patient visit and documentation  I spoke with the patient on the phone directly 5 minutes.       Subjective   Symptoms have subsided with famotidine therapy feeling better.  Had dyspepsia with epigastric burning.  Prior history of peptic ulcer disease.  Healed over ulcer.     Review of Systems   No melena no blood in stool or urine med list reviewed reconciled.       Jak Jones MD

## 2023-06-04 ENCOUNTER — HEALTH MAINTENANCE LETTER (OUTPATIENT)
Age: 78
End: 2023-06-04

## 2023-06-20 ENCOUNTER — OFFICE VISIT (OUTPATIENT)
Dept: INTERNAL MEDICINE | Facility: CLINIC | Age: 78
End: 2023-06-20
Payer: MEDICARE

## 2023-06-20 VITALS
DIASTOLIC BLOOD PRESSURE: 78 MMHG | RESPIRATION RATE: 16 BRPM | WEIGHT: 137.4 LBS | OXYGEN SATURATION: 98 % | HEART RATE: 53 BPM | SYSTOLIC BLOOD PRESSURE: 118 MMHG | BODY MASS INDEX: 24.34 KG/M2 | HEIGHT: 63 IN

## 2023-06-20 DIAGNOSIS — Z00.00 ROUTINE GENERAL MEDICAL EXAMINATION AT A HEALTH CARE FACILITY: Primary | ICD-10-CM

## 2023-06-20 LAB
ALBUMIN SERPL BCG-MCNC: 4.4 G/DL (ref 3.5–5.2)
ALP SERPL-CCNC: 88 U/L (ref 35–104)
ALT SERPL W P-5'-P-CCNC: 18 U/L (ref 0–50)
ANION GAP SERPL CALCULATED.3IONS-SCNC: 10 MMOL/L (ref 7–15)
AST SERPL W P-5'-P-CCNC: 21 U/L (ref 0–45)
BILIRUB SERPL-MCNC: 0.8 MG/DL
BUN SERPL-MCNC: 13.1 MG/DL (ref 8–23)
CALCIUM SERPL-MCNC: 9.9 MG/DL (ref 8.8–10.2)
CHLORIDE SERPL-SCNC: 104 MMOL/L (ref 98–107)
CREAT SERPL-MCNC: 0.63 MG/DL (ref 0.51–0.95)
DEPRECATED HCO3 PLAS-SCNC: 24 MMOL/L (ref 22–29)
ERYTHROCYTE [DISTWIDTH] IN BLOOD BY AUTOMATED COUNT: 17.7 % (ref 10–15)
ERYTHROCYTE [SEDIMENTATION RATE] IN BLOOD BY WESTERGREN METHOD: 2 MM/HR (ref 0–30)
GFR SERPL CREATININE-BSD FRML MDRD: >90 ML/MIN/1.73M2
GLUCOSE SERPL-MCNC: 108 MG/DL (ref 70–99)
HCT VFR BLD AUTO: 43.3 % (ref 35–47)
HGB BLD-MCNC: 13.5 G/DL (ref 11.7–15.7)
MCH RBC QN AUTO: 26.2 PG (ref 26.5–33)
MCHC RBC AUTO-ENTMCNC: 31.2 G/DL (ref 31.5–36.5)
MCV RBC AUTO: 84 FL (ref 78–100)
PLATELET # BLD AUTO: 221 10E3/UL (ref 150–450)
POTASSIUM SERPL-SCNC: 4.5 MMOL/L (ref 3.4–5.3)
PROT SERPL-MCNC: 7.1 G/DL (ref 6.4–8.3)
RBC # BLD AUTO: 5.16 10E6/UL (ref 3.8–5.2)
SODIUM SERPL-SCNC: 138 MMOL/L (ref 136–145)
WBC # BLD AUTO: 4.7 10E3/UL (ref 4–11)

## 2023-06-20 PROCEDURE — 99214 OFFICE O/P EST MOD 30 MIN: CPT | Performed by: INTERNAL MEDICINE

## 2023-06-20 PROCEDURE — 85652 RBC SED RATE AUTOMATED: CPT | Performed by: INTERNAL MEDICINE

## 2023-06-20 PROCEDURE — 36415 COLL VENOUS BLD VENIPUNCTURE: CPT | Performed by: INTERNAL MEDICINE

## 2023-06-20 PROCEDURE — 80053 COMPREHEN METABOLIC PANEL: CPT | Performed by: INTERNAL MEDICINE

## 2023-06-20 PROCEDURE — 85027 COMPLETE CBC AUTOMATED: CPT | Performed by: INTERNAL MEDICINE

## 2023-06-20 ASSESSMENT — PAIN SCALES - GENERAL: PAINLEVEL: NO PAIN (0)

## 2023-06-20 NOTE — PROGRESS NOTES
"Assessment/Plan:    Annual medical examination check sedimentation rate hemogram comprehensive metabolic profile.    History of autoimmune disorder.  HLA-B27 linked.  Ankylosing spondylitis with history of iritis.  Currently quiescent.  Followed by rheumatology.    Dyspepsia better with famotidine and Gas-X rescue.    Irritable bowel syndrome with explosive diarrhea.  Prior history of rectal cancer squamous cell type treated with XRT chemotherapy.  Discussed reassured continue Metamucil for potential benefits.    Sensory neuropathy rule out carpal tunnel syndrome left upper extremity.  Suggest Dr. Alberto Hernandez [place] [facility].  May require carpal tunnel release and or steroid injection.  We had a good discussion.    25 minutes spent on the date of the encounter doing chart review, patient visit and documentation     Subjective:  Oscar Zhao is a 78 year old female presents for the following health issues history as above.  No sign of squamous cell cancer of the rectum recurrence.  Treated with chemotherapy XRT no surgery.  Followed by colorectal surgery group.    ROS:  No blood in stool or urine denies chest pain shortness of breath medication list reviewed reconciled.  Metamucil may be helping control episodes of explosive diarrhea.  Although previous XRT chemotherapy especially XRT for squamous cell carcinoma of the rectum may be something that forces her to have situations as described.    Objective:  /78 (BP Location: Right arm, Patient Position: Sitting, Cuff Size: Adult Regular)   Pulse 53   Resp 16   Ht 1.6 m (5' 3\")   Wt 62.3 kg (137 lb 6.4 oz)   LMP  (LMP Unknown)   SpO2 98%   Breastfeeding No   BMI 24.34 kg/m    Neck veins are nondistended there was no thyromegaly or thyroid nodules she appeared well neuromuscular tone is good healthy good coloring chest clear heart tones normal abdomen benign soft nontender mild centripetal obesity noted.  BMI only 24 and " neatly attired easily conversant good spirited intelligent.  Former dancer.    Jak Jones MD  Internal Medicine    Answers for HPI/ROS submitted by the patient on 6/20/2023  What is the reason for your visit today? : blood test  How many servings of fruits and vegetables do you eat daily?: 4 or more  On average, how many sweetened beverages do you drink each day (Examples: soda, juice, sweet tea, etc.  Do NOT count diet or artificially sweetened beverages)?: 0  How many minutes a day do you exercise enough to make your heart beat faster?: 20 to 29  How many days a week do you exercise enough to make your heart beat faster?: 6  How many days per week do you miss taking your medication?: 0

## 2023-08-22 DIAGNOSIS — I10 ESSENTIAL HYPERTENSION: ICD-10-CM

## 2023-08-22 NOTE — TELEPHONE ENCOUNTER
Received fax from pharmacy requesting refill for     lisinopril and amlodipine  Last refill: 11/17/2022    Metoprolol  Last refill: 08/15/2022    Patient last seen: 06/20/2023

## 2023-08-23 RX ORDER — LISINOPRIL 20 MG/1
20 TABLET ORAL DAILY
Qty: 90 TABLET | Refills: 3 | Status: SHIPPED | OUTPATIENT
Start: 2023-08-23 | End: 2023-11-04

## 2023-08-23 RX ORDER — METOPROLOL SUCCINATE 25 MG/1
50 TABLET, EXTENDED RELEASE ORAL DAILY
Qty: 180 TABLET | Refills: 3 | Status: SHIPPED | OUTPATIENT
Start: 2023-08-23 | End: 2023-11-04

## 2023-08-23 RX ORDER — AMLODIPINE BESYLATE 5 MG/1
10 TABLET ORAL DAILY
Qty: 180 TABLET | Refills: 3 | Status: SHIPPED | OUTPATIENT
Start: 2023-08-23 | End: 2023-11-04

## 2023-08-23 NOTE — TELEPHONE ENCOUNTER
"Last Written Prescription Date:  11/17/2022  Last Fill Quantity: 180,  # refills: 2   Last office visit provider:  6/20/2023    Last Written Prescription Date:  11/17/2022  Last Fill Quantity: 90,  # refills: 2   Last office visit: 6/20/2023     Last Written Prescription Date:  8/15/2022  Last Fill Quantity: 180,  # refills: 3   Last office visit: 6/20/2023            Requested Prescriptions   Pending Prescriptions Disp Refills    amLODIPine (NORVASC) 5 MG tablet 180 tablet 2     Sig: Take 2 tablets (10 mg) by mouth daily       Calcium Channel Blockers Protocol  Passed - 8/22/2023  4:56 PM        Passed - Blood pressure under 140/90 in past 12 months     BP Readings from Last 3 Encounters:   06/20/23 118/78   02/21/23 110/82   02/09/23 121/80                 Passed - Recent (12 mo) or future (30 days) visit within the authorizing provider's specialty     Patient has had an office visit with the authorizing provider or a provider within the authorizing providers department within the previous 12 mos or has a future within next 30 days. See \"Patient Info\" tab in inbasket, or \"Choose Columns\" in Meds & Orders section of the refill encounter.              Passed - Medication is active on med list        Passed - Patient is age 18 or older        Passed - No active pregnancy on record        Passed - Normal serum creatinine on file in past 12 months     Recent Labs   Lab Test 06/20/23  0913   CR 0.63       Ok to refill medication if creatinine is low          Passed - No positive pregnancy test in past 12 months          lisinopril (ZESTRIL) 20 MG tablet 90 tablet 2     Sig: Take 1 tablet (20 mg) by mouth daily       ACE Inhibitors (Including Combos) Protocol Passed - 8/22/2023  4:56 PM        Passed - Blood pressure under 140/90 in past 12 months     BP Readings from Last 3 Encounters:   06/20/23 118/78   02/21/23 110/82   02/09/23 121/80                 Passed - Recent (12 mo) or future (30 days) visit within the " "authorizing provider's specialty     Patient has had an office visit with the authorizing provider or a provider within the authorizing providers department within the previous 12 mos or has a future within next 30 days. See \"Patient Info\" tab in inbasket, or \"Choose Columns\" in Meds & Orders section of the refill encounter.              Passed - Medication is active on med list        Passed - Patient is age 18 or older        Passed - No active pregnancy on record        Passed - Normal serum creatinine on file in past 12 months     Recent Labs   Lab Test 06/20/23  0913   CR 0.63       Ok to refill medication if creatinine is low          Passed - Normal serum potassium on file in past 12 months     Recent Labs   Lab Test 06/20/23 0913   POTASSIUM 4.5             Passed - No positive pregnancy test within past 12 months          metoprolol succinate ER (TOPROL XL) 25 MG 24 hr tablet 180 tablet 3     Sig: Take 2 tablets (50 mg) by mouth daily       Beta-Blockers Protocol Passed - 8/22/2023  4:56 PM        Passed - Blood pressure under 140/90 in past 12 months     BP Readings from Last 3 Encounters:   06/20/23 118/78   02/21/23 110/82   02/09/23 121/80                 Passed - Patient is age 6 or older        Passed - Recent (12 mo) or future (30 days) visit within the authorizing provider's specialty     Patient has had an office visit with the authorizing provider or a provider within the authorizing providers department within the previous 12 mos or has a future within next 30 days. See \"Patient Info\" tab in inbasket, or \"Choose Columns\" in Meds & Orders section of the refill encounter.              Passed - Medication is active on med list             IGOR BELLA RN 08/23/23 9:41 AM  "

## 2023-08-30 ENCOUNTER — APPOINTMENT (OUTPATIENT)
Dept: RADIOLOGY | Facility: CLINIC | Age: 78
DRG: 561 | End: 2023-08-30
Attending: ORTHOPAEDIC SURGERY
Payer: MEDICARE

## 2023-08-30 ENCOUNTER — APPOINTMENT (OUTPATIENT)
Dept: RADIOLOGY | Facility: CLINIC | Age: 78
DRG: 561 | End: 2023-08-30
Attending: EMERGENCY MEDICINE
Payer: MEDICARE

## 2023-08-30 ENCOUNTER — ANESTHESIA EVENT (OUTPATIENT)
Dept: SURGERY | Facility: CLINIC | Age: 78
DRG: 561 | End: 2023-08-30
Payer: MEDICARE

## 2023-08-30 ENCOUNTER — HOSPITAL ENCOUNTER (INPATIENT)
Facility: CLINIC | Age: 78
LOS: 1 days | Discharge: HOME | DRG: 561 | End: 2023-08-30
Attending: EMERGENCY MEDICINE | Admitting: HOSPITALIST
Payer: MEDICARE

## 2023-08-30 ENCOUNTER — ANESTHESIA (OUTPATIENT)
Dept: SURGERY | Facility: CLINIC | Age: 78
DRG: 561 | End: 2023-08-30
Payer: MEDICARE

## 2023-08-30 VITALS
DIASTOLIC BLOOD PRESSURE: 65 MMHG | HEART RATE: 80 BPM | OXYGEN SATURATION: 96 % | SYSTOLIC BLOOD PRESSURE: 118 MMHG | TEMPERATURE: 98.3 F | WEIGHT: 138 LBS | RESPIRATION RATE: 16 BRPM | BODY MASS INDEX: 24.45 KG/M2

## 2023-08-30 DIAGNOSIS — T84.020A DISLOCATION OF INTERNAL RIGHT HIP PROSTHESIS, INITIAL ENCOUNTER (H): ICD-10-CM

## 2023-08-30 LAB
ANION GAP SERPL CALCULATED.3IONS-SCNC: 7 MMOL/L (ref 5–18)
BUN SERPL-MCNC: 11 MG/DL (ref 8–28)
CALCIUM SERPL-MCNC: 7 MG/DL (ref 8.5–10.5)
CHLORIDE BLD-SCNC: 112 MMOL/L (ref 98–107)
CO2 SERPL-SCNC: 20 MMOL/L (ref 22–31)
CREAT SERPL-MCNC: 0.5 MG/DL (ref 0.6–1.1)
ERYTHROCYTE [DISTWIDTH] IN BLOOD BY AUTOMATED COUNT: 17.3 % (ref 10–15)
GFR SERPL CREATININE-BSD FRML MDRD: >90 ML/MIN/1.73M2
GLUCOSE BLD-MCNC: 104 MG/DL (ref 70–125)
HCT VFR BLD AUTO: 37 % (ref 35–47)
HGB BLD-MCNC: 11.5 G/DL (ref 11.7–15.7)
MCH RBC QN AUTO: 27.4 PG (ref 26.5–33)
MCHC RBC AUTO-ENTMCNC: 31.1 G/DL (ref 31.5–36.5)
MCV RBC AUTO: 88 FL (ref 78–100)
PLATELET # BLD AUTO: 170 10E3/UL (ref 150–450)
POTASSIUM BLD-SCNC: 3.4 MMOL/L (ref 3.5–5)
RBC # BLD AUTO: 4.2 10E6/UL (ref 3.8–5.2)
SODIUM SERPL-SCNC: 139 MMOL/L (ref 136–145)
WBC # BLD AUTO: 5.3 10E3/UL (ref 4–11)

## 2023-08-30 PROCEDURE — 82310 ASSAY OF CALCIUM: CPT | Performed by: HOSPITALIST

## 2023-08-30 PROCEDURE — 360N000074 HC SURGERY LEVEL 1, PER MIN: Performed by: ORTHOPAEDIC SURGERY

## 2023-08-30 PROCEDURE — 999N000157 HC STATISTIC RCP TIME EA 10 MIN

## 2023-08-30 PROCEDURE — 99285 EMERGENCY DEPT VISIT HI MDM: CPT | Mod: 25

## 2023-08-30 PROCEDURE — 250N000013 HC RX MED GY IP 250 OP 250 PS 637: Performed by: HOSPITALIST

## 2023-08-30 PROCEDURE — 258N000003 HC RX IP 258 OP 636: Performed by: ANESTHESIOLOGY

## 2023-08-30 PROCEDURE — 36415 COLL VENOUS BLD VENIPUNCTURE: CPT | Performed by: HOSPITALIST

## 2023-08-30 PROCEDURE — 73502 X-RAY EXAM HIP UNI 2-3 VIEWS: CPT

## 2023-08-30 PROCEDURE — 272N000001 HC OR GENERAL SUPPLY STERILE: Performed by: ORTHOPAEDIC SURGERY

## 2023-08-30 PROCEDURE — 258N000003 HC RX IP 258 OP 636: Performed by: NURSE ANESTHETIST, CERTIFIED REGISTERED

## 2023-08-30 PROCEDURE — 0SW9XJZ REVISION OF SYNTHETIC SUBSTITUTE IN RIGHT HIP JOINT, EXTERNAL APPROACH: ICD-10-PCS | Performed by: ORTHOPAEDIC SURGERY

## 2023-08-30 PROCEDURE — 710N000010 HC RECOVERY PHASE 1, LEVEL 2, PER MIN: Performed by: ORTHOPAEDIC SURGERY

## 2023-08-30 PROCEDURE — 96375 TX/PRO/DX INJ NEW DRUG ADDON: CPT

## 2023-08-30 PROCEDURE — 250N000011 HC RX IP 250 OP 636

## 2023-08-30 PROCEDURE — 999N000141 HC STATISTIC PRE-PROCEDURE NURSING ASSESSMENT: Performed by: ORTHOPAEDIC SURGERY

## 2023-08-30 PROCEDURE — 120N000001 HC R&B MED SURG/OB

## 2023-08-30 PROCEDURE — 999N000182 XR SURGERY CARM FLUORO GREATER THAN 5 MIN: Mod: TC

## 2023-08-30 PROCEDURE — 250N000011 HC RX IP 250 OP 636: Mod: JZ | Performed by: HOSPITALIST

## 2023-08-30 PROCEDURE — 96374 THER/PROPH/DIAG INJ IV PUSH: CPT

## 2023-08-30 PROCEDURE — 99152 MOD SED SAME PHYS/QHP 5/>YRS: CPT

## 2023-08-30 PROCEDURE — 27265 TREAT HIP DISLOCATION: CPT | Mod: RT

## 2023-08-30 PROCEDURE — 99239 HOSP IP/OBS DSCHRG MGMT >30: CPT | Performed by: INTERNAL MEDICINE

## 2023-08-30 PROCEDURE — 370N000017 HC ANESTHESIA TECHNICAL FEE, PER MIN: Performed by: ORTHOPAEDIC SURGERY

## 2023-08-30 PROCEDURE — 250N000011 HC RX IP 250 OP 636: Performed by: EMERGENCY MEDICINE

## 2023-08-30 PROCEDURE — 0SW9XJZ REVISION OF SYNTHETIC SUBSTITUTE IN RIGHT HIP JOINT, EXTERNAL APPROACH: ICD-10-PCS | Performed by: EMERGENCY MEDICINE

## 2023-08-30 PROCEDURE — 99222 1ST HOSP IP/OBS MODERATE 55: CPT | Mod: AI | Performed by: HOSPITALIST

## 2023-08-30 PROCEDURE — 250N000025 HC SEVOFLURANE, PER MIN: Performed by: ORTHOPAEDIC SURGERY

## 2023-08-30 PROCEDURE — 258N000003 HC RX IP 258 OP 636: Performed by: HOSPITALIST

## 2023-08-30 PROCEDURE — 85027 COMPLETE CBC AUTOMATED: CPT | Performed by: HOSPITALIST

## 2023-08-30 PROCEDURE — 250N000011 HC RX IP 250 OP 636: Performed by: NURSE ANESTHETIST, CERTIFIED REGISTERED

## 2023-08-30 PROCEDURE — 250N000011 HC RX IP 250 OP 636: Mod: JZ | Performed by: NURSE ANESTHETIST, CERTIFIED REGISTERED

## 2023-08-30 PROCEDURE — 99153 MOD SED SAME PHYS/QHP EA: CPT

## 2023-08-30 PROCEDURE — 258N000003 HC RX IP 258 OP 636

## 2023-08-30 PROCEDURE — 250N000009 HC RX 250: Performed by: NURSE ANESTHETIST, CERTIFIED REGISTERED

## 2023-08-30 RX ORDER — PROCHLORPERAZINE MALEATE 5 MG
5 TABLET ORAL EVERY 6 HOURS PRN
Status: DISCONTINUED | OUTPATIENT
Start: 2023-08-30 | End: 2023-08-30 | Stop reason: HOSPADM

## 2023-08-30 RX ORDER — ONDANSETRON 2 MG/ML
4 INJECTION INTRAMUSCULAR; INTRAVENOUS EVERY 6 HOURS PRN
Status: DISCONTINUED | OUTPATIENT
Start: 2023-08-30 | End: 2023-08-30 | Stop reason: HOSPADM

## 2023-08-30 RX ORDER — HYDROMORPHONE HCL IN WATER/PF 6 MG/30 ML
0.4 PATIENT CONTROLLED ANALGESIA SYRINGE INTRAVENOUS EVERY 5 MIN PRN
Status: DISCONTINUED | OUTPATIENT
Start: 2023-08-30 | End: 2023-08-30 | Stop reason: HOSPADM

## 2023-08-30 RX ORDER — PROCHLORPERAZINE 25 MG
12.5 SUPPOSITORY, RECTAL RECTAL EVERY 12 HOURS PRN
Status: DISCONTINUED | OUTPATIENT
Start: 2023-08-30 | End: 2023-08-30 | Stop reason: HOSPADM

## 2023-08-30 RX ORDER — METOPROLOL SUCCINATE 50 MG/1
50 TABLET, EXTENDED RELEASE ORAL DAILY
Status: DISCONTINUED | OUTPATIENT
Start: 2023-08-30 | End: 2023-08-30 | Stop reason: HOSPADM

## 2023-08-30 RX ORDER — FENTANYL CITRATE 50 UG/ML
50 INJECTION, SOLUTION INTRAMUSCULAR; INTRAVENOUS EVERY 5 MIN PRN
Status: DISCONTINUED | OUTPATIENT
Start: 2023-08-30 | End: 2023-08-30 | Stop reason: HOSPADM

## 2023-08-30 RX ORDER — PROPOFOL 10 MG/ML
INJECTION, EMULSION INTRAVENOUS DAILY PRN
Status: COMPLETED | OUTPATIENT
Start: 2023-08-30 | End: 2023-08-30

## 2023-08-30 RX ORDER — NALOXONE HYDROCHLORIDE 0.4 MG/ML
0.4 INJECTION, SOLUTION INTRAMUSCULAR; INTRAVENOUS; SUBCUTANEOUS
Status: DISCONTINUED | OUTPATIENT
Start: 2023-08-30 | End: 2023-08-30 | Stop reason: HOSPADM

## 2023-08-30 RX ORDER — LIDOCAINE 40 MG/G
CREAM TOPICAL
Status: DISCONTINUED | OUTPATIENT
Start: 2023-08-30 | End: 2023-08-30 | Stop reason: HOSPADM

## 2023-08-30 RX ORDER — MEPERIDINE HYDROCHLORIDE 25 MG/ML
12.5 INJECTION INTRAMUSCULAR; INTRAVENOUS; SUBCUTANEOUS EVERY 5 MIN PRN
Status: DISCONTINUED | OUTPATIENT
Start: 2023-08-30 | End: 2023-08-30 | Stop reason: HOSPADM

## 2023-08-30 RX ORDER — HYDROMORPHONE HCL IN WATER/PF 6 MG/30 ML
0.4 PATIENT CONTROLLED ANALGESIA SYRINGE INTRAVENOUS
Status: DISCONTINUED | OUTPATIENT
Start: 2023-08-30 | End: 2023-08-30 | Stop reason: HOSPADM

## 2023-08-30 RX ORDER — AMOXICILLIN 250 MG
1 CAPSULE ORAL 2 TIMES DAILY
Status: DISCONTINUED | OUTPATIENT
Start: 2023-08-30 | End: 2023-08-30 | Stop reason: HOSPADM

## 2023-08-30 RX ORDER — ONDANSETRON 4 MG/1
4 TABLET, ORALLY DISINTEGRATING ORAL EVERY 6 HOURS PRN
Status: DISCONTINUED | OUTPATIENT
Start: 2023-08-30 | End: 2023-08-30 | Stop reason: HOSPADM

## 2023-08-30 RX ORDER — METHOCARBAMOL 500 MG/1
500 TABLET, FILM COATED ORAL EVERY 6 HOURS PRN
Status: DISCONTINUED | OUTPATIENT
Start: 2023-08-30 | End: 2023-08-30 | Stop reason: HOSPADM

## 2023-08-30 RX ORDER — MORPHINE SULFATE 4 MG/ML
4 INJECTION, SOLUTION INTRAMUSCULAR; INTRAVENOUS ONCE
Status: COMPLETED | OUTPATIENT
Start: 2023-08-30 | End: 2023-08-30

## 2023-08-30 RX ORDER — FENTANYL CITRATE 50 UG/ML
25 INJECTION, SOLUTION INTRAMUSCULAR; INTRAVENOUS EVERY 5 MIN PRN
Status: DISCONTINUED | OUTPATIENT
Start: 2023-08-30 | End: 2023-08-30 | Stop reason: HOSPADM

## 2023-08-30 RX ORDER — AMLODIPINE BESYLATE 10 MG/1
10 TABLET ORAL DAILY
Status: DISCONTINUED | OUTPATIENT
Start: 2023-08-30 | End: 2023-08-30 | Stop reason: HOSPADM

## 2023-08-30 RX ORDER — ONDANSETRON 2 MG/ML
4 INJECTION INTRAMUSCULAR; INTRAVENOUS EVERY 30 MIN PRN
Status: DISCONTINUED | OUTPATIENT
Start: 2023-08-30 | End: 2023-08-30 | Stop reason: HOSPADM

## 2023-08-30 RX ORDER — FENTANYL CITRATE 50 UG/ML
INJECTION, SOLUTION INTRAMUSCULAR; INTRAVENOUS PRN
Status: DISCONTINUED | OUTPATIENT
Start: 2023-08-30 | End: 2023-08-30

## 2023-08-30 RX ORDER — PROPOFOL 10 MG/ML
1 INJECTION, EMULSION INTRAVENOUS ONCE
Status: DISCONTINUED | OUTPATIENT
Start: 2023-08-30 | End: 2023-08-30 | Stop reason: HOSPADM

## 2023-08-30 RX ORDER — LISINOPRIL 20 MG/1
20 TABLET ORAL DAILY
Status: DISCONTINUED | OUTPATIENT
Start: 2023-08-30 | End: 2023-08-30 | Stop reason: HOSPADM

## 2023-08-30 RX ORDER — ONDANSETRON 4 MG/1
4 TABLET, ORALLY DISINTEGRATING ORAL EVERY 30 MIN PRN
Status: DISCONTINUED | OUTPATIENT
Start: 2023-08-30 | End: 2023-08-30 | Stop reason: HOSPADM

## 2023-08-30 RX ORDER — TIMOLOL MALEATE 5 MG/ML
1 SOLUTION/ DROPS OPHTHALMIC DAILY
Status: DISCONTINUED | OUTPATIENT
Start: 2023-08-30 | End: 2023-08-30 | Stop reason: HOSPADM

## 2023-08-30 RX ORDER — BISACODYL 10 MG
10 SUPPOSITORY, RECTAL RECTAL DAILY PRN
Status: DISCONTINUED | OUTPATIENT
Start: 2023-08-30 | End: 2023-08-30 | Stop reason: HOSPADM

## 2023-08-30 RX ORDER — SODIUM CHLORIDE, SODIUM LACTATE, POTASSIUM CHLORIDE, CALCIUM CHLORIDE 600; 310; 30; 20 MG/100ML; MG/100ML; MG/100ML; MG/100ML
INJECTION, SOLUTION INTRAVENOUS CONTINUOUS
Status: DISCONTINUED | OUTPATIENT
Start: 2023-08-30 | End: 2023-08-30 | Stop reason: HOSPADM

## 2023-08-30 RX ORDER — ACETAMINOPHEN 325 MG/1
650 TABLET ORAL EVERY 4 HOURS PRN
Status: DISCONTINUED | OUTPATIENT
Start: 2023-09-02 | End: 2023-08-30 | Stop reason: HOSPADM

## 2023-08-30 RX ORDER — PROCHLORPERAZINE MALEATE 5 MG
5 TABLET ORAL EVERY 6 HOURS PRN
Status: DISCONTINUED | OUTPATIENT
Start: 2023-08-30 | End: 2023-08-30

## 2023-08-30 RX ORDER — HYDROMORPHONE HYDROCHLORIDE 1 MG/ML
0.5 INJECTION, SOLUTION INTRAMUSCULAR; INTRAVENOUS; SUBCUTANEOUS ONCE
Status: COMPLETED | OUTPATIENT
Start: 2023-08-30 | End: 2023-08-30

## 2023-08-30 RX ORDER — HYDROMORPHONE HCL IN WATER/PF 6 MG/30 ML
0.2 PATIENT CONTROLLED ANALGESIA SYRINGE INTRAVENOUS EVERY 5 MIN PRN
Status: DISCONTINUED | OUTPATIENT
Start: 2023-08-30 | End: 2023-08-30 | Stop reason: HOSPADM

## 2023-08-30 RX ORDER — ACETAMINOPHEN 325 MG/1
650 TABLET ORAL EVERY 4 HOURS PRN
COMMUNITY
Start: 2023-09-02 | End: 2023-09-12

## 2023-08-30 RX ORDER — NALOXONE HYDROCHLORIDE 0.4 MG/ML
0.2 INJECTION, SOLUTION INTRAMUSCULAR; INTRAVENOUS; SUBCUTANEOUS
Status: DISCONTINUED | OUTPATIENT
Start: 2023-08-30 | End: 2023-08-30 | Stop reason: HOSPADM

## 2023-08-30 RX ORDER — HYDROMORPHONE HCL IN WATER/PF 6 MG/30 ML
0.2 PATIENT CONTROLLED ANALGESIA SYRINGE INTRAVENOUS
Status: DISCONTINUED | OUTPATIENT
Start: 2023-08-30 | End: 2023-08-30 | Stop reason: HOSPADM

## 2023-08-30 RX ORDER — DOCUSATE SODIUM 100 MG/1
100 CAPSULE, LIQUID FILLED ORAL 2 TIMES DAILY PRN
Status: DISCONTINUED | OUTPATIENT
Start: 2023-08-30 | End: 2023-08-30 | Stop reason: HOSPADM

## 2023-08-30 RX ORDER — POLYETHYLENE GLYCOL 3350 17 G/17G
17 POWDER, FOR SOLUTION ORAL DAILY
Status: DISCONTINUED | OUTPATIENT
Start: 2023-08-31 | End: 2023-08-30 | Stop reason: HOSPADM

## 2023-08-30 RX ORDER — ACETAMINOPHEN 325 MG/1
975 TABLET ORAL EVERY 8 HOURS
Status: DISCONTINUED | OUTPATIENT
Start: 2023-08-30 | End: 2023-08-30

## 2023-08-30 RX ORDER — HYDROMORPHONE HYDROCHLORIDE 2 MG/1
2 TABLET ORAL EVERY 4 HOURS PRN
Status: DISCONTINUED | OUTPATIENT
Start: 2023-08-30 | End: 2023-08-30 | Stop reason: HOSPADM

## 2023-08-30 RX ORDER — ASPIRIN 81 MG/1
81 TABLET ORAL 2 TIMES DAILY
Status: DISCONTINUED | OUTPATIENT
Start: 2023-08-30 | End: 2023-08-30 | Stop reason: HOSPADM

## 2023-08-30 RX ORDER — ACETAMINOPHEN 325 MG/1
975 TABLET ORAL ONCE
Status: COMPLETED | OUTPATIENT
Start: 2023-08-30 | End: 2023-08-30

## 2023-08-30 RX ORDER — HALOPERIDOL 5 MG/ML
1 INJECTION INTRAMUSCULAR
Status: DISCONTINUED | OUTPATIENT
Start: 2023-08-30 | End: 2023-08-30 | Stop reason: HOSPADM

## 2023-08-30 RX ORDER — SODIUM CHLORIDE 9 MG/ML
INJECTION, SOLUTION INTRAVENOUS CONTINUOUS
Status: DISCONTINUED | OUTPATIENT
Start: 2023-08-30 | End: 2023-08-30 | Stop reason: HOSPADM

## 2023-08-30 RX ORDER — ACETAMINOPHEN 325 MG/1
975 TABLET ORAL EVERY 8 HOURS
Status: DISCONTINUED | OUTPATIENT
Start: 2023-08-30 | End: 2023-08-30 | Stop reason: HOSPADM

## 2023-08-30 RX ORDER — CEFAZOLIN SODIUM 1 G/3ML
1 INJECTION, POWDER, FOR SOLUTION INTRAMUSCULAR; INTRAVENOUS EVERY 8 HOURS
Status: DISCONTINUED | OUTPATIENT
Start: 2023-08-30 | End: 2023-08-30 | Stop reason: HOSPADM

## 2023-08-30 RX ORDER — LOTEPREDNOL ETABONATE 5 MG/ML
1 SUSPENSION/ DROPS OPHTHALMIC
Status: DISCONTINUED | OUTPATIENT
Start: 2023-08-31 | End: 2023-08-30 | Stop reason: HOSPADM

## 2023-08-30 RX ORDER — OXYCODONE HYDROCHLORIDE 5 MG/1
10 TABLET ORAL EVERY 4 HOURS PRN
Status: DISCONTINUED | OUTPATIENT
Start: 2023-08-30 | End: 2023-08-30 | Stop reason: HOSPADM

## 2023-08-30 RX ORDER — OXYCODONE HYDROCHLORIDE 5 MG/1
5 TABLET ORAL EVERY 4 HOURS PRN
Status: DISCONTINUED | OUTPATIENT
Start: 2023-08-30 | End: 2023-08-30 | Stop reason: HOSPADM

## 2023-08-30 RX ADMIN — PROPOFOL 40 MG: 10 INJECTION, EMULSION INTRAVENOUS at 02:54

## 2023-08-30 RX ADMIN — PHENYLEPHRINE HYDROCHLORIDE 100 MCG: 10 INJECTION INTRAVENOUS at 10:18

## 2023-08-30 RX ADMIN — PROPOFOL 20 MG: 10 INJECTION, EMULSION INTRAVENOUS at 03:06

## 2023-08-30 RX ADMIN — ONDANSETRON 4 MG: 2 INJECTION INTRAMUSCULAR; INTRAVENOUS at 10:33

## 2023-08-30 RX ADMIN — SODIUM CHLORIDE, POTASSIUM CHLORIDE, SODIUM LACTATE AND CALCIUM CHLORIDE: 600; 310; 30; 20 INJECTION, SOLUTION INTRAVENOUS at 08:19

## 2023-08-30 RX ADMIN — MORPHINE SULFATE 4 MG: 4 INJECTION, SOLUTION INTRAMUSCULAR; INTRAVENOUS at 00:55

## 2023-08-30 RX ADMIN — SUGAMMADEX 100 MG: 100 INJECTION, SOLUTION INTRAVENOUS at 10:31

## 2023-08-30 RX ADMIN — SODIUM CHLORIDE: 9 INJECTION, SOLUTION INTRAVENOUS at 07:30

## 2023-08-30 RX ADMIN — SODIUM CHLORIDE, POTASSIUM CHLORIDE, SODIUM LACTATE AND CALCIUM CHLORIDE: 600; 310; 30; 20 INJECTION, SOLUTION INTRAVENOUS at 09:30

## 2023-08-30 RX ADMIN — PROPOFOL 20 MG: 10 INJECTION, EMULSION INTRAVENOUS at 03:10

## 2023-08-30 RX ADMIN — PHENYLEPHRINE HYDROCHLORIDE 100 MCG: 10 INJECTION INTRAVENOUS at 10:07

## 2023-08-30 RX ADMIN — PHENYLEPHRINE HYDROCHLORIDE 100 MCG: 10 INJECTION INTRAVENOUS at 10:13

## 2023-08-30 RX ADMIN — FENTANYL CITRATE 50 MCG: 50 INJECTION, SOLUTION INTRAMUSCULAR; INTRAVENOUS at 09:50

## 2023-08-30 RX ADMIN — HYDROMORPHONE HYDROCHLORIDE 1 MG: 2 TABLET ORAL at 06:00

## 2023-08-30 RX ADMIN — HYDROMORPHONE HYDROCHLORIDE 0.5 MG: 1 INJECTION, SOLUTION INTRAMUSCULAR; INTRAVENOUS; SUBCUTANEOUS at 04:56

## 2023-08-30 RX ADMIN — PROPOFOL 20 MG: 10 INJECTION, EMULSION INTRAVENOUS at 03:02

## 2023-08-30 RX ADMIN — ROCURONIUM BROMIDE 10 MG: 10 INJECTION, SOLUTION INTRAVENOUS at 10:20

## 2023-08-30 RX ADMIN — ACETAMINOPHEN 975 MG: 325 TABLET ORAL at 06:00

## 2023-08-30 RX ADMIN — PROPOFOL 20 MG: 10 INJECTION, EMULSION INTRAVENOUS at 02:57

## 2023-08-30 RX ADMIN — PROPOFOL 20 MG: 10 INJECTION, EMULSION INTRAVENOUS at 02:59

## 2023-08-30 RX ADMIN — PROPOFOL 20 MG: 10 INJECTION, EMULSION INTRAVENOUS at 02:55

## 2023-08-30 RX ADMIN — SODIUM CHLORIDE, POTASSIUM CHLORIDE, SODIUM LACTATE AND CALCIUM CHLORIDE: 600; 310; 30; 20 INJECTION, SOLUTION INTRAVENOUS at 14:30

## 2023-08-30 RX ADMIN — CEFAZOLIN 1 G: 1 INJECTION, POWDER, FOR SOLUTION INTRAMUSCULAR; INTRAVENOUS at 15:47

## 2023-08-30 RX ADMIN — FENTANYL CITRATE 50 MCG: 50 INJECTION, SOLUTION INTRAMUSCULAR; INTRAVENOUS at 10:05

## 2023-08-30 ASSESSMENT — ACTIVITIES OF DAILY LIVING (ADL)
ADLS_ACUITY_SCORE: 35

## 2023-08-30 NOTE — PROVIDER NOTIFICATION
"Patient arrived to PACU at 1042. This writer called Dr. Pedroza at 1105. No orders from surgeon at this time. Informed Dr. Pedroza that patient has questions on how procedure went and wanting to speak with Dr. Pedroza. Dr. Pedroza did not speak with family member post-procedure as well. This writer asked Dr. Pedroza to speak with patient and he stated \"I already talked to her before the procedure\" and told this writer to tell the patient \"her hip was reduced and the procedure went well.\"  There are orders both for inpatient and a single order that states to discharge patient  (no order set or specific instructions). This writer asked Dr. Pedroza if the patient is suppose to discharge home or be admitted. Dr. Pedroza stated \"The patient can go home if she can get up and move.\" Dr. Pedroza stated he would put in orders. There is still no orders at this time (2071).     "

## 2023-08-30 NOTE — ED NOTES
Bed: [facility]  Expected date: 8/30/23  Expected time: 12:33 AM  Means of arrival: Ambulance  Comments:

## 2023-08-30 NOTE — PHARMACY-ADMISSION MEDICATION HISTORY
Pharmacist Admission Medication History    Admission medication history is complete. The information provided in this note is only as accurate as the sources available at the time of the update.    Medication reconciliation/reorder completed by provider prior to medication history? No    Information Source(s): Patient and CareEverywhere/SureScripts via in-person    Pertinent Information: patient mentioned daughter will bring in eye drops.    Changes made to PTA medication list:  Added: None  Deleted: None  Changed: None    Medication Affordability:       Allergies reviewed with patient and updates made in EHR: yes    Medications available for use during hospital stay: NONE.     Medication History Completed By: Bibiana Beavers RPH 8/30/2023 8:10 AM    PTA Med List   Medication Sig Last Dose    amLODIPine (NORVASC) 5 MG tablet Take 2 tablets (10 mg) by mouth daily 8/29/2023 at am    lisinopril (ZESTRIL) 20 MG tablet Take 1 tablet (20 mg) by mouth daily 8/29/2023 at am    LOTEMAX 0.5 % ophthalmic suspension Place 1 drop into both eyes every 48 hours 8/29/2023 at am - daughter will bring in    metoprolol succinate ER (TOPROL XL) 25 MG 24 hr tablet Take 2 tablets (50 mg) by mouth daily 8/29/2023 at am    timolol maleate (TIMOPTIC) 0.5 % ophthalmic solution Place 1 drop into both eyes daily 8/29/2023 at am - daughter will bring in

## 2023-08-30 NOTE — ED TRIAGE NOTES
Presents via ems with c/o right hip pain. Bent over in bed and felt it pop out. Reports having dislocated this hip twice previously. Hx hip replacement. No meds PTA. +CMS

## 2023-08-30 NOTE — ANESTHESIA PREPROCEDURE EVALUATION
Anesthesia Pre-Procedure Evaluation    Patient: Oscar Zhao   MRN: [id number] : 1945        Procedure : Procedure(s):  CLOSED REDUCTION, HIP          Past Medical History:   Diagnosis Date    Basal cell carcinoma of anterior chest     Breast cyst     History of anesthesia complications     HLA B27 (HLA B27 positive)     Hypertension     Osteoporosis 2011    Peripheral neuropathy 2018    PONV (postoperative nausea and vomiting)     Scleritis, bilateral     Left .  Treated with corticosteroids,, methotrexate and Humira.  .  Rituximab.    Squamous cell carcinoma of skin of chest     Steroid induced glaucoma, both eyes       Past Surgical History:   Procedure Laterality Date    BREAST CYST EXCISION Right     benign     SECTION      CLOSED REDUCTION HIP Right 2019    Procedure: CLOSED REDUCTION, HIP;  Surgeon: Jak Ruiz DO;  Location: [facility];  Service: Orthopedics    COLONOSCOPY  2011    COLONOSCOPY  2005    COLONOSCOPY W/ BIOPSIES AND POLYPECTOMY  2021    16 to 20 mm polyp:tubular adenoma in the ascending colon.  Ulcerated mass in the anal canal: Moderately differentiated squamous cell cancer.    ESOPHAGOSCOPY, GASTROSCOPY, DUODENOSCOPY (EGD), COMBINED  2017    Large hiatal hernia.  Reactive gastropathy with mucosal erosion.  H. pylori negative.    HERNIORRHAPHY FEMORAL Left 2021    Procedure: LEFT FEMORAL HERNIA REPAIR;  Surgeon: Sidney Murray MD;  Location: [facility] [place]    HYSTERECTOMY TOTAL ABDOMINAL, BILATERAL SALPINGO-OOPHORECTOMY, COMBINED      IR CHEST PORT PLACEMENT > 5 YRS OF AGE  3/15/2021    IR PORT PLACEMENT >5 YEARS  03/15/2021    Right IJ port-a-cath.    IR PORT REMOVAL RIGHT  2021    LAPAROSCOPIC APPENDECTOMY  2011    PHACOEMULSIFICATION CLEAR CORNEA WITH STANDARD INTRAOCULAR LENS IMPLANT Right 2017    PHACOEMULSIFICATION CLEAR CORNEA WITH STANDARD INTRAOCULAR LENS IMPLANT Left  09/13/2017    TOTAL HIP ARTHROPLASTY Right 08/18/2015    Procedure: HIP TOTAL ARTHROPLASTY, RIGHT;  Surgeon: Star Du MD;  Location: [facility] Main OR;  Service:     [facility] TOTAL KNEE ARTHROPLASTY Left 03/02/2017    Procedure: LEFT TOTAL KNEE ARTHROPLASTY;  Surgeon: Star Du MD;  Location: [facility] Main OR;  Service: Orthopedics      Allergies   Allergen Reactions    Adalimumab Muscle Pain (Myalgia)    Brimonidine-Timolol [Brimonidine Tartrate-Timolol] Unknown     Redness itching in eyes    Levaquin [Levofloxacin] Unknown     Skin burn and couldn't get out bed for 5 days    Tetracyclines & Related Hives      Social History     Tobacco Use    Smoking status: Never    Smokeless tobacco: Never   Substance Use Topics    Alcohol use: Yes     Alcohol/week: 4.0 standard drinks of alcohol     Comment: wine      Wt Readings from Last 1 Encounters:   08/30/23 62.6 kg (138 lb)        Anesthesia Evaluation   Pt has had prior anesthetic.     History of anesthetic complications  - PONV.      ROS/MED HX  ENT/Pulmonary:  - neg pulmonary ROS     Neurologic:  - neg neurologic ROS     Cardiovascular:     (+)  hypertension- -   -  - -                                      METS/Exercise Tolerance:     Hematologic:  - neg hematologic  ROS     Musculoskeletal:   (+)  arthritis,             GI/Hepatic:     (+) GERD,                   Renal/Genitourinary:  - neg Renal ROS     Endo: Comment: Iritis  Glaucoma       Psychiatric/Substance Use:  - neg psychiatric ROS     Infectious Disease:  - neg infectious disease ROS     Malignancy:   (+) Malignancy, History of GI.GI CA  Remission status post Chemo.      Other:  - neg other ROS          Physical Exam    Airway  airway exam normal      Mallampati: I   TM distance: > 3 FB   Neck ROM: full   Mouth opening: > 3 cm    Respiratory Devices and Support         Dental  no notable dental history     (+) Minor Abnormalities - some fillings, tiny chips      Cardiovascular    cardiovascular exam normal       Rhythm and rate: regular and normal     Pulmonary   pulmonary exam normal        breath sounds clear to auscultation           OUTSIDE LABS:  CBC:   Lab Results   Component Value Date    WBC 5.3 08/30/2023    WBC 4.7 06/20/2023    HGB 11.5 (L) 08/30/2023    HGB 13.5 06/20/2023    HCT 37.0 08/30/2023    HCT 43.3 06/20/2023     08/30/2023     06/20/2023     BMP:   Lab Results   Component Value Date     08/30/2023     06/20/2023    POTASSIUM 3.4 (L) 08/30/2023    POTASSIUM 4.5 06/20/2023    CHLORIDE 112 (H) 08/30/2023    CHLORIDE 104 06/20/2023    CO2 20 (L) 08/30/2023    CO2 24 06/20/2023    BUN 11 08/30/2023    BUN 13.1 06/20/2023    CR 0.50 (L) 08/30/2023    CR 0.63 06/20/2023     08/30/2023     (H) 06/20/2023     COAGS: No results found for: PTT, INR, FIBR  POC: No results found for: BGM, HCG, HCGS  HEPATIC:   Lab Results   Component Value Date    ALBUMIN 4.4 06/20/2023    PROTTOTAL 7.1 06/20/2023    ALT 18 06/20/2023    AST 21 06/20/2023    ALKPHOS 88 06/20/2023    BILITOTAL 0.8 06/20/2023     OTHER:   Lab Results   Component Value Date    LACT 0.7 04/05/2021    A1C 5.3 10/02/2014    MICHEL 7.0 (L) 08/30/2023    PHOS 3.1 04/08/2021    MAG 1.7 (L) 04/07/2021    TSH 1.21 05/24/2022    CRP 4.0 (H) 04/07/2021    SED 2 06/20/2023       Anesthesia Plan    ASA Status:  2    NPO Status:  NPO Appropriate    Anesthesia Type: General.     - Airway: LMA   Induction: Intravenous, Propofol.   Maintenance: TIVA.        Consents    Anesthesia Plan(s) and associated risks, benefits, and realistic alternatives discussed. Questions answered and patient/representative(s) expressed understanding.     - Discussed:     - Discussed with:  Patient            Postoperative Care    Pain management: IV analgesics, Oral pain medications, Multi-modal analgesia.   PONV prophylaxis: Ondansetron (or other 5HT-3), Dexamethasone or Solumedrol, Droperidol or Haldol      Comments:                KIERSTEN MONTOYA MD

## 2023-08-30 NOTE — OP NOTE
Operative Note    Name:  Oscar Zhao  PCP:  Jak Jones  Procedure Date:  8/30/2023      Pre-Procedure Diagnosis:  Dislocation of internal right hip prosthesis, initial encounter (H) [T84.020A]     Post-Procedure Diagnosis:    Same    Procedure: Closed reduction right hip    Surgeon(s):  Aditya Pedroza MD Wickum, Daren J, MD PA-C assist: Minh Wetzel PA-C  A PAC was necessary to ensure safety of this patient and adequate progression of the procedure.    Anesthesia Type:  General    Estimated Blood Loss:   None    Complications:    None    Indications for procedure: Maritza is a 78-year-old female who dislocated her right hip last night while reaching down to rub her ankle.  Attempted reduction in the emergency department was unsuccessful. The patient underwent medical optimization and was cleared for surgery by the hospitalist. I met with the patient today at the bedside and discussed the risks and benefits of moving forward, including but not limited to subsequent dislocations, need for revision surgery, and fracture.    Procedure: The patient was identified in the preoperative holding area. The correct operative site was marked with indelible ink.After being informed of the risks, benefits, and alternatives of the procedure, the patient desired to proceed and was brought to the operating suite, where they were placed under general anesthesia. The patient was positioned supine. Timeout verification step was completed.    A reduction maneuver with flexion, internal rotation, and traction was performed.  Fluoroscopic imaging was also utilized due to a difficult reduction.  The hip was then brought down into extension, external rotation and with traction and subsequently the hip was concentrically reduced and noted to be stable.  Final fluoroscopic image was taken confirming reduction and no noted complications.  Patient was placed in a knee immobilizer and then the patient was then taken from the  operating room to the PACU in stable condition. There were no apparent complications.   POSTOPERATIVE PLAN  Knee immobilizer  Follow-up with Dr. Gabe Pedroza MD    Date: 8/30/2023  Time: 10:33 AM      Implant Name Type Inv. Item Serial No.  Lot No. LRB No. Used Action   ELIMINATOR HOLE [employer] - 1246- Total Joint Component/Insert   Depuy [id number] Right 1 Explanted   LINER ACETABULAR ALTRX +4 10DEG [id number] Total Joint Component/Insert   Depuy [id number] Right 1 Explanted

## 2023-08-30 NOTE — CONSULTS
ORTHOPEDIC CONSULTATION    Consultation  Oscar Zhao,  1945, MRN [id number]    [unfilled]  Dislocation of internal right hip prosthesis, initial encounter (H) [T84.020A]    PCP: Jak Jones, [phone]   Code status:  Full Code       Extended Emergency Contact Information  Primary Emergency Contact: Kaylie Zhao   [facility]  Home Phone: [phone]  Relation: Other         CHIEF COMPLAINT:  Right hip dislocation       HISTORY OF PRESENT ILLNESS:  The patient is seen in orthopedic consultation at the request of [facility] ER.  Patient was brought to [facility] after having right hip dislocated.  Patient states she bent over and felt her right hip pop out.  Patient had her right hip replaced by Dr Du several years ago.  Patient has had 2 previous dislocation events.  Patient states she has seen Dr. Bernal in the past as well.  ED attempted to reduce right hip 2 times and was unsuccessful.  Patient is currently relaxing comfortably in ED with pain well controlled and no distress.      PAST MEDICAL HISTORY:        ALLERGIES:   Review of patient's allergies indicates   Allergies   Allergen Reactions    Adalimumab Muscle Pain (Myalgia)    Brimonidine-Timolol [Brimonidine Tartrate-Timolol] Unknown     Redness itching in eyes    Levaquin [Levofloxacin] Unknown     Skin burn and couldn't get out bed for 5 days    Tetracyclines & Related Hives         MEDICATIONS UPON ADMISSION:  Medications were reviewed.  They include:   (Not in a hospital admission)        SOCIAL HISTORY:   she  reports that she has never smoked. She has never used smokeless tobacco. She reports current alcohol use of about 4.0 standard drinks of alcohol per week. She reports that she does not use drugs.      FAMILY HISTORY:  family history includes Alzheimer Disease in her mother; Heart Disease in her father; No Known Problems in her daughter; Pancreatic Cancer (age of onset: 72.00) in her brother.      REVIEW OF SYSTEMS:    See HPI, otherwise negative       PHYSICAL EXAMINATION:  Vitals: Temp:  [98.1  F (36.7  C)] 98.1  F (36.7  C)  Pulse:  [63-93] 71  Resp:  [15-32] 15  BP: (101-143)/(68-92) 114/68  SpO2:  [90 %-100 %] 97 %  General: On examination, the patient is alert and wake and oriented.  SKIN: There is no evidence of skin issues.    Pulses:  present   Sensation: intact RLE.    Tenderness:   ROM: Painful right hip ROM.  Good ankle ROM.  No foot drop.    Motor: Intact   Contralateral side= Full range of motion, Negative joint instability findings, 5/5 motor groups about the joint, Non-tender.       RADIOGRAPHIC EVALUATION:  X-rays show superior dislocation of right hip.  No evidence of fracture.      Pertinent Labs  Lab Results:       IMPRESSION:  78 year-old female with right hip YAMILET dislocation.       PLAN:  This patient was discussed with Dr. Pedroza, on-call surgeon for [facility] and they are in agreement with the following plan.  ED attempted right hip reduction 2 times and hip would not stay in.  We will plan to attempt reduction in OR this morning.  We have this scheduled for 9:30 am.  This is now patients 3rd dislocation event over the years.  Dr Du did initial surgery and patient has seen Dr. Bernal also in the past.  Plan to reduce hip and have patient follow up with Dr. Bernal for further discussions of YAMILET revision.      Thank you for including [facility] in the care of Oscar Zhao. It has been a pleasure participating in their care.    RIGOBERTO DOOLEY PA-C  [facility]      CC1:   Ezio Hyman DO    CC2:   Jak Jones

## 2023-08-30 NOTE — PLAN OF CARE
"  Problem: Plan of Care - These are the overarching goals to be used throughout the patient stay.    Goal: Plan of Care Review  Description: The Plan of Care Review/Shift note should be completed every shift.  The Outcome Evaluation is a brief statement about your assessment that the patient is improving, declining, or no change.  This information will be displayed automatically on your shift note.  Outcome: Adequate for Care Transition  Goal: Patient-Specific Goal (Individualized)  Description: You can add care plan individualizations to a care plan. Examples of Individualization might be:  \"Parent requests to be called daily at 9am for status\", \"I have a hard time hearing out of my right ear\", or \"Do not touch me to wake me up as it startles me\".  Outcome: Adequate for Care Transition  Goal: Absence of Hospital-Acquired Illness or Injury  Outcome: Adequate for Care Transition  Intervention: Identify and Manage Fall Risk  Recent Flowsheet Documentation  Taken 8/30/2023 1500 by Radha Zamora RN  Safety Promotion/Fall Prevention: activity supervised  Taken 8/30/2023 1420 by Radha Zamora RN  Safety Promotion/Fall Prevention: activity supervised  Intervention: Prevent and Manage VTE (Venous Thromboembolism) Risk  Recent Flowsheet Documentation  Taken 8/30/2023 1500 by Radha Zamora RN  VTE Prevention/Management: SCDs (sequential compression devices) on  Taken 8/30/2023 1420 by Radha Zamora RN  VTE Prevention/Management: SCDs (sequential compression devices) on  Goal: Optimal Comfort and Wellbeing  Outcome: Adequate for Care Transition  Intervention: Monitor Pain and Promote Comfort  Recent Flowsheet Documentation  Taken 8/30/2023 1500 by Radha Zamora RN  Pain Management Interventions:   rest   repositioned   music therapy   emotional support  Taken 8/30/2023 1420 by Radha Zamora RN  Pain Management Interventions:   rest   repositioned   music therapy   emotional support  Goal: Readiness for Transition " of Care  Outcome: Adequate for Care Transition     Problem: Risk for Delirium  Goal: Optimal Coping  Outcome: Adequate for Care Transition  Goal: Improved Behavioral Control  Outcome: Adequate for Care Transition  Goal: Improved Attention and Thought Clarity  Outcome: Adequate for Care Transition  Goal: Improved Sleep  Outcome: Adequate for Care Transition   Goal Outcome Evaluation: patient met goals for discharge. AVS reviewed with patient. Daughter to transport home.

## 2023-08-30 NOTE — DISCHARGE SUMMARY
[facility]  Hospitalist Discharge Summary      Date of Admission:  8/30/2023  Date of Discharge:  8/30/2023  Discharging Provider: Ezio Hyman DO  Discharge Service: Hospitalist Service    Discharge Diagnoses   Dislocation of internal right hip prosthesis, initial encounter  Status post closed reduction of right hip  Essential hypertension  History of rectal cancer    Clinically Significant Risk Factors          Follow-ups Needed After Discharge   Follow-up Appointments     Follow-up and recommended labs and tests       Follow up with Dr Bernal at [facility] postop to further discuss   plan for recurrent right hip dislocations.        Follow-up and recommended labs and tests       Follow up with Dr. Aguillon , patient to call for follow up.            Discharge Disposition   Discharged to home  Condition at discharge: Stable    Hospital Course   78-year-old female who presented with internal right hip prosthesis dislocation.  ED attempted closed reduction which was unsuccessful.  [facility] took patient to the OR on 8/30/2023 and performed closed reduction with successful reduction.  This is patient's third dislocation with this total hip arthroplasty.  Will recommend follow-up with Dr. Aguillon with [facility].  Patient to resume home medications for essential hypertension.  She will utilize ice and Tylenol for pain control.  Activity recommendations were provided by [facility].    Consultations This Hospital Stay   ORTHOPEDIC SURGERY IP CONSULT  PHYSICAL THERAPY ADULT IP CONSULT  OCCUPATIONAL THERAPY ADULT IP CONSULT    Code Status   Full Code    Time Spent on this Encounter   I, Ezio Hyman DO, personally saw the patient today and spent greater than 30 minutes discharging this patient.       DO SHAGUFTA Lazcano [address] [address]  Phone: [phone]  Fax:  [phone]  ______________________________________________________________________    Physical Exam   Vital Signs: Temp: 98.3  F (36.8  C) Temp src: Oral BP: 118/65 Pulse: 80   Resp: 16 SpO2: 96 % O2 Device: None (Room air) Oxygen Delivery: 2 LPM  Weight: 138 lbs 0 oz  Gen: no acute distress, comfortable  ENT: no scleral icterus  Pulm: lungs are clear without wheezing, crackles, breathing comfortably at rest.  Patient with tenderness over lower costophrenic margin.  No deformity noted.  No bruising noted.  CV: regular rate and rhythm, no significant lower extremity pitting edema  GI: abdomen is soft, non-tender, non-distended with active bowel sounds.  MSK: no obvious deformities of the extremities.  Bruising noted over right inguinal area and hip.  Derm: Not pale, no jaundice  Psych: appropriate affect       Primary Care Physician   Jak Jones    Discharge Orders      Reason for your hospital stay    Right hip dislocation/closed reduction.     Follow-up and recommended labs and tests     Follow up with Dr Bernal at [facility] postop to further discuss plan for recurrent right hip dislocations.     Activity    Your activity upon discharge: WBAT.  Knee immobilizer.  No hip flexion past 90 degrees.  No hip IR past neutral.     Reason for your hospital stay    RTHA dislocation, status post closed reduction.     Activity    Your activity upon discharge: activity as tolerated.   See orthopedics recommendations.     Follow-up and recommended labs and tests     Follow up with Dr. Aguillon , patient to call for follow up.     When to contact your care team    Call your primary doctor if you have any of the following: increased swelling and or pain.     Discharge Instructions    Ice to affected areas for swelling/pain.     Crutches DME    DME Documentation: Describe the reason for need to support medical necessity: Impaired gait status post hip surgery. I, the undersigned, certify that the above prescribed  supplies are medically necessary for this patient and is both reasonable and necessary in reference to accepted standards of medical practice in the treatment of this patient's condition and is not prescribed as a convenience.     Cane DME    DME Documentation: Describe the reason for need to support medical necessity: Impaired gait status post hip surgery. I, the undersigned, certify that the above prescribed supplies are medically necessary for this patient and is both reasonable and necessary in reference to accepted standards of medical practice in the treatment of this patient's condition and is not prescribed as a convenience.     [facility] DME    : DME Documentation: Describe the reason for need to support medical necessity: Impaired gait status post hip surgery. I, the undersigned, certify that the above prescribed supplies are medically necessary for this patient and is both reasonable and necessary in reference to accepted standards of medical practice in the treatment of this patient's condition and is not prescribed as a convenience.     Diet    Follow this diet upon discharge: Orders Placed This Encounter      Advance Diet as Tolerated: Regular Diet Adult       Significant Results and Procedures   Most Recent 3 CBC's:  Recent Labs   Lab Test 08/30/23  0609 06/20/23  0913 05/24/22  0904   WBC 5.3 4.7 4.5   HGB 11.5* 13.5 12.2   MCV 88 84 91    221 212     Most Recent 3 BMP's:  Recent Labs   Lab Test 08/30/23  0609 06/20/23  0913 01/10/23  1148 05/24/22  0904    138  --  137   POTASSIUM 3.4* 4.5  --  4.1   CHLORIDE 112* 104  --  105   CO2 20* 24  --  24   BUN 11 13.1  --  10   CR 0.50* 0.63 0.7 0.67   ANIONGAP 7 10  --  8   MICHEL 7.0* 9.9  --  9.8    108*  --  84     Most Recent 2 LFT's:  Recent Labs   Lab Test 06/20/23  0913 05/24/22  0904   AST 21 15   ALT 18 14   ALKPHOS 88 87   BILITOTAL 0.8 0.8     Most Recent Urinalysis:  Recent Labs   Lab Test 05/24/22  0947 06/01/21  1059   COLOR  Yellow Yellow   APPEARANCE Clear Clear   URINEGLC Negative Negative   URINEBILI Negative Negative   URINEKETONE Negative Negative   SG <=1.005 1.025   UBLD Negative Negative   URINEPH 5.5 6.5   PROTEIN Negative Negative   UROBILINOGEN 0.2 0.2 E.U./dL   NITRITE Negative Negative   LEUKEST Negative Trace*   RBCU  --  None Seen   WBCU  --  0-5     Most Recent ESR & CRP:  Recent Labs   Lab Test 06/20/23  0913 04/07/21  0525   SED 2  --    CRP  --  4.0*   ,   Results for orders placed or performed during the hospital encounter of 08/30/23   XR Pelvis and Hip Right 2 Views    Narrative    EXAM: XR PELVIS AND HIP RIGHT 2 VIEWS  LOCATION: [facility]  DATE: 8/30/2023    INDICATION: Hip pain, possible dislocation  COMPARISON: 01/24/2023      Impression    IMPRESSION: There is superior dislocation of the femoral stem of the patient's right hip arthroplasty relative to the acetabular cup. No displaced fracture. Recommend postreduction radiographs for further evaluation.   XR Surgery CHERRIE  Fluoro G/T 5 Min    Narrative    This exam was marked as non-reportable because it will not be read by a   radiologist or a [place] non-radiologist provider.             Discharge Medications   Current Discharge Medication List        START taking these medications    Details   acetaminophen (TYLENOL) 325 MG tablet Take 2 tablets (650 mg) by mouth every 4 hours as needed for other (For optimal non-opioid multimodal pain management to improve pain control.)    Associated Diagnoses: Dislocation of internal right hip prosthesis, initial encounter (H)           CONTINUE these medications which have NOT CHANGED    Details   amLODIPine (NORVASC) 5 MG tablet Take 2 tablets (10 mg) by mouth daily  Qty: 180 tablet, Refills: 3    Associated Diagnoses: Essential hypertension      lisinopril (ZESTRIL) 20 MG tablet Take 1 tablet (20 mg) by mouth daily  Qty: 90 tablet, Refills: 3    Associated Diagnoses: Essential hypertension       LOTEMAX 0.5 % ophthalmic suspension Place 1 drop into both eyes every 48 hours  Refills: 4      metoprolol succinate ER (TOPROL XL) 25 MG 24 hr tablet Take 2 tablets (50 mg) by mouth daily  Qty: 180 tablet, Refills: 3    Associated Diagnoses: Essential hypertension      timolol maleate (TIMOPTIC) 0.5 % ophthalmic solution Place 1 drop into both eyes daily           Allergies   Allergies   Allergen Reactions    Adalimumab Muscle Pain (Myalgia)    Brimonidine-Timolol [Brimonidine Tartrate-Timolol] Unknown     Redness itching in eyes    Levaquin [Levofloxacin] Unknown     Skin burn and couldn't get out bed for 5 days    Tetracyclines & Related Hives

## 2023-08-30 NOTE — ANESTHESIA PROCEDURE NOTES
Airway       Patient location during procedure: OR  Staff -        CRNA: Eleazar Elizondo APRN CRNA       Performed By: CRNA  Consent for Airway        Urgency: elective  Indications and Patient Condition       Indications for airway management: gilma-procedural       Induction type:intravenous       Mask difficulty assessment: 1 - vent by mask    Final Airway Details       Final airway type: supraglottic airway    Supraglottic Airway Details        Type: LMA       Brand: ProSeal       LMA size: 4    Post intubation assessment        Placement verified by: capnometry and equal breath sounds        Number of attempts at approach: 1       Number of other approaches attempted: 0       Secured with: silk tape       Ease of procedure: easy       Dentition: Intact

## 2023-08-30 NOTE — ED NOTES
Sedation Post Procedure Summary:    This writer assisted with the procedural sedation. Prior to the start of the procedure and with procedural staff participation, verbal confirmation was conducted of the patient s identity using two indicators, that the procedure was appropriate and matched the consent or emergent situation, and that the correct equipment were available. Immediately prior to starting the procedure a Time Out was conducted with the procedural staff and re-confirmed the patient s name, procedure, and site/side.      Sedatives: Propofol    Vital signs, airway, end tidal O2, and pulse oximetry were monitored and remained stable throughout the procedure and sedation was maintained until the procedure was complete.  The patient was monitored by staff until sedation discharge criteria were met.    Patient tolerance: Patient tolerated the procedure well with no immediate complications.    Time of sedation in minutes:  25 minutes from beginning to end of physician one to one monitoring.     Plan: Unsuccessful reduction of R hip. MD will consult ortho. Report given to primary RNJustina.     GRADY Carlos RN

## 2023-08-30 NOTE — ED NOTES
Bed: WWED-01  Expected date: 8/30/23  Expected time: 1:20 AM  Means of arrival:   Comments:  Bed D

## 2023-08-30 NOTE — ANESTHESIA CARE TRANSFER NOTE
Patient: Oscar Zhao    Procedure: Procedure(s):  CLOSED REDUCTION, HIP       Diagnosis: Dislocation of internal right hip prosthesis, initial encounter (H) [T84.020A]  Diagnosis Additional Information: No value filed.    Anesthesia Type:   General     Note:    Oropharynx: spontaneously breathing  Level of Consciousness: awake  Oxygen Supplementation: face mask and nasal cannula  Level of Supplemental Oxygen (L/min / FiO2): 3  Independent Airway: airway patency satisfactory and stable  Dentition: dentition unchanged  Vital Signs Stable: post-procedure vital signs reviewed and stable  Report to RN Given: handoff report given  Patient transferred to: PACU    Handoff Report: Identifed the Patient, Identified the Reponsible Provider, Reviewed the pertinent medical history, Discussed the surgical course, Reviewed Intra-OP anesthesia mangement and issues during anesthesia, Set expectations for post-procedure period and Allowed opportunity for questions and acknowledgement of understanding    VS /85, HR 78, Spo2 99, Temp 97.1F, RR 12  Vitals:  Vitals Value Taken Time   /85 08/30/23 1043   Temp 36.6  C (97.9  F) 08/30/23 1043   Pulse 93 08/30/23 1044   Resp 20 08/30/23 1044   SpO2 98 % 08/30/23 1044   Vitals shown include unvalidated device data.    Electronically Signed By: PARTH Joyce CRNA  August 30, 2023  10:46 AM

## 2023-08-30 NOTE — TREATMENT PLAN
Ortho note    ED MD with multiple attempts to reduce right hip overnight that were unsuccessful. Plan will be to take to OR this AM (9:30) for closed reduction under anesthesia. Pre-op orders placed. Formal consult to follow this AM.    Aditya Pedroza MD    
48

## 2023-08-30 NOTE — H&P
[facility] MEDICINE ADMISSION HISTORY AND PHYSICAL     Brief Synopsis:     Oscar Zhao is a 78 year old female who presented with complaints of hip dislocation.    Medical history is notable for recurrent hip dislocation, htn.     Initial evaluation revealed dislocated hip.     Initial treatment included reduction attempt.     Assessment and Plan:  Recurrent hip dislocation  Unable to reduce in ED  Consult ortho, pt told she would need surgery if hip dislocated again  Keep npo for now  Pain control, check labs    Essential htn  Med rec pending    Hx of rectal cancer    Clinically Significant Risk Factors Present on Admission                  # Hypertension: Noted on problem list                     Disposition Plan      Expected Discharge Date: 09/01/2023               Chief Complaint Hip pain, dislocation     HISTORY   Oscar Zhao is a 78 year old female who presented with complaints of hip dislocation.    Per ED provider:  Oscar Zhao is a 78 year old female who presents hip pain     Per Chart Review: the patient has a pertinent history of hypertension, scleritis (bilateral), and chronic fatigue.     Patient presents from EMS with right hip pain. She bent over in bed and felt it pop out. She notes that she has had her hip dislocated twice in the past. The patient reports that she drinks alcohol but doesn't smoke. She has a history of hip replacement. She wasn't given any pain medication by EMS. The patient presents with no other symptoms at this time.    No chest pain, dyspnea, abdominal pain, n/v/d, edema. No orthopnea.      Past Medical History     Past Medical History:  No date: Basal cell carcinoma of anterior chest  No date: Breast cyst  No date: History of anesthesia complications  No date: HLA B27 (HLA B27 positive)  No date: Hypertension  07/19/2011: Osteoporosis  09/12/2018: Peripheral neuropathy  No date: PONV (postoperative nausea and vomiting)  No date: Scleritis,  bilateral      Comment:  Left .  Treated with corticosteroids,, methotrexate                and Humira.  .  Rituximab.  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  Ulcerated mass in the anal canal: Moderately differentiated squamous cell cancer.    ESOPHAGOSCOPY, GASTROSCOPY, DUODENOSCOPY (EGD), COMBINED  2017    Large hiatal hernia.  Reactive gastropathy with mucosal erosion.  H. pylori negative.    HERNIORRHAPHY FEMORAL Left 2021    Procedure: LEFT FEMORAL HERNIA REPAIR;  Surgeon: Sidney Murray MD;  Location: [facility]    HYSTERECTOMY TOTAL ABDOMINAL, BILATERAL SALPINGO-OOPHORECTOMY, COMBINED      IR CHEST PORT PLACEMENT > 5 YRS OF AGE  3/15/2021    IR PORT PLACEMENT >5 YEARS  03/15/2021    Right IJ port-a-cath.    IR PORT REMOVAL RIGHT  2021    LAPAROSCOPIC APPENDECTOMY  2011    PHACOEMULSIFICATION CLEAR CORNEA WITH STANDARD INTRAOCULAR LENS IMPLANT Right 2017    PHACOEMULSIFICATION CLEAR CORNEA WITH STANDARD INTRAOCULAR LENS IMPLANT Left 2017    TOTAL HIP ARTHROPLASTY Right 2015    Procedure: HIP TOTAL ARTHROPLASTY, RIGHT;  Surgeon: Star Du MD;  Location: [facility];  Service:     [facility] TOTAL KNEE ARTHROPLASTY Left 2017    Procedure: LEFT TOTAL KNEE ARTHROPLASTY;  Surgeon: Star Du MD;  Location: [facility];  Service: Orthopedics     Family History      Family History   Problem Relation  Age of Onset    Alzheimer Disease Mother     Heart Disease Father     Pancreatic Cancer Brother 72.00    No Known Problems Daughter     Anesthesia Reaction No family hx of       Social History      Social History     Tobacco Use    Smoking status: Never    Smokeless tobacco: Never   Substance Use Topics    Alcohol use: Yes     Alcohol/week: 4.0 standard drinks of alcohol     Comment: wine    Drug use: No      Allergies     Allergies   Allergen Reactions    Adalimumab Muscle Pain (Myalgia)    Brimonidine-Timolol [Brimonidine Tartrate-Timolol] Unknown     Redness itching in eyes    Levaquin [Levofloxacin] Unknown     Skin burn and couldn't get out bed for 5 days    Tetracyclines & Related Hives     Prior to Admission Medications      Prior to Admission Medications   Prescriptions Last Dose Informant Patient Reported? Taking?   LOTEMAX 0.5 % ophthalmic suspension   Yes No   Sig: Place 1 drop into both eyes every 48 hours   amLODIPine (NORVASC) 5 MG tablet   No No   Sig: Take 2 tablets (10 mg) by mouth daily   lisinopril (ZESTRIL) 20 MG tablet   No No   Sig: Take 1 tablet (20 mg) by mouth daily   metoprolol succinate ER (TOPROL XL) 25 MG 24 hr tablet   No No   Sig: Take 2 tablets (50 mg) by mouth daily   timolol maleate (TIMOPTIC) 0.5 % ophthalmic solution   Yes No   Si drop      Facility-Administered Medications: None      Review of Systems     A 12 point comprehensive review of systems was negative except as noted above in HPI.    PHYSICAL EXAMINATION     Vitals      Temp:  [98.1  F (36.7  C)] 98.1  F (36.7  C)  Pulse:  [63-93] 67  Resp:  [16-32] 23  BP: (101-143)/(68-92) 136/74  SpO2:  [90 %-100 %] 95 %    Examination   Physical Exam:    Gen: no acute distress, comfortable  ENT: no scleral icterus  Pulm: lungs are clear without wheezing, crackles, breathing comfortably at rest  CV: regular rate and rhythm, no significant lower extremity pitting edema  GI: abdomen is soft, non-tender, non-distended with active  bowel sounds.  MSK: no obvious deformities of the extremities  Derm: Not pale, no jaundice  Psych: appropriate affect      Pertinent Radiology     Radiology Results:   Recent Results (from the past 24 hour(s))   XR Pelvis and Hip Right 2 Views    Narrative    EXAM: XR PELVIS AND HIP RIGHT 2 VIEWS  LOCATION: [facility]  DATE: 8/30/2023    INDICATION: Hip pain, possible dislocation  COMPARISON: 01/24/2023      Impression    IMPRESSION: There is superior dislocation of the femoral stem of the patient's right hip arthroplasty relative to the acetabular cup. No displaced fracture. Recommend postreduction radiographs for further evaluation.     Santiago Pereyra, DO  [facility] Medicine  [facility]   Phone: #[phone]

## 2023-08-30 NOTE — ANESTHESIA POSTPROCEDURE EVALUATION
Patient: Oscar Zhao    Procedure: Procedure(s):  CLOSED REDUCTION, HIP       Anesthesia Type:  General    Note:  Disposition: Inpatient   Postop Pain Control: Uneventful            Sign Out: Well controlled pain   PONV: No   Neuro/Psych: Uneventful            Sign Out: Acceptable/Baseline neuro status   Airway/Respiratory: Uneventful            Sign Out: Acceptable/Baseline resp. status   CV/Hemodynamics: Uneventful            Sign Out: Acceptable CV status; No obvious hypovolemia; No obvious fluid overload   Other NRE: NONE   DID A NON-ROUTINE EVENT OCCUR? No           Last vitals:  Vitals Value Taken Time   /74 08/30/23 1120   Temp 36.1  C (96.9  F) 08/30/23 1110   Pulse 86 08/30/23 1128   Resp 16 08/30/23 1128   SpO2 96 % 08/30/23 1128   Vitals shown include unvalidated device data.    Electronically Signed By: KIERSTEN MONTOYA MD  August 30, 2023  12:05 PM

## 2023-08-30 NOTE — ED PROVIDER NOTES
EMERGENCY DEPARTMENT ENCOUNTER      NAME: Oscar Zhao  AGE: 78 year old female  YOB: 1945  MRN: [id number]  EVALUATION DATE & TIME: 2023 12:46 AM    PCP: Jak Jones    ED PROVIDER: Santiago Harris D.O.      Chief Complaint   Patient presents with    Hip Pain       FINAL IMPRESSION:  1. Dislocation of internal right hip prosthesis, initial encounter (H)        ED COURSE & MEDICAL DECISION MAKIN:54 AM I met with the patient to gather history and to perform my initial exam. I discussed the plan for care while in the Emergency Department.  3:10 AM I attempted the reduction and sedation procedure.  3:34 AM I spoke with Dr. Wetzel, Orthopedics.  3:34 AM I updated the patient and her daughter.  3:45 AM I spoke with Dr. Pereyra, Hospitalist.       Pertinent Labs & Imaging studies reviewed. (See chart for details)  78 year old female presents to the Emergency Department for evaluation of hip pain.  Patient has known right hip prosthesis, and has had multiple episodes of dislocation in the past.  X-rays do confirm dislocation again today without evidence of fracture.  We did sedate with propofol, and made multiple attempts to reduce the dislocation.  It did appear several times that we had gotten the hip to reduce, and has a leg was laid flat on the bed the hip would come back out of joint.  Therefore, I consulted with orthopedic surgery.  Plan is to admit for likely intervention in the OR in the morning.  Patient was comfortable with this plan.  Discussed with the hospitalist who agreed to the admission.      Medical Decision Making    History:  Supplemental history from: Documented in chart, if applicable  External Record(s) reviewed: Documented in chart, if applicable.    Work Up:  Chart documentation includes differential considered and any EKGs or imaging independently interpreted by provider, where specified.  In additional to work up documented, I considered the following work up:  Documented in chart, if applicable.    External consultation:  Discussion of management with another provider: Documented in chart, if applicable    Complicating factors:  Care impacted by chronic illness: Hypertension and Other: chronic fatigue, malignant neoplasm of anal canal  Care affected by social determinants of health: N/A    Disposition considerations: Admit.        At the conclusion of the encounter I discussed the results of all of the tests and the disposition. The questions were answered. The patient or family acknowledged understanding and was agreeable with the care plan.        HPI    Patient information was obtained from: the patient    Use of : N/A       Oscar Zhao is a 78 year old female who presents hip pain    Per Chart Review: the patient has a pertinent history of hypertension, scleritis (bilateral), and chronic fatigue.    Patient presents from EMS with right hip pain. She bent over in bed and felt it pop out. She notes that she has had her hip dislocated twice in the past. The patient reports that she drinks alcohol but doesn't smoke. She has a history of hip replacement. She wasn't given any pain medication by EMS. The patient presents with no other symptoms at this time.      REVIEW OF SYSTEMS  Constitutional:  Denies fever, chills, weight loss or weakness  Eyes:  No pain, discharge, redness  HENT:  Denies sore throat, ear pain, congestion  Respiratory: No SOB, wheeze or cough  Cardiovascular:  No CP, palpitations  GI:  Denies abdominal pain, nausea, vomiting, diarrhea  : Denies dysuria, hematuria  Musculoskeletal:  Denies any new muscle/joint pain, swelling or loss of function. Right hip pain.  Skin:  Denies rash, pallor  Neurologic:  Denies headache, focal weakness or sensory changes  Lymph: Denies swollen nodes    All other systems negative unless noted in HPI.    PAST MEDICAL HISTORY:  Past Medical History:   Diagnosis Date    Basal cell carcinoma of anterior chest      Breast cyst     History of anesthesia complications     HLA B27 (HLA B27 positive)     Hypertension     Osteoporosis 2011    Peripheral neuropathy 2018    PONV (postoperative nausea and vomiting)     Scleritis, bilateral     Left .  Treated with corticosteroids,, methotrexate and Humira.  .  Rituximab.    Squamous cell carcinoma of skin of chest     Steroid induced glaucoma, both eyes        PAST SURGICAL HISTORY:  Past Surgical History:   Procedure Laterality Date    BREAST CYST EXCISION Right     benign     SECTION      CLOSED REDUCTION HIP Right 2019    Procedure: CLOSED REDUCTION, HIP;  Surgeon: Jak Ruiz DO;  Location: [facility];  Service: Orthopedics    COLONOSCOPY  2011    COLONOSCOPY  2005    COLONOSCOPY W/ BIOPSIES AND POLYPECTOMY  2021    16 to 20 mm polyp:tubular adenoma in the ascending colon.  Ulcerated mass in the anal canal: Moderately differentiated squamous cell cancer.    ESOPHAGOSCOPY, GASTROSCOPY, DUODENOSCOPY (EGD), COMBINED  2017    Large hiatal hernia.  Reactive gastropathy with mucosal erosion.  H. pylori negative.    HERNIORRHAPHY FEMORAL Left 2021    Procedure: LEFT FEMORAL HERNIA REPAIR;  Surgeon: Sidney Murray MD;  Location: [facility]    HYSTERECTOMY TOTAL ABDOMINAL, BILATERAL SALPINGO-OOPHORECTOMY, COMBINED      IR CHEST PORT PLACEMENT > 5 YRS OF AGE  3/15/2021    IR PORT PLACEMENT >5 YEARS  03/15/2021    Right IJ port-a-cath.    IR PORT REMOVAL RIGHT  2021    LAPAROSCOPIC APPENDECTOMY  2011    PHACOEMULSIFICATION CLEAR CORNEA WITH STANDARD INTRAOCULAR LENS IMPLANT Right 2017    PHACOEMULSIFICATION CLEAR CORNEA WITH STANDARD INTRAOCULAR LENS IMPLANT Left 2017    TOTAL HIP ARTHROPLASTY Right 2015    Procedure: HIP TOTAL ARTHROPLASTY, RIGHT;  Surgeon: Star Du MD;  Location: [facility];  Service:     [facility] TOTAL KNEE ARTHROPLASTY Left 2017     Procedure: LEFT TOTAL KNEE ARTHROPLASTY;  Surgeon: Star Du MD;  Location: [facility];  Service: Orthopedics         CURRENT MEDICATIONS:    Current Facility-Administered Medications   Medication    HYDROmorphone (PF) (DILAUDID) injection 0.5 mg    propofol (DIPRIVAN) injection 10 mg/mL vial     Current Outpatient Medications   Medication    amLODIPine (NORVASC) 5 MG tablet    lisinopril (ZESTRIL) 20 MG tablet    LOTEMAX 0.5 % ophthalmic suspension    metoprolol succinate ER (TOPROL XL) 25 MG 24 hr tablet    timolol maleate (TIMOPTIC) 0.5 % ophthalmic solution         ALLERGIES:  Allergies   Allergen Reactions    Adalimumab Muscle Pain (Myalgia)    Brimonidine-Timolol [Brimonidine Tartrate-Timolol] Unknown     Redness itching in eyes    Levaquin [Levofloxacin] Unknown     Skin burn and couldn't get out bed for 5 days    Tetracyclines & Related Hives       FAMILY HISTORY:  Family History   Problem Relation Age of Onset    Alzheimer Disease Mother     Heart Disease Father     Pancreatic Cancer Brother 72.00    No Known Problems Daughter     Anesthesia Reaction No family hx of        SOCIAL HISTORY:  Social History     Socioeconomic History    Marital status:     Number of children: 1   Tobacco Use    Smoking status: Never    Smokeless tobacco: Never   Substance and Sexual Activity    Alcohol use: Yes     Alcohol/week: 4.0 standard drinks of alcohol     Comment: wine    Drug use: No    Sexual activity: Never       VITALS:  Patient Vitals for the past 24 hrs:   BP Temp Temp src Pulse Resp SpO2 Weight   08/30/23 0400 128/75 -- -- 63 17 93 % --   08/30/23 0330 -- -- -- 71 26 97 % --   08/30/23 0329 125/83 -- -- 67 26 97 % --   08/30/23 0324 118/74 -- -- 66 20 94 % --   08/30/23 0319 112/81 -- -- 76 20 90 % --   08/30/23 0315 -- -- -- 81 30 94 % --   08/30/23 0314 105/73 -- -- 80 29 93 % --   08/30/23 0309 101/68 -- -- 77 18 93 % --   08/30/23 0304 120/81 -- -- 75 18 91 % --   08/30/23 0300 --  -- -- 73 (!) 32 90 % --   08/30/23 0259 138/86 -- -- 76 16 92 % --   08/30/23 0254 118/79 -- -- 79 23 100 % --   08/30/23 0249 132/84 -- -- 79 24 100 % --   08/30/23 0046 (!) 143/92 98.1  F (36.7  C) Oral 93 16 99 % 62.6 kg (138 lb)       PHYSICAL EXAM    VITAL SIGNS: /75   Pulse 63   Temp 98.1  F (36.7  C) (Oral)   Resp 17   Wt 62.6 kg (138 lb)   LMP  (LMP Unknown)   SpO2 93%   BMI 24.45 kg/m      General Appearance: Well-appearing, well-nourished, no acute distress   Head:  Normocephalic, without obvious abnormality, atraumatic  Eyes:  PERRL, conjunctiva/corneas clear, EOM's intact,  ENT:  Lips, mucosa, and tongue normal, membranes are moist without pallor  Neck:  Normal ROM, symmetrical, trachea midline    Abdomen:  Soft, non-tender, no rebound or guarding.  Musculoskeletal: Internal rotation and shortening of the right leg, neurovascular intact distally  Integument:  Warm, Dry, No erythema, No rash.    Neurologic:  Alert & oriented.  No focal deficits appreciated.    Psychiatric:  Affect normal, Judgment normal, Mood normal.      LABS  Results for orders placed or performed during the hospital encounter of 08/30/23 (from the past 24 hour(s))   XR Pelvis and Hip Right 2 Views    Narrative    EXAM: XR PELVIS AND HIP RIGHT 2 VIEWS  LOCATION: [facility]  DATE: 8/30/2023    INDICATION: Hip pain, possible dislocation  COMPARISON: 01/24/2023      Impression    IMPRESSION: There is superior dislocation of the femoral stem of the patient's right hip arthroplasty relative to the acetabular cup. No displaced fracture. Recommend postreduction radiographs for further evaluation.         RADIOLOGY  XR Pelvis and Hip Right 2 Views   Final Result   IMPRESSION: There is superior dislocation of the femoral stem of the patient's right hip arthroplasty relative to the acetabular cup. No displaced fracture. Recommend postreduction radiographs for further evaluation.           I have  independently interpreted the above image, superior dislocation of the right hip hip x-ray. See radiology report for detail.      PROCEDURES:    PROCEDURE: Procedural Sedation  Orthopedic Injury Reduction   INDICATIONS: Sedation is required to allow for joint reduction (right hip)   SEDATION PROVIDER: Dr Santiago Harris   PROCEDURE PROVIDER: Dr Santiago Harris   LEVEL OF SEDATION: Moderate Sedation    Defined as:  Minimal = Normal response to verbal  Moderate = Responds to verbal and light tactile stimulation  Deep = Responds after repeated painful stimulation   CONSENT: Risks, benefits and alternatives were discussed with and Written consent was obtained from Patient.   PROCEDURE SPECIFIC CHECKLIST COMPLETED:   Yes   LAST ORAL INTAKE: Clear Liquids >2 hours   ASA CLASS: 2 - Mild systemic disease   MALLAMPATI:  II - Faucial pillars and soft palate may be seen, but uvula is masked by the base of the tongue   TIME OUT: Universal protocol was followed. TIME OUT conducted just prior to starting procedure confirmed patient identity, site/side, procedure, patient position, and availability of correct equipment. Yes    Immediately prior to initiation of sedation, reassessment of clinical condition was performed which was unchanged.   MEDICATIONS: Propofol, 160 mg, IV  (total medication given)   MONITORING: Monitoring consisted of:   heart rate, cardiac monitor, continuous pulse oximeter, continuous capnometry (end tidal CO2), frequent blood pressure checks, level of consciousness checks, IV access, constant attendance by RN until patient is recovered, and constant attendance by MD until patient is stable   RESPONSE: vital signs stable, airway patent, and O2 saturations remained >92%   REDUCTION   PROCEDURE DESCRIPTION: ORTHOPEDIC MANAGEMENT:  Bone/Joint Manipulation: Affected extremity was grasped and gradual traction was applied and was further manipulated manually until alignment and positioning were improved.  Unfortunately after  repeated attempts, each time the leg was placed on the bed, the joint would dislocate     POST-SEDATION ASSESSMENT/NOTE: Lowest level oxygen saturation reached was 92%.    Post procedure patient was alert and responds to verbal stimuli    Patient was monitored during recovery and returned to pre-procedure baseline.   ADDITIONAL MD ASSISTANCE: Dr. MARIBEL Gonzalez   TOTAL MD DRUG ADMINISTRATION / MONITORING TIME: 35 minutes   COMPLICATIONS:  Patient tolerated procedure well, without complication               MEDICATIONS GIVEN IN THE EMERGENCY:  Medications   propofol (DIPRIVAN) injection 10 mg/mL vial (has no administration in time range)   HYDROmorphone (PF) (DILAUDID) injection 0.5 mg (has no administration in time range)   morphine (PF) injection 4 mg (4 mg Intravenous $Given 8/30/23 0055)   propofol (DIPRIVAN) injection 10 mg/mL vial (20 mg Intravenous $Given 8/30/23 0310)       NEW PRESCRIPTIONS STARTED AT TODAY'S ER VISIT  New Prescriptions    No medications on file        I, Anderson Vines, am serving as a scribe to document services personally performed by Santiago Harris D.O., based on my observations and the provider's statements to me.  I, Santiago Harris D.O., attest that Anderson Vines is acting in a scribe capacity, has observed my performance of the services and has documented them in accordance with my direction.     Santiago Harris D.O.  Emergency Medicine  [facility] EMERGENCY ROOM  [address] [facility] [address]  [phone]  Dept: [phone]       Santiago Harris,   08/30/23 6599

## 2023-09-05 NOTE — UTILIZATION REVIEW
Medicare Post-Discharge Review      Admission Status; Secondary Review Determination       As part of the [facility] Utilization review plan, a self-audit is done on Medicare inpatient admission with less than 2 midnights stay. The 2014 IPPS Final Rule allows outpatient billing in the event that a hospital determines that an inpatient admission was not medically necessary under utilization review process.      (x) Outpatient status would be Appropriate- Short Stay- Post discharge review.     RATIONALE FOR DETERMINATION:  Oscar Zhao is a 78 yr old female who presented to ED with her 3rd right hip dislocation.  Had YAMILET several years ago.  Reduced in OR and discharged.  Does not meet inpatient with a <1 MN stay.  Surgery is not on CMS IPO list.    Patient was admitted and discharge after one night stay. Record was sent by  for  Medicare short stay review by Medical Director. Based on the  severity of illness, intensity of service provided, expected LOS and risk for adverse outcome make the care appropriate for further outpatient/observation; however, doesn't meet criteria for hospital inpatient admission.           The information on this document is developed by the utilization review team in order for the business office to ensure compliance.  This only denotes the appropriateness of proper admission status and does not reflect the quality of care rendered.         The definitions of Inpatient Status and Observation Status used in making the determination above are those provided in the CMS Coverage Manual, Chapter 1 and Chapter 6, section 70.4.      Sincerely,     Justina Astudillo MD  Utilization Review  Medical Director  [facility].

## 2023-09-06 ENCOUNTER — TRANSFERRED RECORDS (OUTPATIENT)
Dept: HEALTH INFORMATION MANAGEMENT | Facility: CLINIC | Age: 78
End: 2023-09-06
Payer: MEDICARE

## 2023-09-10 ENCOUNTER — OFFICE VISIT (OUTPATIENT)
Dept: FAMILY MEDICINE | Facility: CLINIC | Age: 78
End: 2023-09-10
Payer: MEDICARE

## 2023-09-10 VITALS
OXYGEN SATURATION: 97 % | HEART RATE: 72 BPM | DIASTOLIC BLOOD PRESSURE: 82 MMHG | TEMPERATURE: 97.6 F | RESPIRATION RATE: 16 BRPM | SYSTOLIC BLOOD PRESSURE: 122 MMHG

## 2023-09-10 DIAGNOSIS — L03.90 CELLULITIS, UNSPECIFIED CELLULITIS SITE: Primary | ICD-10-CM

## 2023-09-10 PROCEDURE — 99213 OFFICE O/P EST LOW 20 MIN: CPT | Performed by: FAMILY MEDICINE

## 2023-09-10 RX ORDER — CLINDAMYCIN HCL 300 MG
300 CAPSULE ORAL 4 TIMES DAILY
Qty: 40 CAPSULE | Refills: 0 | Status: ON HOLD | OUTPATIENT
Start: 2023-09-10 | End: 2023-09-21

## 2023-09-10 NOTE — PROGRESS NOTES
Assessment & Plan     Cellulitis, unspecified cellulitis site  Wound cleaned and dressed.   - clindamycin (CLEOCIN) 300 MG capsule; Take 1 capsule (300 mg) by mouth 4 times daily for 10 days         MED REC REQUIRED  Post Medication Reconciliation Status:       No follow-ups on file.    Dorothea Muhammad MD  [facility]    Chinmay Moore is a 78 year old, presenting for the following health issues:  Leg Problem (Possible infection on left lower leg )      HPI     Very pleasant 78-year-old female coming in today with draining wound on left anterior shin.  Some purulent drainage is noted.  States that initially began as a scab that was picked.  Denies systemic symptoms including nausea, vomiting, fever, chills, allergies.        Review of Systems   Constitutional, HEENT, cardiovascular, pulmonary, gi and gu systems are negative, except as otherwise noted.      Objective    /82   Pulse 72   Temp 97.6  F (36.4  C) (Oral)   Resp 16   LMP  (LMP Unknown)   SpO2 97%   There is no height or weight on file to calculate BMI.  Physical Exam   GENERAL: healthy, alert and no distress  NECK: no adenopathy, no asymmetry, masses, or scars and thyroid normal to palpation  RESP: lungs clear to auscultation - no rales, rhonchi or wheezes  CV: regular rate and rhythm, normal S1 S2, no S3 or S4, no murmur, click or rub, no peripheral edema and peripheral pulses strong  ABDOMEN: soft, nontender, no hepatosplenomegaly, no masses and bowel sounds normal  MS: no gross musculoskeletal defects noted, no edema  Skin: Crusting 2x 3 cm lesion on shin.     No results found for this or any previous visit (from the past 24 hour(s)).

## 2023-09-12 ENCOUNTER — OFFICE VISIT (OUTPATIENT)
Dept: FAMILY MEDICINE | Facility: CLINIC | Age: 78
End: 2023-09-12
Payer: MEDICARE

## 2023-09-12 VITALS
BODY MASS INDEX: 23.57 KG/M2 | OXYGEN SATURATION: 97 % | TEMPERATURE: 98 F | HEART RATE: 55 BPM | WEIGHT: 133 LBS | DIASTOLIC BLOOD PRESSURE: 69 MMHG | HEIGHT: 63 IN | SYSTOLIC BLOOD PRESSURE: 101 MMHG

## 2023-09-12 DIAGNOSIS — T84.020A DISLOCATION OF INTERNAL RIGHT HIP PROSTHESIS, INITIAL ENCOUNTER (H): Primary | ICD-10-CM

## 2023-09-12 DIAGNOSIS — I10 ESSENTIAL HYPERTENSION: ICD-10-CM

## 2023-09-12 DIAGNOSIS — S81.802A WOUND OF LEFT LOWER EXTREMITY, INITIAL ENCOUNTER: ICD-10-CM

## 2023-09-12 LAB
CRP SERPL-MCNC: <3 MG/L
ERYTHROCYTE [SEDIMENTATION RATE] IN BLOOD BY WESTERGREN METHOD: 7 MM/HR (ref 0–30)

## 2023-09-12 PROCEDURE — 85652 RBC SED RATE AUTOMATED: CPT | Performed by: FAMILY MEDICINE

## 2023-09-12 PROCEDURE — 36415 COLL VENOUS BLD VENIPUNCTURE: CPT | Performed by: FAMILY MEDICINE

## 2023-09-12 PROCEDURE — 86140 C-REACTIVE PROTEIN: CPT | Performed by: FAMILY MEDICINE

## 2023-09-12 PROCEDURE — 99214 OFFICE O/P EST MOD 30 MIN: CPT | Performed by: FAMILY MEDICINE

## 2023-09-12 ASSESSMENT — PAIN SCALES - GENERAL: PAINLEVEL: NO PAIN (0)

## 2023-09-12 NOTE — H&P (VIEW-ONLY)
[facility]  [address] [address]  Phone: [phone]  Fax: [phone]  Primary Provider: Jak Jones  Pre-op Performing Provider: ISABEL CAZARES      PREOPERATIVE EVALUATION:  Today's date: 9/12/2023    Oscar Zhao is a 78 year old female who presents for a preoperative evaluation.      9/12/2023     7:22 AM   Additional Questions   Roomed by [facility], Visit Facilitator     Surgical Information:  Surgery/Procedure: right hip revision, total hip arthroplasty  Surgery Location: [facility]  Surgeon: Bill Bernal DO   Surgery Date: 09/21/2023  Time of Surgery: unknown  Where patient plans to recover: At home with family  Fax number for surgical facility: Note does not need to be faxed, will be available electronically in Epic.    Assessment & Plan     The proposed surgical procedure is considered INTERMEDIATE risk.    Dislocation of internal right hip prosthesis, initial encounter (H)  Patient may proceed with right hip revision without need for further testing, low risk for complications.    Wound of left lower extremity, initial encounter  No sign of infection currently.  Complete clindamycin as prescribed.  Advised cleaning wound with soap and water daily and using ointment with bandage, change daily.  - Erythrocyte sedimentation rate auto; Future  - CRP, inflammation; Future    Essential hypertension  Controlled.  Continue lisinopril and amlodipine.    MED REC REQUIRED  Post Medication Reconciliation Status:           - No identified additional risk factors other than previously addressed    Antiplatelet or Anticoagulation Medication Instructions:   - Patient is on no antiplatelet or anticoagulation medications.    Additional Medication Instructions:  Patient is to take all scheduled medications on the day of surgery    RECOMMENDATION:  APPROVAL GIVEN to proceed with proposed procedure, without further diagnostic  evaluation.            Subjective     HPI related to upcoming procedure: Right hip arthroplasty 8 years ago but now has dislocated 3 times and will have revision surgery to correct.    She suffered a wound on left lower leg when having right hip reduced in ER 1 week ago. She is now on clindamycin by urgent care 2 days ago, which she is tolerating without side effects.  Patient states that wound is improving.          9/12/2023     7:25 AM   Preop Questions   1. Have you ever had a heart attack or stroke? No   2. Have you ever had surgery on your heart or blood vessels, such as a stent placement, a coronary artery bypass, or surgery on an artery in your head, neck, heart, or legs? No   3. Do you have chest pain with activity? No   4. Do you have a history of  heart failure? No   5. Do you currently have a cold, bronchitis or symptoms of other infection? No   6. Do you have a cough, shortness of breath, or wheezing? No   7. Do you or anyone in your family have previous history of blood clots? No   8. Do you or does anyone in your family have a serious bleeding problem such as prolonged bleeding following surgeries or cuts? No   9. Have you ever had problems with anemia or been told to take iron pills? No   10. Have you had any abnormal blood loss such as black, tarry or bloody stools, or abnormal vaginal bleeding? No   11. Have you ever had a blood transfusion? No   12. Are you willing to have a blood transfusion if it is medically needed before, during, or after your surgery? Yes   13. Have you or any of your relatives ever had problems with anesthesia? No   14. Do you have sleep apnea, excessive snoring or daytime drowsiness? No   15. Do you have any artifical heart valves or other implanted medical devices like a pacemaker, defibrillator, or continuous glucose monitor? No   16. Do you have artificial joints? YES - Right hip replacement, left knee replacement   17. Are you allergic to latex? No     Health Care  Directive:  Patient does not have a Health Care Directive or Living Will: Advance Directive received and scanned. Click on Code in the patient header to view.    Preoperative Review of :   reviewed - no record of controlled substances prescribed.          Review of Systems  CONSTITUTIONAL: NEGATIVE for fever, chills, change in weight  INTEGUMENTARY/SKIN: NEGATIVE for worrisome rashes, moles or lesions  EYES: NEGATIVE for vision changes or irritation  ENT/MOUTH: NEGATIVE for ear, mouth and throat problems  RESP: NEGATIVE for significant cough or SOB  CV: NEGATIVE for chest pain, palpitations or peripheral edema  GI: NEGATIVE for nausea, abdominal pain, heartburn, or change in bowel habits  : NEGATIVE for frequency, dysuria, or hematuria  NEURO: NEGATIVE for weakness, dizziness or paresthesias  ENDOCRINE: NEGATIVE for temperature intolerance, skin/hair changes  HEME: NEGATIVE for bleeding problems  PSYCHIATRIC: NEGATIVE for changes in mood or affect    Patient Active Problem List    Diagnosis Date Noted    Dislocation of internal right hip prosthesis, initial encounter (H) 08/30/2023     Priority: Medium    Chronic fatigue 10/08/2022     Priority: Medium    Scleritis, bilateral      Priority: Medium     Left 2010.  Treated with corticosteroids,, methotrexate and Humira.  2015.    Rituximab.        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  Treated with corticosteroids,, methotrexate and Humira.  2015.  Rituximab.    Squamous  cell carcinoma of skin of chest     Steroid induced glaucoma, both eyes      Past Surgical History:   Procedure Laterality Date    BREAST CYST EXCISION Right     benign     SECTION      CLOSED REDUCTION HIP Right 2019    Procedure: CLOSED REDUCTION, HIP;  Surgeon: Jak Ruiz DO;  Location: [facility];  Service: Orthopedics    CLOSED REDUCTION HIP Right 2023    Procedure: CLOSED REDUCTION, HIP RIGHT;  Surgeon: Aditya Pedroza MD;  Location: [facility]    COLONOSCOPY  2011    COLONOSCOPY  2005    COLONOSCOPY W/ BIOPSIES AND POLYPECTOMY  2021    16 to 20 mm polyp:tubular adenoma in the ascending colon.  Ulcerated mass in the anal canal: Moderately differentiated squamous cell cancer.    ESOPHAGOSCOPY, GASTROSCOPY, DUODENOSCOPY (EGD), COMBINED  2017    Large hiatal hernia.  Reactive gastropathy with mucosal erosion.  H. pylori negative.    HERNIORRHAPHY FEMORAL Left 2021    Procedure: LEFT FEMORAL HERNIA REPAIR;  Surgeon: Sidney Murray MD;  [place] [facility]    HYSTERECTOMY TOTAL ABDOMINAL, BILATERAL SALPINGO-OOPHORECTOMY, COMBINED      IR CHEST PORT PLACEMENT > 5 YRS OF AGE  3/15/2021    IR PORT PLACEMENT >5 YEARS  03/15/2021    Right IJ port-a-cath.    IR PORT REMOVAL RIGHT  2021    LAPAROSCOPIC APPENDECTOMY  2011    PHACOEMULSIFICATION CLEAR CORNEA WITH STANDARD INTRAOCULAR LENS IMPLANT Right 2017    PHACOEMULSIFICATION CLEAR CORNEA WITH STANDARD INTRAOCULAR LENS IMPLANT Left 2017    TOTAL HIP ARTHROPLASTY Right 2015    Procedure: HIP TOTAL ARTHROPLASTY, RIGHT;  Surgeon: Star Du MD;  Location: [facility] OR;  Service:     [facility] TOTAL KNEE ARTHROPLASTY Left 2017    Procedure: LEFT TOTAL KNEE ARTHROPLASTY;  Surgeon: Star Du MD;  Location: [facility] OR;  Service: Orthopedics     Current Outpatient Medications   Medication Sig Dispense Refill    amLODIPine (NORVASC)  "5 MG tablet Take 2 tablets (10 mg) by mouth daily 180 tablet 3    clindamycin (CLEOCIN) 300 MG capsule Take 1 capsule (300 mg) by mouth 4 times daily for 10 days 40 capsule 0    lisinopril (ZESTRIL) 20 MG tablet Take 1 tablet (20 mg) by mouth daily 90 tablet 3    LOTEMAX 0.5 % ophthalmic suspension Place 1 drop into both eyes every 48 hours  4    metoprolol succinate ER (TOPROL XL) 25 MG 24 hr tablet Take 2 tablets (50 mg) by mouth daily 180 tablet 3    timolol maleate (TIMOPTIC) 0.5 % ophthalmic solution Place 1 drop into both eyes daily      acetaminophen (TYLENOL) 325 MG tablet Take 2 tablets (650 mg) by mouth every 4 hours as needed for other (For optimal non-opioid multimodal pain management to improve pain control.) (Patient not taking: Reported on 9/12/2023)         Allergies   Allergen Reactions    Adalimumab Muscle Pain (Myalgia)    Brimonidine-Timolol [Brimonidine Tartrate-Timolol] Unknown     Redness itching in eyes    Levaquin [Levofloxacin] Unknown     Skin burn and couldn't get out bed for 5 days    Tetracyclines & Related Hives        Social History     Tobacco Use    Smoking status: Never    Smokeless tobacco: Never   Substance Use Topics    Alcohol use: Yes     Alcohol/week: 4.0 standard drinks of alcohol     Comment: wine     Family History   Problem Relation Age of Onset    Alzheimer Disease Mother     Heart Disease Father     Pancreatic Cancer Brother 72.00    No Known Problems Daughter     Anesthesia Reaction No family hx of      History   Drug Use No         Objective     /69 (BP Location: Left arm, Patient Position: Sitting, Cuff Size: Adult Regular)   Pulse 55   Temp 98  F (36.7  C) (Oral)   Ht 1.606 m (5' 3.23\")   Wt 60.3 kg (133 lb)   LMP  (LMP Unknown)   SpO2 97%   BMI 23.39 kg/m      Physical Exam    GENERAL APPEARANCE: healthy, alert and no distress     EYES: EOMI,  PERRL     HENT: ear canals and TM's normal and nose and mouth without ulcers or lesions     NECK: no " adenopathy, no asymmetry, masses, or scars and thyroid normal to palpation     RESP: lungs clear to auscultation - no rales, rhonchi or wheezes     CV: regular rates and rhythm, normal S1 S2, no S3 or S4 and no murmur, click or rub     ABDOMEN:  soft, nontender, no HSM or masses and bowel sounds normal     SKIN: 2cm x 1.5cm superficial wound on left lower leg with serous drainage without erythema or edema or tenderness.     NEURO: Normal strength and tone, sensory exam grossly normal, mentation intact and speech normal     PSYCH: mentation appears normal. and affect normal/bright     LYMPHATICS: No cervical adenopathy    Recent Labs   Lab Test 08/30/23  0609 06/20/23  0913   HGB 11.5* 13.5    221    138   POTASSIUM 3.4* 4.5   CR 0.50* 0.63        Diagnostics:  Labs pending at this time.  Results will be reviewed when available.   No EKG required, no history of coronary heart disease, significant arrhythmia, peripheral arterial disease or other structural heart disease.    Revised Cardiac Risk Index (RCRI):  The patient has the following serious cardiovascular risks for perioperative complications:   - No serious cardiac risks = 0 points     RCRI Interpretation: 0 points: Class I (very low risk - 0.4% complication rate)         Signed Electronically by: Flavio James MD  Copy of this evaluation report is provided to requesting physician.

## 2023-09-12 NOTE — PROGRESS NOTES
[facility]  [address] [address]  Phone: [phone]  Fax: [phone]  Primary Provider: Jak Jones  Pre-op Performing Provider: ISABEL CAZARES      PREOPERATIVE EVALUATION:  Today's date: 9/12/2023    Oscar Zhao is a 78 year old female who presents for a preoperative evaluation.      9/12/2023     7:22 AM   Additional Questions   Roomed by [facility], Visit Facilitator     Surgical Information:  Surgery/Procedure: right hip revision, total hip arthroplasty  Surgery Location: [facility]  Surgeon: Bill Bernal DO   Surgery Date: 09/21/2023  Time of Surgery: unknown  Where patient plans to recover: At home with family  Fax number for surgical facility: Note does not need to be faxed, will be available electronically in Epic.    Assessment & Plan     The proposed surgical procedure is considered INTERMEDIATE risk.    Dislocation of internal right hip prosthesis, initial encounter (H)  Patient may proceed with right hip revision without need for further testing, low risk for complications.    Wound of left lower extremity, initial encounter  No sign of infection currently.  Complete clindamycin as prescribed.  Advised cleaning wound with soap and water daily and using ointment with bandage, change daily.  - Erythrocyte sedimentation rate auto; Future  - CRP, inflammation; Future    Essential hypertension  Controlled.  Continue lisinopril and amlodipine.    MED REC REQUIRED  Post Medication Reconciliation Status:           - No identified additional risk factors other than previously addressed    Antiplatelet or Anticoagulation Medication Instructions:   - Patient is on no antiplatelet or anticoagulation medications.    Additional Medication Instructions:  Patient is to take all scheduled medications on the day of surgery    RECOMMENDATION:  APPROVAL GIVEN to proceed with proposed procedure, without further diagnostic  evaluation.            Subjective     HPI related to upcoming procedure: Right hip arthroplasty 8 years ago but now has dislocated 3 times and will have revision surgery to correct.    She suffered a wound on left lower leg when having right hip reduced in ER 1 week ago. She is now on clindamycin by urgent care 2 days ago, which she is tolerating without side effects.  Patient states that wound is improving.          9/12/2023     7:25 AM   Preop Questions   1. Have you ever had a heart attack or stroke? No   2. Have you ever had surgery on your heart or blood vessels, such as a stent placement, a coronary artery bypass, or surgery on an artery in your head, neck, heart, or legs? No   3. Do you have chest pain with activity? No   4. Do you have a history of  heart failure? No   5. Do you currently have a cold, bronchitis or symptoms of other infection? No   6. Do you have a cough, shortness of breath, or wheezing? No   7. Do you or anyone in your family have previous history of blood clots? No   8. Do you or does anyone in your family have a serious bleeding problem such as prolonged bleeding following surgeries or cuts? No   9. Have you ever had problems with anemia or been told to take iron pills? No   10. Have you had any abnormal blood loss such as black, tarry or bloody stools, or abnormal vaginal bleeding? No   11. Have you ever had a blood transfusion? No   12. Are you willing to have a blood transfusion if it is medically needed before, during, or after your surgery? Yes   13. Have you or any of your relatives ever had problems with anesthesia? No   14. Do you have sleep apnea, excessive snoring or daytime drowsiness? No   15. Do you have any artifical heart valves or other implanted medical devices like a pacemaker, defibrillator, or continuous glucose monitor? No   16. Do you have artificial joints? YES - Right hip replacement, left knee replacement   17. Are you allergic to latex? No     Health Care  Directive:  Patient does not have a Health Care Directive or Living Will: Advance Directive received and scanned. Click on Code in the patient header to view.    Preoperative Review of :   reviewed - no record of controlled substances prescribed.          Review of Systems  CONSTITUTIONAL: NEGATIVE for fever, chills, change in weight  INTEGUMENTARY/SKIN: NEGATIVE for worrisome rashes, moles or lesions  EYES: NEGATIVE for vision changes or irritation  ENT/MOUTH: NEGATIVE for ear, mouth and throat problems  RESP: NEGATIVE for significant cough or SOB  CV: NEGATIVE for chest pain, palpitations or peripheral edema  GI: NEGATIVE for nausea, abdominal pain, heartburn, or change in bowel habits  : NEGATIVE for frequency, dysuria, or hematuria  NEURO: NEGATIVE for weakness, dizziness or paresthesias  ENDOCRINE: NEGATIVE for temperature intolerance, skin/hair changes  HEME: NEGATIVE for bleeding problems  PSYCHIATRIC: NEGATIVE for changes in mood or affect    Patient Active Problem List    Diagnosis Date Noted    Dislocation of internal right hip prosthesis, initial encounter (H) 08/30/2023     Priority: Medium    Chronic fatigue 10/08/2022     Priority: Medium    Scleritis, bilateral      Priority: Medium     Left 2010.  Treated with corticosteroids,, methotrexate and Humira.  2015.    Rituximab.        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  Treated with corticosteroids,, methotrexate and Humira.  2015.  Rituximab.    Squamous  cell carcinoma of skin of chest     Steroid induced glaucoma, both eyes      Past Surgical History:   Procedure Laterality Date    BREAST CYST EXCISION Right     benign     SECTION      CLOSED REDUCTION HIP Right 2019    Procedure: CLOSED REDUCTION, HIP;  Surgeon: Jak Ruiz DO;  Location: [facility];  Service: Orthopedics    CLOSED REDUCTION HIP Right 2023    Procedure: CLOSED REDUCTION, HIP RIGHT;  Surgeon: Aditya Pedroza MD;  Location: [facility]    COLONOSCOPY  2011    COLONOSCOPY  2005    COLONOSCOPY W/ BIOPSIES AND POLYPECTOMY  2021    16 to 20 mm polyp:tubular adenoma in the ascending colon.  Ulcerated mass in the anal canal: Moderately differentiated squamous cell cancer.    ESOPHAGOSCOPY, GASTROSCOPY, DUODENOSCOPY (EGD), COMBINED  2017    Large hiatal hernia.  Reactive gastropathy with mucosal erosion.  H. pylori negative.    HERNIORRHAPHY FEMORAL Left 2021    Procedure: LEFT FEMORAL HERNIA REPAIR;  Surgeon: Sidney Murray MD;  Location: [facility]    HYSTERECTOMY TOTAL ABDOMINAL, BILATERAL SALPINGO-OOPHORECTOMY, COMBINED      IR CHEST PORT PLACEMENT > 5 YRS OF AGE  3/15/2021    IR PORT PLACEMENT >5 YEARS  03/15/2021    Right IJ port-a-cath.    IR PORT REMOVAL RIGHT  2021    LAPAROSCOPIC APPENDECTOMY  2011    PHACOEMULSIFICATION CLEAR CORNEA WITH STANDARD INTRAOCULAR LENS IMPLANT Right 2017    PHACOEMULSIFICATION CLEAR CORNEA WITH STANDARD INTRAOCULAR LENS IMPLANT Left 2017    TOTAL HIP ARTHROPLASTY Right 2015    Procedure: HIP TOTAL ARTHROPLASTY, RIGHT;  Surgeon: Star Du MD;  Location: [facility] OR;  Service:     [facility] TOTAL KNEE ARTHROPLASTY Left 2017    Procedure: LEFT TOTAL KNEE ARTHROPLASTY;  Surgeon: Star Du MD;  Location: [facility] OR;  Service: Orthopedics     Current Outpatient Medications   Medication Sig Dispense Refill    amLODIPine (NORVASC)  "5 MG tablet Take 2 tablets (10 mg) by mouth daily 180 tablet 3    clindamycin (CLEOCIN) 300 MG capsule Take 1 capsule (300 mg) by mouth 4 times daily for 10 days 40 capsule 0    lisinopril (ZESTRIL) 20 MG tablet Take 1 tablet (20 mg) by mouth daily 90 tablet 3    LOTEMAX 0.5 % ophthalmic suspension Place 1 drop into both eyes every 48 hours  4    metoprolol succinate ER (TOPROL XL) 25 MG 24 hr tablet Take 2 tablets (50 mg) by mouth daily 180 tablet 3    timolol maleate (TIMOPTIC) 0.5 % ophthalmic solution Place 1 drop into both eyes daily      acetaminophen (TYLENOL) 325 MG tablet Take 2 tablets (650 mg) by mouth every 4 hours as needed for other (For optimal non-opioid multimodal pain management to improve pain control.) (Patient not taking: Reported on 9/12/2023)         Allergies   Allergen Reactions    Adalimumab Muscle Pain (Myalgia)    Brimonidine-Timolol [Brimonidine Tartrate-Timolol] Unknown     Redness itching in eyes    Levaquin [Levofloxacin] Unknown     Skin burn and couldn't get out bed for 5 days    Tetracyclines & Related Hives        Social History     Tobacco Use    Smoking status: Never    Smokeless tobacco: Never   Substance Use Topics    Alcohol use: Yes     Alcohol/week: 4.0 standard drinks of alcohol     Comment: wine     Family History   Problem Relation Age of Onset    Alzheimer Disease Mother     Heart Disease Father     Pancreatic Cancer Brother 72.00    No Known Problems Daughter     Anesthesia Reaction No family hx of      History   Drug Use No         Objective     /69 (BP Location: Left arm, Patient Position: Sitting, Cuff Size: Adult Regular)   Pulse 55   Temp 98  F (36.7  C) (Oral)   Ht 1.606 m (5' 3.23\")   Wt 60.3 kg (133 lb)   LMP  (LMP Unknown)   SpO2 97%   BMI 23.39 kg/m      Physical Exam    GENERAL APPEARANCE: healthy, alert and no distress     EYES: EOMI,  PERRL     HENT: ear canals and TM's normal and nose and mouth without ulcers or lesions     NECK: no " adenopathy, no asymmetry, masses, or scars and thyroid normal to palpation     RESP: lungs clear to auscultation - no rales, rhonchi or wheezes     CV: regular rates and rhythm, normal S1 S2, no S3 or S4 and no murmur, click or rub     ABDOMEN:  soft, nontender, no HSM or masses and bowel sounds normal     SKIN: 2cm x 1.5cm superficial wound on left lower leg with serous drainage without erythema or edema or tenderness.     NEURO: Normal strength and tone, sensory exam grossly normal, mentation intact and speech normal     PSYCH: mentation appears normal. and affect normal/bright     LYMPHATICS: No cervical adenopathy    Recent Labs   Lab Test 08/30/23  0609 06/20/23  0913   HGB 11.5* 13.5    221    138   POTASSIUM 3.4* 4.5   CR 0.50* 0.63        Diagnostics:  Labs pending at this time.  Results will be reviewed when available.   No EKG required, no history of coronary heart disease, significant arrhythmia, peripheral arterial disease or other structural heart disease.    Revised Cardiac Risk Index (RCRI):  The patient has the following serious cardiovascular risks for perioperative complications:   - No serious cardiac risks = 0 points     RCRI Interpretation: 0 points: Class I (very low risk - 0.4% complication rate)         Signed Electronically by: Flavio James MD  Copy of this evaluation report is provided to requesting physician.

## 2023-09-21 ENCOUNTER — HOSPITAL ENCOUNTER (INPATIENT)
Facility: CLINIC | Age: 78
LOS: 1 days | Discharge: HOME | DRG: 467 | End: 2023-09-22
Attending: ORTHOPAEDIC SURGERY | Admitting: ORTHOPAEDIC SURGERY
Payer: MEDICARE

## 2023-09-21 ENCOUNTER — APPOINTMENT (OUTPATIENT)
Dept: PHYSICAL THERAPY | Facility: CLINIC | Age: 78
DRG: 467 | End: 2023-09-21
Attending: PHYSICIAN ASSISTANT
Payer: MEDICARE

## 2023-09-21 ENCOUNTER — APPOINTMENT (OUTPATIENT)
Dept: RADIOLOGY | Facility: CLINIC | Age: 78
DRG: 467 | End: 2023-09-21
Payer: MEDICARE

## 2023-09-21 ENCOUNTER — ANESTHESIA EVENT (OUTPATIENT)
Dept: SURGERY | Facility: CLINIC | Age: 78
DRG: 467 | End: 2023-09-21
Payer: MEDICARE

## 2023-09-21 ENCOUNTER — ANESTHESIA (OUTPATIENT)
Dept: SURGERY | Facility: CLINIC | Age: 78
DRG: 467 | End: 2023-09-21
Payer: MEDICARE

## 2023-09-21 DIAGNOSIS — T84.020A DISLOCATION OF INTERNAL RIGHT HIP PROSTHESIS, INITIAL ENCOUNTER (H): Primary | ICD-10-CM

## 2023-09-21 DIAGNOSIS — T84.020D FAILURE OF RIGHT TOTAL HIP ARTHROPLASTY WITH DISLOCATION OF HIP, SUBSEQUENT ENCOUNTER: ICD-10-CM

## 2023-09-21 PROBLEM — T84.010D FAILURE OF RIGHT TOTAL HIP ARTHROPLASTY, SUBSEQUENT ENCOUNTER: Status: ACTIVE | Noted: 2023-09-21

## 2023-09-21 LAB
ABO/RH(D): NORMAL
ANTIBODY SCREEN: NEGATIVE
APPEARANCE FLD: ABNORMAL
CELL COUNT BODY FLUID SOURCE: ABNORMAL
COLOR FLD: ABNORMAL
GLUCOSE BLDC GLUCOMTR-MCNC: 93 MG/DL (ref 70–99)
GRAM STAIN RESULT: NORMAL
GRAM STAIN RESULT: NORMAL
HGB BLD-MCNC: 13 G/DL (ref 11.7–15.7)
LYMPHOCYTES NFR FLD MANUAL: 32 %
MONOS+MACROS NFR FLD MANUAL: 17 %
NEUTS BAND NFR FLD MANUAL: 51 %
SPECIMEN EXPIRATION DATE: NORMAL
WBC # FLD AUTO: 775 /UL

## 2023-09-21 PROCEDURE — 0SPA0JZ REMOVAL OF SYNTHETIC SUBSTITUTE FROM RIGHT HIP JOINT, ACETABULAR SURFACE, OPEN APPROACH: ICD-10-PCS | Performed by: ORTHOPAEDIC SURGERY

## 2023-09-21 PROCEDURE — 0SP909Z REMOVAL OF LINER FROM RIGHT HIP JOINT, OPEN APPROACH: ICD-10-PCS | Performed by: ORTHOPAEDIC SURGERY

## 2023-09-21 PROCEDURE — 250N000011 HC RX IP 250 OP 636: Performed by: STUDENT IN AN ORGANIZED HEALTH CARE EDUCATION/TRAINING PROGRAM

## 2023-09-21 PROCEDURE — 258N000001 HC RX 258: Performed by: ORTHOPAEDIC SURGERY

## 2023-09-21 PROCEDURE — 250N000011 HC RX IP 250 OP 636: Performed by: PHYSICIAN ASSISTANT

## 2023-09-21 PROCEDURE — 258N000003 HC RX IP 258 OP 636: Performed by: NURSE ANESTHETIST, CERTIFIED REGISTERED

## 2023-09-21 PROCEDURE — 258N000003 HC RX IP 258 OP 636: Performed by: PHYSICIAN ASSISTANT

## 2023-09-21 PROCEDURE — 250N000013 HC RX MED GY IP 250 OP 250 PS 637: Performed by: PHYSICIAN ASSISTANT

## 2023-09-21 PROCEDURE — 999N000141 HC STATISTIC PRE-PROCEDURE NURSING ASSESSMENT: Performed by: ORTHOPAEDIC SURGERY

## 2023-09-21 PROCEDURE — 87075 CULTR BACTERIA EXCEPT BLOOD: CPT | Performed by: ORTHOPAEDIC SURGERY

## 2023-09-21 PROCEDURE — C1776 JOINT DEVICE (IMPLANTABLE): HCPCS | Performed by: ORTHOPAEDIC SURGERY

## 2023-09-21 PROCEDURE — 360N000078 HC SURGERY LEVEL 5, PER MIN: Performed by: ORTHOPAEDIC SURGERY

## 2023-09-21 PROCEDURE — 370N000017 HC ANESTHESIA TECHNICAL FEE, PER MIN: Performed by: ORTHOPAEDIC SURGERY

## 2023-09-21 PROCEDURE — 120N000001 HC R&B MED SURG/OB

## 2023-09-21 PROCEDURE — 272N000001 HC OR GENERAL SUPPLY STERILE: Performed by: ORTHOPAEDIC SURGERY

## 2023-09-21 PROCEDURE — 0SRA03A REPLACEMENT OF RIGHT HIP JOINT, ACETABULAR SURFACE WITH CERAMIC SYNTHETIC SUBSTITUTE, UNCEMENTED, OPEN APPROACH: ICD-10-PCS | Performed by: ORTHOPAEDIC SURGERY

## 2023-09-21 PROCEDURE — 250N000009 HC RX 250: Performed by: PHYSICIAN ASSISTANT

## 2023-09-21 PROCEDURE — 999N000065 XR PELVIS AND HIP PORTABLE RIGHT 1 VIEW

## 2023-09-21 PROCEDURE — 86850 RBC ANTIBODY SCREEN: CPT | Performed by: PHYSICIAN ASSISTANT

## 2023-09-21 PROCEDURE — 710N000010 HC RECOVERY PHASE 1, LEVEL 2, PER MIN: Performed by: ORTHOPAEDIC SURGERY

## 2023-09-21 PROCEDURE — 250N000011 HC RX IP 250 OP 636: Performed by: NURSE ANESTHETIST, CERTIFIED REGISTERED

## 2023-09-21 PROCEDURE — 0S990ZX DRAINAGE OF RIGHT HIP JOINT, OPEN APPROACH, DIAGNOSTIC: ICD-10-PCS | Performed by: ORTHOPAEDIC SURGERY

## 2023-09-21 PROCEDURE — 250N000011 HC RX IP 250 OP 636: Mod: JZ | Performed by: NURSE ANESTHETIST, CERTIFIED REGISTERED

## 2023-09-21 PROCEDURE — 0SUA09Z SUPPLEMENT RIGHT HIP JOINT, ACETABULAR SURFACE WITH LINER, OPEN APPROACH: ICD-10-PCS | Performed by: ORTHOPAEDIC SURGERY

## 2023-09-21 PROCEDURE — 87070 CULTURE OTHR SPECIMN AEROBIC: CPT | Performed by: ORTHOPAEDIC SURGERY

## 2023-09-21 PROCEDURE — 250N000009 HC RX 250: Performed by: ORTHOPAEDIC SURGERY

## 2023-09-21 PROCEDURE — 89051 BODY FLUID CELL COUNT: CPT | Performed by: ORTHOPAEDIC SURGERY

## 2023-09-21 PROCEDURE — 97161 PT EVAL LOW COMPLEX 20 MIN: CPT | Mod: GP

## 2023-09-21 PROCEDURE — 250N000009 HC RX 250: Performed by: NURSE ANESTHETIST, CERTIFIED REGISTERED

## 2023-09-21 PROCEDURE — 36415 COLL VENOUS BLD VENIPUNCTURE: CPT | Performed by: PHYSICIAN ASSISTANT

## 2023-09-21 PROCEDURE — 86901 BLOOD TYPING SEROLOGIC RH(D): CPT | Performed by: PHYSICIAN ASSISTANT

## 2023-09-21 PROCEDURE — 85018 HEMOGLOBIN: CPT | Performed by: PHYSICIAN ASSISTANT

## 2023-09-21 PROCEDURE — 97116 GAIT TRAINING THERAPY: CPT | Mod: GP

## 2023-09-21 PROCEDURE — 99222 1ST HOSP IP/OBS MODERATE 55: CPT | Mod: AI | Performed by: FAMILY MEDICINE

## 2023-09-21 PROCEDURE — 87205 SMEAR GRAM STAIN: CPT | Performed by: ORTHOPAEDIC SURGERY

## 2023-09-21 PROCEDURE — 97530 THERAPEUTIC ACTIVITIES: CPT | Mod: GP

## 2023-09-21 DEVICE — ARTICUL/EZE FEMORAL HEAD DIAMETER 32MM +13 12/14 TAPER
Type: IMPLANTABLE DEVICE | Site: HIP | Status: NON-FUNCTIONAL
Brand: ARTICUL/EZE
Removed: 2023-11-13

## 2023-09-21 DEVICE — PINNACLE HIP SOLUTIONS ALTRX POLYETHYLENE ACETABULAR LINER +4 NEUTRAL 32MM ID 48MM OD
Type: IMPLANTABLE DEVICE | Site: HIP | Status: NON-FUNCTIONAL
Brand: PINNACLE ALTRX
Removed: 2023-11-13

## 2023-09-21 RX ORDER — SODIUM CHLORIDE, SODIUM LACTATE, POTASSIUM CHLORIDE, CALCIUM CHLORIDE 600; 310; 30; 20 MG/100ML; MG/100ML; MG/100ML; MG/100ML
INJECTION, SOLUTION INTRAVENOUS CONTINUOUS
Status: DISCONTINUED | OUTPATIENT
Start: 2023-09-21 | End: 2023-09-22 | Stop reason: HOSPADM

## 2023-09-21 RX ORDER — VANCOMYCIN HYDROCHLORIDE 1 G/20ML
2 INJECTION, POWDER, LYOPHILIZED, FOR SOLUTION INTRAVENOUS ONCE
Status: DISCONTINUED | OUTPATIENT
Start: 2023-09-21 | End: 2023-09-21 | Stop reason: HOSPADM

## 2023-09-21 RX ORDER — HYDROXYZINE HYDROCHLORIDE 10 MG/1
10 TABLET, FILM COATED ORAL EVERY 6 HOURS PRN
Qty: 30 TABLET | Refills: 1 | Status: SHIPPED | OUTPATIENT
Start: 2023-09-21 | End: 2023-11-08

## 2023-09-21 RX ORDER — ACETAMINOPHEN 325 MG/1
650 TABLET ORAL EVERY 4 HOURS PRN
Qty: 100 TABLET | Refills: 0 | Status: ON HOLD | OUTPATIENT
Start: 2023-09-21 | End: 2023-11-05

## 2023-09-21 RX ORDER — CEFAZOLIN SODIUM/WATER 2 G/20 ML
2 SYRINGE (ML) INTRAVENOUS SEE ADMIN INSTRUCTIONS
Status: DISCONTINUED | OUTPATIENT
Start: 2023-09-21 | End: 2023-09-21 | Stop reason: HOSPADM

## 2023-09-21 RX ORDER — CEPHALEXIN 500 MG/1
500 CAPSULE ORAL EVERY 6 HOURS SCHEDULED
Status: DISCONTINUED | OUTPATIENT
Start: 2023-09-22 | End: 2023-09-22 | Stop reason: HOSPADM

## 2023-09-21 RX ORDER — POLYETHYLENE GLYCOL 3350 17 G/17G
17 POWDER, FOR SOLUTION ORAL DAILY
Status: DISCONTINUED | OUTPATIENT
Start: 2023-09-22 | End: 2023-09-22 | Stop reason: HOSPADM

## 2023-09-21 RX ORDER — OXYCODONE HYDROCHLORIDE 5 MG/1
5 TABLET ORAL EVERY 4 HOURS PRN
Status: DISCONTINUED | OUTPATIENT
Start: 2023-09-21 | End: 2023-09-22 | Stop reason: HOSPADM

## 2023-09-21 RX ORDER — ASPIRIN 81 MG/1
81 TABLET ORAL 2 TIMES DAILY
Status: DISCONTINUED | OUTPATIENT
Start: 2023-09-21 | End: 2023-09-22 | Stop reason: HOSPADM

## 2023-09-21 RX ORDER — HYDROXYZINE HYDROCHLORIDE 10 MG/1
10 TABLET, FILM COATED ORAL EVERY 6 HOURS PRN
Status: DISCONTINUED | OUTPATIENT
Start: 2023-09-21 | End: 2023-09-22 | Stop reason: HOSPADM

## 2023-09-21 RX ORDER — ONDANSETRON 4 MG/1
4 TABLET, ORALLY DISINTEGRATING ORAL EVERY 30 MIN PRN
Status: DISCONTINUED | OUTPATIENT
Start: 2023-09-21 | End: 2023-09-21

## 2023-09-21 RX ORDER — BUPIVACAINE HYDROCHLORIDE 5 MG/ML
INJECTION, SOLUTION EPIDURAL; INTRACAUDAL
Status: DISCONTINUED | OUTPATIENT
Start: 2023-09-21 | End: 2023-09-21

## 2023-09-21 RX ORDER — TIMOLOL MALEATE 5 MG/ML
1 SOLUTION/ DROPS OPHTHALMIC DAILY
Status: DISCONTINUED | OUTPATIENT
Start: 2023-09-22 | End: 2023-09-22 | Stop reason: HOSPADM

## 2023-09-21 RX ORDER — LOTEPREDNOL ETABONATE 5 MG/ML
1 SUSPENSION/ DROPS OPHTHALMIC
Status: DISCONTINUED | OUTPATIENT
Start: 2023-09-23 | End: 2023-09-22 | Stop reason: HOSPADM

## 2023-09-21 RX ORDER — LIDOCAINE 40 MG/G
CREAM TOPICAL
Status: DISCONTINUED | OUTPATIENT
Start: 2023-09-21 | End: 2023-09-22 | Stop reason: HOSPADM

## 2023-09-21 RX ORDER — HYDROMORPHONE HCL IN WATER/PF 6 MG/30 ML
0.2 PATIENT CONTROLLED ANALGESIA SYRINGE INTRAVENOUS EVERY 5 MIN PRN
Status: DISCONTINUED | OUTPATIENT
Start: 2023-09-21 | End: 2023-09-21

## 2023-09-21 RX ORDER — AMOXICILLIN 250 MG
1 CAPSULE ORAL 2 TIMES DAILY
Status: DISCONTINUED | OUTPATIENT
Start: 2023-09-21 | End: 2023-09-22 | Stop reason: HOSPADM

## 2023-09-21 RX ORDER — LISINOPRIL 20 MG/1
20 TABLET ORAL DAILY
Status: DISCONTINUED | OUTPATIENT
Start: 2023-09-22 | End: 2023-09-22 | Stop reason: HOSPADM

## 2023-09-21 RX ORDER — ONDANSETRON 4 MG/1
4 TABLET, ORALLY DISINTEGRATING ORAL EVERY 6 HOURS PRN
Status: DISCONTINUED | OUTPATIENT
Start: 2023-09-21 | End: 2023-09-22 | Stop reason: HOSPADM

## 2023-09-21 RX ORDER — ASPIRIN 81 MG/1
81 TABLET ORAL 2 TIMES DAILY
Qty: 60 TABLET | Refills: 0 | Status: SHIPPED | OUTPATIENT
Start: 2023-09-21 | End: 2023-11-04

## 2023-09-21 RX ORDER — FENTANYL CITRATE 50 UG/ML
50 INJECTION, SOLUTION INTRAMUSCULAR; INTRAVENOUS EVERY 5 MIN PRN
Status: DISCONTINUED | OUTPATIENT
Start: 2023-09-21 | End: 2023-09-21

## 2023-09-21 RX ORDER — CEFAZOLIN SODIUM/WATER 2 G/20 ML
2 SYRINGE (ML) INTRAVENOUS
Status: COMPLETED | OUTPATIENT
Start: 2023-09-21 | End: 2023-09-21

## 2023-09-21 RX ORDER — HYDROMORPHONE HCL IN WATER/PF 6 MG/30 ML
0.1 PATIENT CONTROLLED ANALGESIA SYRINGE INTRAVENOUS
Status: DISCONTINUED | OUTPATIENT
Start: 2023-09-21 | End: 2023-09-22 | Stop reason: HOSPADM

## 2023-09-21 RX ORDER — CEFADROXIL 500 MG/1
500 CAPSULE ORAL 2 TIMES DAILY
Qty: 28 CAPSULE | Refills: 0 | Status: SHIPPED | OUTPATIENT
Start: 2023-09-21 | End: 2023-10-05

## 2023-09-21 RX ORDER — CEFAZOLIN SODIUM 1 G/3ML
1 INJECTION, POWDER, FOR SOLUTION INTRAMUSCULAR; INTRAVENOUS EVERY 8 HOURS
Status: COMPLETED | OUTPATIENT
Start: 2023-09-21 | End: 2023-09-22

## 2023-09-21 RX ORDER — AMLODIPINE BESYLATE 10 MG/1
10 TABLET ORAL DAILY
Status: DISCONTINUED | OUTPATIENT
Start: 2023-09-22 | End: 2023-09-22 | Stop reason: HOSPADM

## 2023-09-21 RX ORDER — PROCHLORPERAZINE MALEATE 5 MG
5 TABLET ORAL EVERY 6 HOURS PRN
Status: DISCONTINUED | OUTPATIENT
Start: 2023-09-21 | End: 2023-09-22 | Stop reason: HOSPADM

## 2023-09-21 RX ORDER — ONDANSETRON 2 MG/ML
4 INJECTION INTRAMUSCULAR; INTRAVENOUS EVERY 30 MIN PRN
Status: DISCONTINUED | OUTPATIENT
Start: 2023-09-21 | End: 2023-09-21

## 2023-09-21 RX ORDER — METOPROLOL SUCCINATE 50 MG/1
50 TABLET, EXTENDED RELEASE ORAL DAILY
Status: DISCONTINUED | OUTPATIENT
Start: 2023-09-22 | End: 2023-09-22 | Stop reason: HOSPADM

## 2023-09-21 RX ORDER — FENTANYL CITRATE 50 UG/ML
INJECTION, SOLUTION INTRAMUSCULAR; INTRAVENOUS PRN
Status: DISCONTINUED | OUTPATIENT
Start: 2023-09-21 | End: 2023-09-21

## 2023-09-21 RX ORDER — SODIUM CHLORIDE, SODIUM LACTATE, POTASSIUM CHLORIDE, CALCIUM CHLORIDE 600; 310; 30; 20 MG/100ML; MG/100ML; MG/100ML; MG/100ML
INJECTION, SOLUTION INTRAVENOUS CONTINUOUS
Status: DISCONTINUED | OUTPATIENT
Start: 2023-09-21 | End: 2023-09-21

## 2023-09-21 RX ORDER — BISACODYL 10 MG
10 SUPPOSITORY, RECTAL RECTAL DAILY PRN
Status: DISCONTINUED | OUTPATIENT
Start: 2023-09-21 | End: 2023-09-22 | Stop reason: HOSPADM

## 2023-09-21 RX ORDER — ACETAMINOPHEN 325 MG/1
650 TABLET ORAL EVERY 4 HOURS PRN
Status: DISCONTINUED | OUTPATIENT
Start: 2023-09-24 | End: 2023-09-22 | Stop reason: HOSPADM

## 2023-09-21 RX ORDER — OXYCODONE HYDROCHLORIDE 5 MG/1
10 TABLET ORAL
Status: DISCONTINUED | OUTPATIENT
Start: 2023-09-21 | End: 2023-09-21

## 2023-09-21 RX ORDER — AMOXICILLIN 250 MG
1-2 CAPSULE ORAL 2 TIMES DAILY
Qty: 60 TABLET | Refills: 1 | Status: SHIPPED | OUTPATIENT
Start: 2023-09-21 | End: 2023-11-04

## 2023-09-21 RX ORDER — NAPROXEN 250 MG/1
250 TABLET ORAL EVERY 12 HOURS PRN
Status: DISCONTINUED | OUTPATIENT
Start: 2023-09-21 | End: 2023-09-22 | Stop reason: HOSPADM

## 2023-09-21 RX ORDER — TRANEXAMIC ACID 650 MG/1
1950 TABLET ORAL ONCE
Status: COMPLETED | OUTPATIENT
Start: 2023-09-21 | End: 2023-09-21

## 2023-09-21 RX ORDER — HYDROMORPHONE HYDROCHLORIDE 1 MG/ML
0.3 INJECTION, SOLUTION INTRAMUSCULAR; INTRAVENOUS; SUBCUTANEOUS
Status: DISCONTINUED | OUTPATIENT
Start: 2023-09-21 | End: 2023-09-22 | Stop reason: HOSPADM

## 2023-09-21 RX ORDER — PROPOFOL 10 MG/ML
INJECTION, EMULSION INTRAVENOUS CONTINUOUS PRN
Status: DISCONTINUED | OUTPATIENT
Start: 2023-09-21 | End: 2023-09-21

## 2023-09-21 RX ORDER — OXYCODONE HYDROCHLORIDE 5 MG/1
5 TABLET ORAL
Status: DISCONTINUED | OUTPATIENT
Start: 2023-09-21 | End: 2023-09-21

## 2023-09-21 RX ORDER — ONDANSETRON 2 MG/ML
INJECTION INTRAMUSCULAR; INTRAVENOUS PRN
Status: DISCONTINUED | OUTPATIENT
Start: 2023-09-21 | End: 2023-09-21

## 2023-09-21 RX ORDER — SODIUM CHLORIDE, SODIUM LACTATE, POTASSIUM CHLORIDE, CALCIUM CHLORIDE 600; 310; 30; 20 MG/100ML; MG/100ML; MG/100ML; MG/100ML
INJECTION, SOLUTION INTRAVENOUS CONTINUOUS
Status: DISCONTINUED | OUTPATIENT
Start: 2023-09-21 | End: 2023-09-21 | Stop reason: HOSPADM

## 2023-09-21 RX ORDER — LIDOCAINE 40 MG/G
CREAM TOPICAL
Status: DISCONTINUED | OUTPATIENT
Start: 2023-09-21 | End: 2023-09-21 | Stop reason: HOSPADM

## 2023-09-21 RX ORDER — ONDANSETRON 2 MG/ML
4 INJECTION INTRAMUSCULAR; INTRAVENOUS EVERY 6 HOURS PRN
Status: DISCONTINUED | OUTPATIENT
Start: 2023-09-21 | End: 2023-09-22 | Stop reason: HOSPADM

## 2023-09-21 RX ORDER — ACETAMINOPHEN 325 MG/1
975 TABLET ORAL EVERY 8 HOURS
Status: DISCONTINUED | OUTPATIENT
Start: 2023-09-21 | End: 2023-09-22 | Stop reason: HOSPADM

## 2023-09-21 RX ORDER — HYDROMORPHONE HCL IN WATER/PF 6 MG/30 ML
0.4 PATIENT CONTROLLED ANALGESIA SYRINGE INTRAVENOUS EVERY 5 MIN PRN
Status: DISCONTINUED | OUTPATIENT
Start: 2023-09-21 | End: 2023-09-21

## 2023-09-21 RX ORDER — SODIUM CHLORIDE, SODIUM LACTATE, POTASSIUM CHLORIDE, CALCIUM CHLORIDE 600; 310; 30; 20 MG/100ML; MG/100ML; MG/100ML; MG/100ML
INJECTION, SOLUTION INTRAVENOUS CONTINUOUS PRN
Status: DISCONTINUED | OUTPATIENT
Start: 2023-09-21 | End: 2023-09-21

## 2023-09-21 RX ORDER — LIDOCAINE HYDROCHLORIDE 10 MG/ML
INJECTION, SOLUTION INFILTRATION; PERINEURAL PRN
Status: DISCONTINUED | OUTPATIENT
Start: 2023-09-21 | End: 2023-09-21

## 2023-09-21 RX ORDER — FENTANYL CITRATE 50 UG/ML
25 INJECTION, SOLUTION INTRAMUSCULAR; INTRAVENOUS EVERY 5 MIN PRN
Status: DISCONTINUED | OUTPATIENT
Start: 2023-09-21 | End: 2023-09-21

## 2023-09-21 RX ADMIN — PROPOFOL 100 MCG/KG/MIN: 10 INJECTION, EMULSION INTRAVENOUS at 09:50

## 2023-09-21 RX ADMIN — TRANEXAMIC ACID 1950 MG: 650 TABLET ORAL at 07:38

## 2023-09-21 RX ADMIN — ASPIRIN 81 MG: 81 TABLET, COATED ORAL at 21:02

## 2023-09-21 RX ADMIN — PHENYLEPHRINE HYDROCHLORIDE 200 MCG: 10 INJECTION INTRAVENOUS at 10:28

## 2023-09-21 RX ADMIN — PHENYLEPHRINE HYDROCHLORIDE 0.3 MCG/KG/MIN: 10 INJECTION INTRAVENOUS at 10:33

## 2023-09-21 RX ADMIN — BUPIVACAINE HYDROCHLORIDE 2.6 ML: 5 INJECTION, SOLUTION EPIDURAL; INTRACAUDAL; PERINEURAL at 10:00

## 2023-09-21 RX ADMIN — CEFAZOLIN 1 G: 1 INJECTION, POWDER, FOR SOLUTION INTRAMUSCULAR; INTRAVENOUS at 17:53

## 2023-09-21 RX ADMIN — SODIUM CHLORIDE, POTASSIUM CHLORIDE, SODIUM LACTATE AND CALCIUM CHLORIDE: 600; 310; 30; 20 INJECTION, SOLUTION INTRAVENOUS at 14:09

## 2023-09-21 RX ADMIN — FENTANYL CITRATE 25 MCG: 50 INJECTION, SOLUTION INTRAMUSCULAR; INTRAVENOUS at 12:39

## 2023-09-21 RX ADMIN — ONDANSETRON 4 MG: 2 INJECTION INTRAMUSCULAR; INTRAVENOUS at 11:59

## 2023-09-21 RX ADMIN — PHENYLEPHRINE HYDROCHLORIDE 200 MCG: 10 INJECTION INTRAVENOUS at 10:16

## 2023-09-21 RX ADMIN — PHENYLEPHRINE HYDROCHLORIDE 100 MCG: 10 INJECTION INTRAVENOUS at 10:37

## 2023-09-21 RX ADMIN — ACETAMINOPHEN 975 MG: 325 TABLET ORAL at 21:03

## 2023-09-21 RX ADMIN — Medication 2 G: at 09:58

## 2023-09-21 RX ADMIN — ACETAMINOPHEN 975 MG: 325 TABLET ORAL at 13:09

## 2023-09-21 RX ADMIN — FENTANYL CITRATE 25 MCG: 50 INJECTION, SOLUTION INTRAMUSCULAR; INTRAVENOUS at 12:29

## 2023-09-21 RX ADMIN — FENTANYL CITRATE 50 MCG: 50 INJECTION, SOLUTION INTRAMUSCULAR; INTRAVENOUS at 10:03

## 2023-09-21 RX ADMIN — PHENYLEPHRINE HYDROCHLORIDE 100 MCG: 10 INJECTION INTRAVENOUS at 10:22

## 2023-09-21 RX ADMIN — LIDOCAINE HYDROCHLORIDE 20 MG: 10 INJECTION, SOLUTION INFILTRATION; PERINEURAL at 09:50

## 2023-09-21 RX ADMIN — OXYCODONE HYDROCHLORIDE 5 MG: 5 TABLET ORAL at 13:09

## 2023-09-21 RX ADMIN — SODIUM CHLORIDE, POTASSIUM CHLORIDE, SODIUM LACTATE AND CALCIUM CHLORIDE: 600; 310; 30; 20 INJECTION, SOLUTION INTRAVENOUS at 10:38

## 2023-09-21 RX ADMIN — SODIUM CHLORIDE, POTASSIUM CHLORIDE, SODIUM LACTATE AND CALCIUM CHLORIDE: 600; 310; 30; 20 INJECTION, SOLUTION INTRAVENOUS at 09:58

## 2023-09-21 ASSESSMENT — ACTIVITIES OF DAILY LIVING (ADL)
DIFFICULTY_EATING/SWALLOWING: NO
ADLS_ACUITY_SCORE: 33
ADLS_ACUITY_SCORE: 35
ADLS_ACUITY_SCORE: 18
WALKING_OR_CLIMBING_STAIRS_DIFFICULTY: NO
WEAR_GLASSES_OR_BLIND: NO
ADLS_ACUITY_SCORE: 35
CHANGE_IN_FUNCTIONAL_STATUS_SINCE_ONSET_OF_CURRENT_ILLNESS/INJURY: NO
ADLS_ACUITY_SCORE: 35
DRESSING/BATHING_DIFFICULTY: NO
ADLS_ACUITY_SCORE: 35
ADLS_ACUITY_SCORE: 35
TOILETING_ISSUES: NO
ADLS_ACUITY_SCORE: 18
DOING_ERRANDS_INDEPENDENTLY_DIFFICULTY: NO
FALL_HISTORY_WITHIN_LAST_SIX_MONTHS: NO
CONCENTRATING,_REMEMBERING_OR_MAKING_DECISIONS_DIFFICULTY: NO
EQUIPMENT_CURRENTLY_USED_AT_HOME: WALKER, ROLLING
ADLS_ACUITY_SCORE: 35

## 2023-09-21 NOTE — PROGRESS NOTES
09/21/23 1430   Appointment Info   Signing Clinician's Name / Credentials (PT) Pavel Donovan, PT, DPT   Living Environment   People in Home alone   Current Living Arrangements house   Home Accessibility no concerns   Self-Care   Usual Activity Tolerance good   Current Activity Tolerance moderate   Equipment Currently Used at Home walker, rolling   General Information   Onset of Illness/Injury or Date of Surgery 09/21/23   Referring Physician Dr. Bernal   Patient/Family Therapy Goals Statement (PT) Decrease pain with mobility   Pertinent History of Current Problem (include personal factors and/or comorbidities that impact the POC) s/p YAMILET revision   Existing Precautions/Restrictions no hip IR;no hip ADD past midline;90 degree hip flexion;weight bearing   Weight-Bearing Status - LLE full weight-bearing   Weight-Bearing Status - RLE weight-bearing as tolerated   Range of Motion (ROM)   ROM Comment WFL, decreased RLE due to precautions   Strength (Manual Muscle Testing)   Strength Comments WFL   Bed Mobility   Bed Mobility supine-sit   Supine-Sit [place] (Bed Mobility) supervision   Transfers   Transfers sit-stand transfer   Sit-Stand Transfer   Sit-Stand [place] (Transfers) contact guard   Assistive Device (Sit-Stand Transfers) walker, front-wheeled   Gait/Stairs (Locomotion)   [place] Level (Gait) contact guard   Assistive Device (Gait) walker, front-wheeled   Distance in Feet 10'   Distance in Feet (Gait) 120'   Pattern (Gait) step-through   Deviations/Abnormal Patterns (Gait) yuliet decreased;stride length decreased   Clinical Impression   Criteria for Skilled Therapeutic Intervention Yes, treatment indicated   PT Diagnosis (PT) Impaired functional mobility   Influenced by the following impairments Weakness, pain   Functional limitations due to impairments Transfers, gait, stairs   Clinical Presentation (PT Evaluation Complexity) Stable/Uncomplicated   Clinical Presentation Rationale Pt presents  as medically diagnosed   Clinical Decision Making (Complexity) low complexity   Planned Therapy Interventions (PT) gait training;home exercise program;patient/family education;stair training;strengthening;transfer training   Anticipated Equipment Needs at Discharge (PT) walker, rolling   Risk & Benefits of therapy have been explained care plan/treatment goals reviewed;patient   PT Total Evaluation Time   PT Eval, Low Complexity Minutes (83416) 10   Plan of Care Review   Plan of Care Reviewed With patient   Physical Therapy Goals   PT Frequency Daily   PT Predicted Duration/Target Date for Goal Attainment 09/23/23   PT Goals Transfers;Gait;Stairs;PT Goal 1   PT: Transfers Supervision/stand-by assist;Sit to/from stand   PT: Gait Supervision/stand-by assist;Rolling walker;150 feet   PT: Stairs Supervision/stand-by assist;4 stairs;Rail on both sides   PT: Goal 1 I with YAMILET HEP   Interventions   Interventions Quick Adds Gait Training;Therapeutic Activity   Therapeutic Activity   Therapeutic Activities: dynamic activities to improve functional performance Minutes (86175) 12   Symptoms Noted During/After Treatment Fatigue;Increased pain   Treatment Detail/Skilled Intervention Supine to sit completed SBA, increased time to review precautions and for set up, sit<>stand CGA. Recliner set up, SBA at EOB. Encouraged amb as able with nursing.   Gait Training   Gait Training Minutes (31394) 10   Symptoms Noted During/After Treatment (Gait Training) fatigue   Treatment Detail/Skilled Intervention Pt amb well in the halls CGA with FWW, short standing rest break [facility], PT managing IV. Cues for safety with turns to avoid breaking precautions. Educated on walking program and POC, role of therapy for d/c.   [place] Level (Gait Training) contact guard   Physical Assistance Level (Gait Training) supervision;verbal cues   Weight Bearing (Gait Training) weight-bearing as tolerated   Assistive Device (Gait Training) rolling walker    Pattern Analysis (Gait Training) swing-through gait   Gait Analysis Deviations decreased yuliet;decreased step length   Impairments (Gait Analysis/Training) pain;strength decreased   PT Discharge Planning   PT Plan Amb, stair trial, YAMILET HEP   PT Discharge Recommendation (DC Rec)   (Defer to ortho team)   PT Rationale for DC Rec Ambulating and transferring well, lives alone, dtr in area to assist as needed, no stairs.   PT Brief overview of current status SBA for transfers and amb 120'

## 2023-09-21 NOTE — OP NOTE
Operative Report    PATIENT Oscar Zhao   DATE OF SURGERY:  9/21/2023      PREOPERATIVE DIAGNOSIS   Failed right total hip arthroplasty, due to recurrent dislocation, subsequent encounter    POSTOPERATIVE DIAGNOSIS   Failed right total hip arthroplasty, due to recurrent dislocation, subsequent encounter    PROCEDURE PERFORMED   Revision right total hip arthroplasty of both components, head/liner exchange    IMPLANTS  1.  DePuy [place] ALTRX acetabular liner, +4 neutral, 48/32 mm  DePuy CoCr femoral head, 32/+13 mm    SURGEON  Bill Bernal DO     ASSISTANT   MD Ezio Gallego PA-C . PA-C was needed for positioning, draping, retraction/protection of vital structures, assistance with instrumentation and correct implant placement, deep periarticular injection, closure including deep capsular layer and maintaining patient safety throughout the procedure.  Several of these duties can only be performed by a PA-C and not a lesser trained surgical assistant.    ANESTHESIA  Spinal      FINDINGS:  Acetabular component well fixed, anteversion: 30 degrees.  Posterior lipped acetabular liner with notable wear/warping which appears to be due to posterior femoral neck impingement    Femoral component well fixed, anteversion: 5 degrees.     SPECIMENS:  Cell count differential: 775 WBC, differential pending  Fluid culture x1    ESTIMATED BLOOD LOSS:  200 ml    COMPLICATIONS   None.      INDICATION FOR PROCEDURE  Oscar Zhaois a 78 year old female who underwent a total hip arthroplasty in 2015 by one of my former partners.  She did well initially but went on to sustain a posterior dislocation in 2019.  This was treated conservatively.  She then had a second minimally traumatic dislocation in January 2023 followed by a third dislocation in August 2023.  Due to this, patient was indicated for head and liner exchange.  Risks and benefits were discussed.  Expected postoperative course was discussed as well.  Following  this the patient elected to proceed.  Medical clearance was obtained prior to the procedure.    INFORMED CONSENT  Oscar Zhao was identified in the preoperative holding area and was identified using medical record number, name, and date of birth, all of which were confirmed. The operative hip was marked using an indelible marker and informed consent was signed. Once again, all risks and benefits as well as alternatives to surgical intervention were discussed with the patient in detail and all the questions were answered. Risks discussed included but were not limited to: persistent pain, infection, bleeding, scarring, stiffness, thromboembolic events, fracture, malalignment/malrotation, leg length discrepancy, dislocation, implant complications, severe limb dysfunction, loss of limb, and loss of life. The patient signed informed consent and wished to proceed with surgery as scheduled.     TECHNIQUE  The patient was taken back to the operating room and placed on the operating table.  Spinal anesthesia was performed without difficulty.  The patient was then placed in the lateral decubitus position with the operative hip elevated, held in place by a hip positioner.  All bony prominences were well-padded and an axillary roll was used.  The operative hip was then sterilely prepped and draped in usual fashion.  A timeout was performed prior to the procedure, this verified the patient's name, correct site and side of surgery, and the procedure being performed.  Implants needed were available.  It was confirmed that the patient received antibiotics and TXA prior to the start of the procedure.     I then made a posterolateral incision centered over the greater trochanter, through the patient's previous incision.  Hemostasis was obtained and dissection was performed with electrocautery down to the fascial layer. The fascial layer was split with electrocautery as well and the gluteal musculature was gently split in line with  its fibers.  There was a defect in the posterior capsule but a decent pseudocapsule had formed around the posterior hip.  Fluid sample was obtained for cell count and culture. Charnley retractor was then placed and a standard posterior approach was utilized.  Stability was tested and the patient was found to be dislocating at 90 degrees of flexion and 15 degrees of internal rotation.  It appeared that there was notable anterior scar tissue causing impingement. The hip was then dislocated and brought into the field.  The head of the total of arthroplasty was removed easily.  All scar tissue and fibrous material around the proximal femoral implant was removed.  Femoral component was evaluated and found to be well fixed.  Anteversion was 5 degrees.    Retractors were then placed to expose the acetabulum.  There was significant anterior capsular scar tissue which appeared to be a significant impingement point.  Anterior capsular scar tissue was removed to increase mobility the femur anteriorly.  All overlying scar tissue was removed from the acetabular component so that a full 360  view was obtained.  The liner was found to have significant warping and wear of the 10 degree posterior lip.  It appeared that some of this was due to the traumatic dislocation, but it also appeared that there was posterior femoral neck impingement.  The liner was removed with a curved osteotome.  Acetabular component was evaluated and found to be well fixed.  Anteversion was 30, and abduction angle was 45.      I then trialed multiple different constructs including a new 10 degree, +4 offset liner with lipped posterior and increased head length.  This is where I noted that there was obvious posterior femoral neck impingement on the lip.  This was causing some anterior instability now that I had resected the anterior scar tissue.  I then attempted a neutral offset liner with a +9 and +13 mm head.  The +13 mm head did increase length by 7 or 8  mm but it relieved the impingement points and gave good stability with flexion to 90 degrees and internal rotation to 50 degrees.  There was no anterior instability with external rotation or posterior impingement.  I trialed 1 final construct of a dual mobility construct with a +7 head and a outer diameter 41 mm head.  This did give decent stability, but I did not feel confident as there was some possible anterior instability with external rotation and flexion to 90 degrees only allowed for internal rotation to 30 degrees.  I elected to go forward with the neutral offset liner and +13 mm head, with the knowledge that this would lead to increased length.      Following this the trial head was removed.  the trunnion was cleaned and the final titanium sleeved ceramic femoral head was impacted. The cup was irrigated, and then the hip was easily reduced. The wound was then copiously irrigated with normal saline.       I elevated the pseudocapsule of the posterior hip and was able to mobilize it to the posterior greater trochanter.  Closure of this was performed with #5 Ethibond suture.  The repair was rather robust.  The remaining tissue was closed in layers.  The fascia was closed with #1 stratafix, subcutaneous tissue was closed with 2-0 Stratafix and staples were used for skin.  A sterile aquacel dressing was placed over the incision.  A hip abduction pillow was placed and the patient was transferred to the supine position in the postoperative bed. They were transferred to PACU in stable condition.    COMMENTS:  There were no complications during the case.  The patient can weight-bear as tolerated with posterior hip precautions. Twice a day aspirin will be used for DVT prophylaxis.    Implant Name Type Inv. Item Serial No.  Lot No. LRB No. Used Action   IMP LINER HIP DEPUY PINNACLE ALTRX [id number] +4 1221- - [id number] Total Joint Component/Insert IMP LINER HIP DEPUY PINNACLE ALTRX [id number] +4  1221-  &Mobile [employer]hentication [facility]- [id number] Right 1 Implanted   IMP HEAD FEMORAL DEPUY PROSTALAC 32MM +13 [facility] 1365- - [id number] Total Joint Component/Insert IMP HEAD FEMORAL DEPUY PROSTALAC 32MM +13 [facility] 1365-  &J [facility]- [id number] Right 1 Implanted   HEAD CERAMIC DELTA 12/14 TAPER 32 Total Joint Component/Insert   Depuy [id number] Right 1 Explanted   SHELL PINNACLE [address] GRIPTION 48MM - 1217- Total Joint Component/Insert   Depuy [id number] Right 1 Explanted       Bill Bernal DO

## 2023-09-21 NOTE — ANESTHESIA PROCEDURE NOTES
"Intrathecal catheter Procedure Note    Pre-Procedure   Staff -        Anesthesiologist:  Naveen Pereyra DO       Performed By: anesthesiologist       Procedure Start/Stop Times: 9/21/2023 10:00 AM and 9/21/2023 10:05 AM       Pre-Anesthestic Checklist: patient identified, IV checked, risks and benefits discussed, informed consent, monitors and equipment checked, pre-op evaluation, at physician/surgeon's request and post-op pain management  Timeout:       Correct Patient: Yes        Correct Procedure: Yes        Correct Site: Yes        Correct Position: Yes   Procedure Documentation  Procedure: intrathecal catheter       Patient Position: sitting       Skin prep: Chloraprep       Insertion Site: L3-4. (midline approach).       Needle Gauge: 25.        Needle Length (Inches): 3.5        Spinal Needle Type: Pencan       Introducer used       Introducer: 20 G       # of attempts: 1 and  # of redirects:     Assessment/Narrative         Paresthesias: No.       CSF fluid: clear.    Medication(s) Administered   0.5% Bupivacaine PF (Intrathecal) - Intrathecal   2.6 mL - 9/21/2023 10:00:00 AM  Medication Administration Time: 9/21/2023 10:00 AM      FOR [facility] ([facility]) ONLY:   Pain Team Contact information: please page the Pain Team Via [employer]. Search \"Pain\". During daytime hours, please page the attending first. At night please page the resident first.      "

## 2023-09-21 NOTE — PLAN OF CARE
Problem: Hip Arthroplasty  Goal: Acceptable Pain Control  Outcome: Progressing  Intervention: Prevent or Manage Pain  Recent Flowsheet Documentation  Taken 9/21/2023 1400 by Corrie Mcqueen RN  Pain Management Interventions: cold applied     Problem: Hip Arthroplasty  Goal: Optimal Coping  Outcome: Progressing   Goal Outcome Evaluation:         Pt having very minimal pain.  She has been up to the commode.  She is alert and oriented and can make her needs known.

## 2023-09-21 NOTE — ANESTHESIA PREPROCEDURE EVALUATION
Anesthesia Pre-Procedure Evaluation    Patient: Oscar Zhao   MRN: [id number] : 1945        Procedure : Procedure(s):  RIGHT HIP REVISION TOTAL HIP ARTHROPLASTY          Past Medical History:   Diagnosis Date     Arthritis      Basal cell carcinoma of anterior chest      Breast cyst      History of anesthesia complications      HLA B27 (HLA B27 positive)      Hypertension      Osteoporosis 2011     Peripheral neuropathy 2018     PONV (postoperative nausea and vomiting)      Scleritis, bilateral     Left .  Treated with corticosteroids,, methotrexate and Humira.  .  Rituximab.     Squamous cell carcinoma of skin of chest      Steroid induced glaucoma, both eyes       Past Surgical History:   Procedure Laterality Date     BREAST CYST EXCISION Right     benign      SECTION       CLOSED REDUCTION HIP Right 2019    Procedure: CLOSED REDUCTION, HIP;  Surgeon: Jak Ruiz DO;  Location: [facility];  Service: Orthopedics     CLOSED REDUCTION HIP Right 2023    Procedure: CLOSED REDUCTION, HIP RIGHT;  Surgeon: Aditya Pedroza MD;  Location: [facility]     COLONOSCOPY  2011     COLONOSCOPY  2005     COLONOSCOPY W/ BIOPSIES AND POLYPECTOMY  2021    16 to 20 mm polyp:tubular adenoma in the ascending colon.  Ulcerated mass in the anal canal: Moderately differentiated squamous cell cancer.     ESOPHAGOSCOPY, GASTROSCOPY, DUODENOSCOPY (EGD), COMBINED  2017    Large hiatal hernia.  Reactive gastropathy with mucosal erosion.  H. pylori negative.     HERNIORRHAPHY FEMORAL Left 2021    Procedure: LEFT FEMORAL HERNIA REPAIR;  Surgeon: Sidney Murray MD;  Location: [facility] OR     HYSTERECTOMY TOTAL ABDOMINAL, BILATERAL SALPINGO-OOPHORECTOMY, COMBINED       IR CHEST PORT PLACEMENT > 5 YRS OF AGE  3/15/2021     IR PORT PLACEMENT >5 YEARS  03/15/2021    Right IJ port-a-cath.     IR PORT REMOVAL RIGHT  2021      LAPAROSCOPIC APPENDECTOMY  04/28/2011     PHACOEMULSIFICATION CLEAR CORNEA WITH STANDARD INTRAOCULAR LENS IMPLANT Right 08/30/2017     PHACOEMULSIFICATION CLEAR CORNEA WITH STANDARD INTRAOCULAR LENS IMPLANT Left 09/13/2017     TOTAL HIP ARTHROPLASTY Right 08/18/2015    Procedure: HIP TOTAL ARTHROPLASTY, RIGHT;  Surgeon: Star Du MD;  Location: [facility] Main OR;  Service:      [facility] TOTAL KNEE ARTHROPLASTY Left 03/02/2017    Procedure: LEFT TOTAL KNEE ARTHROPLASTY;  Surgeon: Star Du MD;  Location: [facility] Main OR;  Service: Orthopedics      Allergies   Allergen Reactions     Adalimumab Muscle Pain (Myalgia)     Brimonidine-Timolol [Brimonidine Tartrate-Timolol] Unknown     Redness itching in eyes     Levaquin [Levofloxacin] Unknown     Skin burn and couldn't get out bed for 5 days     Tetracyclines & Related Hives      Social History     Tobacco Use     Smoking status: Never     Smokeless tobacco: Never   Substance Use Topics     Alcohol use: Yes     Alcohol/week: 4.0 standard drinks of alcohol     Types: 4 Standard drinks or equivalent per week     Comment: 3-4/wk      Wt Readings from Last 1 Encounters:   09/21/23 61.2 kg (135 lb)        Anesthesia Evaluation   Pt has had prior anesthetic.     History of anesthetic complications       ROS/MED HX  ENT/Pulmonary:       Neurologic:       Cardiovascular:     (+)  hypertension- -   -  - -                                      METS/Exercise Tolerance:     Hematologic:       Musculoskeletal:       GI/Hepatic:     (+) GERD,                   Renal/Genitourinary:       Endo:       Psychiatric/Substance Use:       Infectious Disease:       Malignancy:       Other:            Physical Exam    Airway        Mallampati: II   TM distance: > 3 FB   Neck ROM: full     Respiratory Devices and Support         Dental           Cardiovascular             Pulmonary               OUTSIDE LABS:  CBC:   Lab Results   Component Value Date    WBC 5.3 08/30/2023    WBC  4.7 06/20/2023    HGB 13.0 09/21/2023    HGB 11.5 (L) 08/30/2023    HCT 37.0 08/30/2023    HCT 43.3 06/20/2023     08/30/2023     06/20/2023     BMP:   Lab Results   Component Value Date     08/30/2023     06/20/2023    POTASSIUM 3.4 (L) 08/30/2023    POTASSIUM 4.5 06/20/2023    CHLORIDE 112 (H) 08/30/2023    CHLORIDE 104 06/20/2023    CO2 20 (L) 08/30/2023    CO2 24 06/20/2023    BUN 11 08/30/2023    BUN 13.1 06/20/2023    CR 0.50 (L) 08/30/2023    CR 0.63 06/20/2023    GLC 93 09/21/2023     08/30/2023     COAGS: No results found for: PTT, INR, FIBR  POC: No results found for: BGM, HCG, HCGS  HEPATIC:   Lab Results   Component Value Date    ALBUMIN 4.4 06/20/2023    PROTTOTAL 7.1 06/20/2023    ALT 18 06/20/2023    AST 21 06/20/2023    ALKPHOS 88 06/20/2023    BILITOTAL 0.8 06/20/2023     OTHER:   Lab Results   Component Value Date    LACT 0.7 04/05/2021    A1C 5.3 10/02/2014    MICHEL 7.0 (L) 08/30/2023    PHOS 3.1 04/08/2021    MAG 1.7 (L) 04/07/2021    TSH 1.21 05/24/2022    CRP 4.0 (H) 04/07/2021    SED 7 09/12/2023       Anesthesia Plan    ASA Status:  3       Anesthesia Type: Spinal.      Maintenance: TIVA.        Consents          - Extended Intubation/Ventilatory Support Discussed: Yes.      - Patient is DNR/DNI Status: No     Use of blood products discussed: No .     Postoperative Care    Pain management: IV analgesics, Oral pain medications.   PONV prophylaxis: Ondansetron (or other 5HT-3), Dexamethasone or Solumedrol     Comments:              Jak Henderson MD

## 2023-09-21 NOTE — PHARMACY-ADMISSION MEDICATION HISTORY
Pharmacist Admission Medication History    Admission medication history is complete. The information provided in this note is only as accurate as the sources available at the time of the update.    Medication reconciliation/reorder completed by provider prior to medication history? No    Information Source(s): Patient and CareEverywhere/SureScripts via in-person    Pertinent Information:      Changes made to PTA medication list:  Added: None  Deleted: Clindamycin - stated took last dose yesterday  Changed: None    Medication Affordability:  Not including over the counter (OTC) medications, was there a time in the past 3 months when you did not take your medications as prescribed because of cost?: No    Allergies reviewed with patient and updates made in EHR: yes    Medication History Completed By: Mickey Banks RPH 9/21/2023 7:18 AM    Prior to Admission medications    Medication Sig Last Dose Taking? Auth Provider Long Term End Date   amLODIPine (NORVASC) 5 MG tablet Take 2 tablets (10 mg) by mouth daily 9/21/2023 at am Yes Jak Jones MD Yes    lisinopril (ZESTRIL) 20 MG tablet Take 1 tablet (20 mg) by mouth daily 9/21/2023 at am Yes Jak Jones MD Yes    LOTEMAX 0.5 % ophthalmic suspension Place 1 drop into both eyes every 48 hours 9/21/2023 at am - has with Yes Provider, Historical     metoprolol succinate ER (TOPROL XL) 25 MG 24 hr tablet Take 2 tablets (50 mg) by mouth daily 9/21/2023 at am Yes Jak Jones MD Yes    timolol maleate (TIMOPTIC) 0.5 % ophthalmic solution Place 1 drop into both eyes daily 9/21/2023 at am - has with Yes Reported, Patient

## 2023-09-21 NOTE — ANESTHESIA POSTPROCEDURE EVALUATION
Patient: Oscar Zhao    Procedure: Procedure(s):  RIGHT HIP REVISION TOTAL HIP ARTHROPLASTY       Anesthesia Type:  Spinal    Note:  Disposition: Outpatient   Postop Pain Control: Uneventful            Sign Out: Well controlled pain   PONV: No   Neuro/Psych: Uneventful            Sign Out: Acceptable/Baseline neuro status   Airway/Respiratory: Uneventful            Sign Out: Acceptable/Baseline resp. status   CV/Hemodynamics: Uneventful            Sign Out: Acceptable CV status; No obvious hypovolemia; No obvious fluid overload   Other NRE: NONE   DID A NON-ROUTINE EVENT OCCUR? No    Event details/Postop Comments:  Patient recovering comfortably.        Last vitals:  Vitals Value Taken Time   /66 09/21/23 1330   Temp 36.5  C (97.7  F) 09/21/23 1330   Pulse 76 09/21/23 1335   Resp 17 09/21/23 1252   SpO2 98 % 09/21/23 1335   Vitals shown include unvalidated device data.    Electronically Signed By: Naveen Pereyra DO  September 21, 2023  3:11 PM

## 2023-09-21 NOTE — ANESTHESIA CARE TRANSFER NOTE
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    Anesthesia Type:   Spinal     Note:    Oropharynx: oropharynx clear of all foreign objects and spontaneously breathing  Level of Consciousness: awake  Oxygen Supplementation: face mask  Level of Supplemental Oxygen (L/min / FiO2): 8  Independent Airway: airway patency satisfactory and stable  Dentition: dentition unchanged  Vital Signs Stable: post-procedure vital signs reviewed and stable  Report to RN Given: handoff report given  Patient transferred to: PACU    Handoff Report: Identifed the Patient, Identified the Reponsible Provider, Reviewed the pertinent medical history, Discussed the surgical course, Reviewed Intra-OP anesthesia mangement and issues during anesthesia, Set expectations for post-procedure period and Allowed opportunity for questions and acknowledgement of understanding      Vitals:  Vitals Value Taken Time   /79 09/21/23 1218   Temp 36.4  C (97.6  F) 09/21/23 1217   Pulse 67 09/21/23 1219   Resp 14 09/21/23 1219   SpO2 100 % 09/21/23 1219   Vitals shown include unvalidated device data.    Electronically Signed By: PARTH Newell CRNA  September 21, 2023  12:20 PM

## 2023-09-21 NOTE — CONSULTS
[facility] MEDICINE CONSULT NOTE   Physician requesting consult: Bill Bernal DO    Reason for consult: Postoperative medical management of medical co-morbidities as below    Assessment and Plan    Oscar Zhao is a 78 year old old female with a history of hypertension who has undergone a right hip revision/total hip arthroplasty.  [facility] service was asked to evaluate patient for postoperative medical management as follows below. Please resume the home medications as reconciled and further noted below with ordered hold parameters.  Vital signs have been stable post-operatively including hemodynamically stable blood pressure and heart rate. Thank you for this consult; we will continue to follow this patient until discharge.    Right hip revision total hip arthroplasty  Routine postoperative cares  PT and OT evaluations  Pain control  Hopefully home in 24 hours  Defer VTE prophylaxis to orthopedist    Essential hypertension  On multidrug therapy  At risk for acute kidney injury and electrolyte abnormalities  BNP in a.m.  Home meds with hold parameters    Left lower extremity wound  Examined and well-healed    Remote history of anal cancer      Procedure(s):  RIGHT HIP REVISION TOTAL HIP ARTHROPLASTY  Post-operative Day: Day of Surgery  Code status:Full Code       Estimated Blood Loss:  200 mL    Hospital Problem List   No problem-specific Assessment & Plan notes found for this encounter.    Principal Problem:    Failure of right total hip arthroplasty, subsequent encounter      -Reviewed the patient's preoperative H and P and updated missing elements.  -Home medication reconciliation has been reviewed.  Medications have been ordered as noted from the home list and changes are documented above     HISTORY     Oscar Zhao is a 78 year old old female history of hypertension who presents for revision of a total hip arthroplasty.  Please see the operative note for details of the surgery.   Please see the H&P was reviewed by me for preoperative course.  Role of hospital medicine discussed and questions answered.  Patient has l history of hypertension which is well controlled on multidrug regimen.  Patient states that she has not had any recent adjustments to her meds.  She is not sure why she is hypertensive and is asking about that.  She explains that she also has some whitecoat hypertension.  She does not have diabetes or sleep apnea.  No history of DVT or PE.  She does have a history of a ulcer on her leg after a closed reduction.  He had been on some antibiotics.  This is a well-healed at my exam today.  She has no history of coronary disease that she is aware of.    Past Medical History     Patient Active Problem List    Diagnosis Date Noted    Failure of right total hip arthroplasty, subsequent encounter 09/21/2023     Priority: Medium    Dislocation of internal right hip prosthesis, initial encounter (H) 08/30/2023     Priority: Medium    Chronic fatigue 10/08/2022     Priority: Medium    Scleritis, bilateral      Priority: Medium     Left 2010.  Treated with corticosteroids,, methotrexate and Humira.  2015.    Rituximab.        Malignant neoplasm of anal canal (H) 02/25/2021     Priority: Medium    Essential hypertension      Priority: Medium     Created by Conversion  Replacement Utility updated for latest IMO load            Surgical History   She  has a past surgical history that includes IR Chest Port Placement > 5 Yrs of Age (3/15/2021); Laparoscopic appendectomy (04/28/2011); Total Hip Arthroplasty (Right, 08/18/2015); TOTAL KNEE ARTHROPLASTY (Left, 03/02/2017); Closed reduction hip (Right, 03/26/2019); Breast Cyst Excision (Right, 1981); Hysterectomy total abdominal, bilateral salpingo-oophorectomy, combined (1996); Colonoscopy (09/07/2011); Colonoscopy (09/27/2005); Esophagoscopy, gastroscopy, duodenoscopy (EGD), combined (05/05/2017); Phacoemulsification clear cornea with standard  intraocular lens implant (Right, 2017); Phacoemulsification clear cornea with standard intraocular lens implant (Left, 2017); Colonoscopy W/ Biopsies And Polypectomy (2021);  Section (); Ir Port Placement >5 Years (03/15/2021); IR Port Removal Right (2021); Herniorrhaphy femoral (Left, 2021); and Closed reduction hip (Right, 2023).     Past Surgical History:   Procedure Laterality Date    BREAST CYST EXCISION Right     benign     SECTION      CLOSED REDUCTION HIP Right 2019    Procedure: CLOSED REDUCTION, HIP;  Surgeon: Jak Ruiz DO;  Location: [facility] [place];  Service: Orthopedics    CLOSED REDUCTION HIP Right 2023    Procedure: CLOSED REDUCTION, HIP RIGHT;  Surgeon: Aditya Pedroza MD;  Location: [facility] [place]    COLONOSCOPY  2011    COLONOSCOPY  2005    COLONOSCOPY W/ BIOPSIES AND POLYPECTOMY  2021    16 to 20 mm polyp:tubular adenoma in the ascending colon.  Ulcerated mass in the anal canal: Moderately differentiated squamous cell cancer.    ESOPHAGOSCOPY, GASTROSCOPY, DUODENOSCOPY (EGD), COMBINED  2017    Large hiatal hernia.  Reactive gastropathy with mucosal erosion.  H. pylori negative.    HERNIORRHAPHY FEMORAL Left 2021    Procedure: LEFT FEMORAL HERNIA REPAIR;  Surgeon: Sidney Murray MD;  Location: [facility]    HYSTERECTOMY TOTAL ABDOMINAL, BILATERAL SALPINGO-OOPHORECTOMY, COMBINED      IR CHEST PORT PLACEMENT > 5 YRS OF AGE  3/15/2021    IR PORT PLACEMENT >5 YEARS  03/15/2021    Right IJ port-a-cath.    IR PORT REMOVAL RIGHT  2021    LAPAROSCOPIC APPENDECTOMY  2011    PHACOEMULSIFICATION CLEAR CORNEA WITH STANDARD INTRAOCULAR LENS IMPLANT Right 2017    PHACOEMULSIFICATION CLEAR CORNEA WITH STANDARD INTRAOCULAR LENS IMPLANT Left 2017    TOTAL HIP ARTHROPLASTY Right 2015    Procedure: HIP TOTAL ARTHROPLASTY, RIGHT;  Surgeon: Star Du MD;   Location: [facility] Main OR;  Service:     [facility] TOTAL KNEE ARTHROPLASTY Left 03/02/2017    Procedure: LEFT TOTAL KNEE ARTHROPLASTY;  Surgeon: Star Du MD;  Location: [facility] Main OR;  Service: Orthopedics       Family History    Reviewed, and family history includes Alzheimer Disease in her mother; Heart Disease in her father; No Known Problems in her daughter; Pancreatic Cancer (age of onset: 72.00) in her brother.    Social History    Reviewed, and she  reports that she has never smoked. She has never used smokeless tobacco. She reports current alcohol use of about 4.0 standard drinks of alcohol per week. She reports that she does not use drugs.  Social History     Tobacco Use    Smoking status: Never    Smokeless tobacco: Never   Substance Use Topics    Alcohol use: Yes     Alcohol/week: 4.0 standard drinks of alcohol     Types: 4 Standard drinks or equivalent per week     Comment: 3-4/wk       Allergies     Allergies   Allergen Reactions    Adalimumab Muscle Pain (Myalgia)    Brimonidine-Timolol [Brimonidine Tartrate-Timolol] Unknown     Redness itching in eyes    Levaquin [Levofloxacin] Unknown     Skin burn and couldn't get out bed for 5 days    Tetracyclines & Related Hives       Prior to Admission Medications      Medications Prior to Admission   Medication Sig Dispense Refill Last Dose    amLODIPine (NORVASC) 5 MG tablet Take 2 tablets (10 mg) by mouth daily 180 tablet 3 9/21/2023 at am    lisinopril (ZESTRIL) 20 MG tablet Take 1 tablet (20 mg) by mouth daily 90 tablet 3 9/21/2023 at am    LOTEMAX 0.5 % ophthalmic suspension Place 1 drop into both eyes every 48 hours  4 9/21/2023 at am - has with    metoprolol succinate ER (TOPROL XL) 25 MG 24 hr tablet Take 2 tablets (50 mg) by mouth daily 180 tablet 3 9/21/2023 at am    timolol maleate (TIMOPTIC) 0.5 % ophthalmic solution Place 1 drop into both eyes daily   9/21/2023 at am - has with       Review of Systems   A 12 point comprehensive review  of systems was negative except as noted above.    OBJECTIVE         Physical Exam   Temp:  [97.6  F (36.4  C)] 97.6  F (36.4  C)  Pulse:  [64-71] 64  Resp:  [16-18] 18  BP: (125-140)/(74-83) 140/74  SpO2:  [99 %-100 %] 99 %  Body mass index is 23.91 kg/m .  GENERAL:  Alert, appears comfortable, in no acute distress, appears stated age   HEAD:  Normocephalic, without obvious abnormality, atraumatic   THROAT: Lips, mucosa, and tongue normal; teeth and gums normal, mouth moist   BACK:   Symmetric, no curvature, ROM normal   LUNGS:   Clear to auscultation bilaterally, no rales, rhonchi, or wheezing, symmetric chest rise on inhalation, respirations unlabored   HEART:  Regular rate and rhythm, S1 and S2 normal, no murmur, rub, or gallop    ABDOMEN:   Soft, non-tender, bowel sounds active all four quadrants, no masses, no organomegaly, no rebound or guarding   EXTREMITIES: Extremities normal, atraumatic, no cyanosis or edema    SKIN: Dry to touch, no exanthems in the visualized areas   NEURO: Alert, oriented x 4, moves all four extremities freely/spontaneously   PSYCH: Cooperative, behavior is appropriate          Imaging Reviewed Personally By Myself    Radiology Results: No results found for this or any previous visit (from the past 24 hour(s)).    Labs Reviewed Personally By Myself     Results for orders placed or performed during the hospital encounter of 09/21/23 (from the past 24 hour(s))   ABO/Rh type and screen    Narrative    The following orders were created for panel order ABO/Rh type and screen.  Procedure                               Abnormality         Status                     ---------                               -----------         ------                     Adult Type and Screen[[id number]]                            Final result                 Please view results for these tests on the individual orders.   Hemoglobin   Result Value Ref Range    Hemoglobin 13.0 11.7 - 15.7 g/dL   Adult Type and Screen    Result Value Ref Range    ABO/RH(D) O POS     Antibody Screen Negative Negative    SPECIMEN EXPIRATION DATE [id number]    Glucose by meter   Result Value Ref Range    GLUCOSE BY METER POCT 93 70 - 99 mg/dL   Cell count with differential fluid    Narrative    The following orders were created for panel order Cell count with differential fluid.  Procedure                               Abnormality         Status                     ---------                               -----------         ------                     Cell Count Body Fluid[[id number]]        Abnormal            Final result               Differential Body Fluid[[id number]]                          Final result                 Please view results for these tests on the individual orders.   Cell Count Body Fluid   Result Value Ref Range    Color Red (A) Colorless, Yellow    Clarity Cloudy (A) Clear    Cell Count Fluid Source Synovium     Total Nucleated Cells 775 /uL    Narrative    No reference ranges have been established.  This result  should be interpreted in the context of the patient's clinical condition and   compared to simultaneous measurement in the patient's blood.         Differential Body Fluid   Result Value Ref Range    % Neutrophils 51 %    % Lymphocytes 32 %    % Monocyte/Macrophages 17 %    Narrative    No reference ranges have been established. This result should be interpreted in the context of the patient's clinical condition and compared to simultaneous measurement in the patient's blood.   XR Pelvis w Hip Port Right 1 View    Narrative    EXAM: XR PELVIS AND HIP PORTABLE RIGHT 1 VIEW  LOCATION: [facility]  DATE: 9/21/2023    INDICATION: Status post Hip surgery  COMPARISON: Multiple prior studies, most recent 08/30/2023.      Impression    IMPRESSION: Right total hip replacement is in place without dislocation. No evidence of a fracture. Bones are demineralized.     Most Recent 3 CBC's:  Recent Labs    Lab Test 09/21/23  0727 08/30/23  0609 06/20/23  0913 05/24/22  0904   WBC  --  5.3 4.7 4.5   HGB 13.0 11.5* 13.5 12.2   MCV  --  88 84 91   PLT  --  170 221 212     Most Recent 3 BMP's:  Recent Labs   Lab Test 09/21/23  0734 08/30/23  0609 06/20/23  0913 01/10/23  1148 05/24/22  0904   NA  --  139 138  --  137   POTASSIUM  --  3.4* 4.5  --  4.1   CHLORIDE  --  112* 104  --  105   CO2  --  20* 24  --  24   BUN  --  11 13.1  --  10   CR  --  0.50* 0.63 0.7 0.67   ANIONGAP  --  7 10  --  8   MICHEL  --  7.0* 9.9  --  9.8   GLC 93 104 108*  --  84     Most Recent 2 LFT's:  Recent Labs   Lab Test 06/20/23  0913 05/24/22  0904   AST 21 15   ALT 18 14   ALKPHOS 88 87   BILITOTAL 0.8 0.8     Most Recent 3 INR's:No lab results found.    Preoperative Labs Reviewed Personally By Myself     Thank you for this consultation.  Appreciate the opportunity to participate in the care of Oscar Zhao, please feel free to contact us for any questions or concerns.    Allen Hilario MD  [facility]  Phone: #[phone]    Tt56 min

## 2023-09-22 ENCOUNTER — APPOINTMENT (OUTPATIENT)
Dept: PHYSICAL THERAPY | Facility: CLINIC | Age: 78
DRG: 467 | End: 2023-09-22
Attending: ORTHOPAEDIC SURGERY
Payer: MEDICARE

## 2023-09-22 ENCOUNTER — APPOINTMENT (OUTPATIENT)
Dept: OCCUPATIONAL THERAPY | Facility: CLINIC | Age: 78
DRG: 467 | End: 2023-09-22
Attending: ORTHOPAEDIC SURGERY
Payer: MEDICARE

## 2023-09-22 VITALS
BODY MASS INDEX: 23.92 KG/M2 | TEMPERATURE: 98.4 F | OXYGEN SATURATION: 97 % | SYSTOLIC BLOOD PRESSURE: 101 MMHG | DIASTOLIC BLOOD PRESSURE: 62 MMHG | WEIGHT: 135 LBS | HEIGHT: 63 IN | RESPIRATION RATE: 16 BRPM | HEART RATE: 88 BPM

## 2023-09-22 LAB
ANION GAP SERPL CALCULATED.3IONS-SCNC: 8 MMOL/L (ref 7–15)
BUN SERPL-MCNC: 7.8 MG/DL (ref 8–23)
CALCIUM SERPL-MCNC: 9.4 MG/DL (ref 8.8–10.2)
CHLORIDE SERPL-SCNC: 98 MMOL/L (ref 98–107)
CREAT SERPL-MCNC: 0.6 MG/DL (ref 0.51–0.95)
DEPRECATED HCO3 PLAS-SCNC: 26 MMOL/L (ref 22–29)
EGFRCR SERPLBLD CKD-EPI 2021: >90 ML/MIN/1.73M2
GLUCOSE SERPL-MCNC: 120 MG/DL (ref 70–99)
HGB BLD-MCNC: 12 G/DL (ref 11.7–15.7)
POTASSIUM SERPL-SCNC: 4.4 MMOL/L (ref 3.4–5.3)
SODIUM SERPL-SCNC: 132 MMOL/L (ref 136–145)

## 2023-09-22 PROCEDURE — 85018 HEMOGLOBIN: CPT | Performed by: PHYSICIAN ASSISTANT

## 2023-09-22 PROCEDURE — 97165 OT EVAL LOW COMPLEX 30 MIN: CPT | Mod: GO

## 2023-09-22 PROCEDURE — 250N000013 HC RX MED GY IP 250 OP 250 PS 637: Performed by: PHYSICIAN ASSISTANT

## 2023-09-22 PROCEDURE — 36415 COLL VENOUS BLD VENIPUNCTURE: CPT | Performed by: FAMILY MEDICINE

## 2023-09-22 PROCEDURE — 97535 SELF CARE MNGMENT TRAINING: CPT | Mod: GO

## 2023-09-22 PROCEDURE — 250N000011 HC RX IP 250 OP 636: Performed by: PHYSICIAN ASSISTANT

## 2023-09-22 PROCEDURE — 97116 GAIT TRAINING THERAPY: CPT | Mod: GP

## 2023-09-22 PROCEDURE — 80048 BASIC METABOLIC PNL TOTAL CA: CPT | Performed by: FAMILY MEDICINE

## 2023-09-22 PROCEDURE — 99232 SBSQ HOSP IP/OBS MODERATE 35: CPT | Performed by: FAMILY MEDICINE

## 2023-09-22 RX ORDER — NAPROXEN 250 MG/1
250 TABLET ORAL EVERY 12 HOURS PRN
Qty: 30 TABLET | Refills: 0 | Status: SHIPPED | OUTPATIENT
Start: 2023-09-22 | End: 2023-11-04

## 2023-09-22 RX ORDER — OXYCODONE HYDROCHLORIDE 5 MG/1
2.5 TABLET ORAL EVERY 4 HOURS PRN
Qty: 15 TABLET | Refills: 0 | Status: SHIPPED | OUTPATIENT
Start: 2023-09-22 | End: 2023-11-04

## 2023-09-22 RX ADMIN — CEPHALEXIN 500 MG: 500 CAPSULE ORAL at 05:58

## 2023-09-22 RX ADMIN — CEFAZOLIN 1 G: 1 INJECTION, POWDER, FOR SOLUTION INTRAMUSCULAR; INTRAVENOUS at 01:58

## 2023-09-22 RX ADMIN — ASPIRIN 81 MG: 81 TABLET, COATED ORAL at 08:45

## 2023-09-22 RX ADMIN — TIMOLOL MALEATE 1 DROP: 5 SOLUTION/ DROPS OPHTHALMIC at 08:51

## 2023-09-22 RX ADMIN — ACETAMINOPHEN 975 MG: 325 TABLET ORAL at 05:58

## 2023-09-22 ASSESSMENT — ACTIVITIES OF DAILY LIVING (ADL)
ADLS_ACUITY_SCORE: 21
ADLS_ACUITY_SCORE: 18
ADLS_ACUITY_SCORE: 21
ADLS_ACUITY_SCORE: 21
ADLS_ACUITY_SCORE: 18
ADLS_ACUITY_SCORE: 21

## 2023-09-22 NOTE — PROGRESS NOTES
[facility]    Medicine Progress Note - Hospitalist Service    Date of Admission:  9/21/2023    Assessment & Plan   78-year-old female with history of hypertension presented for an elective revision/total hip arthroplasty.  Doing well postoperative day #1.    Right hip revision total hip arthroplasty  Routine postoperative cares  PT and OT evaluations  Pain control  Hopefully home in 24 hours  Defer VTE prophylaxis to orthopedist     Essential hypertension/perioperative hypotension  On multidrug therapy  At risk for acute kidney injury and electrolyte abnormalities  BNP unremarkable  Holding home meds this morning  Work with therapy  Recheck blood pressure post PT  If improved blood pressures can discharge home with plan to resume BP meds 9/23  Follow-up with primary in approximately a week for blood pressure check and basic metabolic panel    Left lower extremity wound  Examined and well-healed    Mild hyponatremia  Asymptomatic  Recheck at follow-up     Remote history of anal cancer        Diet: Advance Diet as Tolerated: Regular Diet Adult  Discharge Instruction - Regular Diet Adult    DVT Prophylaxis: Defer to primary service  Rodriguez Catheter: Not present  Lines: None     Cardiac Monitoring: None  Code Status: Full Code      Clinically Significant Risk Factors                  # Hypertension: Noted on problem list                   Disposition Plan     Expected Discharge Date: 09/22/2023    Discharge Delays: *Early Discharge Anticipated              Allen Hilario MD  Hospitalist Service  [facility]  Securely message with [employer] (more info)  Text page via [employer] Paging/Directory   ______________________________________________________________________    Interval History   Is doing well.  Blood pressure was a little low this morning.  She states that with her previous dislocation surgery she was also noted to be hypotensive intermittently.  She is asymptomatic.   We are holding her meds this morning.  We talked about stooling or go with plan for resumption of meds 9/23 and close follow-up with her primary.  Organ to see how she does after therapy this morning.  She is in agreement.  Signs and symptoms of venous thromboembolism discussed.  No history.  Discussed also a regimen for at home for bowels.    Physical Exam   Vital Signs: Temp: 98.4  F (36.9  C) Temp src: Oral BP: (!) 81/55 Pulse: 84   Resp: 16 SpO2: 97 % O2 Device: None (Room air) Oxygen Delivery: 8 LPM  Weight: 135 lbs 0 oz    GENERAL:  Alert, appears comfortable, in no acute distress, appears stated age   HEAD:  Normocephalic, without obvious abnormality, atraumatic   NECK: Supple, symmetrical, trachea midline   LUNGS:   Clear to auscultation bilaterally, no rales, rhonchi, or wheezing, symmetric chest rise on inhalation, respirations unlabored   HEART:  Regular rate and rhythm, S1 and S2 normal, no murmur, rub, or gallop    ABDOMEN:   Soft, non-tender, bowel sounds active all four quadrants, no masses, no organomegaly, no rebound or guarding   EXTREMITIES: Extremities normal, atraumatic, no cyanosis or edema    SKIN: Dry to touch, no exanthems in the visualized areas   NEURO: Alert, oriented x 4, moves all four extremities freely/spontaneously   PSYCH: Cooperative, behavior is appropriate        40 MINUTES SPENT BY ME on the date of service doing chart review, history, exam, documentation & further activities per the note.      Data     I have personally reviewed the following data over the past 24 hrs:    N/A  \   12.0   / N/A     132 (L) 98 7.8 (L) /  120 (H)   4.4 26 0.60 \       Imaging results reviewed over the past 24 hrs:   Recent Results (from the past 24 hour(s))   XR Pelvis w Hip Port Right 1 View    Narrative    EXAM: XR PELVIS AND HIP PORTABLE RIGHT 1 VIEW  LOCATION: [facility]  DATE: 9/21/2023    INDICATION: Status post Hip surgery  COMPARISON: Multiple prior studies, most  recent 08/30/2023.      Impression    IMPRESSION: Right total hip replacement is in place without dislocation. No evidence of a fracture. Bones are demineralized.

## 2023-09-22 NOTE — DISCHARGE SUMMARY
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 Bernal on . There were no intraoperative complications and the patient was transferred to the recovery room and later the orthopedic unit in stable condition. Once the patient reached the orthopedic floor our orthopedic pain protocol was implemented along with the following:    Anticoagulation Medications: ASA  Therapy: PT and OT  Activity: WBAT  Bracing: None    Consultations during Admission: Hospitalist service for medical management     COMPLICATIONS/SIGNIFICANT FINDINGS    NONE    DISCHARGE INFORMATION   Condition at discharge: Good  Discharge destination: Home  Patient was seen by myself on the date of discharge.    FOLLOW UP CARE   Follow up with orthopedics in 2 weeks or sooner should the need arise. Ortho will continue to manage pain control, post op anticoagulation and incision care.     Follow up with your PCP for management of chronic medical problems and to evaluate for post op medical complications including constipation, nausea/vomiting, DVT/PE, anemia, changes in blood pressure, fevers/chills, urinary retention and atelectasis/pneumonia.     Subjective   Patient is doing well on POD #1. Pain is well controlled with oral medications. Ambulating. Tolerating oral intake.     Physical Exam   /62 (BP Location: Right arm, Patient Position: Chair, Cuff Size: Adult Regular)   Pulse 82   Temp 98.4  F (36.9  C)  "(Oral)   Resp 16   Ht 1.6 m (5' 3\")   Wt 61.2 kg (135 lb)   LMP  (LMP Unknown)   SpO2 97%   BMI 23.91 kg/m    The patient is A&Ox3. Appears comfortable, sitting up at bedside.  Calves are soft and non-tender bilaterally  Brisk capillary refill in the toes.   Palpable right dorsalis pedis pulse. Foot warm & well-perfused.  Sensation is intact to light touch & equal bilaterally in the femoral, DP, SP & tibial nerve distributions.  ROM: Flexes at right hip. Appropriately flexes & extends all toes bilaterally.   Motor: +5/5 dorsiflexion, plantar flexion & EHL bilaterally. Fires quad.   Leg lengths equal.  right hip dressing C/D/I without strikethrough, no surrounding erythema.         5/5 TA/GSC/EHL.      Pertinent Results at Discharge     Hemoglobin   Date/Time Value Ref Range Status   09/22/2023 06:58 AM 12.0 11.7 - 15.7 g/dL Final   09/21/2023 07:27 AM 13.0 11.7 - 15.7 g/dL Final   08/30/2023 06:09 AM 11.5 (L) 11.7 - 15.7 g/dL Final     Platelet Count   Date/Time Value Ref Range Status   08/30/2023 06:09  150 - 450 10e3/uL Final   06/20/2023 09:13  150 - 450 10e3/uL Final   05/24/2022 09:04  150 - 450 10e3/uL Final       Problem List   Principal Problem:    Failure of right total hip arthroplasty, subsequent encounter      Rose Mary Scanlon PA-C/Dr. Bernal  [facility]  [phone]  Date: 9/22/2023  Time: 10:16 AM   "

## 2023-09-22 NOTE — PLAN OF CARE
Patient vital signs are at baseline: No,  Reason:  BP running low. Pt denies light headedness  Patient able to ambulate as they were prior to admission or with assist devices provided by therapies during their stay:  Yes  Patient MUST void prior to discharge:  Yes  Patient able to tolerate oral intake:  Yes  Pain has adequate pain control using Oral analgesics:  Yes  Does patient have an identified :  Yes  Has goal D/C date and time been discussed with patient:  Yes

## 2023-09-22 NOTE — PROGRESS NOTES
"Orthopedic Progress Note      Assessment: 1 Day Post-Op  S/P Procedure(s):  RIGHT HIP REVISION TOTAL HIP ARTHROPLASTY @    Plan:   - Continue PT/OT.   - Weightbearing status: WBAT  - Anticoagulation: ASA in addition to SCDs, raymon stockings and early ambulation.  - Discharge planning: Discharge to home today.     Subjective:  Pain: Well controlled on oral meds  Nausea, Vomiting:  No  Chest pain: No  Lightheadedness, Dizziness:  No  Neuro:  Patient denies new onset numbness or paresthesias     Patient doing well on POD #1. Ambulating, tolerating oral intake, voiding & pain is controlled with oral medications. Hgb 12 (13 pre-op).     Objective:  /62 (BP Location: Right arm, Patient Position: Chair, Cuff Size: Adult Regular)   Pulse 82   Temp 98.4  F (36.9  C) (Oral)   Resp 16   Ht 1.6 m (5' 3\")   Wt 61.2 kg (135 lb)   LMP  (LMP Unknown)   SpO2 97%   BMI 23.91 kg/m    The patient is A&Ox3. Appears comfortable, sitting up at bedside.  Calves are soft and non-tender bilaterally  Brisk capillary refill in the toes.   Palpable right dorsalis pedis pulse. Foot warm & well-perfused.  Sensation is intact to light touch & equal bilaterally in the femoral, DP, SP & tibial nerve distributions.  ROM: Flexes at right hip. Appropriately flexes & extends all toes bilaterally.   Motor: +5/5 dorsiflexion, plantar flexion & EHL bilaterally. Fires quad.   Leg lengths equal.  right hip dressing C/D/I without strikethrough, no surrounding erythema.       5/5 TA/GSC/EHL.         Pertinent Labs   Lab Results: personally reviewed.   No results found for: INR, PROTIME  Lab Results   Component Value Date    WBC 5.3 08/30/2023    HGB 12.0 09/22/2023    HCT 37.0 08/30/2023    MCV 88 08/30/2023     08/30/2023     Lab Results   Component Value Date     (L) 09/22/2023    CO2 26 09/22/2023         Report completed by:  Rose Mary Scanlon PA-C/Dr. hankins  [facility]    Date: 9/22/2023  Time: 10:15 AM    "

## 2023-09-22 NOTE — PLAN OF CARE
Problem: Hip Arthroplasty  Goal: Anesthesia/Sedation Recovery  Intervention: Optimize Anesthesia Recovery  Recent Flowsheet Documentation  Taken 9/22/2023 0145 by Geena Yu RN  Safety Promotion/Fall Prevention:   activity supervised   clutter free environment maintained   increased rounding and observation   increase visualization of patient   lighting adjusted   mobility aid in reach   nonskid shoes/slippers when out of bed   patient and family education   room door open   room near nurse's station   room organization consistent   safety round/check completed   supervised activity   Goal Outcome Evaluation:  A&OX4 and VSS on RA. Pt has walked w/ SBA using walker (WBAT on RLE). Pt has voided. Pt PIV SL. Pt CMS intact w/ q4hr neuro checks in place. Pt OT 0730 and PT 0815. Possible early discharge home w/ family pending AM therapies.

## 2023-09-22 NOTE — PLAN OF CARE
Orthopedic Progress Note  Patient vital signs are at baseline. Ambulating with SBA, use of walker and gait belt. Alarms in place for safety.    Pt is voiding spontaneously. Regular diet, tolerating well, denies nausea. Bowel sounds present.     Pt states pain is 1/10 to right hip. CMS intact to extremity, Dressing intact, no drainage noted. Will continue to monitor.     IV fluids running, IV antibiotics given, aspirin for anticoagulation.    Plans to discharge home with family tomorrow morning after physical therapy.    Drain:    BELKIS HarrisonN, RN

## 2023-09-22 NOTE — PROGRESS NOTES
Occupational Therapy Discharge Summary    Reason for therapy discharge:    All goals and outcomes met, no further needs identified.    Progress towards therapy goal(s). See goals on Care Plan in [facility] electronic health record for goal details.  Goals met    Therapy recommendation(s):    No further therapy is recommended.       09/22/23 0778   Appointment Info   Signing Clinician's Name / Credentials (OT) Christie Gamboa OTR/L   Living Environment   People in Home alone   Current Living Arrangements house   Living Environment Comments walk-in shower, slightly taller toilets   Self-Care   Equipment Currently Used at Home dressing device  (long reacher)   Fall history within last six months no   Activity/Exercise/Self-Care Comment ind for BADLs   Instrumental Activities of Daily Living (IADL)   IADL Comments assist for yard work, uses grocery delivery as needed; ind for meds, driving, meals   General Information   Onset of Illness/Injury or Date of Surgery 09/21/23   Referring Physician Bill Bernal DO   Patient/Family Therapy Goal Statement (OT) go home   Additional Occupational Profile Info/Pertinent History of Current Problem s/p R YAMILET revision after multiple dislocations   Existing Precautions/Restrictions no hip IR;no hip ADD past midline;90 degree hip flexion;fall   Right Lower Extremity (Weight-bearing Status) weight-bearing as tolerated (WBAT)   Cognitive Status Examination   Affect/Mental Status (Cognitive) WFL   Follows Commands WFL   Pain Assessment   Patient Currently in Pain No   Range of Motion Comprehensive   General Range of Motion no range of motion deficits identified   Strength Comprehensive (MMT)   General Manual Muscle Testing (MMT) Assessment no strength deficits identified   Bed Mobility   Bed Mobility supine-sit;sit-supine   Supine-Sit [place] (Bed Mobility) supervision;verbal cues   Sit-Supine [place] (Bed Mobility) supervision;verbal cues   Transfers   Transfers sit-stand  transfer;toilet transfer;shower transfer   Sit-Stand Transfer   Sit-Stand Waller (Transfers) verbal cues;supervision   Shower Transfer   Type (Shower Transfer) lateral   Waller Level (Shower Transfer) contact guard;verbal cues   Shower Transfer Comments simulated transfer   Toilet Transfer   Type (Toilet Transfer) sit-stand;stand-sit   Waller Level (Toilet Transfer) supervision;verbal cues   Assistive Device (Toilet Transfer) raised toilet seat   Balance   Balance Comments supervision with FWW for mobility in room   Activities of Daily Living   BADL Assessment/Intervention lower body dressing   Lower Body Dressing Assessment/Training   Assistive Devices (Lower Body Dressing) reacher   Waller Level (Lower Body Dressing) supervision;verbal cues   Clinical Impression   Criteria for Skilled Therapeutic Interventions Met (OT) Yes, treatment indicated   OT Diagnosis dec BADL indep   OT Problem List-Impairments impacting ADL problems related to;activity tolerance impaired;mobility;post-surgical precautions   Assessment of Occupational Performance 3-5 Performance Deficits   Identified Performance Deficits dressing, functional transfers, bathing, household mobility   Planned Therapy Interventions (OT) ADL retraining;bed mobility training;transfer training;progressive activity/exercise   Clinical Decision Making Complexity (OT) low complexity   Risk & Benefits of therapy have been explained evaluation/treatment results reviewed;participants included;patient   OT Total Evaluation Time   OT Eval, Low Complexity Minutes (77001) 10   OT Goals   Therapy Frequency (OT) One time eval and treatment   OT Predicted Duration/Target Date for Goal Attainment 09/22/23   OT Goals Lower Body Dressing;Bed Mobility;Toilet Transfer/Toileting;OT Goal 1   OT: Lower Body Dressing Modified independent;using adaptive equipment;within precautions   OT: Bed Mobility Independent;supine to/from sitting;within precautions   OT:  Toilet Transfer/Toileting Modified independent;toilet transfer;within precautions   OT: Goal 1 Pt will verbalize safe techniques for bathing within precautions.   Self-Care/Home Management   Self-Care/Home Mgmt/ADL, Compensatory, Meal Prep Minutes (34280) 24   Symptoms Noted During/After Treatment (Meal Preparation/Planning Training) none   Treatment Detail/Skilled Intervention Educ pt on hip precautions, provided handout, and educ on implications for BADL techniques. Provided cues throughout tasks to increase safety and ease of LB dressing, transfers, and bed mobility - especially needing cues to limit flexion. Pt shows good understanding of cues and follows all without additional cues. Following educ and cues, pt IND/mod IND to complete LB dressing of pants underwear and socks with reacher, toilet transfer, and bed mobility sit <> supine. Simulated walk-in shower transfer d/t pt's shower in room in unsafe condition to use - pt completes mod IND simulating home setup.   OT Discharge Planning   OT Plan d/c   OT Discharge Recommendation (DC Rec)   (defer to ortho)   OT Rationale for DC Rec pt's daughter staying with her through weekend and then will be available PRN   OT Brief overview of current status mod IND BADLs   Total Session Time   Timed Code Treatment Minutes 24   Total Session Time (sum of timed and untimed services) 34

## 2023-09-25 ENCOUNTER — PATIENT OUTREACH (OUTPATIENT)
Dept: CARE COORDINATION | Facility: CLINIC | Age: 78
End: 2023-09-25
Payer: MEDICARE

## 2023-09-25 NOTE — PROGRESS NOTES
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  [facility] service was asked to evaluate patient for postoperative medical management as follows below. Please resume the home medications as reconciled and further noted below with ordered hold parameters.  Vital signs have been stable post-operatively including hemodynamically stable blood pressure and heart rate. Thank you for this consult; we will continue to follow this patient until discharge.    ASSESSMENT           Discharge Assessment  How are you doing now that you are home?: Patient states she has no pain, incision is sore.  Right leg where she had surgery is swollen, started yesterday.  Denies any pain with swelling, just feels stiff.  Patient states she has been doing exercises a lot but feels like she may have sat too much.  Patient also states she hasn't been elevating. Denies any redness. Patient states she is trying to elevate and ice to see if it helps.Patient states she feels good and is eating well.  How are your symptoms? (Red Flag symptoms escalate to triage hotline per guidelines): Improved;New  Do you feel your condition is stable enough to be safe at home until your provider visit?: Yes  Does the patient have their discharge instructions? : Yes  Does the patient have questions  regarding their discharge instructions? : No  Were you started on any new medications or were there changes to any of your previous medications? : Yes  Does the patient have all of their medications?: No (see comment) (Patient states she did not get the oxycodone, naproxen as she states she did not need them.)  Do you have questions regarding any of your medications? : No  Do you have all of your needed medical supplies or equipment (DME)?  (i.e. oxygen tank, CPAP, cane, etc.): Yes  Discharge follow-up appointment scheduled within 14 calendar days? : Yes  Discharge Follow Up Appointment Date: 10/04/23  Discharge Follow Up Appointment Scheduled with?: Specialty Care Provider         Post-op (Clinicians Only)  Did the patient have surgery or a procedure: Yes  Incision:  (Unable to see due to bandage.)  Drainage:  (Bandage is still covering the incision.  Patient has not seen any shadowing on the bandage.)  Bleeding:  (Unable to see due to bandage.)  Fever: No  Chills: No  Redness:  (Unable to see due to bandage.)  Warmth:  (Unable to see due to bandage.)  Swelling:  (No swelling at incision but leg swelling.)  Incision site pain: Yes (With movement, knows it is there. Denies any worsening pain at incision, pain is better.)  Eating & Drinking: eating and drinking without complaints/concerns  PO Intake: regular diet  Additional Symptoms:  (Denies)  Bowel Function: normal  Date of last BM: 09/25/23  Urinary Status: voiding without complaint/concerns        PLAN                                                      Outpatient Plan:  Follow Up Care  Follow-up with your Surgeon Team in 2-3 weeks for wound check. Ellis Esqueda PA-C. Call [phone] if no  appt has been made    Follow-up and recommended labs and tests  Follow up with primary care provider, Jak Jones, within 7 days for hospital follow- up. The following labs/tests are  recommended: blood pressure check, bmp.    Physical Therapy  Instructions  Call Dr. Bernal's team if you would like to start physical therapy, otherwise we will discuss at your 2 week post-op check.    Future Appointments   Date Time Provider Department Center   2/8/2024 11:40 AM [facility] CT 2 [facility]   2/8/2024 12:00 PM [facility]         For any urgent concerns, please contact our 24 hour nurse triage line: [phone] ([phone])         Yoanna Monreal RN

## 2023-09-26 LAB — BACTERIA TISS BX CULT: NO GROWTH

## 2023-09-28 LAB — BACTERIA SPEC CULT: NORMAL

## 2023-10-02 ENCOUNTER — OFFICE VISIT (OUTPATIENT)
Dept: FAMILY MEDICINE | Facility: CLINIC | Age: 78
End: 2023-10-02
Payer: MEDICARE

## 2023-10-02 VITALS
SYSTOLIC BLOOD PRESSURE: 104 MMHG | HEIGHT: 63 IN | DIASTOLIC BLOOD PRESSURE: 71 MMHG | TEMPERATURE: 98.4 F | RESPIRATION RATE: 16 BRPM | BODY MASS INDEX: 23.71 KG/M2 | OXYGEN SATURATION: 100 % | HEART RATE: 77 BPM | WEIGHT: 133.8 LBS

## 2023-10-02 DIAGNOSIS — E55.9 VITAMIN D DEFICIENCY: ICD-10-CM

## 2023-10-02 DIAGNOSIS — R03.1 DECREASED BLOOD PRESSURE, NOT HYPOTENSION: ICD-10-CM

## 2023-10-02 DIAGNOSIS — R73.09 ELEVATED GLUCOSE: Primary | ICD-10-CM

## 2023-10-02 DIAGNOSIS — D64.9 ANEMIA, UNSPECIFIED TYPE: ICD-10-CM

## 2023-10-02 DIAGNOSIS — R53.83 OTHER FATIGUE: ICD-10-CM

## 2023-10-02 LAB
ANION GAP SERPL CALCULATED.3IONS-SCNC: 11 MMOL/L (ref 7–15)
BASO+EOS+MONOS # BLD AUTO: ABNORMAL 10*3/UL
BASO+EOS+MONOS NFR BLD AUTO: ABNORMAL %
BASOPHILS # BLD AUTO: 0.1 10E3/UL (ref 0–0.2)
BASOPHILS NFR BLD AUTO: 2 %
BUN SERPL-MCNC: 8.9 MG/DL (ref 8–23)
CALCIUM SERPL-MCNC: 10.2 MG/DL (ref 8.8–10.2)
CHLORIDE SERPL-SCNC: 102 MMOL/L (ref 98–107)
CREAT SERPL-MCNC: 0.58 MG/DL (ref 0.51–0.95)
DEPRECATED HCO3 PLAS-SCNC: 23 MMOL/L (ref 22–29)
EGFRCR SERPLBLD CKD-EPI 2021: >90 ML/MIN/1.73M2
EOSINOPHIL # BLD AUTO: 1.4 10E3/UL (ref 0–0.7)
EOSINOPHIL NFR BLD AUTO: 17 %
ERYTHROCYTE [DISTWIDTH] IN BLOOD BY AUTOMATED COUNT: 15.7 % (ref 10–15)
FERRITIN SERPL-MCNC: 49 NG/ML (ref 11–328)
GLUCOSE SERPL-MCNC: 109 MG/DL (ref 70–99)
HBA1C MFR BLD: 5.7 % (ref 0–5.6)
HCT VFR BLD AUTO: 36.5 % (ref 35–47)
HGB BLD-MCNC: 10.9 G/DL (ref 11.7–15.7)
IMM GRANULOCYTES # BLD: 0 10E3/UL
IMM GRANULOCYTES NFR BLD: 0 %
IRON BINDING CAPACITY (ROCHE): 357 UG/DL (ref 240–430)
IRON SATN MFR SERPL: 6 % (ref 15–46)
IRON SERPL-MCNC: 23 UG/DL (ref 37–145)
LYMPHOCYTES # BLD AUTO: 1.4 10E3/UL (ref 0.8–5.3)
LYMPHOCYTES NFR BLD AUTO: 16 %
MCH RBC QN AUTO: 26.8 PG (ref 26.5–33)
MCHC RBC AUTO-ENTMCNC: 29.9 G/DL (ref 31.5–36.5)
MCV RBC AUTO: 90 FL (ref 78–100)
MONOCYTES # BLD AUTO: 0.4 10E3/UL (ref 0–1.3)
MONOCYTES NFR BLD AUTO: 5 %
NEUTROPHILS # BLD AUTO: 5.2 10E3/UL (ref 1.6–8.3)
NEUTROPHILS NFR BLD AUTO: 60 %
PLATELET # BLD AUTO: 360 10E3/UL (ref 150–450)
POTASSIUM SERPL-SCNC: 4.8 MMOL/L (ref 3.4–5.3)
RBC # BLD AUTO: 4.06 10E6/UL (ref 3.8–5.2)
SODIUM SERPL-SCNC: 136 MMOL/L (ref 135–145)
TSH SERPL DL<=0.005 MIU/L-ACNC: 0.74 UIU/ML (ref 0.3–4.2)
VIT B12 SERPL-MCNC: 928 PG/ML (ref 232–1245)
VIT D+METAB SERPL-MCNC: 23 NG/ML (ref 20–50)
WBC # BLD AUTO: 8.5 10E3/UL (ref 4–11)

## 2023-10-02 PROCEDURE — 82306 VITAMIN D 25 HYDROXY: CPT | Performed by: NURSE PRACTITIONER

## 2023-10-02 PROCEDURE — 83540 ASSAY OF IRON: CPT | Performed by: NURSE PRACTITIONER

## 2023-10-02 PROCEDURE — 85025 COMPLETE CBC W/AUTO DIFF WBC: CPT | Performed by: NURSE PRACTITIONER

## 2023-10-02 PROCEDURE — 82607 VITAMIN B-12: CPT | Performed by: NURSE PRACTITIONER

## 2023-10-02 PROCEDURE — 36415 COLL VENOUS BLD VENIPUNCTURE: CPT | Performed by: NURSE PRACTITIONER

## 2023-10-02 PROCEDURE — 83550 IRON BINDING TEST: CPT | Performed by: NURSE PRACTITIONER

## 2023-10-02 PROCEDURE — 84443 ASSAY THYROID STIM HORMONE: CPT | Performed by: NURSE PRACTITIONER

## 2023-10-02 PROCEDURE — 99214 OFFICE O/P EST MOD 30 MIN: CPT | Performed by: NURSE PRACTITIONER

## 2023-10-02 PROCEDURE — 80048 BASIC METABOLIC PNL TOTAL CA: CPT | Performed by: NURSE PRACTITIONER

## 2023-10-02 PROCEDURE — 83036 HEMOGLOBIN GLYCOSYLATED A1C: CPT | Performed by: NURSE PRACTITIONER

## 2023-10-02 PROCEDURE — 82728 ASSAY OF FERRITIN: CPT | Performed by: NURSE PRACTITIONER

## 2023-10-02 ASSESSMENT — ENCOUNTER SYMPTOMS: FATIGUE: 1

## 2023-10-02 NOTE — PROGRESS NOTES
Assessment & Plan     Elevated glucose  A1c is elevated in the prediabetic range. Patient declines any recent steroid use with hip issues including joint injections.    Started on steroid eye drops in 80s for glaucoma.     Discuss further with primary provider-I recommend recheck every 6-12 months. Patients body has been under a lot of body stress recently which may have been increasing steroid levels in body and thus glucose. Discussed lifestyle modifications including increase exercise and decreased carbohydrates.     - Hemoglobin A1c    Other fatigue  BMP is back to normal range. Sodium normal.  Glucose could still be elevated as this was a non fasting sample.     Eosiniophils also elevated-patient does have autoimmune condition that could be contributing. I will recheck CBC in about 1 week (lab only appointment.    Patient has had issues in the past with abnormal lab work in the past and was thought to be related to immune system      - Basic metabolic panel; Future  - CBC with platelets and differential; Future  - TSH with free T4 reflex; Future  - Vitamin B12; Future      Vitamin D deficiency  Vitamin D is normal. NO need to replace vitamin D.  - Vitamin D Deficiency; Future  - Vitamin D Deficiency    Anemia, unspecified type  Hemoglobin is decreased to 10.9. Ferritin is within expected range, however total iron and iron saturation are low. I think we should replace iron. Patient repots having iron tablets at home.  Was taking baby aspirin -has stopped due to previous bleed with baby aspirin    Patient reports did not seem black tarry like when she previously had a GI bleed.  We will hold off on the EGD for now.  If not hemoglobin is going down or patient does notice black tarry stools I would order this for her.    - Ferritin; Future  - Iron and iron binding capacity; Future  - Ferritin  - Iron and iron binding capacity      5. Decreased blood pressure, not hypotension  Take 1/2 dose of amlodipine (5  mg)  Follow up BP In 2 weeks         PARTH SUMNER CNP  [facility]    Chinmay Moore is a 78 year old, presenting for the following health issues:  Hypotension (Low blood pressure post surgery, wanting to discuss lab results on 9/22 (concerned about low sodium)) and Fatigue (Fatigue post surgery, pt believes it could be linked to lower sodium levels)      10/2/2023     1:16 PM   Additional Questions   Roomed by Heather RIBEIRO       History of Present Illness       Reason for visit:  Blood pressure  low sodium    She eats 4 or more servings of fruits and vegetables daily.She consumes 0 sweetened beverage(s) daily.She exercises with enough effort to increase her heart rate 20 to 29 minutes per day.  She exercises with enough effort to increase her heart rate 4 days per week.   She is taking medications regularly.           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 Patient states she has no pain, incision is sore.  Right leg where she had surgery is swollen, started yesterday.  Denies any pain with swelling, just feels stiff.  Patient states she has been doing exercises a lot but feels like she may have sat too much.  Patient also states she hasn't been elevating. Denies any redness. Patient states she is trying to elevate and ice to see if it helps.Patient states she feels good and is eating well.   How are your symptoms? (Red Flag symptoms escalate to triage hotline per guidelines) Improved;New   Do you feel your condition is stable enough to be safe at home until your provider visit? Yes   Does the patient have their discharge  "instructions?  Yes   Does the patient have questions regarding their discharge instructions?  No   Were you started on any new medications or were there changes to any of your previous medications?  Yes   Does the patient have all of their medications? No (see comment)   Do you have questions regarding any of your medications?  No   Do you have all of your needed medical supplies or equipment (DME)?  (i.e. oxygen tank, CPAP, cane, etc.) Yes   Discharge follow-up appointment scheduled within 14 calendar days?  Yes   Discharge Follow Up Appointment Date 10/4/2023   Discharge Follow Up Appointment Scheduled with? Specialty Care Provider         Patient had recent total hip replacement revision.  Patient worried about low blood pressure in hospital post surgery. Reports she has typically been in the 120/80 range and was running 100/60 in the hospital. Prior to hospitalization had been doing a lot of PT to try to keep hip from dislocating.   She is still fatigued and tired and not feeling back to normal. Reports she has not yet started PT for hip replacement.  Notes stools are darker than normal thinking likely from foods.             Review of Systems   Constitutional:  Positive for fatigue.            Objective    /71 (BP Location: Right arm, Patient Position: Sitting, Cuff Size: Adult Regular)   Pulse 77   Temp 98.4  F (36.9  C) (Oral)   Resp 16   Ht 1.6 m (5' 2.99\")   Wt 60.7 kg (133 lb 12.8 oz)   LMP  (LMP Unknown)   SpO2 100%   BMI 23.71 kg/m    Body mass index is 23.71 kg/m .  Physical Exam  Constitutional:       Appearance: Normal appearance.   Cardiovascular:      Rate and Rhythm: Normal rate and regular rhythm.   Pulmonary:      Effort: Pulmonary effort is normal.      Breath sounds: Normal breath sounds.   Neurological:      General: No focal deficit present.      Mental Status: She is alert and oriented to person, place, and time.   Psychiatric:         Mood and Affect: Mood normal.         " Behavior: Behavior normal.            Results for orders placed or performed in visit on 10/02/23   Basic metabolic panel     Status: Abnormal   Result Value Ref Range    Sodium 136 135 - 145 mmol/L    Potassium 4.8 3.4 - 5.3 mmol/L    Chloride 102 98 - 107 mmol/L    Carbon Dioxide (CO2) 23 22 - 29 mmol/L    Anion Gap 11 7 - 15 mmol/L    Urea Nitrogen 8.9 8.0 - 23.0 mg/dL    Creatinine 0.58 0.51 - 0.95 mg/dL    GFR Estimate >90 >60 mL/min/1.73m2    Calcium 10.2 8.8 - 10.2 mg/dL    Glucose 109 (H) 70 - 99 mg/dL   Hemoglobin A1c     Status: Abnormal   Result Value Ref Range    Hemoglobin A1C 5.7 (H) 0.0 - 5.6 %   TSH with free T4 reflex     Status: Normal   Result Value Ref Range    TSH 0.74 0.30 - 4.20 uIU/mL   Vitamin B12     Status: Normal   Result Value Ref Range    Vitamin B12 928 232 - 1,245 pg/mL   Vitamin D Deficiency     Status: Normal   Result Value Ref Range    Vitamin D, Total (25-Hydroxy) 23 20 - 50 ng/mL    Narrative    Season, race, dietary intake, and treatment affect the concentration of 25-hydroxy-Vitamin D. Values may decrease during winter months and increase during summer months.    Vitamin D determination is routinely performed by an immunoassay specific for 25 hydroxyvitamin D3.  If an individual is on vitamin D2(ergocalciferol) supplementation, please specify 25 OH vitamin D2 and D3 level determination by LCMSMS test VITD23.     CBC with platelets and differential     Status: Abnormal   Result Value Ref Range    WBC Count 8.5 4.0 - 11.0 10e3/uL    RBC Count 4.06 3.80 - 5.20 10e6/uL    Hemoglobin 10.9 (L) 11.7 - 15.7 g/dL    Hematocrit 36.5 35.0 - 47.0 %    MCV 90 78 - 100 fL    MCH 26.8 26.5 - 33.0 pg    MCHC 29.9 (L) 31.5 - 36.5 g/dL    RDW 15.7 (H) 10.0 - 15.0 %    Platelet Count 360 150 - 450 10e3/uL    % Neutrophils 60 %    % Lymphocytes 16 %    % Monocytes 5 %    Mids % (Monos, Eos, Basos)      % Eosinophils 17 %    % Basophils 2 %    % Immature Granulocytes 0 %    Absolute Neutrophils 5.2  1.6 - 8.3 10e3/uL    Absolute Lymphocytes 1.4 0.8 - 5.3 10e3/uL    Absolute Monocytes 0.4 0.0 - 1.3 10e3/uL    Mids Abs (Monos, Eos, Basos)      Absolute Eosinophils 1.4 (H) 0.0 - 0.7 10e3/uL    Absolute Basophils 0.1 0.0 - 0.2 10e3/uL    Absolute Immature Granulocytes 0.0 <=0.4 10e3/uL   Ferritin     Status: Normal   Result Value Ref Range    Ferritin 49 11 - 328 ng/mL   Iron and iron binding capacity     Status: Abnormal   Result Value Ref Range    Iron 23 (L) 37 - 145 ug/dL    Iron Binding Capacity 357 240 - 430 ug/dL    Iron Sat Index 6 (L) 15 - 46 %   CBC with platelets and differential     Status: Abnormal    Narrative    The following orders were created for panel order CBC with platelets and differential.  Procedure                               Abnormality         Status                     ---------                               -----------         ------                     CBC with platelets and d...[[id number]]  Abnormal            Final result                 Please view results for these tests on the individual orders.

## 2023-10-16 ENCOUNTER — ALLIED HEALTH/NURSE VISIT (OUTPATIENT)
Dept: FAMILY MEDICINE | Facility: CLINIC | Age: 78
End: 2023-10-16
Payer: MEDICARE

## 2023-10-16 VITALS — DIASTOLIC BLOOD PRESSURE: 64 MMHG | SYSTOLIC BLOOD PRESSURE: 108 MMHG | HEART RATE: 60 BPM

## 2023-10-16 DIAGNOSIS — Z01.30 BLOOD PRESSURE CHECK: Primary | ICD-10-CM

## 2023-10-16 PROCEDURE — 99207 PR NO CHARGE NURSE ONLY: CPT

## 2023-10-16 NOTE — PROGRESS NOTES
Oscar Zhao is a 78 year old year old patient who comes in today for a Blood Pressure check because of medication change. 10mg amlodipine to 5mg. Pt made medication change the following day after seeing debby wolfe 10/3/23  Vital Signs as repeated by /64 manual, right arm, HR 60 manual    Patient is taking medication as prescribed  Patient is tolerating medications well.  Patient is not monitoring Blood Pressure at home.    Current complaints: none  Disposition:  routing note to Debby Wolfe and PCP per pt      No lightheadness/dizziness noted from pt.     Pt on amlodipine 5mg daily, lisinopril 20mg daily, metoprolol 50mg daily.

## 2023-10-23 ENCOUNTER — OFFICE VISIT (OUTPATIENT)
Dept: INTERNAL MEDICINE | Facility: CLINIC | Age: 78
End: 2023-10-23
Payer: MEDICARE

## 2023-10-23 VITALS
RESPIRATION RATE: 16 BRPM | HEART RATE: 65 BPM | SYSTOLIC BLOOD PRESSURE: 100 MMHG | BODY MASS INDEX: 24.22 KG/M2 | OXYGEN SATURATION: 100 % | TEMPERATURE: 97.7 F | DIASTOLIC BLOOD PRESSURE: 68 MMHG | WEIGHT: 136.7 LBS

## 2023-10-23 DIAGNOSIS — E11.8 TYPE 2 DIABETES MELLITUS WITH COMPLICATION, WITHOUT LONG-TERM CURRENT USE OF INSULIN (H): Primary | ICD-10-CM

## 2023-10-23 LAB
CHOLEST SERPL-MCNC: 163 MG/DL
FASTING STATUS PATIENT QL REPORTED: ABNORMAL
GLUCOSE SERPL-MCNC: 100 MG/DL (ref 70–99)
HBA1C MFR BLD: 5.3 % (ref 0–5.6)
HDLC SERPL-MCNC: 64 MG/DL
HGB BLD-MCNC: 11.7 G/DL (ref 11.7–15.7)
LDLC SERPL CALC-MCNC: 73 MG/DL
NONHDLC SERPL-MCNC: 99 MG/DL
TRIGL SERPL-MCNC: 129 MG/DL

## 2023-10-23 PROCEDURE — 85018 HEMOGLOBIN: CPT | Performed by: INTERNAL MEDICINE

## 2023-10-23 PROCEDURE — 82947 ASSAY GLUCOSE BLOOD QUANT: CPT | Performed by: INTERNAL MEDICINE

## 2023-10-23 PROCEDURE — 99214 OFFICE O/P EST MOD 30 MIN: CPT | Performed by: INTERNAL MEDICINE

## 2023-10-23 PROCEDURE — 36415 COLL VENOUS BLD VENIPUNCTURE: CPT | Performed by: INTERNAL MEDICINE

## 2023-10-23 PROCEDURE — 80061 LIPID PANEL: CPT | Performed by: INTERNAL MEDICINE

## 2023-10-23 PROCEDURE — 83036 HEMOGLOBIN GLYCOSYLATED A1C: CPT | Performed by: INTERNAL MEDICINE

## 2023-10-23 RX ORDER — RESPIRATORY SYNCYTIAL VIRUS VACCINE 120MCG/0.5
0.5 KIT INTRAMUSCULAR ONCE
Qty: 1 EACH | Refills: 0 | Status: CANCELLED | OUTPATIENT
Start: 2023-10-23 | End: 2023-10-23

## 2023-10-23 NOTE — PROGRESS NOTES
Assessment/Plan:    Diabetes mellitus type II?  Today check hemoglobin A1c blood sugar and lipid panel.  Blood sugars of late have ranged between 109 and 120.    Hypertension.  Today 100/68 decrease all antihypertensive meds by 50% current amlodipine 5 mg daily lisinopril 20 mg daily metoprolol succinate 25 mg daily.  Check with cardiologist to rule all these blood pressure medications for the patient in 1 month regarding status.  Hold all antihypertensive meds if systolic pressure less than or equal to 90.  Currently asymptomatic.  No syncope.    Status post right total hip revision September 21, 2023.  Intolerant of aspirin.  Melena.  Better now stool is brown.  Preop hemoglobin 12 postop hemoglobin 10.  Dr. Aguillon    Long discussion regarding vaccines.  Suggest that the patient receive all the vaccines currently recommended by the CDC including RSV influenza and COVID with final 6 shot available currently.  Shingles vaccine also recommended at a later different time along with tetanus and diphtheria booster.  We had a good discussion.    25 minutes spent on the date of the encounter doing chart review, patient visit, and documentation     Subjective:  Oscar Zhao is a 78 year old female presents for the following health issues status post right total hip revision September 21, 2023 Dr. Aguillon at [facility].  Doing home physical therapy.  Blood pressure has been low discussed see above.  Seen at walk-in care.  Blood pressure meds initially initiated by Dr. Rosales Mercer from cardiology.    Preop hemoglobin for right total hip revision 12 postop 10.  Discussed.  Perioperatively blood sugar 1/21/2009.  Concern regarding diabetes.  No family history of same not obese BMI 24.  Asymptomatic without polyuria polydipsia no signs or symptoms of peripheral neuropathy.    ROS:  No blood in stool or urine denies chest pain shortness of breath syncope or palpitations.    Med list reviewed reconciled in the  chart.    Objective:  /68 (BP Location: Right arm, Patient Position: Sitting, Cuff Size: Adult Regular)   Pulse 65   Temp 97.7  F (36.5  C) (Oral)   Resp 16   Wt 62 kg (136 lb 11.2 oz)   LMP  (LMP Unknown)   SpO2 100%   BMI 24.22 kg/m    Slightly pale in color prior history of iritis.    CTD?.    Chest clear heart tones normal neck veins nondistended no carotid bruits no leg edema.  Abdomen benign soft nontender.  Previous melena while on aspirin anticlotting now stools are brown with stopping of aspirin.  Prior history of DU bleeding.  Endoscopic proven.    Jak Jones MD  Internal Medicine

## 2023-10-24 ENCOUNTER — NURSE TRIAGE (OUTPATIENT)
Dept: CARDIOLOGY | Facility: CLINIC | Age: 78
End: 2023-10-24

## 2023-10-25 ENCOUNTER — HOSPITAL ENCOUNTER (EMERGENCY)
Facility: CLINIC | Age: 78
Discharge: HOME | End: 2023-10-25
Attending: EMERGENCY MEDICINE | Admitting: EMERGENCY MEDICINE
Payer: MEDICARE

## 2023-10-25 ENCOUNTER — APPOINTMENT (OUTPATIENT)
Dept: CT IMAGING | Facility: CLINIC | Age: 78
End: 2023-10-25
Attending: EMERGENCY MEDICINE
Payer: MEDICARE

## 2023-10-25 ENCOUNTER — NURSE TRIAGE (OUTPATIENT)
Dept: NURSING | Facility: CLINIC | Age: 78
End: 2023-10-25
Payer: MEDICARE

## 2023-10-25 ENCOUNTER — TELEPHONE (OUTPATIENT)
Dept: INTERNAL MEDICINE | Facility: CLINIC | Age: 78
End: 2023-10-25

## 2023-10-25 VITALS
OXYGEN SATURATION: 96 % | DIASTOLIC BLOOD PRESSURE: 89 MMHG | RESPIRATION RATE: 18 BRPM | WEIGHT: 135 LBS | SYSTOLIC BLOOD PRESSURE: 136 MMHG | BODY MASS INDEX: 23.92 KG/M2 | HEART RATE: 78 BPM | HEIGHT: 63 IN | TEMPERATURE: 99.2 F

## 2023-10-25 DIAGNOSIS — R07.89 ATYPICAL CHEST PAIN: ICD-10-CM

## 2023-10-25 DIAGNOSIS — I10 HYPERTENSION, UNSPECIFIED TYPE: ICD-10-CM

## 2023-10-25 DIAGNOSIS — K44.9 HIATAL HERNIA: ICD-10-CM

## 2023-10-25 LAB
ANION GAP SERPL CALCULATED.3IONS-SCNC: 9 MMOL/L (ref 7–15)
ATRIAL RATE - MUSE: 69 BPM
BASOPHILS # BLD AUTO: 0 10E3/UL (ref 0–0.2)
BASOPHILS NFR BLD AUTO: 1 %
BUN SERPL-MCNC: 8.6 MG/DL (ref 8–23)
CALCIUM SERPL-MCNC: 10.5 MG/DL (ref 8.8–10.2)
CHLORIDE SERPL-SCNC: 102 MMOL/L (ref 98–107)
CREAT SERPL-MCNC: 0.64 MG/DL (ref 0.51–0.95)
D DIMER PPP FEU-MCNC: 1.19 UG/ML FEU (ref 0–0.5)
DEPRECATED HCO3 PLAS-SCNC: 25 MMOL/L (ref 22–29)
DIASTOLIC BLOOD PRESSURE - MUSE: 92 MMHG
EGFRCR SERPLBLD CKD-EPI 2021: >90 ML/MIN/1.73M2
EOSINOPHIL # BLD AUTO: 0.2 10E3/UL (ref 0–0.7)
EOSINOPHIL NFR BLD AUTO: 4 %
ERYTHROCYTE [DISTWIDTH] IN BLOOD BY AUTOMATED COUNT: 16.6 % (ref 10–15)
GLUCOSE SERPL-MCNC: 111 MG/DL (ref 70–99)
HCT VFR BLD AUTO: 40.6 % (ref 35–47)
HGB BLD-MCNC: 12.1 G/DL (ref 11.7–15.7)
IMM GRANULOCYTES # BLD: 0 10E3/UL
IMM GRANULOCYTES NFR BLD: 0 %
INTERPRETATION ECG - MUSE: NORMAL
LYMPHOCYTES # BLD AUTO: 1 10E3/UL (ref 0.8–5.3)
LYMPHOCYTES NFR BLD AUTO: 17 %
MCH RBC QN AUTO: 26.1 PG (ref 26.5–33)
MCHC RBC AUTO-ENTMCNC: 29.8 G/DL (ref 31.5–36.5)
MCV RBC AUTO: 88 FL (ref 78–100)
MONOCYTES # BLD AUTO: 0.3 10E3/UL (ref 0–1.3)
MONOCYTES NFR BLD AUTO: 6 %
NEUTROPHILS # BLD AUTO: 4.1 10E3/UL (ref 1.6–8.3)
NEUTROPHILS NFR BLD AUTO: 72 %
NRBC # BLD AUTO: 0 10E3/UL
NRBC BLD AUTO-RTO: 0 /100
P AXIS - MUSE: 32 DEGREES
PLATELET # BLD AUTO: 240 10E3/UL (ref 150–450)
POTASSIUM SERPL-SCNC: 4.5 MMOL/L (ref 3.4–5.3)
PR INTERVAL - MUSE: 180 MS
QRS DURATION - MUSE: 64 MS
QT - MUSE: 374 MS
QTC - MUSE: 400 MS
R AXIS - MUSE: 0 DEGREES
RBC # BLD AUTO: 4.64 10E6/UL (ref 3.8–5.2)
SODIUM SERPL-SCNC: 136 MMOL/L (ref 135–145)
SYSTOLIC BLOOD PRESSURE - MUSE: 160 MMHG
T AXIS - MUSE: 45 DEGREES
TROPONIN T SERPL HS-MCNC: 8 NG/L
VENTRICULAR RATE- MUSE: 69 BPM
WBC # BLD AUTO: 5.7 10E3/UL (ref 4–11)

## 2023-10-25 PROCEDURE — 85025 COMPLETE CBC W/AUTO DIFF WBC: CPT | Performed by: EMERGENCY MEDICINE

## 2023-10-25 PROCEDURE — 85379 FIBRIN DEGRADATION QUANT: CPT | Performed by: EMERGENCY MEDICINE

## 2023-10-25 PROCEDURE — 36415 COLL VENOUS BLD VENIPUNCTURE: CPT | Performed by: EMERGENCY MEDICINE

## 2023-10-25 PROCEDURE — 250N000011 HC RX IP 250 OP 636: Mod: JZ | Performed by: EMERGENCY MEDICINE

## 2023-10-25 PROCEDURE — 71275 CT ANGIOGRAPHY CHEST: CPT | Mod: ME

## 2023-10-25 PROCEDURE — 99285 EMERGENCY DEPT VISIT HI MDM: CPT | Mod: 25

## 2023-10-25 PROCEDURE — 80048 BASIC METABOLIC PNL TOTAL CA: CPT | Performed by: EMERGENCY MEDICINE

## 2023-10-25 PROCEDURE — 84484 ASSAY OF TROPONIN QUANT: CPT | Performed by: EMERGENCY MEDICINE

## 2023-10-25 PROCEDURE — 93005 ELECTROCARDIOGRAM TRACING: CPT | Performed by: EMERGENCY MEDICINE

## 2023-10-25 RX ORDER — IOPAMIDOL 755 MG/ML
75 INJECTION, SOLUTION INTRAVASCULAR ONCE
Status: COMPLETED | OUTPATIENT
Start: 2023-10-25 | End: 2023-10-25

## 2023-10-25 RX ADMIN — IOPAMIDOL 75 ML: 755 INJECTION, SOLUTION INTRAVENOUS at 15:27

## 2023-10-25 ASSESSMENT — ACTIVITIES OF DAILY LIVING (ADL): ADLS_ACUITY_SCORE: 35

## 2023-10-25 NOTE — ED PROVIDER NOTES
EMERGENCY DEPARTMENT ENCOUNTER      NAME: Oscar Zhao  AGE: 78 year old female  YOB: 1945  MRN: [id number]  EVALUATION DATE & TIME: No admission date for patient encounter.    PCP: Jak Jones    ED PROVIDER: Lindy Guajardo MD    Chief Complaint   Patient presents with    Hypertension         FINAL IMPRESSION:  1. Hypertension, unspecified type    2. Atypical chest pain    3. Hiatal hernia          ED COURSE & MEDICAL DECISION MAKING:    Pertinent Labs & Imaging studies reviewed. (See chart for details)  78 year old female with history of HTN, approximately 1 month status post revision of a hip arthroplasty due to recurrent dislocation who presents to the Emergency Department for evaluation of elevated blood pressure and chest pain yesterday.  Patient had low blood pressures on her most recent hospital stay, and her PCP decreased her 3 antihypertensives to half of the previous dose just 2 days ago and since her blood pressures have been elevated.  She did have some constant substernal chest pain yesterday, but none today.  Overall my suspicion that this is ACS is low.  2 days ago she did hours a fall yard cleanup and never had any exertional symptoms.  Yesterday, the day after this is when patient had this constant substernal ache.  Overall favor this to be musculoskeletal in etiology.  However seems how she is 1 month postop, she is at increased risk for PE.  No cough or chest congestion to suspect infectious etiology.  I suspect that her reproducible axillary pain is also muscular, less likely PE.    Patient initially seen evaluated by myself in triage area due to boarding crisis.  Twelve-lead EKG shows sinus rhythm.  No ischemic changes.  CBC, BMP, troponin, D-dimer notable for elevated D-dimer.  CT PE study obtained and negative.  Incidental hiatal hernia.  She really does not describe much GERD symptomatology.  Hemoglobin is up trending from her postoperative course and her troponin is  gender normal and does not warrant any repeat.  Blood pressure slightly elevated in the 160s to 150s here, we will have her continue her home antihypertensives and follow-up with PCP for blood pressure management.       ED Course as of 10/25/23 1616   Wed Oct 25, 2023   1503 D-Dimer Quantitative(!): 1.19   1512 Hemoglobin: 12.1  Uptrending post op    1512 Troponin T, High Sensitivity: 8  Gender normal doesn't warrant repeat       Medical Decision Making    History:  Supplemental history from: Documented in chart, if applicable  External Record(s) reviewed: Outpatient Record: Nurse triage line today    Work Up:  Chart documentation includes differential considered and any EKGs or imaging independently interpreted by provider, see MDM  In additional to work up documented, I considered the following work up: see MDM    External consultation:  Discussion of management with another provider: N/A    Complicating factors:  Care impacted by chronic illness: Hypertension  Care affected by social determinants of health: Access to Medical Care referred to ED    Disposition considerations: Discharge. I recommended the patient continue their current prescription strength medication(s): 3 antihypertensive. See documentation for any additional details.        At the conclusion of the encounter I discussed the results of all of the tests and the disposition. The questions were answered. The patient or family acknowledged understanding and was agreeable with the care plan.      MEDICATIONS GIVEN IN THE EMERGENCY:  Medications   iopamidol (ISOVUE-370) solution 75 mL (75 mLs Intravenous $Given 10/25/23 1527)       NEW PRESCRIPTIONS STARTED AT TODAY'S ER VISIT  New Prescriptions    No medications on file          =================================================================    HPI    Patient information was obtained from: patient    Use of Intrepreter: N/A     Oscar Zhao is a 78 year old female with pertinent medical history of  "HTN who presents elevated blood pressure.  Approximately 1 month ago patient had a revision of her total hip arthroplasty due to recurrent dislocation.  Postoperatively, her blood pressures were low.  She has 3 drug hypertension, on Norvasc, lisinopril, metoprolol.  She has been taking all of these as prescribed.  2 days ago she had a follow-up with her PCP to follow-up with the lower blood pressures postoperatively.  In clinic her blood pressure was in the 100s over 50 and her PCP advised her to continue her 3 medications, but instead take half doses of all of them.  Patient has done this now the last 2 days, and has been having high blood pressures in the 160s to 170s systolic.  2 days ago, she did \"all the fall yard clean up\".  During this, she never had any exertional symptoms, chest pain, shortness of breath, etc.  Yesterday, the day after all the formal yard cleanup she was having some anterior infra pectoral constant chest pressure.  She does note a little bit of right axillary pain as well.  This is made worse with any sort of movement.  Patient denies any chest pain today, but with her elevated blood pressures called nurse triage line who directed her to the ER.      PAST MEDICAL HISTORY:  Past Medical History:   Diagnosis Date    Arthritis     Basal cell carcinoma of anterior chest     Breast cyst     History of anesthesia complications     HLA B27 (HLA B27 positive)     Hypertension     Osteoporosis 07/19/2011    Peripheral neuropathy 09/12/2018    PONV (postoperative nausea and vomiting)     Scleritis, bilateral     Left 2010.  Treated with corticosteroids,, methotrexate and Humira.  2015.  Rituximab.    Squamous cell carcinoma of skin of chest     Steroid induced glaucoma, both eyes        PAST SURGICAL HISTORY:  Past Surgical History:   Procedure Laterality Date    ARTHROPLASTY REVISION HIP Right 9/21/2023    Procedure: RIGHT HIP REVISION TOTAL HIP ARTHROPLASTY;  Surgeon: Bill Bernal DO;  Location: " [facility]    BREAST CYST EXCISION Right     benign     SECTION      CLOSED REDUCTION HIP Right 2019    Procedure: CLOSED REDUCTION, HIP;  Surgeon: Jak Ruiz DO;  Location: [facility] OR;  Service: Orthopedics    CLOSED REDUCTION HIP Right 2023    Procedure: CLOSED REDUCTION, HIP RIGHT;  Surgeon: Aditya Pedroza MD;  Location: [facility]    COLONOSCOPY  2011    COLONOSCOPY  2005    COLONOSCOPY W/ BIOPSIES AND POLYPECTOMY  2021    16 to 20 mm polyp:tubular adenoma in the ascending colon.  Ulcerated mass in the anal canal: Moderately differentiated squamous cell cancer.    ESOPHAGOSCOPY, GASTROSCOPY, DUODENOSCOPY (EGD), COMBINED  2017    Large hiatal hernia.  Reactive gastropathy with mucosal erosion.  H. pylori negative.    HERNIORRHAPHY FEMORAL Left 2021    Procedure: LEFT FEMORAL HERNIA REPAIR;  Surgeon: Sidney Murray MD;  Location: [facility]    HYSTERECTOMY TOTAL ABDOMINAL, BILATERAL SALPINGO-OOPHORECTOMY, COMBINED      IR CHEST PORT PLACEMENT > 5 YRS OF AGE  3/15/2021    IR PORT PLACEMENT >5 YEARS  03/15/2021    Right IJ port-a-cath.    IR PORT REMOVAL RIGHT  2021    LAPAROSCOPIC APPENDECTOMY  2011    PHACOEMULSIFICATION CLEAR CORNEA WITH STANDARD INTRAOCULAR LENS IMPLANT Right 2017    PHACOEMULSIFICATION CLEAR CORNEA WITH STANDARD INTRAOCULAR LENS IMPLANT Left 2017    TOTAL HIP ARTHROPLASTY Right 2015    Procedure: HIP TOTAL ARTHROPLASTY, RIGHT;  Surgeon: Star Du MD;  Location: [facility];  Service:     [facility] TOTAL KNEE ARTHROPLASTY Left 2017    Procedure: LEFT TOTAL KNEE ARTHROPLASTY;  Surgeon: Star Du MD;  Location: [facility];  Service: Orthopedics       CURRENT MEDICATIONS:    Prior to Admission Medications   Prescriptions Last Dose Informant Patient Reported? Taking?   LOTEMAX 0.5 % ophthalmic suspension   Yes No   Sig: Place 1 drop into both  eyes every 48 hours   acetaminophen (TYLENOL) 325 MG tablet   No No   Sig: Take 2 tablets (650 mg) by mouth every 4 hours as needed for other (mild pain)   amLODIPine (NORVASC) 5 MG tablet   No No   Sig: Take 2 tablets (10 mg) by mouth daily   aspirin 81 MG EC tablet   No No   Sig: Take 1 tablet (81 mg) by mouth 2 times daily   hydrOXYzine (ATARAX) 10 MG tablet   No No   Sig: Take 1 tablet (10 mg) by mouth every 6 hours as needed for itching or anxiety (with pain, moderate pain)   lisinopril (ZESTRIL) 20 MG tablet   No No   Sig: Take 1 tablet (20 mg) by mouth daily   metoprolol succinate ER (TOPROL XL) 25 MG 24 hr tablet   No No   Sig: Take 2 tablets (50 mg) by mouth daily   naproxen (NAPROSYN) 250 MG tablet   No No   Sig: Take 1 tablet (250 mg) by mouth every 12 hours as needed for moderate pain   oxyCODONE (ROXICODONE) 5 MG tablet   No No   Sig: Take 0.5 tablets (2.5 mg) by mouth every 4 hours as needed for moderate pain   senna-docusate (SENOKOT-S/PERICOLACE) 8.6-50 MG tablet   No No   Sig: Take 1-2 tablets by mouth 2 times daily Take while on oral narcotics to prevent or treat constipation.   timolol maleate (TIMOPTIC) 0.5 % ophthalmic solution   Yes No   Sig: Place 1 drop into both eyes daily      Facility-Administered Medications: None       ALLERGIES:  Allergies   Allergen Reactions    Adalimumab Muscle Pain (Myalgia)    Brimonidine-Timolol [Brimonidine Tartrate-Timolol] Unknown     Redness itching in eyes    Levaquin [Levofloxacin] Unknown     Skin burn and couldn't get out bed for 5 days    Tetracyclines & Related Hives       FAMILY HISTORY:  Family History   Problem Relation Age of Onset    Alzheimer Disease Mother     Heart Disease Father     Pancreatic Cancer Brother 72.00    No Known Problems Daughter     Anesthesia Reaction No family hx of        SOCIAL HISTORY:  Social History     Tobacco Use    Smoking status: Never    Smokeless tobacco: Never   Substance Use Topics    Alcohol use: Yes      "Alcohol/week: 4.0 standard drinks of alcohol     Types: 4 Standard drinks or equivalent per week     Comment: 3-4/wk    Drug use: No        VITALS:  Patient Vitals for the past 24 hrs:   BP Temp Temp src Pulse Resp SpO2 Height Weight   10/25/23 1600 (!) 159/92 -- -- 76 16 99 % -- --   10/25/23 1545 -- -- -- 80 16 100 % -- --   10/25/23 1530 (!) 167/100 -- -- -- -- -- -- --   10/25/23 1346 (!) 160/92 99.2  F (37.3  C) Temporal 72 18 100 % 1.6 m (5' 3\") 61.2 kg (135 lb)       PHYSICAL EXAM    General Appearance: Well-appearing, well-nourished, no acute distress   Head:  Normocephalic  Neck:  Supple  Chest:  No tenderness or deformity, no crepitus  Cardio:  Regular rate and rhythm, no murmur/gallop/rub, hypertensive  Pulm:  No respiratory distress, clear to auscultation bilaterally  Abdomen:  Soft, non-tender  Extremities: Moves extremities normally, normal gait.  No peripheral edema.  Patient has reproducible tenderness palpation in the right axillary region  Neuro:  Alert and oriented ×3     RADIOLOGY/LABS:  Reviewed all pertinent imaging. Please see official radiology report. All pertinent labs reviewed and interpreted.    Results for orders placed or performed during the hospital encounter of 10/25/23   CT Chest Pulmonary Embolism w Contrast    Impression    IMPRESSION:  1.  Negative for acute pulmonary embolism.  2.  No acute findings in the chest.  3.  Large gastroesophageal hiatal hernia with predominantly intrathoracic stomach.   Basic metabolic panel   Result Value Ref Range    Sodium 136 135 - 145 mmol/L    Potassium 4.5 3.4 - 5.3 mmol/L    Chloride 102 98 - 107 mmol/L    Carbon Dioxide (CO2) 25 22 - 29 mmol/L    Anion Gap 9 7 - 15 mmol/L    Urea Nitrogen 8.6 8.0 - 23.0 mg/dL    Creatinine 0.64 0.51 - 0.95 mg/dL    GFR Estimate >90 >60 mL/min/1.73m2    Calcium 10.5 (H) 8.8 - 10.2 mg/dL    Glucose 111 (H) 70 - 99 mg/dL   Result Value Ref Range    Troponin T, High Sensitivity 8 <=14 ng/L   D dimer quantitative "   Result Value Ref Range    D-Dimer Quantitative 1.19 (H) 0.00 - 0.50 ug/mL FEU   CBC with platelets and differential   Result Value Ref Range    WBC Count 5.7 4.0 - 11.0 10e3/uL    RBC Count 4.64 3.80 - 5.20 10e6/uL    Hemoglobin 12.1 11.7 - 15.7 g/dL    Hematocrit 40.6 35.0 - 47.0 %    MCV 88 78 - 100 fL    MCH 26.1 (L) 26.5 - 33.0 pg    MCHC 29.8 (L) 31.5 - 36.5 g/dL    RDW 16.6 (H) 10.0 - 15.0 %    Platelet Count 240 150 - 450 10e3/uL    % Neutrophils 72 %    % Lymphocytes 17 %    % Monocytes 6 %    % Eosinophils 4 %    % Basophils 1 %    % Immature Granulocytes 0 %    NRBCs per 100 WBC 0 <1 /100    Absolute Neutrophils 4.1 1.6 - 8.3 10e3/uL    Absolute Lymphocytes 1.0 0.8 - 5.3 10e3/uL    Absolute Monocytes 0.3 0.0 - 1.3 10e3/uL    Absolute Eosinophils 0.2 0.0 - 0.7 10e3/uL    Absolute Basophils 0.0 0.0 - 0.2 10e3/uL    Absolute Immature Granulocytes 0.0 <=0.4 10e3/uL    Absolute NRBCs 0.0 10e3/uL   ECG 12-LEAD WITH MUSE (LHE)   Result Value Ref Range    Systolic Blood Pressure 160 mmHg    Diastolic Blood Pressure 92 mmHg    Ventricular Rate 69 BPM    Atrial Rate 69 BPM    NJ Interval 180 ms    QRS Duration 64 ms     ms    QTc 400 ms    P Axis 32 degrees    R AXIS 0 degrees    T Axis 45 degrees    Interpretation ECG       Sinus rhythm  Cannot rule out Anterior infarct (cited on or before 04-DEC-2018)  Abnormal ECG  When compared with ECG of 30-AUG-2020 22:55,  Vent. rate has decreased BY  42 BPM  Nonspecific T wave abnormality no longer evident in Lateral leads  Confirmed by SEE ED PROVIDER NOTE FOR, ECG INTERPRETATION ([id number]),  Deepti Vanessa ([id number]) on 10/25/2023 4:09:32 PM         EKG:  Performed at: 13:58:43    Impression: Sinus rhythm. Cannot rule out Anterior infarct. Abnormal ECG.    Rate: 69  Rhythm: Sinus  Axis: 0  NJ Interval: 180  QRS Interval: 64  QTc Interval: 400  ST Changes: Nonspecific T wave abnormality no longer evident in Lateral leads.  Comparison: When compared with ECG of  8/30/2020 22:55, Vent. rate has decreased BY 42 BPM Nonspecific T wave abnormality no longer evident in Lateral leads.    I have independently reviewed and interpreted the EKG(s) documented above.      Lindy Guajardo MD  Emergency Medicine  [facility] EMERGENCY ROOM  [address] [address]  [phone]  Dept: [phone]     Lindy Guajardo MD  10/25/23 8365

## 2023-10-25 NOTE — DISCHARGE INSTRUCTIONS
Your EKG, blood work and CT scan today were reassuring.  No signs of stress to the heart, heart attack, pulmonary embolism.    Continue home medications as previously prescribed.  Monitor blood pressure 1-2 times daily and keep a diary of your blood pressure.  Follow-up with primary care doctor for blood pressure management.

## 2023-10-25 NOTE — ED TRIAGE NOTES
Arrives to ED with c/o HTN. Reports was hypotensive following R hip revision in September. Monday saw PCP and BP was 100/54. Checked BP at home yesterday, . Was unsure if home monitor was inaccurate. Went to fire station today and BP was 168/100. Endorses posterior neck pain and HA. Denies vision changes. Yesterday experienced anterior medial chest tightness. Denies today. Having R axillary pain that radiates to back.      Triage Assessment (Adult)       Row Name 10/25/23 8845          Triage Assessment    Airway WDL WDL        Respiratory WDL    Respiratory WDL WDL        Skin Circulation/Temperature WDL    Skin Circulation/Temperature WDL WDL        Cardiac WDL    Cardiac WDL X;chest pain        Peripheral/Neurovascular WDL    Peripheral Neurovascular WDL WDL        Cognitive/Neuro/Behavioral WDL    Cognitive/Neuro/Behavioral WDL WDL

## 2023-10-25 NOTE — TELEPHONE ENCOUNTER
Pt is phoning stating that her blood pressure is running 178/115     No severe headache     No blurred vision, difficulty walking or talking or chest pain     Pt states that it is hard to take a deep breath and that she is having some pain under her right arm     Per disposition: Go To ED/UCC Now (Or To Office With PCP Approval)     Pt will be going to ED now for evaluation  - pt stating it is just down the street from pt     Care advice given per protocol and when to call back. Pt verbalized understanding and agrees to plan of care.    Whit Zamora RN  [place] Nurse Advisor  1:23 PM 10/25/2023      Reason for Disposition   Systolic BP >= 160 OR Diastolic >= 100, and any cardiac or neurologic symptoms (e.g., chest pain, difficulty breathing, unsteady gait, blurred vision)    Additional Information   Negative: Sounds like a life-threatening emergency to the triager   Negative: Pregnant > 20 weeks or postpartum (< 6 weeks after delivery) and new hand or face swelling   Negative: Pregnant > 20 weeks and BP > 140/90    Protocols used: High Blood Pressure-A-OH

## 2023-10-25 NOTE — TELEPHONE ENCOUNTER
Pt called stating her BP was very high today,she got it checked at a Fire station and it was 178/115, she wanted to know what she should do, I transferred her to Triage nurse. Wanted me to let Robert and his team know about this.

## 2023-11-01 ENCOUNTER — TRANSFERRED RECORDS (OUTPATIENT)
Dept: HEALTH INFORMATION MANAGEMENT | Facility: CLINIC | Age: 78
End: 2023-11-01
Payer: MEDICARE

## 2023-11-03 ENCOUNTER — APPOINTMENT (OUTPATIENT)
Dept: RADIOLOGY | Facility: CLINIC | Age: 78
End: 2023-11-03
Attending: EMERGENCY MEDICINE
Payer: MEDICARE

## 2023-11-03 ENCOUNTER — HOSPITAL ENCOUNTER (OUTPATIENT)
Facility: CLINIC | Age: 78
Setting detail: OBSERVATION
Discharge: HOME | End: 2023-11-05
Attending: EMERGENCY MEDICINE | Admitting: INTERNAL MEDICINE
Payer: MEDICARE

## 2023-11-03 DIAGNOSIS — S73.004A CLOSED DISLOCATION OF RIGHT HIP, INITIAL ENCOUNTER (H): ICD-10-CM

## 2023-11-03 DIAGNOSIS — T84.020D FAILURE OF RIGHT TOTAL HIP ARTHROPLASTY WITH DISLOCATION OF HIP, SUBSEQUENT ENCOUNTER: ICD-10-CM

## 2023-11-03 PROCEDURE — 96374 THER/PROPH/DIAG INJ IV PUSH: CPT | Mod: 59

## 2023-11-03 PROCEDURE — 99285 EMERGENCY DEPT VISIT HI MDM: CPT | Mod: 25

## 2023-11-03 PROCEDURE — 250N000011 HC RX IP 250 OP 636: Performed by: EMERGENCY MEDICINE

## 2023-11-03 PROCEDURE — 258N000003 HC RX IP 258 OP 636: Performed by: EMERGENCY MEDICINE

## 2023-11-03 PROCEDURE — 999N000065 XR PELVIS 1/2 VIEWS

## 2023-11-03 PROCEDURE — 96376 TX/PRO/DX INJ SAME DRUG ADON: CPT

## 2023-11-03 PROCEDURE — 96361 HYDRATE IV INFUSION ADD-ON: CPT

## 2023-11-03 PROCEDURE — 999N000065 XR HIP PORTABLE RIGHT 2-3 VIEWS

## 2023-11-03 PROCEDURE — 73502 X-RAY EXAM HIP UNI 2-3 VIEWS: CPT

## 2023-11-03 PROCEDURE — 72170 X-RAY EXAM OF PELVIS: CPT

## 2023-11-03 PROCEDURE — 96375 TX/PRO/DX INJ NEW DRUG ADDON: CPT

## 2023-11-03 PROCEDURE — 999N000157 HC STATISTIC RCP TIME EA 10 MIN

## 2023-11-03 RX ORDER — PROPOFOL 10 MG/ML
60 INJECTION, EMULSION INTRAVENOUS ONCE
Status: COMPLETED | OUTPATIENT
Start: 2023-11-03 | End: 2023-11-03

## 2023-11-03 RX ORDER — PROPOFOL 10 MG/ML
30 INJECTION, EMULSION INTRAVENOUS ONCE
Status: DISCONTINUED | OUTPATIENT
Start: 2023-11-04 | End: 2023-11-05 | Stop reason: HOSPADM

## 2023-11-03 RX ORDER — SODIUM CHLORIDE 9 MG/ML
INJECTION, SOLUTION INTRAVENOUS ONCE
Status: COMPLETED | OUTPATIENT
Start: 2023-11-03 | End: 2023-11-04

## 2023-11-03 RX ORDER — PROPOFOL 10 MG/ML
200 INJECTION, EMULSION INTRAVENOUS ONCE
Status: COMPLETED | OUTPATIENT
Start: 2023-11-03 | End: 2023-11-03

## 2023-11-03 RX ORDER — PROPOFOL 10 MG/ML
INJECTION, EMULSION INTRAVENOUS DAILY PRN
Status: COMPLETED | OUTPATIENT
Start: 2023-11-03 | End: 2023-11-03

## 2023-11-03 RX ADMIN — PROPOFOL 30 MG: 10 INJECTION, EMULSION INTRAVENOUS at 23:31

## 2023-11-03 RX ADMIN — PROPOFOL 60 MG: 10 INJECTION, EMULSION INTRAVENOUS at 23:29

## 2023-11-03 RX ADMIN — PROPOFOL 60 MG: 10 INJECTION, EMULSION INTRAVENOUS at 22:26

## 2023-11-03 RX ADMIN — HYDROMORPHONE HYDROCHLORIDE 1 MG: 1 INJECTION, SOLUTION INTRAMUSCULAR; INTRAVENOUS; SUBCUTANEOUS at 20:30

## 2023-11-03 RX ADMIN — SODIUM CHLORIDE: 9 INJECTION, SOLUTION INTRAVENOUS at 21:31

## 2023-11-03 ASSESSMENT — ACTIVITIES OF DAILY LIVING (ADL)
ADLS_ACUITY_SCORE: 35
ADLS_ACUITY_SCORE: 35

## 2023-11-04 ENCOUNTER — APPOINTMENT (OUTPATIENT)
Dept: RADIOLOGY | Facility: CLINIC | Age: 78
End: 2023-11-04
Attending: STUDENT IN AN ORGANIZED HEALTH CARE EDUCATION/TRAINING PROGRAM
Payer: MEDICARE

## 2023-11-04 ENCOUNTER — ANESTHESIA (OUTPATIENT)
Dept: SURGERY | Facility: CLINIC | Age: 78
End: 2023-11-04
Payer: MEDICARE

## 2023-11-04 ENCOUNTER — ANESTHESIA EVENT (OUTPATIENT)
Dept: SURGERY | Facility: CLINIC | Age: 78
End: 2023-11-04
Payer: MEDICARE

## 2023-11-04 ENCOUNTER — APPOINTMENT (OUTPATIENT)
Dept: CT IMAGING | Facility: CLINIC | Age: 78
End: 2023-11-04
Attending: STUDENT IN AN ORGANIZED HEALTH CARE EDUCATION/TRAINING PROGRAM
Payer: MEDICARE

## 2023-11-04 PROBLEM — S73.004A CLOSED DISLOCATION OF RIGHT HIP, INITIAL ENCOUNTER (H): Status: ACTIVE | Noted: 2023-11-04

## 2023-11-04 LAB
ALBUMIN SERPL BCG-MCNC: 3.6 G/DL (ref 3.5–5.2)
ALBUMIN UR-MCNC: NEGATIVE MG/DL
ALP SERPL-CCNC: 97 U/L (ref 35–104)
ALT SERPL W P-5'-P-CCNC: 12 U/L (ref 0–50)
ANION GAP SERPL CALCULATED.3IONS-SCNC: 10 MMOL/L (ref 7–15)
ANION GAP SERPL CALCULATED.3IONS-SCNC: 11 MMOL/L (ref 7–15)
ANION GAP SERPL CALCULATED.3IONS-SCNC: 8 MMOL/L (ref 7–15)
APPEARANCE UR: CLEAR
AST SERPL W P-5'-P-CCNC: 20 U/L (ref 0–45)
BASE EXCESS BLDV CALC-SCNC: -0.2 MMOL/L
BASOPHILS # BLD AUTO: 0 10E3/UL (ref 0–0.2)
BASOPHILS # BLD AUTO: 0.1 10E3/UL (ref 0–0.2)
BASOPHILS NFR BLD AUTO: 1 %
BASOPHILS NFR BLD AUTO: 1 %
BILIRUB SERPL-MCNC: 0.6 MG/DL
BILIRUB UR QL STRIP: NEGATIVE
BUN SERPL-MCNC: 8.9 MG/DL (ref 8–23)
BUN SERPL-MCNC: 9 MG/DL (ref 8–23)
BUN SERPL-MCNC: 9.8 MG/DL (ref 8–23)
CALCIUM SERPL-MCNC: 8.9 MG/DL (ref 8.8–10.2)
CALCIUM SERPL-MCNC: 9.4 MG/DL (ref 8.8–10.2)
CALCIUM SERPL-MCNC: 9.6 MG/DL (ref 8.8–10.2)
CHLORIDE SERPL-SCNC: 101 MMOL/L (ref 98–107)
COLOR UR AUTO: COLORLESS
CREAT SERPL-MCNC: 0.5 MG/DL (ref 0.51–0.95)
CREAT SERPL-MCNC: 0.5 MG/DL (ref 0.51–0.95)
CREAT SERPL-MCNC: 0.52 MG/DL (ref 0.51–0.95)
DEPRECATED HCO3 PLAS-SCNC: 21 MMOL/L (ref 22–29)
DEPRECATED HCO3 PLAS-SCNC: 22 MMOL/L (ref 22–29)
DEPRECATED HCO3 PLAS-SCNC: 24 MMOL/L (ref 22–29)
EGFRCR SERPLBLD CKD-EPI 2021: >90 ML/MIN/1.73M2
EOSINOPHIL # BLD AUTO: 0.3 10E3/UL (ref 0–0.7)
EOSINOPHIL # BLD AUTO: 0.3 10E3/UL (ref 0–0.7)
EOSINOPHIL NFR BLD AUTO: 4 %
EOSINOPHIL NFR BLD AUTO: 5 %
ERYTHROCYTE [DISTWIDTH] IN BLOOD BY AUTOMATED COUNT: 16.3 % (ref 10–15)
ERYTHROCYTE [DISTWIDTH] IN BLOOD BY AUTOMATED COUNT: 16.5 % (ref 10–15)
GLUCOSE SERPL-MCNC: 118 MG/DL (ref 70–99)
GLUCOSE SERPL-MCNC: 120 MG/DL (ref 70–99)
GLUCOSE SERPL-MCNC: 128 MG/DL (ref 70–99)
GLUCOSE UR STRIP-MCNC: NEGATIVE MG/DL
HCO3 BLDV-SCNC: 25 MMOL/L (ref 24–30)
HCT VFR BLD AUTO: 37.9 % (ref 35–47)
HCT VFR BLD AUTO: 39.7 % (ref 35–47)
HGB BLD-MCNC: 11.3 G/DL (ref 11.7–15.7)
HGB BLD-MCNC: 12.1 G/DL (ref 11.7–15.7)
HGB UR QL STRIP: NEGATIVE
IMM GRANULOCYTES # BLD: 0 10E3/UL
IMM GRANULOCYTES # BLD: 0 10E3/UL
IMM GRANULOCYTES NFR BLD: 1 %
IMM GRANULOCYTES NFR BLD: 1 %
INR PPP: 1.01 (ref 0.85–1.15)
KETONES UR STRIP-MCNC: 10 MG/DL
LEUKOCYTE ESTERASE UR QL STRIP: ABNORMAL
LYMPHOCYTES # BLD AUTO: 0.9 10E3/UL (ref 0.8–5.3)
LYMPHOCYTES # BLD AUTO: 1.1 10E3/UL (ref 0.8–5.3)
LYMPHOCYTES NFR BLD AUTO: 14 %
LYMPHOCYTES NFR BLD AUTO: 15 %
MCH RBC QN AUTO: 25.5 PG (ref 26.5–33)
MCH RBC QN AUTO: 25.9 PG (ref 26.5–33)
MCHC RBC AUTO-ENTMCNC: 29.8 G/DL (ref 31.5–36.5)
MCHC RBC AUTO-ENTMCNC: 30.5 G/DL (ref 31.5–36.5)
MCV RBC AUTO: 85 FL (ref 78–100)
MCV RBC AUTO: 85 FL (ref 78–100)
MONOCYTES # BLD AUTO: 0.3 10E3/UL (ref 0–1.3)
MONOCYTES # BLD AUTO: 0.6 10E3/UL (ref 0–1.3)
MONOCYTES NFR BLD AUTO: 4 %
MONOCYTES NFR BLD AUTO: 8 %
NEUTROPHILS # BLD AUTO: 4.8 10E3/UL (ref 1.6–8.3)
NEUTROPHILS # BLD AUTO: 5.3 10E3/UL (ref 1.6–8.3)
NEUTROPHILS NFR BLD AUTO: 71 %
NEUTROPHILS NFR BLD AUTO: 75 %
NITRATE UR QL: NEGATIVE
NRBC # BLD AUTO: 0 10E3/UL
NRBC # BLD AUTO: 0 10E3/UL
NRBC BLD AUTO-RTO: 0 /100
NRBC BLD AUTO-RTO: 0 /100
OXYHGB MFR BLDV: 43.1 % (ref 70–75)
PCO2 BLDV: 42 MM HG (ref 35–50)
PH BLDV: 7.38 [PH] (ref 7.35–7.45)
PH UR STRIP: 7 [PH] (ref 5–7)
PLATELET # BLD AUTO: 204 10E3/UL (ref 150–450)
PLATELET # BLD AUTO: 219 10E3/UL (ref 150–450)
PO2 BLDV: 26 MM HG (ref 25–47)
POTASSIUM SERPL-SCNC: 4 MMOL/L (ref 3.4–5.3)
POTASSIUM SERPL-SCNC: 4.1 MMOL/L (ref 3.4–5.3)
POTASSIUM SERPL-SCNC: 5.5 MMOL/L (ref 3.4–5.3)
PROT SERPL-MCNC: 6.2 G/DL (ref 6.4–8.3)
RBC # BLD AUTO: 4.44 10E6/UL (ref 3.8–5.2)
RBC # BLD AUTO: 4.68 10E6/UL (ref 3.8–5.2)
RBC URINE: <1 /HPF
SAO2 % BLDV: 43.8 % (ref 70–75)
SODIUM SERPL-SCNC: 133 MMOL/L (ref 135–145)
SP GR UR STRIP: 1.01 (ref 1–1.03)
SQUAMOUS EPITHELIAL: <1 /HPF
UROBILINOGEN UR STRIP-MCNC: <2 MG/DL
WBC # BLD AUTO: 6.4 10E3/UL (ref 4–11)
WBC # BLD AUTO: 7.3 10E3/UL (ref 4–11)
WBC URINE: 5 /HPF

## 2023-11-04 PROCEDURE — 81001 URINALYSIS AUTO W/SCOPE: CPT | Performed by: EMERGENCY MEDICINE

## 2023-11-04 PROCEDURE — 999N000065 XR PELVIS AND HIP PORTABLE RIGHT 1 VIEW

## 2023-11-04 PROCEDURE — 82805 BLOOD GASES W/O2 SATURATION: CPT | Performed by: INTERNAL MEDICINE

## 2023-11-04 PROCEDURE — 258N000003 HC RX IP 258 OP 636: Performed by: ANESTHESIOLOGY

## 2023-11-04 PROCEDURE — 250N000025 HC SEVOFLURANE, PER MIN: Performed by: STUDENT IN AN ORGANIZED HEALTH CARE EDUCATION/TRAINING PROGRAM

## 2023-11-04 PROCEDURE — 36415 COLL VENOUS BLD VENIPUNCTURE: CPT | Performed by: EMERGENCY MEDICINE

## 2023-11-04 PROCEDURE — 36415 COLL VENOUS BLD VENIPUNCTURE: CPT | Performed by: INTERNAL MEDICINE

## 2023-11-04 PROCEDURE — 250N000013 HC RX MED GY IP 250 OP 250 PS 637: Performed by: STUDENT IN AN ORGANIZED HEALTH CARE EDUCATION/TRAINING PROGRAM

## 2023-11-04 PROCEDURE — 96372 THER/PROPH/DIAG INJ SC/IM: CPT | Mod: XU | Performed by: STUDENT IN AN ORGANIZED HEALTH CARE EDUCATION/TRAINING PROGRAM

## 2023-11-04 PROCEDURE — 250N000011 HC RX IP 250 OP 636: Mod: JZ | Performed by: NURSE ANESTHETIST, CERTIFIED REGISTERED

## 2023-11-04 PROCEDURE — 96361 HYDRATE IV INFUSION ADD-ON: CPT

## 2023-11-04 PROCEDURE — 999N000141 HC STATISTIC PRE-PROCEDURE NURSING ASSESSMENT: Performed by: STUDENT IN AN ORGANIZED HEALTH CARE EDUCATION/TRAINING PROGRAM

## 2023-11-04 PROCEDURE — 80048 BASIC METABOLIC PNL TOTAL CA: CPT | Performed by: INTERNAL MEDICINE

## 2023-11-04 PROCEDURE — 272N000001 HC OR GENERAL SUPPLY STERILE: Performed by: STUDENT IN AN ORGANIZED HEALTH CARE EDUCATION/TRAINING PROGRAM

## 2023-11-04 PROCEDURE — 82040 ASSAY OF SERUM ALBUMIN: CPT | Performed by: INTERNAL MEDICINE

## 2023-11-04 PROCEDURE — 360N000074 HC SURGERY LEVEL 1, PER MIN: Performed by: STUDENT IN AN ORGANIZED HEALTH CARE EDUCATION/TRAINING PROGRAM

## 2023-11-04 PROCEDURE — 258N000003 HC RX IP 258 OP 636: Performed by: STUDENT IN AN ORGANIZED HEALTH CARE EDUCATION/TRAINING PROGRAM

## 2023-11-04 PROCEDURE — 710N000010 HC RECOVERY PHASE 1, LEVEL 2, PER MIN: Performed by: STUDENT IN AN ORGANIZED HEALTH CARE EDUCATION/TRAINING PROGRAM

## 2023-11-04 PROCEDURE — 250N000011 HC RX IP 250 OP 636: Mod: JZ | Performed by: STUDENT IN AN ORGANIZED HEALTH CARE EDUCATION/TRAINING PROGRAM

## 2023-11-04 PROCEDURE — 250N000013 HC RX MED GY IP 250 OP 250 PS 637: Performed by: INTERNAL MEDICINE

## 2023-11-04 PROCEDURE — 85014 HEMATOCRIT: CPT | Performed by: EMERGENCY MEDICINE

## 2023-11-04 PROCEDURE — 258N000003 HC RX IP 258 OP 636: Performed by: NURSE ANESTHETIST, CERTIFIED REGISTERED

## 2023-11-04 PROCEDURE — 999N000182 XR SURGERY CARM FLUORO GREATER THAN 5 MIN

## 2023-11-04 PROCEDURE — 258N000003 HC RX IP 258 OP 636: Performed by: INTERNAL MEDICINE

## 2023-11-04 PROCEDURE — 99223 1ST HOSP IP/OBS HIGH 75: CPT | Mod: AI | Performed by: INTERNAL MEDICINE

## 2023-11-04 PROCEDURE — G0378 HOSPITAL OBSERVATION PER HR: HCPCS

## 2023-11-04 PROCEDURE — 85025 COMPLETE CBC W/AUTO DIFF WBC: CPT | Mod: 91 | Performed by: INTERNAL MEDICINE

## 2023-11-04 PROCEDURE — 370N000017 HC ANESTHESIA TECHNICAL FEE, PER MIN: Performed by: STUDENT IN AN ORGANIZED HEALTH CARE EDUCATION/TRAINING PROGRAM

## 2023-11-04 PROCEDURE — 85610 PROTHROMBIN TIME: CPT | Performed by: EMERGENCY MEDICINE

## 2023-11-04 PROCEDURE — 80053 COMPREHEN METABOLIC PANEL: CPT | Performed by: EMERGENCY MEDICINE

## 2023-11-04 PROCEDURE — G1010 CDSM STANSON: HCPCS

## 2023-11-04 PROCEDURE — 250N000009 HC RX 250: Performed by: STUDENT IN AN ORGANIZED HEALTH CARE EDUCATION/TRAINING PROGRAM

## 2023-11-04 RX ORDER — FENTANYL CITRATE 50 UG/ML
25 INJECTION, SOLUTION INTRAMUSCULAR; INTRAVENOUS EVERY 5 MIN PRN
Status: CANCELLED | OUTPATIENT
Start: 2023-11-04

## 2023-11-04 RX ORDER — OXYCODONE HYDROCHLORIDE 5 MG/1
10 TABLET ORAL EVERY 4 HOURS PRN
Status: DISCONTINUED | OUTPATIENT
Start: 2023-11-04 | End: 2023-11-05 | Stop reason: HOSPADM

## 2023-11-04 RX ORDER — AMLODIPINE BESYLATE 5 MG/1
5 TABLET ORAL DAILY
COMMUNITY
End: 2024-02-08

## 2023-11-04 RX ORDER — PROCHLORPERAZINE MALEATE 5 MG
5 TABLET ORAL EVERY 6 HOURS PRN
Status: DISCONTINUED | OUTPATIENT
Start: 2023-11-04 | End: 2023-11-05 | Stop reason: HOSPADM

## 2023-11-04 RX ORDER — BISACODYL 10 MG
10 SUPPOSITORY, RECTAL RECTAL DAILY PRN
Status: DISCONTINUED | OUTPATIENT
Start: 2023-11-04 | End: 2023-11-05 | Stop reason: HOSPADM

## 2023-11-04 RX ORDER — CEFAZOLIN SODIUM/WATER 2 G/20 ML
2 SYRINGE (ML) INTRAVENOUS
Status: DISCONTINUED | OUTPATIENT
Start: 2023-11-04 | End: 2023-11-04 | Stop reason: HOSPADM

## 2023-11-04 RX ORDER — METOPROLOL SUCCINATE 25 MG/1
25 TABLET, EXTENDED RELEASE ORAL DAILY
Status: DISCONTINUED | OUTPATIENT
Start: 2023-11-04 | End: 2023-11-05 | Stop reason: HOSPADM

## 2023-11-04 RX ORDER — HYDROMORPHONE HCL IN WATER/PF 6 MG/30 ML
0.4 PATIENT CONTROLLED ANALGESIA SYRINGE INTRAVENOUS
Status: DISCONTINUED | OUTPATIENT
Start: 2023-11-04 | End: 2023-11-05 | Stop reason: HOSPADM

## 2023-11-04 RX ORDER — ONDANSETRON 4 MG/1
4 TABLET, ORALLY DISINTEGRATING ORAL EVERY 6 HOURS PRN
Status: DISCONTINUED | OUTPATIENT
Start: 2023-11-04 | End: 2023-11-05 | Stop reason: HOSPADM

## 2023-11-04 RX ORDER — LISINOPRIL 10 MG/1
10 TABLET ORAL DAILY
COMMUNITY
End: 2023-12-13

## 2023-11-04 RX ORDER — NALOXONE HYDROCHLORIDE 0.4 MG/ML
0.4 INJECTION, SOLUTION INTRAMUSCULAR; INTRAVENOUS; SUBCUTANEOUS
Status: DISCONTINUED | OUTPATIENT
Start: 2023-11-04 | End: 2023-11-05 | Stop reason: HOSPADM

## 2023-11-04 RX ORDER — LIDOCAINE 40 MG/G
CREAM TOPICAL
Status: DISCONTINUED | OUTPATIENT
Start: 2023-11-04 | End: 2023-11-05 | Stop reason: HOSPADM

## 2023-11-04 RX ORDER — AMLODIPINE BESYLATE 5 MG/1
5 TABLET ORAL DAILY
Status: DISCONTINUED | OUTPATIENT
Start: 2023-11-04 | End: 2023-11-05 | Stop reason: HOSPADM

## 2023-11-04 RX ORDER — HYDROXYZINE HYDROCHLORIDE 10 MG/1
10 TABLET, FILM COATED ORAL EVERY 6 HOURS PRN
Status: DISCONTINUED | OUTPATIENT
Start: 2023-11-04 | End: 2023-11-05 | Stop reason: HOSPADM

## 2023-11-04 RX ORDER — ASPIRIN 81 MG/1
81 TABLET ORAL 2 TIMES DAILY
Status: DISCONTINUED | OUTPATIENT
Start: 2023-11-04 | End: 2023-11-05

## 2023-11-04 RX ORDER — HYDROCODONE BITARTRATE AND ACETAMINOPHEN 5; 325 MG/1; MG/1
1 TABLET ORAL EVERY 4 HOURS PRN
Status: DISCONTINUED | OUTPATIENT
Start: 2023-11-04 | End: 2023-11-05 | Stop reason: HOSPADM

## 2023-11-04 RX ORDER — ACETAMINOPHEN 325 MG/1
650 TABLET ORAL EVERY 4 HOURS PRN
Status: DISCONTINUED | OUTPATIENT
Start: 2023-11-04 | End: 2023-11-05 | Stop reason: HOSPADM

## 2023-11-04 RX ORDER — ONDANSETRON 2 MG/ML
4 INJECTION INTRAMUSCULAR; INTRAVENOUS EVERY 30 MIN PRN
Status: CANCELLED | OUTPATIENT
Start: 2023-11-04

## 2023-11-04 RX ORDER — OXYCODONE HYDROCHLORIDE 5 MG/1
5 TABLET ORAL EVERY 4 HOURS PRN
Status: DISCONTINUED | OUTPATIENT
Start: 2023-11-04 | End: 2023-11-05 | Stop reason: HOSPADM

## 2023-11-04 RX ORDER — SODIUM CHLORIDE, SODIUM LACTATE, POTASSIUM CHLORIDE, CALCIUM CHLORIDE 600; 310; 30; 20 MG/100ML; MG/100ML; MG/100ML; MG/100ML
INJECTION, SOLUTION INTRAVENOUS CONTINUOUS
Status: CANCELLED | OUTPATIENT
Start: 2023-11-04

## 2023-11-04 RX ORDER — METOPROLOL SUCCINATE 25 MG/1
25 TABLET, EXTENDED RELEASE ORAL DAILY
COMMUNITY
End: 2024-02-01

## 2023-11-04 RX ORDER — NALOXONE HYDROCHLORIDE 0.4 MG/ML
0.2 INJECTION, SOLUTION INTRAMUSCULAR; INTRAVENOUS; SUBCUTANEOUS
Status: DISCONTINUED | OUTPATIENT
Start: 2023-11-04 | End: 2023-11-05 | Stop reason: HOSPADM

## 2023-11-04 RX ORDER — IBUPROFEN 200 MG
200 TABLET ORAL EVERY 4 HOURS PRN
Status: ON HOLD | COMMUNITY
End: 2023-11-13

## 2023-11-04 RX ORDER — HALOPERIDOL 5 MG/ML
1 INJECTION INTRAMUSCULAR
Status: CANCELLED | OUTPATIENT
Start: 2023-11-04

## 2023-11-04 RX ORDER — LIDOCAINE 40 MG/G
CREAM TOPICAL
Status: DISCONTINUED | OUTPATIENT
Start: 2023-11-04 | End: 2023-11-04 | Stop reason: HOSPADM

## 2023-11-04 RX ORDER — ACETAMINOPHEN 325 MG/1
650 TABLET ORAL EVERY 4 HOURS PRN
Status: DISCONTINUED | OUTPATIENT
Start: 2023-11-07 | End: 2023-11-04

## 2023-11-04 RX ORDER — SODIUM CHLORIDE, SODIUM LACTATE, POTASSIUM CHLORIDE, CALCIUM CHLORIDE 600; 310; 30; 20 MG/100ML; MG/100ML; MG/100ML; MG/100ML
INJECTION, SOLUTION INTRAVENOUS CONTINUOUS
Status: DISCONTINUED | OUTPATIENT
Start: 2023-11-04 | End: 2023-11-04 | Stop reason: HOSPADM

## 2023-11-04 RX ORDER — HYDROMORPHONE HCL IN WATER/PF 6 MG/30 ML
0.2 PATIENT CONTROLLED ANALGESIA SYRINGE INTRAVENOUS EVERY 4 HOURS PRN
Status: DISCONTINUED | OUTPATIENT
Start: 2023-11-04 | End: 2023-11-05 | Stop reason: HOSPADM

## 2023-11-04 RX ORDER — ENOXAPARIN SODIUM 100 MG/ML
40 INJECTION SUBCUTANEOUS EVERY 24 HOURS
Status: DISCONTINUED | OUTPATIENT
Start: 2023-11-04 | End: 2023-11-05

## 2023-11-04 RX ORDER — TIMOLOL MALEATE 5 MG/ML
1 SOLUTION/ DROPS OPHTHALMIC DAILY
Status: DISCONTINUED | OUTPATIENT
Start: 2023-11-04 | End: 2023-11-05 | Stop reason: HOSPADM

## 2023-11-04 RX ORDER — ONDANSETRON 4 MG/1
4 TABLET, ORALLY DISINTEGRATING ORAL EVERY 30 MIN PRN
Status: CANCELLED | OUTPATIENT
Start: 2023-11-04

## 2023-11-04 RX ORDER — ONDANSETRON 2 MG/ML
4 INJECTION INTRAMUSCULAR; INTRAVENOUS EVERY 6 HOURS PRN
Status: DISCONTINUED | OUTPATIENT
Start: 2023-11-04 | End: 2023-11-05 | Stop reason: HOSPADM

## 2023-11-04 RX ORDER — DEXAMETHASONE SODIUM PHOSPHATE 10 MG/ML
INJECTION, SOLUTION INTRAMUSCULAR; INTRAVENOUS PRN
Status: DISCONTINUED | OUTPATIENT
Start: 2023-11-04 | End: 2023-11-04

## 2023-11-04 RX ORDER — AMOXICILLIN 250 MG
1 CAPSULE ORAL 2 TIMES DAILY
Status: DISCONTINUED | OUTPATIENT
Start: 2023-11-04 | End: 2023-11-05 | Stop reason: HOSPADM

## 2023-11-04 RX ORDER — PROPOFOL 10 MG/ML
INJECTION, EMULSION INTRAVENOUS PRN
Status: DISCONTINUED | OUTPATIENT
Start: 2023-11-04 | End: 2023-11-04

## 2023-11-04 RX ORDER — HYDROMORPHONE HCL IN WATER/PF 6 MG/30 ML
0.4 PATIENT CONTROLLED ANALGESIA SYRINGE INTRAVENOUS EVERY 5 MIN PRN
Status: CANCELLED | OUTPATIENT
Start: 2023-11-04

## 2023-11-04 RX ORDER — ACETAMINOPHEN 650 MG/1
650 SUPPOSITORY RECTAL EVERY 6 HOURS PRN
Status: DISCONTINUED | OUTPATIENT
Start: 2023-11-04 | End: 2023-11-05 | Stop reason: HOSPADM

## 2023-11-04 RX ORDER — ACETAMINOPHEN 325 MG/1
975 TABLET ORAL EVERY 8 HOURS
Status: DISCONTINUED | OUTPATIENT
Start: 2023-11-04 | End: 2023-11-05 | Stop reason: HOSPADM

## 2023-11-04 RX ORDER — ONDANSETRON 2 MG/ML
INJECTION INTRAMUSCULAR; INTRAVENOUS PRN
Status: DISCONTINUED | OUTPATIENT
Start: 2023-11-04 | End: 2023-11-04

## 2023-11-04 RX ORDER — FENTANYL CITRATE 50 UG/ML
50 INJECTION, SOLUTION INTRAMUSCULAR; INTRAVENOUS EVERY 5 MIN PRN
Status: CANCELLED | OUTPATIENT
Start: 2023-11-04

## 2023-11-04 RX ORDER — HYDROXYZINE HYDROCHLORIDE 10 MG/1
10 TABLET, FILM COATED ORAL EVERY 6 HOURS PRN
Status: DISCONTINUED | OUTPATIENT
Start: 2023-11-04 | End: 2023-11-04

## 2023-11-04 RX ORDER — HYDROMORPHONE HCL IN WATER/PF 6 MG/30 ML
0.2 PATIENT CONTROLLED ANALGESIA SYRINGE INTRAVENOUS
Status: DISCONTINUED | OUTPATIENT
Start: 2023-11-04 | End: 2023-11-05 | Stop reason: HOSPADM

## 2023-11-04 RX ORDER — LOTEPREDNOL ETABONATE 5 MG/ML
1 SUSPENSION/ DROPS OPHTHALMIC
Status: DISCONTINUED | OUTPATIENT
Start: 2023-11-05 | End: 2023-11-05 | Stop reason: HOSPADM

## 2023-11-04 RX ORDER — HYDROMORPHONE HCL IN WATER/PF 6 MG/30 ML
0.2 PATIENT CONTROLLED ANALGESIA SYRINGE INTRAVENOUS EVERY 5 MIN PRN
Status: CANCELLED | OUTPATIENT
Start: 2023-11-04

## 2023-11-04 RX ORDER — CEFAZOLIN SODIUM/WATER 2 G/20 ML
2 SYRINGE (ML) INTRAVENOUS SEE ADMIN INSTRUCTIONS
Status: DISCONTINUED | OUTPATIENT
Start: 2023-11-04 | End: 2023-11-04 | Stop reason: HOSPADM

## 2023-11-04 RX ORDER — FENTANYL CITRATE 50 UG/ML
INJECTION, SOLUTION INTRAMUSCULAR; INTRAVENOUS PRN
Status: DISCONTINUED | OUTPATIENT
Start: 2023-11-04 | End: 2023-11-04

## 2023-11-04 RX ORDER — SODIUM CHLORIDE, SODIUM LACTATE, POTASSIUM CHLORIDE, CALCIUM CHLORIDE 600; 310; 30; 20 MG/100ML; MG/100ML; MG/100ML; MG/100ML
INJECTION, SOLUTION INTRAVENOUS CONTINUOUS
Status: DISCONTINUED | OUTPATIENT
Start: 2023-11-04 | End: 2023-11-05 | Stop reason: HOSPADM

## 2023-11-04 RX ORDER — SODIUM CHLORIDE 9 MG/ML
INJECTION, SOLUTION INTRAVENOUS CONTINUOUS
Status: DISCONTINUED | OUTPATIENT
Start: 2023-11-04 | End: 2023-11-04

## 2023-11-04 RX ORDER — POLYETHYLENE GLYCOL 3350 17 G/17G
17 POWDER, FOR SOLUTION ORAL DAILY
Status: DISCONTINUED | OUTPATIENT
Start: 2023-11-05 | End: 2023-11-05 | Stop reason: HOSPADM

## 2023-11-04 RX ADMIN — Medication 100 MG: at 14:32

## 2023-11-04 RX ADMIN — ONDANSETRON 4 MG: 2 INJECTION INTRAMUSCULAR; INTRAVENOUS at 14:17

## 2023-11-04 RX ADMIN — FENTANYL CITRATE 100 MCG: 50 INJECTION INTRAMUSCULAR; INTRAVENOUS at 14:17

## 2023-11-04 RX ADMIN — DEXAMETHASONE SODIUM PHOSPHATE 10 MG: 10 INJECTION, SOLUTION INTRAMUSCULAR; INTRAVENOUS at 14:38

## 2023-11-04 RX ADMIN — PHENYLEPHRINE HYDROCHLORIDE 100 MCG: 10 INJECTION INTRAVENOUS at 14:37

## 2023-11-04 RX ADMIN — METOPROLOL SUCCINATE 25 MG: 25 TABLET, EXTENDED RELEASE ORAL at 17:00

## 2023-11-04 RX ADMIN — PROPOFOL 150 MG: 10 INJECTION, EMULSION INTRAVENOUS at 14:32

## 2023-11-04 RX ADMIN — SODIUM CHLORIDE: 9 INJECTION, SOLUTION INTRAVENOUS at 08:39

## 2023-11-04 RX ADMIN — TIMOLOL MALEATE 1 DROP: 5 SOLUTION OPHTHALMIC at 18:23

## 2023-11-04 RX ADMIN — ENOXAPARIN SODIUM 40 MG: 100 INJECTION SUBCUTANEOUS at 17:00

## 2023-11-04 RX ADMIN — SODIUM CHLORIDE, POTASSIUM CHLORIDE, SODIUM LACTATE AND CALCIUM CHLORIDE: 600; 310; 30; 20 INJECTION, SOLUTION INTRAVENOUS at 14:14

## 2023-11-04 RX ADMIN — PROPOFOL 30 MG: 10 INJECTION, EMULSION INTRAVENOUS at 14:17

## 2023-11-04 RX ADMIN — HYDROCODONE BITARTRATE AND ACETAMINOPHEN 1 TABLET: 5; 325 TABLET ORAL at 02:40

## 2023-11-04 RX ADMIN — AMLODIPINE BESYLATE 5 MG: 5 TABLET ORAL at 17:00

## 2023-11-04 RX ADMIN — ACETAMINOPHEN 975 MG: 325 TABLET ORAL at 16:03

## 2023-11-04 RX ADMIN — SODIUM CHLORIDE, POTASSIUM CHLORIDE, SODIUM LACTATE AND CALCIUM CHLORIDE: 600; 310; 30; 20 INJECTION, SOLUTION INTRAVENOUS at 16:04

## 2023-11-04 RX ADMIN — ACETAMINOPHEN 650 MG: 325 TABLET ORAL at 09:26

## 2023-11-04 ASSESSMENT — ACTIVITIES OF DAILY LIVING (ADL)
ADLS_ACUITY_SCORE: 37
ADLS_ACUITY_SCORE: 37
ADLS_ACUITY_SCORE: 35
ADLS_ACUITY_SCORE: 39
ADLS_ACUITY_SCORE: 35
ADLS_ACUITY_SCORE: 39
ADLS_ACUITY_SCORE: 35
ADLS_ACUITY_SCORE: 39
ADLS_ACUITY_SCORE: 44
DEPENDENT_IADLS:: INDEPENDENT
ADLS_ACUITY_SCORE: 35

## 2023-11-04 NOTE — DISCHARGE INSTRUCTIONS
Follow up closely with your surgeon, Dr. Bernal.  Keep leg in knee immobilizer and avoid sudden movements, bending over.  Take oxycodone for any breakthrough pain.

## 2023-11-04 NOTE — ANESTHESIA CARE TRANSFER NOTE
Patient: Oscar Zhao    Procedure: Procedure(s):  CLOSED REDUCTION, HIP       Diagnosis: Closed dislocation of right hip, initial encounter (H) [S73.004A]  Diagnosis Additional Information: No value filed.    Anesthesia Type:   General     Note:    Oropharynx: oropharynx clear of all foreign objects  Level of Consciousness: awake  Oxygen Supplementation: face mask  Level of Supplemental Oxygen (L/min / FiO2): 6  Independent Airway: airway patency satisfactory and stable  Dentition: dentition unchanged  Vital Signs Stable: post-procedure vital signs reviewed and stable  Report to RN Given: handoff report given  Patient transferred to: PACU    Handoff Report: Identifed the Patient, Identified the Reponsible Provider, Reviewed the pertinent medical history, Discussed the surgical course, Reviewed Intra-OP anesthesia mangement and issues during anesthesia, Set expectations for post-procedure period and Allowed opportunity for questions and acknowledgement of understanding      Vitals:  Vitals Value Taken Time   /82 11/04/23 1453   Temp     Pulse 85 11/04/23 1453   Resp 19 11/04/23 1453   SpO2 100 % 11/04/23 1453   Vitals shown include unfiled device data.    Electronically Signed By: PARTH Barbosa CRNA  November 4, 2023  2:55 PM

## 2023-11-04 NOTE — PHARMACY-ADMISSION MEDICATION HISTORY
Pharmacist Admission Medication History    Admission medication history is complete. The information provided in this note is only as accurate as the sources available at the time of the update.    Medication reconciliation/reorder completed by provider prior to medication history? No    Information Source(s): Patient and CareEverywhere/SureScripts via in-person    Pertinent Information:     Changes made to PTA medication list:  Added: ibuprofen  Deleted: oxycodone, senna, asa  Changed: Per patient,: amlodipine, metoprolol, and lisinopril doses were cut 50% by primary MD approx. 7 days ago d/t low BP.....see new doses below.    Medication Affordability:  Not including over the counter (OTC) medications, was there a time in the past 3 months when you did not take your medications as prescribed because of cost?: No    Allergies reviewed with patient and updates made in EHR: yes    Medications available for use during hospital stay: NONE and she cannot obtain either.    Medication History Completed By: Tono Hoskins RPH 11/4/2023 9:02 AM    PTA Med List   Medication Sig Note Last Dose    acetaminophen (TYLENOL) 325 MG tablet Take 2 tablets (650 mg) by mouth every 4 hours as needed for other (mild pain)  11/3/2023 at pm    amLODIPine (NORVASC) 5 MG tablet Take 5 mg by mouth daily  11/3/2023 at am    hydrOXYzine (ATARAX) 10 MG tablet Take 1 tablet (10 mg) by mouth every 6 hours as needed for itching or anxiety (with pain, moderate pain)   at has not started    ibuprofen (ADVIL/MOTRIN) 200 MG tablet Take 200 mg by mouth every 4 hours as needed for pain 11/4/2023: Only used one tablet.   Only takes once a month 11/3/2023 at am    lisinopril (ZESTRIL) 10 MG tablet Take 10 mg by mouth daily  11/3/2023 at am    LOTEMAX 0.5 % ophthalmic suspension Place 1 drop into both eyes every 48 hours  11/3/2023 at cannot get own med    metoprolol succinate ER (TOPROL XL) 25 MG 24 hr tablet Take 25 mg by mouth daily  11/3/2023 at  am    timolol maleate (TIMOPTIC) 0.5 % ophthalmic solution Place 1 drop into both eyes daily  11/3/2023 at am (cannot get own med)

## 2023-11-04 NOTE — ED NOTES
Bed: [facility]09  Expected date:   Expected time:   Means of arrival:   Comments:  [facility]  77 y/o F   Atraumatic Right hip dislocation. No blood thinners

## 2023-11-04 NOTE — ED PROVIDER NOTES
Emergency Department Encounter     Evaluation Date & Time:   11/3/2023  8:12 PM    CHIEF COMPLAINT:  Hip Pain (Right hip dislocation reported)      Triage Note:              ED COURSE & MEDICAL DECISION MAKING:     ED Course as of 11/03/23 2245 Fri Nov 03, 2023 2211 Waiting on room availability to perform reduction/sedation.  Pt already completed written consent with me.   2231 Unable to successfully reduce hip at bedside after multiple attempts under good sedation.  Pt did very well with 60mg propofol IV.  Will update orthopedics with plan for hospitalization overnight, closed reduction in OR with them.   2245 Pt updated on plan.       Pt with a history of right hip total replacement 8 years ago with revision 6 weeks ago by Dr. Aguillon with [facility] for recurrent dislocations.  Pt states she was given all clear to proceed with normal activity and tonight simply turned while standing and leg gave out with dislocation.  Pt lowered self to ground, no head injury or any other injuries from fall. No anticoagulation.  Pt reports previous dislocations have been very difficult to reduce in ED.  Will get xrays, speak with [facility] after this given how recent revision was.    8:17 PM I met with the patient to obtain patient history and performed a physical exam. Discussed plan for ED work up including potential diagnostic studies and interventions.  9:24 PM Spoke with [facility], Dr. Mayo.  He's familiar with pt and is ok with us trying closed reduction with sedation in ED.  If successful, knee immobilizer, follow up with Dr. Aguillon.  If unsuccessful, we can admit with plan for operative reduction.    10:41 PM I spoke again with orthopedics, Dr. Mayo, who stated he could come in and try to reduce it at bedside here.  He will be into ED in the next half hour or so.  Pt will be signed out to Dr. Addison pending repeat sedation and closed reduction attempt with orthopedics.  If unable to reduce in ED, pt would  "need to be hospitalized overnight.    Medical Decision Making    History:  Supplemental history from: Documented in chart, if applicable  External Record(s) reviewed: Documented in chart, if applicable.    Work Up:  Chart documentation includes differential considered and any EKGs or imaging independently interpreted by provider, where specified.  In additional to work up documented, I considered the following work up: Documented in chart, if applicable.    External consultation:  Discussion of management with another provider: Orthopedics    Complicating factors:  Care impacted by chronic illness: Chronic Pain and Hypertension  Care affected by social determinants of health: Access to Medical Care    Disposition considerations: Admit.      At the conclusion of the encounter I discussed the results of all the tests and the disposition. The questions were answered. The patient or family acknowledged understanding and was agreeable with the care plan.      MEDICATIONS GIVEN IN THE EMERGENCY DEPARTMENT:  Medications   HYDROmorphone (DILAUDID) injection 1 mg (1 mg Intravenous $Given 11/3/23 2030)   propofol (DIPRIVAN) injection 10 mg/mL vial (60 mg Intravenous $Given 11/3/23 2226)   sodium chloride 0.9 % infusion ( Intravenous $New Bag 11/3/23 2131)       NEW PRESCRIPTIONS STARTED AT TODAY'S ED VISIT:  New Prescriptions    No medications on file       HPI   HPI     Oscar Zhao is a 78 year old female with a pertinent history of total right hip arthroplasty (8/18/2015), right arthroplasty revision hip (9/21/2023), HTN, arthritis, who presents to this ED via EMS for evaluation of hip pain.    Patient reports she had a total right hip arthroplasty in 8/2015 and since then, her right hip has \"popped out\" twice so she had a revision done in 9/2023. About 2 days ago, she received a follow up call from her [facility] provider Dr. Bernal and said that there was no activity restrictions she had to adhere to. Today shortly " "PTA, she was standing and just turned a little when she felt her right hip suddenly \"pop out of the socket.\" She immediately felt severe pain and grabbed the counter to balance herself. She then stepped using her right leg and slowly lowered herself down and fell to her back. She denies LOC or head trauma. She later crawled to grab her phone and call EMS. Since then, she endorses persistent right hip pain. Of note, she has had problems with sedation and reduction for her right hip in the past and needed Orthopedics to come in and reduce it successfully.    She otherwise denies associating chest pain, neck pain, back pain, shortness of breath, and abdominal pain. She is not anticoagulated. She is allergic to tetracycline and adalimumab. There were no other concerns/complaints at this time.    REVIEW OF SYSTEMS:  See HPI      Medical History     Past Medical History:   Diagnosis Date    Arthritis     Basal cell carcinoma of anterior chest     Breast cyst     History of anesthesia complications     HLA B27 (HLA B27 positive)     Hypertension     Osteoporosis 2011    Peripheral neuropathy 2018    PONV (postoperative nausea and vomiting)     Scleritis, bilateral     Squamous cell carcinoma of skin of chest     Steroid induced glaucoma, both eyes        Past Surgical History:   Procedure Laterality Date    ARTHROPLASTY REVISION HIP Right 2023    Procedure: RIGHT HIP REVISION TOTAL HIP ARTHROPLASTY;  Surgeon: Bill Bernal DO;  Location: [facility] OR    BREAST CYST EXCISION Right     benign     SECTION      CLOSED REDUCTION HIP Right 2019    Procedure: CLOSED REDUCTION, HIP;  Surgeon: Jak Ruiz DO;  Location: [facility];  Service: Orthopedics    CLOSED REDUCTION HIP Right 2023    Procedure: CLOSED REDUCTION, HIP RIGHT;  Surgeon: Aditya Pedroza MD;  Location: [facility] OR    COLONOSCOPY  2011    COLONOSCOPY  2005    COLONOSCOPY W/ BIOPSIES " AND POLYPECTOMY  02/18/2021    16 to 20 mm polyp:tubular adenoma in the ascending colon.  Ulcerated mass in the anal canal: Moderately differentiated squamous cell cancer.    ESOPHAGOSCOPY, GASTROSCOPY, DUODENOSCOPY (EGD), COMBINED  05/05/2017    Large hiatal hernia.  Reactive gastropathy with mucosal erosion.  H. pylori negative.    HERNIORRHAPHY FEMORAL Left 9/7/2021    Procedure: LEFT FEMORAL HERNIA REPAIR;  Surgeon: Sidney Murray MD;  Location: [facility]    HYSTERECTOMY TOTAL ABDOMINAL, BILATERAL SALPINGO-OOPHORECTOMY, COMBINED  1996    IR CHEST PORT PLACEMENT > 5 YRS OF AGE  3/15/2021    IR PORT PLACEMENT >5 YEARS  03/15/2021    Right IJ port-a-cath.    IR PORT REMOVAL RIGHT  7/14/2021    LAPAROSCOPIC APPENDECTOMY  04/28/2011    PHACOEMULSIFICATION CLEAR CORNEA WITH STANDARD INTRAOCULAR LENS IMPLANT Right 08/30/2017    PHACOEMULSIFICATION CLEAR CORNEA WITH STANDARD INTRAOCULAR LENS IMPLANT Left 09/13/2017    TOTAL HIP ARTHROPLASTY Right 08/18/2015    Procedure: HIP TOTAL ARTHROPLASTY, RIGHT;  Surgeon: Star Du MD;  Location: [facility];  Service:     RUST TOTAL KNEE ARTHROPLASTY Left 03/02/2017    Procedure: LEFT TOTAL KNEE ARTHROPLASTY;  Surgeon: Star Du MD;  Location: [facility];  Service: Orthopedics       Family History   Problem Relation Age of Onset    Alzheimer Disease Mother     Heart Disease Father     Pancreatic Cancer Brother 72.00    No Known Problems Daughter     Anesthesia Reaction No family hx of        Social History     Tobacco Use    Smoking status: Never    Smokeless tobacco: Never   Substance Use Topics    Alcohol use: Yes     Alcohol/week: 4.0 standard drinks of alcohol     Types: 4 Standard drinks or equivalent per week     Comment: 3-4/wk    Drug use: No       acetaminophen (TYLENOL) 325 MG tablet  amLODIPine (NORVASC) 5 MG tablet  aspirin 81 MG EC tablet  hydrOXYzine (ATARAX) 10 MG tablet  lisinopril (ZESTRIL) 20 MG tablet  LOTEMAX 0.5 %  "ophthalmic suspension  metoprolol succinate ER (TOPROL XL) 25 MG 24 hr tablet  naproxen (NAPROSYN) 250 MG tablet  oxyCODONE (ROXICODONE) 5 MG tablet  senna-docusate (SENOKOT-S/PERICOLACE) 8.6-50 MG tablet  timolol maleate (TIMOPTIC) 0.5 % ophthalmic solution        Physical Exam     Vitals:  /82   Pulse 79   Temp 97.9  F (36.6  C) (Oral)   Resp 20   Ht 1.6 m (5' 3\")   Wt 61.2 kg (135 lb)   LMP  (LMP Unknown)   SpO2 97%   BMI 23.91 kg/m      PHYSICAL EXAM:   Physical Exam  Vitals and nursing note reviewed.   Constitutional:       General: She is not in acute distress.     Appearance: Normal appearance.   HENT:      Head: Normocephalic and atraumatic.      Nose: Nose normal.      Mouth/Throat:      Mouth: Mucous membranes are moist.   Eyes:      Extraocular Movements: Extraocular movements intact.   Cardiovascular:      Rate and Rhythm: Normal rate and regular rhythm.      Pulses: Normal pulses.           Radial pulses are 2+ on the right side and 2+ on the left side.        Dorsalis pedis pulses are 2+ on the right side and 2+ on the left side.   Pulmonary:      Effort: Pulmonary effort is normal.   Musculoskeletal:      Comments: Right leg is shortened. Pain with active and passive ROM of right hip.   Skin:     Findings: No rash.   Neurological:      General: No focal deficit present.      Mental Status: She is alert. Mental status is at baseline.      Comments: Fluent speech   Psychiatric:         Mood and Affect: Mood normal.         Behavior: Behavior normal.         Results     LAB:  All pertinent labs reviewed and interpreted  Labs Ordered and Resulted from Time of ED Arrival to Time of ED Departure - No data to display    RADIOLOGY:  Hip  XR, right, 2 vw PORTABLE   Final Result   IMPRESSION: Dislocated right hip implant. Bones are demineralized.      NOTE: ABNORMAL REPORT      THE DICTATION ABOVE DESCRIBES AN ABNORMALITY FOR WHICH FOLLOW-UP IS NEEDED.       Hip  XR, right, 2 vw PORTABLE    " (Results Pending)                ECG:  None    PROCEDURES:  Procedures:  PROCEDURE: Procedural Sedation  Orthopedic Injury Reduction   INDICATIONS: Sedation is required to allow for joint reduction (right hip dislocation)   SEDATION PROVIDER: Dr Rosales Gavin   PROCEDURE PROVIDER: Dr Rosales Gavin   LEVEL OF SEDATION: Deep Sedation    Defined as:  Minimal = Normal response to verbal  Moderate = Responds to verbal and light tactile stimulation  Deep = Responds after repeated painful stimulation   CONSENT: Risks, benefits and alternatives were discussed with and Written consent was obtained from Patient.   PROCEDURE SPECIFIC CHECKLIST COMPLETED:   Yes   LAST ORAL INTAKE: Clear Liquids >2 hours   ASA CLASS: 2 - Mild systemic disease   MALLAMPATI:  I - Faucial pillars, soft palate, and uvula are visible   TIME OUT: Universal protocol was followed. TIME OUT conducted just prior to starting procedure confirmed patient identity, site/side, procedure, patient position, and availability of correct equipment. Yes    Immediately prior to initiation of sedation, reassessment of clinical condition was performed which was unchanged.   MEDICATIONS: Propofol, 60 mg, IV   MONITORING: Monitoring consisted of:   heart rate, cardiac monitor, continuous pulse oximeter, continuous capnometry (end tidal CO2), frequent blood pressure checks, level of consciousness checks, IV access, constant attendance by RN until patient is recovered, constant attendance by MD until patient is stable, and intubation and emergency airway equipment available   RESPONSE: vital signs stable, airway patent, and O2 saturations remained >92%   REDUCTION   PROCEDURE DESCRIPTION: ORTHOPEDIC MANAGEMENT:  Bone/Joint Manipulation: Affected extremity was grasped and gradual traction was applied and was further manipulated manually.  We did get some movement, but not complete reduction by clinical exam. Repeat xrays ordered.     POST-SEDATION ASSESSMENT/NOTE: Lowest level  oxygen saturation reached was 94%.    Post procedure patient was alert and responds to verbal stimuli    Patient was monitored during recovery and returned to pre-procedure baseline.   ADDITIONAL MD ASSISTANCE: None   TOTAL MD DRUG ADMINISTRATION / MONITORING TIME: 20 minutes   COMPLICATIONS:  Patient tolerated procedure well, without complication         FINAL IMPRESSION:    ICD-10-CM    1. Closed dislocation of right hip, initial encounter (H)  S73.004A           0 minutes of critical care time      I, Eladia Soliz, am serving as a scribe to document services personally performed by Dr. Star Gavin, based on my observations and the provider's statements to me. I, Star Gavin, DO attest that Eladia Soliz is acting in a scribe capacity, has observed my performance of the services and has documented them in accordance with my direction.      Star Gavin DO  Emergency Medicine  [facility] EMERGENCY ROOM  11/3/2023  8:15 PM          Star Gavin MD  11/03/23 4075

## 2023-11-04 NOTE — ED NOTES
Sedation Post Procedure Summary:    This writer assisted with the procedural sedation. Prior to the start of the procedure and with procedural staff participation, verbal confirmation was conducted of the patient s identity using two indicators, that the procedure was appropriate and matched the consent, and that the correct equipment were available. Immediately prior to starting the procedure a Time Out was conducted with the procedural staff and re-confirmed the patient s name, procedure, and site/side.      Sedatives: Propofol    Vital signs, airway, end tidal O2, and pulse oximetry were monitored and remained stable throughout the procedure and sedation was maintained until the procedure was complete.  The patient was monitored by staff until sedation discharge criteria were met.    Patient tolerance: Patient tolerated the procedure well with no immediate complications.    Time of sedation in minutes:  5 minutes from beginning to end of physician one to one monitoring.     Patient is back to baseline with no concerns at the moment. Report was given to bedside AFSHIN Seay.     GRADY Carlos RN

## 2023-11-04 NOTE — ED NOTES
Bed: [facility]ED-02  Expected date:   Expected time:   Means of arrival:   Comments:   Detail Level: Detailed Consent: The patient's consent was obtained including but not limited to risks of crusting, scabbing, blistering, scarring, darker or lighter pigmentary change, recurrence, incomplete removal and infection. Show Aperture Variable?: Yes Post-Care Instructions: I reviewed with the patient in detail post-care instructions. Patient is to wear sunprotection, and avoid picking at any of the treated lesions. Pt may apply Vaseline to crusted or scabbing areas. Duration Of Freeze Thaw-Cycle (Seconds): 3 Render Note In Bullet Format When Appropriate: No Number Of Freeze-Thaw Cycles: 2 freeze-thaw cycles

## 2023-11-04 NOTE — ANESTHESIA POSTPROCEDURE EVALUATION
Patient: Oscar Zhao    Procedure: Procedure(s):  CLOSED REDUCTION, HIP       Anesthesia Type:  General    Note:  Disposition: Inpatient   Postop Pain Control: Uneventful            Sign Out: Well controlled pain   PONV: No   Neuro/Psych: Uneventful            Sign Out: Acceptable/Baseline neuro status   Airway/Respiratory: Uneventful            Sign Out: Acceptable/Baseline resp. status   CV/Hemodynamics: Uneventful            Sign Out: Acceptable CV status; No obvious hypovolemia; No obvious fluid overload   Other NRE: NONE   DID A NON-ROUTINE EVENT OCCUR? No           Last vitals:  Vitals Value Taken Time   /84 11/04/23 1500   Temp 36.8  C (98.2  F) 11/04/23 1453   Pulse 76 11/04/23 1502   Resp 15 11/04/23 1502   SpO2 100 % 11/04/23 1502   Vitals shown include unfiled device data.    Electronically Signed By: Faizan Smiley MD  November 4, 2023  3:04 PM

## 2023-11-04 NOTE — PLAN OF CARE
"Problem: Adult Inpatient Plan of Care  Goal: Absence of Hospital-Acquired Illness or Injury  Intervention: Identify and Manage Fall Risk  Recent Flowsheet Documentation  Taken 11/4/2023 1700 by Debi Peña RN  Safety Promotion/Fall Prevention: safety round/check completed  Taken 11/4/2023 1555 by Debi Peña RN  Safety Promotion/Fall Prevention:   activity supervised   assistive device/personal items within reach   clutter free environment maintained   lighting adjusted   nonskid shoes/slippers when out of bed   safety round/check completed    PRIMARY DIAGNOSIS: \"GENERIC\" NURSING  OUTPATIENT/OBSERVATION GOALS TO BE MET BEFORE DISCHARGE:  ADLs back to baseline: No    Activity and level of assistance: due to ambulate    Pain status: Pain free.    Return to near baseline physical activity: No     Discharge Planner Nurse   Safe discharge environment identified: Yes  Barriers to discharge: Yes       Entered by: Debi Peña RN 11/04/2023 5:15 PM    Patient vital signs are at baseline: Yes  Patient able to ambulate as they were prior to admission or with assist devices provided by therapies during their stay:  No,  Reason:  due to ambulate  Patient MUST void prior to discharge:  Yes  Patient able to tolerate oral intake:  Yes  Pain has adequate pain control using Oral analgesics:  Yes  Does patient have an identified :  Yes  Has goal D/C date and time been discussed with patient:  Yes    Patient A&O, VSS, and CMS intact. Denies pain. Tolerating oral intake. Voiding through purewick. Due to ambulate. All alarms in place and call light appropriate.  Debi Peña RN  "

## 2023-11-04 NOTE — ED TRIAGE NOTES
Patient presents to the ED, brought in by [facility], she states she was standing at the counter tonight and pivoted with her right leg like she isn't suppose to do and felt her right hip dislocate.  History of three right hip dislocations this year with right hip surgery six weeks ago.  States pain is tolerable.     Triage Assessment (Adult)       Row Name 11/03/23 2015          Triage Assessment    Airway WDL WDL        Respiratory WDL    Respiratory WDL WDL        Skin Circulation/Temperature WDL    Skin Circulation/Temperature WDL WDL        Cardiac WDL    Cardiac WDL WDL        Peripheral/Neurovascular WDL    Peripheral Neurovascular WDL WDL        Cognitive/Neuro/Behavioral WDL    Cognitive/Neuro/Behavioral WDL WDL

## 2023-11-04 NOTE — ANESTHESIA PREPROCEDURE EVALUATION
Anesthesia Pre-Procedure Evaluation    Patient: Oscar Zhao   MRN: [id number] : 1945        Procedure : Procedure(s):  CLOSED REDUCTION, HIP  REVISION, ARTHROPLASTY, HIP, WITH FEMORAL HEAD OR ACETABULAR LINER REPLACEMENT, WITH IRRIGATION AND DEBRIDEMENT          Past Medical History:   Diagnosis Date    Arthritis     Basal cell carcinoma of anterior chest     Breast cyst     History of anesthesia complications     HLA B27 (HLA B27 positive)     Hypertension     Osteoporosis 2011    Peripheral neuropathy 2018    PONV (postoperative nausea and vomiting)     Scleritis, bilateral     Left .  Treated with corticosteroids,, methotrexate and Humira.  .  Rituximab.    Squamous cell carcinoma of skin of chest     Steroid induced glaucoma, both eyes       Past Surgical History:   Procedure Laterality Date    ARTHROPLASTY REVISION HIP Right 2023    Procedure: RIGHT HIP REVISION TOTAL HIP ARTHROPLASTY;  Surgeon: Bill Bernal DO;  Location: [facility]    BREAST CYST EXCISION Right     benign     SECTION      CLOSED REDUCTION HIP Right 2019    Procedure: CLOSED REDUCTION, HIP;  Surgeon: Jak Ruiz DO;  Location: [facility];  Service: Orthopedics    CLOSED REDUCTION HIP Right 2023    Procedure: CLOSED REDUCTION, HIP RIGHT;  Surgeon: Aditya Pedroza MD;  Location: [facility]    COLONOSCOPY  2011    COLONOSCOPY  2005    COLONOSCOPY W/ BIOPSIES AND POLYPECTOMY  2021    16 to 20 mm polyp:tubular adenoma in the ascending colon.  Ulcerated mass in the anal canal: Moderately differentiated squamous cell cancer.    ESOPHAGOSCOPY, GASTROSCOPY, DUODENOSCOPY (EGD), COMBINED  2017    Large hiatal hernia.  Reactive gastropathy with mucosal erosion.  H. pylori negative.    HERNIORRHAPHY FEMORAL Left 2021    Procedure: LEFT FEMORAL HERNIA REPAIR;  Surgeon: Sidney Murray MD;  [place] [facility] [place]    HYSTERECTOMY TOTAL  ABDOMINAL, BILATERAL SALPINGO-OOPHORECTOMY, COMBINED  1996    IR CHEST PORT PLACEMENT > 5 YRS OF AGE  3/15/2021    IR PORT PLACEMENT >5 YEARS  03/15/2021    Right IJ port-a-cath.    IR PORT REMOVAL RIGHT  7/14/2021    LAPAROSCOPIC APPENDECTOMY  04/28/2011    PHACOEMULSIFICATION CLEAR CORNEA WITH STANDARD INTRAOCULAR LENS IMPLANT Right 08/30/2017    PHACOEMULSIFICATION CLEAR CORNEA WITH STANDARD INTRAOCULAR LENS IMPLANT Left 09/13/2017    TOTAL HIP ARTHROPLASTY Right 08/18/2015    Procedure: HIP TOTAL ARTHROPLASTY, RIGHT;  Surgeon: Star Du MD;  Location: [facility] Main OR;  Service:     [facility] TOTAL KNEE ARTHROPLASTY Left 03/02/2017    Procedure: LEFT TOTAL KNEE ARTHROPLASTY;  Surgeon: Star Du MD;  Location: [facility] Main OR;  Service: Orthopedics      Allergies   Allergen Reactions    Adalimumab Muscle Pain (Myalgia)    Brimonidine-Timolol [Brimonidine Tartrate-Timolol] Unknown     Redness itching in eyes    Levaquin [Levofloxacin] Unknown     Skin burn and couldn't get out bed for 5 days    Tetracyclines & Related Hives      Social History     Tobacco Use    Smoking status: Never    Smokeless tobacco: Never   Substance Use Topics    Alcohol use: Yes     Alcohol/week: 4.0 standard drinks of alcohol     Types: 4 Standard drinks or equivalent per week     Comment: 3-4/wk      Wt Readings from Last 1 Encounters:   11/03/23 61.2 kg (135 lb)        Anesthesia Evaluation   Pt has had prior anesthetic. Type: General, Regional and MAC.    History of anesthetic complications  - PONV.      ROS/MED HX  ENT/Pulmonary:  - neg pulmonary ROS     Neurologic:  - neg neurologic ROS     Cardiovascular:  - neg cardiovascular ROS   (+)  hypertension- -   -  - -                                      METS/Exercise Tolerance: >4 METS    Hematologic:  - neg hematologic  ROS     Musculoskeletal: Comment: Dislocation of right hip prosthesis - neg musculoskeletal ROS (+)  arthritis,             GI/Hepatic:  - neg GI/hepatic  "ROS     Renal/Genitourinary:  - neg Renal ROS     Endo:  - neg endo ROS     Psychiatric/Substance Use:  - neg psychiatric ROS     Infectious Disease:  - neg infectious disease ROS     Malignancy:  - neg malignancy ROS (+) Malignancy, History of GI.GI CA  Remission status post Surgery.      Other:  - neg other ROS          Physical Exam    Airway        Mallampati: II   TM distance: > 3 FB   Neck ROM: full   Mouth opening: > 3 cm    Respiratory Devices and Support         Dental       (+) Minor Abnormalities - some fillings, tiny chips      Cardiovascular   cardiovascular exam normal          Pulmonary   pulmonary exam normal                OUTSIDE LABS:  CBC:   Lab Results   Component Value Date    WBC 7.3 11/04/2023    WBC 6.4 11/04/2023    HGB 11.3 (L) 11/04/2023    HGB 12.1 11/04/2023    HCT 37.9 11/04/2023    HCT 39.7 11/04/2023     11/04/2023     11/04/2023     BMP:   Lab Results   Component Value Date     (L) 11/04/2023     (L) 11/04/2023    POTASSIUM 4.1 11/04/2023    POTASSIUM 4.0 11/04/2023    CHLORIDE 101 11/04/2023    CHLORIDE 101 11/04/2023    CO2 24 11/04/2023    CO2 22 11/04/2023    BUN 9.8 11/04/2023    BUN 8.9 11/04/2023    CR 0.52 11/04/2023    CR 0.50 (L) 11/04/2023     (H) 11/04/2023     (H) 11/04/2023     COAGS:   Lab Results   Component Value Date    INR 1.01 11/04/2023     POC: No results found for: \"BGM\", \"HCG\", \"HCGS\"  HEPATIC:   Lab Results   Component Value Date    ALBUMIN 3.6 11/04/2023    PROTTOTAL 6.2 (L) 11/04/2023    ALT 12 11/04/2023    AST 20 11/04/2023    ALKPHOS 97 11/04/2023    BILITOTAL 0.6 11/04/2023     OTHER:   Lab Results   Component Value Date    LACT 0.7 04/05/2021    A1C 5.3 10/23/2023    MICHEL 8.9 11/04/2023    PHOS 3.1 04/08/2021    MAG 1.7 (L) 04/07/2021    TSH 0.74 10/02/2023    CRP 4.0 (H) 04/07/2021    SED 7 09/12/2023       Anesthesia Plan    ASA Status:  2    NPO Status:  NPO Appropriate    Anesthesia Type: General.     - " Airway: Mask Only   Induction: Propofol.   Maintenance: TIVA.        Consents    Anesthesia Plan(s) and associated risks, benefits, and realistic alternatives discussed. Questions answered and patient/representative(s) expressed understanding.     - Discussed:     - Discussed with:  Patient            Postoperative Care    Pain management: Multi-modal analgesia.   PONV prophylaxis: Ondansetron (or other 5HT-3), Dexamethasone or Solumedrol     Comments:    Other Comments: Reviewed anesthetic options and risks, including risk of dental trauma. Patient agrees to proceed.     Brief GA with mask ventilation for attempt at reduction. Muscle relaxant and ETT as needed.                     Faizan Smiley MD

## 2023-11-04 NOTE — H&P
[facility] MEDICINE ADMISSION HISTORY AND PHYSICAL       Assessment & Plan      Present on Admission (POA)    1. Right periprosthetic hip dislocation - Had prior episodes. History of Right hip revision total hip arthroplasty.     - IVF, NPO, anti emetics, pain meds  - ortho ref  - CBC/CMP     2. Pre-op evaluation - Denies chest pain or shortness of breath. Denies history of CAD, CHF, severe valvular heart disease, or life threatening arrythmmia.     Functional capacity - independent lives alone, reported physically active, works out.     Based on RCRI, patient has low cardiac risk for contemplated  orthopedic procedure. We did talk about risks including but not limited to: SCD, AMI, or CVA.  Not on DOACs or Coumadin     Check EKG       Chronic Medical Conditions    1. HTN - PTA meds     2. History of Rectal CA        VTE prophylaxis --  SCDs, if appropriate, add SQH  Diet --  NPO  Code Status -- Full,   Barriers to discharge -- Admitting/Acute medical condition/s  Discharge Disposition and goals --  Unable to determine at this point, pending progress/treatment response.     PPE - I was wearing PPE including but not limited to - N95 mask, Gloves, and/or Safety glasses.  Admission Status -- Observation,    Care plan is based on available information and patient's condition at encounter. Care plan was discussed and agreed to proceed by the patient/family member. Made aware that care plan may change.     I recommend to review/revise history/care plan for information/results not available during my encounter. All or some home medication/s were not resumed or was modified on admission due to safety concerns. Will have manny AM doc  review safety to resume held/modified home medications.     Availability -- If need to coordinate care after my shift (8AM) -- Contact assigned AM rounding Hospitalist.     75 minutes spent by me on the date of service doing chart review, history, exam, diagnostic  "test results interpretation, documentation & further activities per the note.        Goran Weaver MD, MPH, FACP, [facility]  Internal Medicine - Hospitalist        Chief Complaint Hip pain      HISTORY     - Patient was seen in ED - 2  - She has history of Right hip revision total hip arthroplasty. History of prior hip dislocations.     - Per report -- she was standing at the counter tonight and pivoted with her right leg like she isn't suppose to do and felt her right hip dislocate.     - She was awake and alert when I met her. She lives alone. She had 1.5 glasses of white wine earlier. She denies pain now. No back or buttock pain. \"I lowered myself slowly\". Then she tried to move/crawled to get some help. No CP or SOB. No belly pain.     - ED course/treatments/referral - Found to have dislocation and was attempted to reduce, Ortho came in and plans for OR in AM      - ROS --- No headache. No dizziness. No weakness. No CP or SOB. No palpitations. No abdominal pain. No nausea or vomiting. No urinary symptoms. No bleeding symptoms. No weight loss. Rest of 12 point ROS was reviewed and negative.       Past Medical History     Past Medical History:   Diagnosis Date    Arthritis     Basal cell carcinoma of anterior chest     Breast cyst     History of anesthesia complications     HLA B27 (HLA B27 positive)     Hypertension     Osteoporosis 2011    Peripheral neuropathy 2018    PONV (postoperative nausea and vomiting)     Scleritis, bilateral     Left .  Treated with corticosteroids,, methotrexate and Humira.  .  Rituximab.    Squamous cell carcinoma of skin of chest     Steroid induced glaucoma, both eyes          Surgical History     Past Surgical History:   Procedure Laterality Date    ARTHROPLASTY REVISION HIP Right 2023    Procedure: RIGHT HIP REVISION TOTAL HIP ARTHROPLASTY;  Surgeon: Bill Bernal DO;  Location: [facility] Main OR    BREAST CYST EXCISION Right     benign     " SECTION  1989    CLOSED REDUCTION HIP Right 03/26/2019    Procedure: CLOSED REDUCTION, HIP;  Surgeon: Jak Ruiz DO;  Location: [facility];  Service: Orthopedics    CLOSED REDUCTION HIP Right 8/30/2023    Procedure: CLOSED REDUCTION, HIP RIGHT;  Surgeon: Aditya Pedroza MD;  Location: [facility]    COLONOSCOPY  09/07/2011    COLONOSCOPY  09/27/2005    COLONOSCOPY W/ BIOPSIES AND POLYPECTOMY  02/18/2021    16 to 20 mm polyp:tubular adenoma in the ascending colon.  Ulcerated mass in the anal canal: Moderately differentiated squamous cell cancer.    ESOPHAGOSCOPY, GASTROSCOPY, DUODENOSCOPY (EGD), COMBINED  05/05/2017    Large hiatal hernia.  Reactive gastropathy with mucosal erosion.  H. pylori negative.    HERNIORRHAPHY FEMORAL Left 9/7/2021    Procedure: LEFT FEMORAL HERNIA REPAIR;  Surgeon: Sidney Murray MD;  Location: [facility]    HYSTERECTOMY TOTAL ABDOMINAL, BILATERAL SALPINGO-OOPHORECTOMY, COMBINED  1996    IR CHEST PORT PLACEMENT > 5 YRS OF AGE  3/15/2021    IR PORT PLACEMENT >5 YEARS  03/15/2021    Right IJ port-a-cath.    IR PORT REMOVAL RIGHT  7/14/2021    LAPAROSCOPIC APPENDECTOMY  04/28/2011    PHACOEMULSIFICATION CLEAR CORNEA WITH STANDARD INTRAOCULAR LENS IMPLANT Right 08/30/2017    PHACOEMULSIFICATION CLEAR CORNEA WITH STANDARD INTRAOCULAR LENS IMPLANT Left 09/13/2017    TOTAL HIP ARTHROPLASTY Right 08/18/2015    Procedure: HIP TOTAL ARTHROPLASTY, RIGHT;  Surgeon: Star Du MD;  Location: [facility];  Service:     [facility] TOTAL KNEE ARTHROPLASTY Left 03/02/2017    Procedure: LEFT TOTAL KNEE ARTHROPLASTY;  Surgeon: Star Du MD;  Location: [facility] OR;  Service: Orthopedics        Family History      Family History   Problem Relation Age of Onset    Alzheimer Disease Mother     Heart Disease Father     Pancreatic Cancer Brother 72.00    No Known Problems Daughter     Anesthesia Reaction No family hx of          Social History      .  Social  History     Socioeconomic History    Marital status:      Spouse name: Not on file    Number of children: 1    Years of education: Not on file    Highest education level: Not on file   Occupational History    Not on file   Tobacco Use    Smoking status: Never    Smokeless tobacco: Never   Vaping Use    Vaping Use: Not on file   Substance and Sexual Activity    Alcohol use: Yes     Alcohol/week: 4.0 standard drinks of alcohol     Types: 4 Standard drinks or equivalent per week     Comment: 3-4/wk    Drug use: No    Sexual activity: Never   Other Topics Concern    Parent/sibling w/ CABG, MI or angioplasty before 65F 55M? Not Asked   Social History Narrative    Not on file     Social Determinants of Health     Financial Resource Strain: Low Risk  (10/2/2023)    Financial Resource Strain     Within the past 12 months, have you or your family members you live with been unable to get utilities (heat, electricity) when it was really needed?: No   Food Insecurity: Low Risk  (10/2/2023)    Food Insecurity     Within the past 12 months, did you worry that your food would run out before you got money to buy more?: No     Within the past 12 months, did the food you bought just not last and you didn t have money to get more?: No   Transportation Needs: Low Risk  (10/2/2023)    Transportation Needs     Within the past 12 months, has lack of transportation kept you from medical appointments, getting your medicines, non-medical meetings or appointments, work, or from getting things that you need?: No   Physical Activity: Not on file   Stress: Not on file   Social Connections: Not on file   Interpersonal Safety: Low Risk  (10/2/2023)    Interpersonal Safety     Do you feel physically and emotionally safe where you currently live?: Yes     Within the past 12 months, have you been hit, slapped, kicked or otherwise physically hurt by someone?: No     Within the past 12 months, have you been humiliated or emotionally abused in  other ways by your partner or ex-partner?: No   Housing Stability: Low Risk  (10/2/2023)    Housing Stability     Do you have housing? : Yes     Are you worried about losing your housing?: No          Allergies        Allergies   Allergen Reactions    Adalimumab Muscle Pain (Myalgia)    Brimonidine-Timolol [Brimonidine Tartrate-Timolol] Unknown     Redness itching in eyes    Levaquin [Levofloxacin] Unknown     Skin burn and couldn't get out bed for 5 days    Tetracyclines & Related Hives         Prior to Admission Medications      No current facility-administered medications on file prior to encounter.  acetaminophen (TYLENOL) 325 MG tablet, Take 2 tablets (650 mg) by mouth every 4 hours as needed for other (mild pain)  amLODIPine (NORVASC) 5 MG tablet, Take 2 tablets (10 mg) by mouth daily  aspirin 81 MG EC tablet, Take 1 tablet (81 mg) by mouth 2 times daily  hydrOXYzine (ATARAX) 10 MG tablet, Take 1 tablet (10 mg) by mouth every 6 hours as needed for itching or anxiety (with pain, moderate pain)  lisinopril (ZESTRIL) 20 MG tablet, Take 1 tablet (20 mg) by mouth daily  LOTEMAX 0.5 % ophthalmic suspension, Place 1 drop into both eyes every 48 hours  metoprolol succinate ER (TOPROL XL) 25 MG 24 hr tablet, Take 2 tablets (50 mg) by mouth daily  naproxen (NAPROSYN) 250 MG tablet, Take 1 tablet (250 mg) by mouth every 12 hours as needed for moderate pain  oxyCODONE (ROXICODONE) 5 MG tablet, Take 0.5 tablets (2.5 mg) by mouth every 4 hours as needed for moderate pain  senna-docusate (SENOKOT-S/PERICOLACE) 8.6-50 MG tablet, Take 1-2 tablets by mouth 2 times daily Take while on oral narcotics to prevent or treat constipation.  timolol maleate (TIMOPTIC) 0.5 % ophthalmic solution, Place 1 drop into both eyes daily            Review of Systems     A 12 point comprehensive review of systems was negative except as noted above in HPI.    PHYSICAL EXAMINATION       Vitals      Vitals: /83   Pulse 90   Temp 97.9  F (36.6  " C) (Oral)   Resp (!) 38   Ht 1.6 m (5' 3\")   Wt 61.2 kg (135 lb)   LMP  (LMP Unknown)   SpO2 97%   BMI 23.91 kg/m    BMI= Body mass index is 23.91 kg/m .      Examination     General Appearance:  Alert, cooperative, no distress  Head:    Normocephalic, without obvious abnormality, atraumatic  EENT:  PERRL, conjunctiva/corneas clear, EOM's intact.   Neck:   Supple, symmetrical, trachea midline, no adenopathy; no NVE  Back:  Symmetric, no curvature, no CVA tenderness  Chest/Lungs: Clear to auscultation bilaterally, respirations unlabored, No tenderness or deformity. No abdominal breathing or use of accessory muscles.   Heart:    Regular rate and rhythm, S1 and S2 normal, no murmur, rub   or gallop  Abdomen: Soft, non-tender, bowel sounds active all four quadrants, not peritoneal on palpation. Not distended  Extremities:  Extremities normal, atraumatic, no swelling   Skin:  Skin color, texture, turgor normal, no rashes or lesion  Neurologic:  Awake and alert, No lateralizing or localizing signs             Pertinent Lab     Results for orders placed or performed during the hospital encounter of 11/03/23   Hip  XR, right, 2 vw PORTABLE    Impression    IMPRESSION: Dislocated right hip implant. Bones are demineralized.    NOTE: ABNORMAL REPORT    THE DICTATION ABOVE DESCRIBES AN ABNORMALITY FOR WHICH FOLLOW-UP IS NEEDED.    Hip  XR, right, 2 vw PORTABLE    Impression    IMPRESSION: Dislocation of the acetabular and femoral components of right total hip arthroplasty remains unchanged in configuration with anterior/superior displacement of the femoral component. No new acute fracture identified. Left hip joint is mildly   narrowed.   XR Pelvis 1/2 Views    Impression    IMPRESSION: No significant interval change, with superior dislocation of the femoral component of the right total hip arthroplasty. No acute fractures.           Pertinent Radiology         "

## 2023-11-04 NOTE — PLAN OF CARE
Ms. Zhao is a 78 years old woman with past medical history significant for hypertension, history of right hip revision who presented to the hospital with right leg pain.  She was diagnosed with right hip dislocation.  Underwent closed reduction in the OR and 11/4.  Possible discharge on 11/5 if pain is well controlled, patient is able to walk.    Hypertension  -At home on lisinopril 10 mg daily, metoprolol succinate 25 mg daily and amlodipine 5 mg daily  -Blood pressures currently around 140/80    Plan  -Resume home amlodipine  -Restart home metoprolol  -Hold lisinopril until 11/5    History of iritis    Plan  -Resume home eyedrops if available in the hospital.    Hyponatremia  -Mild hyponatremia.  Sodium around 134.    Plan  - Continue to monitor      Right periprosthetic hip dislocation - Had prior episodes. History of Right hip revision total hip arthroplasty.     Plan  -Continue current pain regimen  -DVT prophylaxis with Lovenox

## 2023-11-04 NOTE — SEDATION DOCUMENTATION
Patient oxygen saturation dropped 82%. Ambubag was used for approximately 2 minutes. Patient recovered well and only required 2.5L O2 for remainder of procedure.

## 2023-11-04 NOTE — H&P (VIEW-ONLY)
ORTHOPEDIC CONSULTATION    Consultation  Oscar Zhao,  1945, MRN [id number]    No admission diagnoses are documented for this encounter.    PCP: Jak Jones, [phone]   Code status:  Prior       Extended Emergency Contact Information  Primary Emergency Contact: Kaylie Zhao   [facility]  Home Phone: [phone]  Relation: Daughter         IMPRESSION:  Right periprosthetic hip dislocation. Closed reduction under conscious sedation was attempted in ED but unfortunately was unsuccessful.      PLAN:  - Will plan for closed reduction in the OR tomorrow with Dr. Allen   - Admit to Hospitalist for overnight, appreciate preop clearance   - NPO overnight, Bedrest         CHIEF COMPLAINT: R Periprosthetic hip dislocation    HISTORY OF PRESENT ILLNESS:  The patient is seen in orthopedic consultation in the [facility] ED. Oscar recently underwent a Right hip revision arthroplasty with Dr. Aguillon on  for recurrent right hip dislocations. She had been doing well at home until today when she twisted and felt a pop. She knew immediately that her hip was out of place. She presented to the ED for evaluation.    ALLERGIES:   Review of patient's allergies indicates   Allergies   Allergen Reactions    Adalimumab Muscle Pain (Myalgia)    Brimonidine-Timolol [Brimonidine Tartrate-Timolol] Unknown     Redness itching in eyes    Levaquin [Levofloxacin] Unknown     Skin burn and couldn't get out bed for 5 days    Tetracyclines & Related Hives         MEDICATIONS UPON ADMISSION:  Medications were reviewed.  They include:   (Not in a hospital admission)        SOCIAL HISTORY:   she  reports that she has never smoked. She has never used smokeless tobacco. She reports current alcohol use of about 4.0 standard drinks of alcohol per week. She reports that she does not use drugs.      FAMILY HISTORY:  family history includes Alzheimer Disease in her mother; Heart Disease in her father; No Known Problems in her  daughter; Pancreatic Cancer (age of onset: 72.00) in her brother.      REVIEW OF SYSTEMS:   Reviewed with patient. See HPI, otherwise negative       PHYSICAL EXAMINATION:  Vitals: Temp:  [97.9  F (36.6  C)] 97.9  F (36.6  C)  Pulse:  [79-89] 89  Resp:  [16-39] 25  BP: (115-150)/(73-96) 123/78  SpO2:  [92 %-100 %] 92 %  General: On examination, the patient is alert and oriented  RLE: Surgical incision well healed. Leg shortened and externally rotated. Able to dorsiflex and plantar flex ankle without difficulty. Toes wwp with 2+ DP pulse      RADIOGRAPHIC EVALUATION:  Personally reviewed reveals anterior superior periprosthetic hip dislocation.     Les Mayo MD, MD  [facility]  Date: 11/3/2023  Time: 11:25 PM    CC1:   Nicole Cerda MD    CC2:   Jak Jones

## 2023-11-04 NOTE — ED NOTES
"EMERGENCY DEPARTMENT SIGN OUT NOTE        ED COURSE AND MEDICAL DECISION MAKING  Patient was signed out to me by Dr Rosales Gavin at 10:00 PM.  2330-propofol 60 mg IV was administered for sedation for a second hip reduction attempted by orthopedics.  A second dose of propofol 30 mg IV was also administered during the hip reduction attempt.  This was not successful by orthopedics.  We will admit to the hospital and plan for operative intervention tomorrow.  12:07 AM I spoke with hospitalist Dr. Weaver who accepts the patient for admission.     In brief, Oscar Zhao is a 78 year old female who initially presented with right hip pain after pivoting with her right leg \"like she wasn't supposed to.\" Patient has a history of three right hip dislocations this year with right hip surgery six weeks ago.     At time of sign out, orthopedics is coming to the emergency department for hip reduction attempt.    FINAL IMPRESSION    1. Closed dislocation of right hip, initial encounter (H)        ED MEDS  Medications   propofol (DIPRIVAN) injection 10 mg/mL vial (has no administration in time range)   melatonin tablet 1 mg (has no administration in time range)   ondansetron (ZOFRAN ODT) ODT tab 4 mg (has no administration in time range)     Or   ondansetron (ZOFRAN) injection 4 mg (has no administration in time range)   sodium chloride 0.9 % infusion (has no administration in time range)   acetaminophen (TYLENOL) tablet 650 mg (has no administration in time range)     Or   acetaminophen (TYLENOL) Suppository 650 mg (has no administration in time range)   HYDROmorphone (DILAUDID) injection 0.2 mg (has no administration in time range)   HYDROcodone-acetaminophen (NORCO) 5-325 MG per tablet 1 tablet (has no administration in time range)   HYDROmorphone (DILAUDID) injection 1 mg (1 mg Intravenous $Given 11/3/23 2030)   propofol (DIPRIVAN) injection 10 mg/mL vial (60 mg Intravenous $Given 11/3/23 2226)   sodium chloride 0.9 % " infusion ( Intravenous $New Bag 11/3/23 2131)   propofol (DIPRIVAN) injection 10 mg/mL vial (60 mg Intravenous $Given 11/3/23 2329)   propofol (DIPRIVAN) injection 10 mg/mL vial (30 mg Intravenous $Given 11/3/23 2331)       LAB  Labs Ordered and Resulted from Time of ED Arrival to Time of ED Departure   CBC WITH PLATELETS AND DIFFERENTIAL - Abnormal       Result Value    WBC Count 6.4      RBC Count 4.68      Hemoglobin 12.1      Hematocrit 39.7      MCV 85      MCH 25.9 (*)     MCHC 30.5 (*)     RDW 16.5 (*)     Platelet Count 204      % Neutrophils 75      % Lymphocytes 14      % Monocytes 4      % Eosinophils 5      % Basophils 1      % Immature Granulocytes 1      NRBCs per 100 WBC 0      Absolute Neutrophils 4.8      Absolute Lymphocytes 0.9      Absolute Monocytes 0.3      Absolute Eosinophils 0.3      Absolute Basophils 0.0      Absolute Immature Granulocytes 0.0      Absolute NRBCs 0.0     BLOOD GAS VENOUS - Abnormal    pH Venous 7.38      pCO2 Venous 42      pO2 Venous 26      Bicarbonate Venous 25      Base Excess/Deficit -0.2      Oxyhemoglobin Venous 43.1 (*)     O2 Sat, Venous 43.8 (*)    INR   BASIC METABOLIC PANEL   ROUTINE UA WITH MICROSCOPIC REFLEX TO CULTURE   COMPREHENSIVE METABOLIC PANEL       EKG  Performed on 10/25/23    RADIOLOGY    XR Pelvis 1/2 Views   Final Result   IMPRESSION: No significant interval change, with superior dislocation of the femoral component of the right total hip arthroplasty. No acute fractures.      Hip  XR, right, 2 vw PORTABLE   Final Result   IMPRESSION: Dislocation of the acetabular and femoral components of right total hip arthroplasty remains unchanged in configuration with anterior/superior displacement of the femoral component. No new acute fracture identified. Left hip joint is mildly    narrowed.      Hip  XR, right, 2 vw PORTABLE   Final Result   IMPRESSION: Dislocated right hip implant. Bones are demineralized.      NOTE: ABNORMAL REPORT      THE DICTATION  ABOVE DESCRIBES AN ABNORMALITY FOR WHICH FOLLOW-UP IS NEEDED.         PROCEDURE: Procedural Sedation  Orthopedic Injury Reduction   INDICATIONS: Sedation is required to allow for joint reduction (right hip)   SEDATION PROVIDER: Dr Nicole Cerda   PROCEDURE PROVIDER: David Dailey   LEVEL OF SEDATION: Moderate Sedation    Defined as:  Minimal = Normal response to verbal  Moderate = Responds to verbal and light tactile stimulation  Deep = Responds after repeated painful stimulation   CONSENT: Risks, benefits and alternatives were discussed with and Written consent was obtained from Patient.   PROCEDURE SPECIFIC CHECKLIST COMPLETED:   Yes   LAST ORAL INTAKE: Light Meal > 6 hours   ASA CLASS: 2 - Mild systemic disease   MALLAMPATI:  II - Faucial pillars and soft palate may be seen, but uvula is masked by the base of the tongue   TIME OUT: Universal protocol was followed. TIME OUT conducted just prior to starting procedure confirmed patient identity, site/side, procedure, patient position, and availability of correct equipment. Yes    Immediately prior to initiation of sedation, reassessment of clinical condition was performed which was unchanged.   MEDICATIONS: Propofol, 60 and 30 mg, IV   MONITORING: Monitoring consisted of:   heart rate, cardiac monitor, continuous pulse oximeter, continuous capnometry (end tidal CO2), frequent blood pressure checks, level of consciousness checks, IV access, constant attendance by RN until patient is recovered, and constant attendance by MD until patient is stable   RESPONSE: vital signs stable, airway patent, and O2 saturations remained >92%   REDUCTION   PROCEDURE DESCRIPTION: ORTHOPEDIC MANAGEMENT:  Bone/Joint Manipulation: Affected extremity was grasped and gradual traction was applied and was further manipulated manually.  Reduction was unsuccessful by orthopedics     POST-SEDATION ASSESSMENT/NOTE: Lowest level oxygen saturation reached was 83%.    Post  procedure patient was alert and responds to verbal stimuli    Patient was monitored during recovery and returned to pre-procedure baseline.   ADDITIONAL MD ASSISTANCE: None   TOTAL MD DRUG ADMINISTRATION / MONITORING TIME: 15 minutes   COMPLICATIONS:  Patient tolerated procedure well, without complication         Nicole Cerda MD  [facility] EMERGENCY ROOM  [address] [place] [id number]  [phone]       Nicole Cerda MD  11/04/23 0056       Nicole Cerda MD  11/04/23 0100

## 2023-11-04 NOTE — PROGRESS NOTES
RT Note:    Assisted with conscious sedation. pt was on room air when seen. Pt required respiratory intervention during procedure: jaw thrust, manual breaths with ambu bag, and supplemental oxygen to maintain SpO2 >90% per MD verbal order. Writer dismissed by ED MD. Pt placed on 1L until pt full wakes up. SpO2 100% on 1L post procedure. Will continue to monitor and assess pt.

## 2023-11-04 NOTE — CONSULTS
ORTHOPEDIC CONSULTATION    Consultation  Oscar Zhao,  1945, MRN [id number]    No admission diagnoses are documented for this encounter.    PCP: Jak Jones, [phone]   Code status:  Prior       Extended Emergency Contact Information  Primary Emergency Contact: Kaylie Zhao   [facility]  Home Phone: [phone]  Relation: Daughter         IMPRESSION:  Right periprosthetic hip dislocation. Closed reduction under conscious sedation was attempted in ED but unfortunately was unsuccessful.      PLAN:  - Will plan for closed reduction in the OR tomorrow with Dr. Allen   - Admit to Hospitalist for overnight, appreciate preop clearance   - NPO overnight, Bedrest         CHIEF COMPLAINT: R Periprosthetic hip dislocation    HISTORY OF PRESENT ILLNESS:  The patient is seen in orthopedic consultation in the [facility] ED. Oscar recently underwent a Right hip revision arthroplasty with Dr. Aguillon on  for recurrent right hip dislocations. She had been doing well at home until today when she twisted and felt a pop. She knew immediately that her hip was out of place. She presented to the ED for evaluation.    ALLERGIES:   Review of patient's allergies indicates   Allergies   Allergen Reactions    Adalimumab Muscle Pain (Myalgia)    Brimonidine-Timolol [Brimonidine Tartrate-Timolol] Unknown     Redness itching in eyes    Levaquin [Levofloxacin] Unknown     Skin burn and couldn't get out bed for 5 days    Tetracyclines & Related Hives         MEDICATIONS UPON ADMISSION:  Medications were reviewed.  They include:   (Not in a hospital admission)        SOCIAL HISTORY:   she  reports that she has never smoked. She has never used smokeless tobacco. She reports current alcohol use of about 4.0 standard drinks of alcohol per week. She reports that she does not use drugs.      FAMILY HISTORY:  family history includes Alzheimer Disease in her mother; Heart Disease in her father; No Known Problems in her  daughter; Pancreatic Cancer (age of onset: 72.00) in her brother.      REVIEW OF SYSTEMS:   Reviewed with patient. See HPI, otherwise negative       PHYSICAL EXAMINATION:  Vitals: Temp:  [97.9  F (36.6  C)] 97.9  F (36.6  C)  Pulse:  [79-89] 89  Resp:  [16-39] 25  BP: (115-150)/(73-96) 123/78  SpO2:  [92 %-100 %] 92 %  General: On examination, the patient is alert and oriented  RLE: Surgical incision well healed. Leg shortened and externally rotated. Able to dorsiflex and plantar flex ankle without difficulty. Toes wwp with 2+ DP pulse      RADIOGRAPHIC EVALUATION:  Personally reviewed reveals anterior superior periprosthetic hip dislocation.     Les Mayo MD, MD  [facility]  Date: 11/3/2023  Time: 11:25 PM    CC1:   Nicole Cerda MD    CC2:   Jak Jones

## 2023-11-04 NOTE — OP NOTE
PATIENT: Oscar Zhao  MR# :   [id number]  DATE OF OPERATION: 11/04/23    SURGEON:  Jak Allen MD     ASSISTANT:  Wilfredo Lees PA-C     A skilled assistant was required due to the nature of the case for patient position, retraction, exposure, implant placement, closure and dressing application.      Preoperative diagnosis: Right periprosthetic hip dislocation, anterior  Right anterior iliac wing fracture    Postoperative diagnosis: Right periprosthetic hip dislocation, anterior  Right anterior iliac wing fracture    Surgical procedure: Closed reduction right total hip dislocation    Anesthesia:  GETA    Drains: None    Estimated blood loss: 0 cc    IV fluids: Please see Anesthesia Report    Complications: None identified intraoperatively     Blood products: none given     Specimens: * No specimens in log *    Findings: Anterior superior dislocation with incarcerated intrapelvic femoral head, associated anterior iliac wing fracture.  Following closed reduction, tested stability, noting impingement with hip flexion to 90 degrees and external rotation to 35 degrees and internal rotation to 25 degrees, as well as external rotation to 50 degrees with neutral hip positioning and internal rotation to 45 degrees prior to impingement.    COMPONENTS IMPLANTED:  * No implants in log *      INDICATIONS:    Oscar Zhao is a 78 year old female who was admitted for right total hip arthroplasty anterior superior dislocation following pivoting in her kitchen.  She was admitted, with failed attempted closed reduction in the emergency room x2.  CT scan revealed intrapelvic location of the femoral head with the anterior superior dislocation, and associated anterior iliac wing fracture.    We discussed treatment options including both nonoperative and operative management.  Nonoperative management is not recommended for her right total hip arthroplasty dislocation, for fear of prolonged bedrest, bedsores, pneumonias, blood  clots, and other negative potential risks associated with limited mobility.  Additionally, she is at risk for ongoing intrapelvic injury and/or bleeding.  Nonsurgical treatment would include attempted closed reduction of her right hip dislocation.  The potential risks were discussed in detail, including inability to adequately close reduce secondary to soft tissue constraint, potential disassociation of the femoral head from the femoral stem causing retained hardware within the pelvis which may or may not need removal at a later date with a general surgeon/vascular surgeon.  The operative solution would include open reduction, possible revision to improve overall stability of the construct.  The potential risks associated with operative intervention is wound healing complication, prolonged anesthesia, blood loss, neurovascular injury, infection, need for further surgery, stroke, heart attack, death and blood clots.  Additionally, she understands that even with open reduction there is a chance that we would be unable to adequately reduce the hip in a safe way.     The patient verbalized understanding of the above discussion.  We answered her questions in detail.  Following shared decision making, the patient would like to pursue with attempted closed reduction of the right hip dislocation in the operating room.  She understands that if this were to fail, we would then plan to attempt open reduction of the dislocated right hip.     She agrees with the above plan and all questions were answered.  Informed written consent was obtained preoperatively.      PROCEDURE:    After proper identification of the patient including verification and marking of the surgical site, the patient was brought to the operating room.  General anesthesia was given without complications.  The patient was placed on the [place] fracture table in the supine position, with all bony prominences well-padded.  She was secured to the bed with a chest  strap.  Perineal post was then place.  We performed a surgical timeout, including identifying the correct patient, laterality and procedure to be performed.  All were in agreement.  There were no concerns.    Prior to any manipulation attempt, we again took an intraoperative fluoroscopic image confirming anterior superior dislocated placement of the right total hip arthroplasty component.    We began manipulation following relaxation with right leg adduction, external rotation to 45 degrees, and neutral to slight hip flexion positioning.  We then attempted gentle traction under fluoroscopic guidance to confirm that we would not disassociate the femoral head in the pelvis with closed reduction attempts.  With slow traction, we were able to successfully disassociate the femoral head from the soft tissue incarceration, allowing for successful closed reduction of the right femoral head within the acetabular component.  We confirmed this with orthogonal fluoroscopic imaging.  We then tested the hip stability in neutral position and hip flexion position as documented above.  The patient was then allowed to awaken from anesthesia and transferred to her hospital bed in stable condition.  There were no known complications throughout.    At the end of the procedure the patient had palpable DP pulse in her right foot.  She was placed into an abductor brace.  She was then transferred to the postoperative area for ongoing postoperative hemodynamic monitoring.    Postoperative Plan:  Pain Control: Continue per pain protocol.  Weight Bearing: Weight bearing as tolerated on affected lower extremity.    Hip abduction pillow at all times until fitted/placed for a custom hip abduction brace (restrictions as noted in her orders)   Strict hip precautions -demonstrated instability both anteriorly and posteriorly  DVT Prophylaxis: Aspirin 81 mg twice daily  Antibiotics: None needed  GI: Plan for aggressive bowel regimen to prevent  constipation from narcotic medications  Lines: HLIV once tolerating PO  PT/OT: Eval and treatment.  Strict hip precautions in orders.  Follow up:  with Dr. Bill Bernal outpatient to discuss next steps in management  Discharge plan: to home pending brace fitting, medical stability, progression with PT, pain control. Should not discharge prior to fitting/placement of abduction brace.    Dispo: stable to pacu    Jak Allen MD  Orthopedic Surgery  [facility]

## 2023-11-04 NOTE — CONSULTS
Care Management Initial Consult    General Information  Assessment completed with: Patient, pt  Type of CM/SW Visit: Initial Assessment    Primary Care Provider verified and updated as needed: Yes   Readmission within the last 30 days: no previous admission in last 30 days      Reason for Consult: discharge planning  Advance Care Planning: Advance Care Planning Reviewed: verified with patient, no concerns identified     General Information Comments: lives alone essentially (adult dtr lives w/her but is often away)and has had 3 hip dislocation since jan 2023    Communication Assessment  Patient's communication style: spoken language (English or Bilingual)             Cognitive  Cognitive/Neuro/Behavioral: WDL                      Living Environment:   People in home: child(sunny), adult     Current living Arrangements: other (see comments) (free standing townhouse w/stairs but manages to have everything on one level)      Able to return to prior arrangements: yes       Family/Social Support:  Care provided by: self  Provides care for: no one  Marital Status: Single  Children          Description of Support System: Supportive, Involved    Support Assessment: Adequate family and caregiver support, Adequate social supports    Current Resources:   Patient receiving home care services: No     Community Resources: None  Equipment currently used at home:    Supplies currently used at home: None    Employment/Financial:  Employment Status:          Financial Concerns: none   Referral to Financial Worker: No       Does the patient's insurance plan have a 3 day qualifying hospital stay waiver?  No    Lifestyle & Psychosocial Needs:  Social Determinants of Health     Food Insecurity: Low Risk  (10/2/2023)    Food Insecurity     Within the past 12 months, did you worry that your food would run out before you got money to buy more?: No     Within the past 12 months, did the food you bought just not last and you didn t have money to  get more?: No   Depression: Not at risk (6/20/2023)    PHQ-2     PHQ-2 Score: 0   Housing Stability: Low Risk  (10/2/2023)    Housing Stability     Do you have housing? : Yes     Are you worried about losing your housing?: No   Tobacco Use: Low Risk  (11/4/2023)    Patient History     Smoking Tobacco Use: Never     Smokeless Tobacco Use: Never     Passive Exposure: Not on file   Financial Resource Strain: Low Risk  (10/2/2023)    Financial Resource Strain     Within the past 12 months, have you or your family members you live with been unable to get utilities (heat, electricity) when it was really needed?: No   Alcohol Use: Not on file   Transportation Needs: Low Risk  (10/2/2023)    Transportation Needs     Within the past 12 months, has lack of transportation kept you from medical appointments, getting your medicines, non-medical meetings or appointments, work, or from getting things that you need?: No   Physical Activity: Not on file   Interpersonal Safety: Low Risk  (10/2/2023)    Interpersonal Safety     Do you feel physically and emotionally safe where you currently live?: Yes     Within the past 12 months, have you been hit, slapped, kicked or otherwise physically hurt by someone?: No     Within the past 12 months, have you been humiliated or emotionally abused in other ways by your partner or ex-partner?: No   Stress: Not on file   Social Connections: Not on file       Functional Status:  Prior to admission patient needed assistance:   Dependent ADLs:: Independent  Dependent IADLs:: Independent  Assesssment of Functional Status: Not at baseline with mobility    Mental Health Status:  Mental Health Status: No Current Concerns       Chemical Dependency Status:                Values/Beliefs:  Spiritual, Cultural Beliefs, Shinto Practices, Values that affect care:                 Additional Information:  Assessed, discussed MONAHAN, lives w/adult dtr who is away, home is equipped with raised toilet seat, grab  bars and pt is independent at baseline. CM to follow for needs. Pt is hesitant to discharge home just yet. Wanting to see how she responds to surgery this afternoon.     Cuate Rodriguez RN

## 2023-11-04 NOTE — INTERVAL H&P NOTE
I have reviewed the surgical (or preoperative) H&P that is linked to this encounter, and examined the patient. There are no significant changes    The patient is a pleasant 78-year-old female with history of posteriorly unstable right total hip arthroplasty (posterior approach in 2015) with eventual right total hip arthroplasty revision for head and liner exchange (Dr. Bernal on 9/21/2023).  She has now sustained a right hip anterior dislocation with anterior ilium fracture, with resulting intrapelvic location of the femoral head.    Preoperatively, her right lower extremity is externally rotated and shortened.  She has 2+ DP and PT pulses.  Her foot is warm well perfused.  She has intact EHL, FHL, gastrocsoleus complex and tibialis anterior muscle group function.  She is sensate to light touch in the superficial peroneal, deep peroneal, tibial sural and saphenous nerve distributions of the foot.  She has intact femoral cutaneous nerve function.    We discussed treatment options including both nonoperative and operative management.  Nonoperative management is not recommended for her right total hip arthroplasty dislocation, for fear of prolonged bedrest, bedsores, pneumonias, blood clots, and other negative potential risks associated with limited mobility.  Additionally, she is at risk for ongoing intrapelvic injury and/or bleeding.  Nonsurgical treatment would include attempted closed reduction of her right hip dislocation.  The potential risks were discussed in detail, including inability to adequately close reduce secondary to soft tissue constraint, potential disassociation of the femoral head from the femoral stem causing retained hardware within the pelvis which may or may not need removal at a later date with a general surgeon/vascular surgeon.  The operative solution would include open reduction, possible revision to improve overall stability of the construct.  The potential risks associated with operative  intervention is wound healing complication, prolonged anesthesia, blood loss, neurovascular injury, infection, need for further surgery, stroke, heart attack, death and blood clots.  Additionally, she understands that even with open reduction there is a chance that we would be unable to adequately reduce the hip in a safe way.    The patient verbalized understanding of the above discussion.  We answered her questions in detail.  Following shared decision making, the patient would like to pursue with attempted closed reduction of the right hip dislocation in the operating room.  She understands that if this were to fail, we would then plan to attempt open reduction of the dislocated right hip.    She agrees with the above plan and all questions were answered.  Informed written consent was obtained preoperatively.    Jak Allen MD  [facility]      Clinical Conditions Present on Arrival:  Clinically Significant Risk Factors Present on Admission        # Hyperkalemia: Highest K = 5.5 mmol/L in last 2 days, will monitor as appropriate  # Hyponatremia: Lowest Na = 133 mmol/L in last 30 days, will monitor as appropriate  # Hypercalcemia: Highest Ca = 10.5 mg/dL in last 30 days, will monitor as appropriate

## 2023-11-04 NOTE — ED NOTES
Sedation Post Procedure Summary:    This writer assisted with the procedural sedation. Prior to the start of the procedure and with procedural staff participation, verbal confirmation was conducted of the patient s identity using two indicators, that the procedure was appropriate and matched the consent, and that the correct equipment were available. Immediately prior to starting the procedure a Time Out was conducted with the procedural staff and re-confirmed the patient s name, procedure, and site/side.      Sedatives: Propofol    Vital signs, airway, end tidal O2, and pulse oximetry were monitored and remained stable throughout the procedure and sedation was maintained until the procedure was complete.  The patient was monitored by staff until sedation discharge criteria were met.    Patient tolerance: Oxygen Desaturation down to 82%, resolved with:   Supplementing/Increasing oxygen and Airway Repositioning (e.g. jaw thrust, chin lift).    Time of sedation in minutes:  15 minutes from beginning to end of physician one to one monitoring.     2nd attempt at hip reduction by orthopedic surgeon was unsuccessful. Pt will be admitted for surgery tomorrow.     Report given to bedside Geena PEPE.      GRADY Carlos RN

## 2023-11-05 ENCOUNTER — APPOINTMENT (OUTPATIENT)
Dept: PHYSICAL THERAPY | Facility: CLINIC | Age: 78
End: 2023-11-05
Attending: STUDENT IN AN ORGANIZED HEALTH CARE EDUCATION/TRAINING PROGRAM
Payer: MEDICARE

## 2023-11-05 VITALS
OXYGEN SATURATION: 97 % | RESPIRATION RATE: 16 BRPM | DIASTOLIC BLOOD PRESSURE: 78 MMHG | HEART RATE: 73 BPM | HEIGHT: 63 IN | TEMPERATURE: 98.3 F | SYSTOLIC BLOOD PRESSURE: 129 MMHG | WEIGHT: 135 LBS | BODY MASS INDEX: 23.92 KG/M2

## 2023-11-05 LAB
FASTING STATUS PATIENT QL REPORTED: YES
GLUCOSE SERPL-MCNC: 132 MG/DL (ref 70–99)
HGB BLD-MCNC: 11.7 G/DL (ref 11.7–15.7)

## 2023-11-05 PROCEDURE — 99239 HOSP IP/OBS DSCHRG MGMT >30: CPT | Performed by: STUDENT IN AN ORGANIZED HEALTH CARE EDUCATION/TRAINING PROGRAM

## 2023-11-05 PROCEDURE — 250N000013 HC RX MED GY IP 250 OP 250 PS 637: Performed by: STUDENT IN AN ORGANIZED HEALTH CARE EDUCATION/TRAINING PROGRAM

## 2023-11-05 PROCEDURE — G0378 HOSPITAL OBSERVATION PER HR: HCPCS

## 2023-11-05 PROCEDURE — 85018 HEMOGLOBIN: CPT | Performed by: STUDENT IN AN ORGANIZED HEALTH CARE EDUCATION/TRAINING PROGRAM

## 2023-11-05 PROCEDURE — 99207 PR NO BILLABLE SERVICE THIS VISIT: CPT | Performed by: STUDENT IN AN ORGANIZED HEALTH CARE EDUCATION/TRAINING PROGRAM

## 2023-11-05 PROCEDURE — 97116 GAIT TRAINING THERAPY: CPT | Mod: GP

## 2023-11-05 PROCEDURE — 36415 COLL VENOUS BLD VENIPUNCTURE: CPT | Performed by: STUDENT IN AN ORGANIZED HEALTH CARE EDUCATION/TRAINING PROGRAM

## 2023-11-05 PROCEDURE — 999N000111 HC STATISTIC OT IP EVAL DEFER

## 2023-11-05 PROCEDURE — 97161 PT EVAL LOW COMPLEX 20 MIN: CPT | Mod: GP

## 2023-11-05 PROCEDURE — 82947 ASSAY GLUCOSE BLOOD QUANT: CPT | Performed by: STUDENT IN AN ORGANIZED HEALTH CARE EDUCATION/TRAINING PROGRAM

## 2023-11-05 RX ORDER — ENOXAPARIN SODIUM 100 MG/ML
40 INJECTION SUBCUTANEOUS EVERY 24 HOURS
Qty: 5.6 ML | Refills: 0 | Status: DISCONTINUED | OUTPATIENT
Start: 2023-11-05 | End: 2023-11-05 | Stop reason: HOSPADM

## 2023-11-05 RX ORDER — AMOXICILLIN 250 MG
1 CAPSULE ORAL 2 TIMES DAILY PRN
Qty: 30 TABLET | Refills: 0 | Status: SHIPPED | OUTPATIENT
Start: 2023-11-05 | End: 2023-11-08

## 2023-11-05 RX ORDER — ACETAMINOPHEN 325 MG/1
650 TABLET ORAL EVERY 4 HOURS PRN
Qty: 100 TABLET | Refills: 0 | Status: ON HOLD | OUTPATIENT
Start: 2023-11-05 | End: 2023-11-14

## 2023-11-05 RX ORDER — ENOXAPARIN SODIUM 100 MG/ML
40 INJECTION SUBCUTANEOUS EVERY 24 HOURS
Qty: 5.6 ML | Refills: 0 | Status: SHIPPED | OUTPATIENT
Start: 2023-11-05 | End: 2023-11-08

## 2023-11-05 RX ADMIN — TIMOLOL MALEATE 1 DROP: 5 SOLUTION OPHTHALMIC at 08:24

## 2023-11-05 RX ADMIN — METOPROLOL SUCCINATE 25 MG: 25 TABLET, EXTENDED RELEASE ORAL at 08:14

## 2023-11-05 RX ADMIN — AMLODIPINE BESYLATE 5 MG: 5 TABLET ORAL at 08:13

## 2023-11-05 ASSESSMENT — ACTIVITIES OF DAILY LIVING (ADL)
ADLS_ACUITY_SCORE: 43
ADLS_ACUITY_SCORE: 39
ADLS_ACUITY_SCORE: 43
ADLS_ACUITY_SCORE: 39
ADLS_ACUITY_SCORE: 39
ADLS_ACUITY_SCORE: 43

## 2023-11-05 NOTE — PLAN OF CARE
"Problem: Adult Inpatient Plan of Care  Goal: Absence of Hospital-Acquired Illness or Injury  Intervention: Identify and Manage Fall Risk  Recent Flowsheet Documentation  Taken 11/5/2023 0900 by Debi Peña RN  Safety Promotion/Fall Prevention: safety round/check completed  Taken 11/5/2023 0815 by Debi Peña RN  Safety Promotion/Fall Prevention:   activity supervised   assistive device/personal items within reach   clutter free environment maintained   lighting adjusted   nonskid shoes/slippers when out of bed  Taken 11/5/2023 0700 by Debi Peña RN  Safety Promotion/Fall Prevention: safety round/check completed    PRIMARY DIAGNOSIS: \"GENERIC\" NURSING  OUTPATIENT/OBSERVATION GOALS TO BE MET BEFORE DISCHARGE:  ADLs back to baseline: Yes, with brace    Activity and level of assistance: Up with standby assistance.    Pain status: Pain free.    Return to near baseline physical activity: Yes     Discharge Planner Nurse   Safe discharge environment identified: Yes  Barriers to discharge: No       Entered by: Debi Peña RN 11/05/2023 1:22 PM    Patient vital signs are at baseline: Yes  Patient able to ambulate as they were prior to admission or with assist devices provided by therapies during their stay:  Yes  Patient MUST void prior to discharge:  Yes  Patient able to tolerate oral intake:  Yes  Pain has adequate pain control using Oral analgesics:  Yes  Does patient have an identified :  Yes  Has goal D/C date and time been discussed with patient:  Yes    Patient A&O, VSS, and CMS intact. Denies pain. Brace fitted and completed. Voiding and tolerating oral intake. Ok to discharge after brace arrives, per ortho. All alarms in place and call light appropriate. Patient ready for discharge. Completed discharge paperwork with patient and family. Patient and family stated understanding and questions were answered. All belongings were sent with the patient. Discharged safely.  Debi Peña RN  "

## 2023-11-05 NOTE — PLAN OF CARE
Goal Outcome Evaluation:    Patient vital signs are at baseline: Yes  Patient able to ambulate as they were prior to admission or with assist devices provided by therapies during their stay:  No,  Reason:  bedrest until brace arrives  Patient MUST void prior to discharge:  Yes, voiding with purwick  Patient able to tolerate oral intake:  Yes  Pain has adequate pain control using Oral analgesics:  Yes, patient denies pain  Does patient have an identified :  Yes  Has goal D/C date and time been discussed with patient:  Yes   Patient is alert and oriented. Vital signs are stable. Denies pain.  Patient is on bedrest until orthosis brace arrives. Abduction pillow in place. Calls appropriately. Able to make needs known.

## 2023-11-05 NOTE — PLAN OF CARE
Problem: Adult Inpatient Plan of Care  Goal: Absence of Hospital-Acquired Illness or Injury  Intervention: Identify and Manage Fall Risk  Recent Flowsheet Documentation  Taken 11/4/2023 2040 by Louise Soto RN  Safety Promotion/Fall Prevention:   safety round/check completed   activity supervised   assistive device/personal items within reach   clutter free environment maintained   increased rounding and observation   lighting adjusted   mobility aid in reach   nonskid shoes/slippers when out of bed    Patient vital signs are at baseline: Yes  Patient able to ambulate as they were prior to admission or with assist devices provided by therapies during their stay:  No,  Reason:  brace not here, NWB, bedrest until brace is in place  Patient MUST void prior to discharge:  Yes  Patient able to tolerate oral intake:  Yes  Pain has adequate pain control using Oral analgesics:  Yes  Does patient have an identified :  Yes  Has goal D/C date and time been discussed with patient:  Yes

## 2023-11-05 NOTE — PROVIDER NOTIFICATION
[facility] call [facility] PA notified, need clarification regarding weight bearing status as patient was ambulating. Confirmed pt to be NWB until brace delivered and in place. Pt verbalizes understanding. Patient care order placed.

## 2023-11-05 NOTE — PROGRESS NOTES
"Orthopedic Progress Note      Assessment: 1 Day Post-Op  S/P Procedure(s):  CLOSED REDUCTION, HIP     Postoperative Plan:  Pain Control: Continue per pain protocol.  Weight Bearing: Weight bearing as tolerated on affected lower extremity.               Hip abduction pillow at all times until fitted/placed for a custom hip abduction brace (restrictions as noted in her orders)              Strict hip precautions -demonstrated instability both anteriorly and posteriorly  DVT Prophylaxis: no aspirin due to prior GI bleed per patient. Lovenox 40 mg subcutaneous for 2 weeks.   Antibiotics: None needed  GI: Plan for aggressive bowel regimen to prevent constipation from narcotic medications  Lines: HLIV once tolerating PO  PT/OT: Eval and treatment.  Strict hip precautions in orders.  Follow up:  with Dr. Bill Bernal outpatient to discuss next steps in management  Discharge plan: to home pending brace fitting, medical stability, progression with PT, pain control. Should not discharge prior to fitting/placement of abduction brace.   As of this time, abduction brace has not been fitted yet.     I did call [facility] for an ETA, they will call me back within 20 minutes. Aside from the brace fitting, she is stable for discharge after that.          Subjective:  Patient reports no pain today. Quite frustrated as she states she was not aware that she should not be weightbearing without the brace. Concerned as she did not make arrangements for someone to watch her dog today.       Objective:  BP (!) 144/92 (BP Location: Right arm)   Pulse 86   Temp 98.3  F (36.8  C) (Oral)   Resp 18   Ht 1.6 m (5' 3\")   Wt 61.2 kg (135 lb)   LMP  (LMP Unknown)   SpO2 96%   BMI 23.91 kg/m    The patient is Alert and awake. Patient is sitting in bed and appears comfortable.   Sensation is intact.  Dorsiflexion and plantar flexion is intact.  Dorsalis pedis pulse intact. Skin warm and well perfused.   Compartments are soft and " non-tender.       Pertinent Labs   Lab Results: personally reviewed.   Lab Results   Component Value Date    INR 1.01 11/04/2023     Lab Results   Component Value Date    WBC 7.3 11/04/2023    HGB 11.7 11/05/2023    HCT 37.9 11/04/2023    MCV 85 11/04/2023     11/04/2023     Lab Results   Component Value Date     (L) 11/04/2023    CO2 24 11/04/2023         Report completed by:  Corrie Reese PA-C  Date: 11/5/2023  Time: 9:32 AM  [facility]  [phone]

## 2023-11-05 NOTE — PROGRESS NOTES
"PRIMARY DIAGNOSIS: \"GENERIC\" NURSING  OUTPATIENT/OBSERVATION GOALS TO BE MET BEFORE DISCHARGE:  ADLs back to baseline: No    Activity and level of assistance: bedrest until brace is here    Pain status: Pain free.    Return to near baseline physical activity: No     Discharge Planner Nurse   Safe discharge environment identified: Yes  Barriers to discharge: Yes       Entered by: Debi Peña RN 11/05/2023 10:06 AM  "

## 2023-11-05 NOTE — PROGRESS NOTES
Physical Therapy Discharge Summary    Reason for therapy discharge:    All goals and outcomes met, no further needs identified.    Progress towards therapy goal(s). See goals on Care Plan in [facility] electronic health record for goal details.  Goals met    Therapy recommendation(s):    Continue home exercise program.

## 2023-11-05 NOTE — PROGRESS NOTES
OT: Orders received. Chart reviewed and had long discussion w/ patient, RN, and PT.  OT not indicated due to pt having had OT multiple times w/ past hip discolations and is Mod I w/ ADLs. Pt has good home setup and all necessary DME and will have support from family as needed.  Defer discharge recommendations to PT and rest of care team.  Will complete orders.    -Pérez Denney OTR/L

## 2023-11-05 NOTE — DISCHARGE SUMMARY
[facility]  Hospitalist Discharge Summary      Date of Admission:  11/3/2023  Date of Discharge:  11/5/2023  Discharging Provider: SARITHA GRADY MD  Discharge Service: Hospitalist Service    Discharge Diagnoses   Right periprosthetic hip dislocation status post closed reduction on 11/4.    Clinically Significant Risk Factors          Follow-ups Needed After Discharge   Follow-up with orthopedic surgery    Unresulted Labs Ordered in the Past 30 Days of this Admission       No orders found from 10/4/2023 to 11/4/2023.            Discharge Disposition   Discharged to home  Condition at discharge: Good    Hospital Course     Ms. Zhao is a 78 years old woman with past medical history significant for hypertension, history of right hip revision who presented to the hospital with right leg pain.  She was diagnosed with right hip dislocation.  Underwent closed reduction in the OR and 11/4.  Pain well controlled.  Cleared for discharge from orthopedic standpoint after hip brace placement.       Right periprosthetic hip dislocation   -Status post closed reduction on 11/4.  -Pain well controlled.    Plan  -Hip abduction brace will be placed prior to discharge  -Discharged on Lovenox 40 mg daily for 2 weeks as per orthopedic surgery recommendations for DVT prophylaxis  -Strict hip precaution as per orthopedic  -Follow-up with orthopedic surgery as an outpatient.     Hypertension  -At home on lisinopril 10 mg daily, metoprolol succinate 25 mg daily and amlodipine 5 mg daily  -Blood pressures currently around 140/80     Plan  -Continue home antihypertensives        Hyponatremia  -Mild hyponatremia.     Plan  -Follow-up with primary care physician.            Consultations This Hospital Stay   CARE MANAGEMENT / SOCIAL WORK IP CONSULT  ORTHOSIS BRACE IP CONSULT  PHYSICAL THERAPY ADULT IP CONSULT  OCCUPATIONAL THERAPY ADULT IP CONSULT    Code Status   Full Code    Time Spent on this Encounter   SARITHA STEWARD  MD YSABEL, personally saw the patient today and spent greater than 30 minutes discharging this patient.       SARITHA GRADY MD  [address] [address]  Phone: [phone]  Fax: [phone]  ______________________________________________________________________    Physical Exam   Vital Signs: Temp: 98.3  F (36.8  C) Temp src: Oral BP: 129/78 Pulse: 73   Resp: 16 SpO2: 97 % O2 Device: None (Room air) Oxygen Delivery: 6 LPM  Weight: 135 lbs 0 oz  Physical Exam  Constitutional:       General: She is not in acute distress.     Appearance: She is not toxic-appearing.   Cardiovascular:      Rate and Rhythm: Normal rate.   Abdominal:      General: Abdomen is flat. There is no distension.      Palpations: Abdomen is soft.      Tenderness: There is no abdominal tenderness.   Skin:     General: Skin is warm and dry.   Neurological:      Mental Status: She is alert.   Psychiatric:         Mood and Affect: Mood normal.             Primary Care Physician   Jak Jones    Discharge Orders      Reason for your hospital stay    Dislocation of right hip.     Follow-up and recommended labs and tests     Follow up with primary care provider, Jak Jones, within 7 days for hospital follow- up.  No follow up labs or test are needed.  Follow-up with orthopedic surgery for further evaluation and management     Activity    Your activity upon discharge: Hip precaution as per orthopedic surgery     Crutches DME    DME Documentation: Describe the reason for need to support medical necessity: Impaired gait status post hip surgery. I, the undersigned, certify that the above prescribed supplies are medically necessary for this patient and is both reasonable and necessary in reference to accepted standards of medical practice in the treatment of this patient's condition and is not prescribed as a convenience.     Cane DME    DME Documentation: Describe the  reason for need to support medical necessity: Impaired gait status post hip surgery. I, the undersigned, certify that the above prescribed supplies are medically necessary for this patient and is both reasonable and necessary in reference to accepted standards of medical practice in the treatment of this patient's condition and is not prescribed as a convenience.     [facility]    : DME Documentation: Describe the reason for need to support medical necessity: Impaired gait status post hip surgery. I, the undersigned, certify that the above prescribed supplies are medically necessary for this patient and is both reasonable and necessary in reference to accepted standards of medical practice in the treatment of this patient's condition and is not prescribed as a convenience.     Miscellaneous DME Order    DME Documentation:   Describe the reason for need to support medical necessity: right hip dislocation.     I, the undersigned, certify that the above prescribed supplies are medically necessary for this patient and is both reasonable and necessary in reference to accepted standards of medical and necessary in reference to accepted standards of medical practice in the treatment of this patient's condition and is not prescribed as a convenience.     Diet    Follow this diet upon discharge: Orders Placed This Encounter      Regular diet       Significant Results and Procedures   Most Recent 3 CBC's:  Recent Labs   Lab Test 11/05/23 0622 11/04/23 0533 11/04/23  0027 10/25/23  1447   WBC  --  7.3 6.4 5.7   HGB 11.7 11.3* 12.1 12.1   MCV  --  85 85 88   PLT  --  219 204 240     Most Recent 3 BMP's:  Recent Labs   Lab Test 11/05/23 0622 11/04/23 0533 11/04/23  0117 11/04/23  0027   NA  --  133* 133* 133*   POTASSIUM  --  4.1 4.0 5.5*   CHLORIDE  --  101 101 101   CO2  --  24 22 21*   BUN  --  9.8 8.9 9.0   CR  --  0.52 0.50* 0.50*   ANIONGAP  --  8 10 11   MICHEL  --  8.9 9.4 9.6   * 118* 128* 120*   ,   Results for  orders placed or performed during the hospital encounter of 11/03/23   Hip  XR, right, 2 vw PORTABLE    Narrative    EXAM: XR HIP PORTABLE RIGHT 2-3 VIEWS  LOCATION: [facility]  DATE: 11/3/2023    INDICATION: recurrent prosthetic dislocation  COMPARISON: 09/21/2023.       Impression    IMPRESSION: Dislocated right hip implant. Bones are demineralized.    NOTE: ABNORMAL REPORT    THE DICTATION ABOVE DESCRIBES AN ABNORMALITY FOR WHICH FOLLOW-UP IS NEEDED.    Hip  XR, right, 2 vw PORTABLE    Narrative    EXAM: XR HIP PORTABLE RIGHT 2-3 VIEWS  LOCATION: [facility]  DATE: 11/3/2023    INDICATION: reduction attempt  COMPARISON: Previous pelvic plain film at 2041 hours      Impression    IMPRESSION: Dislocation of the acetabular and femoral components of right total hip arthroplasty remains unchanged in configuration with anterior/superior displacement of the femoral component. No new acute fracture identified. Left hip joint is mildly   narrowed.   XR Pelvis 1/2 Views    Narrative    EXAM: XR PELVIS 1/2 VIEWS  LOCATION: [facility]  DATE: 11/3/2023    INDICATION: Post reduction right hip  COMPARISON: Earlier plain film of the same day      Impression    IMPRESSION: No significant interval change, with superior dislocation of the femoral component of the right total hip arthroplasty. No acute fractures.   CT Hip Right w/o Contrast    Narrative    EXAM: CT HIP RIGHT WITHOUT CONTRAST  LOCATION: [facility]  DATE: 11/04/2023    INDICATION: Hip dislocation, preop planning.  COMPARISON: None.  TECHNIQUE: Noncontrast. Axial, sagittal and coronal thin-section reconstruction. Dose reduction techniques were used.     FINDINGS:     BONES:  -Postoperative changes of right total hip arthroplasty. There is superior dislocation of the right hip which protrudes through the anterior aspect of the iliac wing adjacent to a fracture  identified on series 2, image 21 and series 4, image 39. This   morphology is well seen on series 4, image 40 and series 2, image 25. The acetabular component appears intact, however, without evidence for loosening.    The remainder of the bony pelvis is negative for fracture.    SOFT TISSUES:  -Enlargement of the right iliacus muscle likely represents a component of hemorrhagic blood product. There is a also fluid collection superficial to the greater trochanter which may represent hematoma or chronic bursitis.      Impression    IMPRESSION:  1.  Postoperative changes of right total hip arthroplasty.  2.  Superior dislocation of the right hip. In addition, there is a fracture of the right iliac wing and the right femoral endoprosthesis protrudes through the anterior aspect of the right ilium.  3.  There is asymmetric enlargement of the right iliacus and iliopsoas which most likely represents a component of hemorrhagic blood product but there is no evidence for organized hematoma.  4.  There is a fluid collection lateral to the right greater trochanter which may represent a hematoma or chronic bursitis.  5.  No additional fractures are identified.     XR Surgery CHERRIE  Fluoro G/T 5 Min    Narrative    This exam was marked as non-reportable because it will not be read by a   radiologist or a [facility] non-radiologist provider.         XR Pelvis w Hip Port Right 1 View    Narrative    EXAM: XR PELVIS AND HIP PORTABLE RIGHT 1 VIEW  LOCATION: [facility]  DATE: 11/4/2023    INDICATION: Status post Hip surgery  COMPARISON: Same day CT right hip      Impression    IMPRESSION: Postoperative changes of a closed reduction of a right total hip dislocation. The right total hip is now normally aligned. Redemonstration of a mildly displaced right iliac fracture fragment. The femoral and acetabular components of the right   total hip appear well seated. Mild soft tissue edema about the right hip and  proximal thigh.       Discharge Medications   Current Discharge Medication List        START taking these medications    Details   enoxaparin ANTICOAGULANT (LOVENOX) 40 MG/0.4ML syringe Inject 0.4 mLs (40 mg) Subcutaneous every 24 hours for 14 days  Qty: 5.6 mL, Refills: 0    Associated Diagnoses: Closed dislocation of right hip, initial encounter (H)      senna-docusate (SENOKOT-S/PERICOLACE) 8.6-50 MG tablet Take 1 tablet by mouth 2 times daily as needed for constipation  Qty: 30 tablet, Refills: 0    Associated Diagnoses: Closed dislocation of right hip, initial encounter (H)           CONTINUE these medications which have CHANGED    Details   acetaminophen (TYLENOL) 325 MG tablet Take 2 tablets (650 mg) by mouth every 4 hours as needed for other (mild pain)  Qty: 100 tablet, Refills: 0    Associated Diagnoses: 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, subsequent encounter           CONTINUE these medications which have NOT CHANGED    Details   amLODIPine (NORVASC) 5 MG tablet Take 5 mg by mouth daily      hydrOXYzine (ATARAX) 10 MG tablet Take 1 tablet (10 mg) by mouth every 6 hours as needed for itching or anxiety (with pain, moderate pain)  Qty: 30 tablet, Refills: 1    Associated Diagnoses: 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, subsequent encounter      ibuprofen (ADVIL/MOTRIN) 200 MG tablet Take 200 mg by mouth every 4 hours as needed for pain      lisinopril (ZESTRIL) 10 MG tablet Take 10 mg by mouth daily      LOTEMAX 0.5 % ophthalmic suspension Place 1 drop into both eyes every 48 hours  Refills: 4      metoprolol succinate ER (TOPROL XL) 25 MG 24 hr tablet Take 25 mg by mouth daily      timolol maleate (TIMOPTIC) 0.5 % ophthalmic solution Place 1 drop into both eyes daily           Allergies   Allergies   Allergen Reactions    Adalimumab Muscle Pain (Myalgia)    Brimonidine-Timolol [Brimonidine Tartrate-Timolol] Unknown     Redness itching in eyes     Levaquin [Levofloxacin] Unknown     Skin burn and couldn't get out bed for 5 days    Tetracyclines & Related Hives

## 2023-11-05 NOTE — PROGRESS NOTES
11/05/23 1400   Appointment Info   Signing Clinician's Name / Credentials (PT) Micheline Watson, PT, DPT   Quick Adds   Quick Adds Certification   Living Environment   People in Home alone   Current Living Arrangements house   Home Accessibility other (see comments)  (does have 12-16 stairs in house, but does not have to complete right away)   Self-Care   Usual Activity Tolerance good   Current Activity Tolerance moderate   Equipment Currently Used at Home walker, rolling   Fall history within last six months no   General Information   Onset of Illness/Injury or Date of Surgery 11/03/23   Referring Physician Reuben Olivier MD   Patient/Family Therapy Goals Statement (PT) Return to home   Pertinent History of Current Problem (include personal factors and/or comorbidities that impact the POC) closed discolation of R hip   Existing Precautions/Restrictions brace worn when out of bed;no hip IR;no hip ER;no active hip ABD;no hip ADD past midline;no hip hyperextension;90 degree hip flexion;no pivoting or twisting;weight bearing   Weight-Bearing Status - RLE weight-bearing as tolerated  (with brace on)   Range of Motion (ROM)   Range of Motion ROM deficits secondary to surgical procedure;ROM deficits secondary to weakness   Strength (Manual Muscle Testing)   Strength (Manual Muscle Testing) Deficits observed during functional mobility   Transfers   Transfers sit-stand transfer   Maintains Weight-bearing Status (Transfers) able to maintain   Sit-Stand Transfer   Sit-Stand Dolores (Transfers) supervision;verbal cues  (SBA)   Assistive Device (Sit-Stand Transfers) walker, front-wheeled   Gait/Stairs (Locomotion)   Dolores Level (Gait) contact guard   Assistive Device (Gait) walker, front-wheeled   Distance in Feet (Gait) 5   Pattern (Gait) swing-through   Deviations/Abnormal Patterns (Gait) gait speed decreased   Maintains Weight-bearing Status (Gait) able to maintain   Clinical Impression   Criteria for Skilled  Therapeutic Intervention Yes, treatment indicated   PT Diagnosis (PT) Impaired functional mobility   Influenced by the following impairments weakness, decreased ROM, impaired balance   Functional limitations due to impairments gait, stairs, transfer   Clinical Presentation (PT Evaluation Complexity) stable   Clinical Presentation Rationale pt presents as medically diagnosed   Clinical Decision Making (Complexity) low complexity   Planned Therapy Interventions (PT) gait training;home exercise program;ROM (range of motion);stair training;strengthening;transfer training   Risk & Benefits of therapy have been explained evaluation/treatment results reviewed;patient   PT Total Evaluation Time   PT Eval, Low Complexity Minutes (64002) 10   Therapy Certification   Start of care date 11/05/23   Certification date from 11/05/23   Certification date to 12/05/23   Medical Diagnosis Closed dislocation of R hip   Physical Therapy Goals   PT Frequency One time eval and treatment only   PT Predicted Duration/Target Date for Goal Attainment 11/05/23   PT Goals Transfers;Gait;PT Goal 1   PT: Transfers Supervision/stand-by assist;Sit to/from stand;Assistive device;Within precautions;Goal Met   PT: Gait Supervision/stand-by assist;Rolling walker;150 feet;Within precautions;Goal Met   PT: Goal 1 pt will be mod I with HEP - Goal Met  (pt verbalized knowing how to complete HEP)   Interventions   Interventions Quick Adds Gait Training   Gait Training   Gait Training Minutes (26757) 8   Symptoms Noted During/After Treatment (Gait Training) fatigue   Treatment Detail/Skilled Intervention Cueing for safety/FWW management/precautions STAIRS: ascend/descend 8 stairs, cueing for safety/non reciprocol step pattern/use of railings- Education given around completing functional mobility at home, precautions/what to avoid for mobilty & Exercises to complete for futher strengthening/improving mobility   Distance in Feet 225', 5'   [place] Level  (Gait Training) stand-by assist   Physical Assistance Level (Gait Training) supervision;verbal cues   Weight Bearing (Gait Training) weight-bearing as tolerated   Assistive Device (Gait Training) rolling walker   Pattern Analysis (Gait Training) swing-through gait   Gait Analysis Deviations decreased yuliet;decreased step length   Impairments (Gait Analysis/Training) pain;strength decreased;ROM decreased   Stair Railings present at both sides   Physical Assist/Nonphysical Assist (Stairs) verbal cues;supervision   Level of [place] (Stairs) stand-by assist   Assistive Device (Stairs) other (see comments)  (none)   PT Discharge Planning   PT Plan D/C PT   PT Discharge Recommendation (DC Rec) home with assist   PT Rationale for DC Rec pt understands precautions & able to perfrom functional mobility required for safe return home. Pain is under control. pt reports follow up with MD delgado. Home with assist as needed   PT Brief overview of current status ' FWW; SBA sit to/from stand   PT Equipment Needed at Discharge walker, rolling   Total Session Time   Timed Code Treatment Minutes 8   Total Session Time (sum of timed and untimed services) 18     [facility]  OUTPATIENT PHYSICAL THERAPY EVALUATION  PLAN OF TREATMENT FOR OUTPATIENT REHABILITATION  (COMPLETE FOR INITIAL CLAIMS ONLY)  Patient's Last Name, First Name, M.I.  YOB: 1945  Oscar Zhao                        Provider's Name  [facility] Medical Record No.  [id number]                             Onset Date:  11/03/23   Start of Care Date:  11/05/23   Type:     _X_PT   ___OT   ___SLP Medical Diagnosis:  Closed dislocation of R hip              PT Diagnosis:  Impaired functional mobility Visits from SOC:  1     See note for plan of treatment, functional goals and certification details    I CERTIFY THE NEED FOR THESE SERVICES FURNISHED UNDER        THIS PLAN OF TREATMENT AND  WHILE UNDER MY CARE     (Physician co-signature of this document indicates review and certification of the therapy plan).

## 2023-11-06 ENCOUNTER — PATIENT OUTREACH (OUTPATIENT)
Dept: CARE COORDINATION | Facility: CLINIC | Age: 78
End: 2023-11-06
Payer: MEDICARE

## 2023-11-06 NOTE — PROGRESS NOTES
[facility]    Background: Transitional Care Management program identified per system criteria and reviewed by [facility] [place] team for possible outreach.    Assessment: Upon chart review, [facility] Team member will not proceed with patient outreach related to this episode of Transitional Care Management program due to reason below:    Patient has active communication with a nurse, provider or care team for reason of post-hospital follow up plan.  Outreach call by [facility] team not indicated to minimize duplicative efforts.     Plan: Transitional Care Management episode addressed appropriately per reason noted above.      KIRAN Quevedo  [facility]    *Connected Care Resource Team does NOT follow patient ongoing. Referrals are identified based on internal discharge reports and the outreach is to ensure patient has an understanding of their discharge instructions.

## 2023-11-08 ENCOUNTER — OFFICE VISIT (OUTPATIENT)
Dept: INTERNAL MEDICINE | Facility: CLINIC | Age: 78
End: 2023-11-08
Payer: MEDICARE

## 2023-11-08 VITALS
HEIGHT: 63 IN | RESPIRATION RATE: 16 BRPM | OXYGEN SATURATION: 99 % | BODY MASS INDEX: 23.74 KG/M2 | TEMPERATURE: 97.6 F | DIASTOLIC BLOOD PRESSURE: 80 MMHG | WEIGHT: 134 LBS | HEART RATE: 80 BPM | SYSTOLIC BLOOD PRESSURE: 120 MMHG

## 2023-11-08 DIAGNOSIS — E11.8 TYPE 2 DIABETES MELLITUS WITH COMPLICATION, WITHOUT LONG-TERM CURRENT USE OF INSULIN (H): ICD-10-CM

## 2023-11-08 DIAGNOSIS — M54.2 CERVICALGIA: ICD-10-CM

## 2023-11-08 DIAGNOSIS — H40.63X0 STEROID INDUCED GLAUCOMA, BOTH EYES: ICD-10-CM

## 2023-11-08 DIAGNOSIS — T38.0X5A STEROID INDUCED GLAUCOMA, BOTH EYES: ICD-10-CM

## 2023-11-08 DIAGNOSIS — T84.020D DISLOCATION OF INTERNAL RIGHT HIP PROSTHESIS, SUBSEQUENT ENCOUNTER: ICD-10-CM

## 2023-11-08 DIAGNOSIS — I10 ESSENTIAL HYPERTENSION: ICD-10-CM

## 2023-11-08 DIAGNOSIS — Z01.818 PREOPERATIVE EXAMINATION: ICD-10-CM

## 2023-11-08 PROCEDURE — 99214 OFFICE O/P EST MOD 30 MIN: CPT | Performed by: NURSE PRACTITIONER
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
## 2023-11-08 NOTE — PATIENT INSTRUCTIONS
No Aleve, Advil, or Ibuprofen prior to surgery.    Take your medications the AM of surgery with sips of water.    Tylenol is okay for pain control as needed.    For the neck, gentle stretching as attached.    Heat 15 minutes four times per day. Icy hot, biofreeze, topically as needed. Massage. Try to keep your head back, not forward.
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
## 2023-11-12 ENCOUNTER — ANESTHESIA EVENT (OUTPATIENT)
Dept: SURGERY | Facility: CLINIC | Age: 78
DRG: 467 | End: 2023-11-12
Payer: MEDICARE

## 2023-11-13 ENCOUNTER — APPOINTMENT (OUTPATIENT)
Dept: RADIOLOGY | Facility: CLINIC | Age: 78
DRG: 467 | End: 2023-11-13
Attending: PHYSICIAN ASSISTANT
Payer: MEDICARE

## 2023-11-13 ENCOUNTER — HOSPITAL ENCOUNTER (INPATIENT)
Facility: CLINIC | Age: 78
LOS: 1 days | Discharge: HOME | DRG: 467 | End: 2023-11-14
Attending: ORTHOPAEDIC SURGERY | Admitting: ORTHOPAEDIC SURGERY
Payer: MEDICARE

## 2023-11-13 ENCOUNTER — ANESTHESIA (OUTPATIENT)
Dept: SURGERY | Facility: CLINIC | Age: 78
DRG: 467 | End: 2023-11-13
Payer: MEDICARE

## 2023-11-13 DIAGNOSIS — T84.010D FAILURE OF RIGHT TOTAL HIP ARTHROPLASTY, SUBSEQUENT ENCOUNTER: Primary | ICD-10-CM

## 2023-11-13 LAB
ABO/RH(D): NORMAL
ANTIBODY SCREEN: NEGATIVE
APPEARANCE FLD: ABNORMAL
BASOPHILS NFR FLD MANUAL: 1 %
CELL COUNT BODY FLUID SOURCE: ABNORMAL
COLOR FLD: ABNORMAL
GLUCOSE BLDC GLUCOMTR-MCNC: 106 MG/DL (ref 70–99)
GRAM STAIN RESULT: NORMAL
GRAM STAIN RESULT: NORMAL
HGB BLD-MCNC: 12.2 G/DL (ref 11.7–15.7)
LYMPHOCYTES NFR FLD MANUAL: 48 %
MONOS+MACROS NFR FLD MANUAL: 10 %
NEUTS BAND NFR FLD MANUAL: 41 %
POTASSIUM SERPL-SCNC: 4.3 MMOL/L (ref 3.4–5.3)
SODIUM SERPL-SCNC: 135 MMOL/L (ref 135–145)
SPECIMEN EXPIRATION DATE: NORMAL
WBC # FLD AUTO: 856 /UL

## 2023-11-13 PROCEDURE — 87070 CULTURE OTHR SPECIMN AEROBIC: CPT | Performed by: ORTHOPAEDIC SURGERY

## 2023-11-13 PROCEDURE — 87205 SMEAR GRAM STAIN: CPT | Performed by: ORTHOPAEDIC SURGERY

## 2023-11-13 PROCEDURE — 258N000003 HC RX IP 258 OP 636: Performed by: ANESTHESIOLOGY

## 2023-11-13 PROCEDURE — 0SPR0JZ REMOVAL OF SYNTHETIC SUBSTITUTE FROM RIGHT HIP JOINT, FEMORAL SURFACE, OPEN APPROACH: ICD-10-PCS | Performed by: ORTHOPAEDIC SURGERY

## 2023-11-13 PROCEDURE — 250N000025 HC SEVOFLURANE, PER MIN: Performed by: ORTHOPAEDIC SURGERY

## 2023-11-13 PROCEDURE — 86901 BLOOD TYPING SEROLOGIC RH(D): CPT | Performed by: PHYSICIAN ASSISTANT

## 2023-11-13 PROCEDURE — 250N000011 HC RX IP 250 OP 636: Performed by: PHYSICIAN ASSISTANT

## 2023-11-13 PROCEDURE — 360N000078 HC SURGERY LEVEL 5, PER MIN: Performed by: ORTHOPAEDIC SURGERY

## 2023-11-13 PROCEDURE — 258N000003 HC RX IP 258 OP 636: Performed by: NURSE ANESTHETIST, CERTIFIED REGISTERED

## 2023-11-13 PROCEDURE — 250N000009 HC RX 250: Performed by: PHYSICIAN ASSISTANT

## 2023-11-13 PROCEDURE — 250N000013 HC RX MED GY IP 250 OP 250 PS 637: Performed by: PHYSICIAN ASSISTANT

## 2023-11-13 PROCEDURE — C1776 JOINT DEVICE (IMPLANTABLE): HCPCS | Performed by: ORTHOPAEDIC SURGERY

## 2023-11-13 PROCEDURE — 250N000011 HC RX IP 250 OP 636: Performed by: ORTHOPAEDIC SURGERY

## 2023-11-13 PROCEDURE — 710N000010 HC RECOVERY PHASE 1, LEVEL 2, PER MIN: Performed by: ORTHOPAEDIC SURGERY

## 2023-11-13 PROCEDURE — 250N000011 HC RX IP 250 OP 636: Mod: JZ | Performed by: ANESTHESIOLOGY

## 2023-11-13 PROCEDURE — 258N000003 HC RX IP 258 OP 636: Performed by: PHYSICIAN ASSISTANT

## 2023-11-13 PROCEDURE — 84295 ASSAY OF SERUM SODIUM: CPT | Performed by: ORTHOPAEDIC SURGERY

## 2023-11-13 PROCEDURE — 84132 ASSAY OF SERUM POTASSIUM: CPT | Performed by: ORTHOPAEDIC SURGERY

## 2023-11-13 PROCEDURE — 999N000141 HC STATISTIC PRE-PROCEDURE NURSING ASSESSMENT: Performed by: ORTHOPAEDIC SURGERY

## 2023-11-13 PROCEDURE — 87176 TISSUE HOMOGENIZATION CULTR: CPT | Performed by: ORTHOPAEDIC SURGERY

## 2023-11-13 PROCEDURE — 250N000009 HC RX 250: Performed by: NURSE ANESTHETIST, CERTIFIED REGISTERED

## 2023-11-13 PROCEDURE — 85018 HEMOGLOBIN: CPT | Performed by: PHYSICIAN ASSISTANT

## 2023-11-13 PROCEDURE — 87075 CULTR BACTERIA EXCEPT BLOOD: CPT | Performed by: ORTHOPAEDIC SURGERY

## 2023-11-13 PROCEDURE — 99222 1ST HOSP IP/OBS MODERATE 55: CPT | Performed by: STUDENT IN AN ORGANIZED HEALTH CARE EDUCATION/TRAINING PROGRAM

## 2023-11-13 PROCEDURE — 370N000017 HC ANESTHESIA TECHNICAL FEE, PER MIN: Performed by: ORTHOPAEDIC SURGERY

## 2023-11-13 PROCEDURE — 120N000001 HC R&B MED SURG/OB

## 2023-11-13 PROCEDURE — 250N000011 HC RX IP 250 OP 636: Mod: JZ | Performed by: NURSE ANESTHETIST, CERTIFIED REGISTERED

## 2023-11-13 PROCEDURE — 250N000009 HC RX 250: Performed by: ORTHOPAEDIC SURGERY

## 2023-11-13 PROCEDURE — 89050 BODY FLUID CELL COUNT: CPT | Performed by: ORTHOPAEDIC SURGERY

## 2023-11-13 PROCEDURE — 999N000065 XR PELVIS AND HIP PORTABLE RIGHT 1 VIEW

## 2023-11-13 PROCEDURE — 272N000001 HC OR GENERAL SUPPLY STERILE: Performed by: ORTHOPAEDIC SURGERY

## 2023-11-13 PROCEDURE — 36415 COLL VENOUS BLD VENIPUNCTURE: CPT | Performed by: PHYSICIAN ASSISTANT

## 2023-11-13 PROCEDURE — 86850 RBC ANTIBODY SCREEN: CPT | Performed by: PHYSICIAN ASSISTANT

## 2023-11-13 PROCEDURE — 0SRR03A REPLACEMENT OF RIGHT HIP JOINT, FEMORAL SURFACE WITH CERAMIC SYNTHETIC SUBSTITUTE, UNCEMENTED, OPEN APPROACH: ICD-10-PCS | Performed by: ORTHOPAEDIC SURGERY

## 2023-11-13 DEVICE — PINNACLE HIP SOLUTIONS GVF POLYETHYLENE CONSTRAINED ACETABULAR LINER +4 NEUTRAL 28MM ID 48MM OD
Type: IMPLANTABLE DEVICE | Site: HIP | Status: FUNCTIONAL
Brand: PINNACLE

## 2023-11-13 DEVICE — BIOLOX DELTA TS CERAMIC FEMORAL HEAD 12/14 TAPER REVISION DIAMETER 28MM +12
Type: IMPLANTABLE DEVICE | Site: HIP | Status: FUNCTIONAL
Brand: BIOLOX DELTA

## 2023-11-13 RX ORDER — ONDANSETRON 4 MG/1
4 TABLET, ORALLY DISINTEGRATING ORAL EVERY 6 HOURS PRN
Status: DISCONTINUED | OUTPATIENT
Start: 2023-11-13 | End: 2023-11-14 | Stop reason: HOSPADM

## 2023-11-13 RX ORDER — TRANEXAMIC ACID 650 MG/1
1950 TABLET ORAL ONCE
Status: COMPLETED | OUTPATIENT
Start: 2023-11-13 | End: 2023-11-13

## 2023-11-13 RX ORDER — ONDANSETRON 2 MG/ML
4 INJECTION INTRAMUSCULAR; INTRAVENOUS EVERY 6 HOURS PRN
Status: DISCONTINUED | OUTPATIENT
Start: 2023-11-13 | End: 2023-11-14 | Stop reason: HOSPADM

## 2023-11-13 RX ORDER — LOTEPREDNOL ETABONATE 5 MG/ML
1 SUSPENSION/ DROPS OPHTHALMIC
Status: DISCONTINUED | OUTPATIENT
Start: 2023-11-14 | End: 2023-11-14 | Stop reason: HOSPADM

## 2023-11-13 RX ORDER — ACETAMINOPHEN 325 MG/1
650 TABLET ORAL EVERY 4 HOURS PRN
Qty: 100 TABLET | Refills: 0 | Status: SHIPPED | OUTPATIENT
Start: 2023-11-13 | End: 2024-01-30

## 2023-11-13 RX ORDER — HYDROMORPHONE HYDROCHLORIDE 1 MG/ML
0.3 INJECTION, SOLUTION INTRAMUSCULAR; INTRAVENOUS; SUBCUTANEOUS
Status: DISCONTINUED | OUTPATIENT
Start: 2023-11-13 | End: 2023-11-14 | Stop reason: HOSPADM

## 2023-11-13 RX ORDER — TIMOLOL MALEATE 5 MG/ML
1 SOLUTION/ DROPS OPHTHALMIC DAILY
Status: DISCONTINUED | OUTPATIENT
Start: 2023-11-14 | End: 2023-11-14 | Stop reason: HOSPADM

## 2023-11-13 RX ORDER — PROPOFOL 10 MG/ML
INJECTION, EMULSION INTRAVENOUS CONTINUOUS PRN
Status: DISCONTINUED | OUTPATIENT
Start: 2023-11-13 | End: 2023-11-13

## 2023-11-13 RX ORDER — HYDROXYZINE HYDROCHLORIDE 10 MG/1
10 TABLET, FILM COATED ORAL EVERY 6 HOURS PRN
Status: DISCONTINUED | OUTPATIENT
Start: 2023-11-13 | End: 2023-11-14 | Stop reason: HOSPADM

## 2023-11-13 RX ORDER — NALOXONE HYDROCHLORIDE 0.4 MG/ML
0.4 INJECTION, SOLUTION INTRAMUSCULAR; INTRAVENOUS; SUBCUTANEOUS
Status: DISCONTINUED | OUTPATIENT
Start: 2023-11-13 | End: 2023-11-14 | Stop reason: HOSPADM

## 2023-11-13 RX ORDER — POLYETHYLENE GLYCOL 3350 17 G/17G
17 POWDER, FOR SOLUTION ORAL DAILY
Status: DISCONTINUED | OUTPATIENT
Start: 2023-11-14 | End: 2023-11-14 | Stop reason: HOSPADM

## 2023-11-13 RX ORDER — SODIUM CHLORIDE, SODIUM LACTATE, POTASSIUM CHLORIDE, CALCIUM CHLORIDE 600; 310; 30; 20 MG/100ML; MG/100ML; MG/100ML; MG/100ML
INJECTION, SOLUTION INTRAVENOUS CONTINUOUS
Status: DISCONTINUED | OUTPATIENT
Start: 2023-11-13 | End: 2023-11-13 | Stop reason: HOSPADM

## 2023-11-13 RX ORDER — HYDROMORPHONE HCL IN WATER/PF 6 MG/30 ML
0.1 PATIENT CONTROLLED ANALGESIA SYRINGE INTRAVENOUS
Status: DISCONTINUED | OUTPATIENT
Start: 2023-11-13 | End: 2023-11-14 | Stop reason: HOSPADM

## 2023-11-13 RX ORDER — AMOXICILLIN 250 MG
1 CAPSULE ORAL 2 TIMES DAILY
Status: DISCONTINUED | OUTPATIENT
Start: 2023-11-13 | End: 2023-11-14 | Stop reason: HOSPADM

## 2023-11-13 RX ORDER — AMOXICILLIN 250 MG
1-2 CAPSULE ORAL 2 TIMES DAILY
Qty: 60 TABLET | Refills: 0 | Status: SHIPPED | OUTPATIENT
Start: 2023-11-13 | End: 2023-12-13

## 2023-11-13 RX ORDER — NALOXONE HYDROCHLORIDE 0.4 MG/ML
0.2 INJECTION, SOLUTION INTRAMUSCULAR; INTRAVENOUS; SUBCUTANEOUS
Status: DISCONTINUED | OUTPATIENT
Start: 2023-11-13 | End: 2023-11-14 | Stop reason: HOSPADM

## 2023-11-13 RX ORDER — ACETAMINOPHEN 325 MG/1
650 TABLET ORAL EVERY 4 HOURS PRN
Status: DISCONTINUED | OUTPATIENT
Start: 2023-11-16 | End: 2023-11-14 | Stop reason: HOSPADM

## 2023-11-13 RX ORDER — ONDANSETRON 4 MG/1
4 TABLET, ORALLY DISINTEGRATING ORAL EVERY 8 HOURS PRN
Qty: 10 TABLET | Refills: 0 | Status: SHIPPED | OUTPATIENT
Start: 2023-11-13 | End: 2023-12-13

## 2023-11-13 RX ORDER — CEFAZOLIN SODIUM 1 G/3ML
1 INJECTION, POWDER, FOR SOLUTION INTRAMUSCULAR; INTRAVENOUS EVERY 8 HOURS
Qty: 10 ML | Refills: 0 | Status: COMPLETED | OUTPATIENT
Start: 2023-11-13 | End: 2023-11-14

## 2023-11-13 RX ORDER — LIDOCAINE HYDROCHLORIDE 10 MG/ML
INJECTION, SOLUTION INFILTRATION; PERINEURAL PRN
Status: DISCONTINUED | OUTPATIENT
Start: 2023-11-13 | End: 2023-11-13

## 2023-11-13 RX ORDER — VANCOMYCIN HYDROCHLORIDE 1 G/20ML
INJECTION, POWDER, LYOPHILIZED, FOR SOLUTION INTRAVENOUS PRN
Status: DISCONTINUED | OUTPATIENT
Start: 2023-11-13 | End: 2023-11-13 | Stop reason: HOSPADM

## 2023-11-13 RX ORDER — ONDANSETRON 4 MG/1
4 TABLET, ORALLY DISINTEGRATING ORAL EVERY 30 MIN PRN
Status: DISCONTINUED | OUTPATIENT
Start: 2023-11-13 | End: 2023-11-13 | Stop reason: HOSPADM

## 2023-11-13 RX ORDER — ACETAMINOPHEN 325 MG/1
975 TABLET ORAL EVERY 8 HOURS
Qty: 27 TABLET | Refills: 0 | Status: DISCONTINUED | OUTPATIENT
Start: 2023-11-13 | End: 2023-11-14 | Stop reason: HOSPADM

## 2023-11-13 RX ORDER — BISACODYL 10 MG
10 SUPPOSITORY, RECTAL RECTAL DAILY PRN
Status: DISCONTINUED | OUTPATIENT
Start: 2023-11-13 | End: 2023-11-14 | Stop reason: HOSPADM

## 2023-11-13 RX ORDER — PROPOFOL 10 MG/ML
INJECTION, EMULSION INTRAVENOUS PRN
Status: DISCONTINUED | OUTPATIENT
Start: 2023-11-13 | End: 2023-11-13

## 2023-11-13 RX ORDER — PROCHLORPERAZINE MALEATE 5 MG
5 TABLET ORAL EVERY 6 HOURS PRN
Status: DISCONTINUED | OUTPATIENT
Start: 2023-11-13 | End: 2023-11-14 | Stop reason: HOSPADM

## 2023-11-13 RX ORDER — HYDROMORPHONE HCL IN WATER/PF 6 MG/30 ML
0.2 PATIENT CONTROLLED ANALGESIA SYRINGE INTRAVENOUS EVERY 5 MIN PRN
Status: DISCONTINUED | OUTPATIENT
Start: 2023-11-13 | End: 2023-11-13 | Stop reason: HOSPADM

## 2023-11-13 RX ORDER — ONDANSETRON 2 MG/ML
INJECTION INTRAMUSCULAR; INTRAVENOUS PRN
Status: DISCONTINUED | OUTPATIENT
Start: 2023-11-13 | End: 2023-11-13

## 2023-11-13 RX ORDER — METOPROLOL SUCCINATE 25 MG/1
25 TABLET, EXTENDED RELEASE ORAL DAILY
Status: DISCONTINUED | OUTPATIENT
Start: 2023-11-14 | End: 2023-11-14 | Stop reason: HOSPADM

## 2023-11-13 RX ORDER — OXYCODONE HYDROCHLORIDE 5 MG/1
2.5-5 TABLET ORAL EVERY 4 HOURS PRN
Qty: 26 TABLET | Refills: 0 | Status: SHIPPED | OUTPATIENT
Start: 2023-11-13 | End: 2023-12-13

## 2023-11-13 RX ORDER — HYDROMORPHONE HCL IN WATER/PF 6 MG/30 ML
0.4 PATIENT CONTROLLED ANALGESIA SYRINGE INTRAVENOUS EVERY 5 MIN PRN
Status: DISCONTINUED | OUTPATIENT
Start: 2023-11-13 | End: 2023-11-13 | Stop reason: HOSPADM

## 2023-11-13 RX ORDER — AMLODIPINE BESYLATE 5 MG/1
5 TABLET ORAL DAILY
Status: DISCONTINUED | OUTPATIENT
Start: 2023-11-14 | End: 2023-11-14 | Stop reason: HOSPADM

## 2023-11-13 RX ORDER — HYDROXYZINE HYDROCHLORIDE 10 MG/1
10 TABLET, FILM COATED ORAL EVERY 6 HOURS PRN
Qty: 30 TABLET | Refills: 0 | Status: SHIPPED | OUTPATIENT
Start: 2023-11-13 | End: 2024-02-08

## 2023-11-13 RX ORDER — GLYCOPYRROLATE 0.2 MG/ML
INJECTION, SOLUTION INTRAMUSCULAR; INTRAVENOUS PRN
Status: DISCONTINUED | OUTPATIENT
Start: 2023-11-13 | End: 2023-11-13

## 2023-11-13 RX ORDER — SODIUM CHLORIDE, SODIUM LACTATE, POTASSIUM CHLORIDE, CALCIUM CHLORIDE 600; 310; 30; 20 MG/100ML; MG/100ML; MG/100ML; MG/100ML
INJECTION, SOLUTION INTRAVENOUS CONTINUOUS
Status: DISCONTINUED | OUTPATIENT
Start: 2023-11-13 | End: 2023-11-14 | Stop reason: HOSPADM

## 2023-11-13 RX ORDER — DEXAMETHASONE SODIUM PHOSPHATE 10 MG/ML
INJECTION, SOLUTION INTRAMUSCULAR; INTRAVENOUS PRN
Status: DISCONTINUED | OUTPATIENT
Start: 2023-11-13 | End: 2023-11-13

## 2023-11-13 RX ORDER — CEFAZOLIN SODIUM/WATER 2 G/20 ML
2 SYRINGE (ML) INTRAVENOUS SEE ADMIN INSTRUCTIONS
Status: DISCONTINUED | OUTPATIENT
Start: 2023-11-13 | End: 2023-11-13 | Stop reason: HOSPADM

## 2023-11-13 RX ORDER — FENTANYL CITRATE 50 UG/ML
50 INJECTION, SOLUTION INTRAMUSCULAR; INTRAVENOUS EVERY 5 MIN PRN
Status: DISCONTINUED | OUTPATIENT
Start: 2023-11-13 | End: 2023-11-13 | Stop reason: HOSPADM

## 2023-11-13 RX ORDER — CEFAZOLIN SODIUM/WATER 2 G/20 ML
2 SYRINGE (ML) INTRAVENOUS
Status: COMPLETED | OUTPATIENT
Start: 2023-11-13 | End: 2023-11-13

## 2023-11-13 RX ORDER — FENTANYL CITRATE 50 UG/ML
INJECTION, SOLUTION INTRAMUSCULAR; INTRAVENOUS PRN
Status: DISCONTINUED | OUTPATIENT
Start: 2023-11-13 | End: 2023-11-13

## 2023-11-13 RX ORDER — LISINOPRIL 10 MG/1
10 TABLET ORAL DAILY
Status: DISCONTINUED | OUTPATIENT
Start: 2023-11-14 | End: 2023-11-14 | Stop reason: HOSPADM

## 2023-11-13 RX ORDER — OXYCODONE HYDROCHLORIDE 5 MG/1
5 TABLET ORAL EVERY 4 HOURS PRN
Status: DISCONTINUED | OUTPATIENT
Start: 2023-11-13 | End: 2023-11-14 | Stop reason: HOSPADM

## 2023-11-13 RX ORDER — BUPIVACAINE HYDROCHLORIDE 7.5 MG/ML
INJECTION, SOLUTION INTRASPINAL
Status: COMPLETED | OUTPATIENT
Start: 2023-11-13 | End: 2023-11-13

## 2023-11-13 RX ORDER — FENTANYL CITRATE 50 UG/ML
25 INJECTION, SOLUTION INTRAMUSCULAR; INTRAVENOUS EVERY 5 MIN PRN
Status: DISCONTINUED | OUTPATIENT
Start: 2023-11-13 | End: 2023-11-13 | Stop reason: HOSPADM

## 2023-11-13 RX ORDER — LIDOCAINE 40 MG/G
CREAM TOPICAL
Status: DISCONTINUED | OUTPATIENT
Start: 2023-11-13 | End: 2023-11-14 | Stop reason: HOSPADM

## 2023-11-13 RX ORDER — ONDANSETRON 2 MG/ML
4 INJECTION INTRAMUSCULAR; INTRAVENOUS EVERY 30 MIN PRN
Status: DISCONTINUED | OUTPATIENT
Start: 2023-11-13 | End: 2023-11-13 | Stop reason: HOSPADM

## 2023-11-13 RX ORDER — LIDOCAINE 40 MG/G
CREAM TOPICAL
Status: DISCONTINUED | OUTPATIENT
Start: 2023-11-13 | End: 2023-11-13 | Stop reason: HOSPADM

## 2023-11-13 RX ADMIN — FENTANYL CITRATE 50 MCG: 50 INJECTION INTRAMUSCULAR; INTRAVENOUS at 15:04

## 2023-11-13 RX ADMIN — MIDAZOLAM 1 MG: 1 INJECTION INTRAMUSCULAR; INTRAVENOUS at 15:05

## 2023-11-13 RX ADMIN — LIDOCAINE HYDROCHLORIDE 2 ML: 10 INJECTION, SOLUTION INFILTRATION; PERINEURAL at 15:11

## 2023-11-13 RX ADMIN — OXYCODONE HYDROCHLORIDE 5 MG: 5 TABLET ORAL at 17:47

## 2023-11-13 RX ADMIN — PHENYLEPHRINE HYDROCHLORIDE 0.3 MCG/KG/MIN: 10 INJECTION INTRAVENOUS at 15:50

## 2023-11-13 RX ADMIN — Medication 2 G: at 14:58

## 2023-11-13 RX ADMIN — PROPOFOL 40 MG: 10 INJECTION, EMULSION INTRAVENOUS at 15:11

## 2023-11-13 RX ADMIN — ACETAMINOPHEN 975 MG: 325 TABLET ORAL at 21:36

## 2023-11-13 RX ADMIN — PROPOFOL 100 MCG/KG/MIN: 10 INJECTION, EMULSION INTRAVENOUS at 15:11

## 2023-11-13 RX ADMIN — ONDANSETRON 4 MG: 2 INJECTION INTRAMUSCULAR; INTRAVENOUS at 15:34

## 2023-11-13 RX ADMIN — GLYCOPYRROLATE 0.2 MG: 0.2 INJECTION INTRAMUSCULAR; INTRAVENOUS at 15:29

## 2023-11-13 RX ADMIN — DEXAMETHASONE SODIUM PHOSPHATE 10 MG: 10 INJECTION, SOLUTION INTRAMUSCULAR; INTRAVENOUS at 15:34

## 2023-11-13 RX ADMIN — SODIUM CHLORIDE, POTASSIUM CHLORIDE, SODIUM LACTATE AND CALCIUM CHLORIDE: 600; 310; 30; 20 INJECTION, SOLUTION INTRAVENOUS at 20:33

## 2023-11-13 RX ADMIN — BUPIVACAINE HYDROCHLORIDE IN DEXTROSE 1.6 ML: 7.5 INJECTION, SOLUTION SUBARACHNOID at 15:00

## 2023-11-13 RX ADMIN — CEFAZOLIN 1 G: 1 INJECTION, POWDER, FOR SOLUTION INTRAMUSCULAR; INTRAVENOUS at 22:19

## 2023-11-13 RX ADMIN — TRANEXAMIC ACID 1950 MG: 650 TABLET ORAL at 12:10

## 2023-11-13 RX ADMIN — SODIUM CHLORIDE, POTASSIUM CHLORIDE, SODIUM LACTATE AND CALCIUM CHLORIDE: 600; 310; 30; 20 INJECTION, SOLUTION INTRAVENOUS at 13:05

## 2023-11-13 RX ADMIN — PHENYLEPHRINE HYDROCHLORIDE 200 MCG: 10 INJECTION INTRAVENOUS at 15:31

## 2023-11-13 RX ADMIN — PHENYLEPHRINE HYDROCHLORIDE 100 MCG: 10 INJECTION INTRAVENOUS at 15:50

## 2023-11-13 RX ADMIN — SENNOSIDES AND DOCUSATE SODIUM 1 TABLET: 8.6; 5 TABLET ORAL at 21:36

## 2023-11-13 RX ADMIN — GLYCOPYRROLATE 0.2 MG: 0.2 INJECTION INTRAMUSCULAR; INTRAVENOUS at 15:31

## 2023-11-13 ASSESSMENT — ACTIVITIES OF DAILY LIVING (ADL)
ADLS_ACUITY_SCORE: 35
ADLS_ACUITY_SCORE: 35
ADLS_ACUITY_SCORE: 37
ADLS_ACUITY_SCORE: 35
ADLS_ACUITY_SCORE: 37
ADLS_ACUITY_SCORE: 35

## 2023-11-13 ASSESSMENT — VISUAL ACUITY: OU: 1

## 2023-11-13 NOTE — OP NOTE
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  DePuy [place] constrained polyethylene liner, +4 neutral, 48/28 mm  2.  Biolox delta TS revision ceramic femoral head, 28/+12 mm    SURGEON  Bill Bernal,      ASSISTANT   Ezio Frederick PA-C . PA-C was needed for positioning, draping, retraction/protection of vital structures, assistance with instrumentation and correct implant placement, deep periarticular injection, closure including deep capsular layer and maintaining patient safety throughout the procedure.  Several of these duties can only be performed by a VIVIAN and not a lesser trained surgical assistant.    ANESTHESIA  Spinal      FINDINGS:  Acetabular component well fixed, appropriate position  Femoral component well fixed, appropriate position.  Gross instability, mainly anterior with anterior capsular void.    SPECIMENS:  Cell count with differential pending  Tissue culture x1    ESTIMATED BLOOD LOSS:  Less than 50 ml    COMPLICATIONS   None.      INDICATION FOR PROCEDURE  Oscar Zhaois a 78 year old female who underwent a total hip arthroplasty revision for posterior instability by me on 9/21/2023.  She did well for 6 weeks.  Last week she pivoted in the kitchen and sustained an anterior dislocation.  This lodged into the anterior pelvis and was reduced in the OR under [place] table traction.  She was placed in an abduction brace.  I discussed with her continuing conservative care with abduction bracing and hope for eventual scar tissue versus revision to constrained liner due to now global instability.  She has had multiple dislocations and is quite frustrated.   She does not want to continue living with the fear of a recurrent dislocation.  With this, I did feel it was reasonable to move forward with revision of the components to a constrained liner system. Risks and benefits were discussed, which included recurrent dislocation and failure of implants due to increased stress load.  Expected postoperative course was discussed as well.  Following this the patient elected to proceed.  Medical clearance was obtained prior to the procedure.    INFORMED CONSENT  Oscar Zhao was identified in the preoperative holding area and was identified using medical record number, name, and date of birth, all of which were confirmed. The operative hip was marked using an indelible marker and informed consent was signed. Once again, all risks and benefits as well as alternatives to surgical intervention were discussed with the patient in detail and all the questions were answered. Risks discussed included but were not limited to: persistent pain, infection, bleeding, scarring, stiffness, thromboembolic events, fracture, malalignment/malrotation, leg length discrepancy, dislocation, implant complications, severe limb dysfunction, loss of limb, and loss of life. The patient signed informed consent and wished to proceed with surgery as scheduled.     TECHNIQUE  The patient was taken back to the operating room and placed on the operating table.  Spinal anesthesia was performed without difficulty.  The patient was then placed in the lateral decubitus position with the operative hip elevated, held in place by a hip positioner.  All bony prominences were well-padded and an axillary roll was used.  The operative hip was then sterilely prepped and draped in usual fashion.  A timeout was performed prior to the procedure, this verified the patient's name, correct site and side of surgery, and the procedure being performed.  Implants needed were available.  It was confirmed that the patient received  antibiotics and TXA prior to the start of the procedure.     I then made a posterolateral incision centered over the greater trochanter, through the patient's previous incision.  Hemostasis was obtained and dissection was performed with electrocautery down to the fascial layer.  It was obvious that there was a large fluid collection under the fascial layer and with knowing that the capsular tissue was not fully repaired at the previous surgery I aspirated this area and obtained clear serosanguineous fluid.  This was sent for stat cell count with differential.  The fascial layer was split with electrocautery as well and the gluteal musculature was gently split in line with its fibers. Charnley retractor was then placed.  A portion of the posterior capsule repair did remain intact.  This was split in previous sutures were removed.  At this point her obvious anterior instability was noted as the hip was dislocated anterior during positioning and draping.  I was able to use a bone hook and traction to reduce the hip without disarticulating the femoral head.    Cultures were taken from the deep hip capsule. The hip was then dislocated and brought into the field.  The head of the total of arthroplasty was removed easily.  All scar tissue and fibrous material around the proximal femoral implant was removed.  Femoral component was evaluated and found to be well fixed.  Again, anteversion was very minimal, neutral to slight retroversion.    Retractors were then placed to expose the acetabulum.  Mobility of the femoral component was quite easy as she was still quite hypermobile.  No significant scar tissue excision was required to fully retract the femoral component anterior.  All overlying scar tissue was removed from the acetabular component so that a full 360  view was obtained. The liner was removed with a curved osteotome.  Acetabular component was evaluated and found to be well fixed.  Significant irrigation at this  point along with Betadine soak.  At this point, a neutral, +4 offset constrained liner was opened, which matched the patient's previous liner.  This was impacted  into a clean dry acetabular component.    Attention was then returned to the femur and a final +12 mm revision TS ceramic head was placed on a clean dry [place].  I selected this head in order to maintain as close of a construct as her previous hip which was a +13 head.  I chose TS ceramic as this +12 did not have a skirt which would improve range of motion prior to impingement. The cup was irrigated, and then the hip was easily reduced. I then completed the Betadine lavage as well as copious normal saline irrigation.    Capsular closure was performed with #5 Ethibond suture.  The repair was quite good superiorly as I was able to repair the posterior capsule to some semblance of anterior capsule and also was able to repair the remainder of the posterior capsule/bursal tissue to the posterior abductors creating a decent sling.  I then placed a gram of vancomycin powder deep in the hip joint.      The remaining tissue was closed in layers.  The fascia was closed with #1 stratafix, #0 strata fix, 2-0 Stratafix and staples were used for skin.  A sterile aquacel dressing was placed over the incision.  Pillows were placed between the legs and the patient was transferred to the supine position in the postoperative bed. They were transferred to PACU in stable condition.    COMMENTS:  There were no complications during the case.  The patient can weight-bear as tolerated with strict hip precautions.  No need to continue abduction brace at this point.  Will monitor cell count and culture.  Twice a day aspirin will be used for DVT prophylaxis.    Implant Name Type Inv. Item Serial No.  Lot No. LRB No. Used Action   LINER PINNACLE CO NEUTRAL [id number] - [id number] Total Joint Component/Insert LINER PINNACLE CO NEUTRAL [id number]  [employer] [facility]- [id number] Right  1 Implanted   HEAD CERAMIC DELTA 12/14 28MM +12 - [id number] Total Joint Component/Insert HEAD CERAMIC DELTA 12/14 28MM +12  [employer] [facility]- [id number] Right 1 Implanted       Bill Bernal DO

## 2023-11-13 NOTE — ANESTHESIA PROCEDURE NOTES
"Intrathecal injection Procedure Note    Pre-Procedure   Staff -        Anesthesiologist:  Josue Odell MD       Performed By: anesthesiologist       Location: OB       Procedure Start/Stop Times: 11/13/2023 3:00 PM and 11/13/2023 3:03 PM       Pre-Anesthestic Checklist: patient identified, IV checked, risks and benefits discussed, informed consent, monitors and equipment checked, pre-op evaluation, at physician/surgeon's request and post-op pain management  Timeout:       Correct Patient: Yes        Correct Procedure: Yes        Correct Site: Yes        Correct Position: Yes   Procedure Documentation  Procedure: intrathecal injection       Patient Position: sitting       Patient Prep/Sterile Barriers: sterile gloves, mask, patient draped       Skin prep: Chloraprep       Insertion Site: L3-4. (midline approach).       Needle Gauge: 24.        Needle Length (Inches): 3.5        Spinal Needle Type: Sprotte       Introducer used       # of attempts: 1 and  # of redirects:  0    Assessment/Narrative         Paresthesias: No.       Sensory Level: T10       CSF fluid: clear.       Opening pressure was cmH2O while  Sitting.      Medication(s) Administered   0.75% Hyperbaric Bupivacaine (Intrathecal) - Intrathecal   1.6 mL - 11/13/2023 3:00:00 PM  Medication Administration Time: 11/13/2023 3:00 PM      FOR [facility] ONLY:   Pain Team Contact information: please page the Pain Team Via StyleShare. Search \"Pain\". During daytime hours, please page the attending first. At night please page the resident first.      "

## 2023-11-13 NOTE — ANESTHESIA POSTPROCEDURE EVALUATION
Patient: Oscar Zhao    Procedure: Procedure(s):  REVISION TOTAL HIP ARTHROPLASTY HEAD LINER EXCHANGE       Anesthesia Type:  Spinal    Note:  Disposition: Inpatient   Postop Pain Control: Uneventful            Sign Out: Well controlled pain   PONV: No   Neuro/Psych: Uneventful            Sign Out: Acceptable/Baseline neuro status   Airway/Respiratory: Uneventful            Sign Out: Acceptable/Baseline resp. status   CV/Hemodynamics: Uneventful            Sign Out: Acceptable CV status; No obvious hypovolemia; No obvious fluid overload   Other NRE:    DID A NON-ROUTINE EVENT OCCUR?            Last vitals:  Vitals Value Taken Time   /76 11/13/23 1720   Temp 35.2  C (95.36  F) 11/13/23 1722   Pulse 78 11/13/23 1722   Resp 43 11/13/23 1722   SpO2 97 % 11/13/23 1722   Vitals shown include unfiled device data.    Electronically Signed By: Josue Odell MD  November 13, 2023  5:24 PM

## 2023-11-13 NOTE — ANESTHESIA CARE TRANSFER NOTE
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    Anesthesia Type:   Spinal     Note:    Oropharynx: oropharynx clear of all foreign objects  Level of Consciousness: awake  Oxygen Supplementation: face mask  Level of Supplemental Oxygen (L/min / FiO2): 5  Independent Airway: airway patency satisfactory and stable  Dentition: dentition unchanged  Vital Signs Stable: post-procedure vital signs reviewed and stable    Patient transferred to: PACU    Handoff Report: Identifed the Patient, Identified the Reponsible Provider, Reviewed the pertinent medical history, Discussed the surgical course, Reviewed Intra-OP anesthesia mangement and issues during anesthesia, Set expectations for post-procedure period and Allowed opportunity for questions and acknowledgement of understanding      Vitals:  Vitals Value Taken Time   /70 11/13/23 1646   Temp 37  C (98.6  F) 11/13/23 1646   Pulse 94 11/13/23 1646   Resp 16 11/13/23 1646   SpO2 100 % 11/13/23 1646       Electronically Signed By: PARTH MCDANIELS CRNA  November 13, 2023  4:48 PM

## 2023-11-13 NOTE — INTERVAL H&P NOTE
I have reviewed the surgical (or preoperative) H&P that is linked to this encounter, and examined the patient. There are no significant changes    Clinical Conditions Present on Arrival:  Clinically Significant Risk Factors Present on Admission        # Hyperkalemia: Highest K = 5.5 mmol/L in last 30 days, will monitor as appropriate  # Hyponatremia: Lowest Na = 133 mmol/L in last 30 days, will monitor as appropriate  # Hypercalcemia: Highest Ca = 10.5 mg/dL in last 30 days, will monitor as appropriate

## 2023-11-13 NOTE — ANESTHESIA PREPROCEDURE EVALUATION
Anesthesia Pre-Procedure Evaluation    Patient: Oscar Zhao   MRN: [id number] : 1945        Procedure : Procedure(s):  REVISION TOTAL HIP ARTHROPLASTY HEAD LINER EXCHANGE          Past Medical History:   Diagnosis Date    Arthritis     Basal cell carcinoma of anterior chest     Breast cyst     History of anesthesia complications     HLA B27 (HLA B27 positive)     Hypertension     Osteoporosis 2011    Peripheral neuropathy 2018    PONV (postoperative nausea and vomiting)     Scleritis, bilateral     Left .  Treated with corticosteroids,, methotrexate and Humira.  .  Rituximab.    Squamous cell carcinoma of skin of chest     Steroid induced glaucoma, both eyes       Past Surgical History:   Procedure Laterality Date    ARTHROPLASTY REVISION HIP Right 2023    Procedure: RIGHT HIP REVISION TOTAL HIP ARTHROPLASTY;  Surgeon: Bill Bernal DO;  Location: [facility] Main OR    BREAST CYST EXCISION Right     benign     SECTION      CLOSED REDUCTION HIP Right 2019    Procedure: CLOSED REDUCTION, HIP;  Surgeon: Jak Ruiz DO;  Location: [facility] Main OR;  Service: Orthopedics    CLOSED REDUCTION HIP Right 2023    Procedure: CLOSED REDUCTION, HIP RIGHT;  Surgeon: Aditya Pedroza MD;  Location: [facility] Main OR    CLOSED REDUCTION HIP Right 2023    Procedure: CLOSED REDUCTION, HIP RIGHT;  Surgeon: Jak Allen MD;  Location: [facility] Main OR    COLONOSCOPY  2011    COLONOSCOPY  2005    COLONOSCOPY W/ BIOPSIES AND POLYPECTOMY  2021    16 to 20 mm polyp:tubular adenoma in the ascending colon.  Ulcerated mass in the anal canal: Moderately differentiated squamous cell cancer.    ESOPHAGOSCOPY, GASTROSCOPY, DUODENOSCOPY (EGD), COMBINED  2017    Large hiatal hernia.  Reactive gastropathy with mucosal erosion.  H. pylori negative.    HERNIORRHAPHY FEMORAL Left 2021    Procedure: LEFT FEMORAL HERNIA REPAIR;  Surgeon:  Sidney Murray MD;  Location: [facility] OR    HYSTERECTOMY TOTAL ABDOMINAL, BILATERAL SALPINGO-OOPHORECTOMY, COMBINED  1996    IR CHEST PORT PLACEMENT > 5 YRS OF AGE  3/15/2021    IR PORT PLACEMENT >5 YEARS  03/15/2021    Right IJ port-a-cath.    IR PORT REMOVAL RIGHT  7/14/2021    LAPAROSCOPIC APPENDECTOMY  04/28/2011    PHACOEMULSIFICATION CLEAR CORNEA WITH STANDARD INTRAOCULAR LENS IMPLANT Right 08/30/2017    PHACOEMULSIFICATION CLEAR CORNEA WITH STANDARD INTRAOCULAR LENS IMPLANT Left 09/13/2017    TOTAL HIP ARTHROPLASTY Right 08/18/2015    Procedure: HIP TOTAL ARTHROPLASTY, RIGHT;  Surgeon: Star Du MD;  Location: [facility] Main OR;  Service:     RUST TOTAL KNEE ARTHROPLASTY Left 03/02/2017    Procedure: LEFT TOTAL KNEE ARTHROPLASTY;  Surgeon: Star Du MD;  Location: [facility] Main OR;  Service: Orthopedics      Allergies   Allergen Reactions    Adalimumab Muscle Pain (Myalgia)    Brimonidine-Timolol [Brimonidine Tartrate-Timolol] Unknown     Redness itching in eyes    Levaquin [Levofloxacin] Unknown     Skin burn and couldn't get out bed for 5 days    Tetracyclines & Related Hives      Social History     Tobacco Use    Smoking status: Never     Passive exposure: Never    Smokeless tobacco: Never   Substance Use Topics    Alcohol use: Yes     Alcohol/week: 4.0 standard drinks of alcohol     Types: 4 Standard drinks or equivalent per week     Comment: 3-4/wk      Wt Readings from Last 1 Encounters:   11/08/23 60.8 kg (134 lb)        Anesthesia Evaluation   Pt has had prior anesthetic.     History of anesthetic complications  - PONV.      ROS/MED HX  ENT/Pulmonary:  - neg pulmonary ROS     Neurologic:  - neg neurologic ROS     Cardiovascular:     (+)  hypertension- -   -  - -                                      METS/Exercise Tolerance: >4 METS    Hematologic:  - neg hematologic  ROS     Musculoskeletal:  - neg musculoskeletal ROS     GI/Hepatic:  - neg GI/hepatic ROS    "  Renal/Genitourinary:  - neg Renal ROS     Endo:  - neg endo ROS     Psychiatric/Substance Use:  - neg psychiatric ROS     Infectious Disease:  - neg infectious disease ROS     Malignancy:  - neg malignancy ROS     Other:  - neg other ROS          Physical Exam    Airway  airway exam normal      Mallampati: II       Respiratory Devices and Support         Dental  no notable dental history         Cardiovascular   cardiovascular exam normal       Rhythm and rate: regular and normal     Pulmonary   pulmonary exam normal        breath sounds clear to auscultation           OUTSIDE LABS:  CBC:   Lab Results   Component Value Date    WBC 7.3 11/04/2023    WBC 6.4 11/04/2023    HGB 11.7 11/05/2023    HGB 11.3 (L) 11/04/2023    HCT 37.9 11/04/2023    HCT 39.7 11/04/2023     11/04/2023     11/04/2023     BMP:   Lab Results   Component Value Date     (L) 11/04/2023     (L) 11/04/2023    POTASSIUM 4.1 11/04/2023    POTASSIUM 4.0 11/04/2023    CHLORIDE 101 11/04/2023    CHLORIDE 101 11/04/2023    CO2 24 11/04/2023    CO2 22 11/04/2023    BUN 9.8 11/04/2023    BUN 8.9 11/04/2023    CR 0.52 11/04/2023    CR 0.50 (L) 11/04/2023     (H) 11/05/2023     (H) 11/04/2023     COAGS:   Lab Results   Component Value Date    INR 1.01 11/04/2023     POC: No results found for: \"BGM\", \"HCG\", \"HCGS\"  HEPATIC:   Lab Results   Component Value Date    ALBUMIN 3.6 11/04/2023    PROTTOTAL 6.2 (L) 11/04/2023    ALT 12 11/04/2023    AST 20 11/04/2023    ALKPHOS 97 11/04/2023    BILITOTAL 0.6 11/04/2023     OTHER:   Lab Results   Component Value Date    LACT 0.7 04/05/2021    A1C 5.3 10/23/2023    MICHEL 8.9 11/04/2023    PHOS 3.1 04/08/2021    MAG 1.7 (L) 04/07/2021    TSH 0.74 10/02/2023    CRP 4.0 (H) 04/07/2021    SED 7 09/12/2023       Anesthesia Plan    ASA Status:  2       Anesthesia Type: Spinal.   Induction: Intravenous, Propofol.   Maintenance: TIVA.        Consents    Anesthesia Plan(s) and associated " risks, benefits, and realistic alternatives discussed. Questions answered and patient/representative(s) expressed understanding.     - Discussed:     - Discussed with:  Patient      - Extended Intubation/Ventilatory Support Discussed: No.      - Patient is DNR/DNI Status: No     Use of blood products discussed: No .     Postoperative Care       PONV prophylaxis: Ondansetron (or other 5HT-3), Dexamethasone or Solumedrol     Comments:                Josue Odell MD

## 2023-11-13 NOTE — PHARMACY-ADMISSION MEDICATION HISTORY
Pharmacist Admission Medication History    Admission medication history is complete. The information provided in this note is only as accurate as the sources available at the time of the update.    Information Source(s): Patient and CareEverywhere/SureScripts via in-person    Pertinent Information:     Allergies reviewed with patient and updates made in EHR: yes    Medication History Completed By: Yana Diez RPH 11/13/2023 2:00 PM    PTA Med List   Medication Sig Last Dose    acetaminophen (TYLENOL) 325 MG tablet Take 2 tablets (650 mg) by mouth every 4 hours as needed for other (mild pain) Past Week    amLODIPine (NORVASC) 5 MG tablet Take 5 mg by mouth daily 11/13/2023 at AM    lisinopril (ZESTRIL) 10 MG tablet Take 10 mg by mouth daily 11/12/2023 at AM    LOTEMAX 0.5 % ophthalmic suspension Place 1 drop into both eyes every 48 hours 11/12/2023 at AM, Pt brought    metoprolol succinate ER (TOPROL XL) 25 MG 24 hr tablet Take 25 mg by mouth daily 11/13/2023 at AM    timolol maleate (TIMOPTIC) 0.5 % ophthalmic solution Place 1 drop into both eyes daily 11/13/2023 at AM Pt brought

## 2023-11-14 ENCOUNTER — APPOINTMENT (OUTPATIENT)
Dept: PHYSICAL THERAPY | Facility: CLINIC | Age: 78
DRG: 467 | End: 2023-11-14
Attending: ORTHOPAEDIC SURGERY
Payer: MEDICARE

## 2023-11-14 ENCOUNTER — APPOINTMENT (OUTPATIENT)
Dept: OCCUPATIONAL THERAPY | Facility: CLINIC | Age: 78
DRG: 467 | End: 2023-11-14
Attending: ORTHOPAEDIC SURGERY
Payer: MEDICARE

## 2023-11-14 VITALS
DIASTOLIC BLOOD PRESSURE: 90 MMHG | RESPIRATION RATE: 16 BRPM | OXYGEN SATURATION: 100 % | HEART RATE: 57 BPM | SYSTOLIC BLOOD PRESSURE: 141 MMHG | TEMPERATURE: 98.2 F

## 2023-11-14 LAB
GLUCOSE SERPL-MCNC: 126 MG/DL (ref 70–99)
HGB BLD-MCNC: 10.5 G/DL (ref 11.7–15.7)

## 2023-11-14 PROCEDURE — 250N000011 HC RX IP 250 OP 636: Performed by: PHYSICIAN ASSISTANT

## 2023-11-14 PROCEDURE — 82947 ASSAY GLUCOSE BLOOD QUANT: CPT | Performed by: ORTHOPAEDIC SURGERY

## 2023-11-14 PROCEDURE — 97116 GAIT TRAINING THERAPY: CPT | Mod: GP

## 2023-11-14 PROCEDURE — 250N000013 HC RX MED GY IP 250 OP 250 PS 637: Performed by: PHYSICIAN ASSISTANT

## 2023-11-14 PROCEDURE — 99232 SBSQ HOSP IP/OBS MODERATE 35: CPT | Performed by: STUDENT IN AN ORGANIZED HEALTH CARE EDUCATION/TRAINING PROGRAM

## 2023-11-14 PROCEDURE — 250N000013 HC RX MED GY IP 250 OP 250 PS 637: Performed by: STUDENT IN AN ORGANIZED HEALTH CARE EDUCATION/TRAINING PROGRAM

## 2023-11-14 PROCEDURE — 97161 PT EVAL LOW COMPLEX 20 MIN: CPT | Mod: GP

## 2023-11-14 PROCEDURE — 97166 OT EVAL MOD COMPLEX 45 MIN: CPT | Mod: GO

## 2023-11-14 PROCEDURE — 97535 SELF CARE MNGMENT TRAINING: CPT | Mod: GO

## 2023-11-14 PROCEDURE — 36415 COLL VENOUS BLD VENIPUNCTURE: CPT | Performed by: ORTHOPAEDIC SURGERY

## 2023-11-14 PROCEDURE — 85018 HEMOGLOBIN: CPT | Performed by: PHYSICIAN ASSISTANT

## 2023-11-14 RX ADMIN — CEFAZOLIN 1 G: 1 INJECTION, POWDER, FOR SOLUTION INTRAMUSCULAR; INTRAVENOUS at 06:01

## 2023-11-14 RX ADMIN — SENNOSIDES AND DOCUSATE SODIUM 1 TABLET: 8.6; 5 TABLET ORAL at 09:30

## 2023-11-14 RX ADMIN — ACETAMINOPHEN 975 MG: 325 TABLET ORAL at 06:01

## 2023-11-14 RX ADMIN — TIMOLOL MALEATE 1 DROP: 5 SOLUTION/ DROPS OPHTHALMIC at 06:44

## 2023-11-14 RX ADMIN — AMLODIPINE BESYLATE 5 MG: 5 TABLET ORAL at 09:29

## 2023-11-14 RX ADMIN — LOTEPREDNOL ETABONATE 1 DROP: 5 SUSPENSION/ DROPS OPHTHALMIC at 06:44

## 2023-11-14 ASSESSMENT — ACTIVITIES OF DAILY LIVING (ADL)
ADLS_ACUITY_SCORE: 38
ADLS_ACUITY_SCORE: 37
ADLS_ACUITY_SCORE: 39
ADLS_ACUITY_SCORE: 37
PREVIOUS_RESPONSIBILITIES: MEAL PREP;HOUSEKEEPING;LAUNDRY;SHOPPING;YARDWORK;DRIVING
ADLS_ACUITY_SCORE: 38
ADLS_ACUITY_SCORE: 37

## 2023-11-14 NOTE — PROGRESS NOTES
Dr. Olivier noticed patient's right pupil is larger than left pupil. Patient denies change in vision. Both pupils round and brisk. Size difference decreased since first assessed at 1900.

## 2023-11-14 NOTE — PLAN OF CARE
Subjective:       Patient ID: Susi Adan is a 40 y.o. female.    Chief Complaint:  Gynecologic Exam (last pap 03/04/15)      History of Present Illness  HPI  Annual Exam-Premenopausal  Patient presents for annual exam. The patient has no complaints today except for weight gain. The patient is sexually active. GYN screening history: last pap: approximate date 3/4/2015 and was normal. The patient wears seatbelts: yes. The patient participates in regular exercise: yes. Has the patient ever been transfused or tattooed?: no. The patient reports that there is not domestic violence in her life.    She is doing well on oral contraceptive.  But she thinks she has been gaining too much weight.  Would like to discuss other options.      GYN & OB HistoryNo LMP recorded.   Date of Last Pap: 3/30/2015    OB History    Para Term  AB Living   2 2 2     2   SAB TAB Ectopic Multiple Live Births           2      # Outcome Date GA Lbr Crescencio/2nd Weight Sex Delivery Anes PTL Lv   2 Term 04 40w0d  3.5 kg (7 lb 11.5 oz) F Vag-Spont None N LA   1 Term 10/15/99 37w0d  2.6 kg (5 lb 11.7 oz) F Vag-Spont None N LA        Past Medical History:   Diagnosis Date    GERD (gastroesophageal reflux disease)        Past Surgical History:   Procedure Laterality Date    CHOLECYSTECTOMY  2013       Family History   Problem Relation Age of Onset    Hypertension Father        Social History     Social History    Marital status:      Spouse name: N/A    Number of children: N/A    Years of education: N/A     Social History Main Topics    Smoking status: Never Smoker    Smokeless tobacco: Never Used    Alcohol use No    Drug use: No    Sexual activity: Yes     Partners: Male     Birth control/ protection: None     Other Topics Concern    None     Social History Narrative     since     He is a     She is a homemaker       Current Outpatient Prescriptions   Medication Sig Dispense Refill     Goal Outcome Evaluation:    Patient vital signs are at baseline: Yes  Patient able to ambulate as they were prior to admission or with assist devices provided by therapies during their stay:  Yes  Patient MUST void prior to discharge:  Yes  Patient able to tolerate oral intake:  Yes  Pain has adequate pain control using Oral analgesics:  Yes  Does patient have an identified :  Yes  Has goal D/C date and time been discussed with patient:  Yes     Patient is alert & oriented x4. Vitally stable. Assist of 1. Denies pain. Scheduled Tylenol given. Dressing is clean dry and intact. Cultures pending.    esomeprazole (NEXIUM) 40 MG capsule Take 1 capsule (40 mg total) by mouth once daily. 30 capsule 5    levonorgestrel-ethinyl estradiol (AVIANE) 0.1-20 mg-mcg per tablet Take 1 tablet by mouth once daily. 28 tablet 12    promethazine-dextromethorphan (PROMETHAZINE-DM) 6.25-15 mg/5 mL Syrp 1 teaspoon PO Q 8 hrs PRN cough. DO NOT DRIVE AFTER TAKING  mL 0    VITAMIN B COMPLEX NO.12-NIACIN ORAL Take by mouth.       No current facility-administered medications for this visit.        Review of patient's allergies indicates:  No Known Allergies    Review of Systems  Review of Systems   Constitutional: Positive for unexpected weight change. Negative for activity change, appetite change, chills, fatigue and fever.   HENT: Negative for mouth sores.    Respiratory: Negative for cough, shortness of breath and wheezing.    Cardiovascular: Negative for chest pain and palpitations.   Gastrointestinal: Negative for abdominal pain, bloating, blood in stool, constipation, nausea and vomiting.   Endocrine: Negative for diabetes and hot flashes.   Genitourinary: Negative for dyspareunia, dysuria, frequency, hematuria, menorrhagia, menstrual problem, pelvic pain, urgency, vaginal bleeding, vaginal discharge, vaginal pain, dysmenorrhea, urinary incontinence, postcoital bleeding and vaginal odor.   Musculoskeletal: Negative for back pain and myalgias.   Skin:  Negative for rash.   Neurological: Negative for seizures and headaches.   Psychiatric/Behavioral: Negative for depression and sleep disturbance. The patient is not nervous/anxious.    Breast: Negative for breast mass, breast pain and nipple discharge          Objective:    Physical Exam:   Constitutional: She appears well-developed and well-nourished. No distress.    HENT:   Head: Normocephalic and atraumatic.    Eyes: EOM are normal.    Neck: Normal range of motion.     Pulmonary/Chest: Effort normal. No respiratory distress.   Breasts: Non-tender, no engorgement, no  masses, no retraction, no discharge. Negative for lymphadenopathy.         Abdominal: Soft. She exhibits no distension. There is no tenderness. There is no rebound and no guarding.     Genitourinary: Vagina normal and uterus normal. No vaginal discharge found.   Genitourinary Comments: Vulva without any obvious lesions.  Vaginal vault with good support.  Minimal discharge noted.  No obvious lesion.  Cervix is without any cervical motion tenderness.  No obvious lesion.  Uterus is small, retroverted, non-tender, normal contour.  Adnexa is without any masses or tenderness.           Musculoskeletal: Normal range of motion.       Neurological: She is alert.    Skin: Skin is warm and dry.    Psychiatric: She has a normal mood and affect.          Assessment:        1. Well woman exam with routine gynecological exam    2. Family planning             Plan:          I have discussed with the patient her condition.  Monthly breast examination was instructed, discussed, and encouraged.  Patient was encouraged to consume a low-calorie, low fat diet, and to increase of physical activity.  Healthy habits encouraged.  A Pap smear was NOT performed.  Mammogram was NOT ordered because of the combination of her age and risk factors.  Gonorrhea and Chlamydia testing not performed.  HIV test not ordered.  Patient is to continue her medication, Alesse.  She will come back to see me in one year for her annual visit.  She can come back to see me sooner as necessary.  All of her questions were answered appropriately to her satisfaction.    She would like to have some time to think about tubal ligation vs IUD.  We also discussed vasectomy.  She thinks the pills made her gain weight.  She will call to let us know.

## 2023-11-14 NOTE — PLAN OF CARE
Patient vital signs are at baseline: Yes  Patient able to ambulate as they were prior to admission or with assist devices provided by therapies during their stay:  Yes  Patient MUST void prior to discharge:  Yes  Patient able to tolerate oral intake:  Yes  Pain has adequate pain control using Oral analgesics:  Yes  Does patient have an identified :  Yes  Has goal D/C date and time been discussed with patient:  Yes       Patient alert and oriented. VSS. RA. LR 75ml/hr.  Tolerating Regular diet. A1 with walker and gaitbelt. Ambulating and voiding spontaneously. Size difference noted on right pupil. Patient denies any vision problems.  Pain is minimal. Will need 2 more PVRs. Resting in bed. No other concerns at this time. Dressing is CDI. Alarms on.

## 2023-11-14 NOTE — DISCHARGE SUMMARY
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 Bernal on . There were no intraoperative complications and the patient was transferred to the recovery room and later the orthopedic unit in stable condition. Once the patient reached the orthopedic floor our orthopedic pain protocol was implemented along with the following:    Anticoagulation Medications: ASA  Therapy: PT and OT  Activity: WBAT  Bracing: None    Consultations during Admission: Hospitalist service for medical management     COMPLICATIONS/SIGNIFICANT FINDINGS    NONE    DISCHARGE INFORMATION   Condition at discharge: Good  Discharge destination: Home  Patient was seen by myself on the date of discharge.    FOLLOW UP CARE   Follow up with orthopedics in 2 weeks or sooner should the need arise. Ortho will continue to manage pain control, post op anticoagulation and incision care.     Follow up with your PCP for management of chronic medical problems and to evaluate for post op medical complications including constipation, nausea/vomiting, DVT/PE, anemia, changes in blood pressure, fevers/chills, urinary retention and atelectasis/pneumonia.     Subjective   Patient is doing well on POD #1. Pain is well controlled with oral medications. Ambulating. Tolerating oral intake.     Physical Exam   BP (!) 141/90 (BP Location: Left arm, Patient Position: Chair,  Cuff Size: Adult Small)   Pulse 57   Temp 98.2  F (36.8  C) (Oral)   Resp 16   LMP  (LMP Unknown)   SpO2 100%   The patient is A&Ox3. Appears comfortable, sitting up at bedside.  Calves are soft and non-tender bilaterally  Brisk capillary refill in the toes.   Palpable rught dorsalis pedis pulse. Foot warm & well-perfused.  Sensation is intact to light touch & equal bilaterally in the femoral, DP, SP & tibial nerve distributions.  ROM: Flexes at right hip. Appropriately flexes & extends all toes bilaterally.   Motor: +5/5 dorsiflexion, plantar flexion & EHL bilaterally. Fires quad.   Leg lengths equal.  right hip dressing C/D/I without strikethrough, no surrounding erythema.         5/5 TA/GSC/EHL.         Pertinent Results at Discharge     Hemoglobin   Date/Time Value Ref Range Status   11/14/2023 05:40 AM 10.5 (L) 11.7 - 15.7 g/dL Final   11/13/2023 12:55 PM 12.2 11.7 - 15.7 g/dL Final   11/05/2023 06:22 AM 11.7 11.7 - 15.7 g/dL Final     INR   Date/Time Value Ref Range Status   11/04/2023 12:52 AM 1.01 0.85 - 1.15 Final     Platelet Count   Date/Time Value Ref Range Status   11/04/2023 05:33  150 - 450 10e3/uL Final   11/04/2023 12:27  150 - 450 10e3/uL Final   10/25/2023 02:47  150 - 450 10e3/uL Final       Problem List   Principal Problem:    Failure of right total hip arthroplasty, subsequent encounter      Rose Mary Scanlon PA-C/Dr. Bernal  [facility]  [phone]  Date: 11/14/2023  Time: 10:09 AM

## 2023-11-14 NOTE — PROGRESS NOTES
Occupational Therapy Discharge Summary    Reason for therapy discharge:    Goals met    Progress towards therapy goal(s). See goals on Care Plan in [facility] electronic health record for goal details.  Goals met    Therapy recommendation(s):    No further therapy is recommended.

## 2023-11-14 NOTE — PROGRESS NOTES
[facility]    Medicine Progress Note - Hospitalist Service    Date of Admission:  11/13/2023    Assessment & Plan   Oscar Zhao is a 78 year old female admitted on 11/13/2023. She has past medical history significant for hypertension, right hip revision with recurrence dislocation, she was recently hospitalized for right hip dislocation on 11/4/2023, underwent closed reduction at that time.  She presented to the hospital on 11/13/2023 for revision of right total hip arthroplasty of both components. Hospitalist medicine consulted for management of her chronic medical conditions.    Hypertension  -At home on lisinopril 10 mg daily, metoprolol succinate 25 mg daily and amlodipine 5 mg daily  -Heart rate on 11/14 around 57.  Therefore metoprolol was held.       Plan  -Educated about management of antihypertensives and beta-blockers at home  -Hold metoprolol if heart rate below 60 or blood pressure below 110/60  -Hold lisinopril if blood pressure below 120/70  -Continue to monitor blood pressure regularly over the next few days and follow-up with cardiology or primary care physician for further management and adjust meds if needed.    History of recurrent periprosthetic hip dislocation  Status post revision of right total hip arthroplasty on 11/13    Plan  -DVT prophylaxis and pain management as per primary team           SARITHA GRADY MD  Hospitalist Service  Securely message with [employer] (more info)  Text page via [employer] Paging/Directory   ______________________________________________________________________            Diet: Discharge Instruction - Regular Diet Adult  Regular Diet Adult    DVT Prophylaxis: Defer to primary service  Rodriguez Catheter: Not present  Lines: None     Cardiac Monitoring: None  Code Status: Full Code      Clinically Significant Risk Factors Present on Admission                  # Hypertension: Noted on problem list          # Financial/Environmental Concerns:            Disposition Plan      Expected Discharge Date: 11/14/2023,  9:00 AM                  SARITHA GRADY MD  Hospitalist Service  [facility]  Securely message with [employer] (more info)  Text page via [employer] Paging/Directory   ______________________________________________________________________    Interval History   No significant events.  Feeling well.  Denies any significant pain.  Denies any nausea or vomiting.  Denies any lightheadedness.    Physical Exam   Vital Signs: Temp: 98.2  F (36.8  C) Temp src: Oral BP: (!) 141/90 Pulse: 57   Resp: 16 SpO2: 100 % O2 Device: None (Room air) Oxygen Delivery: 10 LPM  Weight: 0 lbs 0 oz  Physical Exam  Cardiovascular:      Rate and Rhythm: Normal rate.   Pulmonary:      Effort: Pulmonary effort is normal.   Neurological:      Mental Status: She is alert.   Psychiatric:         Mood and Affect: Mood normal.          Medical Decision Making       45 MINUTES SPENT BY ME on the date of service doing chart review, history, exam, documentation & further activities per the note.      Data     I have personally reviewed the following data over the past 24 hrs:    N/A  \   10.5 (L)   / N/A     135 N/A N/A /  126 (H)   4.3 N/A N/A \       Imaging results reviewed over the past 24 hrs:   Recent Results (from the past 24 hour(s))   XR Pelvis w Hip Port Right 1 View    Narrative    EXAM: XR PELVIS AND HIP PORTABLE RIGHT 1 VIEW  LOCATION: [facility]  DATE: 11/13/2023    INDICATION: Status post Hip surgery  COMPARISON: 11/04/2023      Impression    IMPRESSION: Interval revision right hip arthroplasty. Alignment satisfactory. No acute fracture. Heterotopic bone fragment lateral to the right acetabulum stable. Postoperative gas and soft tissues.

## 2023-11-14 NOTE — CONSULTS
[facility]  Consult Note - Hospitalist Service  Date of Admission:  11/13/2023  Consult Requested by: Orthopedic surgery  Reason for Consult: Management of chronic medical conditions    Assessment & Plan   Oscar Zhao is a 78 year old female admitted on 11/13/2023. She has past medical history significant for hypertension, right hip revision with recurrence dislocation, she was recently hospitalized for right hip dislocation on 11/4/2023, underwent closed reduction at that time.  She presented to the hospital on 11/13/2023 for revision of right total hip arthroplasty of both components. Hospitalist medicine consulted for management of her chronic medical conditions.    Hypertension  -At home on lisinopril 10 mg daily, metoprolol succinate 25 mg daily and amlodipine 5 mg daily  -She already received her lisinopril and metoprolol today.     Plan  -Resume antihypertensives on 11/14.    History of recurrent periprosthetic hip dislocation  Status post revision of right total hip arthroplasty on 11/13    Plan  -DVT prophylaxis and pain management as per primary team           SARITHA GRADY MD  Hospitalist Service  Securely message with [employer] (more info)  Text page via [facility] Paging/Directory   ______________________________________________________________________      History of Present Illness   Oscar Zhao is a 78 year old female admitted on 11/13/2023. She has past medical history significant for hypertension, right hip revision with recurrence dislocation, she was recently hospitalized for right hip dislocation on 11/4/2023, underwent closed reduction at that time.  She presented to the hospital on 11/13/2023 for revision of right total hip arthroplasty of both components.    Evaluated around 6:30 PM. Patient is feeling well. She denies any chest pain or shortness of breath.  She denies any nausea or vomiting. She denies any headache.  She denies any lightheadedness. She denies any  change in vision.      Past Medical History    Past Medical History:   Diagnosis Date    Arthritis     Basal cell carcinoma of anterior chest     Breast cyst     History of anesthesia complications     HLA B27 (HLA B27 positive)     Hypertension     Osteoporosis 2011    Peripheral neuropathy 2018    PONV (postoperative nausea and vomiting)     Scleritis, bilateral     Left .  Treated with corticosteroids,, methotrexate and Humira.  .  Rituximab.    Squamous cell carcinoma of skin of chest     Steroid induced glaucoma, both eyes        Past Surgical History   Past Surgical History:   Procedure Laterality Date    ARTHROPLASTY REVISION HIP Right 2023    Procedure: RIGHT HIP REVISION TOTAL HIP ARTHROPLASTY;  Surgeon: Bill Bernal DO;  Location: [facility]    BREAST CYST EXCISION Right     benign     SECTION      CLOSED REDUCTION HIP Right 2019    Procedure: CLOSED REDUCTION, HIP;  Surgeon: Jak Ruiz DO;  Location: [facility];  Service: Orthopedics    CLOSED REDUCTION HIP Right 2023    Procedure: CLOSED REDUCTION, HIP RIGHT;  Surgeon: Aditya Pedroza MD;  Location: [facility]    CLOSED REDUCTION HIP Right 2023    Procedure: CLOSED REDUCTION, HIP RIGHT;  Surgeon: Jak Allen MD;  Location: [facility]    COLONOSCOPY  2011    COLONOSCOPY  2005    COLONOSCOPY W/ BIOPSIES AND POLYPECTOMY  2021    16 to 20 mm polyp:tubular adenoma in the ascending colon.  Ulcerated mass in the anal canal: Moderately differentiated squamous cell cancer.    ESOPHAGOSCOPY, GASTROSCOPY, DUODENOSCOPY (EGD), COMBINED  2017    Large hiatal hernia.  Reactive gastropathy with mucosal erosion.  H. pylori negative.    HERNIORRHAPHY FEMORAL Left 2021    Procedure: LEFT FEMORAL HERNIA REPAIR;  Surgeon: Sidney Murray MD;  Location: [facility]    HYSTERECTOMY TOTAL ABDOMINAL, BILATERAL SALPINGO-OOPHORECTOMY, COMBINED   1996    IR CHEST PORT PLACEMENT > 5 YRS OF AGE  3/15/2021    IR PORT PLACEMENT >5 YEARS  03/15/2021    Right IJ port-a-cath.    IR PORT REMOVAL RIGHT  7/14/2021    LAPAROSCOPIC APPENDECTOMY  04/28/2011    PHACOEMULSIFICATION CLEAR CORNEA WITH STANDARD INTRAOCULAR LENS IMPLANT Right 08/30/2017    PHACOEMULSIFICATION CLEAR CORNEA WITH STANDARD INTRAOCULAR LENS IMPLANT Left 09/13/2017    TOTAL HIP ARTHROPLASTY Right 08/18/2015    Procedure: HIP TOTAL ARTHROPLASTY, RIGHT;  Surgeon: Star Du MD;  Location: [facility] Main OR;  Service:     [facility] TOTAL KNEE ARTHROPLASTY Left 03/02/2017    Procedure: LEFT TOTAL KNEE ARTHROPLASTY;  Surgeon: Star Du MD;  Location: [facility] Main OR;  Service: Orthopedics       Medications   I have reviewed this patient's current medications  Current Facility-Administered Medications   Medication    [START ON 11/14/2023] amLODIPine (NORVASC) tablet 5 mg    fentaNYL (PF) (SUBLIMAZE) injection 25 mcg    fentaNYL (PF) (SUBLIMAZE) injection 50 mcg    HYDROmorphone (DILAUDID) injection 0.2 mg    HYDROmorphone (DILAUDID) injection 0.4 mg    lactated ringers infusion    [START ON 11/14/2023] lisinopril (ZESTRIL) tablet 10 mg    loteprednol (LOTEMAX) 0.5 % ophthalmic susp 1 drop    [START ON 11/14/2023] metoprolol succinate ER (TOPROL XL) 24 hr tablet 25 mg    ondansetron (ZOFRAN ODT) ODT tab 4 mg    Or    ondansetron (ZOFRAN) injection 4 mg    oxyCODONE IR (ROXICODONE) half-tab 2.5 mg    Or    oxyCODONE (ROXICODONE) tablet 5 mg    prochlorperazine (COMPAZINE) injection 5 mg    timolol maleate (TIMOPTIC) 0.5 % ophthalmic solution 1 drop            Physical Exam   Vital Signs: Temp: (!) 95.9  F (35.5  C) Temp src: Core BP: 117/81 Pulse: 100   Resp: 17 SpO2: 95 % O2 Device: None (Room air) Oxygen Delivery: 10 LPM  Weight: 0 lbs 0 oz    Physical Exam  Constitutional:       General: She is not in acute distress.     Appearance: She is not ill-appearing or toxic-appearing.   Eyes:       General: Vision grossly intact.      Extraocular Movements: Extraocular movements intact.      Comments: Dilated right pupil comparing to left.   Cardiovascular:      Rate and Rhythm: Normal rate.   Pulmonary:      Effort: Pulmonary effort is normal. No respiratory distress.      Breath sounds: No wheezing.   Skin:     General: Skin is warm and dry.   Neurological:      Mental Status: She is alert.   Psychiatric:         Mood and Affect: Mood normal.          Medical Decision Making       40 MINUTES SPENT BY ME on the date of service doing chart review, history, exam, documentation & further activities per the note.      Data     I have personally reviewed the following data over the past 24 hrs:    N/A  \   12.2   / N/A     135 N/A N/A /  106 (H)   4.3 N/A N/A \       Imaging results reviewed over the past 24 hrs:   Recent Results (from the past 24 hour(s))   XR Pelvis w Hip Port Right 1 View    Narrative    EXAM: XR PELVIS AND HIP PORTABLE RIGHT 1 VIEW  LOCATION: [facility]  DATE: 11/13/2023    INDICATION: Status post Hip surgery  COMPARISON: 11/04/2023      Impression    IMPRESSION: Interval revision right hip arthroplasty. Alignment satisfactory. No acute fracture. Heterotopic bone fragment lateral to the right acetabulum stable. Postoperative gas and soft tissues.

## 2023-11-14 NOTE — PROGRESS NOTES
11/14/23 0730   Appointment Info   Signing Clinician's Name / Credentials (OT) MANDY Guerrier   Student Supervision Therapy services provided with the co-signing licensed therapist guiding and directing the services, and providing the skilled judgement and assessment throughout the session   Quick Adds   Quick Adds Certification   Living Environment   People in Home alone   Current Living Arrangements house   Living Environment Comments WIS with shower chair, RTS with sink to R   Self-Care   Usual Activity Tolerance good   Current Activity Tolerance moderate   Instrumental Activities of Daily Living (IADL)   Previous Responsibilities meal prep;housekeeping;laundry;shopping;yardwork;driving   IADL Comments Daughter to assist as needed   General Information   Onset of Illness/Injury or Date of Surgery 11/13/23   Referring Physician Dr. Bill Bernal   Patient/Family Therapy Goal Statement (OT) To return home   Additional Occupational Profile Info/Pertinent History of Current Problem Pt admitted for R YAMILET. PMH: HTN, T2M, Cervicalgia, Hip dislocations   Existing Precautions/Restrictions (S)  no hip IR;no hip ER;no hip ADD past midline;90 degree hip flexion   Right Lower Extremity (Weight-bearing Status) (S)  weight-bearing as tolerated (WBAT)   Cognitive Status Examination   Follows Commands WNL   Visual Perception   Visual Impairment/Limitations corrective lenses for reading   Balance   Balance Assessment standing static balance   Standing Balance: Static supervision   Position/Device Used, Standing Balance supported;walker, front-wheeled   Balance Comments good   Activities of Daily Living   BADL Assessment/Intervention lower body dressing   Lower Body Dressing Assessment/Training   Position (Lower Body Dressing) supported sitting   [place] Level (Lower Body Dressing) lower body dressing skills;doff;don;socks;dependent (less than 25% patient effort)   Clinical Impression   Criteria for Skilled Therapeutic  Interventions Met (OT) Yes, treatment indicated   OT Diagnosis Decreased independence with ADL's and transfers   Influenced by the following impairments Due to RTHA   OT Problem List-Impairments impacting ADL problems related to;post-surgical precautions   Assessment of Occupational Performance 3-5 Performance Deficits   Identified Performance Deficits Transfers, Dressing, Bathing, G/H, Toileting   Planned Therapy Interventions (OT) ADL retraining   Clinical Decision Making Complexity (OT) detailed assessment/moderate complexity   Risk & Benefits of therapy have been explained evaluation/treatment results reviewed;participants included;patient   OT Total Evaluation Time   OT Eval, Moderate Complexity Minutes (56534) 15   Therapy Certification   Medical Diagnosis RTHA   Start of Care Date 11/14/23   Certification date from 11/14/23   Certification date to 12/14/23   OT Goals   Therapy Frequency (OT) One time eval and treatment   OT Predicted Duration/Target Date for Goal Attainment 11/14/23   OT Goals Lower Body Dressing;Transfers   OT: Lower Body Dressing Modified independent;using adaptive equipment;within precautions   OT: Transfer Modified independent;with assistive device;within precautions   Interventions   Interventions Quick Adds Self-Care/Home Management   Self-Care/Home Management   Self-Care/Home Mgmt/ADL, Compensatory, Meal Prep Minutes (47918) 30   Treatment Detail/Skilled Intervention Pt seated in recliner and aggreeable to therapy. Therapist demonstrated and reviwed hip precautions, NO IR/ER, NO ADD, NO 90 degree flexion. Pt declined any dressing, but was willing to practice socks with AE. Pt was initally dependent, but once therapist introudced and educated on use of AE Pt was able to asim socks with Mod I. Pt then completed transfers to toilet and chair with SBA and use of fww. Therapist then demonstrated car and shower transfers and Pt verbalized understanding. Therapist also instructed on bed  mobility and positioning but Pt declined to practice. Increased time needed for education and answering of Pt questions. At end of session, Pt was seated in the chair, with chair alarm on and call light within reach. RN aware   OT Discharge Planning   OT Plan OT goals Met   OT Discharge Recommendation (DC Rec) other (see comments)  (Defer to ortho team)   OT Rationale for DC Rec Pt will have support at home from daughter   OT Brief overview of current status OT goals met   OT Equipment Needed at Discharge other (see comments)  (sock aid)   Total Session Time   Timed Code Treatment Minutes 30   Total Session Time (sum of timed and untimed services) 45     interphalangeal joint

## 2023-11-14 NOTE — PROGRESS NOTES
Orthopedic Progress Note      Assessment: 1 Day Post-Op  S/P Procedure(s):  REVISION TOTAL HIP ARTHROPLASTY HEAD LINER EXCHANGE @    Plan:   - Continue PT/OT.   - Weightbearing status: WBAT  - Anticoagulation: ASA in addition to SCDs, raymon stockings and early ambulation.  - Discharge planning: Discharge to home today.     Subjective:  Pain: Well controlled on oral meds  Nausea, Vomiting:  No  Chest pain: No  Lightheadedness, Dizziness:  No  Neuro:  Patient denies new onset numbness or paresthesias     Patient doing well on POD #1. Ambulating, tolerating oral intake, voiding & pain is controlled with oral medications. Hgb 10.5 (12.2 pre-op).     Objective:  BP (!) 141/90 (BP Location: Left arm, Patient Position: Chair, Cuff Size: Adult Small)   Pulse 57   Temp 98.2  F (36.8  C) (Oral)   Resp 16   LMP  (LMP Unknown)   SpO2 100%   The patient is A&Ox3. Appears comfortable, sitting up at bedside.  Calves are soft and non-tender bilaterally  Brisk capillary refill in the toes.   Palpable rught dorsalis pedis pulse. Foot warm & well-perfused.  Sensation is intact to light touch & equal bilaterally in the femoral, DP, SP & tibial nerve distributions.  ROM: Flexes at right hip. Appropriately flexes & extends all toes bilaterally.   Motor: +5/5 dorsiflexion, plantar flexion & EHL bilaterally. Fires quad.   Leg lengths equal.  right hip dressing C/D/I without strikethrough, no surrounding erythema.       5/5 TA/GSC/EHL.         Pertinent Labs   Lab Results: personally reviewed.   Lab Results   Component Value Date    INR 1.01 11/04/2023     Lab Results   Component Value Date    WBC 7.3 11/04/2023    HGB 10.5 (L) 11/14/2023    HCT 37.9 11/04/2023    MCV 85 11/04/2023     11/04/2023     Lab Results   Component Value Date     11/13/2023    CO2 24 11/04/2023         Report completed by:  Rose Mary Scanlon PA-C/[facility]    Date: 11/14/2023  Time: 10:06 AM

## 2023-11-14 NOTE — PROGRESS NOTES
11/14/23 0151   Appointment Info   Signing Clinician's Name / Credentials (PT) Aurelia Dowell, PT, DPT   Living Environment   People in Home alone   Current Living Arrangements house   Home Accessibility stairs to enter home;stairs within home   Number of Stairs, Main Entrance 1   Stair Railings, Main Entrance none   Number of Stairs, Within Home, Primary greater than 10 stairs   Stair Railings, Within Home, Primary railing on left side (ascending)   Self-Care   Equipment Currently Used at Home walker, rolling   General Information   Onset of Illness/Injury or Date of Surgery 11/13/23   Referring Physician Dr. Bill Bernal   Patient/Family Therapy Goals Statement (PT) Walk safely   Pertinent History of Current Problem (include personal factors and/or comorbidities that impact the POC) R YAMILET Revision   Existing Precautions/Restrictions no hip IR;no hip ER;no hip ADD past midline;90 degree hip flexion   Weight-Bearing Status - LLE full weight-bearing   Weight-Bearing Status - RLE weight-bearing as tolerated   Transfers   Transfers sit-stand transfer   Maintains Weight-bearing Status (Transfers) able to maintain   Sit-Stand Transfer   Sit-Stand [place] (Transfers) verbal cues;supervision   Assistive Device (Sit-Stand Transfers) walker, front-wheeled   Gait/Stairs (Locomotion)   [place] Level (Gait) supervision;verbal cues   Assistive Device (Gait) walker, front-wheeled   Distance in Feet (Gait) 10   Pattern (Gait) swing-through   Deviations/Abnormal Patterns (Gait) gait speed decreased;stride length decreased   Maintains Weight-bearing Status (Gait) able to maintain   Clinical Impression   Criteria for Skilled Therapeutic Intervention Yes, treatment indicated   PT Diagnosis (PT) Impaired functional mobility   Influenced by the following impairments weakness, pain   Functional limitations due to impairments transfers, gait, stairs   Clinical Presentation (PT Evaluation Complexity) stable   Clinical  Presentation Rationale Pt presents as medically diagnosed   Clinical Decision Making (Complexity) low complexity   Planned Therapy Interventions (PT) gait training;stair training;patient/family education;transfer training   Risk & Benefits of therapy have been explained evaluation/treatment results reviewed;care plan/treatment goals reviewed;patient   PT Total Evaluation Time   PT Eval, Low Complexity Minutes (94010) 10   Physical Therapy Goals   PT Frequency One time eval and treatment only   PT Predicted Duration/Target Date for Goal Attainment 11/14/23   PT Goals Gait;Stairs   PT: Gait Supervision/stand-by assist;Straight cane;150 feet;Goal Met   PT: Stairs Supervision/stand-by assist;Rail on left;8 stairs   Interventions   Interventions Quick Adds Therapeutic Activity   Therapeutic Activity   Symptoms Noted During/After Treatment Fatigue   Treatment Detail/Skilled Intervention Sit<>Stand with FWW, supervision. Stand to sit with SEC, supervision.   Gait Training   Gait Training Minutes (84876) 15   Symptoms Noted During/After Treatment (Gait Training) fatigue   Treatment Detail/Skilled Intervention verbal cueing for safety. Education on progressing ambulation at home. Patient reports going to OP PT after 3 week follow up. Stairs: 4 stairs with bilateral rails, nonreciprocal pattern, SBA. 4 stairs with left rail, SBA, nonreciprocal pattern, verbal cueing for safety. Ambulation with SEC, 200ft, SBA, cueing for patterning. Amb 100ft with FWW, mod I. Pt declined need to go through HEP, has been doing in recent past   Distance in Feet 100, 200   [place] Level (Gait Training) stand-by assist   Physical Assistance Level (Gait Training) verbal cues   Weight Bearing (Gait Training) weight-bearing as tolerated   Assistive Device (Gait Training) rolling walker   Pattern Analysis (Gait Training) swing-through gait   Gait Analysis Deviations decreased yuliet;decreased step length   PT Discharge Planning   PT Discharge  Recommendation (DC Rec)   (Defer to ortho)   PT Rationale for DC Rec pt ambulating and negotiating stairs safely   PT Brief overview of current status SEC, 200ft with SBA   PT Equipment Needed at Discharge walker, rolling;cane, straight   Total Session Time   Timed Code Treatment Minutes 15   Total Session Time (sum of timed and untimed services) 25

## 2023-11-14 NOTE — PROGRESS NOTES
Physical Therapy Discharge Summary    Reason for therapy discharge:    All goals and outcomes met, no further needs identified.    Progress towards therapy goal(s). See goals on Care Plan in [facility] electronic health record for goal details.  Goals met    Therapy recommendation(s):    Continue home exercise program.

## 2023-11-14 NOTE — PROGRESS NOTES
Care Management Discharge Note    Discharge Date: 11/14/2023       Discharge Disposition:  home no needs  Discharge Services:  n/a  Discharge DME:  n/a  Discharge Transportation:  family to transport after 1200    Private pay costs discussed: Not applicable    Education Provided on the Discharge Plan:  yes  Persons Notified of Discharge Plans: yes  Patient/Family in Agreement with the Plan: yes      Handoff Referral Completed: Yes    Additional Information:  SW completed chart assessment due to low readmissions score. Pt comes from home; lives alone and gets help from children aside from being independent at baseline. Anticipate no needs from care management at discharge. Pt to follow with PCP if needs arise post discharge.Family to transport.  10:16 AM    YADIRA Gómez  11/14/2023

## 2023-11-15 ENCOUNTER — PATIENT OUTREACH (OUTPATIENT)
Dept: CARE COORDINATION | Facility: CLINIC | Age: 78
End: 2023-11-15
Payer: MEDICARE

## 2023-11-15 NOTE — PROGRESS NOTES
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 Bernal on 11/13. There were no intraoperative complications and the patient was transferred to the recovery room and later the orthopedic unit in stable condition. Once the patient reached the orthopedic floor our orthopedic pain protocol was implemented along with the following:     Anticoagulation Medications: ASA  Therapy: PT and OT  Activity: WBAT  Bracing: None     Consultations during Admission: Hospitalist service for medical management   ASSESSMENT           Discharge Assessment  How are you doing now that you are home?: \" Doing really good\"  How are your symptoms? (Red Flag symptoms escalate to triage hotline per guidelines): Improved  Do you feel your condition is stable enough to be safe at home until your provider visit?: Yes  Does the patient have their discharge instructions? : Yes  Does the patient have questions regarding their discharge instructions? : No  Were you started on any new medications or were there changes to any of your previous medications? : Yes  Does the patient have all of their medications?: Yes  Do you have questions regarding any of your medications? : No  Do you have all of your needed medical supplies or equipment (DME)?  (i.e. oxygen tank, CPAP, cane, etc.): Yes  Discharge follow-up appointment scheduled within 14 calendar days? : No  Discharge Follow Up Appointment Date: " 12/13/23  Discharge Follow Up Appointment Scheduled with?: Specialty Care Provider  Is patient agreeable to assistance with scheduling? : No    Post-op ([facility] Only)  If the patient had a surgery or procedure, do they have any questions for a nurse?: No             PLAN                                                      Outpatient Plan:  Follow up with orthopedics in 2 weeks or sooner should the need arise. Ortho will continue to manage pain control, post op anticoagulation and incision care.      Follow up with your PCP for management of chronic medical problems and to evaluate for post op medical complications including constipation, nausea/vomiting, DVT/PE, anemia, changes in blood pressure, fevers/chills, urinary retention and atelectasis/pneumonia    Future Appointments   Date Time Provider Department Center   12/13/2023  1:20 PM [facility]   2/8/2024 11:40 AM [facility]   2/8/2024 12:00 PM [facility] MR1 [facility]   2/27/2024 10:40 AM [facility]         For any urgent concerns, please contact our 24 hour nurse triage line: [phone] ([phone])         Charlotte Dempsey MA

## 2023-11-18 LAB — BACTERIA TISS BX CULT: NO GROWTH

## 2023-11-20 LAB — BACTERIA TISS BX CULT: NORMAL

## 2023-12-02 ENCOUNTER — IMMUNIZATION (OUTPATIENT)
Dept: FAMILY MEDICINE | Facility: CLINIC | Age: 78
End: 2023-12-02
Payer: MEDICARE

## 2023-12-02 PROCEDURE — G0008 ADMIN INFLUENZA VIRUS VAC: HCPCS

## 2023-12-02 PROCEDURE — 90662 IIV NO PRSV INCREASED AG IM: CPT

## 2023-12-13 ENCOUNTER — OFFICE VISIT (OUTPATIENT)
Dept: CARDIOLOGY | Facility: CLINIC | Age: 78
End: 2023-12-13
Payer: MEDICARE

## 2023-12-13 VITALS
HEART RATE: 84 BPM | DIASTOLIC BLOOD PRESSURE: 64 MMHG | SYSTOLIC BLOOD PRESSURE: 102 MMHG | WEIGHT: 129 LBS | RESPIRATION RATE: 18 BRPM | BODY MASS INDEX: 22.85 KG/M2 | OXYGEN SATURATION: 94 %

## 2023-12-13 DIAGNOSIS — I10 ESSENTIAL HYPERTENSION: ICD-10-CM

## 2023-12-13 DIAGNOSIS — R00.0 SINUS TACHYCARDIA: ICD-10-CM

## 2023-12-13 DIAGNOSIS — R06.09 DOE (DYSPNEA ON EXERTION): Primary | ICD-10-CM

## 2023-12-13 PROCEDURE — 99204 OFFICE O/P NEW MOD 45 MIN: CPT | Performed by: INTERNAL MEDICINE

## 2023-12-13 NOTE — LETTER
12/13/2023    Jak Jones MD  1962 [address] [facility] [address]  [facility] [address]    RE: Oscar Zhao       Dear Colleague,     I had the pleasure of seeing Oscar Zhao in the [facility].      Click to link to [facility] HEART CARE NOTE    Thank you, Dr. Jones, for asking me to see Oscar Zhao in consultation at [facility] to evaluate fluctuated blood pressure.      Assessment/Plan:   Benign essential hypertension, fluctuated blood pressure: Patient has fluctuated blood pressure most likely secondary to deconditioning.  He had right hip surgery twice over last several months.  She did not do much exercise.  Now she feels better and able to do more exercise activities.  Her blood pressure actually is controlled at this time with amlodipine 5 mg daily, metoprolol succinate 25 mg daily.  Lisinopril was discontinued.  She will monitor her blood pressure and let me know if her numbers is too low or too high.  As long as her systolic blood pressure between 100 and 140, she will be fine.  Encouraged her to gradually increase exercise activities.  Sinus tachycardia: Continue metoprolol succinate 25 mg daily.  Dyspnea on exertion: Most likely secondary to deconditioning.  However she is 78, did not have cardiac evaluation so far.  Echocardiogram is requested for evaluation of heart function and structure, also rule out to heart dysfunction or pulmonary hypertension related dyspnea on exertion.    Thank you for the opportunity to be involved in the care of Oscar Zhao. If you have any questions, please feel free to contact me.  I will see the patient again in 12 months and as needed    Much or all of the text in this note was generated through the use of Dragon Dictate voice-to-text software. Errors in spelling or words which seem out of context are unintentional.   Sound alike errors, in particular, may have escaped editing.       History of  Present Illness:   It is my pleasure to see Oscar Zhao at the [facility] clinic for evaluation of fluctuated blood pressure. Oscar Zhao is a 78 year old female with a medical history of benign essential hypertension, chronic fatigue, sinus tachycardia.    The patient states that she had dislocation of internal right hip prosthesis.  She had surgery twice this year.  She feels better at this time.  She did not do exercise since the beginning of this year.  After surgery, her blood pressure has been fluctuated.  Sometimes lower.  Lisinopril 10 mg was discontinued.  Currently she is on metoprolol succinate 25 mg daily, amlodipine 5 mg daily.  She denies chest pain.  She complains of dyspnea on exertion.  She had fluttering heartbeats the same as before.  She had no dizziness, orthopnea, PND or leg edema.  Her blood pressure and heart rate are in normal range today.    Past Medical History:     Patient Active Problem List   Diagnosis    Essential hypertension    Malignant neoplasm of anal canal (H)    Scleritis, bilateral    Chronic fatigue    Dislocation of internal right hip prosthesis, initial encounter (H24)    Failure of right total hip arthroplasty, subsequent encounter    Closed dislocation of right hip, initial encounter (H)       Past Surgical History:     Past Surgical History:   Procedure Laterality Date    ARTHROPLASTY REVISION HIP Right 2023    Procedure: RIGHT HIP REVISION TOTAL HIP ARTHROPLASTY;  Surgeon: Bill Bernal DO;  Location: [facility] OR    ARTHROPLASTY REVISION HIP Right 2023    Procedure: RIGHT REVISION TOTAL HIP ARTHROPLASTY HEAD LINER EXCHANGE;  Surgeon: Bill Bernal DO;  Location: [facility] OR    BREAST CYST EXCISION Right     benign     SECTION      CLOSED REDUCTION HIP Right 2019    Procedure: CLOSED REDUCTION, HIP;  Surgeon: Jak Ruiz DO;  Location: [facility];  Service: Orthopedics    CLOSED  REDUCTION HIP Right 8/30/2023    Procedure: CLOSED REDUCTION, HIP RIGHT;  Surgeon: Aditya Pedroza MD;  Location: [facility] OR    CLOSED REDUCTION HIP Right 11/4/2023    Procedure: CLOSED REDUCTION, HIP RIGHT;  Surgeon: Jak Allen MD;  Location: [facility]    COLONOSCOPY  09/07/2011    COLONOSCOPY  09/27/2005    COLONOSCOPY W/ BIOPSIES AND POLYPECTOMY  02/18/2021    16 to 20 mm polyp:tubular adenoma in the ascending colon.  Ulcerated mass in the anal canal: Moderately differentiated squamous cell cancer.    ESOPHAGOSCOPY, GASTROSCOPY, DUODENOSCOPY (EGD), COMBINED  05/05/2017    Large hiatal hernia.  Reactive gastropathy with mucosal erosion.  H. pylori negative.    HERNIORRHAPHY FEMORAL Left 9/7/2021    Procedure: LEFT FEMORAL HERNIA REPAIR;  Surgeon: Sidney Murray MD;  Location: [facility]    HYSTERECTOMY TOTAL ABDOMINAL, BILATERAL SALPINGO-OOPHORECTOMY, COMBINED  1996    IR CHEST PORT PLACEMENT > 5 YRS OF AGE  3/15/2021    IR PORT PLACEMENT >5 YEARS  03/15/2021    Right IJ port-a-cath.    IR PORT REMOVAL RIGHT  7/14/2021    LAPAROSCOPIC APPENDECTOMY  04/28/2011    PHACOEMULSIFICATION CLEAR CORNEA WITH STANDARD INTRAOCULAR LENS IMPLANT Right 08/30/2017    PHACOEMULSIFICATION CLEAR CORNEA WITH STANDARD INTRAOCULAR LENS IMPLANT Left 09/13/2017    TOTAL HIP ARTHROPLASTY Right 08/18/2015    Procedure: HIP TOTAL ARTHROPLASTY, RIGHT;  Surgeon: Star Du MD;  Location: [facility];  Service:     [facility] TOTAL KNEE ARTHROPLASTY Left 03/02/2017    Procedure: LEFT TOTAL KNEE ARTHROPLASTY;  Surgeon: Star Du MD;  Location: [facility];  Service: Orthopedics       Family History:     Family History   Problem Relation Age of Onset    Alzheimer Disease Mother     Heart Disease Father     Pancreatic Cancer Brother 72.00    No Known Problems Daughter     Anesthesia Reaction No family hx of        Social History:    reports that she has never smoked. She has never been  exposed to tobacco smoke. She has never used smokeless tobacco. She reports current alcohol use of about 4.0 standard drinks of alcohol per week. She reports that she does not use drugs.    Review of Systems:   12 systems are reviewed negative except for in HPI.    Meds:     Current Outpatient Medications:     acetaminophen (TYLENOL) 325 MG tablet, Take 2 tablets (650 mg) by mouth every 4 hours as needed for other (mild pain) (Patient taking differently: Take 650 mg by mouth as needed for other (mild pain)), Disp: 100 tablet, Rfl: 0    amLODIPine (NORVASC) 5 MG tablet, Take 5 mg by mouth daily, Disp: , Rfl:     hydrOXYzine (ATARAX) 10 MG tablet, Take 1 tablet (10 mg) by mouth every 6 hours as needed for itching or anxiety (with pain, moderate pain), Disp: 30 tablet, Rfl: 0    LOTEMAX 0.5 % ophthalmic suspension, Place 1 drop into both eyes every 48 hours, Disp: , Rfl: 4    metoprolol succinate ER (TOPROL XL) 25 MG 24 hr tablet, Take 25 mg by mouth daily, Disp: , Rfl:     timolol maleate (TIMOPTIC) 0.5 % ophthalmic solution, Place 1 drop into both eyes daily, Disp: , Rfl:      Allergies:   Adalimumab, Aspirin, Brimonidine-timolol [brimonidine tartrate-timolol], Levaquin [levofloxacin], and Tetracyclines & related    Objective:      Physical Exam  58.5 kg (129 lb)     Body mass index is 22.85 kg/m .  /64 (BP Location: Left arm, Patient Position: Sitting, Cuff Size: Adult Regular)   Pulse 84   Resp 18   Wt 58.5 kg (129 lb)   LMP  (LMP Unknown)   SpO2 94%   BMI 22.85 kg/m      General Appearance:   Awake, Alert, No acute distress.   HEENT:  Pupil equal, reactive to light. No scleral icterus; the mucous membranes were moist. No oral ulcers or thrush.    Neck: No cervical bruits. No JVD. No thyromegaly. No lymph node enlargement or tenderness.   Chest: The spine was straight. The chest was symmetric.   Lungs:   Respirations unlabored. Lungs are clear to auscultation. No crackles. No wheezing.   Cardiovascular:  "  RRR, normal first and second heart sounds with no murmurs. No rubs or gallops.    Abdomen:  Soft. No tenderness. Non-distended. Bowels sounds are present   Extremities: Equal posterior tibial pulses. No leg edema.   Skin: No rashes or ulcers. Warm, Dry.   Musculoskeletal: No tenderness. No deformity.   Neurologic: Mood and affect are appropriate. No focal deficits.         EKG:  Personally reivewed  Sinus rhythm   Cannot rule out Anterior infarct (cited on or before 04-DEC-2018)   Abnormal ECG   When compared with ECG of 30-AUG-2020 22:55,   Vent. rate has decreased BY  42 BPM   Nonspecific T wave abnormality no longer evident in Lateral leads     Cardiac Imaging Studies       Lab Review   Lab Results   Component Value Date     11/13/2023    CO2 24 11/04/2023    CO2 20 08/30/2023    BUN 9.8 11/04/2023    BUN 11 08/30/2023     Lab Results   Component Value Date    WBC 7.3 11/04/2023    HGB 10.5 11/14/2023    HCT 37.9 11/04/2023    MCV 85 11/04/2023     11/04/2023     Lab Results   Component Value Date    CHOL 163 10/23/2023    TRIG 129 10/23/2023    HDL 64 10/23/2023    LDL 73 10/23/2023     LDL Cholesterol Calculated   Date Value Ref Range Status   10/23/2023 73 <=100 mg/dL Final     Lab Results   Component Value Date    TROPONINI <0.01 12/04/2018     No results found for: \"BNP\"  Lab Results   Component Value Date    TSH 0.74 10/02/2023    TSH 1.21 05/24/2022                   Thank you for allowing me to participate in the care of your patient.      Sincerely,     Royce Perez MD     [facility]  cc:   No referring provider defined for this encounter.      "

## 2023-12-13 NOTE — PATIENT INSTRUCTIONS
Oscar Zhao,    It is my pleasure to see you today at the [facility].    My recommendations for this visit are:    ECHO to evaluate heart function and structure.  Continue amlodipine 5 mg daily and metoprolol succinate 25 mg daily.  Hold amlodipine if your blood pressure is less than 105 in systolic.  Please call me if your blood pressure is higher than 140 in systolic, or less than 100 in systolic.  Will see you again.  Will call you to discuss your ECHO findings.    Royce Perez MD, PhD

## 2023-12-13 NOTE — PROGRESS NOTES
Click to link to [facility] HEART CARE NOTE    Thank you, Dr. Jones, for asking me to see Oscar Zhao in consultation at [facility] to evaluate fluctuated blood pressure.      Assessment/Plan:   Benign essential hypertension, fluctuated blood pressure: Patient has fluctuated blood pressure most likely secondary to deconditioning.  He had right hip surgery twice over last several months.  She did not do much exercise.  Now she feels better and able to do more exercise activities.  Her blood pressure actually is controlled at this time with amlodipine 5 mg daily, metoprolol succinate 25 mg daily.  Lisinopril was discontinued.  She will monitor her blood pressure and let me know if her numbers is too low or too high.  As long as her systolic blood pressure between 100 and 140, she will be fine.  Encouraged her to gradually increase exercise activities.  Sinus tachycardia: Continue metoprolol succinate 25 mg daily.  Dyspnea on exertion: Most likely secondary to deconditioning.  However she is 78, did not have cardiac evaluation so far.  Echocardiogram is requested for evaluation of heart function and structure, also rule out to heart dysfunction or pulmonary hypertension related dyspnea on exertion.    Thank you for the opportunity to be involved in the care of Oscar Zhao. If you have any questions, please feel free to contact me.  I will see the patient again in 12 months and as needed    Much or all of the text in this note was generated through the use of Dragon Dictate voice-to-text software. Errors in spelling or words which seem out of context are unintentional.   Sound alike errors, in particular, may have escaped editing.       History of Present Illness:   It is my pleasure to see Oscar Zhao at the [facility] for evaluation of fluctuated blood pressure. Oscar Zhao is a 78 year old female with a medical history of benign  essential hypertension, chronic fatigue, sinus tachycardia.    The patient states that she had dislocation of internal right hip prosthesis.  She had surgery twice this year.  She feels better at this time.  She did not do exercise since the beginning of this year.  After surgery, her blood pressure has been fluctuated.  Sometimes lower.  Lisinopril 10 mg was discontinued.  Currently she is on metoprolol succinate 25 mg daily, amlodipine 5 mg daily.  She denies chest pain.  She complains of dyspnea on exertion.  She had fluttering heartbeats the same as before.  She had no dizziness, orthopnea, PND or leg edema.  Her blood pressure and heart rate are in normal range today.    Past Medical History:     Patient Active Problem List   Diagnosis    Essential hypertension    Malignant neoplasm of anal canal (H)    Scleritis, bilateral    Chronic fatigue    Dislocation of internal right hip prosthesis, initial encounter (H24)    Failure of right total hip arthroplasty, subsequent encounter    Closed dislocation of right hip, initial encounter (H)       Past Surgical History:     Past Surgical History:   Procedure Laterality Date    ARTHROPLASTY REVISION HIP Right 2023    Procedure: RIGHT HIP REVISION TOTAL HIP ARTHROPLASTY;  Surgeon: Bill Bernal DO;  Location: [facility] OR    ARTHROPLASTY REVISION HIP Right 2023    Procedure: RIGHT REVISION TOTAL HIP ARTHROPLASTY HEAD LINER EXCHANGE;  Surgeon: Bill Bernal DO;  Location: [facility] OR    BREAST CYST EXCISION Right     benign     SECTION      CLOSED REDUCTION HIP Right 2019    Procedure: CLOSED REDUCTION, HIP;  Surgeon: Jak Ruiz DO;  Location: [facility] OR;  Service: Orthopedics    CLOSED REDUCTION HIP Right 2023    Procedure: CLOSED REDUCTION, HIP RIGHT;  Surgeon: Aditya Pedroza MD;  Location: [facility] OR    CLOSED REDUCTION HIP Right 2023    Procedure: CLOSED REDUCTION, HIP RIGHT;  Surgeon:  Jak Allen MD;  Location: [facility]    COLONOSCOPY  09/07/2011    COLONOSCOPY  09/27/2005    COLONOSCOPY W/ BIOPSIES AND POLYPECTOMY  02/18/2021    16 to 20 mm polyp:tubular adenoma in the ascending colon.  Ulcerated mass in the anal canal: Moderately differentiated squamous cell cancer.    ESOPHAGOSCOPY, GASTROSCOPY, DUODENOSCOPY (EGD), COMBINED  05/05/2017    Large hiatal hernia.  Reactive gastropathy with mucosal erosion.  H. pylori negative.    HERNIORRHAPHY FEMORAL Left 9/7/2021    Procedure: LEFT FEMORAL HERNIA REPAIR;  Surgeon: Sidney Murray MD;  Location: [facility]    HYSTERECTOMY TOTAL ABDOMINAL, BILATERAL SALPINGO-OOPHORECTOMY, COMBINED  1996    IR CHEST PORT PLACEMENT > 5 YRS OF AGE  3/15/2021    IR PORT PLACEMENT >5 YEARS  03/15/2021    Right IJ port-a-cath.    IR PORT REMOVAL RIGHT  7/14/2021    LAPAROSCOPIC APPENDECTOMY  04/28/2011    PHACOEMULSIFICATION CLEAR CORNEA WITH STANDARD INTRAOCULAR LENS IMPLANT Right 08/30/2017    PHACOEMULSIFICATION CLEAR CORNEA WITH STANDARD INTRAOCULAR LENS IMPLANT Left 09/13/2017    TOTAL HIP ARTHROPLASTY Right 08/18/2015    Procedure: HIP TOTAL ARTHROPLASTY, RIGHT;  Surgeon: Star Du MD;  Location: [facility];  Service:     [facility] TOTAL KNEE ARTHROPLASTY Left 03/02/2017    Procedure: LEFT TOTAL KNEE ARTHROPLASTY;  Surgeon: Star Du MD;  Location: [facility] OR;  Service: Orthopedics       Family History:     Family History   Problem Relation Age of Onset    Alzheimer Disease Mother     Heart Disease Father     Pancreatic Cancer Brother 72.00    No Known Problems Daughter     Anesthesia Reaction No family hx of        Social History:    reports that she has never smoked. She has never been exposed to tobacco smoke. She has never used smokeless tobacco. She reports current alcohol use of about 4.0 standard drinks of alcohol per week. She reports that she does not use drugs.    Review of Systems:   12 systems are  reviewed negative except for in HPI.    Meds:     Current Outpatient Medications:     acetaminophen (TYLENOL) 325 MG tablet, Take 2 tablets (650 mg) by mouth every 4 hours as needed for other (mild pain) (Patient taking differently: Take 650 mg by mouth as needed for other (mild pain)), Disp: 100 tablet, Rfl: 0    amLODIPine (NORVASC) 5 MG tablet, Take 5 mg by mouth daily, Disp: , Rfl:     hydrOXYzine (ATARAX) 10 MG tablet, Take 1 tablet (10 mg) by mouth every 6 hours as needed for itching or anxiety (with pain, moderate pain), Disp: 30 tablet, Rfl: 0    LOTEMAX 0.5 % ophthalmic suspension, Place 1 drop into both eyes every 48 hours, Disp: , Rfl: 4    metoprolol succinate ER (TOPROL XL) 25 MG 24 hr tablet, Take 25 mg by mouth daily, Disp: , Rfl:     timolol maleate (TIMOPTIC) 0.5 % ophthalmic solution, Place 1 drop into both eyes daily, Disp: , Rfl:      Allergies:   Adalimumab, Aspirin, Brimonidine-timolol [brimonidine tartrate-timolol], Levaquin [levofloxacin], and Tetracyclines & related    Objective:      Physical Exam  58.5 kg (129 lb)     Body mass index is 22.85 kg/m .  /64 (BP Location: Left arm, Patient Position: Sitting, Cuff Size: Adult Regular)   Pulse 84   Resp 18   Wt 58.5 kg (129 lb)   LMP  (LMP Unknown)   SpO2 94%   BMI 22.85 kg/m      General Appearance:   Awake, Alert, No acute distress.   HEENT:  Pupil equal, reactive to light. No scleral icterus; the mucous membranes were moist. No oral ulcers or thrush.    Neck: No cervical bruits. No JVD. No thyromegaly. No lymph node enlargement or tenderness.   Chest: The spine was straight. The chest was symmetric.   Lungs:   Respirations unlabored. Lungs are clear to auscultation. No crackles. No wheezing.   Cardiovascular:   RRR, normal first and second heart sounds with no murmurs. No rubs or gallops.    Abdomen:  Soft. No tenderness. Non-distended. Bowels sounds are present   Extremities: Equal posterior tibial pulses. No leg edema.   Skin:  "No rashes or ulcers. Warm, Dry.   Musculoskeletal: No tenderness. No deformity.   Neurologic: Mood and affect are appropriate. No focal deficits.         EKG:  Personally reivewed  Sinus rhythm   Cannot rule out Anterior infarct (cited on or before 04-DEC-2018)   Abnormal ECG   When compared with ECG of 30-AUG-2020 22:55,   Vent. rate has decreased BY  42 BPM   Nonspecific T wave abnormality no longer evident in Lateral leads     Cardiac Imaging Studies       Lab Review   Lab Results   Component Value Date     11/13/2023    CO2 24 11/04/2023    CO2 20 08/30/2023    BUN 9.8 11/04/2023    BUN 11 08/30/2023     Lab Results   Component Value Date    WBC 7.3 11/04/2023    HGB 10.5 11/14/2023    HCT 37.9 11/04/2023    MCV 85 11/04/2023     11/04/2023     Lab Results   Component Value Date    CHOL 163 10/23/2023    TRIG 129 10/23/2023    HDL 64 10/23/2023    LDL 73 10/23/2023     LDL Cholesterol Calculated   Date Value Ref Range Status   10/23/2023 73 <=100 mg/dL Final     Lab Results   Component Value Date    TROPONINI <0.01 12/04/2018     No results found for: \"BNP\"  Lab Results   Component Value Date    TSH 0.74 10/02/2023    TSH 1.21 05/24/2022               "

## 2023-12-21 ENCOUNTER — HOSPITAL ENCOUNTER (OUTPATIENT)
Dept: CARDIOLOGY | Facility: CLINIC | Age: 78
Discharge: HOME | End: 2023-12-21
Attending: INTERNAL MEDICINE | Admitting: INTERNAL MEDICINE
Payer: MEDICARE

## 2023-12-21 LAB — LVEF ECHO: NORMAL

## 2023-12-21 PROCEDURE — 93306 TTE W/DOPPLER COMPLETE: CPT

## 2023-12-21 PROCEDURE — 93306 TTE W/DOPPLER COMPLETE: CPT | Mod: 26 | Performed by: INTERNAL MEDICINE

## 2023-12-23 ENCOUNTER — HEALTH MAINTENANCE LETTER (OUTPATIENT)
Age: 78
End: 2023-12-23

## 2024-01-03 ENCOUNTER — TRANSFERRED RECORDS (OUTPATIENT)
Dept: HEALTH INFORMATION MANAGEMENT | Facility: CLINIC | Age: 79
End: 2024-01-03
Payer: MEDICARE

## 2024-01-27 ENCOUNTER — TELEPHONE (OUTPATIENT)
Dept: INTERNAL MEDICINE | Facility: CLINIC | Age: 79
End: 2024-01-27
Payer: MEDICARE

## 2024-01-27 NOTE — TELEPHONE ENCOUNTER
Reason for Call:  Appointment Request    Patient requesting this type of appt:  sinus    Requested provider: Jak Jones    Reason patient unable to be scheduled: Not within requested timeframe    When does patient want to be seen/preferred time: Same day    Comments: sinus 3+weeks     Could we send this information to you in [facility] or would you prefer to receive a phone call?:   Patient would prefer a phone call   Okay to leave a detailed message?: Yes at Cell number on file:    Telephone Information:   Mobile [phone]       Call taken on 1/27/2024 at 8:17 AM by Cherie Traore

## 2024-01-30 ENCOUNTER — OFFICE VISIT (OUTPATIENT)
Dept: FAMILY MEDICINE | Facility: CLINIC | Age: 79
End: 2024-01-30
Payer: MEDICARE

## 2024-01-30 VITALS
BODY MASS INDEX: 22.68 KG/M2 | HEIGHT: 63 IN | WEIGHT: 128 LBS | DIASTOLIC BLOOD PRESSURE: 80 MMHG | SYSTOLIC BLOOD PRESSURE: 128 MMHG | HEART RATE: 66 BPM | TEMPERATURE: 98 F | OXYGEN SATURATION: 99 %

## 2024-01-30 DIAGNOSIS — H66.003 NON-RECURRENT ACUTE SUPPURATIVE OTITIS MEDIA OF BOTH EARS WITHOUT SPONTANEOUS RUPTURE OF TYMPANIC MEMBRANES: ICD-10-CM

## 2024-01-30 DIAGNOSIS — C21.1 MALIGNANT NEOPLASM OF ANAL CANAL (H): ICD-10-CM

## 2024-01-30 DIAGNOSIS — I10 ESSENTIAL HYPERTENSION: ICD-10-CM

## 2024-01-30 DIAGNOSIS — J01.40 ACUTE NON-RECURRENT PANSINUSITIS: Primary | ICD-10-CM

## 2024-01-30 PROCEDURE — 99214 OFFICE O/P EST MOD 30 MIN: CPT | Performed by: FAMILY MEDICINE

## 2024-01-30 RX ORDER — AMOXICILLIN 875 MG
875 TABLET ORAL 2 TIMES DAILY
Qty: 20 TABLET | Refills: 0 | Status: ON HOLD | OUTPATIENT
Start: 2024-01-30 | End: 2024-02-12

## 2024-01-30 RX ORDER — RESPIRATORY SYNCYTIAL VIRUS VACCINE 120MCG/0.5
0.5 KIT INTRAMUSCULAR ONCE
Qty: 1 EACH | Refills: 0 | Status: CANCELLED | OUTPATIENT
Start: 2024-01-30 | End: 2024-01-30

## 2024-01-30 RX ORDER — FLUTICASONE PROPIONATE 50 MCG
1 SPRAY, SUSPENSION (ML) NASAL DAILY
Qty: 16 G | Refills: 2 | Status: SHIPPED | OUTPATIENT
Start: 2024-01-30 | End: 2024-02-08

## 2024-01-30 ASSESSMENT — PAIN SCALES - GENERAL: PAINLEVEL: MILD PAIN (2)

## 2024-01-30 NOTE — PROGRESS NOTES
Assessment & Plan     Acute non-recurrent pansinusitis  New problem.  Start amoxicillin and Flonase.  - amoxicillin (AMOXIL) 875 MG tablet; Take 1 tablet (875 mg) by mouth 2 times daily for 10 days  - fluticasone (FLONASE) 50 MCG/ACT nasal spray; Spray 1 spray into both nostrils daily    Non-recurrent acute suppurative otitis media of both ears without spontaneous rupture of tympanic membranes  New problem.  Start amoxicillin and Flonase.    Essential hypertension  Controlled.  Continue metoprolol.    Malignant neoplasm of anal canal (H)  Status posttreatment and being [facility].                Chinmay Moore is a 79 year old, presenting for the following health issues:  Sinus Problem (Started 3 weeks ago. Discuss sinus infection, bilateral ear pain, sore throat, headache, runny nose) and Establish Care (Discuss PCP)        1/30/2024     7:35 AM   Additional Questions   Roomed by [facility], Visit Facilitator     History of Present Illness       Reason for visit:  Sinus infection  Symptom onset:  3-4 weeks ago  Symptoms include:  Ear ache headache runnynose  Symptom intensity:  Severe  Symptom progression:  Staying the same  Had these symptoms before:  Yes  Has tried/received treatment for these symptoms:  Yes  Previous treatment was successful:  Yes  Prior treatment description:  Antibiotics  What makes it worse:  No  What makes it better:  No    She eats 4 or more servings of fruits and vegetables daily.She consumes 1 sweetened beverage(s) daily.She exercises with enough effort to increase her heart rate 20 to 29 minutes per day.    She is taking medications regularly.     Sinus infection: Symptoms have been present for the last 3 weeks and getting worse. There is sinus pressure and teeth pain. She is hearing her heart beat in her ears and now moving down to her throat and changing her voice. She has tried Tylenol, Advil, nasal saline spray, without relief.    HTN: Off  "amlodipine and lisinopril, only on metoprolol.              Review of Systems  No fever      Objective    /80 (BP Location: Left arm, Patient Position: Sitting, Cuff Size: Adult Regular)   Pulse 66   Temp 98  F (36.7  C) (Oral)   Ht 1.6 m (5' 3\")   Wt 58.1 kg (128 lb)   LMP  (LMP Unknown)   SpO2 99%   BMI 22.67 kg/m    Body mass index is 22.67 kg/m .  Physical Exam   GENERAL: alert and no distress  EYES: Eyes grossly normal to inspection, PERRL and conjunctivae and sclerae normal  HENT: normal cephalic/atraumatic, both ears: mucopurulent effusion, nose and mouth without ulcers or lesions, oropharynx clear, oral mucous membranes moist, and sinuses: not tender  NECK: no adenopathy, no asymmetry, masses, or scars  RESP: lungs clear to auscultation - no rales, rhonchi or wheezes            Signed Electronically by: Flavio James MD    "

## 2024-02-01 ENCOUNTER — HOSPITAL ENCOUNTER (OUTPATIENT)
Dept: MAMMOGRAPHY | Facility: CLINIC | Age: 79
Discharge: HOME | End: 2024-02-01
Attending: INTERNAL MEDICINE | Admitting: INTERNAL MEDICINE
Payer: MEDICARE

## 2024-02-01 DIAGNOSIS — I10 ESSENTIAL HYPERTENSION: Primary | ICD-10-CM

## 2024-02-01 DIAGNOSIS — Z12.31 VISIT FOR SCREENING MAMMOGRAM: ICD-10-CM

## 2024-02-01 PROCEDURE — 77063 BREAST TOMOSYNTHESIS BI: CPT

## 2024-02-01 RX ORDER — METOPROLOL SUCCINATE 25 MG/1
50 TABLET, EXTENDED RELEASE ORAL DAILY
Qty: 180 TABLET | Refills: 11 | Status: SHIPPED | OUTPATIENT
Start: 2024-02-01 | End: 2024-02-08

## 2024-02-08 ENCOUNTER — APPOINTMENT (OUTPATIENT)
Dept: ULTRASOUND IMAGING | Facility: CLINIC | Age: 79
DRG: 299 | End: 2024-02-08
Attending: EMERGENCY MEDICINE
Payer: MEDICARE

## 2024-02-08 ENCOUNTER — TELEPHONE (OUTPATIENT)
Dept: RADIATION ONCOLOGY | Facility: CLINIC | Age: 79
End: 2024-02-08

## 2024-02-08 ENCOUNTER — HOSPITAL ENCOUNTER (OUTPATIENT)
Dept: CT IMAGING | Facility: CLINIC | Age: 79
Discharge: HOME | DRG: 299 | End: 2024-02-08
Attending: STUDENT IN AN ORGANIZED HEALTH CARE EDUCATION/TRAINING PROGRAM
Payer: MEDICARE

## 2024-02-08 ENCOUNTER — HOSPITAL ENCOUNTER (OUTPATIENT)
Dept: MRI IMAGING | Facility: CLINIC | Age: 79
Discharge: HOME | DRG: 299 | End: 2024-02-08
Attending: STUDENT IN AN ORGANIZED HEALTH CARE EDUCATION/TRAINING PROGRAM
Payer: MEDICARE

## 2024-02-08 ENCOUNTER — HOSPITAL ENCOUNTER (INPATIENT)
Facility: CLINIC | Age: 79
LOS: 4 days | Discharge: HOME | DRG: 299 | End: 2024-02-12
Attending: EMERGENCY MEDICINE | Admitting: FAMILY MEDICINE
Payer: MEDICARE

## 2024-02-08 DIAGNOSIS — I26.99 BILATERAL PULMONARY EMBOLISM (H): ICD-10-CM

## 2024-02-08 DIAGNOSIS — D64.9 ANEMIA, UNSPECIFIED TYPE: ICD-10-CM

## 2024-02-08 DIAGNOSIS — I82.4Y1 ACUTE DEEP VEIN THROMBOSIS (DVT) OF PROXIMAL VEIN OF RIGHT LOWER EXTREMITY (H): ICD-10-CM

## 2024-02-08 DIAGNOSIS — C21.1 MALIGNANT NEOPLASM OF ANAL CANAL (H): ICD-10-CM

## 2024-02-08 DIAGNOSIS — I10 ESSENTIAL HYPERTENSION: ICD-10-CM

## 2024-02-08 DIAGNOSIS — D50.9 MICROCYTIC ANEMIA: Primary | ICD-10-CM

## 2024-02-08 LAB
ABO/RH(D): NORMAL
ALBUMIN SERPL BCG-MCNC: 4.4 G/DL (ref 3.5–5.2)
ALP SERPL-CCNC: 87 U/L (ref 40–150)
ALT SERPL W P-5'-P-CCNC: 13 U/L (ref 0–50)
ANION GAP SERPL CALCULATED.3IONS-SCNC: 9 MMOL/L (ref 7–15)
ANTIBODY SCREEN: NEGATIVE
APTT PPP: 26 SECONDS (ref 22–38)
AST SERPL W P-5'-P-CCNC: 26 U/L (ref 0–45)
ATRIAL RATE - MUSE: 73 BPM
BASOPHILS # BLD AUTO: 0 10E3/UL (ref 0–0.2)
BASOPHILS NFR BLD AUTO: 0 %
BILIRUB DIRECT SERPL-MCNC: <0.2 MG/DL (ref 0–0.3)
BILIRUB SERPL-MCNC: 0.8 MG/DL
BLD PROD TYP BPU: NORMAL
BLD PROD TYP BPU: NORMAL
BLOOD COMPONENT TYPE: NORMAL
BLOOD COMPONENT TYPE: NORMAL
BUN SERPL-MCNC: 15.3 MG/DL (ref 8–23)
CALCIUM SERPL-MCNC: 10 MG/DL (ref 8.8–10.2)
CHLORIDE SERPL-SCNC: 102 MMOL/L (ref 98–107)
CODING SYSTEM: NORMAL
CODING SYSTEM: NORMAL
CREAT BLD-MCNC: 0.6 MG/DL (ref 0.6–1.1)
CREAT SERPL-MCNC: 0.53 MG/DL (ref 0.51–0.95)
CROSSMATCH: NORMAL
CROSSMATCH: NORMAL
CRP SERPL-MCNC: 3.08 MG/L
DEPRECATED HCO3 PLAS-SCNC: 22 MMOL/L (ref 22–29)
DIASTOLIC BLOOD PRESSURE - MUSE: 78 MMHG
EGFRCR SERPLBLD CKD-EPI 2021: >60 ML/MIN/1.73M2
EGFRCR SERPLBLD CKD-EPI 2021: >90 ML/MIN/1.73M2
EOSINOPHIL # BLD AUTO: 0.2 10E3/UL (ref 0–0.7)
EOSINOPHIL NFR BLD AUTO: 3 %
ERYTHROCYTE [DISTWIDTH] IN BLOOD BY AUTOMATED COUNT: 19.2 % (ref 10–15)
GLUCOSE SERPL-MCNC: 104 MG/DL (ref 70–99)
HCT VFR BLD AUTO: 26.2 % (ref 35–47)
HGB BLD-MCNC: 6.9 G/DL (ref 11.7–15.7)
HOLD SPECIMEN: NORMAL
IMM GRANULOCYTES # BLD: 0 10E3/UL
IMM GRANULOCYTES NFR BLD: 0 %
INR PPP: 1.08 (ref 0.85–1.15)
INTERPRETATION ECG - MUSE: NORMAL
ISSUE DATE AND TIME: NORMAL
ISSUE DATE AND TIME: NORMAL
LDH SERPL L TO P-CCNC: 222 U/L (ref 0–250)
LYMPHOCYTES # BLD AUTO: 1.2 10E3/UL (ref 0.8–5.3)
LYMPHOCYTES NFR BLD AUTO: 18 %
MCH RBC QN AUTO: 17.9 PG (ref 26.5–33)
MCHC RBC AUTO-ENTMCNC: 26.3 G/DL (ref 31.5–36.5)
MCV RBC AUTO: 68 FL (ref 78–100)
MONOCYTES # BLD AUTO: 0.5 10E3/UL (ref 0–1.3)
MONOCYTES NFR BLD AUTO: 7 %
NEUTROPHILS # BLD AUTO: 5 10E3/UL (ref 1.6–8.3)
NEUTROPHILS NFR BLD AUTO: 72 %
NRBC # BLD AUTO: 0 10E3/UL
NRBC BLD AUTO-RTO: 1 /100
NT-PROBNP SERPL-MCNC: 332 PG/ML (ref 0–1800)
P AXIS - MUSE: 32 DEGREES
PLATELET # BLD AUTO: 255 10E3/UL (ref 150–450)
POTASSIUM SERPL-SCNC: 4.8 MMOL/L (ref 3.4–5.3)
PR INTERVAL - MUSE: 186 MS
PROT SERPL-MCNC: 7.1 G/DL (ref 6.4–8.3)
QRS DURATION - MUSE: 70 MS
QT - MUSE: 380 MS
QTC - MUSE: 418 MS
R AXIS - MUSE: 45 DEGREES
RADIOLOGIST FLAGS: ABNORMAL
RBC # BLD AUTO: 3.85 10E6/UL (ref 3.8–5.2)
RETICS # AUTO: 0.08 10E6/UL (ref 0.01–0.11)
RETICS/RBC NFR AUTO: 2.1 % (ref 0.8–2.7)
SODIUM SERPL-SCNC: 133 MMOL/L (ref 135–145)
SPECIMEN EXPIRATION DATE: NORMAL
SYSTOLIC BLOOD PRESSURE - MUSE: 132 MMHG
T AXIS - MUSE: 41 DEGREES
TROPONIN T SERPL HS-MCNC: 14 NG/L
UNIT ABO/RH: NORMAL
UNIT ABO/RH: NORMAL
UNIT NUMBER: NORMAL
UNIT NUMBER: NORMAL
UNIT STATUS: NORMAL
UNIT STATUS: NORMAL
UNIT TYPE ISBT: 5100
UNIT TYPE ISBT: 5100
VENTRICULAR RATE- MUSE: 73 BPM
WBC # BLD AUTO: 7 10E3/UL (ref 4–11)

## 2024-02-08 PROCEDURE — 82374 ASSAY BLOOD CARBON DIOXIDE: CPT | Performed by: EMERGENCY MEDICINE

## 2024-02-08 PROCEDURE — 85025 COMPLETE CBC W/AUTO DIFF WBC: CPT | Performed by: EMERGENCY MEDICINE

## 2024-02-08 PROCEDURE — 250N000011 HC RX IP 250 OP 636: Performed by: STUDENT IN AN ORGANIZED HEALTH CARE EDUCATION/TRAINING PROGRAM

## 2024-02-08 PROCEDURE — 250N000013 HC RX MED GY IP 250 OP 250 PS 637

## 2024-02-08 PROCEDURE — 96374 THER/PROPH/DIAG INJ IV PUSH: CPT

## 2024-02-08 PROCEDURE — A9585 GADOBUTROL INJECTION: HCPCS | Performed by: STUDENT IN AN ORGANIZED HEALTH CARE EDUCATION/TRAINING PROGRAM

## 2024-02-08 PROCEDURE — 71260 CT THORAX DX C+: CPT | Mod: MG

## 2024-02-08 PROCEDURE — 210N000002 HC R&B HEART CARE

## 2024-02-08 PROCEDURE — 36415 COLL VENOUS BLD VENIPUNCTURE: CPT | Performed by: EMERGENCY MEDICINE

## 2024-02-08 PROCEDURE — P9016 RBC LEUKOCYTES REDUCED: HCPCS | Performed by: EMERGENCY MEDICINE

## 2024-02-08 PROCEDURE — 82248 BILIRUBIN DIRECT: CPT | Performed by: EMERGENCY MEDICINE

## 2024-02-08 PROCEDURE — 83615 LACTATE (LD) (LDH) ENZYME: CPT

## 2024-02-08 PROCEDURE — 82565 ASSAY OF CREATININE: CPT

## 2024-02-08 PROCEDURE — 85520 HEPARIN ASSAY: CPT | Performed by: EMERGENCY MEDICINE

## 2024-02-08 PROCEDURE — 86923 COMPATIBILITY TEST ELECTRIC: CPT | Performed by: EMERGENCY MEDICINE

## 2024-02-08 PROCEDURE — G1010 CDSM STANSON: HCPCS

## 2024-02-08 PROCEDURE — 85730 THROMBOPLASTIN TIME PARTIAL: CPT | Performed by: EMERGENCY MEDICINE

## 2024-02-08 PROCEDURE — 86140 C-REACTIVE PROTEIN: CPT

## 2024-02-08 PROCEDURE — 85045 AUTOMATED RETICULOCYTE COUNT: CPT

## 2024-02-08 PROCEDURE — 36430 TRANSFUSION BLD/BLD COMPNT: CPT

## 2024-02-08 PROCEDURE — 93005 ELECTROCARDIOGRAM TRACING: CPT | Performed by: EMERGENCY MEDICINE

## 2024-02-08 PROCEDURE — 84484 ASSAY OF TROPONIN QUANT: CPT | Performed by: EMERGENCY MEDICINE

## 2024-02-08 PROCEDURE — 85610 PROTHROMBIN TIME: CPT | Performed by: EMERGENCY MEDICINE

## 2024-02-08 PROCEDURE — 99285 EMERGENCY DEPT VISIT HI MDM: CPT | Mod: 25

## 2024-02-08 PROCEDURE — 93970 EXTREMITY STUDY: CPT

## 2024-02-08 PROCEDURE — 255N000002 HC RX 255 OP 636: Performed by: STUDENT IN AN ORGANIZED HEALTH CARE EDUCATION/TRAINING PROGRAM

## 2024-02-08 PROCEDURE — 83880 ASSAY OF NATRIURETIC PEPTIDE: CPT | Performed by: EMERGENCY MEDICINE

## 2024-02-08 PROCEDURE — 83010 ASSAY OF HAPTOGLOBIN QUANT: CPT

## 2024-02-08 PROCEDURE — 250N000011 HC RX IP 250 OP 636: Performed by: EMERGENCY MEDICINE

## 2024-02-08 PROCEDURE — 86900 BLOOD TYPING SEROLOGIC ABO: CPT | Performed by: EMERGENCY MEDICINE

## 2024-02-08 RX ORDER — AMOXICILLIN 250 MG
2 CAPSULE ORAL 2 TIMES DAILY PRN
Status: DISCONTINUED | OUTPATIENT
Start: 2024-02-08 | End: 2024-02-12 | Stop reason: HOSPADM

## 2024-02-08 RX ORDER — GADOBUTROL 604.72 MG/ML
6 INJECTION INTRAVENOUS ONCE
Status: COMPLETED | OUTPATIENT
Start: 2024-02-08 | End: 2024-02-08

## 2024-02-08 RX ORDER — ACETAMINOPHEN 325 MG/1
650 TABLET ORAL EVERY 4 HOURS PRN
Status: DISCONTINUED | OUTPATIENT
Start: 2024-02-08 | End: 2024-02-12 | Stop reason: HOSPADM

## 2024-02-08 RX ORDER — LIDOCAINE 40 MG/G
CREAM TOPICAL
Status: DISCONTINUED | OUTPATIENT
Start: 2024-02-08 | End: 2024-02-12 | Stop reason: HOSPADM

## 2024-02-08 RX ORDER — PANTOPRAZOLE SODIUM 40 MG/1
40 TABLET, DELAYED RELEASE ORAL
Status: DISCONTINUED | OUTPATIENT
Start: 2024-02-09 | End: 2024-02-08

## 2024-02-08 RX ORDER — HEPARIN SODIUM 10000 [USP'U]/100ML
0-5000 INJECTION, SOLUTION INTRAVENOUS CONTINUOUS
Status: DISPENSED | OUTPATIENT
Start: 2024-02-08 | End: 2024-02-11

## 2024-02-08 RX ORDER — CALCIUM CARBONATE 500 MG/1
1000 TABLET, CHEWABLE ORAL 4 TIMES DAILY PRN
Status: DISCONTINUED | OUTPATIENT
Start: 2024-02-08 | End: 2024-02-12 | Stop reason: HOSPADM

## 2024-02-08 RX ORDER — ACETAMINOPHEN 650 MG/1
650 SUPPOSITORY RECTAL EVERY 4 HOURS PRN
Status: DISCONTINUED | OUTPATIENT
Start: 2024-02-08 | End: 2024-02-12 | Stop reason: HOSPADM

## 2024-02-08 RX ORDER — PANTOPRAZOLE SODIUM 40 MG/1
40 TABLET, DELAYED RELEASE ORAL
Status: DISCONTINUED | OUTPATIENT
Start: 2024-02-09 | End: 2024-02-09

## 2024-02-08 RX ORDER — METOPROLOL SUCCINATE 25 MG/1
25 TABLET, EXTENDED RELEASE ORAL DAILY
Status: ON HOLD | COMMUNITY
End: 2024-02-12

## 2024-02-08 RX ORDER — ONDANSETRON 4 MG/1
4 TABLET, ORALLY DISINTEGRATING ORAL EVERY 6 HOURS PRN
Status: DISCONTINUED | OUTPATIENT
Start: 2024-02-08 | End: 2024-02-12 | Stop reason: HOSPADM

## 2024-02-08 RX ORDER — IOPAMIDOL 755 MG/ML
90 INJECTION, SOLUTION INTRAVASCULAR ONCE
Status: COMPLETED | OUTPATIENT
Start: 2024-02-08 | End: 2024-02-08

## 2024-02-08 RX ORDER — METOPROLOL SUCCINATE 25 MG/1
25 TABLET, EXTENDED RELEASE ORAL DAILY
Status: DISCONTINUED | OUTPATIENT
Start: 2024-02-09 | End: 2024-02-12 | Stop reason: HOSPADM

## 2024-02-08 RX ORDER — ONDANSETRON 2 MG/ML
4 INJECTION INTRAMUSCULAR; INTRAVENOUS EVERY 6 HOURS PRN
Status: DISCONTINUED | OUTPATIENT
Start: 2024-02-08 | End: 2024-02-12 | Stop reason: HOSPADM

## 2024-02-08 RX ORDER — AMOXICILLIN 875 MG
875 TABLET ORAL ONCE
Status: COMPLETED | OUTPATIENT
Start: 2024-02-08 | End: 2024-02-08

## 2024-02-08 RX ORDER — TIMOLOL MALEATE 5 MG/ML
1 SOLUTION/ DROPS OPHTHALMIC DAILY
Status: DISCONTINUED | OUTPATIENT
Start: 2024-02-09 | End: 2024-02-12 | Stop reason: HOSPADM

## 2024-02-08 RX ORDER — AMOXICILLIN 250 MG
1 CAPSULE ORAL 2 TIMES DAILY PRN
Status: DISCONTINUED | OUTPATIENT
Start: 2024-02-08 | End: 2024-02-12 | Stop reason: HOSPADM

## 2024-02-08 RX ORDER — LOTEPREDNOL ETABONATE 5 MG/ML
1 SUSPENSION/ DROPS OPHTHALMIC
Status: DISCONTINUED | OUTPATIENT
Start: 2024-02-10 | End: 2024-02-12 | Stop reason: HOSPADM

## 2024-02-08 RX ADMIN — AMOXICILLIN 875 MG: 875 TABLET, FILM COATED ORAL at 21:40

## 2024-02-08 RX ADMIN — HEPARIN SODIUM 1050 UNITS/HR: 10000 INJECTION, SOLUTION INTRAVENOUS at 17:45

## 2024-02-08 RX ADMIN — GLUCAGON 0.5 MG: 1 INJECTION, POWDER, LYOPHILIZED, FOR SOLUTION INTRAMUSCULAR; INTRAVENOUS at 12:14

## 2024-02-08 RX ADMIN — GADOBUTROL 6 ML: 604.72 INJECTION INTRAVENOUS at 13:10

## 2024-02-08 RX ADMIN — IOPAMIDOL 90 ML: 755 INJECTION, SOLUTION INTRAVENOUS at 11:38

## 2024-02-08 RX ADMIN — GLUCAGON 0.5 MG: 1 INJECTION, POWDER, LYOPHILIZED, FOR SOLUTION INTRAMUSCULAR; INTRAVENOUS at 13:01

## 2024-02-08 ASSESSMENT — ACTIVITIES OF DAILY LIVING (ADL)
ADLS_ACUITY_SCORE: 38
ADLS_ACUITY_SCORE: 40
ADLS_ACUITY_SCORE: 38
ADLS_ACUITY_SCORE: 38

## 2024-02-08 NOTE — PROGRESS NOTES
Patient is not diabetic. Does not have insulinoma or pheochromocytoma. Glucagon given per protocol.    Megan Perez RN  2/8/2024  12:27 PM

## 2024-02-08 NOTE — TELEPHONE ENCOUNTER
Dr. Spencer received call from diagnostic radiology today with some urgent incidental findings on patient's scans done earlier this morning.  Called patient per Dr. Spencer's direction instructing her to come back to [facility] for treatment of blood clots found in her lungs on her scan.  Was unable to reach patient and left her a detailed voicemail with a callback number.  Called patient daughter Kaylie as she is listed on consent to communicate as patient's emergency contact.  Had to leave a message for Kaylie with similar information and told her to call us if there is any questions or concerns

## 2024-02-08 NOTE — ED PROVIDER NOTES
Emergency Department Encounter     Evaluation Date & Time:   No admission date for patient encounter.    CHIEF COMPLAINT:  blood clots      Triage Note:Pt states she had an MRI for a cancer check up and was told to come right back because she had blood clots. Report not available to this writer but pt said it is in her lung and leg. Pt not sure details. No pain. Pt not on blood thinners. Today was last day for amoxicillin for sinus infection. Denies chest pain or shortness of breath but has felt rather run down recently.               ED COURSE & MEDICAL DECISION MAKING:     Pt sent in for further evaluation and management of PE and DVT found today on inicidental CT imaging of her chest abd/pelvis for routine surveillance of previous anal cancer. Pt, on further questioning, does admit to some dyspnea on exertion, but attributed it to being less active following some hip surgeries, including last one in October.  No current chest pain or dyspnea here and vitally well.  Her right leg doesn't even appear swollen and she has no tenderness with a warm and well perfused foot distally.  No prior DVT/PE history and cancer in remission. Will get labs, start heparin, hospitalize.      ED Course as of 02/08/24 1954   Thu Feb 08, 2024   1502 I spoke with radiation oncologist, Dr. Spencer, who called to let me know they were called regarding pt's CT that was being done for routine surveillance. Ended up showing b/l PE with RLE DVT.  Pt sent in for anticoagulation, US of legs.   1601 Hgb 6.9, which is down from 10.5 two months ago.  Pt has absolutely no bleeding history.  Will speak with hematology.  Suspect heparin still best option as it has fast on/off ability if she drops hemoglobin.     1614 I re-interviewed pt and she adamantly denies any bleeding recently.  Denies black stools and knows what they look like as she had an ulcer in the remote past with dark stools.  Rectal exam shows light brown stool, nothing concerning.   She remains vitally well.      Pt consented for blood transfusion, signed written consent with nursing witnessing.     1651 I spoke with hematologist, Dr. Bernal, who agrees with heparin.     1805 hsTrop reassuring at 14.  Chemistry reassuring with normal BUN.   1837 US shows extensive RLE DVT. Will speak with IR to see if she's an intervention candidate before admitting here.  Leg is warm/well perfused, no actual pain or real significant swelling.   1842 Spoke with Dr. Stevens, interventional radiology. Discussed ultrasound results and recommendations.  Review images and history and nothing to do from IR standpoint, ok to stay here at [facility].   1918 Updated [facility] resident on discussion with IR. The resident accepts the patient for admission.         Medical Decision Making    History:  Supplemental history from: Documented in chart and Family Member/Significant Other  External Record(s) reviewed: Outpatient Record: Communication from radiation oncologist today    Work Up:  Chart documentation includes differential considered and any EKGs or imaging independently interpreted by provider, where specified.  In additional to work up documented, I considered the following work up: Documented in chart, if applicable.    External consultation:  Discussion of management with another provider: Hematology - Oncology    Complicating factors:  Care impacted by chronic illness: Other: hx of cancer  Care affected by social determinants of health: N/A    Disposition considerations: Admit.      At the conclusion of the encounter I discussed the results of all the tests and the disposition. The questions were answered. The patient or family acknowledged understanding and was agreeable with the care plan.      MEDICATIONS GIVEN IN THE EMERGENCY DEPARTMENT:  Medications   heparin 25,000 units in 0.45% NaCl 250 mL ANTICOAGULANT infusion (1,050 Units/hr Intravenous $New Bag 2/8/24 1807)   heparin ANTICOAGULANT Loading dose for  HIGH INTENSITY TREATMENT * Give BEFORE starting heparin infusion (4,700 Units Intravenous $Given 24 9975)       NEW PRESCRIPTIONS STARTED AT TODAY'S ED VISIT:  New Prescriptions    No medications on file       HPI   HPI     Oscar Zhao is a 79 year old female with a pertinent history of previous anal cancer in remission, no longer under any treatment for past 3 years who presents to this ED for evaluation of blood clots found on CT imaging today. Pt had routine surveillance imaging performed today and found to have b/l PE and RLE DVT with no right heart strain.  Pt, on further questioning, does admit to some dyspnea on exertion, but attributed this to being less active due to hip problems recently. She had hip surgery for recurrent dislocation last October.  Denies leg pain/swelling, chest pain, syncope, recent bleeding history, hx of dvt/pe. Pt no longer receiving any sort of treatment for cancer, but follows closely. Denies any other acute complaints.      REVIEW OF SYSTEMS:  See HPI      Medical History     Past Medical History:   Diagnosis Date    Anemia, unspecified type 2024    Arthritis     Basal cell carcinoma of anterior chest     Breast cyst     History of anesthesia complications     HLA B27 (HLA B27 positive)     Hypertension     Osteoporosis 2011    Peripheral neuropathy 2018    PONV (postoperative nausea and vomiting)     Scleritis, bilateral     Squamous cell carcinoma of skin of chest     Steroid induced glaucoma, both eyes        Past Surgical History:   Procedure Laterality Date    ARTHROPLASTY REVISION HIP Right 2023    Procedure: RIGHT HIP REVISION TOTAL HIP ARTHROPLASTY;  Surgeon: Bill Bernal DO;  Location: [facility] Main OR    ARTHROPLASTY REVISION HIP Right 2023    Procedure: RIGHT REVISION TOTAL HIP ARTHROPLASTY HEAD LINER EXCHANGE;  Surgeon: Bill Bernal DO;  Location: [facility] Main OR    BREAST CYST EXCISION Right     benign     SECTION   1989    CLOSED REDUCTION HIP Right 03/26/2019    Procedure: CLOSED REDUCTION, HIP;  Surgeon: Jak Ruiz DO;  Location: [facility] OR;  Service: Orthopedics    CLOSED REDUCTION HIP Right 8/30/2023    Procedure: CLOSED REDUCTION, HIP RIGHT;  Surgeon: Aditya Pedroza MD;  Location: [facility]    CLOSED REDUCTION HIP Right 11/4/2023    Procedure: CLOSED REDUCTION, HIP RIGHT;  Surgeon: Jak Allen MD;  Location: [facility]    COLONOSCOPY  09/07/2011    COLONOSCOPY  09/27/2005    COLONOSCOPY W/ BIOPSIES AND POLYPECTOMY  02/18/2021    16 to 20 mm polyp:tubular adenoma in the ascending colon.  Ulcerated mass in the anal canal: Moderately differentiated squamous cell cancer.    ESOPHAGOSCOPY, GASTROSCOPY, DUODENOSCOPY (EGD), COMBINED  05/05/2017    Large hiatal hernia.  Reactive gastropathy with mucosal erosion.  H. pylori negative.    HERNIORRHAPHY FEMORAL Left 9/7/2021    Procedure: LEFT FEMORAL HERNIA REPAIR;  Surgeon: Sidney Murray MD;  Location: [facility]    HYSTERECTOMY TOTAL ABDOMINAL, BILATERAL SALPINGO-OOPHORECTOMY, COMBINED  1996    IR CHEST PORT PLACEMENT > 5 YRS OF AGE  3/15/2021    IR PORT PLACEMENT >5 YEARS  03/15/2021    Right IJ port-a-cath.    IR PORT REMOVAL RIGHT  7/14/2021    LAPAROSCOPIC APPENDECTOMY  04/28/2011    PHACOEMULSIFICATION CLEAR CORNEA WITH STANDARD INTRAOCULAR LENS IMPLANT Right 08/30/2017    PHACOEMULSIFICATION CLEAR CORNEA WITH STANDARD INTRAOCULAR LENS IMPLANT Left 09/13/2017    TOTAL HIP ARTHROPLASTY Right 08/18/2015    Procedure: HIP TOTAL ARTHROPLASTY, RIGHT;  Surgeon: Star Du MD;  Location: [facility] OR;  Service:     [facility] TOTAL KNEE ARTHROPLASTY Left 03/02/2017    Procedure: LEFT TOTAL KNEE ARTHROPLASTY;  Surgeon: Star Du MD;  Location: [facility] Main OR;  Service: Orthopedics       Family History   Problem Relation Age of Onset    Alzheimer Disease Mother     Heart Disease Father     Pancreatic Cancer Brother  "72.00    No Known Problems Daughter     Anesthesia Reaction No family hx of        Social History     Tobacco Use    Smoking status: Never     Passive exposure: Never    Smokeless tobacco: Never   Vaping Use    Vaping Use: Never used   Substance Use Topics    Alcohol use: Yes     Alcohol/week: 4.0 standard drinks of alcohol     Types: 4 Standard drinks or equivalent per week     Comment: 3-4/wk    Drug use: No       amoxicillin (AMOXIL) 875 MG tablet  LOTEMAX 0.5 % ophthalmic suspension  metoprolol succinate ER (TOPROL XL) 25 MG 24 hr tablet  timolol maleate (TIMOPTIC) 0.5 % ophthalmic solution        Physical Exam     Vitals:  BP (!) 143/88   Pulse 72   Temp 98  F (36.7  C) (Oral)   Resp 12   Ht 1.6 m (5' 3\")   Wt 59 kg (130 lb)   LMP  (LMP Unknown)   SpO2 100%   BMI 23.03 kg/m      PHYSICAL EXAM:   Physical Exam  Vitals and nursing note reviewed.   Constitutional:       General: She is not in acute distress.     Appearance: Normal appearance.   HENT:      Head: Normocephalic and atraumatic.      Nose: Nose normal.      Mouth/Throat:      Mouth: Mucous membranes are moist.   Eyes:      Pupils: Pupils are equal, round, and reactive to light.   Neck:      Vascular: No JVD.   Cardiovascular:      Rate and Rhythm: Normal rate and regular rhythm.      Pulses: Normal pulses.           Radial pulses are 2+ on the right side and 2+ on the left side.        Dorsalis pedis pulses are 2+ on the right side and 2+ on the left side.   Pulmonary:      Effort: Pulmonary effort is normal. No respiratory distress.      Breath sounds: Normal breath sounds.   Abdominal:      Palpations: Abdomen is soft.      Tenderness: There is no abdominal tenderness.      Comments: Digital rectal exam reveals only light brown stool.  No melena or bright red blood.   Musculoskeletal:      Cervical back: Full passive range of motion without pain and neck supple.      Comments: No calf tenderness or swelling b/l   Skin:     General: Skin is " warm.      Findings: No rash.   Neurological:      General: No focal deficit present.      Mental Status: She is alert. Mental status is at baseline.      Comments: Fluent speech, no acute lateralizing deficits   Psychiatric:         Mood and Affect: Mood normal.         Behavior: Behavior normal.           Results     LAB:  All pertinent labs reviewed and interpreted  Labs Ordered and Resulted from Time of ED Arrival to Time of ED Departure   BASIC METABOLIC PANEL - Abnormal       Result Value    Sodium 133 (*)     Potassium 4.8      Chloride 102      Carbon Dioxide (CO2) 22      Anion Gap 9      Urea Nitrogen 15.3      Creatinine 0.53      GFR Estimate >90      Calcium 10.0      Glucose 104 (*)    CBC WITH PLATELETS AND DIFFERENTIAL - Abnormal    WBC Count 7.0      RBC Count 3.85      Hemoglobin 6.9 (*)     Hematocrit 26.2 (*)     MCV 68 (*)     MCH 17.9 (*)     MCHC 26.3 (*)     RDW 19.2 (*)     Platelet Count 255      % Neutrophils 72      % Lymphocytes 18      % Monocytes 7      % Eosinophils 3      % Basophils 0      % Immature Granulocytes 0      NRBCs per 100 WBC 1 (*)     Absolute Neutrophils 5.0      Absolute Lymphocytes 1.2      Absolute Monocytes 0.5      Absolute Eosinophils 0.2      Absolute Basophils 0.0      Absolute Immature Granulocytes 0.0      Absolute NRBCs 0.0     INR - Normal    INR 1.08     PARTIAL THROMBOPLASTIN TIME - Normal    aPTT 26     HEPATIC FUNCTION PANEL - Normal    Protein Total 7.1      Albumin 4.4      Bilirubin Total 0.8      Alkaline Phosphatase 87      AST 26      ALT 13      Bilirubin Direct <0.20     TROPONIN T, HIGH SENSITIVITY - Normal    Troponin T, High Sensitivity 14     NT PROBNP INPATIENT - Normal    N terminal Pro BNP Inpatient 332     HEPARIN UNFRACTIONATED ANTI XA LEVEL   HEMOGLOBIN   TYPE AND SCREEN, ADULT    ABO/RH(D) O POS      Antibody Screen Negative      SPECIMEN EXPIRATION DATE [id number]     PREPARE RED BLOOD CELLS (UNIT)    Blood Component Type Red  Blood Cells      Product Code B5699R33      Unit Status Ready for issue      Unit Number [id number]      CROSSMATCH Compatible      CODING SYSTEM ELFA313     PREPARE RED BLOOD CELLS (UNIT)    Blood Component Type Red Blood Cells      Product Code G0109K43      Unit Status Transfused      Unit Number [id number]      CROSSMATCH Compatible      CODING SYSTEM [id number]      ISSUE DATE AND TIME [id number]      UNIT ABO/RH O+      UNIT TYPE ISBT 5100     PREPARE RED BLOOD CELLS (UNIT)   TRANSFUSE RED BLOOD CELLS (UNIT)   ABO/RH TYPE AND SCREEN       RADIOLOGY:  US Lower Extremity Venous Duplex Bilateral   Final Result   IMPRESSION:      1.  Extensive right leg DVT. Recommend interventional radiology consultation.      2.  No left-sided DVT.                      ECG:  NSR, rate 73, normal intervals, no acute ischemia    I have independently reviewed and interpreted the EKG(s) documented above     PROCEDURES:  Procedures:  none      FINAL IMPRESSION:    ICD-10-CM    1. Bilateral pulmonary embolism (H)  I26.99       2. Anemia, unspecified type  D64.9       3. Acute deep vein thrombosis (DVT) of proximal vein of right lower extremity (H)  I82.4Y1           CRITICAL CARE  55 minutes of critical care time spent managing PE and DVT with need for IV heparin infusion, multiple consultant calls.  Time spent speaking with pt and consultants, documenting, as well as reviewing labs/imaging.  Independent of any procedures performed.        Star Gavin DO  Emergency Medicine  [facility] EMERGENCY ROOM  2/8/2024  3:01 PM          Star Gavin MD  02/08/24 1954

## 2024-02-08 NOTE — PHARMACY-ADMISSION MEDICATION HISTORY
Pharmacist Admission Medication History    Admission medication history is complete. The information provided in this note is only as accurate as the sources available at the time of the update.    Information Source(s): Patient via in-person    Pertinent Information:     Changes made to PTA medication list:  Added: None  Deleted: no longer taking amlodipine  Changed: None    Allergies reviewed with patient and updates made in EHR: yes    Medication History Completed By: Denys Espinoza RPH 2/8/2024 4:36 PM    PTA Med List   Medication Sig Last Dose    amoxicillin (AMOXIL) 875 MG tablet Take 1 tablet (875 mg) by mouth 2 times daily for 10 days (Patient taking differently: Take 875 mg by mouth 2 times daily Has one dose left to finish 10 day course for sinusitis) 2/8/2024    LOTEMAX 0.5 % ophthalmic suspension Place 1 drop into both eyes every 48 hours 2/8/2024 at will bring in    metoprolol succinate ER (TOPROL XL) 25 MG 24 hr tablet Take 25 mg by mouth daily 2/8/2024    timolol maleate (TIMOPTIC) 0.5 % ophthalmic solution Place 1 drop into both eyes daily 2/8/2024 at will bring in

## 2024-02-08 NOTE — ED TRIAGE NOTES
Pt states she had a CT and MRI for a cancer check up and was told to come right back because she had blood clots. Pt has Bilateral PE and DVT.. Pt not sure details. No pain. Pt not on blood thinners. Today was last day for amoxicillin for sinus infection. Denies chest pain or shortness of breath but has felt rather run down recently.

## 2024-02-09 DIAGNOSIS — D50.0 IRON DEFICIENCY ANEMIA DUE TO CHRONIC BLOOD LOSS: Primary | ICD-10-CM

## 2024-02-09 LAB
BASOPHILS # BLD AUTO: 0.1 10E3/UL (ref 0–0.2)
BASOPHILS NFR BLD AUTO: 1 %
EOSINOPHIL # BLD AUTO: 0.3 10E3/UL (ref 0–0.7)
EOSINOPHIL NFR BLD AUTO: 5 %
ERYTHROCYTE [DISTWIDTH] IN BLOOD BY AUTOMATED COUNT: 22.3 % (ref 10–15)
ERYTHROCYTE [DISTWIDTH] IN BLOOD BY AUTOMATED COUNT: 22.4 % (ref 10–15)
ERYTHROCYTE [SEDIMENTATION RATE] IN BLOOD BY WESTERGREN METHOD: 6 MM/HR (ref 0–30)
FERRITIN SERPL-MCNC: 13 NG/ML (ref 11–328)
HAPTOGLOB SERPL-MCNC: 101 MG/DL (ref 30–200)
HCT VFR BLD AUTO: 31.8 % (ref 35–47)
HCT VFR BLD AUTO: 32.8 % (ref 35–47)
HGB BLD-MCNC: 9.4 G/DL (ref 11.7–15.7)
HGB BLD-MCNC: 9.6 G/DL (ref 11.7–15.7)
IMM GRANULOCYTES # BLD: 0 10E3/UL
IMM GRANULOCYTES NFR BLD: 0 %
LYMPHOCYTES # BLD AUTO: 1.7 10E3/UL (ref 0.8–5.3)
LYMPHOCYTES NFR BLD AUTO: 26 %
MCH RBC QN AUTO: 21.1 PG (ref 26.5–33)
MCH RBC QN AUTO: 21.1 PG (ref 26.5–33)
MCHC RBC AUTO-ENTMCNC: 29.3 G/DL (ref 31.5–36.5)
MCHC RBC AUTO-ENTMCNC: 29.6 G/DL (ref 31.5–36.5)
MCV RBC AUTO: 72 FL (ref 78–100)
MCV RBC AUTO: 72 FL (ref 78–100)
MONOCYTES # BLD AUTO: 0.5 10E3/UL (ref 0–1.3)
MONOCYTES NFR BLD AUTO: 8 %
NEUTROPHILS # BLD AUTO: 3.9 10E3/UL (ref 1.6–8.3)
NEUTROPHILS NFR BLD AUTO: 60 %
NRBC # BLD AUTO: 0 10E3/UL
NRBC BLD AUTO-RTO: 1 /100
PATH REPORT.COMMENTS IMP SPEC: NORMAL
PATH REPORT.COMMENTS IMP SPEC: NORMAL
PATH REPORT.FINAL DX SPEC: NORMAL
PLATELET # BLD AUTO: 194 10E3/UL (ref 150–450)
PLATELET # BLD AUTO: 203 10E3/UL (ref 150–450)
RBC # BLD AUTO: 4.45 10E6/UL (ref 3.8–5.2)
RBC # BLD AUTO: 4.56 10E6/UL (ref 3.8–5.2)
RETICS # AUTO: 0.07 10E6/UL (ref 0.01–0.11)
RETICS/RBC NFR AUTO: 1.6 % (ref 0.8–2.7)
UFH PPP CHRO-ACNC: 0.66 IU/ML
UFH PPP CHRO-ACNC: 0.68 IU/ML
WBC # BLD AUTO: 5.8 10E3/UL (ref 4–11)
WBC # BLD AUTO: 6.4 10E3/UL (ref 4–11)

## 2024-02-09 PROCEDURE — 85045 AUTOMATED RETICULOCYTE COUNT: CPT

## 2024-02-09 PROCEDURE — 210N000002 HC R&B HEART CARE

## 2024-02-09 PROCEDURE — 36415 COLL VENOUS BLD VENIPUNCTURE: CPT | Performed by: FAMILY MEDICINE

## 2024-02-09 PROCEDURE — 99223 1ST HOSP IP/OBS HIGH 75: CPT | Performed by: NURSE PRACTITIONER

## 2024-02-09 PROCEDURE — C9113 INJ PANTOPRAZOLE SODIUM, VIA: HCPCS | Performed by: PHYSICIAN ASSISTANT

## 2024-02-09 PROCEDURE — 85652 RBC SED RATE AUTOMATED: CPT

## 2024-02-09 PROCEDURE — 85060 BLOOD SMEAR INTERPRETATION: CPT | Performed by: PATHOLOGY

## 2024-02-09 PROCEDURE — 250N000011 HC RX IP 250 OP 636: Performed by: EMERGENCY MEDICINE

## 2024-02-09 PROCEDURE — 85520 HEPARIN ASSAY: CPT | Performed by: FAMILY MEDICINE

## 2024-02-09 PROCEDURE — 99223 1ST HOSP IP/OBS HIGH 75: CPT | Mod: AI

## 2024-02-09 PROCEDURE — 36415 COLL VENOUS BLD VENIPUNCTURE: CPT | Performed by: INTERNAL MEDICINE

## 2024-02-09 PROCEDURE — 85025 COMPLETE CBC W/AUTO DIFF WBC: CPT

## 2024-02-09 PROCEDURE — 85027 COMPLETE CBC AUTOMATED: CPT

## 2024-02-09 PROCEDURE — 250N000011 HC RX IP 250 OP 636: Performed by: PHYSICIAN ASSISTANT

## 2024-02-09 PROCEDURE — 36415 COLL VENOUS BLD VENIPUNCTURE: CPT

## 2024-02-09 PROCEDURE — 82728 ASSAY OF FERRITIN: CPT | Performed by: INTERNAL MEDICINE

## 2024-02-09 PROCEDURE — 250N000011 HC RX IP 250 OP 636: Performed by: NURSE PRACTITIONER

## 2024-02-09 PROCEDURE — 258N000003 HC RX IP 258 OP 636: Performed by: NURSE PRACTITIONER

## 2024-02-09 PROCEDURE — 250N000013 HC RX MED GY IP 250 OP 250 PS 637

## 2024-02-09 RX ORDER — MAGNESIUM CARB/ALUMINUM HYDROX 105-160MG
296 TABLET,CHEWABLE ORAL ONCE
Status: COMPLETED | OUTPATIENT
Start: 2024-02-12 | End: 2024-02-12

## 2024-02-09 RX ORDER — BISACODYL 5 MG
10 TABLET, DELAYED RELEASE (ENTERIC COATED) ORAL ONCE
Status: COMPLETED | OUTPATIENT
Start: 2024-02-11 | End: 2024-02-11

## 2024-02-09 RX ORDER — POLYETHYLENE GLYCOL 3350 17 G/17G
238 POWDER, FOR SOLUTION ORAL ONCE
Status: COMPLETED | OUTPATIENT
Start: 2024-02-11 | End: 2024-02-11

## 2024-02-09 RX ADMIN — IRON SUCROSE 300 MG: 20 INJECTION, SOLUTION INTRAVENOUS at 12:14

## 2024-02-09 RX ADMIN — METOPROLOL SUCCINATE 25 MG: 25 TABLET, EXTENDED RELEASE ORAL at 09:35

## 2024-02-09 RX ADMIN — PANTOPRAZOLE SODIUM 40 MG: 40 INJECTION, POWDER, FOR SOLUTION INTRAVENOUS at 20:12

## 2024-02-09 RX ADMIN — CALCIUM CARBONATE (ANTACID) CHEW TAB 500 MG 1000 MG: 500 CHEW TAB at 03:06

## 2024-02-09 RX ADMIN — HEPARIN SODIUM 1050 UNITS/HR: 10000 INJECTION, SOLUTION INTRAVENOUS at 12:13

## 2024-02-09 RX ADMIN — PANTOPRAZOLE SODIUM 40 MG: 40 TABLET, DELAYED RELEASE ORAL at 09:35

## 2024-02-09 ASSESSMENT — ACTIVITIES OF DAILY LIVING (ADL)
ADLS_ACUITY_SCORE: 40
ADLS_ACUITY_SCORE: 40
ADLS_ACUITY_SCORE: 23
ADLS_ACUITY_SCORE: 40
ADLS_ACUITY_SCORE: 31
CHANGE_IN_FUNCTIONAL_STATUS_SINCE_ONSET_OF_CURRENT_ILLNESS/INJURY: NO
ADLS_ACUITY_SCORE: 40
DOING_ERRANDS_INDEPENDENTLY_DIFFICULTY: NO
ADLS_ACUITY_SCORE: 23
DRESSING/BATHING_DIFFICULTY: NO
ADLS_ACUITY_SCORE: 40
FALL_HISTORY_WITHIN_LAST_SIX_MONTHS: NO
ADLS_ACUITY_SCORE: 22
WALKING_OR_CLIMBING_STAIRS_DIFFICULTY: NO
ADLS_ACUITY_SCORE: 23
ADLS_ACUITY_SCORE: 40
TOILETING_ISSUES: NO
ADLS_ACUITY_SCORE: 23

## 2024-02-09 NOTE — PROGRESS NOTES
[facility]    Progress Note - Hospitalist Service       Date of Admission:  2/8/2024    Assessment & Plan   Oscar Zhao is a 79 year old female admitted on 2/8/2024. She has a PMH including hypertension, sinus tachycardia, anal cancer in remission, R YAMILET revisions in 9/2023 and 10/2023 due to dislocations and is admitted with RLE DVT and bilateral PE found on routine imaging 2/8/24, also found to have symptomatic anemia. PE and DVT currently being treated with heparin gtt per Hematology.    RLE DVT  Bilateral PE, R > L  Incidentally found - hemodynamically stable, on RA. Does report progressive MARTIN and fatigue over the course of weeks; this could be related to VTE or also anemia as below. CT CAP showing lobar and segmental PE on R and segmental and subsegmental PE on the L; no evidence of R heart strain. No prior history of VTE and not on anticoagulation. VTE appears unprovoked - patient does have a history of anal cancer in remission and was treated with mitomycin and 5FU for chemotherapy, which could increase risk of VTE, but last treated 3 years ago. No recurrence of malignancy on CT CAP 2/9/24. Per chart review, does have a history of autoimmune-related eye disease and potentially peripheral neuropathy; no history of bleeding disorders, denies any family history of bleeding/clotting disorders.  -Admit to inpatient  -Heme/Onc consulted, appreciate recs              -Heparin drip   -Planning to see patient today  -IR consulted by ED provider, will not perform procedure for DVT  -Cardiac telemetry  -O2 as needed, continuous pulse ox     Symptomatic anemia of unknown etiology, possible acute blood loss anemia  Hgb 6.9 on admission; ED ordered 2 units pRBC. Patient has previous history of gastric ulcer with UGIB/tarry stools and denies any recent tarry stools. Rectal exam done by ED provider showed light brown stool. No hemoptysis or hematemesis. Indirect bilirubin, INR/PTT normal.  Most likely acute blood loss, but unclear source of bleeding - could potentially be recurrent gastric ulcer; patient has hx of gastric ulcers. History of EGD many years ago for gastric ulcer. Last colonoscopy in 2021, reports being due for one this year due to polyp.  -Heme/Onc consulted as above  -Status post 2 units pRBC on 2/8/24  -Hemolysis labs: haptoglobin, LDH, reticulocyte count, peripheral blood smear; also autoimmune labs: ESR, CRP  -PO pantoprazole 40 mg BID for potential UGIB as source  -Will proceed with EGD and colonoscopy to r/o GI bleed, orders place     Chronic medical conditions:  Sinusitis: was prescribed 10 days of amoxicillin by outpatient provider on 1/30; has one dose left - can finish.  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 Patient will bring these in.  Hypertension, sinus tachycardia: continue PTA metoprolol         Diet: Combination Diet Regular Diet Adult    DVT Prophylaxis: heparin gtt as above  Rodriguez Catheter: Not present  Fluids: PO  Lines: None     Cardiac Monitoring: ACTIVE order. Indication: bilateral PE  Code Status: Full Code      Clinically Significant Risk Factors Present on Admission                  # Hypertension: Noted on problem list          # Financial/Environmental Concerns:           Disposition Plan      Expected Discharge Date: 02/10/2024                The patient's care was discussed with the Attending Physician, Dr. Desir .    ALCIRA YOUNG MD  Hospitalist Service  [facility]  Securely message with [facility] (more info)  Text page via [employer] Paging/Directory   ______________________________________________________________________    Interval History   No acute events overnight. Feeling well this morning. Continues to denies any SOB, chest pain, or palpitations. Affirms history provided to Dr. Roger last night, see her note for details of events  leading up to admission. Anticipating that her daughter will be visiting sometime today. At baseline, she reports being very active. She also reports following PT recommendations rigorously after her various procedures. No other questions or concerns today.    Physical Exam   Vital Signs: Temp: 98  F (36.7  C) Temp src: Oral BP: 127/71 Pulse: 95   Resp: 22 SpO2: 98 % O2 Device: None (Room air)    Weight: 130 lbs 0 oz    General Appearance: Alert, lying in bed comfortably, no acute distress.  HEENT: Normocephalic, moist mucous membranes, no nasal discharge.  Respiratory: Lungs clear to auscultation bilaterally, no wheezes or crackles.  Cardiovascular: Regular rate and rhythm, normal S1 and S2, no murmurs.   GI: Soft, bowel sounds present, nontender to palpation.  Skin: No significant lesions or rashes on exposed skin.  Musculoskeletal: No gross musculoskeletal deformities. BLE without edema, skin changes, or tenderness to palpation.  Neurologic: Alert, answers questions appropriately. No apparent focal deficits.  Psychiatric: Pleasant mood and affect, engaged and good understanding of her health and current condition.      Data     I have personally reviewed the following data over the past 24 hrs:    5.8  \   9.6 (L)   / 203     133 (L) 102 15.3 /  104 (H)   4.8 22 0.53 \     ALT: 13 AST: 26 AP: 87 TBILI: 0.8   ALB: 4.4 TOT PROTEIN: 7.1 LIPASE: N/A     Trop: 14 BNP: 332     Procal: N/A CRP: 3.08 Lactic Acid: N/A       INR:  1.08 PTT:  26   D-dimer:  N/A Fibrinogen:  N/A     Ferritin:  N/A % Retic:  1.6 LDH:  222       Imaging results reviewed over the past 24 hrs:   Recent Results (from the past 24 hour(s))   CT Chest/Abdomen/Pelvis w Contrast   Result Value    Radiologist flags New diagnosis of pulmonary embolism (AA)    Narrative    EXAM: CT CHEST/ABDOMEN/PELVIS W CONTRAST  LOCATION: [facility]  DATE: 2/8/2024    INDICATION: Anal SCC s p SCRT cpmpleted 4 29 21 restaging  COMPARISON: Chest  CT from 10/25/2023, pelvic MRI from 02/02/2023, CT chest, abdomen, and pelvis from 01/10/2023  TECHNIQUE: CT scan of the chest, abdomen, and pelvis was performed following injection of IV contrast. Multiplanar reformats were obtained. Dose reduction techniques were used.   CONTRAST: 90 ML ISOVUE 370    FINDINGS:   LUNGS AND PLEURA: Similar 4 mm right upper lobe nodule on series 4 image 79. Continued attention on follow-up. No new or enlarging pulmonary nodules. Scattered areas of mild atelectasis/scarring.    MEDIASTINUM/AXILLAE: Heart size is normal. Large hiatal hernia. No adenopathy. Small volume bilateral pulmonary emboli. On the right side this is in the distal right pulmonary artery extending into the lobar and a few segmental branches. In the left lobe   this is predominantly segmental and subsegmental in the left lower lobe. No evidence of right heart strain.    CORONARY ARTERY CALCIFICATION: None.    HEPATOBILIARY: Probable mild hepatic steatosis. Tiny hypodensity in the right lobe of the liver remains too small to characterize but favored to be benign. Cholelithiasis. No biliary ductal dilation.    PANCREAS: Normal.    SPLEEN: Normal.    ADRENAL GLANDS: Normal.    KIDNEYS/BLADDER: No significant mass, stone, or hydronephrosis. The bladder is partially obscured by beam hardening artifact from hip arthroplasty and incompletely distended accentuating wall thickening.    BOWEL: Diverticulosis of the colon. No acute inflammatory change. No obstruction. Mild colonic stool burden.    LYMPH NODES: No new or enlarging adenopathy.    VASCULATURE: Questionable right lower extremity DVT in the superficial femoral vein. Hysterectomy.    PELVIC ORGANS: Hysterectomy.    MUSCULOSKELETAL: Heterogeneous osteopenia. No definitive osseous lesions. Right hip arthroplasty. Scarring changes and fat stranding in the right lateral hip soft tissues. Small right hip effusion.      Impression    IMPRESSION:  1.  No CT evidence of  recurrent or metastatic disease. See same day pelvic MRI for additional details.  2.  However, there are bilateral pulmonary emboli, right greater than left. No evidence of right heart strain.  3.  Additionally, there is right lower extremity DVT in the superficial femoral vein. Recommend right lower extremity DVT ultrasound.      [Critical Result: New diagnosis of pulmonary embolism]    Finding was identified on 2/8/2024 1:50 PM CST.     was contacted by me on 2/8/2024 1:58 PM CST and verbalized understanding of the critical result.    MR Pelvis (Intrapelvic Organs) wo&w Contrast    Narrative    EXAM: MRI PELVIS WITHOUT AND WITH IV CONTRAST  LOCATION: [facility]  DATE: 2/8/2024    INDICATION: anal SCC s p CRT 4 29 21  COMPARISON: Pelvic MRI 02/02/2023, CT abdomen and pelvis 02/08/2024    TECHNIQUE: Routine MRI pelvis without and with IV contrast. Axial, sagittal, and coronal high-resolution T2 and post gadolinium T1 with fat saturation.   CONTRAST: 6.0 mL gadavist    FINDINGS:     LOCAL TUMOR: No evidence of recurrent tumor.    LYMPH NODES: No lymphadenopathy in the pelvis.    ADDITIONAL FINDINGS: No evidence of metastatic disease in the pelvis. Redemonstrated DVT in the right femoral vein.      Impression    IMPRESSION:  1.  No evidence of recurrent disease in the pelvis.    2.  Right lower extremity DVT.   US Lower Extremity Venous Duplex Bilateral    Narrative    EXAM: US LOWER EXTREMITY VENOUS DUPLEX BILATERAL  LOCATION: [facility]  DATE: 2/8/2024    INDICATION: DVT on CT imaging, hx of cancer.  COMPARISON: None.  TECHNIQUE: Venous Duplex ultrasound of bilateral lower extremities with and without compression, augmentation and duplex. Color flow and spectral Doppler with waveform analysis performed.    FINDINGS: Exam includes the common femoral, femoral, popliteal veins as well as segmentally visualized deep calf veins and greater saphenous vein.     RIGHT: DVT  identified in the proximal through distal right femoral, popliteal and one of the proximal through distal posterior tibial veins, some regions nearly occlusive. The right peroneal vein is not visualized. No superficial thrombophlebitis. No   popliteal cyst.    LEFT: No deep vein thrombosis. No superficial thrombophlebitis. No popliteal cyst.      Impression    IMPRESSION:    1.  Extensive right leg DVT. Recommend interventional radiology consultation.    2.  No left-sided DVT.

## 2024-02-09 NOTE — H&P (VIEW-ONLY)
GI CONSULT NOTE      Name: Oscar Zhao  Medical Record #: [id number]  YOB: 1945  Date of Admission: 2/8/2024  Date/Time: 2/9/2024/1:40 PM     CHIEF COMPLAINT: Acute microcytic anemia     HISTORY OF PRESENT ILLNESS: We were asked to see Oscar Zhao by Dr Desir for evaluation of acute microcytic anemia. Oscar Zhao is a 79 year old year old female with history of basal cell carcinoma, htn, osteoarthritis, iritis (remotely treated with Humira, methotrexate, rituximab, and prednisone), and anal cancer diagnosed in February 2021 who was undergoing routine surveillance imaging for her history of anal cancer when she was found to have bilateral pulmonary emboli and DVT.  She was sent to the ER for further evaluation.  In the ER, the patient was found to have acute microcytic anemia with hemoglobin of 6.9 and MCV of 68 (hemoglobin of 10.5 November 14, 2023).  The patient has been started on weight-based heparin.  [facility] has been consulted for further evaluation of microcytic anemia.    The patient denies having any heartburn, nausea, vomiting, abdominal pain.  She reports her bowel habits have been normal.  She has not recently had any black stools but does report that when she was recovering from her hip surgery and on higher doses of aspirin she did note some dark stools.  Her body weight is stable.  She denies having chest pain. She reports having a poor exercise tolerance with dyspnea on exertion.  She does not take a PPI.    She underwent orthopedic hip surgery in September and October.  He was on high doses of aspirin for roughly 5 weeks.  Does not typically take NSAIDs but reports it is using 4 ibuprofen in the past 3 weeks.    She reports being followed by Dr. Reynolds of colorectal surgery in regards to her history of anal cancer.  She believes she has had exams every 3 to 4 months.  I am not able to find any recent flex sigmoidoscopy or colonoscopy reports. She is scheduled for colonoscopy at [facility]  2/20/24.   Her last colonoscopy was at the time of diagnosis February 18, 2021 by Dr. Todd which revealed an ulcerated anal mass lesion which was biopsied and returned with invasive moderately differentiated squamous cell carcinoma.  She also had an advanced adenoma removed from the ascending colon and internal hemorrhoids.    The patient had an upper endoscopy in 2017 that showed a large hiatal hernia and gastric erosion.  Gastric biopsy showed reactive gastropathy with no H. pylori.    REVIEW OF SYSTEMS (ROS): Complete review of systems negative other than listed in HPI.    PAST MEDICAL HISTORY:  Past Medical History:   Diagnosis Date    Anemia, unspecified type 2/8/2024    Arthritis     Basal cell carcinoma of anterior chest     Breast cyst     History of anesthesia complications     HLA B27 (HLA B27 positive)     Hypertension     Osteoporosis 07/19/2011    Peripheral neuropathy 09/12/2018    PONV (postoperative nausea and vomiting)     Scleritis, bilateral     Left 2010.  Treated with corticosteroids,, methotrexate and Humira.  2015.  Rituximab.    Squamous cell carcinoma of skin of chest     Steroid induced glaucoma, both eyes         FAMILY HISTORY:  Family History   Problem Relation Age of Onset    Alzheimer Disease Mother     Heart Disease Father     Pancreatic Cancer Brother 72.00    No Known Problems Daughter     Anesthesia Reaction No family hx of        SOCIAL HISTORY:        MEDICATIONS PRIOR TO ADMISSION:   Medications Prior to Admission   Medication Sig Dispense Refill Last Dose    amoxicillin (AMOXIL) 875 MG tablet Take 1 tablet (875 mg) by mouth 2 times daily for 10 days (Patient taking differently: Take 875 mg by mouth 2 times daily Has one dose left to finish 10 day course for sinusitis) 20 tablet 0 2/8/2024    LOTEMAX 0.5 % ophthalmic suspension Place 1 drop into both eyes every 48 hours  4 2/8/2024 at will bring in    metoprolol succinate ER (TOPROL XL) 25 MG 24 hr tablet Take 25 mg by mouth  "daily   2/8/2024    timolol maleate (TIMOPTIC) 0.5 % ophthalmic solution Place 1 drop into both eyes daily   2/8/2024 at will bring in          ALLERGIES: Adalimumab, Aspirin, Brimonidine-timolol [brimonidine tartrate-timolol], Levaquin [levofloxacin], and Tetracyclines & related    PHYSICAL EXAM:    /68 (BP Location: Right arm)   Pulse 80   Temp 98.5  F (36.9  C) (Oral)   Resp 23   Ht 1.6 m (5' 3\")   Wt 59.1 kg (130 lb 4.8 oz)   LMP  (LMP Unknown)   SpO2 97%   BMI 23.08 kg/m      GENERAL: cooperative, no obvious distress  EYES: No scleral icterus  LUNGS: Clear to auscultation bilaterally  HEART: S1S2, no lower extremity edema  ABDOMEN: Non-distended. Positive bowel sounds. Soft, non-tender, no guarding/rebound  MUSKULOSKELETAL:  Warm and well perfused, moves all extremities well  SKIN: No jaundice  NEUROLOGIC: Alert and oriented  PSYCHIATRIC: Normal affect    LAB DATA:  CMP Results:   Recent Labs   Lab Test 02/08/24  1537 02/08/24  1135 11/14/23  0540 11/13/23  1258 11/13/23  1255 11/05/23  0622 11/04/23  0533 11/04/23  0117 08/30/23  0609 06/20/23  0913   *  --   --   --  135  --  133* 133*   < > 138   POTASSIUM 4.8  --   --   --  4.3  --  4.1 4.0   < > 4.5   CHLORIDE 102  --   --   --   --   --  101 101   < > 104   CO2 22  --   --   --   --   --  24 22   < > 24   ANIONGAP 9  --   --   --   --   --  8 10   < > 10   *  --  126* 106*  --    < > 118* 128*   < > 108*   BUN 15.3  --   --   --   --   --  9.8 8.9   < > 13.1   CR 0.53 0.6  --   --   --   --  0.52 0.50*   < > 0.63   BILITOTAL 0.8  --   --   --   --   --  0.6  --   --  0.8   ALKPHOS 87  --   --   --   --   --  97  --   --  88   ALT 13  --   --   --   --   --  12  --   --  18   AST 26  --   --   --   --   --  20  --   --  21    < > = values in this interval not displayed.      CBC  Recent Labs   Lab 02/09/24  0716 02/09/24  0208 02/08/24  1537   WBC 5.8 6.4 7.0   RBC 4.56 4.45 3.85   HGB 9.6* 9.4* 6.9*   HCT 32.8* 31.8* 26.2*   MCV " "72* 72* 68*   MCH 21.1* 21.1* 17.9*   MCHC 29.3* 29.6* 26.3*   RDW 22.3* 22.4* 19.2*    194 255     INR  Recent Labs   Lab 02/08/24  1537   INR 1.08      No results found for: \"LIPASE\"    IMAGING:    CT chest/abdomen 2/8/24  IMPRESSION:  1.  No CT evidence of recurrent or metastatic disease. See same day pelvic MRI for additional details.  2.  However, there are bilateral pulmonary emboli, right greater than left. No evidence of right heart strain.  3.  Additionally, there is right lower extremity DVT in the superficial femoral vein. Recommend right lower extremity DVT ultrasound.    MR Pelvis 2/8/24  IMPRESSION:  1.  No evidence of recurrent disease in the pelvis.     2.  Right lower extremity DVT.    EXAM: US LOWER EXTREMITY VENOUS DUPLEX BILATERAL  LOCATION: [facility]  DATE: 2/8/2024     INDICATION: DVT on CT imaging, hx of cancer.  COMPARISON: None.  TECHNIQUE: Venous Duplex ultrasound of bilateral lower extremities with and without compression, augmentation and duplex. Color flow and spectral Doppler with waveform analysis performed.     FINDINGS: Exam includes the common femoral, femoral, popliteal veins as well as segmentally visualized deep calf veins and greater saphenous vein.      RIGHT: DVT identified in the proximal through distal right femoral, popliteal and one of the proximal through distal posterior tibial veins, some regions nearly occlusive. The right peroneal vein is not visualized. No superficial thrombophlebitis. No   popliteal cyst.     LEFT: No deep vein thrombosis. No superficial thrombophlebitis. No popliteal cyst.                                                                      IMPRESSION:     1.  Extensive right leg DVT. Recommend interventional radiology consultation.     2.  No left-sided DVT.  ASSESSMENT:  Bilateral pulmonary emboli and bilateral DVT (most extensive on right) and acute microcytic anemia-- This 78 yo female with history of anal " cancer diagnosed 2021, iritis, and osteoarthritis s/p revision right total hip arthroplasty Sept 2023 and closed reduction right total hip dislocation Nov 2023 who was admitted after outpatient imaging for anal cancer surveillance revealed bilateral pulmonary emboli and bilateral DVT.  The patient's hemoglobin was found to be acutely low at 6.9 with MCV of 68 (previous hgb 10.5 11/14/23).  She does not report having overt GI bleeding but does describe having dark stool after undergoing her hip surgery in September in the setting of taking higher doses of aspirin.  She has not been taking NSAIDs.      Possible sources of blood loss to account for microcytic anemia include peptic ulcer disease/gastric ulcers, Gato erosions associated with large hiatal hernia as noted on EGD in 2017, bleeding from polyps (advanced adenoma ascending colon 2/2021) or even malignancy.  For now, agree with IV iron replacement and weight-based heparin.  Will start twice daily IV PPI and follow hemoglobin closely.  Transfuse RBCs as needed.  Will tentatively plan for EGD and colonoscopy Monday, February 12.     PLAN:  Agree with IV iron replacement.  Start twice daily IV ppi. Follow hgb closely and transfuse RBCs as needed.   Heparin/treatment of PE/DVT per primary team/oncology.  Will plan to proceed with EGD and colonoscopy 2/12/24.    I will place orders for colonoscopy prep Sunday 2/11/24. NPO at midnight for egd/colonoscopy 2/12/24.     Total time spent on this encounter=45minutes.   Discussed with Dr. Devlin who will also visit with the patient.                                                Sadia Sullivan PA-C  Thank you for the opportunity to participate in the care of this patient.   Please feel free to call me with any questions or concerns.  Phone number [phone].            GI Staff Addendum  DOS 2/9/2024     Pt seen and discussed with GHAZALA. Agree with evaluation, assessment and plan as outlined.    79 year old yo female  "with history of anal cancer (sp chemo/radiation, no follow-up endoscopy), previous melena (EGD 2017 showing gastric erosions--essentially normal biopsies) who is admitted for acute PE/DVT. GI consulted for microcytic anemia. Ferritin is pending but she has been transfused 2u pRBCs and gotten IV iron. She states that when she took aspirin after her recent hip surgery, had darker stools, so stopped aspirin and her stools lightened up. No nausea, vomiting, abdominal pain, coffee ground or hematemesis or any BRBPR. No symptoms related to anal cancer.   No other NSAIDs  No EtOH  12 pt ros negative     /68 (BP Location: Right arm)   Pulse 80   Temp 98.5  F (36.9  C) (Oral)   Resp 23   Ht 1.6 m (5' 3\")   Wt 59.1 kg (130 lb 4.8 oz)   LMP  (LMP Unknown)   SpO2 97%   BMI 23.08 kg/m    General: A&O, NAD, non-toxic appearing  Eyes: No icterus or conjunctivitis  Gastrointestinal: Soft, NTTP, NABS, no r/g, no masses, no HSM    Assessment/Plan:  Acute on chronic anemia. Some may be related to surgical losses, low-level GI blood loss (ddx broad, includes PUD/DUD, esophagitis, gastritis, upper or lower GI malignancy, AVM, dieulafoy or other). New diagnosis of DVT/PE with symptoms of SOB/fatigue (also possibly attributable to anemia). Would recommend endoscopic evaluation, but needs stabilization on heparin prior to colonoscopy prep and anesthesia.    -IV PPI BID  -CLD starting tomorrow  -Prep starting Sunday  -EGD/colon Monday     Approximately 25 minutes of total time was spent providing patient care including patient evaluation, reviewing documentation/test results, and .                                                  Henry Devlin MD  Thank you for the opportunity to participate in the care of this patient.   Please feel free to call me with any questions or concerns.  Phone number [phone].                "

## 2024-02-09 NOTE — PLAN OF CARE
Problem: VTE (Venous Thromboembolism)  Goal: Tissue Perfusion  Outcome: Progressing  Intervention: Optimize Tissue Perfusion  Recent Flowsheet Documentation  Taken 2/9/2024 0045 by Migdalia Hartman, RN  VTE Prevention/Management: SCDs (sequential compression devices) off  Taken 2/8/2024 2100 by Migdalia Hartman, RN  VTE Prevention/Management: SCDs (sequential compression devices) off     A&Ox4. VSS, on RA. Denies any pain. C/o some heartburn. PRN Tums given. Slight MARTIN. Received 2 units of blood on shift, tolerated well. Hemoglobin up to 9.4. Heparin drip infusing @ 10.5 units/hr; next Hep10A check this AM. Up independently. Hem/Onc to see today. Discharge pending.

## 2024-02-09 NOTE — CONSULTS
[facility] Inpatient Consult Note    Patient: Oscar Zhao  MRN: [id number]  Date of Service: 2/9/2024      Reason for Visit    Hem/Onc consulted for acute microcytic anemia and acute DVT/PE  Hx anal cancer, treated 2021 (ZOYA)    Collaborating MD: Dr. Bernal    Assessment/Plan    Acute microcytic anemia, likely iron deficiency  Concern for acute GI bleed  Hx upper GI bleed with gastric ulcer. Had some dark stools in November when on ASA after her hip surgery and after holding, resolved. No evident bowel changes or GI symptoms.     Anemia typically normal and was 12.2 in November (prior to hip surgery). Had hip surgery 11/13 and day 1 post-op hgb 10.5 which would be expected with post-op blood loss. No interim hemoglobin and yesterday, hgb 6.9 and MCV 68.     Since blood transfusion yesterday, hgb has been stable 9.4-9.6.    CT chest abd pelvis shows no evident bleeding source.     Her last colonoscopy was 2021 when diagnosed with anal cancer. She's not had one since.   Her last EGD was 2017.    Plan:  -Serial hgb monitoring. pRBC as needed hgb <7.0.  -Ferritin ordered, pending.   -Venofer 300 mg IV x 3 doses, ordered to administer inpatient.   -Will need EGD and colonoscopy while inpatient - defer to primary hospital team to order/organize. She reports prior issues with moderate sedation during EGD, may need to do with anesthesia.  -Can keep her outpatient follow-up in Hematology as scheduled 2/16. We will recheck CBC at that time and manage her MARIA A moving forward.    Acute extensive RLE DVT/PE, possibly provoked following recent hip surgery/decreased activity  No hx of clots. Likely provoked by recent hip surgery and limitations in post-operative activity due to anemia symptoms.     In setting of concern for acute GI bleed, started heparin gtt for short term to closely monitor for progressive bleeding concerns in which it will need to be held. So far, hgb has been stable and no  clinical evidence for bleeding.    Plan:  -Heparin gtt, close monitoring of serial hgb   -If hgb stable and no signs of progressive bleeding on heparin after 24 hrs, can change to DOAC ahead of dismissal   -Can reassess length of anticoagulation as outpatient, depending on tolerance. In setting of first incidence of clot, likely provoked and no ongoing risk factors will likely be 3-6 mth duration.     Hx anal cancer, ZOYA (2021)  No evidence of recurrence by scans. Has had routine local surveillance with Dr. Dempsey ([facility])    Plan:  -Continue annual surveillance CT cap and MRI pelvis with Oncology  -Continue routine follow-up with Dr. Dempsey ([facility]S) for rectal exam/scope assessments to rule out local recurrence    ______________________________________________________________________________      Oncologic History    Cancer Staging   Malignant neoplasm of anal canal (H)  Staging form: Anus, AJCC 8th Edition  - Clinical stage from 2/20/2021: Stage IIA (cT2, cN0, cM0) - Signed by Patsy Spencer MD on 1/13/2022 2/2021: Stage IIA (cT2-cN0-cM0) Malignant neoplasm of anal canal   -presented with 1-year history of blood on toilet paper when wiping. Became anemic.  -2/18/21 colonoscopy: Ulcerated anal canal lesion - path c/w moderately differentiated squamous cell carcinoma. 1.5-2 cm polyp of the ascending colon removed - pathology c/w tubular adenoma.    -MRI pelvis - focal thickening (L) posterolateral anorectal mucosa.  Muscularis propria appears intact without extension into the mesorecat or intersphincteric space.  No maegan or distant mets in the pelvis.  -PET - FDG avid anal mass. No evidence of metastasis.      Treatment:  -3/22 - 4/29/2021 completed concurrent 5FU + mitomycin chemotherapy with 5400 cGy in 27 fractions to the anal canal and 4500 cGy in 27 fractions to the regional lymph nodes.  6/2021 post-treatment restaging MR pelvis - previously seen anal mass has nearly completely resolved. No evidence of  metastatic disease in the pelvis.  6/2021 post-treatment restaging CT CAP - no evidence of metastatic disease.      History  Ms. Oscar Zhao is a 79 year old we follow in Oncology for history of early stage anal cancer treated with concurrent chemoRT 3 yrs ago, ZOYA to date. She's had routine surveillance with MRI pelvis, CT cap and CRS (Dr. Dempsey) exams that have been without recurrence. MRI pelvis and CT cap yesterday remain negative. She's not had a post-op surveillance colonoscopy from what I can tell.     During her routine MRI and CT scan surveillance yesterday, she was found to have incidental RLE DVT and bilateral PE without heart strain. She was sent to ED with these findings, where she was then also noted to have a acute microcytic anemia (hgb 6.9, MCV 72). No evidence of hemolysis on labwork. 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 Ferritin pending, but appears to be acute iron-def anemia likely from acute blood loss.     She had a right hip surgery ~ 3 mths ago. About 6 weeks ago, she started to feel progressively tired and exertional dyspnea interfering with ADLs. No leg pain/swelling.     She has a history of gastric ulcers.  She had been on ASA post-op after her Ortho surgery last November but noted blacks stools while on it, so she started holding her ASA after 4 weeks on it and her bowels returned to normal. No recent change in stool color, hematemesis or bright red blood in stools. No epigastric pain.    Last evening, got 1 unit pRBC and hgb following that is 9.4-9.6.  Started on heparin gtt to manage the acute DVT and PE in the setting of possible GI bleed. Hgb has remained stable and she has no evidence of bleeding since starting this.       ECOG Performance Status: 0      Past History  Past Medical History:   Diagnosis Date    Anemia, unspecified type 2/8/2024    Arthritis     Basal cell carcinoma of anterior chest     Breast cyst     History of anesthesia complications      HLA B27 (HLA B27 positive)     Hypertension     Osteoporosis 2011    Peripheral neuropathy 2018    PONV (postoperative nausea and vomiting)     Scleritis, bilateral     Left .  Treated with corticosteroids,, methotrexate and Humira.  2015.  Rituximab.    Squamous cell carcinoma of skin of chest     Steroid induced glaucoma, both eyes      Past Surgical History:   Procedure Laterality Date    ARTHROPLASTY REVISION HIP Right 2023    Procedure: RIGHT HIP REVISION TOTAL HIP ARTHROPLASTY;  Surgeon: Bill Bernal DO;  Location: [facility] OR    ARTHROPLASTY REVISION HIP Right 2023    Procedure: RIGHT REVISION TOTAL HIP ARTHROPLASTY HEAD LINER EXCHANGE;  Surgeon: Bill Bernal DO;  Location: [facility] OR    BREAST CYST EXCISION Right     benign     SECTION      CLOSED REDUCTION HIP Right 2019    Procedure: CLOSED REDUCTION, HIP;  Surgeon: Jak Ruiz DO;  Location: [facility];  Service: Orthopedics    CLOSED REDUCTION HIP Right 2023    Procedure: CLOSED REDUCTION, HIP RIGHT;  Surgeon: Aditya Pedroza MD;  Location: [facility] OR    CLOSED REDUCTION HIP Right 2023    Procedure: CLOSED REDUCTION, HIP RIGHT;  Surgeon: Jak Allen MD;  Location: [facility]    COLONOSCOPY  2011    COLONOSCOPY  2005    COLONOSCOPY W/ BIOPSIES AND POLYPECTOMY  2021    16 to 20 mm polyp:tubular adenoma in the ascending colon.  Ulcerated mass in the anal canal: Moderately differentiated squamous cell cancer.    ESOPHAGOSCOPY, GASTROSCOPY, DUODENOSCOPY (EGD), COMBINED  2017    Large hiatal hernia.  Reactive gastropathy with mucosal erosion.  H. pylori negative.    HERNIORRHAPHY FEMORAL Left 2021    Procedure: LEFT FEMORAL HERNIA REPAIR;  Surgeon: Sidney Murray MD;  Location: [facility]    HYSTERECTOMY TOTAL ABDOMINAL, BILATERAL SALPINGO-OOPHORECTOMY, COMBINED      IR CHEST PORT PLACEMENT > 5 YRS OF  AGE  3/15/2021    IR PORT PLACEMENT >5 YEARS  03/15/2021    Right IJ port-a-cath.    IR PORT REMOVAL RIGHT  7/14/2021    LAPAROSCOPIC APPENDECTOMY  04/28/2011    PHACOEMULSIFICATION CLEAR CORNEA WITH STANDARD INTRAOCULAR LENS IMPLANT Right 08/30/2017    PHACOEMULSIFICATION CLEAR CORNEA WITH STANDARD INTRAOCULAR LENS IMPLANT Left 09/13/2017    TOTAL HIP ARTHROPLASTY Right 08/18/2015    Procedure: HIP TOTAL ARTHROPLASTY, RIGHT;  Surgeon: Star Du MD;  Location: [facility] Main OR;  Service:     [facility] TOTAL KNEE ARTHROPLASTY Left 03/02/2017    Procedure: LEFT TOTAL KNEE ARTHROPLASTY;  Surgeon: Star Du MD;  Location: [facility] Main OR;  Service: Orthopedics     Family History   Problem Relation Age of Onset    Alzheimer Disease Mother     Heart Disease Father     Pancreatic Cancer Brother 72.00    No Known Problems Daughter     Anesthesia Reaction No family hx of      Social History     Socioeconomic History    Marital status:     Number of children: 1   Tobacco Use    Smoking status: Never     Passive exposure: Never    Smokeless tobacco: Never   Vaping Use    Vaping Use: Never used   Substance and Sexual Activity    Alcohol use: Yes     Alcohol/week: 4.0 standard drinks of alcohol     Types: 4 Standard drinks or equivalent per week     Comment: 3-4/wk    Drug use: No    Sexual activity: Never     Social Determinants of Health     Financial Resource Strain: Low Risk  (10/2/2023)    Financial Resource Strain     Within the past 12 months, have you or your family members you live with been unable to get utilities (heat, electricity) when it was really needed?: No   Food Insecurity: Low Risk  (10/2/2023)    Food Insecurity     Within the past 12 months, did you worry that your food would run out before you got money to buy more?: No     Within the past 12 months, did the food you bought just not last and you didn t have money to get more?: No   Transportation Needs: Low Risk  (10/2/2023)     Transportation Needs     Within the past 12 months, has lack of transportation kept you from medical appointments, getting your medicines, non-medical meetings or appointments, work, or from getting things that you need?: No   Interpersonal Safety: Low Risk  (10/2/2023)    Interpersonal Safety     Do you feel physically and emotionally safe where you currently live?: Yes     Within the past 12 months, have you been hit, slapped, kicked or otherwise physically hurt by someone?: No     Within the past 12 months, have you been humiliated or emotionally abused in other ways by your partner or ex-partner?: No   Housing Stability: Low Risk  (10/2/2023)    Housing Stability     Do you have housing? : Yes     Are you worried about losing your housing?: No       Allergies    Allergies   Allergen Reactions    Adalimumab Muscle Pain (Myalgia)    Aspirin      Had Bleeding ulcers after previous 2 surgeries due to aspirin    Brimonidine-Timolol [Brimonidine Tartrate-Timolol] Unknown     Redness itching in eyes    Levaquin [Levofloxacin] Unknown     Skin burn and couldn't get out bed for 5 days    Tetracyclines & Related Hives          Physical Exam        Latest Ref Rng & Units 10/23/2023     2:53 PM   CHOLESTEROL-MEDICATIONS   Cholesterol <200 mg/dL 163        GENERAL: Alert and oriented to time place and person. Seated comfortably in [facility]. In no distress. Daughter present.    HEAD: Atraumatic and normocephalic.    EYES: JUANCARLOS, EOMI.  No pallor.  No icterus.    Oral cavity: no mucosal lesion or tonsillar enlargement.    NECK: supple. JVP normal.  No thyroid enlargement.    LYMPH NODES: No palpable supraclavicular, cervical, axillary or inguinal lymphadenopathy.    CHEST: clear to auscultation bilaterally.  Resonant to percussion throughout bilaterally.  Symmetrical breath movements bilaterally.    CVS: S1 and S2 are heard. Regular rate and rhythm.  No murmur or gallop or rub heard.  No peripheral edema.    ABDOMEN:  Soft. Not tender. Not distended.  No palpable hepatomegaly or splenomegaly.  No other mass palpable.  Bowel sounds heard.    EXTREMITIES: Warm. No leg pain/swelling.    SKIN: no rash, or bruising or purpura.  Has a full head of hair.    Lab Results    Recent Labs   Lab 02/09/24  0716 02/09/24  0208 02/08/24  1537   WBC 5.8 6.4 7.0   HGB 9.6* 9.4* 6.9*   HCT 32.8* 31.8* 26.2*    194 255     Recent Labs   Lab 02/08/24  1537   *   CO2 22   BUN 15.3   ALBUMIN 4.4   ALKPHOS 87   ALT 13   AST 26       Imaging Results    MR Pelvis (Intrapelvic Organs) wo&w Contrast    Result Date: 2/9/2024  EXAM: MRI PELVIS WITHOUT AND WITH IV CONTRAST LOCATION: [facility] DATE: 2/8/2024 INDICATION: anal SCC s p CRT 4 29 21 COMPARISON: Pelvic MRI 02/02/2023, CT abdomen and pelvis 02/08/2024 TECHNIQUE: Routine MRI pelvis without and with IV contrast. Axial, sagittal, and coronal high-resolution T2 and post gadolinium T1 with fat saturation. CONTRAST: 6.0 mL gadavist FINDINGS: LOCAL TUMOR: No evidence of recurrent tumor. LYMPH NODES: No lymphadenopathy in the pelvis. ADDITIONAL FINDINGS: No evidence of metastatic disease in the pelvis. Redemonstrated DVT in the right femoral vein.     IMPRESSION: 1.  No evidence of recurrent disease in the pelvis. 2.  Right lower extremity DVT.    US Lower Extremity Venous Duplex Bilateral    Result Date: 2/8/2024  EXAM: US LOWER EXTREMITY VENOUS DUPLEX BILATERAL LOCATION: [facility] DATE: 2/8/2024 INDICATION: DVT on CT imaging, hx of cancer. COMPARISON: None. TECHNIQUE: Venous Duplex ultrasound of bilateral lower extremities with and without compression, augmentation and duplex. Color flow and spectral Doppler with waveform analysis performed. FINDINGS: Exam includes the common femoral, femoral, popliteal veins as well as segmentally visualized deep calf veins and greater saphenous vein. RIGHT: DVT identified in the proximal through distal  right femoral, popliteal and one of the proximal through distal posterior tibial veins, some regions nearly occlusive. The right peroneal vein is not visualized. No superficial thrombophlebitis. No popliteal cyst. LEFT: No deep vein thrombosis. No superficial thrombophlebitis. No popliteal cyst.     IMPRESSION: 1.  Extensive right leg DVT. Recommend interventional radiology consultation. 2.  No left-sided DVT.     CT Chest/Abdomen/Pelvis w Contrast    Result Date: 2/8/2024  EXAM: CT CHEST/ABDOMEN/PELVIS W CONTRAST LOCATION: [facility] DATE: 2/8/2024 INDICATION: Anal SCC s p SCRT cpmpleted 4 29 21 restaging COMPARISON: Chest CT from 10/25/2023, pelvic MRI from 02/02/2023, CT chest, abdomen, and pelvis from 01/10/2023 TECHNIQUE: CT scan of the chest, abdomen, and pelvis was performed following injection of IV contrast. Multiplanar reformats were obtained. Dose reduction techniques were used. CONTRAST: 90 ML ISOVUE 370 FINDINGS: LUNGS AND PLEURA: Similar 4 mm right upper lobe nodule on series 4 image 79. Continued attention on follow-up. No new or enlarging pulmonary nodules. Scattered areas of mild atelectasis/scarring. MEDIASTINUM/AXILLAE: Heart size is normal. Large hiatal hernia. No adenopathy. Small volume bilateral pulmonary emboli. On the right side this is in the distal right pulmonary artery extending into the lobar and a few segmental branches. In the left lobe  this is predominantly segmental and subsegmental in the left lower lobe. No evidence of right heart strain. CORONARY ARTERY CALCIFICATION: None. HEPATOBILIARY: Probable mild hepatic steatosis. Tiny hypodensity in the right lobe of the liver remains too small to characterize but favored to be benign. Cholelithiasis. No biliary ductal dilation. PANCREAS: Normal. SPLEEN: Normal. ADRENAL GLANDS: Normal. KIDNEYS/BLADDER: No significant mass, stone, or hydronephrosis. The bladder is partially obscured by beam hardening artifact  from hip arthroplasty and incompletely distended accentuating wall thickening. BOWEL: Diverticulosis of the colon. No acute inflammatory change. No obstruction. Mild colonic stool burden. LYMPH NODES: No new or enlarging adenopathy. VASCULATURE: Questionable right lower extremity DVT in the superficial femoral vein. Hysterectomy. PELVIC ORGANS: Hysterectomy. MUSCULOSKELETAL: Heterogeneous osteopenia. No definitive osseous lesions. Right hip arthroplasty. Scarring changes and fat stranding in the right lateral hip soft tissues. Small right hip effusion.     IMPRESSION: 1.  No CT evidence of recurrent or metastatic disease. See same day pelvic MRI for additional details. 2.  However, there are bilateral pulmonary emboli, right greater than left. No evidence of right heart strain. 3.  Additionally, there is right lower extremity DVT in the superficial femoral vein. Recommend right lower extremity DVT ultrasound. [Critical Result: New diagnosis of pulmonary embolism] Finding was identified on 2/8/2024 1:50 PM CST. was contacted by me on 2/8/2024 1:58 PM CST and verbalized understanding of the critical result.     MA Screen Bilateral w/Andrew    Result Date: 2/1/2024  BILATERAL FULL FIELD DIGITAL SCREENING MAMMOGRAM WITH TOMOSYNTHESIS Performed on: 2/1/24 Compared to: 11/02/2022, 09/14/2021, and 08/27/2020 Technique:  This study was evaluated with the assistance of Computer-Aided Detection.  Breast Tomosynthesis was used in interpretation. Findings: The breasts are heterogeneously dense, which may obscure small masses.  There is no radiographic evidence of malignancy.     IMPRESSION: ACR BI-RADS Category 1: Negative BREAST CANCER SCREENING RECOMMENDATION: Routine yearly mammography beginning at age 40 or as discussed with your provider. The results and recommendations of this examination will be communicated to the patient. Jayme Mcmullen        Signed by: Erin Barajas, ILIR

## 2024-02-09 NOTE — PLAN OF CARE
Patient admitted to  at 1300 with her daughter at bedside. Heparin drip therapeutic, running at 1050 units/hr. No complaints of pain. GI did see patient, plan for EGD and colonoscopy Monday.     At 1530 Patient reported she had her 4th bowel movement of the day, earlier 3 this AM had no blood, loose stool. This afternoon she reported bright red blood with her bowel movement. MD notified.   Dorothea Cruz, RN on 2/9/2024 at 3:35 PM    Problem: Adult Inpatient Plan of Care  Goal: Optimal Comfort and Wellbeing  Outcome: Progressing  Goal: Readiness for Transition of Care  Intervention: Mutually Develop Transition Plan  Recent Flowsheet Documentation  Taken 2/9/2024 1300 by Dorothea Cruz, RN  Equipment Currently Used at Home: none     Problem: VTE (Venous Thromboembolism)  Goal: Tissue Perfusion  Outcome: Progressing   Goal Outcome Evaluation:

## 2024-02-09 NOTE — H&P
[facility]    History and Physical - Hospitalist Service       Date of Admission:  2/8/2024    Assessment & Plan      Oscar Zhao is a 79 year old female admitted on 2/8/2024. She has a PMH including hypertension, sinus tachycardia, anal cancer in remission, R YAMILET revisions in 9/2023 and 10/2023 due to dislocations and is admitted with RLE DVT and bilateral PE found on routine imaging today, also found to have symptomatic anemia.    RLE DVT  Bilateral PE, R > L  Incidentally found - hemodynamically stable, on RA. Does report progressive MARTIN and fatigue over the course of weeks; this could be related to VTE or also anemia as below. CT CAP showing lobar and segmental PE on R and segmental and subsegmental PE on the L; no evidence of R heart strain. No prior history of VTE and not on anticoagulation. VTE appears unprovoked - patient does have a history of anal cancer in remission and was treated with mitomycin and 5FU for chemotherapy, which could increase risk of VTE, but last treated 3 years ago. No recurrence of malignancy on CT CAP today. Had orthopedic surgery but this was last done 4 months ago. Per chart review, does have a history of autoimmune-related eye disease and potentially peripheral neuropathy; no history of bleeding disorders.  - Admit to inpatient  - Heme/Onc consulted, appreciate recs   - Heparin drip  - IR consulted by ED provider, will not perform procedure for DVT  - Cardiac telemetry for now  - O2 as needed, continuous pulse ox    Symptomatic anemia of unknown etiology, possible acute blood loss anemia  Hgb 6.9 on admission; ED ordered 2 units pRBC. Patient has previous history of gastric ulcer with UGIB/tarry stools and denies any recent tarry stools. Rectal exam done by ED provider showed light brown stool. No hemoptysis or hematemesis. Indirect bilirubin, INR/PTT normal. Most likely acute blood loss, but unclear source of bleeding - could potentially be  recurrent gastric ulcer.  - Heme/Onc consulted as above  - Status post 1 unit pRBC, transfusing second unit   - recheck Hgb after transfusions completed  - Adding hemolysis labs: haptoglobin, LDH, reticulocyte count, peripheral blood smear; also autoimmune labs: ESR, CRP  - PO pantoprazole 40 mg BID for potential UGIB as source    Chronic medical conditions:  Sinusitis: was prescribed 10 days of amoxicillin by outpatient provider on 1/30; has one dose left - can finish.  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 Patient will bring these in.  Hypertension, sinus tachycardia: continue PTA metoprolol        Diet: Regular diet  DVT Prophylaxis: see above for DVT/PE treatment; started on heparin drip  Rodriguez Catheter: Not present  Fluids: PO  Lines: None     Cardiac Monitoring: ACTIVE order. Indication: bilateral PE  Code Status: Full code    Clinically Significant Risk Factors Present on Admission                  # Hypertension: Noted on problem list          # Financial/Environmental Concerns:           Disposition Plan      Expected Discharge Date: 02/10/2024                The patient's care was discussed with the Attending Physician, Dr. Harvey . Dr. Desir to see patient in AM.      Komal Roger MD  Hospitalist Service  [facility]  Securely message with [employer] (more info)  Text page via [employer] Paging/Directory   ______________________________________________________________________    Chief Complaint   DVT and PE    History is obtained from the patient    History of Present Illness   Oscar Zhao is a 79 year old female with PMH including hypertension, sinus tachycardia, anal cancer in remission, R YAMILET revisions in 9/2023 and 10/2023 due to dislocations who presents with RLE DVT and bilateral PE found on routine imaging today.    Patient follows with Dr. Spencer in Radiation Oncology and Heme/Onc as well as  Dr. Reynolds in Colorectal Surgery for history of anal cancer in remission; previously treated with chemo and radiation and now in remission for almost 3 years. Gets annual CT CAP and pelvic MRI for surveillance. Presented for annual imaging today and was incidentally found to have DVT and bilateral PE; was told to go to ED.    CT CAP shows no evidence of recurrent or metastatic disease, but did show bilateral PE, R > L, no evidence of R heart strain, and RLE DVT in superficial femoral vein. Follow up BLE venous US showing extensive R leg DVT. Pelvic MRI read pending.    Recently treated for sinusitis and bilateral otitis media with amoxicillin x 10 days on 1/30.    Patient reports today that about 6 weeks after her most recent hip surgery, she began to feel progressively more tired and short of breath with exertion.  Would only be able to get up and do some chores around the house before having to lie back down on her couch to rest again; this happens multiple times per day.  She and other providers had thought this was due to deconditioning after hip surgery and symptoms would resolve with time as she got back to exercising.  Had recently started doing physical therapy last month.  No dizziness, presyncope, or chest pain with exertion.    Does note that with one of her prior orthopedic surgeries, she had a gastric ulcer with upper GI bleeding/dark stool after taking aspirin for prolonged period.  After her most recent hip surgery, she stopped taking aspirin a few weeks earlier than recommended as she started to notice her stool was getting darker again.  Since then, stool has returned to normal color.  No hemoptysis or hematemesis.  No history of blood clots.    In the ED, VS WNL, on RA. Labs notable for Hgb 6.9; CMP, BNP, troponin, INR, remainder of CBC unremarkable. Hematology consulted about anticoagulation, started on heparin, will see tomorrow AM. Consented for blood transfusion; transfusing 2 units pRBC. ED  spoke with IR about extensive DVT, they will not do any procedures given patient is stable/doing well.    Patient had initially had a headache, but this is now resolved.  Denies headache, vision changes, cough, sore throat, chest pain, dyspnea at rest, nausea, abdominal pain, dysuria, blood in stool, changes in bowel movements, and new or worsening leg swelling.  Denies any pain or swelling in right leg.  Feels like muffled sensation in ears has improved since taking amoxicillin for sinusitis.      Past Medical History    Past Medical History:   Diagnosis Date    Anemia, unspecified type 2024    Arthritis     Basal cell carcinoma of anterior chest     Breast cyst     History of anesthesia complications     HLA B27 (HLA B27 positive)     Hypertension     Osteoporosis 2011    Peripheral neuropathy 2018    PONV (postoperative nausea and vomiting)     Scleritis, bilateral     Left .  Treated with corticosteroids,, methotrexate and Humira.  .  Rituximab.    Squamous cell carcinoma of skin of chest     Steroid induced glaucoma, both eyes        Past Surgical History   Past Surgical History:   Procedure Laterality Date    ARTHROPLASTY REVISION HIP Right 2023    Procedure: RIGHT HIP REVISION TOTAL HIP ARTHROPLASTY;  Surgeon: Bill Bernal DO;  Location: [facility]    ARTHROPLASTY REVISION HIP Right 2023    Procedure: RIGHT REVISION TOTAL HIP ARTHROPLASTY HEAD LINER EXCHANGE;  Surgeon: Bill Bernal DO;  Location: [facility] OR    BREAST CYST EXCISION Right     benign     SECTION      CLOSED REDUCTION HIP Right 2019    Procedure: CLOSED REDUCTION, HIP;  Surgeon: Jak Ruiz DO;  Location: [facility];  Service: Orthopedics    CLOSED REDUCTION HIP Right 2023    Procedure: CLOSED REDUCTION, HIP RIGHT;  Surgeon: Aditya Pedroza MD;  Location: [facility]    CLOSED REDUCTION HIP Right 2023    Procedure: CLOSED REDUCTION, HIP  RIGHT;  Surgeon: Jak Allen MD;  Location: [facility]    COLONOSCOPY  09/07/2011    COLONOSCOPY  09/27/2005    COLONOSCOPY W/ BIOPSIES AND POLYPECTOMY  02/18/2021    16 to 20 mm polyp:tubular adenoma in the ascending colon.  Ulcerated mass in the anal canal: Moderately differentiated squamous cell cancer.    ESOPHAGOSCOPY, GASTROSCOPY, DUODENOSCOPY (EGD), COMBINED  05/05/2017    Large hiatal hernia.  Reactive gastropathy with mucosal erosion.  H. pylori negative.    HERNIORRHAPHY FEMORAL Left 9/7/2021    Procedure: LEFT FEMORAL HERNIA REPAIR;  Surgeon: Sidney Murray MD;  Location: [facility]    HYSTERECTOMY TOTAL ABDOMINAL, BILATERAL SALPINGO-OOPHORECTOMY, COMBINED  1996    IR CHEST PORT PLACEMENT > 5 YRS OF AGE  3/15/2021    IR PORT PLACEMENT >5 YEARS  03/15/2021    Right IJ port-a-cath.    IR PORT REMOVAL RIGHT  7/14/2021    LAPAROSCOPIC APPENDECTOMY  04/28/2011    PHACOEMULSIFICATION CLEAR CORNEA WITH STANDARD INTRAOCULAR LENS IMPLANT Right 08/30/2017    PHACOEMULSIFICATION CLEAR CORNEA WITH STANDARD INTRAOCULAR LENS IMPLANT Left 09/13/2017    TOTAL HIP ARTHROPLASTY Right 08/18/2015    Procedure: HIP TOTAL ARTHROPLASTY, RIGHT;  Surgeon: Star Du MD;  Location: [facility];  Service:     [facility] TOTAL KNEE ARTHROPLASTY Left 03/02/2017    Procedure: LEFT TOTAL KNEE ARTHROPLASTY;  Surgeon: Star Du MD;  Location: [facility];  Service: Orthopedics       Prior to Admission Medications   Prior to Admission Medications   Prescriptions Last Dose Informant Patient Reported? Taking?   LOTEMAX 0.5 % ophthalmic suspension 2/8/2024 at will bring in  Yes Yes   Sig: Place 1 drop into both eyes every 48 hours   amoxicillin (AMOXIL) 875 MG tablet 2/8/2024  No Yes   Sig: Take 1 tablet (875 mg) by mouth 2 times daily for 10 days   Patient taking differently: Take 875 mg by mouth 2 times daily Has one dose left to finish 10 day course for sinusitis   metoprolol succinate ER  (TOPROL XL) 25 MG 24 hr tablet 2/8/2024  Yes Yes   Sig: Take 25 mg by mouth daily   timolol maleate (TIMOPTIC) 0.5 % ophthalmic solution 2/8/2024 at will bring in  Yes Yes   Sig: Place 1 drop into both eyes daily      Facility-Administered Medications: None        Review of Systems    Pertinent positives and negatives as noted in HPI.    Allergies   Allergies   Allergen Reactions    Adalimumab Muscle Pain (Myalgia)    Aspirin      Had Bleeding ulcers after previous 2 surgeries due to aspirin    Brimonidine-Timolol [Brimonidine Tartrate-Timolol] Unknown     Redness itching in eyes    Levaquin [Levofloxacin] Unknown     Skin burn and couldn't get out bed for 5 days    Tetracyclines & Related Hives        Physical Exam   Vital Signs: Temp: 98.2  F (36.8  C) Temp src: Oral BP: 133/81 Pulse: 90   Resp: 23 SpO2: 98 % O2 Device: None (Room air)    Weight: 130 lbs 0 oz    General Appearance: Alert, lying in bed comfortably, no acute distress  Eyes: PERRL, normal conjunctiva  HEENT: Normocephalic, moist oropharyngeal mucous membranes, no nasal discharge  Respiratory: Lungs clear to auscultation bilaterally  Cardiovascular: Regular rate and rhythm, normal S1 and S2, no murmurs.  Bilateral lower extremities appear similar to each other, no erythema, warmth, swelling, or tenderness to palpation to right lower extremity.  No lower extremity swelling on left side.  GI: Soft, bowel sounds present, nontender to palpation  Lymph/Hematologic: No cervical lymphadenopathy  Skin: No significant lesions or rashes on visible skin  Musculoskeletal: No gross musculoskeletal deformities  Neurologic: Alert, answers questions appropriately  Psychiatric: Pleasant affect    Medical Decision Making     Please see A&P for additional details of medical decision making.      Data     I have personally reviewed the following data over the past 24 hrs:    7.0  \   6.9 (LL)   / 255     133 (L) 102 15.3 /  104 (H)   4.8 22 0.53 \     ALT: 13 AST: 26 AP:  87 TBILI: 0.8   ALB: 4.4 TOT PROTEIN: 7.1 LIPASE: N/A     Trop: 14 BNP: 332     INR:  1.08 PTT:  26   D-dimer:  N/A Fibrinogen:  N/A       Imaging results reviewed over the past 24 hrs:   Recent Results (from the past 24 hour(s))   CT Chest/Abdomen/Pelvis w Contrast   Result Value    Radiologist flags New diagnosis of pulmonary embolism (AA)    Narrative    EXAM: CT CHEST/ABDOMEN/PELVIS W CONTRAST  LOCATION: [facility]  DATE: 2/8/2024    INDICATION: Anal SCC s p SCRT cpmpleted 4 29 21 restaging  COMPARISON: Chest CT from 10/25/2023, pelvic MRI from 02/02/2023, CT chest, abdomen, and pelvis from 01/10/2023  TECHNIQUE: CT scan of the chest, abdomen, and pelvis was performed following injection of IV contrast. Multiplanar reformats were obtained. Dose reduction techniques were used.   CONTRAST: 90 ML ISOVUE 370    FINDINGS:   LUNGS AND PLEURA: Similar 4 mm right upper lobe nodule on series 4 image 79. Continued attention on follow-up. No new or enlarging pulmonary nodules. Scattered areas of mild atelectasis/scarring.    MEDIASTINUM/AXILLAE: Heart size is normal. Large hiatal hernia. No adenopathy. Small volume bilateral pulmonary emboli. On the right side this is in the distal right pulmonary artery extending into the lobar and a few segmental branches. In the left lobe   this is predominantly segmental and subsegmental in the left lower lobe. No evidence of right heart strain.    CORONARY ARTERY CALCIFICATION: None.    HEPATOBILIARY: Probable mild hepatic steatosis. Tiny hypodensity in the right lobe of the liver remains too small to characterize but favored to be benign. Cholelithiasis. No biliary ductal dilation.    PANCREAS: Normal.    SPLEEN: Normal.    ADRENAL GLANDS: Normal.    KIDNEYS/BLADDER: No significant mass, stone, or hydronephrosis. The bladder is partially obscured by beam hardening artifact from hip arthroplasty and incompletely distended accentuating wall  thickening.    BOWEL: Diverticulosis of the colon. No acute inflammatory change. No obstruction. Mild colonic stool burden.    LYMPH NODES: No new or enlarging adenopathy.    VASCULATURE: Questionable right lower extremity DVT in the superficial femoral vein. Hysterectomy.    PELVIC ORGANS: Hysterectomy.    MUSCULOSKELETAL: Heterogeneous osteopenia. No definitive osseous lesions. Right hip arthroplasty. Scarring changes and fat stranding in the right lateral hip soft tissues. Small right hip effusion.      Impression    IMPRESSION:  1.  No CT evidence of recurrent or metastatic disease. See same day pelvic MRI for additional details.  2.  However, there are bilateral pulmonary emboli, right greater than left. No evidence of right heart strain.  3.  Additionally, there is right lower extremity DVT in the superficial femoral vein. Recommend right lower extremity DVT ultrasound.      [Critical Result: New diagnosis of pulmonary embolism]    Finding was identified on 2/8/2024 1:50 PM CST.     was contacted by me on 2/8/2024 1:58 PM CST and verbalized understanding of the critical result.    US Lower Extremity Venous Duplex Bilateral    Narrative    EXAM: US LOWER EXTREMITY VENOUS DUPLEX BILATERAL  LOCATION: [facility]  DATE: 2/8/2024    INDICATION: DVT on CT imaging, hx of cancer.  COMPARISON: None.  TECHNIQUE: Venous Duplex ultrasound of bilateral lower extremities with and without compression, augmentation and duplex. Color flow and spectral Doppler with waveform analysis performed.    FINDINGS: Exam includes the common femoral, femoral, popliteal veins as well as segmentally visualized deep calf veins and greater saphenous vein.     RIGHT: DVT identified in the proximal through distal right femoral, popliteal and one of the proximal through distal posterior tibial veins, some regions nearly occlusive. The right peroneal vein is not visualized. No superficial thrombophlebitis. No   popliteal  cyst.    LEFT: No deep vein thrombosis. No superficial thrombophlebitis. No popliteal cyst.      Impression    IMPRESSION:    1.  Extensive right leg DVT. Recommend interventional radiology consultation.    2.  No left-sided DVT.

## 2024-02-09 NOTE — CONSULTS
GI CONSULT NOTE      Name: Oscar Zhao  Medical Record #: [id number]  YOB: 1945  Date of Admission: 2/8/2024  Date/Time: 2/9/2024/1:40 PM     CHIEF COMPLAINT: Acute microcytic anemia     HISTORY OF PRESENT ILLNESS: We were asked to see Oscar Zhao by Dr Desir for evaluation of acute microcytic anemia. Oscar Zhao is a 79 year old year old female with history of basal cell carcinoma, htn, osteoarthritis, iritis (remotely treated with Humira, methotrexate, rituximab, and prednisone), and anal cancer diagnosed in February 2021 who was undergoing routine surveillance imaging for her history of anal cancer when she was found to have bilateral pulmonary emboli and DVT.  She was sent to the ER for further evaluation.  In the ER, the patient was found to have acute microcytic anemia with hemoglobin of 6.9 and MCV of 68 (hemoglobin of 10.5 November 14, 2023).  The patient has been started on weight-based heparin.  [facility] has been consulted for further evaluation of microcytic anemia.    The patient denies having any heartburn, nausea, vomiting, abdominal pain.  She reports her bowel habits have been normal.  She has not recently had any black stools but does report that when she was recovering from her hip surgery and on higher doses of aspirin she did note some dark stools.  Her body weight is stable.  She denies having chest pain. She reports having a poor exercise tolerance with dyspnea on exertion.  She does not take a PPI.    She underwent orthopedic hip surgery in September and October.  He was on high doses of aspirin for roughly 5 weeks.  Does not typically take NSAIDs but reports it is using 4 ibuprofen in the past 3 weeks.    She reports being followed by Dr. Reynolds of colorectal surgery in regards to her history of anal cancer.  She believes she has had exams every 3 to 4 months.  I am not able to find any recent flex sigmoidoscopy or colonoscopy reports. She is scheduled for colonoscopy at [facility]  2/20/24.   Her last colonoscopy was at the time of diagnosis February 18, 2021 by Dr. Todd which revealed an ulcerated anal mass lesion which was biopsied and returned with invasive moderately differentiated squamous cell carcinoma.  She also had an advanced adenoma removed from the ascending colon and internal hemorrhoids.    The patient had an upper endoscopy in 2017 that showed a large hiatal hernia and gastric erosion.  Gastric biopsy showed reactive gastropathy with no H. pylori.    REVIEW OF SYSTEMS (ROS): Complete review of systems negative other than listed in HPI.    PAST MEDICAL HISTORY:  Past Medical History:   Diagnosis Date    Anemia, unspecified type 2/8/2024    Arthritis     Basal cell carcinoma of anterior chest     Breast cyst     History of anesthesia complications     HLA B27 (HLA B27 positive)     Hypertension     Osteoporosis 07/19/2011    Peripheral neuropathy 09/12/2018    PONV (postoperative nausea and vomiting)     Scleritis, bilateral     Left 2010.  Treated with corticosteroids,, methotrexate and Humira.  2015.  Rituximab.    Squamous cell carcinoma of skin of chest     Steroid induced glaucoma, both eyes         FAMILY HISTORY:  Family History   Problem Relation Age of Onset    Alzheimer Disease Mother     Heart Disease Father     Pancreatic Cancer Brother 72.00    No Known Problems Daughter     Anesthesia Reaction No family hx of        SOCIAL HISTORY:        MEDICATIONS PRIOR TO ADMISSION:   Medications Prior to Admission   Medication Sig Dispense Refill Last Dose    amoxicillin (AMOXIL) 875 MG tablet Take 1 tablet (875 mg) by mouth 2 times daily for 10 days (Patient taking differently: Take 875 mg by mouth 2 times daily Has one dose left to finish 10 day course for sinusitis) 20 tablet 0 2/8/2024    LOTEMAX 0.5 % ophthalmic suspension Place 1 drop into both eyes every 48 hours  4 2/8/2024 at will bring in    metoprolol succinate ER (TOPROL XL) 25 MG 24 hr tablet Take 25 mg by mouth  "daily   2/8/2024    timolol maleate (TIMOPTIC) 0.5 % ophthalmic solution Place 1 drop into both eyes daily   2/8/2024 at will bring in          ALLERGIES: Adalimumab, Aspirin, Brimonidine-timolol [brimonidine tartrate-timolol], Levaquin [levofloxacin], and Tetracyclines & related    PHYSICAL EXAM:    /68 (BP Location: Right arm)   Pulse 80   Temp 98.5  F (36.9  C) (Oral)   Resp 23   Ht 1.6 m (5' 3\")   Wt 59.1 kg (130 lb 4.8 oz)   LMP  (LMP Unknown)   SpO2 97%   BMI 23.08 kg/m      GENERAL: cooperative, no obvious distress  EYES: No scleral icterus  LUNGS: Clear to auscultation bilaterally  HEART: S1S2, no lower extremity edema  ABDOMEN: Non-distended. Positive bowel sounds. Soft, non-tender, no guarding/rebound  MUSKULOSKELETAL:  Warm and well perfused, moves all extremities well  SKIN: No jaundice  NEUROLOGIC: Alert and oriented  PSYCHIATRIC: Normal affect    LAB DATA:  CMP Results:   Recent Labs   Lab Test 02/08/24  1537 02/08/24  1135 11/14/23  0540 11/13/23  1258 11/13/23  1255 11/05/23  0622 11/04/23  0533 11/04/23  0117 08/30/23  0609 06/20/23  0913   *  --   --   --  135  --  133* 133*   < > 138   POTASSIUM 4.8  --   --   --  4.3  --  4.1 4.0   < > 4.5   CHLORIDE 102  --   --   --   --   --  101 101   < > 104   CO2 22  --   --   --   --   --  24 22   < > 24   ANIONGAP 9  --   --   --   --   --  8 10   < > 10   *  --  126* 106*  --    < > 118* 128*   < > 108*   BUN 15.3  --   --   --   --   --  9.8 8.9   < > 13.1   CR 0.53 0.6  --   --   --   --  0.52 0.50*   < > 0.63   BILITOTAL 0.8  --   --   --   --   --  0.6  --   --  0.8   ALKPHOS 87  --   --   --   --   --  97  --   --  88   ALT 13  --   --   --   --   --  12  --   --  18   AST 26  --   --   --   --   --  20  --   --  21    < > = values in this interval not displayed.      CBC  Recent Labs   Lab 02/09/24  0716 02/09/24  0208 02/08/24  1537   WBC 5.8 6.4 7.0   RBC 4.56 4.45 3.85   HGB 9.6* 9.4* 6.9*   HCT 32.8* 31.8* 26.2*   MCV " "72* 72* 68*   MCH 21.1* 21.1* 17.9*   MCHC 29.3* 29.6* 26.3*   RDW 22.3* 22.4* 19.2*    194 255     INR  Recent Labs   Lab 02/08/24  1537   INR 1.08      No results found for: \"LIPASE\"    IMAGING:    CT chest/abdomen 2/8/24  IMPRESSION:  1.  No CT evidence of recurrent or metastatic disease. See same day pelvic MRI for additional details.  2.  However, there are bilateral pulmonary emboli, right greater than left. No evidence of right heart strain.  3.  Additionally, there is right lower extremity DVT in the superficial femoral vein. Recommend right lower extremity DVT ultrasound.    MR Pelvis 2/8/24  IMPRESSION:  1.  No evidence of recurrent disease in the pelvis.     2.  Right lower extremity DVT.    EXAM: US LOWER EXTREMITY VENOUS DUPLEX BILATERAL  LOCATION: [facility]  DATE: 2/8/2024     INDICATION: DVT on CT imaging, hx of cancer.  COMPARISON: None.  TECHNIQUE: Venous Duplex ultrasound of bilateral lower extremities with and without compression, augmentation and duplex. Color flow and spectral Doppler with waveform analysis performed.     FINDINGS: Exam includes the common femoral, femoral, popliteal veins as well as segmentally visualized deep calf veins and greater saphenous vein.      RIGHT: DVT identified in the proximal through distal right femoral, popliteal and one of the proximal through distal posterior tibial veins, some regions nearly occlusive. The right peroneal vein is not visualized. No superficial thrombophlebitis. No   popliteal cyst.     LEFT: No deep vein thrombosis. No superficial thrombophlebitis. No popliteal cyst.                                                                      IMPRESSION:     1.  Extensive right leg DVT. Recommend interventional radiology consultation.     2.  No left-sided DVT.  ASSESSMENT:  Bilateral pulmonary emboli and bilateral DVT (most extensive on right) and acute microcytic anemia-- This 78 yo female with history of anal " cancer diagnosed 2021, iritis, and osteoarthritis s/p revision right total hip arthroplasty Sept 2023 and closed reduction right total hip dislocation Nov 2023 who was admitted after outpatient imaging for anal cancer surveillance revealed bilateral pulmonary emboli and bilateral DVT.  The patient's hemoglobin was found to be acutely low at 6.9 with MCV of 68 (previous hgb 10.5 11/14/23).  She does not report having overt GI bleeding but does describe having dark stool after undergoing her hip surgery in September in the setting of taking higher doses of aspirin.  She has not been taking NSAIDs.      Possible sources of blood loss to account for microcytic anemia include peptic ulcer disease/gastric ulcers, Gato erosions associated with large hiatal hernia as noted on EGD in 2017, bleeding from polyps (advanced adenoma ascending colon 2/2021) or even malignancy.  For now, agree with IV iron replacement and weight-based heparin.  Will start twice daily IV PPI and follow hemoglobin closely.  Transfuse RBCs as needed.  Will tentatively plan for EGD and colonoscopy Monday, February 12.     PLAN:  Agree with IV iron replacement.  Start twice daily IV ppi. Follow hgb closely and transfuse RBCs as needed.   Heparin/treatment of PE/DVT per primary team/oncology.  Will plan to proceed with EGD and colonoscopy 2/12/24.    I will place orders for colonoscopy prep Sunday 2/11/24. NPO at midnight for egd/colonoscopy 2/12/24.     Total time spent on this encounter=45minutes.   Discussed with Dr. Devlin who will also visit with the patient.                                                Sadia Sullivan PA-C  Thank you for the opportunity to participate in the care of this patient.   Please feel free to call me with any questions or concerns.  Phone number [phone].            GI Staff Addendum  DOS 2/9/2024     Pt seen and discussed with GHAZALA. Agree with evaluation, assessment and plan as outlined.    79 year old yo female  "with history of anal cancer (sp chemo/radiation, no follow-up endoscopy), previous melena (EGD 2017 showing gastric erosions--essentially normal biopsies) who is admitted for acute PE/DVT. GI consulted for microcytic anemia. Ferritin is pending but she has been transfused 2u pRBCs and gotten IV iron. She states that when she took aspirin after her recent hip surgery, had darker stools, so stopped aspirin and her stools lightened up. No nausea, vomiting, abdominal pain, coffee ground or hematemesis or any BRBPR. No symptoms related to anal cancer.   No other NSAIDs  No EtOH  12 pt ros negative     /68 (BP Location: Right arm)   Pulse 80   Temp 98.5  F (36.9  C) (Oral)   Resp 23   Ht 1.6 m (5' 3\")   Wt 59.1 kg (130 lb 4.8 oz)   LMP  (LMP Unknown)   SpO2 97%   BMI 23.08 kg/m    General: A&O, NAD, non-toxic appearing  Eyes: No icterus or conjunctivitis  Gastrointestinal: Soft, NTTP, NABS, no r/g, no masses, no HSM    Assessment/Plan:  Acute on chronic anemia. Some may be related to surgical losses, low-level GI blood loss (ddx broad, includes PUD/DUD, esophagitis, gastritis, upper or lower GI malignancy, AVM, dieulafoy or other). New diagnosis of DVT/PE with symptoms of SOB/fatigue (also possibly attributable to anemia). Would recommend endoscopic evaluation, but needs stabilization on heparin prior to colonoscopy prep and anesthesia.    -IV PPI BID  -CLD starting tomorrow  -Prep starting Sunday  -EGD/colon Monday     Approximately 25 minutes of total time was spent providing patient care including patient evaluation, reviewing documentation/test results, and .                                                  Henry Devlin MD  Thank you for the opportunity to participate in the care of this patient.   Please feel free to call me with any questions or concerns.  Phone number [phone].                "

## 2024-02-10 LAB
BASOPHILS # BLD AUTO: 0 10E3/UL (ref 0–0.2)
BASOPHILS NFR BLD AUTO: 1 %
EOSINOPHIL # BLD AUTO: 0.3 10E3/UL (ref 0–0.7)
EOSINOPHIL NFR BLD AUTO: 5 %
ERYTHROCYTE [DISTWIDTH] IN BLOOD BY AUTOMATED COUNT: 22.7 % (ref 10–15)
HCT VFR BLD AUTO: 32.6 % (ref 35–47)
HGB BLD-MCNC: 9.5 G/DL (ref 11.7–15.7)
IMM GRANULOCYTES # BLD: 0 10E3/UL
IMM GRANULOCYTES NFR BLD: 1 %
LYMPHOCYTES # BLD AUTO: 0.9 10E3/UL (ref 0.8–5.3)
LYMPHOCYTES NFR BLD AUTO: 14 %
MCH RBC QN AUTO: 20.9 PG (ref 26.5–33)
MCHC RBC AUTO-ENTMCNC: 29.1 G/DL (ref 31.5–36.5)
MCV RBC AUTO: 72 FL (ref 78–100)
MONOCYTES # BLD AUTO: 0.5 10E3/UL (ref 0–1.3)
MONOCYTES NFR BLD AUTO: 8 %
NEUTROPHILS # BLD AUTO: 4.5 10E3/UL (ref 1.6–8.3)
NEUTROPHILS NFR BLD AUTO: 71 %
NRBC # BLD AUTO: 0 10E3/UL
NRBC BLD AUTO-RTO: 1 /100
PLATELET # BLD AUTO: 192 10E3/UL (ref 150–450)
RBC # BLD AUTO: 4.54 10E6/UL (ref 3.8–5.2)
UFH PPP CHRO-ACNC: 0.53 IU/ML
UFH PPP CHRO-ACNC: 0.55 IU/ML
UFH PPP CHRO-ACNC: 0.71 IU/ML
WBC # BLD AUTO: 6.3 10E3/UL (ref 4–11)

## 2024-02-10 PROCEDURE — C9113 INJ PANTOPRAZOLE SODIUM, VIA: HCPCS | Performed by: PHYSICIAN ASSISTANT

## 2024-02-10 PROCEDURE — 250N000011 HC RX IP 250 OP 636: Performed by: PHYSICIAN ASSISTANT

## 2024-02-10 PROCEDURE — 120N000004 HC R&B MS OVERFLOW

## 2024-02-10 PROCEDURE — 85025 COMPLETE CBC W/AUTO DIFF WBC: CPT

## 2024-02-10 PROCEDURE — 250N000011 HC RX IP 250 OP 636: Performed by: EMERGENCY MEDICINE

## 2024-02-10 PROCEDURE — 258N000003 HC RX IP 258 OP 636: Performed by: NURSE PRACTITIONER

## 2024-02-10 PROCEDURE — 250N000013 HC RX MED GY IP 250 OP 250 PS 637

## 2024-02-10 PROCEDURE — 250N000011 HC RX IP 250 OP 636: Performed by: NURSE PRACTITIONER

## 2024-02-10 PROCEDURE — 85520 HEPARIN ASSAY: CPT | Performed by: FAMILY MEDICINE

## 2024-02-10 PROCEDURE — 36415 COLL VENOUS BLD VENIPUNCTURE: CPT | Performed by: FAMILY MEDICINE

## 2024-02-10 PROCEDURE — 99233 SBSQ HOSP IP/OBS HIGH 50: CPT | Mod: GC

## 2024-02-10 RX ADMIN — IRON SUCROSE 300 MG: 20 INJECTION, SOLUTION INTRAVENOUS at 08:57

## 2024-02-10 RX ADMIN — PANTOPRAZOLE SODIUM 40 MG: 40 INJECTION, POWDER, FOR SOLUTION INTRAVENOUS at 20:25

## 2024-02-10 RX ADMIN — HEPARIN SODIUM 950 UNITS/HR: 10000 INJECTION, SOLUTION INTRAVENOUS at 12:19

## 2024-02-10 RX ADMIN — PANTOPRAZOLE SODIUM 40 MG: 40 INJECTION, POWDER, FOR SOLUTION INTRAVENOUS at 08:57

## 2024-02-10 RX ADMIN — METOPROLOL SUCCINATE 25 MG: 25 TABLET, EXTENDED RELEASE ORAL at 08:57

## 2024-02-10 ASSESSMENT — ACTIVITIES OF DAILY LIVING (ADL)
ADLS_ACUITY_SCORE: 22
ADLS_ACUITY_SCORE: 23
ADLS_ACUITY_SCORE: 22
ADLS_ACUITY_SCORE: 23
ADLS_ACUITY_SCORE: 23
ADLS_ACUITY_SCORE: 22
ADLS_ACUITY_SCORE: 22
ADLS_ACUITY_SCORE: 23
ADLS_ACUITY_SCORE: 22
ADLS_ACUITY_SCORE: 22

## 2024-02-10 NOTE — PLAN OF CARE
Problem: VTE (Venous Thromboembolism)  Goal: Tissue Perfusion  Outcome: Progressing     Problem: Risk for Delirium  Goal: Improved Attention and Thought Clarity    Pt resting comfortably, no events. Heparin gtt infusing at 1050 units/hr, Xa check in AM. Up to bathroom w/ SBA. Tele reading NSR. No bloody stools this shift. Plan to have EGD & colonoscopy on Monday.

## 2024-02-10 NOTE — PLAN OF CARE
Problem: VTE (Venous Thromboembolism)  Goal: Tissue Perfusion  Outcome: Progressing  Intervention: Optimize Tissue Perfusion  Recent Flowsheet Documentation  Taken 2/10/2024 0354 by Deloris Munoz RN  VTE Prevention/Management: SCDs (sequential compression devices) off  Taken 2/9/2024 6603 by Deloris Munoz, AFSHIN  VTE Prevention/Management: SCDs (sequential compression devices) off     Problem: VTE (Venous Thromboembolism)  Goal: Right Ventricular Function  Outcome: Progressing   Goal Outcome Evaluation:  Patient A&O x4, able to make needs known. VSS on RA. NSR on telemetry. Denied pain throughout shift. Heparin infusing 1050Units/hr until 0607, Heparin infusion paused for and hour and decreased by 100Units/hr. Now, right PIV infusing Heparin at 950Units/hr. Next Xa draw at 1306. Left PIV SL. Denies SOB and chest pain. Lung sounds clear. Voiding spontaneously. No BM during shift. Regular diet, tolerating well. Stand by assist with activity. Discharge pending.

## 2024-02-10 NOTE — PROGRESS NOTES
"  GASTROENTEROLOGY PROGRESS NOTE     SUBJECTIVE   No new issues overnight.  Patient is tolerating diet very well.  She reports a very small episode of rectal bleeding yesterday, which has not occurred at home.  She denies perianal symptoms.     OBJECTIVE     Vitals Blood pressure 116/66, pulse 93, temperature 97.9  F (36.6  C), temperature source Oral, resp. rate 18, height 1.6 m (5' 3\"), weight 57.7 kg (127 lb 4.8 oz), SpO2 100%, not currently breastfeeding.      Physical exam:    Alert and oriented, eating breakfast, no acute distress    LABORATORY    ELECTROLYTE PANEL   Recent Labs   Lab 24  1537 24  1135   *  --    POTASSIUM 4.8  --    CHLORIDE 102  --    CO2 22  --    *  --    CR 0.53 0.6   BUN 15.3  --       HEMATOLOGY PANEL   Recent Labs   Lab 02/10/24  0542 24  0716 24  0208 24  1537   HGB 9.5* 9.6* 9.4* 6.9*   MCV 72* 72* 72* 68*   WBC 6.3 5.8 6.4 7.0    203 194 255   INR  --   --   --  1.08      LIVER AND PANCREAS PANEL   Recent Labs   Lab 24  1537   AST 26   ALT 13   ALKPHOS 87   BILITOTAL 0.8     IMAGING STUDIES        I have reviewed the current diagnostic and laboratory tests.              IMPRESSION   79-year-old woman with history of anal cancer status postchemotherapy and radiation, inflammatory iritis, hypertension, advanced adenomatous polyp colon status post right total hip arthroplasty in November who presents with the followin.  Bilateral pulmonary emboli/DVT: On heparin drip  2.  Microcytic anemia: No gross bleeding at home.  She has not had endoscopic surveillance for her anal cancer, nor advanced adenomatous polyp.  No focal GI symptoms to suggest a source, but occult GI blood loss is possible.  Endoscopic evaluation is pending.     RECOMMENDATIONS   1.  Diet as tolerated today.  2.  She is on a heparin drip.  3.  Clear liquid diet tomorrow and start colonoscopy prep at that time.  4.  She is scheduled for an upper endoscopy and " colonoscopy on February 12. 5.  Please be sure heparin drip is turned off at midnight on February 12. 6.  I will not plan to see her tomorrow.  I will chart check.  Please call in the meanwhile with any questions.     Total time spent: 25 minutes.        Debby Balderas MD  Thank you for the opportunity to participate in the care of this patient.   Please feel free to call me with any questions or concerns.  Phone number [phone].

## 2024-02-10 NOTE — PROGRESS NOTES
[facility]    Progress Note - Hospitalist Service       Date of Admission:  2/8/2024    Assessment & Plan   Oscar Zhao is a 79 year old female admitted on 2/8/2024. She has a PMH including hypertension, sinus tachycardia, anal cancer in remission, R YAMILET revisions in 9/2023 and 10/2023 due to dislocations and is admitted with RLE DVT and bilateral PE found on routine imaging 2/8/24, also found to have symptomatic anemia. PE and DVT currently being treated with heparin gtt per Hematology. Will transition to DOAC prior to discharge. Planning for EGD and colonoscopy on 2/12/24.    RLE DVT  Bilateral PE, R > L  Incidentally found - hemodynamically stable, on RA. Does report progressive MARTIN and fatigue over the course of weeks; this could be related to VTE or also anemia as below. CT CAP showing lobar and segmental PE on R and segmental and subsegmental PE on the L; no evidence of R heart strain. No prior history of VTE and not on anticoagulation. VTE appears unprovoked - patient does have a history of anal cancer in remission and was treated with mitomycin and 5FU for chemotherapy, which could increase risk of VTE, but last treated 3 years ago. No recurrence of malignancy on CT CAP 2/9/24. Per chart review, does have a history of autoimmune-related eye disease and potentially peripheral neuropathy; no history of bleeding disorders, denies any family history of bleeding/clotting disorders.  -Admit to inpatient  -Heme/Onc consulted, appreciate recs              -Heparin drip   -Trend hemoglobin   -Transition to DOAC prior to discharge   -Follow-up on 2/16/24 outpatient as scheduled  -IR consulted by ED provider, will not perform procedure for DVT  -Cardiac telemetry  -O2 as needed, continuous pulse ox     Symptomatic anemia of unknown etiology, possible acute blood loss anemia  Hgb 6.9 on admission; ED ordered 2 units pRBC. Patient has previous history of gastric ulcer with UGIB/tarry  stools and denies any recent tarry stools. Rectal exam done by ED provider showed light brown stool. No hemoptysis or hematemesis. Indirect bilirubin, INR/PTT normal. Most likely acute blood loss, but unclear source of bleeding - could potentially be recurrent gastric ulcer; patient has hx of gastric ulcers. History of EGD many years ago for gastric ulcer. Last colonoscopy in 2021, reports being due for one this year due to polyp.  -Heme/Onc consulted as above  -Status post 2 units pRBC on 2/8/24; hemoglobin has since been stable  -Hemolysis labs: haptoglobin, LDH, reticulocyte count, peripheral blood smear; also autoimmune labs: ESR, CRP  -PO pantoprazole 40 mg BID for potential UGIB as source  -GI consulted for EGD and colonoscopy, appreciate recs  -Diet as tolerated today.  -She is on a heparin drip.  -Clear liquid diet tomorrow and start colonoscopy prep at that time.  -She is scheduled for an upper endoscopy and colonoscopy on February 12.  -Please be sure heparin drip is turned off at midnight on February 12.     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 Patient will bring these in.  Hypertension, sinus tachycardia: continue PTA metoprolol         Diet: Combination Diet Regular Diet Adult  Clear Liquid Diet    DVT Prophylaxis: heparin gtt as above  Rodriguez Catheter: Not present  Fluids: PO  Lines: None     Cardiac Monitoring: ACTIVE order. Indication: bilateral PE  Code Status: Full Code      Clinically Significant Risk Factors                  # Hypertension: Noted on problem list            # Financial/Environmental Concerns:           Disposition Plan      Expected Discharge Date: 02/13/2024        Discharge Comments: hep gtt, EGD/Colonoscopy 2/12        The patient's care was discussed with the Attending Physician, Dr. Desir .    ALCIRA YOUNG MD  Hospitalist Service  [facility]  [facility]  Securely message with [employer] (more info)  Text page via [facility] Paging/Directory   ______________________________________________________________________    Interval History   No acute events overnight. Feeling well this morning, no concerns. Wonders if small amount of bleeding yesterday may have been secondary to hemorrhoid as she had an especially large bowel movement. No other signs or symptoms of ongoing bleeding.    Physical Exam   Vital Signs: Temp: 97.9  F (36.6  C) Temp src: Oral BP: 116/66 Pulse: 93   Resp: 18 SpO2: 100 % O2 Device: None (Room air)    Weight: 127 lbs 4.8 oz    General Appearance: Alert, lying in bed comfortably, no acute distress.  HEENT: Normocephalic, moist mucous membranes, no nasal discharge.  Respiratory: Lungs clear to auscultation bilaterally, no wheezes or crackles.  Cardiovascular: Regular rate and rhythm, normal S1 and S2, no murmurs.   Skin: No significant lesions or rashes on exposed skin.  Musculoskeletal: No gross musculoskeletal deformities. BLE without edema, skin changes, or tenderness to palpation.  Neurologic: Alert, answers questions appropriately. No apparent focal deficits.  Psychiatric: Pleasant mood and affect, engaged and good understanding of her health and current condition.      Data     I have personally reviewed the following data over the past 24 hrs:    6.3  \   9.5 (L)   / 192     N/A N/A N/A /  N/A   N/A N/A N/A \     Ferritin:  N/A % Retic:  N/A LDH:  N/A       Imaging results reviewed over the past 24 hrs:   No results found for this or any previous visit (from the past 24 hour(s)).

## 2024-02-11 LAB
ERYTHROCYTE [DISTWIDTH] IN BLOOD BY AUTOMATED COUNT: 23.3 % (ref 10–15)
ERYTHROCYTE [DISTWIDTH] IN BLOOD BY AUTOMATED COUNT: 23.7 % (ref 10–15)
HCT VFR BLD AUTO: 32.6 % (ref 35–47)
HCT VFR BLD AUTO: 33.1 % (ref 35–47)
HGB BLD-MCNC: 9.2 G/DL (ref 11.7–15.7)
HGB BLD-MCNC: 9.5 G/DL (ref 11.7–15.7)
MCH RBC QN AUTO: 20.8 PG (ref 26.5–33)
MCH RBC QN AUTO: 21.1 PG (ref 26.5–33)
MCHC RBC AUTO-ENTMCNC: 28.2 G/DL (ref 31.5–36.5)
MCHC RBC AUTO-ENTMCNC: 28.7 G/DL (ref 31.5–36.5)
MCV RBC AUTO: 73 FL (ref 78–100)
MCV RBC AUTO: 74 FL (ref 78–100)
PLATELET # BLD AUTO: 193 10E3/UL (ref 150–450)
PLATELET # BLD AUTO: 210 10E3/UL (ref 150–450)
RBC # BLD AUTO: 4.42 10E6/UL (ref 3.8–5.2)
RBC # BLD AUTO: 4.51 10E6/UL (ref 3.8–5.2)
UFH PPP CHRO-ACNC: 0.41 IU/ML
WBC # BLD AUTO: 6.1 10E3/UL (ref 4–11)
WBC # BLD AUTO: 6.2 10E3/UL (ref 4–11)

## 2024-02-11 PROCEDURE — 99233 SBSQ HOSP IP/OBS HIGH 50: CPT | Mod: GC

## 2024-02-11 PROCEDURE — 120N000004 HC R&B MS OVERFLOW

## 2024-02-11 PROCEDURE — C9113 INJ PANTOPRAZOLE SODIUM, VIA: HCPCS | Performed by: PHYSICIAN ASSISTANT

## 2024-02-11 PROCEDURE — 36415 COLL VENOUS BLD VENIPUNCTURE: CPT

## 2024-02-11 PROCEDURE — 85520 HEPARIN ASSAY: CPT | Performed by: FAMILY MEDICINE

## 2024-02-11 PROCEDURE — 250N000013 HC RX MED GY IP 250 OP 250 PS 637: Performed by: PHYSICIAN ASSISTANT

## 2024-02-11 PROCEDURE — 250N000011 HC RX IP 250 OP 636: Performed by: PHYSICIAN ASSISTANT

## 2024-02-11 PROCEDURE — 36415 COLL VENOUS BLD VENIPUNCTURE: CPT | Performed by: FAMILY MEDICINE

## 2024-02-11 PROCEDURE — 258N000003 HC RX IP 258 OP 636: Performed by: NURSE PRACTITIONER

## 2024-02-11 PROCEDURE — 250N000013 HC RX MED GY IP 250 OP 250 PS 637

## 2024-02-11 PROCEDURE — 85027 COMPLETE CBC AUTOMATED: CPT

## 2024-02-11 PROCEDURE — 250N000011 HC RX IP 250 OP 636: Performed by: NURSE PRACTITIONER

## 2024-02-11 PROCEDURE — 85027 COMPLETE CBC AUTOMATED: CPT | Performed by: EMERGENCY MEDICINE

## 2024-02-11 PROCEDURE — 250N000011 HC RX IP 250 OP 636

## 2024-02-11 RX ADMIN — PANTOPRAZOLE SODIUM 40 MG: 40 INJECTION, POWDER, FOR SOLUTION INTRAVENOUS at 20:22

## 2024-02-11 RX ADMIN — IRON SUCROSE 300 MG: 20 INJECTION, SOLUTION INTRAVENOUS at 10:52

## 2024-02-11 RX ADMIN — PANTOPRAZOLE SODIUM 40 MG: 40 INJECTION, POWDER, FOR SOLUTION INTRAVENOUS at 09:55

## 2024-02-11 RX ADMIN — BISACODYL 10 MG: 5 TABLET, COATED ORAL at 13:47

## 2024-02-11 RX ADMIN — HEPARIN SODIUM 950 UNITS/HR: 10000 INJECTION, SOLUTION INTRAVENOUS at 17:30

## 2024-02-11 RX ADMIN — METOPROLOL SUCCINATE 25 MG: 25 TABLET, EXTENDED RELEASE ORAL at 09:55

## 2024-02-11 RX ADMIN — POLYETHYLENE GLYCOL 3350 238 G: 17 POWDER, FOR SOLUTION ORAL at 15:15

## 2024-02-11 ASSESSMENT — ACTIVITIES OF DAILY LIVING (ADL)
ADLS_ACUITY_SCORE: 22
ADLS_ACUITY_SCORE: 23
ADLS_ACUITY_SCORE: 22
ADLS_ACUITY_SCORE: 23
ADLS_ACUITY_SCORE: 22
ADLS_ACUITY_SCORE: 22
ADLS_ACUITY_SCORE: 23
ADLS_ACUITY_SCORE: 22
DEPENDENT_IADLS:: INDEPENDENT
ADLS_ACUITY_SCORE: 23

## 2024-02-11 NOTE — PLAN OF CARE
Patient remains on Heparin at 950 units/hr. Anti-xa therapeutic x1. Next check at 2000. No signs or symptoms of bleed. Up ad jumana. Aware of EGD and colonoscopy plan for Monday.  Dorothea Cruz RN on 2/10/2024 at 6:15 PM      Problem: Adult Inpatient Plan of Care  Goal: Optimal Comfort and Wellbeing  Outcome: Progressing     Problem: Adult Inpatient Plan of Care  Goal: Readiness for Transition of Care  Outcome: Progressing     Problem: VTE (Venous Thromboembolism)  Goal: Tissue Perfusion  Outcome: Progressing  Intervention: Optimize Tissue Perfusion  Recent Flowsheet Documentation  Taken 2/10/2024 1700 by Dorothea Cruz, RN  VTE Prevention/Management: SCDs (sequential compression devices) off  Taken 2/10/2024 0900 by Dorothea Cruz, RN  VTE Prevention/Management: SCDs (sequential compression devices) off   Goal Outcome Evaluation:

## 2024-02-11 NOTE — PLAN OF CARE
Goal Outcome Evaluation:       No acute changes this shift. Bowel prep for procedures tomorrow started today- clear liquids all day followed by ordered medications this afternoon. Her vitals are WNL on room air. Denies any pain or shortness of breath. Up ambulating independently. Continues on IV heparin at 950 units / hr, plans to stop at 0000 for procedure tonight. Will also be NPO and midnight.     Overall Patient Progress: improving

## 2024-02-11 NOTE — PLAN OF CARE
Goal Outcome Evaluation:       Pt denies pain.  Up independent to bathroom.   Started clear liquid diet at 0000 for preparation for colonoscopy Monday.  Heparin gtt continues to run at 950 units per hour.        Problem: Adult Inpatient Plan of Care  Goal: Plan of Care Review  Description: The Plan of Care Review/Shift note should be completed every shift.  The Outcome Evaluation is a brief statement about your assessment that the patient is improving, declining, or no change.  This information will be displayed automatically on your shift  note.  Outcome: Progressing     Problem: VTE (Venous Thromboembolism)  Goal: Tissue Perfusion  Outcome: Progressing  Goal: Right Ventricular Function  Outcome: Progressing     Problem: VTE (Venous Thromboembolism)  Goal: Tissue Perfusion  Outcome: Progressing

## 2024-02-11 NOTE — CONSULTS
Care Management Initial Consult    General Information  Assessment completed with: Patient,    Type of CM/SW Visit: Initial Assessment    Primary Care Provider verified and updated as needed: Yes   Readmission within the last 30 days:     Reason for Consult: discharge planning  Advance Care Planning: Advance Care Planning Reviewed: verified with patient   (Pt states she has a healthcare directive and her daughterKaylie is her healthcare agent)     Communication Assessment  Patient's communication style: spoken language (English or Bilingual)    Hearing Difficulty or Deaf: no   Wear Glasses or Blind: no    Cognitive  Cognitive/Neuro/Behavioral: WDL                      Living Environment:   People in home: alone     Current living Arrangements: other (see comments) (detached [facility])      Able to return to prior arrangements:       Family/Social Support:  Care provided by: self  Provides care for: no one  Marital Status:   Children, Other (specify) (nephews)          Description of Support System: Supportive, Involved    Support Assessment: Adequate family and caregiver support    Current Resources:   Patient receiving home care services: No     Community Resources: None  Equipment currently used at home: none  Supplies currently used at home: None    Employment/Financial:  Employment Status: retired        Financial Concerns: none   Referral to Financial Worker: No     Does the patient's insurance plan have a 3 day qualifying hospital stay waiver?      Lifestyle & Psychosocial Needs:  Social Determinants of Health     Food Insecurity: Low Risk  (10/2/2023)    Food Insecurity     Within the past 12 months, did you worry that your food would run out before you got money to buy more?: No     Within the past 12 months, did the food you bought just not last and you didn t have money to get more?: No   Depression: Not at risk (1/30/2024)    PHQ-2     PHQ-2 Score: 0   Housing Stability: Low Risk  (10/2/2023)     Housing Stability     Do you have housing? : Yes     Are you worried about losing your housing?: No   Tobacco Use: Low Risk  (1/30/2024)    Patient History     Smoking Tobacco Use: Never     Smokeless Tobacco Use: Never     Passive Exposure: Never   Financial Resource Strain: Low Risk  (10/2/2023)    Financial Resource Strain     Within the past 12 months, have you or your family members you live with been unable to get utilities (heat, electricity) when it was really needed?: No   Alcohol Use: Not on file   Transportation Needs: Low Risk  (10/2/2023)    Transportation Needs     Within the past 12 months, has lack of transportation kept you from medical appointments, getting your medicines, non-medical meetings or appointments, work, or from getting things that you need?: No   Physical Activity: Not on file   Interpersonal Safety: Low Risk  (10/2/2023)    Interpersonal Safety     Do you feel physically and emotionally safe where you currently live?: Yes     Within the past 12 months, have you been hit, slapped, kicked or otherwise physically hurt by someone?: No     Within the past 12 months, have you been humiliated or emotionally abused in other ways by your partner or ex-partner?: No   Stress: Not on file   Social Connections: Not on file     Functional Status:  Prior to admission patient needed assistance:   Dependent ADLs:: Independent  Dependent IADLs:: Independent     Mental Health Status:  Mental Health Status: No Current Concerns       Chemical Dependency Status:  Chemical Dependency Status: No Current Concerns           Values/Beliefs:  Spiritual, Cultural Beliefs, Adventism Practices, Values that affect care:    Description of Beliefs that Will Affect Care: Temple            Additional Information:  Met with patient to review role of care management, progression of care and possible need for services at discharge, including OP services, home care, or skilled nursing care.   Pt reports she lives  alone in a detached townhouse that has stairs, but she normally resides on one lever.  She states she is independent with all ADLs/IADLs, including bathing and driving.  She states her daughter, Kalyie, is very supportive, as well as a couple nephews.   Pt anticipates return home with OP follow up and states he daughter will transport her home.    Pt will have EGD and Colonoscopy tomorrow.     Care Management will continue to follow for potential discharge needs.    Christa Murphy RN

## 2024-02-11 NOTE — PROGRESS NOTES
[facility]    Progress Note - Hospitalist Service       Date of Admission:  2/8/2024    Assessment & Plan   Oscar Zhao is a 79 year old female admitted on 2/8/2024. She has a PMH including hypertension, sinus tachycardia, anal cancer in remission, R YAMILET revisions in 9/2023 and 10/2023 due to dislocations and is admitted with RLE DVT and bilateral PE found on routine imaging 2/8/24, also found to have symptomatic anemia. PE and DVT currently being treated with heparin gtt per Hematology. Will transition to DOAC prior to discharge. Planning for EGD and colonoscopy on 2/12/24.    RLE DVT  Bilateral PE, R > L  Incidentally found - hemodynamically stable, on RA. Does report progressive MARTIN and fatigue over the course of weeks; this could be related to VTE or also anemia as below. CT CAP showing lobar and segmental PE on R and segmental and subsegmental PE on the L; no evidence of R heart strain. No prior history of VTE and not on anticoagulation. VTE appears unprovoked - patient does have a history of anal cancer in remission and was treated with mitomycin and 5FU for chemotherapy, which could increase risk of VTE, but last treated 3 years ago. No recurrence of malignancy on CT CAP 2/9/24. Per chart review, does have a history of autoimmune-related eye disease and potentially peripheral neuropathy; no history of bleeding disorders, denies any family history of bleeding/clotting disorders.  -Heme/Onc consulted, appreciate recs              -Heparin drip, will hold starting at 11:59 2/11 for procedures 2/12 (orders in)    -Trend hemoglobin   -Transition to DOAC prior to discharge   -Follow-up on 2/16/24 outpatient as scheduled  -IR consulted by ED provider, will not perform procedure for DVT     Symptomatic anemia of unknown etiology, possible acute blood loss anemia  Hgb 6.9 on admission; ED ordered 2 units pRBC. Patient has previous history of gastric ulcer with UGIB/tarry stools and  denies any recent tarry stools. Rectal exam done by ED provider showed light brown stool. No hemoptysis or hematemesis. Indirect bilirubin, INR/PTT normal. Most likely acute blood loss, but unclear source of bleeding - could potentially be recurrent gastric ulcer; patient has hx of gastric ulcers. History of EGD many years ago for gastric ulcer. Last colonoscopy in 2021, reports being due for one this year due to polyp.  -Heme/Onc consulted as above  -Status post 2 units pRBC on 2/8/24; hemoglobin has since been stable  -Hemolysis labs: haptoglobin, LDH, reticulocyte count, peripheral blood smear; also autoimmune labs: ESR, CRP - all within normal limits   -PO pantoprazole 40 mg BID for potential UGIB as source  -GI consulted for EGD and colonoscopy, appreciate recs  -She is on a heparin drip. Scheduled to discontinue at 11:59pm 12/11.  -Clear liquid diet today and start colonoscopy prep this afternoon.   -She is scheduled for an upper endoscopy and colonoscopy on February 12.  -Please be sure heparin drip is turned off at midnight on February 12.     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 Patient will bring these in.  Hypertension, sinus tachycardia: continue PTA metoprolol         Diet: Clear Liquid Diet    DVT Prophylaxis: heparin gtt as above  Rodriguez Catheter: Not present  Fluids: PO  Lines: None     Cardiac Monitoring: None  Code Status: Full Code      Clinically Significant Risk Factors                  # Hypertension: Noted on problem list            # Financial/Environmental Concerns:           Disposition Plan      Expected Discharge Date: 02/13/2024        Discharge Comments: hep gtt, EGD/Colonoscopy 2/12        The patient's care was discussed with the Attending Physician, Dr. Desir .    Nereida Hendrickson, DO  Hospitalist Service  [facility]  Securely message with  [employer] (more info)  Text page via [facility] Paging/Directory   ______________________________________________________________________    Interval History   No acute events overnight. No complaints or concerns this morning. Looking forward to having procedures done tomorrow, reassured by stable hemoglobin. No new signs of bleeding or bruising.       Physical Exam   Vital Signs: Temp: 98.7  F (37.1  C) Temp src: Oral BP: (!) 150/89 Pulse: 71   Resp: 16 SpO2: 97 % O2 Device: None (Room air)    Weight: 128 lbs 1.6 oz    General Appearance: Alert, lying in bed comfortably, no acute distress.  HEENT: Normocephalic, moist mucous membranes, no nasal discharge.  Respiratory: Lungs clear to auscultation bilaterally, no wheezes or crackles.  Cardiovascular: Regular rate and rhythm, normal S1 and S2, no murmurs.   Skin: No significant lesions or rashes on exposed skin.  Musculoskeletal: No gross musculoskeletal deformities. BLE without edema, skin changes, or tenderness to palpation.  Neurologic: Alert, answers questions appropriately. No apparent focal deficits.  Psychiatric: Pleasant mood and affect, engaged and good understanding of her health and current condition.      Data     I have personally reviewed the following data over the past 24 hrs:    6.2  \   9.5 (L)   / 193     N/A N/A N/A /  N/A   N/A N/A N/A \       Imaging results reviewed over the past 24 hrs:   No results found for this or any previous visit (from the past 24 hour(s)).

## 2024-02-12 ENCOUNTER — ANESTHESIA EVENT (OUTPATIENT)
Dept: SURGERY | Facility: CLINIC | Age: 79
DRG: 299 | End: 2024-02-12
Payer: MEDICARE

## 2024-02-12 ENCOUNTER — ANESTHESIA (OUTPATIENT)
Dept: SURGERY | Facility: CLINIC | Age: 79
DRG: 299 | End: 2024-02-12
Payer: MEDICARE

## 2024-02-12 VITALS
DIASTOLIC BLOOD PRESSURE: 63 MMHG | RESPIRATION RATE: 16 BRPM | OXYGEN SATURATION: 99 % | WEIGHT: 130 LBS | HEIGHT: 63 IN | SYSTOLIC BLOOD PRESSURE: 140 MMHG | TEMPERATURE: 96.3 F | BODY MASS INDEX: 23.04 KG/M2 | HEART RATE: 74 BPM

## 2024-02-12 LAB
COLONOSCOPY: NORMAL
ERYTHROCYTE [DISTWIDTH] IN BLOOD BY AUTOMATED COUNT: 24 % (ref 10–15)
HCT VFR BLD AUTO: 33.9 % (ref 35–47)
HGB BLD-MCNC: 9.8 G/DL (ref 11.7–15.7)
MCH RBC QN AUTO: 21.3 PG (ref 26.5–33)
MCHC RBC AUTO-ENTMCNC: 28.9 G/DL (ref 31.5–36.5)
MCV RBC AUTO: 74 FL (ref 78–100)
PLATELET # BLD AUTO: 198 10E3/UL (ref 150–450)
RBC # BLD AUTO: 4.61 10E6/UL (ref 3.8–5.2)
UFH PPP CHRO-ACNC: 0.01 IU/ML
UPPER GI ENDOSCOPY: NORMAL
WBC # BLD AUTO: 5.6 10E3/UL (ref 4–11)

## 2024-02-12 PROCEDURE — 258N000003 HC RX IP 258 OP 636: Performed by: ANESTHESIOLOGY

## 2024-02-12 PROCEDURE — 360N000075 HC SURGERY LEVEL 2, PER MIN: Performed by: INTERNAL MEDICINE

## 2024-02-12 PROCEDURE — 370N000017 HC ANESTHESIA TECHNICAL FEE, PER MIN: Performed by: INTERNAL MEDICINE

## 2024-02-12 PROCEDURE — 272N000001 HC OR GENERAL SUPPLY STERILE: Performed by: INTERNAL MEDICINE

## 2024-02-12 PROCEDURE — 250N000009 HC RX 250: Performed by: NURSE ANESTHETIST, CERTIFIED REGISTERED

## 2024-02-12 PROCEDURE — 999N000141 HC STATISTIC PRE-PROCEDURE NURSING ASSESSMENT: Performed by: INTERNAL MEDICINE

## 2024-02-12 PROCEDURE — 250N000013 HC RX MED GY IP 250 OP 250 PS 637: Performed by: PHYSICIAN ASSISTANT

## 2024-02-12 PROCEDURE — 250N000011 HC RX IP 250 OP 636: Performed by: PHYSICIAN ASSISTANT

## 2024-02-12 PROCEDURE — C9113 INJ PANTOPRAZOLE SODIUM, VIA: HCPCS | Performed by: PHYSICIAN ASSISTANT

## 2024-02-12 PROCEDURE — 0DB98ZX EXCISION OF DUODENUM, VIA NATURAL OR ARTIFICIAL OPENING ENDOSCOPIC, DIAGNOSTIC: ICD-10-PCS | Performed by: INTERNAL MEDICINE

## 2024-02-12 PROCEDURE — 88342 IMHCHEM/IMCYTCHM 1ST ANTB: CPT | Mod: TC | Performed by: INTERNAL MEDICINE

## 2024-02-12 PROCEDURE — 250N000011 HC RX IP 250 OP 636: Performed by: NURSE ANESTHETIST, CERTIFIED REGISTERED

## 2024-02-12 PROCEDURE — 0DJD8ZZ INSPECTION OF LOWER INTESTINAL TRACT, VIA NATURAL OR ARTIFICIAL OPENING ENDOSCOPIC: ICD-10-PCS | Performed by: INTERNAL MEDICINE

## 2024-02-12 PROCEDURE — 85041 AUTOMATED RBC COUNT: CPT

## 2024-02-12 PROCEDURE — 85520 HEPARIN ASSAY: CPT | Performed by: FAMILY MEDICINE

## 2024-02-12 PROCEDURE — 36415 COLL VENOUS BLD VENIPUNCTURE: CPT | Performed by: FAMILY MEDICINE

## 2024-02-12 PROCEDURE — 99238 HOSP IP/OBS DSCHRG MGMT 30/<: CPT | Mod: GC

## 2024-02-12 PROCEDURE — 0DB68ZX EXCISION OF STOMACH, VIA NATURAL OR ARTIFICIAL OPENING ENDOSCOPIC, DIAGNOSTIC: ICD-10-PCS | Performed by: INTERNAL MEDICINE

## 2024-02-12 RX ORDER — ONDANSETRON 4 MG/1
4 TABLET, ORALLY DISINTEGRATING ORAL EVERY 30 MIN PRN
Status: CANCELLED | OUTPATIENT
Start: 2024-02-12

## 2024-02-12 RX ORDER — METOPROLOL SUCCINATE 25 MG/1
25 TABLET, EXTENDED RELEASE ORAL DAILY
Qty: 30 TABLET | Refills: 0 | Status: SHIPPED | OUTPATIENT
Start: 2024-02-13

## 2024-02-12 RX ORDER — LIDOCAINE 40 MG/G
CREAM TOPICAL
Status: DISCONTINUED | OUTPATIENT
Start: 2024-02-12 | End: 2024-02-12 | Stop reason: HOSPADM

## 2024-02-12 RX ORDER — ONDANSETRON 2 MG/ML
4 INJECTION INTRAMUSCULAR; INTRAVENOUS EVERY 30 MIN PRN
Status: CANCELLED | OUTPATIENT
Start: 2024-02-12

## 2024-02-12 RX ORDER — PROPOFOL 10 MG/ML
INJECTION, EMULSION INTRAVENOUS CONTINUOUS PRN
Status: DISCONTINUED | OUTPATIENT
Start: 2024-02-12 | End: 2024-02-12

## 2024-02-12 RX ORDER — ONDANSETRON 2 MG/ML
4 INJECTION INTRAMUSCULAR; INTRAVENOUS
Status: DISCONTINUED | OUTPATIENT
Start: 2024-02-12 | End: 2024-02-12 | Stop reason: HOSPADM

## 2024-02-12 RX ORDER — FENTANYL CITRATE 50 UG/ML
25 INJECTION, SOLUTION INTRAMUSCULAR; INTRAVENOUS EVERY 5 MIN PRN
Status: CANCELLED | OUTPATIENT
Start: 2024-02-12

## 2024-02-12 RX ORDER — HYDROMORPHONE HCL IN WATER/PF 6 MG/30 ML
0.4 PATIENT CONTROLLED ANALGESIA SYRINGE INTRAVENOUS EVERY 5 MIN PRN
Status: CANCELLED | OUTPATIENT
Start: 2024-02-12

## 2024-02-12 RX ORDER — SODIUM CHLORIDE, SODIUM LACTATE, POTASSIUM CHLORIDE, CALCIUM CHLORIDE 600; 310; 30; 20 MG/100ML; MG/100ML; MG/100ML; MG/100ML
INJECTION, SOLUTION INTRAVENOUS CONTINUOUS
Status: CANCELLED | OUTPATIENT
Start: 2024-02-12

## 2024-02-12 RX ORDER — SODIUM CHLORIDE, SODIUM LACTATE, POTASSIUM CHLORIDE, CALCIUM CHLORIDE 600; 310; 30; 20 MG/100ML; MG/100ML; MG/100ML; MG/100ML
INJECTION, SOLUTION INTRAVENOUS CONTINUOUS
Status: DISCONTINUED | OUTPATIENT
Start: 2024-02-12 | End: 2024-02-12 | Stop reason: HOSPADM

## 2024-02-12 RX ORDER — HALOPERIDOL 5 MG/ML
1 INJECTION INTRAMUSCULAR
Status: CANCELLED | OUTPATIENT
Start: 2024-02-12

## 2024-02-12 RX ORDER — HYDROMORPHONE HCL IN WATER/PF 6 MG/30 ML
0.2 PATIENT CONTROLLED ANALGESIA SYRINGE INTRAVENOUS EVERY 5 MIN PRN
Status: CANCELLED | OUTPATIENT
Start: 2024-02-12

## 2024-02-12 RX ORDER — LIDOCAINE HYDROCHLORIDE 10 MG/ML
INJECTION, SOLUTION INFILTRATION; PERINEURAL PRN
Status: DISCONTINUED | OUTPATIENT
Start: 2024-02-12 | End: 2024-02-12

## 2024-02-12 RX ORDER — FENTANYL CITRATE 50 UG/ML
50 INJECTION, SOLUTION INTRAMUSCULAR; INTRAVENOUS EVERY 5 MIN PRN
Status: CANCELLED | OUTPATIENT
Start: 2024-02-12

## 2024-02-12 RX ORDER — MEPERIDINE HYDROCHLORIDE 25 MG/ML
12.5 INJECTION INTRAMUSCULAR; INTRAVENOUS; SUBCUTANEOUS EVERY 5 MIN PRN
Status: CANCELLED | OUTPATIENT
Start: 2024-02-12

## 2024-02-12 RX ADMIN — MAGNESIUM CITRATE 296 ML: 1.75 LIQUID ORAL at 02:14

## 2024-02-12 RX ADMIN — PANTOPRAZOLE SODIUM 40 MG: 40 INJECTION, POWDER, FOR SOLUTION INTRAVENOUS at 09:07

## 2024-02-12 RX ADMIN — SODIUM CHLORIDE, POTASSIUM CHLORIDE, SODIUM LACTATE AND CALCIUM CHLORIDE: 600; 310; 30; 20 INJECTION, SOLUTION INTRAVENOUS at 10:29

## 2024-02-12 RX ADMIN — PROPOFOL 200 MCG/KG/MIN: 10 INJECTION, EMULSION INTRAVENOUS at 11:12

## 2024-02-12 RX ADMIN — LIDOCAINE HYDROCHLORIDE 5 ML: 10 INJECTION, SOLUTION INFILTRATION; PERINEURAL at 11:12

## 2024-02-12 ASSESSMENT — ACTIVITIES OF DAILY LIVING (ADL)
ADLS_ACUITY_SCORE: 22

## 2024-02-12 NOTE — PROGRESS NOTES
Care Management Discharge Note    Discharge Date: 02/12/2024       Discharge Disposition: Home    Discharge Services:  none     Discharge DME:  none    Discharge Transportation:  Family    Private pay costs discussed: Not applicable    Does the patient's insurance plan have a 3 day qualifying hospital stay waiver?  Yes     Which insurance plan 3 day waiver is available? [employer] RE[employer]    Will the waiver be used for post-acute placement? No    PAS Confirmation Code:    Patient/family educated on Medicare website which has current facility and service quality ratings:      Education Provided on the Discharge Plan:  yes  Persons Notified of Discharge Plans: pt  Patient/Family in Agreement with the Plan: yes    Handoff Referral Completed: Yes    Additional Information:  Pt to discharge back to home. Family to transport.     Pt to follow up in clinic with PCP.    No CM needs at discharge.     Sky Machado RN

## 2024-02-12 NOTE — ANESTHESIA PREPROCEDURE EVALUATION
Anesthesia Pre-Procedure Evaluation    Patient: Oscar Zhao   MRN: [id number] : 1945        Procedure : Procedure(s):  ESOPHAGOGASTRODUODENOSCOPY  COLONOSCOPY          Past Medical History:   Diagnosis Date    Anemia, unspecified type 2024    Arthritis     Basal cell carcinoma of anterior chest     Breast cyst     History of anesthesia complications     HLA B27 (HLA B27 positive)     Hypertension     Osteoporosis 2011    Peripheral neuropathy 2018    PONV (postoperative nausea and vomiting)     Scleritis, bilateral     Left .  Treated with corticosteroids,, methotrexate and Humira.  .  Rituximab.    Squamous cell carcinoma of skin of chest     Steroid induced glaucoma, both eyes       Past Surgical History:   Procedure Laterality Date    ARTHROPLASTY REVISION HIP Right 2023    Procedure: RIGHT HIP REVISION TOTAL HIP ARTHROPLASTY;  Surgeon: Bill Bernal DO;  Location: [facility] OR    ARTHROPLASTY REVISION HIP Right 2023    Procedure: RIGHT REVISION TOTAL HIP ARTHROPLASTY HEAD LINER EXCHANGE;  Surgeon: Bill Bernal DO;  Location: [facility] OR    BREAST CYST EXCISION Right     benign     SECTION      CLOSED REDUCTION HIP Right 2019    Procedure: CLOSED REDUCTION, HIP;  Surgeon: Jak Ruiz DO;  Location: [facility] OR;  Service: Orthopedics    CLOSED REDUCTION HIP Right 2023    Procedure: CLOSED REDUCTION, HIP RIGHT;  Surgeon: Aditya Pedroza MD;  Location: [facility] OR    CLOSED REDUCTION HIP Right 2023    Procedure: CLOSED REDUCTION, HIP RIGHT;  Surgeon: Jak Allen MD;  Location: [facility] OR    COLONOSCOPY  2011    COLONOSCOPY  2005    COLONOSCOPY W/ BIOPSIES AND POLYPECTOMY  2021    16 to 20 mm polyp:tubular adenoma in the ascending colon.  Ulcerated mass in the anal canal: Moderately differentiated squamous cell cancer.    ESOPHAGOSCOPY, GASTROSCOPY, DUODENOSCOPY (EGD),  COMBINED  05/05/2017    Large hiatal hernia.  Reactive gastropathy with mucosal erosion.  H. pylori negative.    HERNIORRHAPHY FEMORAL Left 9/7/2021    Procedure: LEFT FEMORAL HERNIA REPAIR;  Surgeon: Sidney Murray MD;  Location: [facility]    HYSTERECTOMY TOTAL ABDOMINAL, BILATERAL SALPINGO-OOPHORECTOMY, COMBINED  1996    IR CHEST PORT PLACEMENT > 5 YRS OF AGE  3/15/2021    IR PORT PLACEMENT >5 YEARS  03/15/2021    Right IJ port-a-cath.    IR PORT REMOVAL RIGHT  7/14/2021    LAPAROSCOPIC APPENDECTOMY  04/28/2011    PHACOEMULSIFICATION CLEAR CORNEA WITH STANDARD INTRAOCULAR LENS IMPLANT Right 08/30/2017    PHACOEMULSIFICATION CLEAR CORNEA WITH STANDARD INTRAOCULAR LENS IMPLANT Left 09/13/2017    TOTAL HIP ARTHROPLASTY Right 08/18/2015    Procedure: HIP TOTAL ARTHROPLASTY, RIGHT;  Surgeon: Star Du MD;  Location: [facility];  Service:     [facility] TOTAL KNEE ARTHROPLASTY Left 03/02/2017    Procedure: LEFT TOTAL KNEE ARTHROPLASTY;  Surgeon: Star Du MD;  Location: [facility] OR;  Service: Orthopedics      Allergies   Allergen Reactions    Adalimumab Muscle Pain (Myalgia)    Aspirin      Had Bleeding ulcers after previous 2 surgeries due to aspirin    Brimonidine-Timolol [Brimonidine Tartrate-Timolol] Unknown     Redness itching in eyes    Levaquin [Levofloxacin] Unknown     Skin burn and couldn't get out bed for 5 days    Tetracyclines & Related Hives      Social History     Tobacco Use    Smoking status: Never     Passive exposure: Never    Smokeless tobacco: Never   Substance Use Topics    Alcohol use: Yes     Alcohol/week: 4.0 standard drinks of alcohol     Types: 4 Standard drinks or equivalent per week     Comment: 3-4/wk      Wt Readings from Last 1 Encounters:   02/11/24 58.1 kg (128 lb 1.6 oz)        Anesthesia Evaluation   Pt has had prior anesthetic.     History of anesthetic complications  - PONV.      ROS/MED HX  ENT/Pulmonary:  - neg pulmonary ROS     Neurologic:  - neg  "neurologic ROS     Cardiovascular:  - neg cardiovascular ROS   (+)  hypertension- -   -  - -                                      METS/Exercise Tolerance: >4 METS    Hematologic:  - neg hematologic  ROS   (+) History of blood clots (unprovoked PE/DVT, admitted with heparin infusion, now on hold),    pt is anticoagulated,  history of blood transfusion, no previous transfusion reaction,        Musculoskeletal:  - neg musculoskeletal ROS (+)  arthritis,             GI/Hepatic: Comment: GI bleed - neg GI/hepatic ROS     Renal/Genitourinary:  - neg Renal ROS     Endo:  - neg endo ROS     Psychiatric/Substance Use:  - neg psychiatric ROS     Infectious Disease:  - neg infectious disease ROS     Malignancy:  - neg malignancy ROS (+) Malignancy, History of GI.GI CA  status post Chemo and Radiation.      Other:  - neg other ROS          Physical Exam    Airway        Mallampati: II   TM distance: > 3 FB   Neck ROM: full   Mouth opening: > 3 cm    Respiratory Devices and Support         Dental       (+) Minor Abnormalities - some fillings, tiny chips      Cardiovascular   cardiovascular exam normal          Pulmonary   pulmonary exam normal                OUTSIDE LABS:  CBC:   Lab Results   Component Value Date    WBC 5.6 02/12/2024    WBC 6.2 02/11/2024    HGB 9.8 (L) 02/12/2024    HGB 9.5 (L) 02/11/2024    HCT 33.9 (L) 02/12/2024    HCT 33.1 (L) 02/11/2024     02/12/2024     02/11/2024     BMP:   Lab Results   Component Value Date     (L) 02/08/2024     11/13/2023    POTASSIUM 4.8 02/08/2024    POTASSIUM 4.3 11/13/2023    CHLORIDE 102 02/08/2024    CHLORIDE 101 11/04/2023    CO2 22 02/08/2024    CO2 24 11/04/2023    BUN 15.3 02/08/2024    BUN 9.8 11/04/2023    CR 0.53 02/08/2024    CR 0.6 02/08/2024     (H) 02/08/2024     (H) 11/14/2023     COAGS:   Lab Results   Component Value Date    PTT 26 02/08/2024    INR 1.08 02/08/2024     POC: No results found for: \"BGM\", \"HCG\", " "\"HCGS\"  HEPATIC:   Lab Results   Component Value Date    ALBUMIN 4.4 02/08/2024    PROTTOTAL 7.1 02/08/2024    ALT 13 02/08/2024    AST 26 02/08/2024    ALKPHOS 87 02/08/2024    BILITOTAL 0.8 02/08/2024     OTHER:   Lab Results   Component Value Date    LACT 0.7 04/05/2021    A1C 5.3 10/23/2023    MICHEL 10.0 02/08/2024    PHOS 3.1 04/08/2021    MAG 1.7 (L) 04/07/2021    TSH 0.74 10/02/2023    CRP 4.0 (H) 04/07/2021    SED 6 02/09/2024       Anesthesia Plan    ASA Status:  3    NPO Status:  NPO Appropriate    Anesthesia Type: MAC.     - Reason for MAC: immobility needed, straight local not clinically adequate   Induction: Propofol.   Maintenance: TIVA.        Consents    Anesthesia Plan(s) and associated risks, benefits, and realistic alternatives discussed. Questions answered and patient/representative(s) expressed understanding.     - Discussed:     - Discussed with:  Patient            Postoperative Care    Pain management: Multi-modal analgesia.   PONV prophylaxis: Ondansetron (or other 5HT-3), Dexamethasone or Solumedrol     Comments:    Other Comments: propofol    Reviewed anesthetic options and risks. Patient agrees to proceed.            Faizan Smiley MD    I have reviewed the pertinent notes and labs in the chart from the past 30 days and (re)examined the patient.  Any updates or changes from those notes are reflected in this note.             "

## 2024-02-12 NOTE — CONSULTS
Care Management Follow Up    Length of Stay (days): 4    Expected Discharge Date: 02/13/2024     Concerns to be Addressed:       Patient plan of care discussed at interdisciplinary rounds: Yes    Anticipated Discharge Disposition: Home     Anticipated Discharge Services:    Anticipated Discharge DME:      Patient/family educated on Medicare website which has current facility and service quality ratings:    Education Provided on the Discharge Plan:    Patient/Family in Agreement with the Plan: yes    Referrals Placed by CM/SW:    Private pay costs discussed: Not applicable    Additional Information:  Chart reviewed. CM consult completed yesterday, see note.  Pt anticipates return home with OP follow up and states he daughter will transport her home.     Pt will have EGD and Colonoscopy today.     Care Management will continue to follow for potential discharge needs.    Sky Machado RN

## 2024-02-12 NOTE — PROGRESS NOTES
Patient discharged. Discharge instructions reviewed with patient. Escorted to main entrance via wheelchair, accompanied by writer.  Daughter to transport home. Pamela Solares RN

## 2024-02-12 NOTE — ANESTHESIA CARE TRANSFER NOTE
Patient: Oscar Zhao    Procedure: Procedure(s):  ESOPHAGOGASTRODUODENOSCOPY with biopsies  COLONOSCOPY       Diagnosis: Microcytic anemia [D50.9]  Diagnosis Additional Information: No value filed.    Anesthesia Type:   MAC     Note:    Oropharynx: oropharynx clear of all foreign objects  Level of Consciousness: awake  Oxygen Supplementation: room air    Independent Airway: airway patency satisfactory and stable  Dentition: dentition unchanged  Vital Signs Stable: post-procedure vital signs reviewed and stable  Report to RN Given: handoff report given  Patient transferred to: Phase II    Handoff Report: Identifed the Patient, Identified the Reponsible Provider, Reviewed the pertinent medical history, Discussed the surgical course, Reviewed Intra-OP anesthesia mangement and issues during anesthesia, Set expectations for post-procedure period and Allowed opportunity for questions and acknowledgement of understanding      Vitals:  Vitals Value Taken Time   BP     Temp     Pulse     Resp     SpO2         Electronically Signed By: PARTH Gandara CRNA  February 12, 2024  12:02 PM

## 2024-02-12 NOTE — ANESTHESIA POSTPROCEDURE EVALUATION
Patient: Oscar Zhao    Procedure: Procedure(s):  ESOPHAGOGASTRODUODENOSCOPY with biopsies  COLONOSCOPY       Anesthesia Type:  MAC    Note:  Disposition: Inpatient   Postop Pain Control: Uneventful            Sign Out: Well controlled pain   PONV: No   Neuro/Psych: Uneventful            Sign Out: Acceptable/Baseline neuro status   Airway/Respiratory: Uneventful            Sign Out: Acceptable/Baseline resp. status   CV/Hemodynamics: Uneventful            Sign Out: Acceptable CV status; No obvious hypovolemia; No obvious fluid overload   Other NRE: NONE   DID A NON-ROUTINE EVENT OCCUR? No           Last vitals:  Vitals:    02/12/24 0900 02/12/24 1042 02/12/24 1217   BP: (!) 146/80 (!) 148/84 (!) 140/63   Pulse: 84 75 74   Resp:  16 16   Temp: 36.7  C (98  F) 36.9  C (98.4  F) (!) 35.7  C (96.3  F)   SpO2: 100% 100% 99%       Electronically Signed By: Faizan Smiley MD  February 12, 2024  12:36 PM

## 2024-02-12 NOTE — DISCHARGE SUMMARY
[facility]  Discharge Summary - Medicine & Pediatrics       Date of Admission:  2/8/2024  Date of Discharge:  2/12/2024  Discharging Provider: Dr. Mahsa Ortiz  Discharge Service: Hospitalist Service    Discharge Diagnoses   Patient Active Problem List   Diagnosis    Essential hypertension    Malignant neoplasm of anal canal (H)    Scleritis, bilateral    Chronic fatigue    Dislocation of internal right hip prosthesis, initial encounter (H24)    Failure of right total hip arthroplasty, subsequent encounter    Closed dislocation of right hip, initial encounter (H)    Bilateral pulmonary embolism (H)    Acute deep vein thrombosis (DVT) of proximal vein of right lower extremity (H)    Anemia, unspecified type     Clinically Significant Risk Factors          Follow-ups Needed After Discharge    Follow up with primary care provider, Jak Jones, within 7 days   to evaluate medication change and for hospital follow-up.  The following   labs/tests are recommended: CBC, BMP.      Follow-up with Hematology on 2/16/24 as scheduled.       Unresulted Labs Ordered in the Past 30 Days of this Admission       Date and Time Order Name Status Description    2/12/2024 11:19 AM Surgical Pathology Exam In process         These results will be followed up by PCP/GI.    Discharge Disposition   Discharged to home  Condition at discharge: Stable    Hospital Course   Oscar Zhao was admitted on 2/8/2024 for bilateral PE and RLE DVT. She has a PMH including hypertension, sinus tachycardia, anal cancer in remission, R YAMILET revisions in 9/2023 and 10/2023 due to dislocations and is admitted with RLE DVT and bilateral PE found on routine imaging 2/8/24, also found to have symptomatic anemia. Bilateral PE and RLE DVT were found incidentally though patient did report progressive MARTIN and fatigue in the preceding weeks. CT CAP showed lobar and segmental PE on R and segmental and subsegmental PE on the L; no  evidence of R heart strain. Patient was started on heparin gtt which was continued until the night before EGD/colonoscopy. EGD/colonoscopy was performed on 2/12/24 given presentation of hemoglobin 6.9 on admission, history of gastric ulcer, and history of anal cancer in remission. She was transfused pRBC x2 on day of admission at hemoglobin remained stable at 9-10 thereafter. Following EGD/colonoscopy, patient was transitioned to Xarelto with Hematology follow-up scheduled for 2/16/24 to further discuss blood clotting risk factors and any further workup.      The following problems were addressed during her hospitalization:    RLE DVT  Bilateral PE, R > L  Incidentally found - hemodynamically stable, on RA. Does report progressive MARTIN and fatigue over the course of weeks; this could be related to VTE or also anemia as below. CT CAP showing lobar and segmental PE on R and segmental and subsegmental PE on the L; no evidence of R heart strain. No prior history of VTE and not on anticoagulation. VTE appears unprovoked - patient does have a history of anal cancer in remission and was treated with mitomycin and 5FU for chemotherapy, which could increase risk of VTE, but last treated 3 years ago. No recurrence of malignancy on CT CAP 2/9/24. Per chart review, does have a history of autoimmune-related eye disease and potentially peripheral neuropathy; no history of bleeding disorders, denies any family history of bleeding/clotting disorders.  -Heme/Onc consulted, appreciate recs              -Heparin drip              -Trend hemoglobin              -Transition to DOAC prior to discharge              -Follow-up on 2/16/24 outpatient as scheduled  -IR consulted by ED provider, will not perform procedure for DVT     Symptomatic anemia of unknown etiology, possible acute blood loss anemia  Hgb 6.9 on admission; ED ordered 2 units pRBC. Patient has previous history of gastric ulcer with UGIB/tarry stools and denies any recent  tarry stools. Rectal exam done by ED provider showed light brown stool. No hemoptysis or hematemesis. Indirect bilirubin, INR/PTT normal. Most likely acute blood loss, but unclear source of bleeding - could potentially be recurrent gastric ulcer; patient has hx of gastric ulcers. History of EGD many years ago for gastric ulcer. Last colonoscopy in 2021, reports being due for one this year due to polyp.  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 Ortiz.    ALCIRA YOUNG MD  [facility]  [address] [place] [id number]  Phone: [phone]  Fax: [phone]  ______________________________________________________________________    Physical Exam   Vital Signs: Temp: (!) 96.3  F (35.7  C) Temp src: Axillary BP: (!) 140/63 Pulse: 74   Resp: 16 SpO2: 99 % O2 Device: None (Room air)    Weight: 130 lbs 0 oz    General Appearance: Alert, standing and walking around her room comfortably, no acute distress.  HEENT: Normocephalic, moist mucous membranes, no nasal  discharge.  Respiratory: Lungs clear to auscultation bilaterally, no wheezes or crackles.  Cardiovascular: Regular rate and rhythm, normal S1 and S2, no murmurs.   Skin: No significant lesions or rashes on exposed skin.  Musculoskeletal: No gross musculoskeletal deformities. BLE without edema, skin changes, or tenderness to palpation.  Neurologic: Alert, answers questions appropriately. No apparent focal deficits.  Psychiatric: Pleasant mood and affect, engaged and good understanding of her health and current condition.      Primary Care Physician   Jak Jones    Discharge Orders      Reason for your hospital stay    Pulmonary embolism, DVT, anemia     Follow-up and recommended labs and tests     Follow up with primary care provider, Jak Jones, within 7 days to evaluate medication change and for hospital follow-up.  The following labs/tests are recommended: CBC, BMP.     Activity    Your activity upon discharge: activity as tolerated     Diet    Follow this diet upon discharge: Orders Placed This Encounter      Regular Diet Adult       Significant Results and Procedures   Results for orders placed or performed during the hospital encounter of 02/08/24   US Lower Extremity Venous Duplex Bilateral    Narrative    EXAM: US LOWER EXTREMITY VENOUS DUPLEX BILATERAL  LOCATION: [facility]  DATE: 2/8/2024    INDICATION: DVT on CT imaging, hx of cancer.  COMPARISON: None.  TECHNIQUE: Venous Duplex ultrasound of bilateral lower extremities with and without compression, augmentation and duplex. Color flow and spectral Doppler with waveform analysis performed.    FINDINGS: Exam includes the common femoral, femoral, popliteal veins as well as segmentally visualized deep calf veins and greater saphenous vein.     RIGHT: DVT identified in the proximal through distal right femoral, popliteal and one of the proximal through distal posterior tibial veins, some regions nearly occlusive. The  right peroneal vein is not visualized. No superficial thrombophlebitis. No   popliteal cyst.    LEFT: No deep vein thrombosis. No superficial thrombophlebitis. No popliteal cyst.      Impression    IMPRESSION:    1.  Extensive right leg DVT. Recommend interventional radiology consultation.    2.  No left-sided DVT.         Discharge Medications   Current Discharge Medication List        START taking these medications    Details   Rivaroxaban ANTICOAGULANT 15 & 20 MG TBPK Starter Therapy Pack Take 15 mg by mouth 2 times daily (with meals) for 21 days, THEN 20 mg daily with food for 9 days.  Qty: 51 each, Refills: 0    Associated Diagnoses: Acute deep vein thrombosis (DVT) of proximal vein of right lower extremity (H)           CONTINUE these medications which have CHANGED    Details   metoprolol succinate ER (TOPROL XL) 25 MG 24 hr tablet Take 1 tablet (25 mg) by mouth daily  Qty: 30 tablet, Refills: 0    Associated Diagnoses: Essential hypertension           CONTINUE these medications which have NOT CHANGED    Details   LOTEMAX 0.5 % ophthalmic suspension Place 1 drop into both eyes every 48 hours  Refills: 4      timolol maleate (TIMOPTIC) 0.5 % ophthalmic solution Place 1 drop into both eyes daily           STOP taking these medications       amoxicillin (AMOXIL) 875 MG tablet Comments:   Reason for Stopping:             Allergies   Allergies   Allergen Reactions    Adalimumab Muscle Pain (Myalgia)    Aspirin      Had Bleeding ulcers after previous 2 surgeries due to aspirin    Brimonidine-Timolol [Brimonidine Tartrate-Timolol] Unknown     Redness itching in eyes    Levaquin [Levofloxacin] Unknown     Skin burn and couldn't get out bed for 5 days    Tetracyclines & Related Hives

## 2024-02-12 NOTE — PLAN OF CARE
"  Problem: Anemia  Goal: Anemia Symptom Improvement  Outcome: Progressing  Intervention: Monitor and Manage Anemia  Recent Flowsheet Documentation  Taken 2/12/2024 0522 by Katherin Ordoñez RN  Safety Promotion/Fall Prevention:   safety round/check completed   room organization consistent   room near nurse's station   clutter free environment maintained   nonskid shoes/slippers when out of bed  Taken 2/12/2024 0218 by Katherin Ordoñez RN  Safety Promotion/Fall Prevention:   safety round/check completed   room organization consistent   room near nurse's station   clutter free environment maintained   nonskid shoes/slippers when out of bed  Taken 2/12/2024 0006 by Katherin Ordoñez RN  Safety Promotion/Fall Prevention:   safety round/check completed   room organization consistent   room near nurse's station   clutter free environment maintained   nonskid shoes/slippers when out of bed  Taken 2/11/2024 2022 by Katherin Ordoñez RN  Safety Promotion/Fall Prevention:   safety round/check completed   room organization consistent   room near nurse's station   nonskid shoes/slippers when out of bed   clutter free environment maintained     Goal Outcome Evaluation:  BP (!) 143/84 (BP Location: Left arm)   Pulse 83   Temp 97.4  F (36.3  C) (Oral)   Resp 16   Ht 1.6 m (5' 3\")   Wt 58.1 kg (128 lb 1.6 oz)   LMP  (LMP Unknown)   SpO2 98%   BMI 22.69 kg/m    Pt is a/ox4, calm and cooperative. Pt denied any pain, SOB, N/V, and numbness/tingling. Pt is independent in the room. Heparin was stopped at midnight. Maintain NPO, except medications. R/L PIV, SL. Resp: RASHMI garg.   Plan: EGD and colonoscopy in AM on 02/12.                      "

## 2024-02-13 ENCOUNTER — PATIENT OUTREACH (OUTPATIENT)
Dept: CARE COORDINATION | Facility: CLINIC | Age: 79
End: 2024-02-13
Payer: MEDICARE

## 2024-02-13 ENCOUNTER — TELEPHONE (OUTPATIENT)
Dept: ONCOLOGY | Facility: HOSPITAL | Age: 79
End: 2024-02-13
Payer: MEDICARE

## 2024-02-13 LAB
PATH REPORT.COMMENTS IMP SPEC: NORMAL
PATH REPORT.COMMENTS IMP SPEC: NORMAL
PATH REPORT.FINAL DX SPEC: NORMAL
PATH REPORT.GROSS SPEC: NORMAL
PATH REPORT.MICROSCOPIC SPEC OTHER STN: NORMAL
PATH REPORT.RELEVANT HX SPEC: NORMAL
PHOTO IMAGE: NORMAL

## 2024-02-13 PROCEDURE — 88305 TISSUE EXAM BY PATHOLOGIST: CPT | Mod: 26 | Performed by: PATHOLOGY

## 2024-02-13 PROCEDURE — 88342 IMHCHEM/IMCYTCHM 1ST ANTB: CPT | Mod: 26 | Performed by: PATHOLOGY

## 2024-02-13 NOTE — TELEPHONE ENCOUNTER
Patient calls and leaves message on triage voicemail. She is wondering about an antacid being prescribed. Patient was discharged from the hospital on 2/12/24 for bilateral pulmonary emboli, anemia, and DVT. Erin Barajas NP had seen patient on 2/9/24. She is currently on Xarelto. Patient does have a follow up scheduled on 2/16/24.    Message will be sent to Erin Baarjas NP to advise.    Luz Marina Holder RN

## 2024-02-13 NOTE — PROGRESS NOTES
Clinic Care Coordination Contact  [facility]: Post-Discharge Note  SITUATION                                                      Admission:    Admission Date: 02/08/24   Reason for Admission: bilateral PE and RLE DVT.  Discharge:   Discharge Date: 02/12/24  Discharge Diagnosis: bilateral PE and RLE DVT.    BACKGROUND                                                      Per hospital discharge summary and inpatient provider notes:    [facility]  Discharge Summary - Medicine & Pediatrics       Date of Admission:  2/8/2024  Date of Discharge:  2/12/2024       Discharge Diagnoses      Patient Active Problem List   Diagnosis    Essential hypertension    Malignant neoplasm of anal canal (H)    Scleritis, bilateral    Chronic fatigue    Dislocation of internal right hip prosthesis, initial encounter (H24)    Failure of right total hip arthroplasty, subsequent encounter    Closed dislocation of right hip, initial encounter (H)    Bilateral pulmonary embolism (H)    Acute deep vein thrombosis (DVT) of proximal vein of right lower extremity (H)    Anemia, unspecified type       Follow-ups Needed After Discharge  Follow up with primary care provider, Jak Jones, within 7 days   to evaluate medication change and for hospital follow-up.  The following   labs/tests are recommended: CBC, BMP.       Follow-up with Hematology on 2/16/24 as scheduled.        Unresulted Labs Ordered in the Past 30 Days of this Admission         Date and Time Order Name Status Description     2/12/2024 11:19 AM Surgical Pathology Exam In process            These results will be followed up by PCP/GI.  ASSESSMENT           Discharge Assessment  How are you doing now that you are home?: well  How are your symptoms? (Red Flag symptoms escalate to triage hotline per guidelines): Improved  Do you feel your condition is stable enough to be safe at home until your provider visit?: Yes  Does the patient have their  discharge instructions? : Yes  Does the patient have questions regarding their discharge instructions? : No  Were you started on any new medications or were there changes to any of your previous medications? : Yes  Does the patient have all of their medications?: Yes  Do you have questions regarding any of your medications? : No  Do you have all of your needed medical supplies or equipment (DME)?  (i.e. oxygen tank, CPAP, cane, etc.): Yes  Discharge follow-up appointment scheduled within 14 calendar days? : Yes  Discharge Follow Up Appointment Date: 02/16/24  Discharge Follow Up Appointment Scheduled with?: Specialty Care Provider         Post-op (Clinicians Only)  Did the patient have surgery or a procedure: No  Eating & Drinking: eating and drinking without complaints/concerns  PO Intake: regular diet    Feeling better, have energy.  Has been feeling tired for a while.  The Eliquis cost her $950 and she will check with her insurance to see if she is paying deductible, etc. She does not want to be on blood thinner that requires frequent INR checks.  She doesn't think she will need to be on the med for a long time as the DVT may have been from recent hip surgery.  She had scans done and no cancer was evident for which she is grateful.  She will follow up with oncology and hematology.  Has upcoming appt with PCP but may cancel as it may not be needed.  She has lost 12 pounds due to lack of appetite as she was tired.  One good thing is her blood pressure is better and she got off that medication.      Reviewed care coordination and she will call if she needs support.     PLAN                                                      Outpatient Plan:  attend appts as scheduled.     Future Appointments   Date Time Provider Department [facility]   2/16/2024  1:00 PM Patsy Spencer MD [facility]   2/16/2024  1:30 PM [facility]   2/16/2024  1:45 PM Erin Barajas NP [facility]   2/27/2024  10:40 AM Jak Jones MD [facility]         For any urgent concerns, please contact our 24 hour nurse triage line: [phone] ([phone])         KIRAN Crespo

## 2024-02-13 NOTE — LETTER
[facility] CARE COORDINATION  [facility]  February 13, 2024    Oscar Zhao  [address] [facility] [address]      Dear Oscar,    I am a clinic care coordinator who works with Jak Jones MD with the [facility]. I wanted to thank you for spending the time to talk with me.  Below is a description of clinic care coordination and how I can further assist you.       The clinic care coordination team is made up of a registered nurse, , financial resource worker and community health worker who understand the health care system. The goal of clinic care coordination is to help you manage your health and improve access to the health care system. Our team works alongside your provider to assist you in determining your health and social needs. We can help you obtain health care and community resources, providing you with necessary information and education. We can work with you through any barriers and develop a care plan that helps coordinate and strengthen the communication between you and your care team.  Our services are voluntary and are offered without charge to you personally.    Please feel free to contact me with any questions or concerns regarding care coordination and what we can offer.      We are focused on providing you with the highest-quality healthcare experience possible.    Sincerely,     Xochitl Raya,   [facility]  [phone]

## 2024-02-14 ENCOUNTER — MYC MEDICAL ADVICE (OUTPATIENT)
Dept: INTERNAL MEDICINE | Facility: CLINIC | Age: 79
End: 2024-02-14
Payer: MEDICARE

## 2024-02-14 DIAGNOSIS — D64.9 ANEMIA, UNSPECIFIED TYPE: Primary | ICD-10-CM

## 2024-02-14 NOTE — TELEPHONE ENCOUNTER
"Per Erin Barajas, NP -   \"Per the hospital summary, the primary team/GI recommended PPI 40 mg BID. Her endoscopy shows a hiatel hernia with mild ulcers (not bleeding), so I believe recommended to reduce risk for esophagitis. She may need clarification on dose/length at her PCP/GI follow-up she is to have a week after dismissal.\"   Explained to patient that she needs to get the RX for above from her PCP, Dr. Jones. She does have a follow up with him on 2/27. She will sent a [employer] message now, and also call the clinic to get place a request for rx for Pantoprazole 40 mg. She expressed understanding to the advisement; no further needs at this time.   "

## 2024-02-15 RX ORDER — PANTOPRAZOLE SODIUM 40 MG/1
40 TABLET, DELAYED RELEASE ORAL 2 TIMES DAILY
Qty: 60 TABLET | Refills: 11 | Status: SHIPPED | OUTPATIENT
Start: 2024-02-15 | End: 2024-05-17

## 2024-02-16 ENCOUNTER — OFFICE VISIT (OUTPATIENT)
Dept: RADIATION ONCOLOGY | Facility: CLINIC | Age: 79
End: 2024-02-16
Attending: STUDENT IN AN ORGANIZED HEALTH CARE EDUCATION/TRAINING PROGRAM
Payer: MEDICARE

## 2024-02-16 ENCOUNTER — LAB (OUTPATIENT)
Dept: INFUSION THERAPY | Facility: HOSPITAL | Age: 79
End: 2024-02-16
Attending: NURSE PRACTITIONER
Payer: MEDICARE

## 2024-02-16 ENCOUNTER — ONCOLOGY VISIT (OUTPATIENT)
Dept: ONCOLOGY | Facility: HOSPITAL | Age: 79
End: 2024-02-16
Attending: STUDENT IN AN ORGANIZED HEALTH CARE EDUCATION/TRAINING PROGRAM
Payer: MEDICARE

## 2024-02-16 VITALS
DIASTOLIC BLOOD PRESSURE: 81 MMHG | HEIGHT: 63 IN | HEART RATE: 74 BPM | SYSTOLIC BLOOD PRESSURE: 142 MMHG | TEMPERATURE: 98.6 F | OXYGEN SATURATION: 100 % | WEIGHT: 132.2 LBS | RESPIRATION RATE: 16 BRPM | BODY MASS INDEX: 23.42 KG/M2

## 2024-02-16 VITALS
HEART RATE: 74 BPM | BODY MASS INDEX: 23.42 KG/M2 | SYSTOLIC BLOOD PRESSURE: 142 MMHG | OXYGEN SATURATION: 100 % | DIASTOLIC BLOOD PRESSURE: 81 MMHG | WEIGHT: 132.2 LBS | RESPIRATION RATE: 16 BRPM | TEMPERATURE: 98.6 F

## 2024-02-16 DIAGNOSIS — D50.0 IRON DEFICIENCY ANEMIA DUE TO CHRONIC BLOOD LOSS: ICD-10-CM

## 2024-02-16 DIAGNOSIS — C21.1 MALIGNANT NEOPLASM OF ANAL CANAL (H): ICD-10-CM

## 2024-02-16 DIAGNOSIS — I26.99 ACUTE PULMONARY EMBOLISM WITHOUT ACUTE COR PULMONALE, UNSPECIFIED PULMONARY EMBOLISM TYPE (H): ICD-10-CM

## 2024-02-16 DIAGNOSIS — D50.0 IRON DEFICIENCY ANEMIA DUE TO CHRONIC BLOOD LOSS: Primary | ICD-10-CM

## 2024-02-16 DIAGNOSIS — D64.9 ANEMIA, UNSPECIFIED TYPE: ICD-10-CM

## 2024-02-16 DIAGNOSIS — I82.411 ACUTE DEEP VEIN THROMBOSIS (DVT) OF FEMORAL VEIN OF RIGHT LOWER EXTREMITY (H): ICD-10-CM

## 2024-02-16 DIAGNOSIS — C21.1 MALIGNANT NEOPLASM OF ANAL CANAL (H): Primary | ICD-10-CM

## 2024-02-16 LAB
BASOPHILS # BLD AUTO: 0 10E3/UL (ref 0–0.2)
BASOPHILS NFR BLD AUTO: 1 %
EOSINOPHIL # BLD AUTO: 0.3 10E3/UL (ref 0–0.7)
EOSINOPHIL NFR BLD AUTO: 4 %
ERYTHROCYTE [DISTWIDTH] IN BLOOD BY AUTOMATED COUNT: 27.9 % (ref 10–15)
HCT VFR BLD AUTO: 38.8 % (ref 35–47)
HGB BLD-MCNC: 10.7 G/DL (ref 11.7–15.7)
IMM GRANULOCYTES # BLD: 0 10E3/UL
IMM GRANULOCYTES NFR BLD: 0 %
LYMPHOCYTES # BLD AUTO: 1.6 10E3/UL (ref 0.8–5.3)
LYMPHOCYTES NFR BLD AUTO: 23 %
MCH RBC QN AUTO: 21.9 PG (ref 26.5–33)
MCHC RBC AUTO-ENTMCNC: 27.6 G/DL (ref 31.5–36.5)
MCV RBC AUTO: 79 FL (ref 78–100)
MONOCYTES # BLD AUTO: 0.5 10E3/UL (ref 0–1.3)
MONOCYTES NFR BLD AUTO: 6 %
NEUTROPHILS # BLD AUTO: 4.8 10E3/UL (ref 1.6–8.3)
NEUTROPHILS NFR BLD AUTO: 66 %
NRBC # BLD AUTO: 0 10E3/UL
NRBC BLD AUTO-RTO: 0 /100
PLATELET # BLD AUTO: 254 10E3/UL (ref 150–450)
RBC # BLD AUTO: 4.89 10E6/UL (ref 3.8–5.2)
WBC # BLD AUTO: 7.2 10E3/UL (ref 4–11)

## 2024-02-16 PROCEDURE — 99215 OFFICE O/P EST HI 40 MIN: CPT | Performed by: STUDENT IN AN ORGANIZED HEALTH CARE EDUCATION/TRAINING PROGRAM

## 2024-02-16 PROCEDURE — G2211 COMPLEX E/M VISIT ADD ON: HCPCS | Performed by: NURSE PRACTITIONER

## 2024-02-16 PROCEDURE — 99215 OFFICE O/P EST HI 40 MIN: CPT | Performed by: NURSE PRACTITIONER

## 2024-02-16 PROCEDURE — 36415 COLL VENOUS BLD VENIPUNCTURE: CPT

## 2024-02-16 PROCEDURE — G0463 HOSPITAL OUTPT CLINIC VISIT: HCPCS | Performed by: NURSE PRACTITIONER

## 2024-02-16 PROCEDURE — G0463 HOSPITAL OUTPT CLINIC VISIT: HCPCS | Mod: 27 | Performed by: STUDENT IN AN ORGANIZED HEALTH CARE EDUCATION/TRAINING PROGRAM

## 2024-02-16 PROCEDURE — 85004 AUTOMATED DIFF WBC COUNT: CPT

## 2024-02-16 RX ORDER — HYDROXYZINE HYDROCHLORIDE 10 MG/1
10 TABLET, FILM COATED ORAL EVERY 6 HOURS PRN
COMMUNITY
End: 2024-04-16

## 2024-02-16 ASSESSMENT — PAIN SCALES - GENERAL
PAINLEVEL: NO PAIN (0)
PAINLEVEL: NO PAIN (0)

## 2024-02-16 NOTE — PROGRESS NOTES
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 Zhao is a 79 year old female who underwent colonoscopy following presentation with anemia.  2/18/2021 colonoscopy: Ascending colon polyp 16 to 20 mm status post complete polypectomy.  She was noted to have an ulcerated mass in it anus, biopsy, Pathology:  Invasive moderately differentiated squamous cell carcinoma     2/23/2021 CT chest abdomen pelvis which noted incidental 4 mm right upper lobe nodule for which follow-up was recommended.    MRI of the pelvis noted a 25 x 18 x 15 mm mucosal thickening centered in the left posterior laterally anorectal junction there were no abnormal or suspicious lymph nodes or visible involvement of the levator ani, puborectalis, or external sphincter musculature.    3/22/2021-4/29/2021 concurrent chemoradiation therapy: 5400 and 4500 cGy in 27 fractions to the primary and pelvic lymph nodes respectively with concurrent mitomycin + 5-FU    Complete response on anoscopy and follow-up restaging CTs and MRIs but most recent restaging imaging 2/8/2024 demonstrated extensive DVT and bilateral pulmonary emboli.  She also was noted to have significant symptomatic anemia.     She had right hip surgery for recurrent dislocation.  She had noticed fatigue and shortness of breath but did not realize how significant until blood  transfusion.  She feels much better now.  She is on Xarelto.  No bladder or bowel concerns.  Denies any concern of vaginal dryness.  She continues to follow with colorectal surgery.     Medications were reviewed and are up to date on EPIC.    The following portions of the patient's history were reviewed and updated as appropriate: allergies, current medications, past family history, past medical history, past social history, past surgical history and problem list.    Review of Systems:      General  Constitutional  Constitutional (WDL): Exceptions to WDL  Fatigue: Fatigue relieved by rest  EENT  Eye Disorders  Eye Disorder (WDL): Assessment not pertinent to visit  Ear Disorders  Ear Disorder (WDL): Assessment not pertinent to visit  Respiratory  Respiratory  Respiratory (WDL): All respiratory elements are within defined limits  Cardiovascular  Cardiovascular  Cardiovascular (WDL): All cardiovascular elements are within defined limits (no pacemaker)  Gastrointestinal  Gastrointestinal  Gastrointestinal (WDL): Exceptions to WDL  Diarrhea: Increase of less than 4 stools per day over baseline OR mild increase in ostomy output compared to baseline (occassionally)  Musculoskeletal  Musculoskeletal and Connective Tissue Disorders  Musculoskeletal & Connective (WDL): Exceptions to WDL (right hip dislocation x3 in past year)  Integumentary  Integumentary  Integumentary (WDL): All integumentary elements are within defined limits  Neurological  Neurosensory  Neurosensory (WDL): All neurosensory elements are within defined limits  Genitourinary/Reproductive  Genitourinary  Genitourinary (WDL): All genitourinary elements are within defined limits  Lymphatic  Lymph System Disorders  Lymph (WDL): All lymph elements are within defined limits  Pain  Pain Score: No Pain (0)  AUA Assessment                                                              Accompanied by  Accompanied By: self only    Objective:        PHYSICAL EXAMINATION:     BP (!) 142/81 (BP Location: Right arm, Patient Position: Sitting, Cuff Size: Adult Regular)   Pulse 74   Temp 98.6  F (37  C) (Oral)   Resp 16   Wt 60 kg (132 lb 3.2 oz)   LMP  (LMP Unknown)   SpO2 100%   BMI 23.42 kg/m      Gen: Alert, in NAD pleasant interactive, well nourished  Eyes:  EOMI, sclera anicteric  HENT     Head: NC/AT  Pulm: No wheezing, stridor or respiratory distress  Musculoskeletal: Normal muscle bulk and tone  Skin: Normal tone and turgor, warm, dry, intact  Neurologic: A/Ox3,  face symmetric, speech fluent, no focal motor deficit. Gait normal and unaided  Psychiatric: Appropriate mood and affect      Imaging: Imaging results 6 weeks:MR Pelvis (Intrapelvic Organs) wo&w Contrast    Result Date: 2/9/2024  EXAM: MRI PELVIS WITHOUT AND WITH IV CONTRAST LOCATION: [facility] DATE: 2/8/2024 INDICATION: anal SCC s p CRT 4 29 21 COMPARISON: Pelvic MRI 02/02/2023, CT abdomen and pelvis 02/08/2024 TECHNIQUE: Routine MRI pelvis without and with IV contrast. Axial, sagittal, and coronal high-resolution T2 and post gadolinium T1 with fat saturation. CONTRAST: 6.0 mL gadavist FINDINGS: LOCAL TUMOR: No evidence of recurrent tumor. LYMPH NODES: No lymphadenopathy in the pelvis. ADDITIONAL FINDINGS: No evidence of metastatic disease in the pelvis. Redemonstrated DVT in the right femoral vein.     IMPRESSION: 1.  No evidence of recurrent disease in the pelvis. 2.  Right lower extremity DVT.    US Lower Extremity Venous Duplex Bilateral    Result Date: 2/8/2024  EXAM: US LOWER EXTREMITY VENOUS DUPLEX BILATERAL LOCATION: [facility] DATE: 2/8/2024 INDICATION: DVT on CT imaging, hx of cancer. COMPARISON: None. TECHNIQUE: Venous Duplex ultrasound of bilateral lower extremities with and without compression, augmentation and duplex. Color flow and spectral Doppler with waveform analysis performed. FINDINGS: Exam includes the common femoral, femoral, popliteal  veins as well as segmentally visualized deep calf veins and greater saphenous vein. RIGHT: DVT identified in the proximal through distal right femoral, popliteal and one of the proximal through distal posterior tibial veins, some regions nearly occlusive. The right peroneal vein is not visualized. No superficial thrombophlebitis. No popliteal cyst. LEFT: No deep vein thrombosis. No superficial thrombophlebitis. No popliteal cyst.     IMPRESSION: 1.  Extensive right leg DVT. Recommend interventional radiology consultation. 2.  No left-sided DVT.     CT Chest/Abdomen/Pelvis w Contrast    Result Date: 2/8/2024  EXAM: CT CHEST/ABDOMEN/PELVIS W CONTRAST LOCATION: [facility] DATE: 2/8/2024 INDICATION: Anal SCC s p SCRT cpmpleted 4 29 21 restaging COMPARISON: Chest CT from 10/25/2023, pelvic MRI from 02/02/2023, CT chest, abdomen, and pelvis from 01/10/2023 TECHNIQUE: CT scan of the chest, abdomen, and pelvis was performed following injection of IV contrast. Multiplanar reformats were obtained. Dose reduction techniques were used. CONTRAST: 90 ML ISOVUE 370 FINDINGS: LUNGS AND PLEURA: Similar 4 mm right upper lobe nodule on series 4 image 79. Continued attention on follow-up. No new or enlarging pulmonary nodules. Scattered areas of mild atelectasis/scarring. MEDIASTINUM/AXILLAE: Heart size is normal. Large hiatal hernia. No adenopathy. Small volume bilateral pulmonary emboli. On the right side this is in the distal right pulmonary artery extending into the lobar and a few segmental branches. In the left lobe  this is predominantly segmental and subsegmental in the left lower lobe. No evidence of right heart strain. CORONARY ARTERY CALCIFICATION: None. HEPATOBILIARY: Probable mild hepatic steatosis. Tiny hypodensity in the right lobe of the liver remains too small to characterize but favored to be benign. Cholelithiasis. No biliary ductal dilation. PANCREAS: Normal. SPLEEN: Normal. ADRENAL GLANDS:  Normal. KIDNEYS/BLADDER: No significant mass, stone, or hydronephrosis. The bladder is partially obscured by beam hardening artifact from hip arthroplasty and incompletely distended accentuating wall thickening. BOWEL: Diverticulosis of the colon. No acute inflammatory change. No obstruction. Mild colonic stool burden. LYMPH NODES: No new or enlarging adenopathy. VASCULATURE: Questionable right lower extremity DVT in the superficial femoral vein. Hysterectomy. PELVIC ORGANS: Hysterectomy. MUSCULOSKELETAL: Heterogeneous osteopenia. No definitive osseous lesions. Right hip arthroplasty. Scarring changes and fat stranding in the right lateral hip soft tissues. Small right hip effusion.     IMPRESSION: 1.  No CT evidence of recurrent or metastatic disease. See same day pelvic MRI for additional details. 2.  However, there are bilateral pulmonary emboli, right greater than left. No evidence of right heart strain. 3.  Additionally, there is right lower extremity DVT in the superficial femoral vein. Recommend right lower extremity DVT ultrasound. [Critical Result: New diagnosis of pulmonary embolism] Finding was identified on 2/8/2024 1:50 PM CST. was contacted by me on 2/8/2024 1:58 PM CST and verbalized understanding of the critical result.     MA Screen Bilateral w/Andrew    Result Date: 2/1/2024  BILATERAL FULL FIELD DIGITAL SCREENING MAMMOGRAM WITH TOMOSYNTHESIS Performed on: 2/1/24 Compared to: 11/02/2022, 09/14/2021, and 08/27/2020 Technique:  This study was evaluated with the assistance of Computer-Aided Detection.  Breast Tomosynthesis was used in interpretation. Findings: The breasts are heterogeneously dense, which may obscure small masses.  There is no radiographic evidence of malignancy.     IMPRESSION: ACR BI-RADS Category 1: Negative BREAST CANCER SCREENING RECOMMENDATION: Routine yearly mammography beginning at age 40 or as discussed with your provider. The results and recommendations of this examination will  be communicated to the patient. 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    Visit Dx:    (C21.1) Malignant neoplasm of anal canal (H)  (primary encounter diagnosis)       Cancer Staging   Malignant neoplasm of anal canal (H)  Staging form: Anus, AJCC 8th Edition  - Clinical stage from 2/20/2021: Stage IIA (cT2, cN0, cM0) - Signed by Patsy Spencer MD on 1/13/2022      Assessment & Plan:   1.  Incidental finding on restaging scans of DVT with bilateral pulm emboli being treated on anticoagulation    2.  Continue follow-up with colorectal surgery and heme-onc    3.  Return to clinic in 1 year, sooner if needed, with restaging CT chest and MRI pelvis    4.  Significant anemia likely related to upper GI bleed is on iron supplement orally as well as PPI with follow-up with GI.    Pain Management Plan: N/A    Total time of this visit, including time spent face-to-face with patient and or via video/audio, and also in preparing for today's visit for MDM and documentation. Medical decision-making included consideration and possible diagnoses, management options, complex record review, review of diagnostic tests and information, consideration and discussion of significant complications based on comorbidities, discussion with providers involved in the care of the patient.     40 Minutes spent.      Patsy Spencer MD  Department of Radiation Oncology   [facility]  Tel: [phone]  Page: [phone]    [facility]  [address] [place] [address]     [address]   [place] [address]    CC:  Patient Care Team:  Jak Jones MD as PCP - General  Jak Jones MD as Assigned PCP  Tomasa Gonzalez RN as Specialty Care Coordinator (Hematology &  Oncology)  Royce Perez MD as Assigned Heart and Vascular Provider  Erin Barajas NP as Assigned Cancer Care Provider

## 2024-02-16 NOTE — PROGRESS NOTES
"Oncology Rooming Note    February 16, 2024 1:53 PM   Oscar Zhao is a 79 year old female who presents for:    Chief Complaint   Patient presents with    Oncology Clinic Visit     Initial Vitals: BP (!) 142/81 (BP Location: Right arm, Patient Position: Sitting, Cuff Size: Adult Regular)   Pulse 74   Temp 98.6  F (37  C) (Oral)   Resp 16   Ht 1.6 m (5' 3\")   Wt 60 kg (132 lb 3.2 oz)   LMP  (LMP Unknown)   SpO2 100%   BMI 23.42 kg/m   Estimated body mass index is 23.42 kg/m  as calculated from the following:    Height as of this encounter: 1.6 m (5' 3\").    Weight as of this encounter: 60 kg (132 lb 3.2 oz). Body surface area is 1.63 meters squared.  No Pain (0) Comment: Data Unavailable   No LMP recorded (lmp unknown). Patient is postmenopausal.  Allergies reviewed: Yes  Medications reviewed: Yes    Medications: Medication refills not needed today.  Pharmacy name entered into [employer]: [id number] [address] AT [facility]    Frailty Screening:   Is the patient here for a new oncology consult visit in cancer care? 2. No      Clinical concerns: follow up visit        Vanessa De Los Santos LPN              "

## 2024-02-16 NOTE — PROGRESS NOTES
"Oncology Rooming Note    February 16, 2024 1:48 PM   Oscar Zhao is a 79 year old female who presents for:    Chief Complaint   Patient presents with    Oncology Clinic Visit     Initial Vitals: LMP  (LMP Unknown)  Estimated body mass index is 23.42 kg/m  as calculated from the following:    Height as of 2/12/24: 1.6 m (5' 3\").    Weight as of an earlier encounter on 2/16/24: 60 kg (132 lb 3.2 oz). There is no height or weight on file to calculate BSA.  Data Unavailable Comment: Data Unavailable   No LMP recorded (lmp unknown). Patient is postmenopausal.  Allergies reviewed: Yes  Medications reviewed: Yes    Medications: Medication refills not needed today.  Pharmacy name entered into [employer]: [id number] [address] AT [facility]    Frailty Screening:   Is the patient here for a new oncology consult visit in cancer care? 2. No      Clinical concerns: follow up visit        Vanessa De Los Santos LPN              "

## 2024-02-16 NOTE — LETTER
2/16/2024         RE: Oscar Zhao  [address]  [place] [address]        Dear Colleague,    Thank you for referring your patient, Oscar Zhao, to the [facility] RADIATION ONCOLOGY [place]. Please see a copy of my visit note below.    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 Zhao is a 79 year old female who underwent colonoscopy following presentation with anemia.  2/18/2021 colonoscopy: Ascending colon polyp 16 to 20 mm status post complete polypectomy.  She was noted to have an ulcerated mass in it anus, biopsy, Pathology:  Invasive moderately differentiated squamous cell carcinoma     2/23/2021 CT chest abdomen pelvis which noted incidental 4 mm right upper lobe nodule for which follow-up was recommended.    MRI of the pelvis noted a 25 x 18 x 15 mm mucosal thickening centered in the left posterior laterally anorectal junction there were no abnormal or suspicious lymph nodes or visible involvement of the levator ani, puborectalis, or external sphincter musculature.    3/22/2021-4/29/2021 concurrent chemoradiation therapy: 5400 and 4500 cGy in 27 fractions to the primary and pelvic lymph nodes respectively with concurrent mitomycin + 5-FU    Complete response on anoscopy and follow-up restaging CTs and MRIs but most recent restaging imaging 2/8/2024 demonstrated extensive  DVT and bilateral pulmonary emboli.  She also was noted to have significant symptomatic anemia.     She had right hip surgery for recurrent dislocation.  She had noticed fatigue and shortness of breath but did not realize how significant until blood transfusion.  She feels much better now.  She is on Xarelto.  No bladder or bowel concerns.  Denies any concern of vaginal dryness.  She continues to follow with colorectal surgery.     Medications were reviewed and are up to date on EPIC.    The following portions of the patient's history were reviewed and updated as appropriate: allergies, current medications, past family history, past medical history, past social history, past surgical history and problem list.    Review of Systems:      General  Constitutional  Constitutional (WDL): Exceptions to WDL  Fatigue: Fatigue relieved by rest  EENT  Eye Disorders  Eye Disorder (WDL): Assessment not pertinent to visit  Ear Disorders  Ear Disorder (WDL): Assessment not pertinent to visit  Respiratory  Respiratory  Respiratory (WDL): All respiratory elements are within defined limits  Cardiovascular  Cardiovascular  Cardiovascular (WDL): All cardiovascular elements are within defined limits (no pacemaker)  Gastrointestinal  Gastrointestinal  Gastrointestinal (WDL): Exceptions to WDL  Diarrhea: Increase of less than 4 stools per day over baseline OR mild increase in ostomy output compared to baseline (occassionally)  Musculoskeletal  Musculoskeletal and Connective Tissue Disorders  Musculoskeletal & Connective (WDL): Exceptions to WDL (right hip dislocation x3 in past year)  Integumentary  Integumentary  Integumentary (WDL): All integumentary elements are within defined limits  Neurological  Neurosensory  Neurosensory (WDL): All neurosensory elements are within defined limits  Genitourinary/Reproductive  Genitourinary  Genitourinary (WDL): All genitourinary elements are within defined limits  Lymphatic  Lymph System  Disorders  Lymph (WDL): All lymph elements are within defined limits  Pain  Pain Score: No Pain (0)  AUA Assessment                                                              Accompanied by  Accompanied By: self only    Objective:        PHYSICAL EXAMINATION:    BP (!) 142/81 (BP Location: Right arm, Patient Position: Sitting, Cuff Size: Adult Regular)   Pulse 74   Temp 98.6  F (37  C) (Oral)   Resp 16   Wt 60 kg (132 lb 3.2 oz)   LMP  (LMP Unknown)   SpO2 100%   BMI 23.42 kg/m      Gen: Alert, in NAD pleasant interactive, well nourished  Eyes:  EOMI, sclera anicteric  HENT     Head: NC/AT  Pulm: No wheezing, stridor or respiratory distress  Musculoskeletal: Normal muscle bulk and tone  Skin: Normal tone and turgor, warm, dry, intact  Neurologic: A/Ox3,  face symmetric, speech fluent, no focal motor deficit. Gait normal and unaided  Psychiatric: Appropriate mood and affect      Imaging: Imaging results 6 weeks:MR Pelvis (Intrapelvic Organs) wo&w Contrast    Result Date: 2/9/2024  EXAM: MRI PELVIS WITHOUT AND WITH IV CONTRAST LOCATION: [facility] DATE: 2/8/2024 INDICATION: anal SCC s p CRT 4 29 21 COMPARISON: Pelvic MRI 02/02/2023, CT abdomen and pelvis 02/08/2024 TECHNIQUE: Routine MRI pelvis without and with IV contrast. Axial, sagittal, and coronal high-resolution T2 and post gadolinium T1 with fat saturation. CONTRAST: 6.0 mL gadavist FINDINGS: LOCAL TUMOR: No evidence of recurrent tumor. LYMPH NODES: No lymphadenopathy in the pelvis. ADDITIONAL FINDINGS: No evidence of metastatic disease in the pelvis. Redemonstrated DVT in the right femoral vein.     IMPRESSION: 1.  No evidence of recurrent disease in the pelvis. 2.  Right lower extremity DVT.    US Lower Extremity Venous Duplex Bilateral    Result Date: 2/8/2024  EXAM: US LOWER EXTREMITY VENOUS DUPLEX BILATERAL LOCATION: [facility] DATE: 2/8/2024 INDICATION: DVT on CT imaging, hx of cancer.  COMPARISON: None. TECHNIQUE: Venous Duplex ultrasound of bilateral lower extremities with and without compression, augmentation and duplex. Color flow and spectral Doppler with waveform analysis performed. FINDINGS: Exam includes the common femoral, femoral, popliteal veins as well as segmentally visualized deep calf veins and greater saphenous vein. RIGHT: DVT identified in the proximal through distal right femoral, popliteal and one of the proximal through distal posterior tibial veins, some regions nearly occlusive. The right peroneal vein is not visualized. No superficial thrombophlebitis. No popliteal cyst. LEFT: No deep vein thrombosis. No superficial thrombophlebitis. No popliteal cyst.     IMPRESSION: 1.  Extensive right leg DVT. Recommend interventional radiology consultation. 2.  No left-sided DVT.     CT Chest/Abdomen/Pelvis w Contrast    Result Date: 2/8/2024  EXAM: CT CHEST/ABDOMEN/PELVIS W CONTRAST LOCATION: [facility] DATE: 2/8/2024 INDICATION: Anal SCC s p SCRT cpmpleted 4 29 21 restaging COMPARISON: Chest CT from 10/25/2023, pelvic MRI from 02/02/2023, CT chest, abdomen, and pelvis from 01/10/2023 TECHNIQUE: CT scan of the chest, abdomen, and pelvis was performed following injection of IV contrast. Multiplanar reformats were obtained. Dose reduction techniques were used. CONTRAST: 90 ML ISOVUE 370 FINDINGS: LUNGS AND PLEURA: Similar 4 mm right upper lobe nodule on series 4 image 79. Continued attention on follow-up. No new or enlarging pulmonary nodules. Scattered areas of mild atelectasis/scarring. MEDIASTINUM/AXILLAE: Heart size is normal. Large hiatal hernia. No adenopathy. Small volume bilateral pulmonary emboli. On the right side this is in the distal right pulmonary artery extending into the lobar and a few segmental branches. In the left lobe  this is predominantly segmental and subsegmental in the left lower lobe. No evidence of right heart strain. CORONARY ARTERY  CALCIFICATION: None. HEPATOBILIARY: Probable mild hepatic steatosis. Tiny hypodensity in the right lobe of the liver remains too small to characterize but favored to be benign. Cholelithiasis. No biliary ductal dilation. PANCREAS: Normal. SPLEEN: Normal. ADRENAL GLANDS: Normal. KIDNEYS/BLADDER: No significant mass, stone, or hydronephrosis. The bladder is partially obscured by beam hardening artifact from hip arthroplasty and incompletely distended accentuating wall thickening. BOWEL: Diverticulosis of the colon. No acute inflammatory change. No obstruction. Mild colonic stool burden. LYMPH NODES: No new or enlarging adenopathy. VASCULATURE: Questionable right lower extremity DVT in the superficial femoral vein. Hysterectomy. PELVIC ORGANS: Hysterectomy. MUSCULOSKELETAL: Heterogeneous osteopenia. No definitive osseous lesions. Right hip arthroplasty. Scarring changes and fat stranding in the right lateral hip soft tissues. Small right hip effusion.     IMPRESSION: 1.  No CT evidence of recurrent or metastatic disease. See same day pelvic MRI for additional details. 2.  However, there are bilateral pulmonary emboli, right greater than left. No evidence of right heart strain. 3.  Additionally, there is right lower extremity DVT in the superficial femoral vein. Recommend right lower extremity DVT ultrasound. [Critical Result: New diagnosis of pulmonary embolism] Finding was identified on 2/8/2024 1:50 PM CST. was contacted by me on 2/8/2024 1:58 PM CST and verbalized understanding of the critical result.     MA Screen Bilateral w/Andrew    Result Date: 2/1/2024  BILATERAL FULL FIELD DIGITAL SCREENING MAMMOGRAM WITH TOMOSYNTHESIS Performed on: 2/1/24 Compared to: 11/02/2022, 09/14/2021, and 08/27/2020 Technique:  This study was evaluated with the assistance of Computer-Aided Detection.  Breast Tomosynthesis was used in interpretation. Findings: The breasts are heterogeneously dense, which may obscure small masses.  There  is no radiographic evidence of malignancy.     IMPRESSION: ACR BI-RADS Category 1: Negative BREAST CANCER SCREENING RECOMMENDATION: Routine yearly mammography beginning at age 40 or as discussed with your provider. The results and recommendations of this examination will be communicated to the patient. 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    Visit Dx:    (C21.1) Malignant neoplasm of anal canal (H)  (primary encounter diagnosis)       Cancer Staging   Malignant neoplasm of anal canal (H)  Staging form: Anus, AJCC 8th Edition  - Clinical stage from 2/20/2021: Stage IIA (cT2, cN0, cM0) - Signed by Patsy Spencer MD on 1/13/2022      Assessment & Plan:   1.  Incidental finding on restaging scans of DVT with bilateral pulm emboli being treated on anticoagulation    2.  Continue follow-up with colorectal surgery and heme-onc    3.  Return to clinic in 1 year, sooner if needed, with restaging CT chest and MRI pelvis    4.  Significant anemia likely related to upper GI bleed is on iron supplement orally as well as PPI with follow-up with GI.    Pain Management Plan: N/A    Total time of this visit, including time spent face-to-face with patient and or via video/audio, and also in preparing for today's visit for MDM and documentation. Medical decision-making included consideration and possible diagnoses, management options, complex record review, review of diagnostic tests and information, consideration and discussion of significant complications based on comorbidities, discussion with providers involved in the care of the patient.     40 Minutes spent.      Patsy Spencer MD  Department of Radiation Oncology   [facility]  Tel: [phone]  Page: [phone]    [place] [id number]  [place] [address]     [facility]   [address] [place] [address]    CC:  Patient Care Team:  Jak Jones MD as PCP - General  Jak Jones MD as Assigned PCP  Tomasa Gonzalez RN as Specialty Care Coordinator (Hematology & Oncology)  Royce Perez MD as Assigned Heart and Vascular Provider  Erin Barajas NP as Assigned Cancer Care Provider      Oncology Rooming Note    February 16, 2024 1:31 PM   Oscar Zhao is a 79 year old female who presents for:    Chief Complaint   Patient presents with     Oncology Clinic Visit     Follow up with Dr. Spencer     Initial Vitals: BP (!) 142/81 (BP Location: Right arm, Patient Position: Sitting, Cuff Size: Adult Regular)   Pulse 74   Temp 98.6  F (37  C) (Oral)   Resp 16   Wt 60 kg (132 lb 3.2 oz)   LMP  (LMP Unknown)   SpO2 100%   BMI 23.42 kg/m   Estimated body mass index is 23.42 kg/m  as calculated from the following:    Height as of 2/12/24: 1.6 m (5' 3\").    Weight as of this encounter: 60 kg (132 lb 3.2 oz). Body surface area is 1.63 meters squared.  No Pain (0) Comment: Data Unavailable   No LMP recorded (lmp unknown). Patient is postmenopausal.  Allergies reviewed: Yes  Medications reviewed: Yes    Medications: Medication refills not needed today.  Pharmacy name entered into [facility]: [id number] [address]  AT [facility]    Frailty Screening:   Is the patient here for a new oncology consult visit in cancer care? 2. No      Clinical concerns: Patient here ambulatory for follow-up status post radiation for her rectal cancer.  Patient had recent CT and MRI and is here today for results.  Patient was recently in the hospital for bilateral PEs and a right leg DVT.  Patient states she is feeling so much better with having received blood while in the hospital.  Seen by Dr. Spencer.  Plan return to clinic for follow-up as directed by provider.   Dr. Spencer was notified.      Ofelia " DARRYL Perez RN                Again, thank you for allowing me to participate in the care of your patient.        Sincerely,        Patsy Spencer MD

## 2024-02-16 NOTE — LETTER
2/16/2024         RE: Oscar Zhao  [address]  [facility] [address]        Dear Colleague,    Thank you for referring your patient, Oscar Zhao, to the [facility]. Please see a copy of my visit note below.    [facility] Hematology and Oncology Outpatient Progress Note    Patient: Oscar Zhao  MRN: [id number]  Date of Service: Feb 16, 2024          Reason for Visit    Hx anal cancer  Acute iron-def anemia  Acute DVT/PE      Assessment/Plan  Hx anal cancer  Now 3 yrs out from diagnosis. Completed chemoRT with complete response.   Recent CT, MRI and colonoscopy negative for recurrence.     Plan:  -Every 6 month exam and labs in Oncology  -Annual scans until 5 yrs out  -Colonoscopy in 3 yrs (2027)  -Continue follow-up surveillance in CR[facility] with Dr. Reynolds    Iron def anemia, likely due to upper GI bleed   Large hiatal hernia, Gato ulcer   EGD showed large hiatal hernia and Gato ulcers, probably site of bleeding while on ASA in her post-op period last Fall. Now, off ASA.    Hgb improving, now 10.7 (up from 9.8 at dismissal last week). MCV normalizing.   No clinical signs of bleeding. No GI symptoms.    Plan:  -Start oral iron daily (or every other day). She has at home and will start.  -Continue PPI BID. Follow-up with PCP/GI for ongoing mgmt  -Monitor CBC, ferritin and iron indices every 6 weeks since on anticoagulation.  -Return in 3 months.     RLE DVT and PE  First incidence. Likely provoked folloiwng hip surgery x 2 in October, then post-op activity limitations due to anemia.    In light of concurrent anemia concerning for GI bleed, started heparin gtt and with no progressive anemia and normal colonoscopy/EGD so changed to DOAC last week.   On Xarelto. She had $900 copay for the 21-day starter pack.    No evident bleeding. Hgb improving.    No RLE nor respiratory symptoms.     Plan:  -Continue anticoagulation 3-6 mths. Since first incidence and likely provoked,  can consider stopping at that time especially given her bleeding risk with GI history  -Monitor for GI bleeding closely while on it.  -Rx for Xarelto 20 mg daily sent to start after 21 days completed. If copay expensive, she was advised to let us know and we can have our pharmacy finance team look into alternatives (?Eliquis) or support options  ______________________________________________________________________________    History of Present Illness/ Interval History    Ms. Oscar Zhao is a 79 year old with history of anal cancer, treated 3 yrs ago with chemoRT (ZOYA) in surveillance.     Last week, while undergoing routine surveillance imaging for her cancer, scans incidentally noted RLE DVT and PE. Asymptomatic. During eval for that in ED, she was also noted to have acute microcytic anemia consistent with iron def. EGD noted Gato ulcer related to large hiatal hernia; colonoscopy negative. Got blood transfusion and IV iron as inpatient. Started heparin gtt initially with no recurrent bleeding, so transitioned to DOAC (Xarelto) before dismissal.     Doing well since leaving hospital.  On Xarelto.  Reports a few incidences bright red blood per rectum after her digital rectal exam while hospitalized, none since. No black/tarry stools. No abd pain. No dyspnea. No leg pain/swelling.        ECOG Performance    0      Oncology History/Treatment  Diagnosis/Stage:   2/2021: Stage IIA (cT2-cN0-cM0) Malignant neoplasm of anal canal   -presented with 1-year history of blood on toilet paper when wiping. Became anemic.  -2/18/21 colonoscopy: Ulcerated anal canal lesion - path c/w moderately differentiated squamous cell carcinoma. 1.5-2 cm polyp of the ascending colon removed - pathology c/w tubular adenoma.    -MRI pelvis - focal thickening (L) posterolateral anorectal mucosa.  Muscularis propria appears intact without extension into the mesorecat or intersphincteric space.  No maegan or distant mets in the pelvis.  -PET -  FDG avid anal mass. No evidence of metastasis.       Treatment:  -3/22 - 4/29/2021 completed concurrent 5FU + mitomycin chemotherapy with 5400 cGy in 27 fractions to the anal canal and 4500 cGy in 27 fractions to the regional lymph nodes.  6/2021 post-treatment restaging MR pelvis - previously seen anal mass has nearly completely resolved. No evidence of metastatic disease in the pelvis.  6/2021 post-treatment restaging CT CAP - no evidence of metastatic disease.    2/2024: Iron def anemia, secondary to acute upper GI bleed  -Hx of gastric ulcers  -Underwent right hip surgery in October. During right hip post-op recovery, was noting progressive fatigue and activity intolerance. Noted black stools while on prophylactic ASA post-op, but after stopping ASA this resolved.   -Pre-op hgb 12. Post-op hgb 11/4/23: 11.3.    -Hemolysis labs WNL  -2/8/24 hgb: 6.9, MCV 72. Ferritin 13. Got 2 unit(s) pRBC and 3 doses Venofer IV inpatient  -CT cap: no bleeding source  -2/8/24 EGD: normal esophagus, stomach and duodenum. Large hiatal hernia with a few Gato ulcers.   -2/8/24 Colonoscopy: negative. Erythematous mucosa at anus consistent with RT changes. No cancer recurrence.   -Started PPI     2/2024: Acute RLE DVT and PE (post-op setting)  -Required 2 hip surgeries 10/2023. Developed anemia contributing to fatigue and exertional dyspnea, limiting her post-op recovery.   -During incidental pelvic MRI for cancer surveillance, RLE DVT noted (asymptomatic)  -CT: bilateral PE R>L. No heart strain  -RLE Doppler: Extensive right proximal - distal femoral, popliteal and tibial veins with near occlusion.   -Hospitalized due to concurrent iron-def anemia concnering for acute bleed. Started on heparin gtt and no concern for bleeding, so changed to Xarelto  -IR did not recommend intervention for DVT    Physical Exam    GENERAL: Alert and oriented to time place and person. Seated comfortably. In no distress.  HEAD: Atraumatic and  normocephalic. No alopecia.  EYES: JUANCARLOS, EOMI. No erythema. No icterus.  CHEST: regular respiratory rate.  ABDOMEN: Soft. Not tender. Not distended. No palpable hepatomegaly or splenomegaly. No other mass palpable. Bowel sounds present.  EXTREMITIES: Warm. No peripheral edema.  SKIN: no rash, or bruising or purpura.   NEURO: No gross deficit noted. Non-antalgic gait.      Lab Results    Recent Results (from the past 168 hour(s))   Heparin Unfractionated Anti Xa Level   Result Value Ref Range    Anti Xa Unfractionated Heparin 0.71 For Reference Range, See Comment IU/mL   CBC with platelets and differential   Result Value Ref Range    WBC Count 6.3 4.0 - 11.0 10e3/uL    RBC Count 4.54 3.80 - 5.20 10e6/uL    Hemoglobin 9.5 (L) 11.7 - 15.7 g/dL    Hematocrit 32.6 (L) 35.0 - 47.0 %    MCV 72 (L) 78 - 100 fL    MCH 20.9 (L) 26.5 - 33.0 pg    MCHC 29.1 (L) 31.5 - 36.5 g/dL    RDW 22.7 (H) 10.0 - 15.0 %    Platelet Count 192 150 - 450 10e3/uL    % Neutrophils 71 %    % Lymphocytes 14 %    % Monocytes 8 %    % Eosinophils 5 %    % Basophils 1 %    % Immature Granulocytes 1 %    NRBCs per 100 WBC 1 (H) <1 /100    Absolute Neutrophils 4.5 1.6 - 8.3 10e3/uL    Absolute Lymphocytes 0.9 0.8 - 5.3 10e3/uL    Absolute Monocytes 0.5 0.0 - 1.3 10e3/uL    Absolute Eosinophils 0.3 0.0 - 0.7 10e3/uL    Absolute Basophils 0.0 0.0 - 0.2 10e3/uL    Absolute Immature Granulocytes 0.0 <=0.4 10e3/uL    Absolute NRBCs 0.0 10e3/uL   Heparin Unfractionated Anti Xa Level   Result Value Ref Range    Anti Xa Unfractionated Heparin 0.55 For Reference Range, See Comment IU/mL   Heparin Unfractionated Anti Xa Level   Result Value Ref Range    Anti Xa Unfractionated Heparin 0.53 For Reference Range, See Comment IU/mL   CBC with platelets   Result Value Ref Range    WBC Count 6.1 4.0 - 11.0 10e3/uL    RBC Count 4.42 3.80 - 5.20 10e6/uL    Hemoglobin 9.2 (L) 11.7 - 15.7 g/dL    Hematocrit 32.6 (L) 35.0 - 47.0 %    MCV 74 (L) 78 - 100 fL    MCH 20.8 (L)  26.5 - 33.0 pg    MCHC 28.2 (L) 31.5 - 36.5 g/dL    RDW 23.3 (H) 10.0 - 15.0 %    Platelet Count 210 150 - 450 10e3/uL   Heparin Unfractionated Anti Xa Level   Result Value Ref Range    Anti Xa Unfractionated Heparin 0.41 For Reference Range, See Comment IU/mL   CBC with platelets   Result Value Ref Range    WBC Count 6.2 4.0 - 11.0 10e3/uL    RBC Count 4.51 3.80 - 5.20 10e6/uL    Hemoglobin 9.5 (L) 11.7 - 15.7 g/dL    Hematocrit 33.1 (L) 35.0 - 47.0 %    MCV 73 (L) 78 - 100 fL    MCH 21.1 (L) 26.5 - 33.0 pg    MCHC 28.7 (L) 31.5 - 36.5 g/dL    RDW 23.7 (H) 10.0 - 15.0 %    Platelet Count 193 150 - 450 10e3/uL   Heparin Unfractionated Anti Xa Level   Result Value Ref Range    Anti Xa Unfractionated Heparin 0.01 For Reference Range, See Comment IU/mL   CBC with platelets   Result Value Ref Range    WBC Count 5.6 4.0 - 11.0 10e3/uL    RBC Count 4.61 3.80 - 5.20 10e6/uL    Hemoglobin 9.8 (L) 11.7 - 15.7 g/dL    Hematocrit 33.9 (L) 35.0 - 47.0 %    MCV 74 (L) 78 - 100 fL    MCH 21.3 (L) 26.5 - 33.0 pg    MCHC 28.9 (L) 31.5 - 36.5 g/dL    RDW 24.0 (H) 10.0 - 15.0 %    Platelet Count 198 150 - 450 10e3/uL   UPPER GI ENDOSCOPY   Result Value Ref Range    Upper GI Endoscopy       [facility]  [address] [place] [address]  _______________________________________________________________________________  Patient Name: Oscar Zhao             Procedure Date: 2/12/2024 10:54 AM  MRN: [id number]                       Account Number: [id number]  YOB: 1945              Admit Type: Inpatient  Age: 79                               Room: [place] [address]  Note Status: Finalized                Attending MD: CUONG WILSON MD,     Total Sedation Time:                  Instrument Name: EGD Scope 2064  _______________________________________________________________________________     Procedure:             Upper GI endoscopy  Indications:           Iron deficiency anemia  Providers:              CUONG WILSON MD  Referring MD:            Medicines:             Monitored Anesthesia Care  Complications:         No immediate complications.  ______________________________________________________________________________ _  Procedure:             Pre-Anesthesia Assessment:                         - Prior to the procedure, a History and Physical was                          performed, and patient medications and allergies were                          reviewed. The patient is competent. The risks and                          benefits of the procedure and the sedation options and                          risks were discussed with the patient. All questions                          were answered and informed consent was obtained.                          Patient identification and proposed procedure were                          verified by the physician and the nurse in the                          pre-procedure area. Mental Status Examination: alert                          and oriented. Airway Examination: normal oropharyngeal                          airway and neck mobility. Respiratory Examination:                          clear to auscultation. CV Examination: normal. ASA                          G rade Assessment: II - A patient with mild systemic                          disease. After reviewing the risks and benefits, the                          patient was deemed in satisfactory condition to                          undergo the procedure. The anesthesia plan was to use                          monitored anesthesia care (MAC). Immediately prior to                          administration of medications, the patient was                          re-assessed for adequacy to receive sedatives. The                          heart rate, respiratory rate, oxygen saturations,                          blood pressure, adequacy of pulmonary ventilation, and                          response to care  were monitored throughout the                          procedure. The physical status of the patient was                          re-assessed after the procedure.                         After obtaining informed consent, the endoscope was                          passed under direct vision. Thr oughout the procedure,                          the patient's blood pressure, pulse, and oxygen                          saturations were monitored continuously. The endoscope                          was introduced through the mouth, and advanced to the                          second part of duodenum. The upper GI endoscopy was                          accomplished without difficulty. The patient tolerated                          the procedure well.                                                                                   Findings:       The examined esophagus was normal.       The Z-line was regular and was found 36 cm from the incisors.       A large hiatal hernia with a single Gato ulcer was found. The        proximal extent of the gastric folds (end of tubular esophagus) was 36        cm from the incisors. The hiatal narrowing was 41 cm from the incisors.        The Z-line was 36 cm from the incisors.       The entire examined stomach was normal. Biopsies were taken w ith a cold        forceps for histology.       The examined duodenum was normal. Biopsies were taken with a cold        forceps for histology.                                                                                   Moderate Sedation:       See Anesthesia Note  Impression:            - Normal esophagus.                         - Z-line regular, 36 cm from the incisors.                         - Large hiatal hernia with a few Gato ulcers.                         - Normal stomach. Biopsied.                         - Normal examined duodenum. Biopsied.  Recommendation:        - Await pathology results.                         - Perform  a colonoscopy today.                                                                                     Cuong Figueroa MD  __________________________  CUONG FIGUEROA MD  2/12/2024 12:07:06 PM  I was physically present for the entire viewing portion of the exam.  __________________________  Signature of teaching physician  Ezekiel/Jeanie STILES MD  Number of Addenda: 0    Note Initiated On: 2/12/2024 10:54 AM  Scope In: 11:16:30 AM  Scope Out: 11:22:37 AM     COLONOSCOPY   Result Value Ref Range    COLONOSCOPY       [address] [address]  _______________________________________________________________________________  Patient Name: Oscar Zhao             Procedure Date: 2/12/2024 10:59 AM  MRN: [id number]                       Account Number: [id number]  YOB: 1945              Admit Type: Inpatient  Age: 79                               Room: [place] [address]  Note Status: Finalized                Attending MD: CUONG FIGUEROA MD,     Total Sedation Time:                  Instrument Name: Adult Colonoscope 1194  _______________________________________________________________________________     Procedure:             Colonoscopy  Indications:           Surveillance: Personal history of adenomatous polyps                          on last colonoscopy 3 years ago, Incidental - Iron                          deficiency anemia, Incidental - Follow-up of anal                          canal cancer  Providers:             CUONG FIGUEROA MD  Referring MD:            Medicines:             Monitored Anesthesia Care  Complications:         No immediate complications.  _______________________________________________________________________________  Procedure:             Pre-Anesthesia Assessment:                         - Prior to the procedure, a History and Physical was                          performed, and patient medications and  allergies were                          reviewed. The patient is competent. The risks and                          benefits of the procedure and the sedation options and                          risks were discussed with the patient. All questions                          were answered and informed consent was obtained.                          Patient identification and proposed procedure were                          verified by the physician and the nurse in the                          pre-procedure area. Mental Status Examination: alert                          and oriented.  Airway Examination: normal oropharyngeal                          airway and neck mobility. Respiratory Examination:                          clear to auscultation. CV Examination: normal. ASA                          Grade Assessment: II - A patient with mild systemic                          disease. After reviewing the risks and benefits, the                          patient was deemed in satisfactory condition to                          undergo the procedure. The anesthesia plan was to use                          monitored anesthesia care (MAC). Immediately prior to                          administration of medications, the patient was                          re-assessed for adequacy to receive sedatives. The                          heart rate, respiratory rate, oxygen saturations,                          blood pressure, adequacy of pulmonary ventilation, and                          response to care were monitored throughout the                          procedure. The physi jill status of the patient was                          re-assessed after the procedure.                         After obtaining informed consent, the colonoscope was                          passed under direct vision. Throughout the procedure,                          the patient's blood pressure, pulse, and oxygen                          saturations were  monitored continuously. The                          colonoscope was introduced through the anus and                          advanced to the cecum, identified by appendiceal                          orifice and ileocecal valve. The colonoscopy was                          performed without difficulty.                                                                                   Findings:       The terminal ileum appeared normal.       The entire examined colon appeared normal on direct and retroflexion        views.       A localized area of moderately erythematous mucosa was found at the anus.                                                                                    Moderate Sedation:       See Anesthesia Note  Impression:            - The examined portion of the ileum was normal.                         - The entire examined colon is normal on direct and                          retroflexion views.                         - Erythematous mucosa at the anus consistent with                          radiation changes.                         - No specimens collected.  Recommendation:        - Repeat colonoscopy in 3 years for surveillance.                                                                                     Cuong Figueroa MD  __________________________  CUONG FIGUEROA MD  2/12/2024 12:14:13 PM  I was physically present for the entire viewing portion of the exam.  __________________________  Signature of teaching physician  [id number] FIGUEROA MD  Number of Addenda: 0    Note Initiated On: 2/12/2024 10:59 AM  Scope In: 11:36:57 AM  Scope Out: 11:52:22 AM     Surgical Pathology Exam   Result Value Ref Range    Case Report       Surgical Pathology Report                         Case: [id number]                                  Authorizing Provider:  Cuong Figueroa,   Collected:           02/12/2024 11:19 AM                                 MD                                                                            Ordering Location:     [facility]          Received:            02/12/2024 12:30 PM                                 [facility] OR                                                   Pathologist:           Massiel Cordova MD                                                           Specimens:   A) - Small Intestine, Duodenum                                                                      B) - Stomach                                                                               Final Diagnosis       A) DUODENUM, BIOPSIES:        1.  DUODENAL MUCOSA WITH NORMAL VILLI HEIGHT, WITHOUT INCREASED NUMBERS OF             INTRAEPITHELIAL LYMPHOCYTES (NO EVIDENCE OF CELIAC DISEASE)        2.  NO EVIDENCE OF ULCERATION, ACTIVE ENTERITIS, DYSPLASIA OR MALIGNANCY    B) GASTRIC BIOPSIES:        1.  GASTRIC ANTRAL AND FUNDIC MUCOSA WITH MILD CHRONIC GASTRITIS        2.  NEGATIVE FOR ACUTE GASTRITIS, INTESTINAL METAPLASIA, ATROPHY, DYSPLASIA AND             MALIGNANCY        3.  H. PYLORI IMMUNOSTAIN: NEGATIVE FOR HELICOBACTER ORGANISMS        Clinical Information       Procedure:  ESOPHAGOGASTRODUODENOSCOPY with biopsies  COLONOSCOPY  Pre-op Diagnosis: Microcytic anemia [D50.9]  Post-op Diagnosis: D50.9 - Microcytic anemia [ICD-10-CM]      Gross Description       A(1). Small Intestine, Duodenum, :  Received in formalin, labeled with the patient's name and duodenum biopsy are four irregular, tan soft tissues ranging from 0.1 cm to 0.3 cm.  TE-1C  B(2). Stomach, :  Received in formalin, labeled with the patient's name and stomach biopsy, are seven irregular, tan-pink soft tissues.  TE-1C RACHAEL Guerrero:[id number]       Microscopic Description       Microscopic examination performed, substantiating the above diagnosis. All controls stain appropriately.        Performing Labs       The technical component of this testing was completed at [facility]  [facility]      Case Images         Imaging    MR Pelvis (Intrapelvic Organs) wo&w Contrast    Result Date: 2/9/2024  EXAM: MRI PELVIS WITHOUT AND WITH IV CONTRAST LOCATION: [facility] DATE: 2/8/2024 INDICATION: anal SCC s p CRT 4 29 21 COMPARISON: Pelvic MRI 02/02/2023, CT abdomen and pelvis 02/08/2024 TECHNIQUE: Routine MRI pelvis without and with IV contrast. Axial, sagittal, and coronal high-resolution T2 and post gadolinium T1 with fat saturation. CONTRAST: 6.0 mL gadavist FINDINGS: LOCAL TUMOR: No evidence of recurrent tumor. LYMPH NODES: No lymphadenopathy in the pelvis. ADDITIONAL FINDINGS: No evidence of metastatic disease in the pelvis. Redemonstrated DVT in the right femoral vein.     IMPRESSION: 1.  No evidence of recurrent disease in the pelvis. 2.  Right lower extremity DVT.    US Lower Extremity Venous Duplex Bilateral    Result Date: 2/8/2024  EXAM: US LOWER EXTREMITY VENOUS DUPLEX BILATERAL LOCATION: [facility] DATE: 2/8/2024 INDICATION: DVT on CT imaging, hx of cancer. COMPARISON: None. TECHNIQUE: Venous Duplex ultrasound of bilateral lower extremities with and without compression, augmentation and duplex. Color flow and spectral Doppler with waveform analysis performed. FINDINGS: Exam includes the common femoral, femoral, popliteal veins as well as segmentally visualized deep calf veins and greater saphenous vein. RIGHT: DVT identified in the proximal through distal right femoral, popliteal and one of the proximal through distal posterior tibial veins, some regions nearly occlusive. The right peroneal vein is not visualized. No superficial thrombophlebitis. No popliteal cyst. LEFT: No deep vein thrombosis. No superficial thrombophlebitis. No popliteal cyst.     IMPRESSION: 1.  Extensive right leg DVT. Recommend interventional radiology consultation. 2.  No left-sided DVT.     CT Chest/Abdomen/Pelvis w Contrast    Result Date:  2/8/2024  EXAM: CT CHEST/ABDOMEN/PELVIS W CONTRAST LOCATION: [facility] DATE: 2/8/2024 INDICATION: Anal SCC s p SCRT cpmpleted 4 29 21 restaging COMPARISON: Chest CT from 10/25/2023, pelvic MRI from 02/02/2023, CT chest, abdomen, and pelvis from 01/10/2023 TECHNIQUE: CT scan of the chest, abdomen, and pelvis was performed following injection of IV contrast. Multiplanar reformats were obtained. Dose reduction techniques were used. CONTRAST: 90 ML ISOVUE 370 FINDINGS: LUNGS AND PLEURA: Similar 4 mm right upper lobe nodule on series 4 image 79. Continued attention on follow-up. No new or enlarging pulmonary nodules. Scattered areas of mild atelectasis/scarring. MEDIASTINUM/AXILLAE: Heart size is normal. Large hiatal hernia. No adenopathy. Small volume bilateral pulmonary emboli. On the right side this is in the distal right pulmonary artery extending into the lobar and a few segmental branches. In the left lobe  this is predominantly segmental and subsegmental in the left lower lobe. No evidence of right heart strain. CORONARY ARTERY CALCIFICATION: None. HEPATOBILIARY: Probable mild hepatic steatosis. Tiny hypodensity in the right lobe of the liver remains too small to characterize but favored to be benign. Cholelithiasis. No biliary ductal dilation. PANCREAS: Normal. SPLEEN: Normal. ADRENAL GLANDS: Normal. KIDNEYS/BLADDER: No significant mass, stone, or hydronephrosis. The bladder is partially obscured by beam hardening artifact from hip arthroplasty and incompletely distended accentuating wall thickening. BOWEL: Diverticulosis of the colon. No acute inflammatory change. No obstruction. Mild colonic stool burden. LYMPH NODES: No new or enlarging adenopathy. VASCULATURE: Questionable right lower extremity DVT in the superficial femoral vein. Hysterectomy. PELVIC ORGANS: Hysterectomy. MUSCULOSKELETAL: Heterogeneous osteopenia. No definitive osseous lesions. Right hip arthroplasty. Scarring  changes and fat stranding in the right lateral hip soft tissues. Small right hip effusion.     IMPRESSION: 1.  No CT evidence of recurrent or metastatic disease. See same day pelvic MRI for additional details. 2.  However, there are bilateral pulmonary emboli, right greater than left. No evidence of right heart strain. 3.  Additionally, there is right lower extremity DVT in the superficial femoral vein. Recommend right lower extremity DVT ultrasound. [Critical Result: New diagnosis of pulmonary embolism] Finding was identified on 2/8/2024 1:50 PM CST. was contacted by me on 2/8/2024 1:58 PM CST and verbalized understanding of the critical result.     MA Screen Bilateral w/Andrew    Result Date: 2/1/2024  BILATERAL FULL FIELD DIGITAL SCREENING MAMMOGRAM WITH TOMOSYNTHESIS Performed on: 2/1/24 Compared to: 11/02/2022, 09/14/2021, and 08/27/2020 Technique:  This study was evaluated with the assistance of Computer-Aided Detection.  Breast Tomosynthesis was used in interpretation. Findings: The breasts are heterogeneously dense, which may obscure small masses.  There is no radiographic evidence of malignancy.     IMPRESSION: ACR BI-RADS Category 1: Negative BREAST CANCER SCREENING RECOMMENDATION: Routine yearly mammography beginning at age 40 or as discussed with your provider. The results and recommendations of this examination will be communicated to the patient. Jayme Mcmullen      Billing  Total time 40 minutes, to include face to face visit, review of EMR, ordering, documentation and coordination of care on date of service   complexity modifier for longitudinal care.     Signed by: Erin Barajas NP  t    Oncology Rooming Note    February 16, 2024 1:48 PM   Oscar Zhao is a 79 year old female who presents for:    Chief Complaint   Patient presents with     Oncology Clinic Visit     Initial Vitals: LMP  (LMP Unknown)  Estimated body mass index is 23.42 kg/m  as calculated from the following:    Height as of  "2/12/24: 1.6 m (5' 3\").    Weight as of an earlier encounter on 2/16/24: 60 kg (132 lb 3.2 oz). There is no height or weight on file to calculate BSA.  Data Unavailable Comment: Data Unavailable   No LMP recorded (lmp unknown). Patient is postmenopausal.  Allergies reviewed: Yes  Medications reviewed: Yes    Medications: Medication refills not needed today.  Pharmacy name entered into [employer]: [facility] #[id number] [place], [place] - [address] AT [facility]    Frailty Screening:   Is the patient here for a new oncology consult visit in cancer care? 2. No      Clinical concerns: follow up visit        Vanessa De Los Santos LPN                Oncology Rooming Note    February 16, 2024 1:53 PM   Oscar Zhao is a 79 year old female who presents for:    Chief Complaint   Patient presents with     Oncology Clinic Visit     Initial Vitals: BP (!) 142/81 (BP Location: Right arm, Patient Position: Sitting, Cuff Size: Adult Regular)   Pulse 74   Temp 98.6  F (37  C) (Oral)   Resp 16   Ht 1.6 m (5' 3\")   Wt 60 kg (132 lb 3.2 oz)   LMP  (LMP Unknown)   SpO2 100%   BMI 23.42 kg/m   Estimated body mass index is 23.42 kg/m  as calculated from the following:    Height as of this encounter: 1.6 m (5' 3\").    Weight as of this encounter: 60 kg (132 lb 3.2 oz). Body surface area is 1.63 meters squared.  No Pain (0) Comment: Data Unavailable   No LMP recorded (lmp unknown). Patient is postmenopausal.  Allergies reviewed: Yes  Medications reviewed: Yes    Medications: Medication refills not needed today.  Pharmacy name entered into [employer]: [facility] #[id number] [place], [place] - [address] AT [facility]    Frailty Screening:   Is the patient here for a new oncology consult visit in cancer care? 2. No      Clinical concerns: follow up visit        Vanessa De Los Santos LPN                Again, thank you for allowing me to participate in the care of your patient.  "       Sincerely,        Erin Barajas, NP

## 2024-02-16 NOTE — PROGRESS NOTES
[facility] Outpatient Progress Note    Patient: Oscar Zhao  MRN: [id number]  Date of Service: Feb 16, 2024          Reason for Visit    Hx anal cancer  Acute iron-def anemia  Acute DVT/PE      Assessment/Plan  Hx anal cancer  Now 3 yrs out from diagnosis. Completed chemoRT with complete response.   Recent CT, MRI and colonoscopy negative for recurrence.     Plan:  -Every 6 month exam and labs in Oncology  -Annual scans until 5 yrs out  -Colonoscopy in 3 yrs (2027)  -Continue follow-up surveillance in [facility] with Dr. Reynolds    Iron def anemia, likely due to upper GI bleed   Large hiatal hernia, Gato ulcer   EGD showed large hiatal hernia and Gato ulcers, probably site of bleeding while on ASA in her post-op period last Fall. Now, off ASA.    Hgb improving, now 10.7 (up from 9.8 at dismissal last week). MCV normalizing.   No clinical signs of bleeding. No GI symptoms.    Plan:  -Start oral iron daily (or every other day). She has at home and will start.  -Continue PPI BID. Follow-up with PCP/GI for ongoing mgmt  -Monitor CBC, ferritin and iron indices every 6 weeks since on anticoagulation.  -Return in 3 months.     RLE DVT and PE  First incidence. Likely provoked folloiwng hip surgery x 2 in October, then post-op activity limitations due to anemia.    In light of concurrent anemia concerning for GI bleed, started heparin gtt and with no progressive anemia and normal colonoscopy/EGD so changed to DOAC last week.   On Xarelto. She had $900 copay for the 21-day starter pack.    No evident bleeding. Hgb improving.    No RLE nor respiratory symptoms.     Plan:  -Continue anticoagulation 3-6 mths. Since first incidence and likely provoked, can consider stopping at that time especially given her bleeding risk with GI history  -Monitor for GI bleeding closely while on it.  -Rx for Xarelto 20 mg daily sent to start after 21 days completed. If copay expensive, she was advised to let us know  and we can have our pharmacy finance team look into alternatives (?Eliquis) or support options  ______________________________________________________________________________    History of Present Illness/ Interval History    Ms. Oscar Zhao is a 79 year old with history of anal cancer, treated 3 yrs ago with chemoRT (ZOYA) in surveillance.     Last week, while undergoing routine surveillance imaging for her cancer, scans incidentally noted RLE DVT and PE. Asymptomatic. During eval for that in ED, she was also noted to have acute microcytic anemia consistent with iron def. EGD noted Gato ulcer related to large hiatal hernia; colonoscopy negative. Got blood transfusion and IV iron as inpatient. Started heparin gtt initially with no recurrent bleeding, so transitioned to DOAC (Xarelto) before dismissal.     Doing well since leaving hospital.  On Xarelto.  Reports a few incidences bright red blood per rectum after her digital rectal exam while hospitalized, none since. No black/tarry stools. No abd pain. No dyspnea. No leg pain/swelling.        ECOG Performance    0      Oncology History/Treatment  Diagnosis/Stage:   2/2021: Stage IIA (cT2-cN0-cM0) Malignant neoplasm of anal canal   -presented with 1-year history of blood on toilet paper when wiping. Became anemic.  -2/18/21 colonoscopy: Ulcerated anal canal lesion - path c/w moderately differentiated squamous cell carcinoma. 1.5-2 cm polyp of the ascending colon removed - pathology c/w tubular adenoma.    -MRI pelvis - focal thickening (L) posterolateral anorectal mucosa.  Muscularis propria appears intact without extension into the mesorecat or intersphincteric space.  No maegan or distant mets in the pelvis.  -PET - FDG avid anal mass. No evidence of metastasis.       Treatment:  -3/22 - 4/29/2021 completed concurrent 5FU + mitomycin chemotherapy with 5400 cGy in 27 fractions to the anal canal and 4500 cGy in 27 fractions to the regional lymph nodes.  6/2021  post-treatment restaging MR pelvis - previously seen anal mass has nearly completely resolved. No evidence of metastatic disease in the pelvis.  6/2021 post-treatment restaging CT CAP - no evidence of metastatic disease.    2/2024: Iron def anemia, secondary to acute upper GI bleed  -Hx of gastric ulcers  -Underwent right hip surgery in October. During right hip post-op recovery, was noting progressive fatigue and activity intolerance. Noted black stools while on prophylactic ASA post-op, but after stopping ASA this resolved.   -Pre-op hgb 12. Post-op hgb 11/4/23: 11.3.    -Hemolysis labs WNL  -2/8/24 hgb: 6.9, MCV 72. Ferritin 13. Got 2 unit(s) pRBC and 3 doses Venofer IV inpatient  -CT cap: no bleeding source  -2/8/24 EGD: normal esophagus, stomach and duodenum. Large hiatal hernia with a few Gato ulcers.   -2/8/24 Colonoscopy: negative. Erythematous mucosa at anus consistent with RT changes. No cancer recurrence.   -Started PPI     2/2024: Acute RLE DVT and PE (post-op setting)  -Required 2 hip surgeries 10/2023. Developed anemia contributing to fatigue and exertional dyspnea, limiting her post-op recovery.   -During incidental pelvic MRI for cancer surveillance, RLE DVT noted (asymptomatic)  -CT: bilateral PE R>L. No heart strain  -RLE Doppler: Extensive right proximal - distal femoral, popliteal and tibial veins with near occlusion.   -Hospitalized due to concurrent iron-def anemia concnering for acute bleed. Started on heparin gtt and no concern for bleeding, so changed to Xarelto  -IR did not recommend intervention for DVT    Physical Exam    GENERAL: Alert and oriented to time place and person. Seated comfortably. In no distress.  HEAD: Atraumatic and normocephalic. No alopecia.  EYES: JUANCARLOS, EOMI. No erythema. No icterus.  CHEST: regular respiratory rate.  ABDOMEN: Soft. Not tender. Not distended. No palpable hepatomegaly or splenomegaly. No other mass palpable. Bowel sounds present.  EXTREMITIES: Warm.  No peripheral edema.  SKIN: no rash, or bruising or purpura.   NEURO: No gross deficit noted. Non-antalgic gait.      Lab Results    Recent Results (from the past 168 hour(s))   Heparin Unfractionated Anti Xa Level   Result Value Ref Range    Anti Xa Unfractionated Heparin 0.71 For Reference Range, See Comment IU/mL   CBC with platelets and differential   Result Value Ref Range    WBC Count 6.3 4.0 - 11.0 10e3/uL    RBC Count 4.54 3.80 - 5.20 10e6/uL    Hemoglobin 9.5 (L) 11.7 - 15.7 g/dL    Hematocrit 32.6 (L) 35.0 - 47.0 %    MCV 72 (L) 78 - 100 fL    MCH 20.9 (L) 26.5 - 33.0 pg    MCHC 29.1 (L) 31.5 - 36.5 g/dL    RDW 22.7 (H) 10.0 - 15.0 %    Platelet Count 192 150 - 450 10e3/uL    % Neutrophils 71 %    % Lymphocytes 14 %    % Monocytes 8 %    % Eosinophils 5 %    % Basophils 1 %    % Immature Granulocytes 1 %    NRBCs per 100 WBC 1 (H) <1 /100    Absolute Neutrophils 4.5 1.6 - 8.3 10e3/uL    Absolute Lymphocytes 0.9 0.8 - 5.3 10e3/uL    Absolute Monocytes 0.5 0.0 - 1.3 10e3/uL    Absolute Eosinophils 0.3 0.0 - 0.7 10e3/uL    Absolute Basophils 0.0 0.0 - 0.2 10e3/uL    Absolute Immature Granulocytes 0.0 <=0.4 10e3/uL    Absolute NRBCs 0.0 10e3/uL   Heparin Unfractionated Anti Xa Level   Result Value Ref Range    Anti Xa Unfractionated Heparin 0.55 For Reference Range, See Comment IU/mL   Heparin Unfractionated Anti Xa Level   Result Value Ref Range    Anti Xa Unfractionated Heparin 0.53 For Reference Range, See Comment IU/mL   CBC with platelets   Result Value Ref Range    WBC Count 6.1 4.0 - 11.0 10e3/uL    RBC Count 4.42 3.80 - 5.20 10e6/uL    Hemoglobin 9.2 (L) 11.7 - 15.7 g/dL    Hematocrit 32.6 (L) 35.0 - 47.0 %    MCV 74 (L) 78 - 100 fL    MCH 20.8 (L) 26.5 - 33.0 pg    MCHC 28.2 (L) 31.5 - 36.5 g/dL    RDW 23.3 (H) 10.0 - 15.0 %    Platelet Count 210 150 - 450 10e3/uL   Heparin Unfractionated Anti Xa Level   Result Value Ref Range    Anti Xa Unfractionated Heparin 0.41 For Reference Range, See Comment  IU/mL   CBC with platelets   Result Value Ref Range    WBC Count 6.2 4.0 - 11.0 10e3/uL    RBC Count 4.51 3.80 - 5.20 10e6/uL    Hemoglobin 9.5 (L) 11.7 - 15.7 g/dL    Hematocrit 33.1 (L) 35.0 - 47.0 %    MCV 73 (L) 78 - 100 fL    MCH 21.1 (L) 26.5 - 33.0 pg    MCHC 28.7 (L) 31.5 - 36.5 g/dL    RDW 23.7 (H) 10.0 - 15.0 %    Platelet Count 193 150 - 450 10e3/uL   Heparin Unfractionated Anti Xa Level   Result Value Ref Range    Anti Xa Unfractionated Heparin 0.01 For Reference Range, See Comment IU/mL   CBC with platelets   Result Value Ref Range    WBC Count 5.6 4.0 - 11.0 10e3/uL    RBC Count 4.61 3.80 - 5.20 10e6/uL    Hemoglobin 9.8 (L) 11.7 - 15.7 g/dL    Hematocrit 33.9 (L) 35.0 - 47.0 %    MCV 74 (L) 78 - 100 fL    MCH 21.3 (L) 26.5 - 33.0 pg    MCHC 28.9 (L) 31.5 - 36.5 g/dL    RDW 24.0 (H) 10.0 - 15.0 %    Platelet Count 198 150 - 450 10e3/uL   UPPER GI ENDOSCOPY   Result Value Ref Range    Upper GI Endoscopy       [facility]  [address] [place] [address]  _______________________________________________________________________________  Patient Name: Oscar Zhao             Procedure Date: 2/12/2024 10:54 AM  MRN: [id number]                       Account Number: [id number]  YOB: 1945              Admit Type: Inpatient  Age: 79                               Room: [place] [address]  Note Status: Finalized                Attending MD: CUONG WILSON MD,     Total Sedation Time:                  Instrument Name: EGD Scope 2064  _______________________________________________________________________________     Procedure:             Upper GI endoscopy  Indications:           Iron deficiency anemia  Providers:             CUONG WILSON MD  Referring MD:            Medicines:             Monitored Anesthesia Care  Complications:         No immediate complications.  ______________________________________________________________________________  _  Procedure:             Pre-Anesthesia Assessment:                         - Prior to the procedure, a History and Physical was                          performed, and patient medications and allergies were                          reviewed. The patient is competent. The risks and                          benefits of the procedure and the sedation options and                          risks were discussed with the patient. All questions                          were answered and informed consent was obtained.                          Patient identification and proposed procedure were                          verified by the physician and the nurse in the                          pre-procedure area. Mental Status Examination: alert                          and oriented. Airway Examination: normal oropharyngeal                          airway and neck mobility. Respiratory Examination:                          clear to auscultation. CV Examination: normal. ASA                          G rade Assessment: II - A patient with mild systemic                          disease. After reviewing the risks and benefits, the                          patient was deemed in satisfactory condition to                          undergo the procedure. The anesthesia plan was to use                          monitored anesthesia care (MAC). Immediately prior to                          administration of medications, the patient was                          re-assessed for adequacy to receive sedatives. The                          heart rate, respiratory rate, oxygen saturations,                          blood pressure, adequacy of pulmonary ventilation, and                          response to care were monitored throughout the                          procedure. The physical status of the patient was                          re-assessed after the procedure.                         After obtaining informed consent, the endoscope was                           passed under direct vision. Thr oughout the procedure,                          the patient's blood pressure, pulse, and oxygen                          saturations were monitored continuously. The endoscope                          was introduced through the mouth, and advanced to the                          second part of duodenum. The upper GI endoscopy was                          accomplished without difficulty. The patient tolerated                          the procedure well.                                                                                   Findings:       The examined esophagus was normal.       The Z-line was regular and was found 36 cm from the incisors.       A large hiatal hernia with a single Gato ulcer was found. The        proximal extent of the gastric folds (end of tubular esophagus) was 36        cm from the incisors. The hiatal narrowing was 41 cm from the incisors.        The Z-line was 36 cm from the incisors.       The entire examined stomach was normal. Biopsies were taken w ith a cold        forceps for histology.       The examined duodenum was normal. Biopsies were taken with a cold        forceps for histology.                                                                                   Moderate Sedation:       See Anesthesia Note  Impression:            - Normal esophagus.                         - Z-line regular, 36 cm from the incisors.                         - Large hiatal hernia with a few Gato ulcers.                         - Normal stomach. Biopsied.                         - Normal examined duodenum. Biopsied.  Recommendation:        - Await pathology results.                         - Perform a colonoscopy today.                                                                                     Cuong Figueroa MD  __________________________  CUONG FIGUEROA MD  2/12/2024 12:07:06 PM  I was physically present for the entire  viewing portion of the exam.  __________________________  Signature of teaching physician  Ezekiel/Jeanie STILES MD  Number of Addenda: 0    Note Initiated On: 2/12/2024 10:54 AM  Scope In: 11:16:30 AM  Scope Out: 11:22:37 AM     COLONOSCOPY   Result Value Ref Range    COLONOSCOPY       [facility]  [address] [place] [address]  _______________________________________________________________________________  Patient Name: Oscar Zhao             Procedure Date: 2/12/2024 10:59 AM  MRN: [id number]                       Account Number: [id number]  YOB: 1945              Admit Type: Inpatient  Age: 79                               Room: [place] [address]  Note Status: Finalized                Attending MD: CUONG WILSON MD,     Total Sedation Time:                  Instrument Name: Adult Colonoscope 1194  _______________________________________________________________________________     Procedure:             Colonoscopy  Indications:           Surveillance: Personal history of adenomatous polyps                          on last colonoscopy 3 years ago, Incidental - Iron                          deficiency anemia, Incidental - Follow-up of anal                          canal cancer  Providers:             CUONG WILSON MD  Referring MD:            Medicines:             Monitored Anesthesia Care  Complications:         No immediate complications.  _______________________________________________________________________________  Procedure:             Pre-Anesthesia Assessment:                         - Prior to the procedure, a History and Physical was                          performed, and patient medications and allergies were                          reviewed. The patient is competent. The risks and                          benefits of the procedure and the sedation options and                          risks were discussed with the patient. All questions                           were answered and informed consent was obtained.                          Patient identification and proposed procedure were                          verified by the physician and the nurse in the                          pre-procedure area. Mental Status Examination: alert                          and oriented.  Airway Examination: normal oropharyngeal                          airway and neck mobility. Respiratory Examination:                          clear to auscultation. CV Examination: normal. ASA                          Grade Assessment: II - A patient with mild systemic                          disease. After reviewing the risks and benefits, the                          patient was deemed in satisfactory condition to                          undergo the procedure. The anesthesia plan was to use                          monitored anesthesia care (MAC). Immediately prior to                          administration of medications, the patient was                          re-assessed for adequacy to receive sedatives. The                          heart rate, respiratory rate, oxygen saturations,                          blood pressure, adequacy of pulmonary ventilation, and                          response to care were monitored throughout the                          procedure. The physi jill status of the patient was                          re-assessed after the procedure.                         After obtaining informed consent, the colonoscope was                          passed under direct vision. Throughout the procedure,                          the patient's blood pressure, pulse, and oxygen                          saturations were monitored continuously. The                          colonoscope was introduced through the anus and                          advanced to the cecum, identified by appendiceal                          orifice and ileocecal valve. The colonoscopy was                           performed without difficulty.                                                                                   Findings:       The terminal ileum appeared normal.       The entire examined colon appeared normal on direct and retroflexion        views.       A localized area of moderately erythematous mucosa was found at the anus.                                                                                    Moderate Sedation:       See Anesthesia Note  Impression:            - The examined portion of the ileum was normal.                         - The entire examined colon is normal on direct and                          retroflexion views.                         - Erythematous mucosa at the anus consistent with                          radiation changes.                         - No specimens collected.  Recommendation:        - Repeat colonoscopy in 3 years for surveillance.                                                                                     Cuong Figueroa MD  __________________________  CUONG FIGUEROA MD  2/12/2024 12:14:13 PM  I was physically present for the entire viewing portion of the exam.  __________________________  Signature of teaching physician  [id number] FIGUEROA MD  Number of Addenda: 0    Note Initiated On: 2/12/2024 10:59 AM  Scope In: 11:36:57 AM  Scope Out: 11:52:22 AM     Surgical Pathology Exam   Result Value Ref Range    Case Report       Surgical Pathology Report                         Case: [id number]                                  Authorizing Provider:  Cuong Figueroa,   Collected:           02/12/2024 11:19 AM                                 MD                                                                           Ordering Location:     [facility]          Received:            02/12/2024 12:30 PM                                 [facility] OR                                                    Pathologist:           Massiel Cordova MD                                                           Specimens:   A) - Small Intestine, Duodenum                                                                      B) - Stomach                                                                               Final Diagnosis       A) DUODENUM, BIOPSIES:        1.  DUODENAL MUCOSA WITH NORMAL VILLI HEIGHT, WITHOUT INCREASED NUMBERS OF             INTRAEPITHELIAL LYMPHOCYTES (NO EVIDENCE OF CELIAC DISEASE)        2.  NO EVIDENCE OF ULCERATION, ACTIVE ENTERITIS, DYSPLASIA OR MALIGNANCY    B) GASTRIC BIOPSIES:        1.  GASTRIC ANTRAL AND FUNDIC MUCOSA WITH MILD CHRONIC GASTRITIS        2.  NEGATIVE FOR ACUTE GASTRITIS, INTESTINAL METAPLASIA, ATROPHY, DYSPLASIA AND             MALIGNANCY        3.  H. PYLORI IMMUNOSTAIN: NEGATIVE FOR HELICOBACTER ORGANISMS        Clinical Information       Procedure:  ESOPHAGOGASTRODUODENOSCOPY with biopsies  COLONOSCOPY  Pre-op Diagnosis: Microcytic anemia [D50.9]  Post-op Diagnosis: D50.9 - Microcytic anemia [ICD-10-CM]      Gross Description       A(1). Small Intestine, Duodenum, :  Received in formalin, labeled with the patient's name and duodenum biopsy are four irregular, tan soft tissues ranging from 0.1 cm to 0.3 cm.  TE-1C  B(2). Stomach, :  Received in formalin, labeled with the patient's name and stomach biopsy, are seven irregular, tan-pink soft tissues.  TE-1C RACHAEL Guerrero:[id number]       Microscopic Description       Microscopic examination performed, substantiating the above diagnosis. All controls stain appropriately.        Performing Labs       The technical component of this testing was completed at [facility]      Case Images         Imaging    MR Pelvis (Intrapelvic Organs) wo&w Contrast    Result Date: 2/9/2024  EXAM: MRI PELVIS WITHOUT AND WITH IV CONTRAST LOCATION: [facility]  DATE: 2/8/2024 INDICATION: anal SCC s p CRT 4 29 21 COMPARISON: Pelvic MRI 02/02/2023, CT abdomen and pelvis 02/08/2024 TECHNIQUE: Routine MRI pelvis without and with IV contrast. Axial, sagittal, and coronal high-resolution T2 and post gadolinium T1 with fat saturation. CONTRAST: 6.0 mL gadavist FINDINGS: LOCAL TUMOR: No evidence of recurrent tumor. LYMPH NODES: No lymphadenopathy in the pelvis. ADDITIONAL FINDINGS: No evidence of metastatic disease in the pelvis. Redemonstrated DVT in the right femoral vein.     IMPRESSION: 1.  No evidence of recurrent disease in the pelvis. 2.  Right lower extremity DVT.    US Lower Extremity Venous Duplex Bilateral    Result Date: 2/8/2024  EXAM: US LOWER EXTREMITY VENOUS DUPLEX BILATERAL LOCATION: [facility] DATE: 2/8/2024 INDICATION: DVT on CT imaging, hx of cancer. COMPARISON: None. TECHNIQUE: Venous Duplex ultrasound of bilateral lower extremities with and without compression, augmentation and duplex. Color flow and spectral Doppler with waveform analysis performed. FINDINGS: Exam includes the common femoral, femoral, popliteal veins as well as segmentally visualized deep calf veins and greater saphenous vein. RIGHT: DVT identified in the proximal through distal right femoral, popliteal and one of the proximal through distal posterior tibial veins, some regions nearly occlusive. The right peroneal vein is not visualized. No superficial thrombophlebitis. No popliteal cyst. LEFT: No deep vein thrombosis. No superficial thrombophlebitis. No popliteal cyst.     IMPRESSION: 1.  Extensive right leg DVT. Recommend interventional radiology consultation. 2.  No left-sided DVT.     CT Chest/Abdomen/Pelvis w Contrast    Result Date: 2/8/2024  EXAM: CT CHEST/ABDOMEN/PELVIS W CONTRAST LOCATION: [facility] DATE: 2/8/2024 INDICATION: Anal SCC s p SCRT cpmpleted 4 29 21 restaging COMPARISON: Chest CT from 10/25/2023, pelvic MRI from  02/02/2023, CT chest, abdomen, and pelvis from 01/10/2023 TECHNIQUE: CT scan of the chest, abdomen, and pelvis was performed following injection of IV contrast. Multiplanar reformats were obtained. Dose reduction techniques were used. CONTRAST: 90 ML ISOVUE 370 FINDINGS: LUNGS AND PLEURA: Similar 4 mm right upper lobe nodule on series 4 image 79. Continued attention on follow-up. No new or enlarging pulmonary nodules. Scattered areas of mild atelectasis/scarring. MEDIASTINUM/AXILLAE: Heart size is normal. Large hiatal hernia. No adenopathy. Small volume bilateral pulmonary emboli. On the right side this is in the distal right pulmonary artery extending into the lobar and a few segmental branches. In the left lobe  this is predominantly segmental and subsegmental in the left lower lobe. No evidence of right heart strain. CORONARY ARTERY CALCIFICATION: None. HEPATOBILIARY: Probable mild hepatic steatosis. Tiny hypodensity in the right lobe of the liver remains too small to characterize but favored to be benign. Cholelithiasis. No biliary ductal dilation. PANCREAS: Normal. SPLEEN: Normal. ADRENAL GLANDS: Normal. KIDNEYS/BLADDER: No significant mass, stone, or hydronephrosis. The bladder is partially obscured by beam hardening artifact from hip arthroplasty and incompletely distended accentuating wall thickening. BOWEL: Diverticulosis of the colon. No acute inflammatory change. No obstruction. Mild colonic stool burden. LYMPH NODES: No new or enlarging adenopathy. VASCULATURE: Questionable right lower extremity DVT in the superficial femoral vein. Hysterectomy. PELVIC ORGANS: Hysterectomy. MUSCULOSKELETAL: Heterogeneous osteopenia. No definitive osseous lesions. Right hip arthroplasty. Scarring changes and fat stranding in the right lateral hip soft tissues. Small right hip effusion.     IMPRESSION: 1.  No CT evidence of recurrent or metastatic disease. See same day pelvic MRI for additional details. 2.  However, there  are bilateral pulmonary emboli, right greater than left. No evidence of right heart strain. 3.  Additionally, there is right lower extremity DVT in the superficial femoral vein. Recommend right lower extremity DVT ultrasound. [Critical Result: New diagnosis of pulmonary embolism] Finding was identified on 2/8/2024 1:50 PM CST. was contacted by me on 2/8/2024 1:58 PM CST and verbalized understanding of the critical result.     MA Screen Bilateral w/Andrew    Result Date: 2/1/2024  BILATERAL FULL FIELD DIGITAL SCREENING MAMMOGRAM WITH TOMOSYNTHESIS Performed on: 2/1/24 Compared to: 11/02/2022, 09/14/2021, and 08/27/2020 Technique:  This study was evaluated with the assistance of Computer-Aided Detection.  Breast Tomosynthesis was used in interpretation. Findings: The breasts are heterogeneously dense, which may obscure small masses.  There is no radiographic evidence of malignancy.     IMPRESSION: ACR BI-RADS Category 1: Negative BREAST CANCER SCREENING RECOMMENDATION: Routine yearly mammography beginning at age 40 or as discussed with your provider. The results and recommendations of this examination will be communicated to the patient. Jayme Mcmullen      Billing  Total time 40 minutes, to include face to face visit, review of EMR, ordering, documentation and coordination of care on date of service   complexity modifier for longitudinal care.     Signed by: Erin Barajas NP  t

## 2024-02-16 NOTE — PROGRESS NOTES
"Oncology Rooming Note    February 16, 2024 1:31 PM   Oscar Zhao is a 79 year old female who presents for:    Chief Complaint   Patient presents with    Oncology Clinic Visit     Follow up with Dr. Spencer     Initial Vitals: BP (!) 142/81 (BP Location: Right arm, Patient Position: Sitting, Cuff Size: Adult Regular)   Pulse 74   Temp 98.6  F (37  C) (Oral)   Resp 16   Wt 60 kg (132 lb 3.2 oz)   LMP  (LMP Unknown)   SpO2 100%   BMI 23.42 kg/m   Estimated body mass index is 23.42 kg/m  as calculated from the following:    Height as of 2/12/24: 1.6 m (5' 3\").    Weight as of this encounter: 60 kg (132 lb 3.2 oz). Body surface area is 1.63 meters squared.  No Pain (0) Comment: Data Unavailable   No LMP recorded (lmp unknown). Patient is postmenopausal.  Allergies reviewed: Yes  Medications reviewed: Yes    Medications: Medication refills not needed today.  Pharmacy name entered into Matchbook: [id number] [address] [facility]  AT [facility]    Frailty Screening:   Is the patient here for a new oncology consult visit in cancer care? 2. No      Clinical concerns: Patient here ambulatory for follow-up status post radiation for her rectal cancer.  Patient had recent CT and MRI and is here today for results.  Patient was recently in the hospital for bilateral PEs and a right leg DVT.  Patient states she is feeling so much better with having received blood while in the hospital.  Seen by Dr. Spencer.  Plan return to clinic for follow-up as directed by provider.   Dr. Spencer was notified.      Ofelia Perez RN              "

## 2024-02-27 ENCOUNTER — OFFICE VISIT (OUTPATIENT)
Dept: INTERNAL MEDICINE | Facility: CLINIC | Age: 79
End: 2024-02-27
Payer: MEDICARE

## 2024-02-27 VITALS
RESPIRATION RATE: 20 BRPM | SYSTOLIC BLOOD PRESSURE: 152 MMHG | BODY MASS INDEX: 23.14 KG/M2 | OXYGEN SATURATION: 99 % | HEART RATE: 69 BPM | TEMPERATURE: 97.4 F | WEIGHT: 130.6 LBS | HEIGHT: 63 IN | DIASTOLIC BLOOD PRESSURE: 74 MMHG

## 2024-02-27 DIAGNOSIS — D64.9 ANEMIA, UNSPECIFIED TYPE: ICD-10-CM

## 2024-02-27 DIAGNOSIS — T84.020D DISLOCATION OF INTERNAL RIGHT HIP PROSTHESIS, SUBSEQUENT ENCOUNTER: ICD-10-CM

## 2024-02-27 DIAGNOSIS — R53.83 OTHER FATIGUE: ICD-10-CM

## 2024-02-27 DIAGNOSIS — Z23 NEED FOR TDAP VACCINATION: ICD-10-CM

## 2024-02-27 DIAGNOSIS — Z23 NEED FOR SHINGLES VACCINE: ICD-10-CM

## 2024-02-27 DIAGNOSIS — I10 ESSENTIAL HYPERTENSION: ICD-10-CM

## 2024-02-27 DIAGNOSIS — E55.9 VITAMIN D DEFICIENCY: ICD-10-CM

## 2024-02-27 DIAGNOSIS — R10.13 DYSPEPSIA: ICD-10-CM

## 2024-02-27 DIAGNOSIS — E11.8 TYPE 2 DIABETES MELLITUS WITH COMPLICATION, WITHOUT LONG-TERM CURRENT USE OF INSULIN (H): Primary | ICD-10-CM

## 2024-02-27 LAB
CHOLEST SERPL-MCNC: 189 MG/DL
CREAT UR-MCNC: 16.5 MG/DL
FASTING STATUS PATIENT QL REPORTED: NO
FASTING STATUS PATIENT QL REPORTED: NO
GLUCOSE SERPL-MCNC: 101 MG/DL (ref 70–99)
HBA1C MFR BLD: 5 % (ref 0–5.6)
HDLC SERPL-MCNC: 63 MG/DL
HGB BLD-MCNC: 13.2 G/DL (ref 11.7–15.7)
LDLC SERPL CALC-MCNC: 107 MG/DL
MICROALBUMIN UR-MCNC: <12 MG/L
MICROALBUMIN/CREAT UR: NORMAL MG/G{CREAT}
NONHDLC SERPL-MCNC: 126 MG/DL
TRIGL SERPL-MCNC: 95 MG/DL

## 2024-02-27 PROCEDURE — 36415 COLL VENOUS BLD VENIPUNCTURE: CPT | Performed by: INTERNAL MEDICINE

## 2024-02-27 PROCEDURE — 83036 HEMOGLOBIN GLYCOSYLATED A1C: CPT | Performed by: INTERNAL MEDICINE

## 2024-02-27 PROCEDURE — 80061 LIPID PANEL: CPT | Performed by: INTERNAL MEDICINE

## 2024-02-27 PROCEDURE — 85018 HEMOGLOBIN: CPT | Performed by: INTERNAL MEDICINE

## 2024-02-27 PROCEDURE — 82043 UR ALBUMIN QUANTITATIVE: CPT | Performed by: INTERNAL MEDICINE

## 2024-02-27 PROCEDURE — 82570 ASSAY OF URINE CREATININE: CPT | Performed by: INTERNAL MEDICINE

## 2024-02-27 PROCEDURE — 82947 ASSAY GLUCOSE BLOOD QUANT: CPT | Performed by: INTERNAL MEDICINE

## 2024-02-27 PROCEDURE — 99214 OFFICE O/P EST MOD 30 MIN: CPT | Performed by: INTERNAL MEDICINE

## 2024-02-27 NOTE — PROGRESS NOTES
Assessment/Plan:    Diabetes mellitus type 2 borderline.  Today check hemoglobin A1c plus hemoglobin plus urine for microalbumin plus blood sugar and lipid panel.    Hypertension recheck blood pressure 152/84 initially 170/100.  Not resting.    Pulmonary emboli after right hip surgery for recurrent dislocation.  Incidental finding of PE small bilateral.  CT scan abdomen pelvis as part of workup for follow-up for squamous cell skin cancer of the perianal area.  Oncology ordered.  Now on Xarelto.  Deep vein thrombosis right thigh.    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    Recurrent hip dislocation followed by Dr. Bill Aguillon [facility] revision of right hip done preceding DVT right thigh and subsequent pulmonary emboli.  3 weeks prior hospital stay for PE.  Now on Xarelto.    GI bleed Joey esophageal hiatal hernia area.  On aspirin for DVT PE prophylaxis after hip surgery per [facility]opedic group Dr. Rosales Bernal presiding.  Now on Xarelto off aspirin.  Continue Protonix same.    25 minutes spent on the date of the encounter doing chart review, interpretation of tests, patient visit, and documentation     Subjective:  Oscar Zhao is a 79 year old female presents for the following health issues feeling well now initial blood pressure 170/100 recheck 152/84.  Pulmonary embolus diagnosed from DVT right thigh after right hip surgery for recurrent dislocation Dr. Rupesh Bernal.  On aspirin resulting in GI bleeding.  Hiatal hernia area require transfusional support 2 units packed cells with 3 units of iron) given parenterally.  Hemoglobin as low as 6 now improved repeat hemoglobin pending today on Protonix therapy.  Off aspirin.  Anal cancer squamous cell type followed at [facility] no sign of distant metastasis or spread hiatal hernia.  Aspirin related ulceration now healed in  "the proximity to the hiatal hernia esophageal gastric.  [facility] following.    ROS:  No blood in stool or urine previously melena.  Med list reviewed reconciled.  Denies chest pain shortness of breath no right leg swelling redness.    Objective:  BP (!) 170/100 (BP Location: Right arm, Patient Position: Sitting, Cuff Size: Adult Regular)   Pulse 69   Temp 97.4  F (36.3  C) (Oral)   Resp 20   Ht 1.6 m (5' 3\")   Wt 59.2 kg (130 lb 9.6 oz)   LMP  (LMP Unknown)   SpO2 99%   BMI 23.13 kg/m    Recheck blood pressure 152/74 right arm sitting resting better now chest clear heart tones normal abdomen benign extremities free of edema no leg swelling or redness neck veins nondistended no thyromegaly or scleral icterus she is slightly pale in color labs ordered see above.    Jak Jones MD  Internal Medicine    Answers submitted by the patient for this visit:  General Questionnaire (Submitted on 2/27/2024)  Chief Complaint: Chronic problems general questions HPI Form  What is the reason for your visit today? : hip  How many servings of fruits and vegetables do you eat daily?: 4 or more  On average, how many sweetened beverages do you drink each day (Examples: soda, juice, sweet tea, etc.  Do NOT count diet or artificially sweetened beverages)?: 0  How many minutes a day do you exercise enough to make your heart beat faster?: 20 to 29  How many days a week do you exercise enough to make your heart beat faster?: 4  How many days per week do you miss taking your medication?: 0    "

## 2024-03-18 ENCOUNTER — TRANSFERRED RECORDS (OUTPATIENT)
Dept: MULTI SPECIALTY CLINIC | Facility: CLINIC | Age: 79
End: 2024-03-18

## 2024-03-18 LAB — RETINOPATHY: NORMAL

## 2024-03-29 ENCOUNTER — LAB (OUTPATIENT)
Dept: INFUSION THERAPY | Facility: HOSPITAL | Age: 79
End: 2024-03-29
Attending: NURSE PRACTITIONER
Payer: MEDICARE

## 2024-03-29 DIAGNOSIS — D50.0 IRON DEFICIENCY ANEMIA DUE TO CHRONIC BLOOD LOSS: ICD-10-CM

## 2024-03-29 LAB
BASOPHILS # BLD AUTO: 0.1 10E3/UL (ref 0–0.2)
BASOPHILS NFR BLD AUTO: 1 %
EOSINOPHIL # BLD AUTO: 0.1 10E3/UL (ref 0–0.7)
EOSINOPHIL NFR BLD AUTO: 2 %
ERYTHROCYTE [DISTWIDTH] IN BLOOD BY AUTOMATED COUNT: 24 % (ref 10–15)
FERRITIN SERPL-MCNC: 29 NG/ML (ref 11–328)
HCT VFR BLD AUTO: 49.6 % (ref 35–47)
HGB BLD-MCNC: 15.1 G/DL (ref 11.7–15.7)
IMM GRANULOCYTES # BLD: 0.1 10E3/UL
IMM GRANULOCYTES NFR BLD: 2 %
IRON BINDING CAPACITY (ROCHE): 373 UG/DL (ref 240–430)
IRON SATN MFR SERPL: 15 % (ref 15–46)
IRON SERPL-MCNC: 56 UG/DL (ref 37–145)
LYMPHOCYTES # BLD AUTO: 1.2 10E3/UL (ref 0.8–5.3)
LYMPHOCYTES NFR BLD AUTO: 20 %
MCH RBC QN AUTO: 27.1 PG (ref 26.5–33)
MCHC RBC AUTO-ENTMCNC: 30.4 G/DL (ref 31.5–36.5)
MCV RBC AUTO: 89 FL (ref 78–100)
MONOCYTES # BLD AUTO: 0.4 10E3/UL (ref 0–1.3)
MONOCYTES NFR BLD AUTO: 7 %
NEUTROPHILS # BLD AUTO: 4.2 10E3/UL (ref 1.6–8.3)
NEUTROPHILS NFR BLD AUTO: 69 %
NRBC # BLD AUTO: 0 10E3/UL
NRBC BLD AUTO-RTO: 0 /100
PLATELET # BLD AUTO: 212 10E3/UL (ref 150–450)
RBC # BLD AUTO: 5.58 10E6/UL (ref 3.8–5.2)
WBC # BLD AUTO: 6.1 10E3/UL (ref 4–11)

## 2024-03-29 PROCEDURE — 36415 COLL VENOUS BLD VENIPUNCTURE: CPT

## 2024-03-29 PROCEDURE — 83550 IRON BINDING TEST: CPT

## 2024-03-29 PROCEDURE — 85025 COMPLETE CBC W/AUTO DIFF WBC: CPT

## 2024-03-29 PROCEDURE — 83540 ASSAY OF IRON: CPT

## 2024-03-29 PROCEDURE — 82728 ASSAY OF FERRITIN: CPT

## 2024-04-02 ENCOUNTER — PATIENT OUTREACH (OUTPATIENT)
Dept: ONCOLOGY | Facility: HOSPITAL | Age: 79
End: 2024-04-02
Payer: MEDICARE

## 2024-04-02 NOTE — PROGRESS NOTES
[facility]: Cancer Care Follow-Up Note                                    Discussion with Patient:                                                        Called Oscar to follow up on her lab results from 3/29/24, after they were reviewed by Erin Barajas NP. Shared that  Her hgb is great, result is 15.1  Ferritin and iron levels improving, still lower end of normal range. Instructed that she should continue oral iron every other day for now. Will re-evaluate length to continue on this based on her labs in May. She relayed that she is not taking iron every other day as she has had GI issues and her bottle  was ferrous gluconate and bottle reads totake 4 tablets per day. Clarified that she should take OTC 1-tab ferrous sulfate 325/65 every other day, to be taken with vitamin C or orange juice for improved cell transport.    Educated on potential s/e of gastric discomfort and constipation.  Instructed to take stool softener daily if constipated.  Educated on iron rich diet, she already has list of iron rich foods.   She should plan to keep her future apt on 5/17 and encouraged her to have her labs drawn a couple days prior to they would be resulted at the time of her apt. She was transferred to scheduling to arrange.          Dates of Treatment:                                                      Infusion given in last 28 days       None            Assessment:                                                      Hgb is within normal limits. Ferritin and iron levels improving, still lower end of normal range        Intervention/Education provided during outreach:                                                       Patient stated understanding of her results and plan. She will keep her future schedule apts.        Signature:  Kalee Wilson RN

## 2024-04-07 ENCOUNTER — HOSPITAL ENCOUNTER (EMERGENCY)
Facility: CLINIC | Age: 79
Discharge: HOME | End: 2024-04-07
Attending: EMERGENCY MEDICINE | Admitting: EMERGENCY MEDICINE
Payer: MEDICARE

## 2024-04-07 ENCOUNTER — APPOINTMENT (OUTPATIENT)
Dept: RADIOLOGY | Facility: CLINIC | Age: 79
End: 2024-04-07
Attending: PHYSICIAN ASSISTANT
Payer: MEDICARE

## 2024-04-07 VITALS
BODY MASS INDEX: 23.04 KG/M2 | OXYGEN SATURATION: 96 % | DIASTOLIC BLOOD PRESSURE: 97 MMHG | HEIGHT: 63 IN | TEMPERATURE: 97.2 F | SYSTOLIC BLOOD PRESSURE: 159 MMHG | WEIGHT: 130 LBS | RESPIRATION RATE: 18 BRPM | HEART RATE: 80 BPM

## 2024-04-07 DIAGNOSIS — T78.2XXA ANAPHYLAXIS, INITIAL ENCOUNTER: ICD-10-CM

## 2024-04-07 LAB
ALBUMIN SERPL BCG-MCNC: 4.1 G/DL (ref 3.5–5.2)
ALP SERPL-CCNC: 112 U/L (ref 40–150)
ALT SERPL W P-5'-P-CCNC: 14 U/L (ref 0–50)
ANION GAP SERPL CALCULATED.3IONS-SCNC: 9 MMOL/L (ref 7–15)
AST SERPL W P-5'-P-CCNC: 23 U/L (ref 0–45)
ATRIAL RATE - MUSE: 86 BPM
BILIRUB DIRECT SERPL-MCNC: <0.2 MG/DL (ref 0–0.3)
BILIRUB SERPL-MCNC: 0.7 MG/DL
BUN SERPL-MCNC: 19.1 MG/DL (ref 8–23)
CALCIUM SERPL-MCNC: 9.9 MG/DL (ref 8.8–10.2)
CHLORIDE SERPL-SCNC: 102 MMOL/L (ref 98–107)
CREAT SERPL-MCNC: 0.55 MG/DL (ref 0.51–0.95)
DEPRECATED HCO3 PLAS-SCNC: 25 MMOL/L (ref 22–29)
DIASTOLIC BLOOD PRESSURE - MUSE: 90 MMHG
EGFRCR SERPLBLD CKD-EPI 2021: >90 ML/MIN/1.73M2
ERYTHROCYTE [DISTWIDTH] IN BLOOD BY AUTOMATED COUNT: 21.2 % (ref 10–15)
GLUCOSE SERPL-MCNC: 138 MG/DL (ref 70–99)
HCT VFR BLD AUTO: 50.5 % (ref 35–47)
HGB BLD-MCNC: 15.5 G/DL (ref 11.7–15.7)
INTERPRETATION ECG - MUSE: NORMAL
LIPASE SERPL-CCNC: 68 U/L (ref 13–60)
MCH RBC QN AUTO: 27.5 PG (ref 26.5–33)
MCHC RBC AUTO-ENTMCNC: 30.7 G/DL (ref 31.5–36.5)
MCV RBC AUTO: 90 FL (ref 78–100)
P AXIS - MUSE: 48 DEGREES
PLATELET # BLD AUTO: 228 10E3/UL (ref 150–450)
POTASSIUM SERPL-SCNC: 4.7 MMOL/L (ref 3.4–5.3)
PR INTERVAL - MUSE: 192 MS
PROT SERPL-MCNC: 7.2 G/DL (ref 6.4–8.3)
QRS DURATION - MUSE: 76 MS
QT - MUSE: 340 MS
QTC - MUSE: 406 MS
R AXIS - MUSE: 17 DEGREES
RBC # BLD AUTO: 5.63 10E6/UL (ref 3.8–5.2)
SODIUM SERPL-SCNC: 136 MMOL/L (ref 135–145)
SYSTOLIC BLOOD PRESSURE - MUSE: 131 MMHG
T AXIS - MUSE: 29 DEGREES
TROPONIN T SERPL HS-MCNC: 6 NG/L
TROPONIN T SERPL HS-MCNC: 9 NG/L
VENTRICULAR RATE- MUSE: 86 BPM
WBC # BLD AUTO: 9.1 10E3/UL (ref 4–11)

## 2024-04-07 PROCEDURE — 250N000011 HC RX IP 250 OP 636: Performed by: PHYSICIAN ASSISTANT

## 2024-04-07 PROCEDURE — 80053 COMPREHEN METABOLIC PANEL: CPT | Performed by: PHYSICIAN ASSISTANT

## 2024-04-07 PROCEDURE — 83690 ASSAY OF LIPASE: CPT | Performed by: PHYSICIAN ASSISTANT

## 2024-04-07 PROCEDURE — 84484 ASSAY OF TROPONIN QUANT: CPT | Performed by: PHYSICIAN ASSISTANT

## 2024-04-07 PROCEDURE — 96374 THER/PROPH/DIAG INJ IV PUSH: CPT

## 2024-04-07 PROCEDURE — 99285 EMERGENCY DEPT VISIT HI MDM: CPT | Mod: 25

## 2024-04-07 PROCEDURE — 36415 COLL VENOUS BLD VENIPUNCTURE: CPT | Performed by: PHYSICIAN ASSISTANT

## 2024-04-07 PROCEDURE — 250N000009 HC RX 250

## 2024-04-07 PROCEDURE — 96375 TX/PRO/DX INJ NEW DRUG ADDON: CPT

## 2024-04-07 PROCEDURE — 71046 X-RAY EXAM CHEST 2 VIEWS: CPT

## 2024-04-07 PROCEDURE — 93005 ELECTROCARDIOGRAM TRACING: CPT | Performed by: PHYSICIAN ASSISTANT

## 2024-04-07 PROCEDURE — 85027 COMPLETE CBC AUTOMATED: CPT | Performed by: PHYSICIAN ASSISTANT

## 2024-04-07 RX ORDER — PREDNISONE 10 MG/1
40 TABLET ORAL DAILY
Qty: 12 TABLET | Refills: 0 | Status: SHIPPED | OUTPATIENT
Start: 2024-04-07 | End: 2024-04-10

## 2024-04-07 RX ORDER — FAMOTIDINE 20 MG/1
40 TABLET, FILM COATED ORAL DAILY
Qty: 6 TABLET | Refills: 0 | Status: SHIPPED | OUTPATIENT
Start: 2024-04-07 | End: 2024-04-10

## 2024-04-07 RX ORDER — LORATADINE 10 MG/1
10 TABLET ORAL DAILY
Qty: 3 TABLET | Refills: 0 | Status: SHIPPED | OUTPATIENT
Start: 2024-04-07 | End: 2024-04-10

## 2024-04-07 RX ORDER — DIPHENHYDRAMINE HYDROCHLORIDE 50 MG/ML
25 INJECTION INTRAMUSCULAR; INTRAVENOUS ONCE
Status: COMPLETED | OUTPATIENT
Start: 2024-04-07 | End: 2024-04-07

## 2024-04-07 RX ORDER — MULTIVIT WITH MINERALS/LUTEIN
250 TABLET ORAL
COMMUNITY

## 2024-04-07 RX ORDER — EPINEPHRINE 0.3 MG/.3ML
0.3 INJECTION SUBCUTANEOUS PRN
Qty: 2 EACH | Refills: 0 | Status: SHIPPED | OUTPATIENT
Start: 2024-04-07

## 2024-04-07 RX ORDER — AMLODIPINE BESYLATE 5 MG/1
5 TABLET ORAL DAILY
COMMUNITY
End: 2024-09-05

## 2024-04-07 RX ORDER — METHYLPREDNISOLONE SODIUM SUCCINATE 125 MG/2ML
125 INJECTION, POWDER, LYOPHILIZED, FOR SOLUTION INTRAMUSCULAR; INTRAVENOUS ONCE
Status: COMPLETED | OUTPATIENT
Start: 2024-04-07 | End: 2024-04-07

## 2024-04-07 RX ORDER — FERROUS SULFATE 325(65) MG
325 TABLET, DELAYED RELEASE (ENTERIC COATED) ORAL EVERY OTHER DAY
COMMUNITY

## 2024-04-07 RX ADMIN — DIPHENHYDRAMINE HYDROCHLORIDE 25 MG: 50 INJECTION INTRAMUSCULAR; INTRAVENOUS at 13:27

## 2024-04-07 RX ADMIN — METHYLPREDNISOLONE SODIUM SUCCINATE 125 MG: 125 INJECTION, POWDER, FOR SOLUTION INTRAMUSCULAR; INTRAVENOUS at 13:27

## 2024-04-07 RX ADMIN — FAMOTIDINE 20 MG: 10 INJECTION, SOLUTION INTRAVENOUS at 13:27

## 2024-04-07 RX ADMIN — EPINEPHRINE 0.3 MG: 1 INJECTION INTRAMUSCULAR; INTRAVENOUS; SUBCUTANEOUS at 13:26

## 2024-04-07 ASSESSMENT — ACTIVITIES OF DAILY LIVING (ADL)
ADLS_ACUITY_SCORE: 37

## 2024-04-07 ASSESSMENT — COLUMBIA-SUICIDE SEVERITY RATING SCALE - C-SSRS
2. HAVE YOU ACTUALLY HAD ANY THOUGHTS OF KILLING YOURSELF IN THE PAST MONTH?: NO
1. IN THE PAST MONTH, HAVE YOU WISHED YOU WERE DEAD OR WISHED YOU COULD GO TO SLEEP AND NOT WAKE UP?: NO
6. HAVE YOU EVER DONE ANYTHING, STARTED TO DO ANYTHING, OR PREPARED TO DO ANYTHING TO END YOUR LIFE?: NO

## 2024-04-07 NOTE — ED PROVIDER NOTES
"Emergency Department Midlevel Supervisory Note     I had a face to face encounter with this patient seen by the Advanced Practice Provider (GHAZALA). I personally made/approved the management plan and take responsibility for the patient management. I personally saw patient and performed a substantive portion of the visit including all aspects of the medical decision making.     ED Course:  1:23 PM Masha Carty PA-C staffed patient with me. I agree with their assessment and plan of management, and I will see the patient.  1:25 PM I met with the patient to introduce myself, gather additional history, perform my initial exam, and discuss the plan.   3:20 PM Labs all reassuring.  Plan for eventual discharge after period of obs in ED following epinephrine administration, delta trop.      Brief HPI:     Oscar Zhao is a 79 year old female who presents for evaluation of pruritic hives and sensation of thickened tongue after eating kiwi.  Symptoms started 5 minutes after eating kiwi. Reports she had this before, but it had been years.  Pt was having some epigastric discomfort as well.  No vomiting, syncope.    I, Noreen Toribio, am serving as a scribe to document services personally performed by Star Gavin DO, based on my observations and the provider's statements to me.   I, Star Gavin DO attest that Noreen Toribio was acting in a scribe capacity, has observed my performance of the services and has documented them in accordance with my direction.    Brief Physical Exam: BP (!) 131/90   Pulse 87   Temp 98.3  F (36.8  C) (Oral)   Resp (!) 37   Ht 1.6 m (5' 3\")   Wt 59 kg (130 lb)   LMP  (LMP Unknown)   SpO2 98%   BMI 23.03 kg/m    Constitutional:  Alert, in no acute distress  EYES: Conjunctivae clear  HENT:  Atraumatic  Respiratory:  Respirations even, unlabored, in no acute respiratory distress  Cardiovascular:  Regular rate and rhythm, good peripheral perfusion  GI: Soft, non-distended, non-tender  Musculoskeletal:  " Moves all 4 extremities equally, grossly symmetrical strength  Integument: Warm & dry. No appreciable rash, erythema.  Neurologic:  Alert & oriented, speech clear and fluent, no focal deficits noted  Psych: Normal mood and affect       MDM:  Pt seen in conjunction with GHAZALA Masha Carty, here for possible allergic reaction to Kiwi, which she's had before. Feel itchy, tongue thickness, although no appreciable swelling. Pt also having nausea, epigastric discomfort and recent medical complications, so I agree with labs, treatment for anaphylaxis with tongue symptoms using IM epi, solumedrol, antihistamines.         1. Anaphylaxis, initial encounter        Labs and Imaging:  Results for orders placed or performed during the hospital encounter of 04/07/24   Chest XR,  PA & LAT    Impression    IMPRESSION:     Large hiatal hernia.    Mild left lower lobe atelectasis adjacent to the hiatal hernia. Lungs are otherwise clear. No pleural effusions or pneumothorax. Normal pulmonary vascularity. Nonenlarged cardiac silhouette.   CBC (+ platelets, no diff)   Result Value Ref Range    WBC Count 9.1 4.0 - 11.0 10e3/uL    RBC Count 5.63 (H) 3.80 - 5.20 10e6/uL    Hemoglobin 15.5 11.7 - 15.7 g/dL    Hematocrit 50.5 (H) 35.0 - 47.0 %    MCV 90 78 - 100 fL    MCH 27.5 26.5 - 33.0 pg    MCHC 30.7 (L) 31.5 - 36.5 g/dL    RDW 21.2 (H) 10.0 - 15.0 %    Platelet Count 228 150 - 450 10e3/uL   Basic metabolic panel   Result Value Ref Range    Sodium 136 135 - 145 mmol/L    Potassium 4.7 3.4 - 5.3 mmol/L    Chloride 102 98 - 107 mmol/L    Carbon Dioxide (CO2) 25 22 - 29 mmol/L    Anion Gap 9 7 - 15 mmol/L    Urea Nitrogen 19.1 8.0 - 23.0 mg/dL    Creatinine 0.55 0.51 - 0.95 mg/dL    GFR Estimate >90 >60 mL/min/1.73m2    Calcium 9.9 8.8 - 10.2 mg/dL    Glucose 138 (H) 70 - 99 mg/dL   Hepatic function panel   Result Value Ref Range    Protein Total 7.2 6.4 - 8.3 g/dL    Albumin 4.1 3.5 - 5.2 g/dL    Bilirubin Total 0.7 <=1.2 mg/dL    Alkaline  Phosphatase 112 40 - 150 U/L    AST 23 0 - 45 U/L    ALT 14 0 - 50 U/L    Bilirubin Direct <0.20 0.00 - 0.30 mg/dL   Result Value Ref Range    Lipase 68 (H) 13 - 60 U/L   Troponin T, High Sensitivity (now)   Result Value Ref Range    Troponin T, High Sensitivity 9 <=14 ng/L   ECG 12-LEAD WITH MUSE (LHE)   Result Value Ref Range    Systolic Blood Pressure 131 mmHg    Diastolic Blood Pressure 90 mmHg    Ventricular Rate 86 BPM    Atrial Rate 86 BPM    VT Interval 192 ms    QRS Duration 76 ms     ms    QTc 406 ms    P Axis 48 degrees    R AXIS 17 degrees    T Axis 29 degrees    Interpretation ECG       Sinus rhythm  Nonspecific ST abnormality  Abnormal ECG  When compared with ECG of 08-FEB-2024 16:31,  No significant change was found  Confirmed by SEE ED PROVIDER NOTE FOR, ECG INTERPRETATION ([id number]),  RICKI WAITE ([id number]) on 4/7/2024 1:30:42 PM         I have reviewed the relevant laboratory studies above.    I independently interpreted the following imaging study(s):       EKG: I reviewed and independently interpreted the patient's EKG, with comments made as listed below. Please see scanned EKG for full report.   NSR, rate 86, normal intervals, no acute ischemia    CRITICAL CARE:  35 minutes. Time spent managing anaphylaxis with need for IV meds, epinephrine. Time spent interpreting vitals, labs, documenting, speaking with pt.  Independent of any procedures performed.    Procedures:  I was present for the key portions of procedures documented in GHAZALA/midlevel note, see midlevel note for further details.    Star Gavin DO  [facility] EMERGENCY ROOM  [address] [place] [id number] [phone]     Star Gavin MD  04/07/24 [id number]

## 2024-04-07 NOTE — ED NOTES
Patient Discharged to home with her friend providing transportation.Discharge instructions reviewed and signed by the patient. Patient's friend picked up the Epipen and brought it to the bedside. Patient able to return and demonstrate proper technique for Epipen administration.  All personal belongings removed from the room.  Gopi Jarrell RN  4/7/2024  5:48 PM

## 2024-04-07 NOTE — DISCHARGE INSTRUCTIONS
As we discussed, your symptoms today were due to a severe allergic reaction that we call anaphylaxis.  I have sent a prescription for an EpiPen to your pharmacy, and advised that you fill this and carry it with you to administer yourself if you were to have similar reaction in the future.  I will also place you on 3 days of a steroid and antihistamines to prevent return of symptoms.  Do not come into contact with kiwi as you are allergic.  If at any point you were to develop return of tongue swelling, lip swelling, facial swelling, difficulty breathing or swallowing, administer your EpiPen for further evaluation.    Your blood pressure was elevated in the emergencydepartment today and requires recheck and close follow-up in your primary care clinic. Untreated blood pressure can cause serious complications including, but not limited to stroke, heart attack/failure, and kidney disease.  Please make a close follow-up appointment to have this recheck performed. Please return to the emergency department immediately if you develop a severe headache, vision changes, chest pain, shortness of breath, orabdominal pain.

## 2024-04-07 NOTE — ED TRIAGE NOTES
Patient presents to ED with itchy hands, ears and armpits, having some hives to abdomen, mouth feels numb, had some emesis, had eaten kiwi at 1000 this morning, no prior reaction to kiwi.  Maki Tinsley RN.......4/7/2024 1:26 PM     Triage Assessment (Adult)       Row Name 04/07/24 1314          Triage Assessment    Airway WDL X  numbness to tongue        Respiratory WDL    Respiratory WDL WDL        Skin Circulation/Temperature WDL    Skin Circulation/Temperature WDL WDL        Cardiac WDL    Cardiac WDL WDL        Peripheral/Neurovascular WDL    Peripheral Neurovascular WDL WDL        Cognitive/Neuro/Behavioral WDL    Cognitive/Neuro/Behavioral WDL WDL

## 2024-04-07 NOTE — ED PROVIDER NOTES
Emergency Department Encounter   NAME: Oscar Zhao ; AGE: 79 year old female ; YOB: 1945 ; MRN: [id number] ; PCP: Jak Jones   ED PROVIDER: Rosemary Carty PA-C    Evaluation Date & Time:   4/7/2024  1:05 PM    CHIEF COMPLAINT:  Allergic Reaction      Impression and Plan   MDM:   Oscar Zhao is a 79 year old female with a pertinent history of anemia, arthritis, HLA B27 positive, hypertension, osteoporosis, peripheral neuropathy, who presents to the ED by private vehicle for evaluation of concerns for allergic reaction.  The patient presents to the emergency department for evaluation of rash, pruritus, tongue fullness sensation, epigastric burning and an episode of vomiting that occurred 15 minutes after eating to eat kiwi.  Upon arrival to the ER, she is in no acute distress, speaking with normal speech.  Hemodynamically stable.  She does have an urticarial rash, and is reporting that her tongue feels full, given her associated GI symptoms after food exposure, symptoms are consistent with an allergic reaction and given her tongue fullness sensation, concerns for possible early anaphylaxis though no overt evidence of angioedema.  Given this we discussed administering epinephrine and patient is comfortable with this plan.  She does have a significant history, and will obtain EKG and laboratory workup to further assess as well.  She already took 10 mg of hydroxyzine prior to arrival in the emergency department, 25 mg of IV Benadryl, Solu-Medrol, Pepcid and IM epinephrine ordered.  Plan to assess on cardiac monitor following epinephrine administration.    EKG showed normal sinus rhythm.  T wave inversion in V1 though no concerning ST or T wave abnormalities. Troponin 9, and 6 wnl.  Low suspicion for ACS.  No pleuritic chest pain, hypoxia or tachycardia, and she is chronically anticoagulated on Eliquis.  No concern for acute PE.  No leukocytosis, fever, or infectious symptoms.  Very mild  elevation in lipase to 68 nonspecific.  No tenderness on repeat abdominal exam.  Nothing to suggest acute cholecystitis, choledocholithiasis, gallstone pancreatitis.  Chest x-ray reassuring.  Patient had complete resolution of her symptoms while in the emergency department and was monitored for 4 hours with no rebound symptoms.  At this time, she is clinically safe to return home with monitoring, and 3 days of preventative medications of prednisone, Pepcid, and Claritin which we discussed.  I also prescribed her an EpiPen, and we will have our ED pharmacist educate her on use.  We reviewed the importance of staying away from kiwi, following up in her clinic for allergy testing if desired, carrying her EpiPen on her, concerning signs and symptoms to administer this at home and to return to the ER.  She verbalized understanding is comfortable with the plan.  Will for blood pressure rechecked in clinic.  Discharged in good condition.        Medical Decision Making  Obtained supplemental history:Supplemental history obtained?: No  Reviewed external records: External records reviewed?: Documented in chart and Outpatient Record: Admission on 2/8/2024  Care impacted by chronic illness:Anticoagulated State and Other: anemia, HTN  Care significantly affected by social determinants of health:Access to Medical Care  Did you consider but not order tests?: Work up considered but not performed and documented in chart, if applicable  Did you interpret images independently?: Independent interpretation of ECG and images noted in documentation, when applicable.  Consultation discussion with other provider:Did you involve another provider (consultant, MH, pharmacy, etc.)?: No  Discharge. I prescribed additional prescription strength medication(s) as charted. See documentation for any additional details.    ED COURSE:  1:08 PM I met and introduced myself to the patient. I gathered initial history and performed my physical exam. We  discussed plan for initial workup.   1:23 PM I have staffed the patient with Dr. Gavin, ED MD, who has evaluated the patient and agrees with all aspects of today's care.   3:15 PM Rechecked the patient and discussed results and plan for further monitoring.   4:55 PM I rechecked the patient and discussed results, discharge, follow up, and reasons to return to the ED.     At the conclusion of the encounter I discussed the results of all the tests and the disposition. The questions were answered. The patient or family acknowledged understanding and was agreeable with the care plan.    FINAL IMPRESSION:    ICD-10-CM    1. Anaphylaxis, initial encounter  T78.2XXA             MEDICATIONS GIVEN IN THE EMERGENCY DEPARTMENT:  Medications   diphenhydrAMINE (BENADRYL) injection 25 mg (25 mg Intravenous $Given 4/7/24 1327)   methylPREDNISolone sodium succinate (solu-MEDROL) injection 125 mg (125 mg Intravenous $Given 4/7/24 1327)   famotidine (PEPCID) injection 20 mg (20 mg Intravenous $Given 4/7/24 1327)   EPINEPHrine (ADRENALIN) kit 0.3 mg (0.3 mg Intramuscular $Given 4/7/24 1326)         NEW PRESCRIPTIONS STARTED AT TODAY'S ED VISIT:  New Prescriptions    EPINEPHRINE (ANY BX GENERIC EQUIV) 0.3 MG/0.3ML INJECTION 2-PACK    Inject 0.3 mLs (0.3 mg) into the muscle as needed for anaphylaxis May repeat one time in 5-15 minutes if response to initial dose is inadequate.    FAMOTIDINE (PEPCID) 20 MG TABLET    Take 2 tablets (40 mg) by mouth daily for 3 days    LORATADINE (CLARITIN) 10 MG TABLET    Take 1 tablet (10 mg) by mouth daily for 3 days    PREDNISONE (DELTASONE) 10 MG TABLET    Take 4 tablets (40 mg) by mouth daily for 3 days         HPI   Use of Intrepreter: N/A     Oscar Zhao is a 79 year old female with a pertinent history of anemia, arthritis, HLA B27 positive, hypertension, osteoporosis, peripheral neuropathy, who presents to the ED by private vehicle for evaluation of concerns for allergic reaction.     Per  patient, she states that she ate a kiwi around 10 AM this morning.  She has not had this fruit in many years, and within 15 minutes she started having full body itching, or rash that she felt like look like hives along her belly, chest, and in her armpits as well as at the back of her neck.  She is having tingling to her face, and feels that her tongue is somewhat thicker and it is slightly more difficult to speak.  She had a burning pain in her epigastrium as well as nausea and had an episode of vomiting prior to arrival in the ER.  No wheezing, no difficulty swallowing.  No recent fevers, chills, or infectious symptoms.  Has never had a similar severe allergic reaction.  She took a 10 mg hydroxyzine prior to arrival in the emergency department and is noticing some improvement in the rash.    REVIEW OF SYSTEMS:  Pertinent positive and negative symptoms per HPI.       Medical History     Past Medical History:   Diagnosis Date    Anemia, unspecified type 2024    Arthritis     Basal cell carcinoma of anterior chest     Breast cyst     History of anesthesia complications     HLA B27 (HLA B27 positive)     Hypertension     Osteoporosis 2011    Peripheral neuropathy 2018    PONV (postoperative nausea and vomiting)     Scleritis, bilateral     Squamous cell carcinoma of skin of chest     Steroid induced glaucoma, both eyes        Past Surgical History:   Procedure Laterality Date    ARTHROPLASTY REVISION HIP Right 2023    Procedure: RIGHT HIP REVISION TOTAL HIP ARTHROPLASTY;  Surgeon: Bill Bernal DO;  Location: [facility] Main OR    ARTHROPLASTY REVISION HIP Right 2023    Procedure: RIGHT REVISION TOTAL HIP ARTHROPLASTY HEAD LINER EXCHANGE;  Surgeon: Bill Bernal DO;  Location: [facility] Main OR    BREAST CYST EXCISION Right     benign     SECTION      CLOSED REDUCTION HIP Right 2019    Procedure: CLOSED REDUCTION, HIP;  Surgeon: Jak Ruiz DO;  Location:  [facility] OR;  Service: Orthopedics    CLOSED REDUCTION HIP Right 8/30/2023    Procedure: CLOSED REDUCTION, HIP RIGHT;  Surgeon: Aditya Pedroza MD;  Location: [facility] OR    CLOSED REDUCTION HIP Right 11/4/2023    Procedure: CLOSED REDUCTION, HIP RIGHT;  Surgeon: Jak Allen MD;  Location: [facility]    COLONOSCOPY  09/07/2011    COLONOSCOPY  09/27/2005    COLONOSCOPY N/A 2/12/2024    Procedure: COLONOSCOPY;  Surgeon: Chi Figueroa MD;  Location: [facility]    COLONOSCOPY W/ BIOPSIES AND POLYPECTOMY  02/18/2021    16 to 20 mm polyp:tubular adenoma in the ascending colon.  Ulcerated mass in the anal canal: Moderately differentiated squamous cell cancer.    ESOPHAGOSCOPY, GASTROSCOPY, DUODENOSCOPY (EGD), COMBINED  05/05/2017    Large hiatal hernia.  Reactive gastropathy with mucosal erosion.  H. pylori negative.    ESOPHAGOSCOPY, GASTROSCOPY, DUODENOSCOPY (EGD), COMBINED N/A 2/12/2024    Procedure: ESOPHAGOGASTRODUODENOSCOPY with biopsies;  Surgeon: Chi Figueroa MD;  Location: [facility]    HERNIORRHAPHY FEMORAL Left 9/7/2021    Procedure: LEFT FEMORAL HERNIA REPAIR;  Surgeon: Sidney Murray MD;  Location: [facility]    HYSTERECTOMY TOTAL ABDOMINAL, BILATERAL SALPINGO-OOPHORECTOMY, COMBINED  1996    IR CHEST PORT PLACEMENT > 5 YRS OF AGE  3/15/2021    IR PORT PLACEMENT >5 YEARS  03/15/2021    Right IJ port-a-cath.    IR PORT REMOVAL RIGHT  7/14/2021    LAPAROSCOPIC APPENDECTOMY  04/28/2011    PHACOEMULSIFICATION CLEAR CORNEA WITH STANDARD INTRAOCULAR LENS IMPLANT Right 08/30/2017    PHACOEMULSIFICATION CLEAR CORNEA WITH STANDARD INTRAOCULAR LENS IMPLANT Left 09/13/2017    TOTAL HIP ARTHROPLASTY Right 08/18/2015    Procedure: HIP TOTAL ARTHROPLASTY, RIGHT;  Surgeon: Star Du MD;  Location: [facility];  Service:     [facility] TOTAL KNEE ARTHROPLASTY Left 03/02/2017    Procedure: LEFT TOTAL KNEE ARTHROPLASTY;  Surgeon: Star Du MD;   Location: [facility] OR;  Service: Orthopedics       Family History   Problem Relation Age of Onset    Alzheimer Disease Mother     Heart Disease Father     Pancreatic Cancer Brother 72.00    No Known Problems Daughter     Anesthesia Reaction No family hx of        Social History     Tobacco Use    Smoking status: Never     Passive exposure: Never    Smokeless tobacco: Never   Vaping Use    Vaping Use: Never used   Substance Use Topics    Alcohol use: Yes     Alcohol/week: 4.0 standard drinks of alcohol     Types: 4 Standard drinks or equivalent per week     Comment: 3-4/wk    Drug use: No       amLODIPine (NORVASC) 5 MG tablet  EPINEPHrine (ANY BX GENERIC EQUIV) 0.3 MG/0.3ML injection 2-pack  famotidine (PEPCID) 20 MG tablet  ferrous sulfate (FE TABS) 325 (65 Fe) MG EC tablet  hydrOXYzine HCl (ATARAX) 10 MG tablet  loratadine (CLARITIN) 10 MG tablet  LOTEMAX 0.5 % ophthalmic suspension  metoprolol succinate ER (TOPROL XL) 25 MG 24 hr tablet  pantoprazole (PROTONIX) 40 MG EC tablet  predniSONE (DELTASONE) 10 MG tablet  rivaroxaban ANTICOAGULANT (XARELTO) 20 MG TABS tablet  timolol maleate (TIMOPTIC) 0.5 % ophthalmic solution  vitamin C (ASCORBIC ACID) 250 MG tablet          Physical Exam     First Vitals:  Patient Vitals for the past 24 hrs:   BP Temp Temp src Pulse Resp SpO2 Height Weight   04/07/24 1645 (!) 152/90 -- -- 84 -- 100 % -- --   04/07/24 1630 (!) 161/84 -- -- -- -- -- -- --   04/07/24 1600 (!) 152/95 -- -- 78 16 99 % -- --   04/07/24 1545 -- -- -- 79 23 97 % -- --   04/07/24 1530 126/85 -- -- 82 18 97 % -- --   04/07/24 1515 128/80 -- -- 80 19 94 % -- --   04/07/24 1500 123/76 -- -- 81 19 95 % -- --   04/07/24 1445 125/80 -- -- 79 10 97 % -- --   04/07/24 1430 -- -- -- 82 29 97 % -- --   04/07/24 1415 (!) 150/94 -- -- 78 (!) 37 98 % -- --   04/07/24 1400 -- -- -- 67 18 100 % -- --   04/07/24 1330 (!) 131/90 -- -- 87 (!) 37 98 % -- --   04/07/24 1318 (!) 166/93 98.3  F (36.8  C) Oral 90 16 99 %  "1.6 m (5' 3\") 59 kg (130 lb)         PHYSICAL EXAM:   Physical Exam  Vitals reviewed.   Constitutional:       General: She is not in acute distress.     Appearance: She is not toxic-appearing.   HENT:      Mouth/Throat:      Comments: Posterior oropharynx is clear without edema.  No appreciable swelling of the tongue, lips or face.  Eyes:      Conjunctiva/sclera: Conjunctivae normal.      Pupils: Pupils are equal, round, and reactive to light.   Neck:      Comments: No stridor.  Cardiovascular:      Rate and Rhythm: Normal rate and regular rhythm.      Heart sounds: Normal heart sounds.   Pulmonary:      Effort: Pulmonary effort is normal.      Breath sounds: Normal breath sounds. No wheezing.   Abdominal:      General: Abdomen is flat. There is no distension.      Palpations: Abdomen is soft.      Tenderness: There is no abdominal tenderness. There is no guarding or rebound.   Musculoskeletal:      Cervical back: Normal range of motion and neck supple.   Skin:     General: Skin is warm and dry.      Comments: Urticarial rash heavily prominent in her bilateral axilla, though scattered lesions to her upper abdomen and upper back as well.   Neurological:      Mental Status: She is alert. Mental status is at baseline.             Results     LAB:  All pertinent labs reviewed and interpreted  Labs Ordered and Resulted from Time of ED Arrival to Time of ED Departure   CBC WITH PLATELETS - Abnormal       Result Value    WBC Count 9.1      RBC Count 5.63 (*)     Hemoglobin 15.5      Hematocrit 50.5 (*)     MCV 90      MCH 27.5      MCHC 30.7 (*)     RDW 21.2 (*)     Platelet Count 228     BASIC METABOLIC PANEL - Abnormal    Sodium 136      Potassium 4.7      Chloride 102      Carbon Dioxide (CO2) 25      Anion Gap 9      Urea Nitrogen 19.1      Creatinine 0.55      GFR Estimate >90      Calcium 9.9      Glucose 138 (*)    LIPASE - Abnormal    Lipase 68 (*)    HEPATIC FUNCTION PANEL - Normal    Protein Total 7.2      " Albumin 4.1      Bilirubin Total 0.7      Alkaline Phosphatase 112      AST 23      ALT 14      Bilirubin Direct <0.20     TROPONIN T, HIGH SENSITIVITY - Normal    Troponin T, High Sensitivity 9     TROPONIN T, HIGH SENSITIVITY - Normal    Troponin T, High Sensitivity 6         RADIOLOGY:  Chest XR,  PA & LAT   Final Result   IMPRESSION:       Large hiatal hernia.      Mild left lower lobe atelectasis adjacent to the hiatal hernia. Lungs are otherwise clear. No pleural effusions or pneumothorax. Normal pulmonary vascularity. Nonenlarged cardiac silhouette.          ECG:    Performed at: 13:27    Impression: Sinus rhythm. Nonspecific ST abnormality. Abnormal ECG.     Rate: 86  Rhythm: Sinus  Axis: 17  WY Interval: 192  QRS Interval: 76  QTc Interval: 406  Comparison: When compared with ECG of 08-FEB-2024 No significant change was found.     EKG results reviewed and interpreted by Dr. Romaine ED MD.     Rosemary Carty PA-C   Emergency Medicine   [facility] EMERGENCY ROOM       Rosemary Carty PA-C  04/07/24 [id number]

## 2024-04-07 NOTE — PHARMACY-ADMISSION MEDICATION HISTORY
Pharmacy Intern Admission Medication History    Admission medication history is complete. The information provided in this note is only as accurate as the sources available at the time of the update.    Medication reconciliation/reorder completed by provider prior to medication history? No    Information Source(s): Patient and CareEverywhere/SureScripts via in-person    Changes made to PTA medication list:  Added: ferrous sulfate, vitamin C, and amlodipine   Deleted: none   Changed: hydroxyzine to 10mg Q6H prn     Allergies reviewed with patient and updates made in EHR: yes    Medication available for use during hospital stay: None     Medication History Completed By: Ja Ventura 4/7/2024 2:12 PM    PTA Med List   Medication Sig Last Dose    amLODIPine (NORVASC) 5 MG tablet Take 5 mg by mouth daily 4/7/2024 at am    ferrous sulfate (FE TABS) 325 (65 Fe) MG EC tablet Take 325 mg by mouth every other day 4/6/2024    hydrOXYzine HCl (ATARAX) 10 MG tablet Take 10 mg by mouth every 6 hours as needed for itching or anxiety 4/7/2024 at 1100 x1    LOTEMAX 0.5 % ophthalmic suspension Place 1 drop into both eyes every 48 hours 4/6/2024    metoprolol succinate ER (TOPROL XL) 25 MG 24 hr tablet Take 1 tablet (25 mg) by mouth daily 4/7/2024 at am    pantoprazole (PROTONIX) 40 MG EC tablet Take 1 tablet (40 mg) by mouth 2 times daily 4/7/2024 at am x1    rivaroxaban ANTICOAGULANT (XARELTO) 20 MG TABS tablet Take 1 tablet (20 mg) by mouth daily (with dinner) 4/6/2024 at pm    timolol maleate (TIMOPTIC) 0.5 % ophthalmic solution Place 1 drop into both eyes daily 4/7/2024 at am    vitamin C (ASCORBIC ACID) 250 MG tablet Take 250 mg by mouth every 48 hours 4/6/2024

## 2024-04-09 ENCOUNTER — PATIENT OUTREACH (OUTPATIENT)
Dept: CARE COORDINATION | Facility: CLINIC | Age: 79
End: 2024-04-09
Payer: MEDICARE

## 2024-04-09 NOTE — PROGRESS NOTES
Clinic Care Coordination Contact  Community Health Worker Initial Outreach    CHW Initial Information Gathering:  Referral Source: ED Follow-Up  CHW Additional Questions  If ED/Hospital discharge, follow-up appointment scheduled as recommended?: No  Patient agreeable to assistance with scheduling?: No  Patient declined (specify): Patient declined to schedule ED follow up with PCP at this time. She does want to ask for an allergist referral but would prefer to send Veed[employer] message to primary care team for this.  MyChart active?: Yes    Patient accepts CC: No, patient declined at this time. Patient will be sent Care Coordination introduction letter for future reference.       Francine Pereyra  Community Health Worker  Connected Care Resource Center, [facility]    *Connected Care Resource Team does NOT follow patient ongoing. Referrals are identified based on internal discharge reports and the outreach is to ensure patient has an understanding of their discharge instructions.

## 2024-04-09 NOTE — LETTER
Oscar Zhao  [address] [address]    Dear Oscar Zhao,      I am a team member within the Connected Care Resource Center with [facility]. I recently contacted you to ensure you are doing well following a visit within our health system. I also wanted to take this chance to introduce Clinic Care Coordination should you have any interest in this program in the future.    Below is a description of Clinic Care Coordination and how this team can further assist you:       The Clinic Care Coordination team is made up of a Registered Nurse, , Financial Resource Worker, and a Community Health Worker who understand and can help navigate the health care system. The goal of clinic care coordination is to help you manage your health, improve access to care, and achieve optimal health outcomes. They work alongside your provider to assist you in determining your health and social needs, obtain health care and community resources, and provide you with necessary information and education. Clinic Care Coordination can work with you through any barriers and develop a care plan that helps coordinate and strengthen the relationship between you and your care team.    If you wish to connect with the Clinic Care Coordination Team, please let your [facility] Primary Care Provider or Clinic Care Team know and they can place a referral. The Clinic Care Coordination team will then reach out by phone to further support you.    We are focused on providing you with the highest-quality healthcare experience possible.    Sincerely,   Your care team with [facility]

## 2024-04-15 ENCOUNTER — MYC MEDICAL ADVICE (OUTPATIENT)
Dept: INTERNAL MEDICINE | Facility: CLINIC | Age: 79
End: 2024-04-15
Payer: MEDICARE

## 2024-04-16 ENCOUNTER — LAB (OUTPATIENT)
Dept: LAB | Facility: CLINIC | Age: 79
End: 2024-04-16
Payer: MEDICARE

## 2024-04-16 ENCOUNTER — OFFICE VISIT (OUTPATIENT)
Dept: ALLERGY | Facility: CLINIC | Age: 79
End: 2024-04-16
Payer: MEDICARE

## 2024-04-16 VITALS — BODY MASS INDEX: 23.21 KG/M2 | WEIGHT: 131 LBS | OXYGEN SATURATION: 99 % | HEIGHT: 63 IN | HEART RATE: 77 BPM

## 2024-04-16 DIAGNOSIS — J98.4 OTHER DISORDERS OF LUNG: ICD-10-CM

## 2024-04-16 DIAGNOSIS — T78.40XA ALLERGIC REACTION, INITIAL ENCOUNTER: ICD-10-CM

## 2024-04-16 DIAGNOSIS — T78.3XXD ANGIOEDEMA, SUBSEQUENT ENCOUNTER: ICD-10-CM

## 2024-04-16 DIAGNOSIS — T78.40XA ALLERGIC REACTION, INITIAL ENCOUNTER: Primary | ICD-10-CM

## 2024-04-16 LAB
Lab: NORMAL
PERFORMING LABORATORY: NORMAL
SPECIMEN STATUS: NORMAL
TEST NAME: NORMAL

## 2024-04-16 PROCEDURE — 99203 OFFICE O/P NEW LOW 30 MIN: CPT | Performed by: ALLERGY & IMMUNOLOGY

## 2024-04-16 PROCEDURE — 82785 ASSAY OF IGE: CPT

## 2024-04-16 PROCEDURE — 86003 ALLG SPEC IGE CRUDE XTRC EA: CPT | Mod: 59

## 2024-04-16 PROCEDURE — 99000 SPECIMEN HANDLING OFFICE-LAB: CPT

## 2024-04-16 PROCEDURE — 86003 ALLG SPEC IGE CRUDE XTRC EA: CPT | Mod: 90

## 2024-04-16 PROCEDURE — 36415 COLL VENOUS BLD VENIPUNCTURE: CPT

## 2024-04-16 NOTE — PATIENT INSTRUCTIONS
Check lemon, kiwi    Could be oral allergy syndrome    Check pollens associated with oral allergy syndrome    Metoprolol is a beta blocker, may need glucagon kit-I will contact cardiology

## 2024-04-16 NOTE — LETTER
4/16/2024         RE: Oscar Zhao  [address]  [place] [address]        Dear Colleague,    Thank you for referring your patient, Oscar Zhao, to the [facility] Please see a copy of my visit note below.          Subjective  Oscar is a 79 year old, presenting for the following health issues:  Allergy Consult (Angioedema, after kiwi. Got stomach pain, mouth and lips burning, vomited, lay down and got hives and lips getting numb.)    HPI     Chief complaint: Allergic reaction to kiwi    History of present illness: This is a pleasant 79-year-old woman I was asked to see for an allergic reaction to kiwi by Dr. Jones.  Patient states that a week or so ago she was eating breakfast.  She had a raw kiwi when she ate it she felt that her mouth was on fire.  She felt tingling and burning of her lips.  She also had some stomach pain.  She subsequently made herself some eggs [place] with lemon sauce.  She states she has had this food she started to feel sick and she felt that she was going to vomit.  She then developed a rash.  She describes it as itchy raised red bumps that occurred under her arms and spread down her chest.  She took 10 mg of hydroxyzine and went to the emergency department.  There she was given epinephrine.  Should be noted that the patient is on metoprolol 50 mg.  She was given IV steroids and antihistamines and symptoms did resolve with time.  She has never had a reaction like this before except to medications.  She states she had not had kiwi in some years prior to this.  She was tested for environmental allergies some years ago and negative at that time.  She does note a runny nose, however, at certain times a year.  No history of asthma.  Denies itchy mouth and throat with any other foods or vegetables.  Denies any over-the-counter medications or illnesses that day.  No other unusual exposures.    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  Physical Exam     Gen: Pleasant female not in acute distress  HEENT: Eyes no erythema of the bulbar or palpebral conjunctiva, no edema. Ears: No deformities or lesions. Nose: No congestion,  Mouth: Throat clear, no lip or tongue edema.   Neck: No masses lesions or swelling  Respiratory: No coughing with breathing, no retractions  Lymph: No visible supraclavicular or cervical lymphadenopathy  Skin: No rashes or lesions  Psych: Alert and appropriate for age    Impression report and plan:  1.  Allergic reaction    Check specific IgE to kiwi and the pollens associated with oral allergy syndrome.  Carry epinephrine for now.  Patient does take a beta-blocker.  For this reason she may need a glucagon kit but I will contact her cardiologist to see if there is an alternative agent to metoprolol.  Anaphylaxis action plan was reviewed and will be placed in her MyChart once testing returns.  Check specific IgE to lemon as well.  I do not think we need to check specific IgE to eggs as she eats these regularly.  Carry epinephrine device at all times.    Signed Electronically by: Kaylie MURRY MD        Again, thank you for allowing me to participate in the care of your patient.        Sincerely,        Kaylie MURRY MD  "

## 2024-04-16 NOTE — PROGRESS NOTES
Chinmay Moore is a 79 year old, presenting for the following health issues:  Allergy Consult (Angioedema, after kiwi. Got stomach pain, mouth and lips burning, vomited, lay down and got hives and lips getting numb.)    HPI     Chief complaint: Allergic reaction to kiwi    History of present illness: This is a pleasant 79-year-old woman I was asked to see for an allergic reaction to kiwi by Dr. Jones.  Patient states that a week or so ago she was eating breakfast.  She had a raw kiwi when she ate it she felt that her mouth was on fire.  She felt tingling and burning of her lips.  She also had some stomach pain.  She subsequently made herself some eggs [place] with lemon sauce.  She states she has had this food she started to feel sick and she felt that she was going to vomit.  She then developed a rash.  She describes it as itchy raised red bumps that occurred under her arms and spread down her chest.  She took 10 mg of hydroxyzine and went to the emergency department.  There she was given epinephrine.  Should be noted that the patient is on metoprolol 50 mg.  She was given IV steroids and antihistamines and symptoms did resolve with time.  She has never had a reaction like this before except to medications.  She states she had not had kiwi in some years prior to this.  She was tested for environmental allergies some years ago and negative at that time.  She does note a runny nose, however, at certain times a year.  No history of asthma.  Denies itchy mouth and throat with any other foods or vegetables.  Denies any over-the-counter medications or illnesses that day.  No other unusual exposures.    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  Physical Exam     Gen: Pleasant female not in acute distress  HEENT: Eyes no erythema of the bulbar or palpebral conjunctiva, no edema. Ears: No deformities or lesions. Nose: No congestion,  Mouth: Throat clear, no lip or tongue edema.   Neck: No masses lesions or swelling  Respiratory: No coughing with breathing, no retractions  Lymph: No visible supraclavicular or cervical lymphadenopathy  Skin: No rashes or lesions  Psych: Alert and appropriate for age    Impression report and plan:  1.  Allergic reaction    Check specific IgE to kiwi and the pollens associated with oral allergy syndrome.  Carry epinephrine for now.  Patient does take a beta-blocker.  For this reason she may need a glucagon kit but I will contact her cardiologist to see if there is an alternative agent to metoprolol.  Anaphylaxis action plan was reviewed and will be placed in her MyChart once testing returns.  Check specific IgE to lemon as well.  I do not think we need to check specific IgE to eggs as she eats these regularly.  Carry epinephrine device at all times.    Signed Electronically by: Kaylie MURRY MD    "

## 2024-04-16 NOTE — Clinical Note
I had the pleasure of seeing this patient the allergy clinic for an allergic reaction likely to kiwi.  She is carry an epinephrine device currently but she is on metoprolol.  I will prescribe her a glucagon kit but wanted to check and see if there is an alternative agent to a beta-blocker for her hypertension.  If not, I did go through with her the glucagon kit but she needs to carry an epinephrine device from now on.  Thank you, Kaylie Mohr MD

## 2024-04-17 LAB — MAYO MISC RESULT: NORMAL

## 2024-04-18 LAB
COMMON RAGWEED IGE QN: <0.1 KU(A)/L
DEPRECATED MISC ALLERGEN IGE RAST QL: NORMAL
IGE SERPL-ACNC: 59 KU/L (ref 0–114)
JOHNSON GRASS IGE QN: <0.1 KU(A)/L
KIWIFRUIT IGE QN: 1.04 KU/L
MUGWORT IGE QN: <0.1 KU(A)/L
SILVER BIRCH IGE QN: <0.1 KU(A)/L
TIMOTHY IGE QN: <0.1 KU(A)/L

## 2024-04-30 ENCOUNTER — TRANSFERRED RECORDS (OUTPATIENT)
Dept: HEALTH INFORMATION MANAGEMENT | Facility: CLINIC | Age: 79
End: 2024-04-30
Payer: MEDICARE

## 2024-05-13 ENCOUNTER — LAB (OUTPATIENT)
Dept: INFUSION THERAPY | Facility: HOSPITAL | Age: 79
End: 2024-05-13
Attending: NURSE PRACTITIONER
Payer: MEDICARE

## 2024-05-13 DIAGNOSIS — D50.0 IRON DEFICIENCY ANEMIA DUE TO CHRONIC BLOOD LOSS: ICD-10-CM

## 2024-05-13 LAB
BASOPHILS # BLD AUTO: 0 10E3/UL (ref 0–0.2)
BASOPHILS NFR BLD AUTO: 1 %
EOSINOPHIL # BLD AUTO: 0.2 10E3/UL (ref 0–0.7)
EOSINOPHIL NFR BLD AUTO: 3 %
ERYTHROCYTE [DISTWIDTH] IN BLOOD BY AUTOMATED COUNT: 16.1 % (ref 10–15)
FERRITIN SERPL-MCNC: 80 NG/ML (ref 11–328)
HCT VFR BLD AUTO: 49.2 % (ref 35–47)
HGB BLD-MCNC: 15.6 G/DL (ref 11.7–15.7)
IMM GRANULOCYTES # BLD: 0 10E3/UL
IMM GRANULOCYTES NFR BLD: 0 %
IRON BINDING CAPACITY (ROCHE): 309 UG/DL (ref 240–430)
IRON SATN MFR SERPL: 31 % (ref 15–46)
IRON SERPL-MCNC: 97 UG/DL (ref 37–145)
LYMPHOCYTES # BLD AUTO: 1 10E3/UL (ref 0.8–5.3)
LYMPHOCYTES NFR BLD AUTO: 20 %
MCH RBC QN AUTO: 30 PG (ref 26.5–33)
MCHC RBC AUTO-ENTMCNC: 31.7 G/DL (ref 31.5–36.5)
MCV RBC AUTO: 95 FL (ref 78–100)
MONOCYTES # BLD AUTO: 0.3 10E3/UL (ref 0–1.3)
MONOCYTES NFR BLD AUTO: 6 %
NEUTROPHILS # BLD AUTO: 3.7 10E3/UL (ref 1.6–8.3)
NEUTROPHILS NFR BLD AUTO: 71 %
NRBC # BLD AUTO: 0 10E3/UL
NRBC BLD AUTO-RTO: 0 /100
PLATELET # BLD AUTO: 162 10E3/UL (ref 150–450)
RBC # BLD AUTO: 5.2 10E6/UL (ref 3.8–5.2)
WBC # BLD AUTO: 5.2 10E3/UL (ref 4–11)

## 2024-05-13 PROCEDURE — 83550 IRON BINDING TEST: CPT

## 2024-05-13 PROCEDURE — 82728 ASSAY OF FERRITIN: CPT

## 2024-05-13 PROCEDURE — 85004 AUTOMATED DIFF WBC COUNT: CPT

## 2024-05-13 PROCEDURE — 36415 COLL VENOUS BLD VENIPUNCTURE: CPT

## 2024-05-16 ENCOUNTER — OFFICE VISIT (OUTPATIENT)
Dept: FAMILY MEDICINE | Facility: CLINIC | Age: 79
End: 2024-05-16
Payer: MEDICARE

## 2024-05-16 VITALS
SYSTOLIC BLOOD PRESSURE: 144 MMHG | WEIGHT: 134.1 LBS | DIASTOLIC BLOOD PRESSURE: 92 MMHG | BODY MASS INDEX: 23.76 KG/M2 | RESPIRATION RATE: 16 BRPM | HEART RATE: 66 BPM | TEMPERATURE: 98.4 F | OXYGEN SATURATION: 99 % | HEIGHT: 63 IN

## 2024-05-16 DIAGNOSIS — H92.02 OTALGIA, LEFT: Primary | ICD-10-CM

## 2024-05-16 PROCEDURE — 99213 OFFICE O/P EST LOW 20 MIN: CPT | Performed by: FAMILY MEDICINE

## 2024-05-16 NOTE — PROGRESS NOTES
"  Assessment & Plan     Otalgia, left  Patient has some tender ear cartilage which we will apply some natural lubricant to I do not see any inner ear pathology I do not see any external ear pathology no lymphadenopathy; today she is asymptomatic and I reassured her.  She is not experiencing any tinnitus or balance problems                Subjective   Oscar is a 79 year old, presenting for the following health issues:  Ear Problem (Left ear x30 days; intermittent pain; worsens at night; denies fever and drainage )      5/16/2024     9:45 AM   Additional Questions   Roomed by LETY Laurent   Accompanied by alone     HPI most pleasant 79-year-old with 1 month history of external ear discomfort at the base of the helix; precipitated by perhaps a vector which got into her external antihelix area during gardening.  She is not experiencing any loss of hearing balance problems tinnitus discharge.  Examination reveals normal TM normal canal but somewhat tender ear base which I think is some mild inflammatory external cartilage.  We can apply some warm olive oil externally to this area and patient should expect continued relief.  Today her ear is feeling fine she seems to experience this discomfort at night and then examines it.  I really enjoyed meeting her today she has had some health issues this past year.  She is dealing with a cancer that is under excellent control recently had some hip problems.  We wish her well she will contact us if further problems              Review of Systems  Constitutional, HEENT, cardiovascular, pulmonary, gi and gu systems are negative, except as otherwise noted.      Objective    BP (!) 144/92   Pulse 66   Temp 98.4  F (36.9  C) (Temporal)   Resp 16   Ht 1.6 m (5' 3\")   Wt 60.8 kg (134 lb 1.6 oz)   LMP  (LMP Unknown)   SpO2 99%   BMI 23.75 kg/m    Body mass index is 23.75 kg/m .  Physical Exam   GENERAL: alert and no distress  NECK: no adenopathy, no asymmetry, masses, or " scars  RESP: lungs clear to auscultation - no rales, rhonchi or wheezes  CV: regular rate and rhythm, normal S1 S2, no S3 or S4, no murmur, click or rub, no peripheral edema  ABDOMEN: soft, nontender, no hepatosplenomegaly, no masses and bowel sounds normal  MS: no gross musculoskeletal defects noted, no edema    TM exam left side normal no external pathology somewhat tender base and antihelix helix origin        Signed Electronically by: Santiago Montana MD

## 2024-05-17 ENCOUNTER — ONCOLOGY VISIT (OUTPATIENT)
Dept: ONCOLOGY | Facility: HOSPITAL | Age: 79
End: 2024-05-17
Attending: NURSE PRACTITIONER
Payer: MEDICARE

## 2024-05-17 VITALS
WEIGHT: 135.3 LBS | BODY MASS INDEX: 23.97 KG/M2 | OXYGEN SATURATION: 100 % | TEMPERATURE: 98.2 F | HEART RATE: 73 BPM | HEIGHT: 63 IN | SYSTOLIC BLOOD PRESSURE: 143 MMHG | DIASTOLIC BLOOD PRESSURE: 85 MMHG | RESPIRATION RATE: 16 BRPM

## 2024-05-17 DIAGNOSIS — Z79.01 ANTICOAGULATED: ICD-10-CM

## 2024-05-17 DIAGNOSIS — I82.411 ACUTE DEEP VEIN THROMBOSIS (DVT) OF FEMORAL VEIN OF RIGHT LOWER EXTREMITY (H): ICD-10-CM

## 2024-05-17 DIAGNOSIS — D50.0 IRON DEFICIENCY ANEMIA DUE TO CHRONIC BLOOD LOSS: Primary | ICD-10-CM

## 2024-05-17 DIAGNOSIS — Z85.048 HISTORY OF RECTAL OR ANAL CANCER: ICD-10-CM

## 2024-05-17 DIAGNOSIS — Z86.711 HISTORY OF PULMONARY EMBOLISM: ICD-10-CM

## 2024-05-17 DIAGNOSIS — I26.99 ACUTE PULMONARY EMBOLISM WITHOUT ACUTE COR PULMONALE, UNSPECIFIED PULMONARY EMBOLISM TYPE (H): ICD-10-CM

## 2024-05-17 PROCEDURE — G0463 HOSPITAL OUTPT CLINIC VISIT: HCPCS | Performed by: NURSE PRACTITIONER

## 2024-05-17 PROCEDURE — 99214 OFFICE O/P EST MOD 30 MIN: CPT | Performed by: NURSE PRACTITIONER

## 2024-05-17 RX ORDER — PANTOPRAZOLE SODIUM 40 MG/1
40 TABLET, DELAYED RELEASE ORAL DAILY
COMMUNITY
Start: 2024-05-17 | End: 2024-06-12

## 2024-05-17 ASSESSMENT — PAIN SCALES - GENERAL: PAINLEVEL: NO PAIN (0)

## 2024-05-17 NOTE — PROGRESS NOTES
"Oncology Rooming Note    May 17, 2024 10:33 AM   Oscar Zhao is a 79 year old female who presents for:    Chief Complaint   Patient presents with    Oncology Clinic Visit     Malignant neoplasm of anal canal (H)     Initial Vitals: BP (!) 143/85   Pulse 73   Temp 98.2  F (36.8  C)   Resp 16   Ht 1.6 m (5' 3\")   Wt 61.4 kg (135 lb 4.8 oz)   LMP  (LMP Unknown)   SpO2 100%   BMI 23.97 kg/m   Estimated body mass index is 23.97 kg/m  as calculated from the following:    Height as of this encounter: 1.6 m (5' 3\").    Weight as of this encounter: 61.4 kg (135 lb 4.8 oz). Body surface area is 1.65 meters squared.  No Pain (0) Comment: Data Unavailable   No LMP recorded (lmp unknown). Patient is postmenopausal.  Allergies reviewed: Yes  Medications reviewed: Yes    Medications: Medication refills not needed today.  Pharmacy name entered into [id number] [place] [id number] [address] AT [facility]    Frailty Screening:   Is the patient here for a new oncology consult visit in cancer care? 2. No      Clinical concerns: Has questions on a sheet for provider.      Dorothea Yu LPN             "

## 2024-05-17 NOTE — LETTER
5/17/2024         RE: Oscar Zhao  [address]  [facility] [address]        Dear Colleague,    Thank you for referring your patient, Oscar Zhao, to the [facility] [place]. Please see a copy of my visit note below.    [facility] Hematology and Oncology Outpatient Progress Note    Patient: Oscar Zhao  MRN: [id number]  Date of Service: May 17, 2024          Reason for Visit    Hx anal cancer  iron-def anemia, resolved  Recent DVT/PE 2/24 on anticoagulation       Assessment/Plan  Hx anal cancer  Now 3 yrs out from diagnosis. Completed chemoRT with complete response.   Recent CT (2/8/24), MRI (2/8/24) and colonoscopy (2/8/24) negative for recurrence.     Plan:  -Every 6 month exam and labs in Oncology  - Ordered CEA, CMP for next visit in 3 months   -Annual scans until 5 yrs out, next CT CAP and MRI rectum due 2/25  -Colonoscopy in 3 yrs (2027)  -Continue follow-up surveillance in CR[facility] with Dr. Reynolds    Iron def anemia, likely due to upper GI bleed   Large hiatal hernia, Gato ulcer   EGD 2/24 showed large hiatal hernia and Gato ulcers, probably site of bleeding while on ASA in her post-op period Fall 2023. Now, off ASA. Closely follow while on anticoagulation for provoked PE.    She has been taking iron supplementation every other day for the past 3 months. Hgb has normalized, now 15.6 (up from 10.7 3 months ago). MCV normalized. No clinical signs of bleeding. No GI symptoms.     Plan:  - Decrease pantoprazole 40 mg to once daily Follow-up with PCP/GI for ongoing mgmt  - Hold iron supplementation as iron and ferritin are WNL on labs  - Check, ferritin and iron indices in 3 months since on anticoagulation.  - Return in 3 months with Dr. Kiran HINOJOSA DVT and PE  First incidence. Found incidentally on oncology surveillance scans 2/24. Likely provoked folloiwng hip surgery x 2 in October 2023, then post-op activity limitations due to anemia. Currently on Xarelto. She had  $900 copay for the 21-day starter pack. She states she is fine with paying this amount, but will check with pharmacy on insurance coverage for Eliquis. No evident bleeding, hematuria, or hematochezia. Hgb has normalized. No lower extremity edema or respiratory symptoms.     Plan:  -Continue anticoagulation 3 more months for total of 6 mths. Since first incidence and likely provoked, can consider stopping at that time especially given her bleeding risk with GI history  -Monitor for GI bleeding closely while on it.  -Rx for Xarelto 20 mg daily needs refills. Will check with pharmacy team on whether Eliquis is better covered by her insurance.   ______________________________________________________________________________    History of Present Illness/ Interval History    Ms. Oscar Zhao is a 79 year old with history of anal cancer, treated 3 yrs ago with chemoRT (ZOYA) in surveillance and a history of provoked right DVT and PE along with iron deficiency anemia secondary to bleeding from cam lesions and hiatal hernia. She is here for 3 month follow up and notes she is feeling much better.     2/8/24 while undergoing routine surveillance imaging for her cancer, scans incidentally noted RLE DVT and PE. Asymptomatic. During eval for that in ED, she was also noted to have acute microcytic anemia consistent with iron def. EGD noted Gato ulcer related to large hiatal hernia; colonoscopy negative. Got blood transfusion and IV iron as inpatient. Started heparin gtt initially with no recurrent bleeding, so transitioned to DOAC (Xarelto) before dismissal.     She is tolerating Xarelto well, denies prolonged bleeding, hematuria, or hematochezia. She denies black stools. No SOB or leg swelling. She has a high copay for Xarelto but notes she was too overwhelmed to call her insurance and investigate other option and is okay with paying this. She denies fevers, chills, new pain, or bowel or bladder changes.       ECOG  Performance    0      Oncology History/Treatment  Diagnosis/Stage:   2/2021: Stage IIA (cT2-cN0-cM0) Malignant neoplasm of anal canal   -presented with 1-year history of blood on toilet paper when wiping. Became anemic.  -2/18/21 colonoscopy: Ulcerated anal canal lesion - path c/w moderately differentiated squamous cell carcinoma. 1.5-2 cm polyp of the ascending colon removed - pathology c/w tubular adenoma.    -MRI pelvis - focal thickening (L) posterolateral anorectal mucosa.  Muscularis propria appears intact without extension into the mesorecat or intersphincteric space.  No maegan or distant mets in the pelvis.  -PET - FDG avid anal mass. No evidence of metastasis.       Treatment:  -3/22 - 4/29/2021 completed concurrent 5FU + mitomycin chemotherapy with 5400 cGy in 27 fractions to the anal canal and 4500 cGy in 27 fractions to the regional lymph nodes.  6/2021 post-treatment restaging MR pelvis - previously seen anal mass has nearly completely resolved. No evidence of metastatic disease in the pelvis.  6/2021 post-treatment restaging CT CAP - no evidence of metastatic disease.    2/2024: Iron def anemia, secondary to acute upper GI bleed  -Hx of gastric ulcers  -Underwent right hip surgery in October. During right hip post-op recovery, was noting progressive fatigue and activity intolerance. Noted black stools while on prophylactic ASA post-op, but after stopping ASA this resolved.   -Pre-op hgb 12. Post-op hgb 11/4/23: 11.3.    -Hemolysis labs WNL  -2/8/24 hgb: 6.9, MCV 72. Ferritin 13. Got 2 unit(s) pRBC and 3 doses Venofer IV inpatient  -CT cap: no bleeding source  -2/8/24 EGD: normal esophagus, stomach and duodenum. Large hiatal hernia with a few Gato ulcers.   -2/8/24 Colonoscopy: negative. Erythematous mucosa at anus consistent with RT changes. No cancer recurrence.   - 2/24 Started PPI and iron  - 5/17/24 stopped iron, decrease pantoprazole to 40mg daily.     2/2024: Acute RLE DVT and PE (post-op  setting)  -Required 2 hip surgeries 10/2023. Developed anemia contributing to fatigue and exertional dyspnea, limiting her post-op recovery.   -During incidental pelvic MRI for cancer surveillance, RLE DVT noted (asymptomatic)  -CT: bilateral PE R>L. No heart strain  -RLE Doppler: Extensive right proximal - distal femoral, popliteal and tibial veins with near occlusion.   -Hospitalized due to concurrent iron-def anemia concnering for acute bleed. Started on heparin gtt and no concern for bleeding, so changed to Xarelto  -IR did not recommend intervention for DVT    Physical Exam    GENERAL: Alert and oriented to time place and person. Seated comfortably. In no distress.  HEAD: Atraumatic and normocephalic. No alopecia.  EYES: JUANCARLOS, EOMI. No erythema. No icterus.  CHEST: regular respiratory rate. Lungs clear to auscultation bilaterally.  HEART: regular rate and rhythm.   EXTREMITIES: Warm. No peripheral edema.  SKIN: no rash, or bruising or purpura.   NEURO: No gross deficit noted. Non-antalgic gait.      Lab Results    Recent Results (from the past 168 hour(s))   Ferritin   Result Value Ref Range    Ferritin 80 11 - 328 ng/mL   Iron & Iron Binding Capacity   Result Value Ref Range    Iron 97 37 - 145 ug/dL    Iron Binding Capacity 309 240 - 430 ug/dL    Iron Sat Index 31 15 - 46 %   CBC with platelets and differential   Result Value Ref Range    WBC Count 5.2 4.0 - 11.0 10e3/uL    RBC Count 5.20 3.80 - 5.20 10e6/uL    Hemoglobin 15.6 11.7 - 15.7 g/dL    Hematocrit 49.2 (H) 35.0 - 47.0 %    MCV 95 78 - 100 fL    MCH 30.0 26.5 - 33.0 pg    MCHC 31.7 31.5 - 36.5 g/dL    RDW 16.1 (H) 10.0 - 15.0 %    Platelet Count 162 150 - 450 10e3/uL    % Neutrophils 71 %    % Lymphocytes 20 %    % Monocytes 6 %    % Eosinophils 3 %    % Basophils 1 %    % Immature Granulocytes 0 %    NRBCs per 100 WBC 0 <1 /100    Absolute Neutrophils 3.7 1.6 - 8.3 10e3/uL    Absolute Lymphocytes 1.0 0.8 - 5.3 10e3/uL    Absolute Monocytes 0.3 0.0  "- 1.3 10e3/uL    Absolute Eosinophils 0.2 0.0 - 0.7 10e3/uL    Absolute Basophils 0.0 0.0 - 0.2 10e3/uL    Absolute Immature Granulocytes 0.0 <=0.4 10e3/uL    Absolute NRBCs 0.0 10e3/uL       Imaging    No results found.    Billing  Total time 30 minutes, to include face to face visit, review of EMR, ordering, documentation and coordination of care on date of service   complexity modifier for longitudinal care.     Debi Murary PA-C     Attestation signed by Erin Barajas NP at 5/17/2024  4:00 PM:      Physician Attestation    I saw and evaluated Oscar Zhao as part of a shared APRN/PA visit.     I personally reviewed the vital signs, medications, and labs.    I personally provided a substantive portion of care for this patient and I approve the care plan as written by the GHAZALA.  I was involved with Medical Decision Making including: Please see A&P for additional details of medical decision making.    Erin Barajas NP  Date of Service (when I saw the patient): 05/17/24    Oncology Rooming Note    May 17, 2024 10:33 AM   Oscar Zhao is a 79 year old female who presents for:    Chief Complaint   Patient presents with     Oncology Clinic Visit     Malignant neoplasm of anal canal (H)     Initial Vitals: BP (!) 143/85   Pulse 73   Temp 98.2  F (36.8  C)   Resp 16   Ht 1.6 m (5' 3\")   Wt 61.4 kg (135 lb 4.8 oz)   LMP  (LMP Unknown)   SpO2 100%   BMI 23.97 kg/m   Estimated body mass index is 23.97 kg/m  as calculated from the following:    Height as of this encounter: 1.6 m (5' 3\").    Weight as of this encounter: 61.4 kg (135 lb 4.8 oz). Body surface area is 1.65 meters squared.  No Pain (0) Comment: Data Unavailable   No LMP recorded (lmp unknown). Patient is postmenopausal.  Allergies reviewed: Yes  Medications reviewed: Yes    Medications: Medication refills not needed today.  Pharmacy name entered into [employer]: [id number] [address]  AT [facility]    Frailty Screening:   Is the patient here for a new oncology consult visit in cancer care? 2. No      Clinical concerns: Has questions on a sheet for provider.      Dorothea Yu LPN                 Again, thank you for allowing me to participate in the care of your patient.        Sincerely,        Erin Barajas NP

## 2024-05-17 NOTE — PROGRESS NOTES
[facility] Hematology and Oncology Outpatient Progress Note    Patient: Oscar Zhao  MRN: [id number]  Date of Service: May 17, 2024          Reason for Visit    Hx anal cancer  iron-def anemia, resolved  Recent DVT/PE 2/24 on anticoagulation       Assessment/Plan  Hx anal cancer  Now 3 yrs out from diagnosis. Completed chemoRT with complete response.   Recent CT (2/8/24), MRI (2/8/24) and colonoscopy (2/8/24) negative for recurrence.     Plan:  -Every 6 month exam and labs in Oncology  - Ordered CEA, CMP for next visit in 3 months   -Annual scans until 5 yrs out, next CT CAP and MRI rectum due 2/25  -Colonoscopy in 3 yrs (2027)  -Continue follow-up surveillance in [facility] with Dr. Reynolds    Iron def anemia, likely due to upper GI bleed   Large hiatal hernia, Gato ulcer   EGD 2/24 showed large hiatal hernia and Gato ulcers, probably site of bleeding while on ASA in her post-op period Fall 2023. Now, off ASA. Closely follow while on anticoagulation for provoked PE.    She has been taking iron supplementation every other day for the past 3 months. Hgb has normalized, now 15.6 (up from 10.7 3 months ago). MCV normalized. No clinical signs of bleeding. No GI symptoms.     Plan:  - Decrease pantoprazole 40 mg to once daily Follow-up with PCP/GI for ongoing mgmt  - Hold iron supplementation as iron and ferritin are WNL on labs  - Check, ferritin and iron indices in 3 months since on anticoagulation.  - Return in 3 months with Dr. Kiran HINOJOSA DVT and PE  First incidence. Found incidentally on oncology surveillance scans 2/24. Likely provoked folloiwng hip surgery x 2 in October 2023, then post-op activity limitations due to anemia. Currently on Xarelto. She had $900 copay for the 21-day starter pack. She states she is fine with paying this amount, but will check with pharmacy on insurance coverage for Eliquis. No evident bleeding, hematuria, or hematochezia. Hgb has normalized. No lower extremity edema or  respiratory symptoms.     Plan:  -Continue anticoagulation 3 more months for total of 6 mths. Since first incidence and likely provoked, can consider stopping at that time especially given her bleeding risk with GI history  -Monitor for GI bleeding closely while on it.  -Rx for Xarelto 20 mg daily needs refills. Will check with pharmacy team on whether Eliquis is better covered by her insurance.   ______________________________________________________________________________    History of Present Illness/ Interval History    Ms. Oscar Zhao is a 79 year old with history of anal cancer, treated 3 yrs ago with chemoRT (ZOYA) in surveillance and a history of provoked right DVT and PE along with iron deficiency anemia secondary to bleeding from cam lesions and hiatal hernia. She is here for 3 month follow up and notes she is feeling much better.     2/8/24 while undergoing routine surveillance imaging for her cancer, scans incidentally noted RLE DVT and PE. Asymptomatic. During eval for that in ED, she was also noted to have acute microcytic anemia consistent with iron def. EGD noted Gato ulcer related to large hiatal hernia; colonoscopy negative. Got blood transfusion and IV iron as inpatient. Started heparin gtt initially with no recurrent bleeding, so transitioned to DOAC (Xarelto) before dismissal.     She is tolerating Xarelto well, denies prolonged bleeding, hematuria, or hematochezia. She denies black stools. No SOB or leg swelling. She has a high copay for Xarelto but notes she was too overwhelmed to call her insurance and investigate other option and is okay with paying this. She denies fevers, chills, new pain, or bowel or bladder changes.       ECOG Performance    0      Oncology History/Treatment  Diagnosis/Stage:   2/2021: Stage IIA (cT2-cN0-cM0) Malignant neoplasm of anal canal   -presented with 1-year history of blood on toilet paper when wiping. Became anemic.  -2/18/21 colonoscopy: Ulcerated anal  canal lesion - path c/w moderately differentiated squamous cell carcinoma. 1.5-2 cm polyp of the ascending colon removed - pathology c/w tubular adenoma.    -MRI pelvis - focal thickening (L) posterolateral anorectal mucosa.  Muscularis propria appears intact without extension into the mesorecat or intersphincteric space.  No maegan or distant mets in the pelvis.  -PET - FDG avid anal mass. No evidence of metastasis.       Treatment:  -3/22 - 4/29/2021 completed concurrent 5FU + mitomycin chemotherapy with 5400 cGy in 27 fractions to the anal canal and 4500 cGy in 27 fractions to the regional lymph nodes.  6/2021 post-treatment restaging MR pelvis - previously seen anal mass has nearly completely resolved. No evidence of metastatic disease in the pelvis.  6/2021 post-treatment restaging CT CAP - no evidence of metastatic disease.    2/2024: Iron def anemia, secondary to acute upper GI bleed  -Hx of gastric ulcers  -Underwent right hip surgery in October. During right hip post-op recovery, was noting progressive fatigue and activity intolerance. Noted black stools while on prophylactic ASA post-op, but after stopping ASA this resolved.   -Pre-op hgb 12. Post-op hgb 11/4/23: 11.3.    -Hemolysis labs WNL  -2/8/24 hgb: 6.9, MCV 72. Ferritin 13. Got 2 unit(s) pRBC and 3 doses Venofer IV inpatient  -CT cap: no bleeding source  -2/8/24 EGD: normal esophagus, stomach and duodenum. Large hiatal hernia with a few Gato ulcers.   -2/8/24 Colonoscopy: negative. Erythematous mucosa at anus consistent with RT changes. No cancer recurrence.   - 2/24 Started PPI and iron  - 5/17/24 stopped iron, decrease pantoprazole to 40mg daily.     2/2024: Acute RLE DVT and PE (post-op setting)  -Required 2 hip surgeries 10/2023. Developed anemia contributing to fatigue and exertional dyspnea, limiting her post-op recovery.   -During incidental pelvic MRI for cancer surveillance, RLE DVT noted (asymptomatic)  -CT: bilateral PE R>L. No heart  strain  -RLE Doppler: Extensive right proximal - distal femoral, popliteal and tibial veins with near occlusion.   -Hospitalized due to concurrent iron-def anemia concnering for acute bleed. Started on heparin gtt and no concern for bleeding, so changed to Xarelto  -IR did not recommend intervention for DVT    Physical Exam    GENERAL: Alert and oriented to time place and person. Seated comfortably. In no distress.  HEAD: Atraumatic and normocephalic. No alopecia.  EYES: JUANCARLOS, EOMI. No erythema. No icterus.  CHEST: regular respiratory rate. Lungs clear to auscultation bilaterally.  HEART: regular rate and rhythm.   EXTREMITIES: Warm. No peripheral edema.  SKIN: no rash, or bruising or purpura.   NEURO: No gross deficit noted. Non-antalgic gait.      Lab Results    Recent Results (from the past 168 hour(s))   Ferritin   Result Value Ref Range    Ferritin 80 11 - 328 ng/mL   Iron & Iron Binding Capacity   Result Value Ref Range    Iron 97 37 - 145 ug/dL    Iron Binding Capacity 309 240 - 430 ug/dL    Iron Sat Index 31 15 - 46 %   CBC with platelets and differential   Result Value Ref Range    WBC Count 5.2 4.0 - 11.0 10e3/uL    RBC Count 5.20 3.80 - 5.20 10e6/uL    Hemoglobin 15.6 11.7 - 15.7 g/dL    Hematocrit 49.2 (H) 35.0 - 47.0 %    MCV 95 78 - 100 fL    MCH 30.0 26.5 - 33.0 pg    MCHC 31.7 31.5 - 36.5 g/dL    RDW 16.1 (H) 10.0 - 15.0 %    Platelet Count 162 150 - 450 10e3/uL    % Neutrophils 71 %    % Lymphocytes 20 %    % Monocytes 6 %    % Eosinophils 3 %    % Basophils 1 %    % Immature Granulocytes 0 %    NRBCs per 100 WBC 0 <1 /100    Absolute Neutrophils 3.7 1.6 - 8.3 10e3/uL    Absolute Lymphocytes 1.0 0.8 - 5.3 10e3/uL    Absolute Monocytes 0.3 0.0 - 1.3 10e3/uL    Absolute Eosinophils 0.2 0.0 - 0.7 10e3/uL    Absolute Basophils 0.0 0.0 - 0.2 10e3/uL    Absolute Immature Granulocytes 0.0 <=0.4 10e3/uL    Absolute NRBCs 0.0 10e3/uL       Imaging    No results found.    Billing  Total time 30 minutes, to  include face to face visit, review of EMR, ordering, documentation and coordination of care on date of service   complexity modifier for longitudinal care.     Debi Murray PA-C

## 2024-06-12 DIAGNOSIS — D50.0 IRON DEFICIENCY ANEMIA DUE TO CHRONIC BLOOD LOSS: Primary | ICD-10-CM

## 2024-06-12 RX ORDER — PANTOPRAZOLE SODIUM 40 MG/1
40 TABLET, DELAYED RELEASE ORAL DAILY
Qty: 90 TABLET | Refills: 0 | Status: SHIPPED | OUTPATIENT
Start: 2024-06-12 | End: 2024-06-19

## 2024-06-19 DIAGNOSIS — D50.0 IRON DEFICIENCY ANEMIA DUE TO CHRONIC BLOOD LOSS: ICD-10-CM

## 2024-06-21 RX ORDER — PANTOPRAZOLE SODIUM 40 MG/1
40 TABLET, DELAYED RELEASE ORAL DAILY
Qty: 90 TABLET | Refills: 0 | Status: SHIPPED | OUTPATIENT
Start: 2024-06-21

## 2024-07-20 ENCOUNTER — HEALTH MAINTENANCE LETTER (OUTPATIENT)
Age: 79
End: 2024-07-20

## 2024-08-08 ENCOUNTER — PATIENT OUTREACH (OUTPATIENT)
Dept: ONCOLOGY | Facility: HOSPITAL | Age: 79
End: 2024-08-08
Payer: MEDICARE

## 2024-08-08 NOTE — PROGRESS NOTES
"[facility]: Cancer Care                                                                                          Prescription refill request for Xarelto received from [facility]. Per last visit note from Erin Barajas CNP and RACHAEL Howell    \"Plan:   -Continue anticoagulation 3 more months for total of 6 mths. Since first incidence and likely provoked, can consider stopping at that time especially given her bleeding risk with GI history\"    Reviewed this with Oscar who is scheduled to see Dr. Beck 8/15/24. Oscar reports that she has enough medication to make it to appointment and will discuss further need for anticoagulation and refill with Dr. Bcek during that appointment.         Signature:  Lisa Jacobsen RN  "

## 2024-08-13 ENCOUNTER — LAB (OUTPATIENT)
Dept: INFUSION THERAPY | Facility: HOSPITAL | Age: 79
End: 2024-08-13
Attending: INTERNAL MEDICINE
Payer: MEDICARE

## 2024-08-13 DIAGNOSIS — Z86.711 HISTORY OF PULMONARY EMBOLISM: ICD-10-CM

## 2024-08-13 DIAGNOSIS — D50.0 IRON DEFICIENCY ANEMIA DUE TO CHRONIC BLOOD LOSS: ICD-10-CM

## 2024-08-13 DIAGNOSIS — Z85.048 HISTORY OF RECTAL OR ANAL CANCER: ICD-10-CM

## 2024-08-13 DIAGNOSIS — Z79.01 ANTICOAGULATED: ICD-10-CM

## 2024-08-13 LAB
ALBUMIN SERPL BCG-MCNC: 4.3 G/DL (ref 3.5–5.2)
ALP SERPL-CCNC: 105 U/L (ref 40–150)
ALT SERPL W P-5'-P-CCNC: 19 U/L (ref 0–50)
ANION GAP SERPL CALCULATED.3IONS-SCNC: 8 MMOL/L (ref 7–15)
AST SERPL W P-5'-P-CCNC: 31 U/L (ref 0–45)
BASOPHILS # BLD AUTO: 0.1 10E3/UL (ref 0–0.2)
BASOPHILS NFR BLD AUTO: 1 %
BILIRUB SERPL-MCNC: 1.1 MG/DL
BUN SERPL-MCNC: 11.8 MG/DL (ref 8–23)
CALCIUM SERPL-MCNC: 10.6 MG/DL (ref 8.8–10.4)
CEA SERPL-MCNC: 1.9 NG/ML
CHLORIDE SERPL-SCNC: 103 MMOL/L (ref 98–107)
CREAT SERPL-MCNC: 0.66 MG/DL (ref 0.51–0.95)
EGFRCR SERPLBLD CKD-EPI 2021: 89 ML/MIN/1.73M2
EOSINOPHIL # BLD AUTO: 0.2 10E3/UL (ref 0–0.7)
EOSINOPHIL NFR BLD AUTO: 3 %
ERYTHROCYTE [DISTWIDTH] IN BLOOD BY AUTOMATED COUNT: 14.4 % (ref 10–15)
FERRITIN SERPL-MCNC: 25 NG/ML (ref 11–328)
GLUCOSE SERPL-MCNC: 102 MG/DL (ref 70–99)
HCO3 SERPL-SCNC: 24 MMOL/L (ref 22–29)
HCT VFR BLD AUTO: 52.6 % (ref 35–47)
HGB BLD-MCNC: 17.2 G/DL (ref 11.7–15.7)
HOLD SPECIMEN: NORMAL
IMM GRANULOCYTES # BLD: 0 10E3/UL
IMM GRANULOCYTES NFR BLD: 0 %
IRON BINDING CAPACITY (ROCHE): 406 UG/DL (ref 240–430)
IRON SATN MFR SERPL: 16 % (ref 15–46)
IRON SERPL-MCNC: 65 UG/DL (ref 37–145)
LYMPHOCYTES # BLD AUTO: 0.8 10E3/UL (ref 0.8–5.3)
LYMPHOCYTES NFR BLD AUTO: 16 %
MCH RBC QN AUTO: 32 PG (ref 26.5–33)
MCHC RBC AUTO-ENTMCNC: 32.7 G/DL (ref 31.5–36.5)
MCV RBC AUTO: 98 FL (ref 78–100)
MONOCYTES # BLD AUTO: 0.3 10E3/UL (ref 0–1.3)
MONOCYTES NFR BLD AUTO: 5 %
NEUTROPHILS # BLD AUTO: 3.7 10E3/UL (ref 1.6–8.3)
NEUTROPHILS NFR BLD AUTO: 74 %
NRBC # BLD AUTO: 0 10E3/UL
NRBC BLD AUTO-RTO: 0 /100
PLATELET # BLD AUTO: 171 10E3/UL (ref 150–450)
POTASSIUM SERPL-SCNC: 4.1 MMOL/L (ref 3.4–5.3)
PROT SERPL-MCNC: 7.4 G/DL (ref 6.4–8.3)
RBC # BLD AUTO: 5.38 10E6/UL (ref 3.8–5.2)
SODIUM SERPL-SCNC: 135 MMOL/L (ref 135–145)
WBC # BLD AUTO: 5 10E3/UL (ref 4–11)

## 2024-08-13 PROCEDURE — 82728 ASSAY OF FERRITIN: CPT

## 2024-08-13 PROCEDURE — 85025 COMPLETE CBC W/AUTO DIFF WBC: CPT

## 2024-08-13 PROCEDURE — 82247 BILIRUBIN TOTAL: CPT

## 2024-08-13 PROCEDURE — 83550 IRON BINDING TEST: CPT

## 2024-08-13 PROCEDURE — 82378 CARCINOEMBRYONIC ANTIGEN: CPT

## 2024-08-13 PROCEDURE — 36415 COLL VENOUS BLD VENIPUNCTURE: CPT

## 2024-08-15 ENCOUNTER — ONCOLOGY VISIT (OUTPATIENT)
Dept: ONCOLOGY | Facility: HOSPITAL | Age: 79
End: 2024-08-15
Attending: INTERNAL MEDICINE
Payer: MEDICARE

## 2024-08-15 VITALS
OXYGEN SATURATION: 98 % | RESPIRATION RATE: 16 BRPM | HEART RATE: 74 BPM | SYSTOLIC BLOOD PRESSURE: 124 MMHG | BODY MASS INDEX: 23.92 KG/M2 | HEIGHT: 63 IN | DIASTOLIC BLOOD PRESSURE: 92 MMHG | WEIGHT: 135 LBS

## 2024-08-15 DIAGNOSIS — D50.0 IRON DEFICIENCY ANEMIA DUE TO CHRONIC BLOOD LOSS: Primary | ICD-10-CM

## 2024-08-15 DIAGNOSIS — I26.99 ACUTE PULMONARY EMBOLISM WITHOUT ACUTE COR PULMONALE, UNSPECIFIED PULMONARY EMBOLISM TYPE (H): ICD-10-CM

## 2024-08-15 DIAGNOSIS — C21.1 MALIGNANT NEOPLASM OF ANAL CANAL (H): ICD-10-CM

## 2024-08-15 PROCEDURE — 99215 OFFICE O/P EST HI 40 MIN: CPT | Performed by: INTERNAL MEDICINE

## 2024-08-15 PROCEDURE — G2211 COMPLEX E/M VISIT ADD ON: HCPCS | Performed by: INTERNAL MEDICINE

## 2024-08-15 PROCEDURE — G0463 HOSPITAL OUTPT CLINIC VISIT: HCPCS | Performed by: INTERNAL MEDICINE

## 2024-08-15 ASSESSMENT — PAIN SCALES - GENERAL: PAINLEVEL: NO PAIN (0)

## 2024-08-15 NOTE — PROGRESS NOTES
[facility] Hematology and Oncology Progress Note    Patient: Oscar Zhao  MRN: [id number]  Date of Service: Aug 15, 2024             ECOG Performance    0 - Independent          ______________________________________________________________________________  Oncologic history  Squamous cell cancer of the anus stage IIa diagnosed in February 2021  Treated with concurrent chemotherapy and radiation with 5-FU and mitomycin from 3/22/2021 to 4/29/2021  Received total dose of 5400 cGy in 27 fractions to the primary and 4500 cGy in 27 fractions to the pelvic lymph nodes    Incidental finding of pulmonary embolism and DVT in February 2024 a couple of months after hip replacement  Anticoagulation from February 2024 to August 2024    History of Present Illness    Ms. Oscar Zhao is heRe in follow-up.  She feels fine.  She has no new concerns.  She denies any nosebleeds gum bleeds blood in her urine or stools.  She also denies any change in her bowel habits.  Her appetite is good and she has gained some weight this summer.  Patient also denies any new areas of pains and aches.    Review of systems  A comprehensive 12 point review of system was done that was negative except what is mentioned in the history of present illness    Past History    Past Medical History:   Diagnosis Date    Anemia, unspecified type 2/8/2024    Arthritis     Basal cell carcinoma of anterior chest     Breast cyst     History of anesthesia complications     HLA B27 (HLA B27 positive)     Hypertension     Osteoporosis 07/19/2011    Peripheral neuropathy 09/12/2018    PONV (postoperative nausea and vomiting)     Scleritis, bilateral     Left 2010.  Treated with corticosteroids,, methotrexate and Humira.  2015.  Rituximab.    Squamous cell carcinoma of skin of chest     Steroid induced glaucoma, both eyes        Past Surgical History:   Procedure Laterality Date    ARTHROPLASTY REVISION HIP Right 9/21/2023    Procedure: RIGHT HIP REVISION TOTAL  HIP ARTHROPLASTY;  Surgeon: Bill Brenal DO;  Location: [facility] OR    ARTHROPLASTY REVISION HIP Right 2023    Procedure: RIGHT REVISION TOTAL HIP ARTHROPLASTY HEAD LINER EXCHANGE;  Surgeon: Bill Bernal DO;  Location: [facility] OR    BREAST CYST EXCISION Right     benign     SECTION  1989    CLOSED REDUCTION HIP Right 2019    Procedure: CLOSED REDUCTION, HIP;  Surgeon: Jak Ruiz DO;  Location: [facility] OR;  Service: Orthopedics    CLOSED REDUCTION HIP Right 2023    Procedure: CLOSED REDUCTION, HIP RIGHT;  Surgeon: Aditya Pedroza MD;  Location: [facility] OR    CLOSED REDUCTION HIP Right 2023    Procedure: CLOSED REDUCTION, HIP RIGHT;  Surgeon: Jak Allen MD;  Location: [facility] OR    COLONOSCOPY  2011    COLONOSCOPY  2005    COLONOSCOPY N/A 2024    Procedure: COLONOSCOPY;  Surgeon: Chi Figueroa MD;  Location: [facility]    COLONOSCOPY W/ BIOPSIES AND POLYPECTOMY  2021    16 to 20 mm polyp:tubular adenoma in the ascending colon.  Ulcerated mass in the anal canal: Moderately differentiated squamous cell cancer.    ESOPHAGOSCOPY, GASTROSCOPY, DUODENOSCOPY (EGD), COMBINED  2017    Large hiatal hernia.  Reactive gastropathy with mucosal erosion.  H. pylori negative.    ESOPHAGOSCOPY, GASTROSCOPY, DUODENOSCOPY (EGD), COMBINED N/A 2024    Procedure: ESOPHAGOGASTRODUODENOSCOPY with biopsies;  Surgeon: Chi Figueroa MD;  Location: [facility] OR    HERNIORRHAPHY FEMORAL Left 2021    Procedure: LEFT FEMORAL HERNIA REPAIR;  Surgeon: Sidney Murray MD;  Location: [facility] OR    HYSTERECTOMY TOTAL ABDOMINAL, BILATERAL SALPINGO-OOPHORECTOMY, COMBINED      IR CHEST PORT PLACEMENT > 5 YRS OF AGE  3/15/2021    IR PORT PLACEMENT >5 YEARS  03/15/2021    Right IJ port-a-cath.    IR PORT REMOVAL RIGHT  2021    LAPAROSCOPIC APPENDECTOMY  2011     "PHACOEMULSIFICATION CLEAR CORNEA WITH STANDARD INTRAOCULAR LENS IMPLANT Right 08/30/2017    PHACOEMULSIFICATION CLEAR CORNEA WITH STANDARD INTRAOCULAR LENS IMPLANT Left 09/13/2017    TOTAL HIP ARTHROPLASTY Right 08/18/2015    Procedure: HIP TOTAL ARTHROPLASTY, RIGHT;  Surgeon: Star Du MD;  Location: [facility];  Service:     [facility] TOTAL KNEE ARTHROPLASTY Left 03/02/2017    Procedure: LEFT TOTAL KNEE ARTHROPLASTY;  Surgeon: Star Du MD;  Location: [facility];  Service: Orthopedics       Physical Exam    BP (!) 124/92   Pulse 74   Resp 16   Ht 1.6 m (5' 3\")   Wt 61.2 kg (135 lb)   LMP  (LMP Unknown)   SpO2 98%   BMI 23.91 kg/m      General: alert, awake, not in acute distress  HEENT: Head: Normal, normocephalic, atraumatic.  Eye: Normal external eye, conjunctiva, lids cornea, JAVIER.  Nose: Normal external nose, mucus membranes and septum.  Pharynx: Normal buccal mucosa. Normal pharynx.  Neck / Thyroid: Supple, no masses, nodes, nodules or enlargement.  Lymphatics: No abnormally enlarged lymph nodes.  Chest: Normal chest wall and respirations. Clear to auscultation.  No crackles or rhonchi's  Heart: S1 S2 RRR, no murmur.   Abdomen: abdomen is soft without significant tenderness, masses, organomegaly or guarding  Extremities: normal strength, tone, and muscle mass  Skin: normal. no rash or abnormalities  CNS: non focal.    Lab Results    Recent Results (from the past 240 hour(s))   CEA    Collection Time: 08/13/24 10:18 AM   Result Value Ref Range    CEA 1.9 ng/mL   Comprehensive metabolic panel (BMP + Alb, Alk Phos, ALT, AST, Total. Bili, TP)    Collection Time: 08/13/24 10:18 AM   Result Value Ref Range    Sodium 135 135 - 145 mmol/L    Potassium 4.1 3.4 - 5.3 mmol/L    Carbon Dioxide (CO2) 24 22 - 29 mmol/L    Anion Gap 8 7 - 15 mmol/L    Urea Nitrogen 11.8 8.0 - 23.0 mg/dL    Creatinine 0.66 0.51 - 0.95 mg/dL    GFR Estimate 89 >60 mL/min/1.73m2    Calcium 10.6 (H) 8.8 - 10.4 " mg/dL    Chloride 103 98 - 107 mmol/L    Glucose 102 (H) 70 - 99 mg/dL    Alkaline Phosphatase 105 40 - 150 U/L    AST 31 0 - 45 U/L    ALT 19 0 - 50 U/L    Protein Total 7.4 6.4 - 8.3 g/dL    Albumin 4.3 3.5 - 5.2 g/dL    Bilirubin Total 1.1 <=1.2 mg/dL   Ferritin    Collection Time: 08/13/24 10:18 AM   Result Value Ref Range    Ferritin 25 11 - 328 ng/mL   CBC with platelets and differential    Collection Time: 08/13/24 10:18 AM   Result Value Ref Range    WBC Count 5.0 4.0 - 11.0 10e3/uL    RBC Count 5.38 (H) 3.80 - 5.20 10e6/uL    Hemoglobin 17.2 (H) 11.7 - 15.7 g/dL    Hematocrit 52.6 (H) 35.0 - 47.0 %    MCV 98 78 - 100 fL    MCH 32.0 26.5 - 33.0 pg    MCHC 32.7 31.5 - 36.5 g/dL    RDW 14.4 10.0 - 15.0 %    Platelet Count 171 150 - 450 10e3/uL    % Neutrophils 74 %    % Lymphocytes 16 %    % Monocytes 5 %    % Eosinophils 3 %    % Basophils 1 %    % Immature Granulocytes 0 %    NRBCs per 100 WBC 0 <1 /100    Absolute Neutrophils 3.7 1.6 - 8.3 10e3/uL    Absolute Lymphocytes 0.8 0.8 - 5.3 10e3/uL    Absolute Monocytes 0.3 0.0 - 1.3 10e3/uL    Absolute Eosinophils 0.2 0.0 - 0.7 10e3/uL    Absolute Basophils 0.1 0.0 - 0.2 10e3/uL    Absolute Immature Granulocytes 0.0 <=0.4 10e3/uL    Absolute NRBCs 0.0 10e3/uL   Iron and iron binding capacity    Collection Time: 08/13/24 10:21 AM   Result Value Ref Range    Iron 65 37 - 145 ug/dL    Iron Binding Capacity 406 240 - 430 ug/dL    Iron Sat Index 16 15 - 46 %   Extra Red Top Tube    Collection Time: 08/13/24 10:21 AM   Result Value Ref Range    Hold Specimen JIC          Imaging    No results found.        Assessment and Plan    Squamous cell cancer of the anal canal  Patient has now been in remission for 3-1/2 years.  Her last set of CT scans were in February and her surveillance CTs were changed to once a year.  She will come back in 6 months and CT of the chest abdomen pelvis will be done.  She will call us before that if she has any symptoms.  She continues to be  completely asymptomatic at this point.      Pulmonary embolism and DVT  Patient has history of PE and DVT that was diagnosed incidentally on a CT scan in February 2024.  As per the notes of the hematology providers who saw the patient at that time they were thought to be provoked by hip replacement that had happened a couple of months prior.  She has however not felt good after that and has had a prolonged recovery.  And was feeling short of breath and to some extent symptomatic many weeks before she presented.  Considering that I think it is reasonable to stop anticoagulation at this point in time.  She was informed that with her history she will always remain at a higher risk of a second episode and she would be mindful of symptoms and call us right away if she notices anything new.  We will also like to be more aggressive in prophylaxis if she is in a high risk situation again.        Signed by: Camila Beck MD        CC: Jak Jones MD

## 2024-08-15 NOTE — PROGRESS NOTES
"Oncology Rooming Note    August 15, 2024 3:15 PM   Oscar Zhao is a 79 year old female who presents for:    Chief Complaint   Patient presents with    Oncology Clinic Visit     Malignant neoplasm of anal canal     Initial Vitals: BP (!) 124/92   Pulse 74   Resp 16   Ht 1.6 m (5' 3\")   Wt 61.2 kg (135 lb)   LMP  (LMP Unknown)   SpO2 98%   BMI 23.91 kg/m   Estimated body mass index is 23.91 kg/m  as calculated from the following:    Height as of this encounter: 1.6 m (5' 3\").    Weight as of this encounter: 61.2 kg (135 lb). Body surface area is 1.65 meters squared.  No Pain (0) Comment: Data Unavailable   No LMP recorded (lmp unknown). Patient is postmenopausal.  Allergies reviewed: Yes  Medications reviewed: Yes    Medications: MEDICATION REFILLS NEEDED TODAY. Provider was notified.  Pharmacy name entered into [facility]:    [id number] [place], [place] - [address] [facility] [address]  AT [facility]    Frailty Screening:   Is the patient here for a new oncology consult visit in cancer care? 2. No      Clinical concerns:  3 month labs       Bree Alexandra            "

## 2024-08-15 NOTE — LETTER
"8/15/2024      Oscar Zhao  [address] [facility] [address]      Dear Colleague,    Thank you for referring your patient, Oscar Zhao, to the [facility]. Please see a copy of my visit note below.    Oncology Rooming Note    August 15, 2024 3:15 PM   Oscar Zhao is a 79 year old female who presents for:    Chief Complaint   Patient presents with     Oncology Clinic Visit     Malignant neoplasm of anal canal     Initial Vitals: BP (!) 124/92   Pulse 74   Resp 16   Ht 1.6 m (5' 3\")   Wt 61.2 kg (135 lb)   LMP  (LMP Unknown)   SpO2 98%   BMI 23.91 kg/m   Estimated body mass index is 23.91 kg/m  as calculated from the following:    Height as of this encounter: 1.6 m (5' 3\").    Weight as of this encounter: 61.2 kg (135 lb). Body surface area is 1.65 meters squared.  No Pain (0) Comment: Data Unavailable   No LMP recorded (lmp unknown). Patient is postmenopausal.  Allergies reviewed: Yes  Medications reviewed: Yes    Medications: MEDICATION REFILLS NEEDED TODAY. Provider was notified.  Pharmacy name entered into Awd[facility]:    [id number] - [place] [id number] [facility]  AT [facility] [address]    Frailty Screening:   Is the patient here for a new oncology consult visit in cancer care? 2. 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 Oscar Zhao is heRe in follow-up.  She feels fine.  She has no new concerns.  She denies any nosebleeds gum bleeds blood in her urine or stools.  She also denies any change in her bowel habits.  Her appetite is good and she has gained some weight this summer.  Patient also denies any new areas of pains and aches.    Review of systems  A comprehensive 12 point review of system was done that was negative except what is mentioned in the history of present illness    Past History    Past Medical History:   Diagnosis Date     Anemia, unspecified type 2024     Arthritis      Basal cell carcinoma of anterior chest      Breast cyst      History of anesthesia complications      HLA B27 (HLA B27 positive)      Hypertension      Osteoporosis 2011     Peripheral neuropathy 2018     PONV (postoperative nausea and vomiting)      Scleritis, bilateral     Left .  Treated with corticosteroids,, methotrexate and Humira.  .  Rituximab.     Squamous cell carcinoma of skin of chest      Steroid induced glaucoma, both eyes        Past Surgical History:   Procedure Laterality Date     ARTHROPLASTY REVISION HIP Right 2023    Procedure: RIGHT HIP REVISION TOTAL HIP ARTHROPLASTY;  Surgeon: Bill Bernal DO;  Location: [facility] OR     ARTHROPLASTY REVISION HIP Right 2023    Procedure: RIGHT REVISION TOTAL HIP ARTHROPLASTY HEAD LINER EXCHANGE;  Surgeon: Bill Bernal DO;  Location: [facility] OR     BREAST CYST EXCISION Right     benign      SECTION       CLOSED REDUCTION HIP Right 2019    Procedure: CLOSED REDUCTION, HIP;  Surgeon: Jak Ruiz DO;  Location: [facility] OR;  Service: Orthopedics     CLOSED REDUCTION HIP  Right 8/30/2023    Procedure: CLOSED REDUCTION, HIP RIGHT;  Surgeon: Aditya Pedroza MD;  Location: [facility] OR     CLOSED REDUCTION HIP Right 11/4/2023    Procedure: CLOSED REDUCTION, HIP RIGHT;  Surgeon: Jak Allen MD;  Location: [facility] OR     COLONOSCOPY  09/07/2011     COLONOSCOPY  09/27/2005     COLONOSCOPY N/A 2/12/2024    Procedure: COLONOSCOPY;  Surgeon: Chi Figueroa MD;  Location: [facility] OR     COLONOSCOPY W/ BIOPSIES AND POLYPECTOMY  02/18/2021    16 to 20 mm polyp:tubular adenoma in the ascending colon.  Ulcerated mass in the anal canal: Moderately differentiated squamous cell cancer.     ESOPHAGOSCOPY, GASTROSCOPY, DUODENOSCOPY (EGD), COMBINED  05/05/2017    Large hiatal hernia.  Reactive gastropathy with mucosal erosion.  H. pylori negative.     ESOPHAGOSCOPY, GASTROSCOPY, DUODENOSCOPY (EGD), COMBINED N/A 2/12/2024    Procedure: ESOPHAGOGASTRODUODENOSCOPY with biopsies;  Surgeon: Chi Figueroa MD;  Location: [facility] OR     HERNIORRHAPHY FEMORAL Left 9/7/2021    Procedure: LEFT FEMORAL HERNIA REPAIR;  Surgeon: Sidney Murray MD;  Location: [facility] [place]     HYSTERECTOMY TOTAL ABDOMINAL, BILATERAL SALPINGO-OOPHORECTOMY, COMBINED  1996     IR CHEST PORT PLACEMENT > 5 YRS OF AGE  3/15/2021     IR PORT PLACEMENT >5 YEARS  03/15/2021    Right IJ port-a-cath.     IR PORT REMOVAL RIGHT  7/14/2021     LAPAROSCOPIC APPENDECTOMY  04/28/2011     PHACOEMULSIFICATION CLEAR CORNEA WITH STANDARD INTRAOCULAR LENS IMPLANT Right 08/30/2017     PHACOEMULSIFICATION CLEAR CORNEA WITH STANDARD INTRAOCULAR LENS IMPLANT Left 09/13/2017     TOTAL HIP ARTHROPLASTY Right 08/18/2015    Procedure: HIP TOTAL ARTHROPLASTY, RIGHT;  Surgeon: Star Du MD;  Location: [facility];  Service:      [facility] TOTAL KNEE ARTHROPLASTY Left 03/02/2017    Procedure: LEFT TOTAL KNEE ARTHROPLASTY;  Surgeon: Star Du MD;  Location: [facility] OR;  Service:  "Orthopedics       Physical Exam    BP (!) 124/92   Pulse 74   Resp 16   Ht 1.6 m (5' 3\")   Wt 61.2 kg (135 lb)   LMP  (LMP Unknown)   SpO2 98%   BMI 23.91 kg/m      General: alert, awake, not in acute distress  HEENT: Head: Normal, normocephalic, atraumatic.  Eye: Normal external eye, conjunctiva, lids cornea, JAVIER.  Nose: Normal external nose, mucus membranes and septum.  Pharynx: Normal buccal mucosa. Normal pharynx.  Neck / Thyroid: Supple, no masses, nodes, nodules or enlargement.  Lymphatics: No abnormally enlarged lymph nodes.  Chest: Normal chest wall and respirations. Clear to auscultation.  No crackles or rhonchi's  Heart: S1 S2 RRR, no murmur.   Abdomen: abdomen is soft without significant tenderness, masses, organomegaly or guarding  Extremities: normal strength, tone, and muscle mass  Skin: normal. no rash or abnormalities  CNS: non focal.    Lab Results    Recent Results (from the past 240 hour(s))   CEA    Collection Time: 08/13/24 10:18 AM   Result Value Ref Range    CEA 1.9 ng/mL   Comprehensive metabolic panel (BMP + Alb, Alk Phos, ALT, AST, Total. Bili, TP)    Collection Time: 08/13/24 10:18 AM   Result Value Ref Range    Sodium 135 135 - 145 mmol/L    Potassium 4.1 3.4 - 5.3 mmol/L    Carbon Dioxide (CO2) 24 22 - 29 mmol/L    Anion Gap 8 7 - 15 mmol/L    Urea Nitrogen 11.8 8.0 - 23.0 mg/dL    Creatinine 0.66 0.51 - 0.95 mg/dL    GFR Estimate 89 >60 mL/min/1.73m2    Calcium 10.6 (H) 8.8 - 10.4 mg/dL    Chloride 103 98 - 107 mmol/L    Glucose 102 (H) 70 - 99 mg/dL    Alkaline Phosphatase 105 40 - 150 U/L    AST 31 0 - 45 U/L    ALT 19 0 - 50 U/L    Protein Total 7.4 6.4 - 8.3 g/dL    Albumin 4.3 3.5 - 5.2 g/dL    Bilirubin Total 1.1 <=1.2 mg/dL   Ferritin    Collection Time: 08/13/24 10:18 AM   Result Value Ref Range    Ferritin 25 11 - 328 ng/mL   CBC with platelets and differential    Collection Time: 08/13/24 10:18 AM   Result Value Ref Range    WBC Count 5.0 4.0 - 11.0 10e3/uL    RBC " Count 5.38 (H) 3.80 - 5.20 10e6/uL    Hemoglobin 17.2 (H) 11.7 - 15.7 g/dL    Hematocrit 52.6 (H) 35.0 - 47.0 %    MCV 98 78 - 100 fL    MCH 32.0 26.5 - 33.0 pg    MCHC 32.7 31.5 - 36.5 g/dL    RDW 14.4 10.0 - 15.0 %    Platelet Count 171 150 - 450 10e3/uL    % Neutrophils 74 %    % Lymphocytes 16 %    % Monocytes 5 %    % Eosinophils 3 %    % Basophils 1 %    % Immature Granulocytes 0 %    NRBCs per 100 WBC 0 <1 /100    Absolute Neutrophils 3.7 1.6 - 8.3 10e3/uL    Absolute Lymphocytes 0.8 0.8 - 5.3 10e3/uL    Absolute Monocytes 0.3 0.0 - 1.3 10e3/uL    Absolute Eosinophils 0.2 0.0 - 0.7 10e3/uL    Absolute Basophils 0.1 0.0 - 0.2 10e3/uL    Absolute Immature Granulocytes 0.0 <=0.4 10e3/uL    Absolute NRBCs 0.0 10e3/uL   Iron and iron binding capacity    Collection Time: 08/13/24 10:21 AM   Result Value Ref Range    Iron 65 37 - 145 ug/dL    Iron Binding Capacity 406 240 - 430 ug/dL    Iron Sat Index 16 15 - 46 %   Extra Red Top Tube    Collection Time: 08/13/24 10:21 AM   Result Value Ref Range    Hold Specimen JIC          Imaging    No results found.        Assessment and Plan    Squamous cell cancer of the anal canal  Patient has now been in remission for 3-1/2 years.  Her last set of CT scans were in February and her surveillance CTs were changed to once a year.  She will come back in 6 months and CT of the chest abdomen pelvis will be done.  She will call us before that if she has any symptoms.  She continues to be completely asymptomatic at this point.      Pulmonary embolism and DVT  Patient has history of PE and DVT that was diagnosed incidentally on a CT scan in February 2024.  As per the notes of the hematology providers who saw the patient at that time they were thought to be provoked by hip replacement that had happened a couple of months prior.  She has however not felt good after that and has had a prolonged recovery.  And was feeling short of breath and to some extent symptomatic many weeks before  she presented.  Considering that I think it is reasonable to stop anticoagulation at this point in time.  She was informed that with her history she will always remain at a higher risk of a second episode and she would be mindful of symptoms and call us right away if she notices anything new.  We will also like to be more aggressive in prophylaxis if she is in a high risk situation again.        Signed by: Camila Beck MD        CC: Jak Jones MD       Again, thank you for allowing me to participate in the care of your patient.        Sincerely,        Camila Beck MD

## 2024-08-30 ENCOUNTER — TRANSFERRED RECORDS (OUTPATIENT)
Dept: HEALTH INFORMATION MANAGEMENT | Facility: CLINIC | Age: 79
End: 2024-08-30
Payer: MEDICARE

## 2024-09-05 DIAGNOSIS — I10 ESSENTIAL HYPERTENSION: Primary | ICD-10-CM

## 2024-09-05 RX ORDER — AMLODIPINE BESYLATE 5 MG/1
10 TABLET ORAL DAILY
Qty: 180 TABLET | Refills: 11 | Status: SHIPPED | OUTPATIENT
Start: 2024-09-05

## 2024-09-17 ENCOUNTER — OFFICE VISIT (OUTPATIENT)
Dept: INTERNAL MEDICINE | Facility: CLINIC | Age: 79
End: 2024-09-17
Payer: MEDICARE

## 2024-09-17 VITALS
SYSTOLIC BLOOD PRESSURE: 116 MMHG | TEMPERATURE: 97.7 F | WEIGHT: 135 LBS | DIASTOLIC BLOOD PRESSURE: 84 MMHG | RESPIRATION RATE: 16 BRPM | BODY MASS INDEX: 23.92 KG/M2 | HEART RATE: 64 BPM | HEIGHT: 63 IN | OXYGEN SATURATION: 97 %

## 2024-09-17 DIAGNOSIS — E11.8 TYPE 2 DIABETES MELLITUS WITH COMPLICATION, WITHOUT LONG-TERM CURRENT USE OF INSULIN (H): Primary | ICD-10-CM

## 2024-09-17 DIAGNOSIS — I10 ESSENTIAL HYPERTENSION: ICD-10-CM

## 2024-09-17 LAB
CALCIUM SERPL-MCNC: 10 MG/DL (ref 8.8–10.4)
FASTING STATUS PATIENT QL REPORTED: ABNORMAL
GLUCOSE SERPL-MCNC: 106 MG/DL (ref 70–99)
HBA1C MFR BLD: 5.4 % (ref 0–5.6)

## 2024-09-17 PROCEDURE — 82947 ASSAY GLUCOSE BLOOD QUANT: CPT | Performed by: INTERNAL MEDICINE

## 2024-09-17 PROCEDURE — 99214 OFFICE O/P EST MOD 30 MIN: CPT | Performed by: INTERNAL MEDICINE

## 2024-09-17 PROCEDURE — 36415 COLL VENOUS BLD VENIPUNCTURE: CPT | Performed by: INTERNAL MEDICINE

## 2024-09-17 PROCEDURE — 83036 HEMOGLOBIN GLYCOSYLATED A1C: CPT | Performed by: INTERNAL MEDICINE

## 2024-09-17 PROCEDURE — 82310 ASSAY OF CALCIUM: CPT | Performed by: INTERNAL MEDICINE

## 2024-09-17 PROCEDURE — G2211 COMPLEX E/M VISIT ADD ON: HCPCS | Performed by: INTERNAL MEDICINE

## 2024-09-17 RX ORDER — HYDROXYZINE HYDROCHLORIDE 10 MG/1
10 TABLET, FILM COATED ORAL 3 TIMES DAILY PRN
Qty: 90 TABLET | Refills: 11 | Status: SHIPPED | OUTPATIENT
Start: 2024-09-17

## 2024-09-17 NOTE — PROGRESS NOTES
Assessment/Plan:    (E11.8) Type 2 diabetes mellitus with complication, without long-term current use of insulin (H)  (primary encounter diagnosis)  Comment: Stable.  Blood sugar has been either 120 or 102 at a previous physician's office.  Recheck blood sugar today with A1c and serum calcium level.  Serum calcium level was also slightly elevated.  At 10.6 upper range of normal 10.4 repeat ordered.  Plan: Glucose, Hemoglobin A1c, Calcium, hydrOXYzine wants refill of hydroxyzine which he uses for anxiety and/or insomnia.  10 mg.  Done        HCl (ATARAX) 10 MG tablet        Refill thiamine also check blood sugar A1c and serum calcium level.  Previous blood sugar either 102/120 and previous serum calcium level 10.6 at upper range of normal 10.5.    (I10) Essential hypertension  Comment: Controlled today's blood pressure 116/84.  Plan: Continue same medications as is.  Blood pressure control including amlodipine.    Pulmonary embolus by history seen by hematology oncology.  Now off Xarelto.  Previous treatment with Xarelto 20 mg daily for 6 months..  No recurrence of deep vein thrombosis or pulm emboli.  Remain off Xarelto.    Dyspepsia with periumbilical pain offered gastroenterology consultation patient declined in the meantime stop Protonix continue famotidine 20 mg twice daily with food.  To enhance absorption.  Minimal drug-drug interaction with famotidine very safe emphasized.        25 minutes spent on the date of the encounter doing chart review, review of test results, interpretation of tests, patient visit, and documentation     Subjective:  Oscar Zhao is a 79 year old female presents for the following health issues off Xarelto had been on 6 months for PE.  Seen by hematology oncology.    Serum calcium level of late 10.6 at hematology office.  Upper range of normal they are 10.4.    Hyperglycemia.  102 or 128.  Recheck ordered with A1c.  Polyuria polydipsia.    Prior history of squamous cell carcinoma of  "the anus.  No sign of recurrence.  Followed at the [facility].  Questions about CEA were brought forth by the patient.  CEA is a measure for adenocarcinoma of the colon not squamous cell carcinoma of the perianal area.    Dyspepsia offered GI consult.  Previously had been on Protonix while on Xarelto.  Now on famotidine increased dose at 20 mg twice daily with food.  Safe driving minimal drug drug interactions emphasized.    On hydroxyzine is used for sleep this seems to be very helpful.  Also quiets nerves and safe no sign of risk for addiction arbitration.  Written new prescription for same.  10 mg hydroxyzine No. 98.  1 at bedtime or 1 3 times a day as needed.  11 refills.    ROS:  No blood in the stool or urine med list reviewed reconciled in the chart.    Objective:  /84   Pulse 64   Temp 97.7  F (36.5  C) (Oral)   Resp 16   Ht 1.6 m (5' 3\")   Wt 61.2 kg (135 lb)   LMP  (LMP Unknown)   SpO2 97%   BMI 23.91 kg/m    Chest clear heart tones normal abdomen benign extremities free of edema cyanosis or clubbing no carotid bruits thyromegaly no thyroid nodules noted no lymph    Jak Jones MD  Internal Medicine    The longitudinal plan of care for the diagnosis(es)/condition(s) as documented were addressed during this visit. Due to the added complexity in care, I will continue to support Maritza in the subsequent management and with ongoing continuity of care.      Answers submitted by the patient for this visit:  General Questionnaire (Submitted on 9/17/2024)  Chief Complaint: Chronic problems general questions HPI Form  What is the reason for your visit today? : check  How many days per week do you miss taking your medication?: 0    "

## 2024-10-21 ENCOUNTER — HOSPITAL ENCOUNTER (EMERGENCY)
Facility: CLINIC | Age: 79
Discharge: HOME | End: 2024-10-21
Attending: EMERGENCY MEDICINE | Admitting: EMERGENCY MEDICINE
Payer: MEDICARE

## 2024-10-21 ENCOUNTER — APPOINTMENT (OUTPATIENT)
Dept: CT IMAGING | Facility: CLINIC | Age: 79
End: 2024-10-21
Attending: EMERGENCY MEDICINE
Payer: MEDICARE

## 2024-10-21 VITALS
SYSTOLIC BLOOD PRESSURE: 128 MMHG | HEIGHT: 63 IN | HEART RATE: 88 BPM | WEIGHT: 134 LBS | TEMPERATURE: 97.8 F | BODY MASS INDEX: 23.74 KG/M2 | DIASTOLIC BLOOD PRESSURE: 77 MMHG | OXYGEN SATURATION: 96 % | RESPIRATION RATE: 18 BRPM

## 2024-10-21 DIAGNOSIS — N20.1 URETERAL STONE: ICD-10-CM

## 2024-10-21 LAB
ALBUMIN SERPL BCG-MCNC: 4.3 G/DL (ref 3.5–5.2)
ALBUMIN UR-MCNC: 10 MG/DL
ALP SERPL-CCNC: 112 U/L (ref 40–150)
ALT SERPL W P-5'-P-CCNC: 17 U/L (ref 0–50)
ANION GAP SERPL CALCULATED.3IONS-SCNC: 16 MMOL/L (ref 7–15)
APPEARANCE UR: CLEAR
AST SERPL W P-5'-P-CCNC: 26 U/L (ref 0–45)
BASOPHILS # BLD AUTO: 0 10E3/UL (ref 0–0.2)
BASOPHILS NFR BLD AUTO: 1 %
BILIRUB SERPL-MCNC: 0.8 MG/DL
BILIRUB UR QL STRIP: NEGATIVE
BUN SERPL-MCNC: 12.4 MG/DL (ref 8–23)
CALCIUM SERPL-MCNC: 9.6 MG/DL (ref 8.8–10.4)
CHLORIDE SERPL-SCNC: 100 MMOL/L (ref 98–107)
COLOR UR AUTO: ABNORMAL
CREAT SERPL-MCNC: 0.67 MG/DL (ref 0.51–0.95)
CRP SERPL-MCNC: <3 MG/L
EGFRCR SERPLBLD CKD-EPI 2021: 89 ML/MIN/1.73M2
EOSINOPHIL # BLD AUTO: 0 10E3/UL (ref 0–0.7)
EOSINOPHIL NFR BLD AUTO: 0 %
ERYTHROCYTE [DISTWIDTH] IN BLOOD BY AUTOMATED COUNT: 14.7 % (ref 10–15)
GLUCOSE SERPL-MCNC: 182 MG/DL (ref 70–99)
GLUCOSE UR STRIP-MCNC: 150 MG/DL
HCO3 SERPL-SCNC: 20 MMOL/L (ref 22–29)
HCT VFR BLD AUTO: 48.6 % (ref 35–47)
HGB BLD-MCNC: 14.8 G/DL (ref 11.7–15.7)
HGB UR QL STRIP: NEGATIVE
HOLD SPECIMEN: NORMAL
HYALINE CASTS: 1 /LPF
IMM GRANULOCYTES # BLD: 0 10E3/UL
IMM GRANULOCYTES NFR BLD: 1 %
KETONES UR STRIP-MCNC: NEGATIVE MG/DL
LACTATE SERPL-SCNC: 1.3 MMOL/L (ref 0.7–2)
LACTATE SERPL-SCNC: 2.2 MMOL/L (ref 0.7–2)
LEUKOCYTE ESTERASE UR QL STRIP: NEGATIVE
LIPASE SERPL-CCNC: 37 U/L (ref 13–60)
LYMPHOCYTES # BLD AUTO: 0.7 10E3/UL (ref 0.8–5.3)
LYMPHOCYTES NFR BLD AUTO: 9 %
MCH RBC QN AUTO: 26.9 PG (ref 26.5–33)
MCHC RBC AUTO-ENTMCNC: 30.5 G/DL (ref 31.5–36.5)
MCV RBC AUTO: 88 FL (ref 78–100)
MONOCYTES # BLD AUTO: 0.4 10E3/UL (ref 0–1.3)
MONOCYTES NFR BLD AUTO: 4 %
MUCOUS THREADS #/AREA URNS LPF: PRESENT /LPF
NEUTROPHILS # BLD AUTO: 7.3 10E3/UL (ref 1.6–8.3)
NEUTROPHILS NFR BLD AUTO: 86 %
NITRATE UR QL: NEGATIVE
NRBC # BLD AUTO: 0 10E3/UL
NRBC BLD AUTO-RTO: 0 /100
PH UR STRIP: 7 [PH] (ref 5–7)
PLATELET # BLD AUTO: 222 10E3/UL (ref 150–450)
POTASSIUM SERPL-SCNC: 4.1 MMOL/L (ref 3.4–5.3)
PROT SERPL-MCNC: 7.4 G/DL (ref 6.4–8.3)
RBC # BLD AUTO: 5.51 10E6/UL (ref 3.8–5.2)
RBC URINE: 1 /HPF
SODIUM SERPL-SCNC: 136 MMOL/L (ref 135–145)
SP GR UR STRIP: 1.02 (ref 1–1.03)
SQUAMOUS EPITHELIAL: <1 /HPF
UROBILINOGEN UR STRIP-MCNC: <2 MG/DL
WBC # BLD AUTO: 8.5 10E3/UL (ref 4–11)
WBC URINE: 1 /HPF

## 2024-10-21 PROCEDURE — 36415 COLL VENOUS BLD VENIPUNCTURE: CPT | Performed by: EMERGENCY MEDICINE

## 2024-10-21 PROCEDURE — 250N000011 HC RX IP 250 OP 636: Performed by: EMERGENCY MEDICINE

## 2024-10-21 PROCEDURE — 86140 C-REACTIVE PROTEIN: CPT | Performed by: EMERGENCY MEDICINE

## 2024-10-21 PROCEDURE — 83605 ASSAY OF LACTIC ACID: CPT | Performed by: EMERGENCY MEDICINE

## 2024-10-21 PROCEDURE — 85004 AUTOMATED DIFF WBC COUNT: CPT | Performed by: EMERGENCY MEDICINE

## 2024-10-21 PROCEDURE — 250N000013 HC RX MED GY IP 250 OP 250 PS 637: Performed by: EMERGENCY MEDICINE

## 2024-10-21 PROCEDURE — 83690 ASSAY OF LIPASE: CPT | Performed by: EMERGENCY MEDICINE

## 2024-10-21 PROCEDURE — 258N000003 HC RX IP 258 OP 636: Performed by: EMERGENCY MEDICINE

## 2024-10-21 PROCEDURE — 96374 THER/PROPH/DIAG INJ IV PUSH: CPT | Mod: 59

## 2024-10-21 PROCEDURE — 81001 URINALYSIS AUTO W/SCOPE: CPT | Performed by: EMERGENCY MEDICINE

## 2024-10-21 PROCEDURE — 96375 TX/PRO/DX INJ NEW DRUG ADDON: CPT

## 2024-10-21 PROCEDURE — 99285 EMERGENCY DEPT VISIT HI MDM: CPT | Mod: 25

## 2024-10-21 PROCEDURE — 74177 CT ABD & PELVIS W/CONTRAST: CPT | Mod: MG

## 2024-10-21 PROCEDURE — 96361 HYDRATE IV INFUSION ADD-ON: CPT

## 2024-10-21 PROCEDURE — 80053 COMPREHEN METABOLIC PANEL: CPT | Performed by: EMERGENCY MEDICINE

## 2024-10-21 RX ORDER — OXYCODONE AND ACETAMINOPHEN 5; 325 MG/1; MG/1
1 TABLET ORAL ONCE
Status: COMPLETED | OUTPATIENT
Start: 2024-10-21 | End: 2024-10-21

## 2024-10-21 RX ORDER — MORPHINE SULFATE 4 MG/ML
4 INJECTION, SOLUTION INTRAMUSCULAR; INTRAVENOUS ONCE
Status: COMPLETED | OUTPATIENT
Start: 2024-10-21 | End: 2024-10-21

## 2024-10-21 RX ORDER — ONDANSETRON 2 MG/ML
4 INJECTION INTRAMUSCULAR; INTRAVENOUS ONCE
Status: COMPLETED | OUTPATIENT
Start: 2024-10-21 | End: 2024-10-21

## 2024-10-21 RX ORDER — OXYCODONE HYDROCHLORIDE 5 MG/1
5 TABLET ORAL EVERY 4 HOURS PRN
Qty: 12 TABLET | Refills: 0 | Status: SHIPPED | OUTPATIENT
Start: 2024-10-21 | End: 2024-10-25

## 2024-10-21 RX ORDER — ACETAMINOPHEN 500 MG
1000 TABLET ORAL EVERY 6 HOURS
Qty: 56 TABLET | Refills: 0 | Status: SHIPPED | OUTPATIENT
Start: 2024-10-21 | End: 2024-10-28

## 2024-10-21 RX ORDER — DIMENHYDRINATE 50 MG
50 TABLET ORAL AT BEDTIME
Qty: 7 TABLET | Refills: 0 | Status: SHIPPED | OUTPATIENT
Start: 2024-10-21 | End: 2024-10-28

## 2024-10-21 RX ORDER — ONDANSETRON 4 MG/1
4 TABLET, ORALLY DISINTEGRATING ORAL EVERY 8 HOURS PRN
Qty: 10 TABLET | Refills: 0 | Status: SHIPPED | OUTPATIENT
Start: 2024-10-21 | End: 2024-10-24

## 2024-10-21 RX ORDER — IOPAMIDOL 755 MG/ML
90 INJECTION, SOLUTION INTRAVASCULAR ONCE
Status: COMPLETED | OUTPATIENT
Start: 2024-10-21 | End: 2024-10-21

## 2024-10-21 RX ORDER — DIMENHYDRINATE 50 MG
50 TABLET ORAL EVERY 6 HOURS PRN
Qty: 28 TABLET | Refills: 0 | Status: SHIPPED | OUTPATIENT
Start: 2024-10-21 | End: 2024-10-28

## 2024-10-21 RX ADMIN — MORPHINE SULFATE 4 MG: 4 INJECTION, SOLUTION INTRAMUSCULAR; INTRAVENOUS at 10:39

## 2024-10-21 RX ADMIN — IOPAMIDOL 90 ML: 755 INJECTION, SOLUTION INTRAVENOUS at 11:19

## 2024-10-21 RX ADMIN — OXYCODONE HYDROCHLORIDE AND ACETAMINOPHEN 1 TABLET: 5; 325 TABLET ORAL at 13:11

## 2024-10-21 RX ADMIN — ONDANSETRON 4 MG: 2 INJECTION, SOLUTION INTRAMUSCULAR; INTRAVENOUS at 10:39

## 2024-10-21 RX ADMIN — SODIUM CHLORIDE 500 ML: 9 INJECTION, SOLUTION INTRAVENOUS at 10:39

## 2024-10-21 ASSESSMENT — ENCOUNTER SYMPTOMS
DYSURIA: 0
VOMITING: 0
ABDOMINAL PAIN: 1
NAUSEA: 1
SHORTNESS OF BREATH: 0
DIARRHEA: 0

## 2024-10-21 ASSESSMENT — ACTIVITIES OF DAILY LIVING (ADL)
ADLS_ACUITY_SCORE: 37
ADLS_ACUITY_SCORE: 35
ADLS_ACUITY_SCORE: 37
ADLS_ACUITY_SCORE: 37

## 2024-10-21 ASSESSMENT — COLUMBIA-SUICIDE SEVERITY RATING SCALE - C-SSRS
1. IN THE PAST MONTH, HAVE YOU WISHED YOU WERE DEAD OR WISHED YOU COULD GO TO SLEEP AND NOT WAKE UP?: NO
6. HAVE YOU EVER DONE ANYTHING, STARTED TO DO ANYTHING, OR PREPARED TO DO ANYTHING TO END YOUR LIFE?: NO
2. HAVE YOU ACTUALLY HAD ANY THOUGHTS OF KILLING YOURSELF IN THE PAST MONTH?: NO

## 2024-10-21 NOTE — ED NOTES
Introduced self to patient.  Whiteboard updated.  Plan of care and length of time discussed with patient.  Will continue to monitor. Maki Tinsley RN.......10/21/2024 10:22 AM

## 2024-10-21 NOTE — ED TRIAGE NOTES
Pt c/o L lower abdominal pain that radiated to groin starting at 5:30am with dry heaving. Pain now suprapubic. Reports feeling like she needs to void. Pain radiates to lower back. 3 BM last night. No constipation. Pt alert.     Triage Assessment (Adult)       Row Name 10/21/24 0954          Triage Assessment    Airway WDL WDL        Respiratory WDL    Respiratory WDL WDL        Skin Circulation/Temperature WDL    Skin Circulation/Temperature WDL WDL        Cardiac WDL    Cardiac WDL WDL        Peripheral/Neurovascular WDL    Peripheral Neurovascular WDL WDL        Cognitive/Neuro/Behavioral WDL    Cognitive/Neuro/Behavioral WDL WDL

## 2024-10-21 NOTE — ED PROVIDER NOTES
EMERGENCY DEPARTMENT ENCOUNTER      NAME: Oscar Zhao  AGE: 79 year old female  YOB: 1945  MRN: [id number]  EVALUATION DATE & TIME: 10/21/2024  9:58 AM    PCP: Jak Jones    ED PROVIDER: Aditya Acevedo MD      Chief Complaint   Patient presents with    Abdominal Pain     FINAL IMPRESSION:  1. Ureteral stone        ED COURSE & MEDICAL DECISION MAKING:    Pertinent Labs & Imaging studies reviewed. (See chart for details)  79 year old female presents to the Emergency Department for evaluation of left-sided abdominal pain.  Patient reporting left-sided abdominal pain developing this morning first noted the at 0530 after awakening.  Never had this type of pain before.  No change to stool or urinary symptoms at this time.  No fevers or chills she reports significant left lower quadrant abdominal pain.  Denies additional symptoms.  Remote history of rectal cancer that is in remission.  Previous history of pulmonary embolism no longer on anticoagulation.  Denies blood in the stool.  Denies urinary symptoms.    Workup in the emergency department was notable for a obstructing distal ureteral stone 3 mm on the left side with hydroureteronephrosis and perinephric stranding.  Patient is afebrile with normal white blood cell count urinalysis was negative for acute infection and CRP was negative.  Doubt active infection based on additional workup performed in the emergency department and patient's overall clinical improvement.  She is now feeling markedly improved with no pain to clinical examination and I think appropriate for discharge and continued outpatient follow-up.  Referral placed to our kidney stone doctors.  Plan of care for discharge symptomatic management reviewed treatment plan follow-up plan and return precautions prior to discharge.         10:02 AM I met with the patient, obtained history, performed an initial exam, and discussed options and plan for diagnostics and treatment here in the  ED.      Medical Decision Making  Obtained supplemental history:Supplemental history obtained?: No  Reviewed external records: External records reviewed?: Documented in chart  Care impacted by chronic illness:Cancer/Chemotherapy and Hypertension  Did you consider but not order tests?: Work up considered but not performed and documented in chart, if applicable  Did you interpret images independently?: Independent interpretation of ECG and images noted in documentation, when applicable.  Consultation discussion with other provider:Did you involve another provider (consultant, , pharmacy, etc.)?: No  Discharge. I prescribed additional prescription strength medication(s) as charted. I considered admission, but discharged patient after significant clinical improvement.    MIPS: Not Applicable      At the conclusion of the encounter I discussed the results of all of the tests and the disposition. The questions were answered. The patient or family acknowledged understanding and was agreeable with the care plan.       MEDICATIONS GIVEN IN THE EMERGENCY:  Medications   oxyCODONE-acetaminophen (PERCOCET) 5-325 MG per tablet 1 tablet (has no administration in time range)   sodium chloride 0.9% BOLUS 500 mL (0 mLs Intravenous Stopped 10/21/24 1227)   ondansetron (ZOFRAN) injection 4 mg (4 mg Intravenous $Given 10/21/24 1039)   morphine (PF) injection 4 mg (4 mg Intravenous $Given 10/21/24 1039)   iopamidol (ISOVUE-370) solution 90 mL (90 mLs Intravenous $Given 10/21/24 1119)       NEW PRESCRIPTIONS STARTED AT TODAY'S ER VISIT  New Prescriptions    ACETAMINOPHEN (TYLENOL) 500 MG TABLET    Take 2 tablets (1,000 mg) by mouth every 6 hours for 7 days.    DIMENHYDRINATE (DRAMAMINE) 50 MG TABLET    Take 1 tablet (50 mg) by mouth at bedtime for 7 days.    DIMENHYDRINATE (DRAMAMINE) 50 MG TABLET    Take 1 tablet (50 mg) by mouth every 6 hours as needed for other (kidney stone pain management).    OXYCODONE (ROXICODONE) 5 MG TABLET     "Take 1 tablet (5 mg) by mouth every 4 hours as needed for severe pain. If pain is not improved with acetaminophen and ibuprofen.     Modified Medications    No medications on file       =================================================================    HPI    Patient information was obtained from: patient     Use of : N/A       Oscar Zhao is a 79 year old female with a pertinent history of squamous cell carcinoma, HTN, and anemia who presents to this ED by walking for evaluation of abdominal pain.    Patient stated that she was woken up from abdominal pain at 5:30 AM (4.5 hours ago). She said that it first started in her LLQ, but now has suprapubic pain and radiating pain to her hip and low back. She mentioned that her pain was sharp, but now is a dull ache. She reported that she feels like she has to urinate, but hasn't urinated this morning. Patient added that she has had an irregular shape to her stool for 1.5 months, and described her stool as being \"little balls\" recently.     REVIEW OF SYSTEMS   Review of Systems   Respiratory:  Negative for shortness of breath.    Cardiovascular:  Negative for chest pain.   Gastrointestinal:  Positive for abdominal pain and nausea. Negative for diarrhea and vomiting.   Genitourinary:  Negative for dysuria.        PHYSICAL EXAM    BP (!) 146/83   Pulse 75   Temp 97.8  F (36.6  C) (Oral)   Resp 18   Ht 1.6 m (5' 3\")   Wt 60.8 kg (134 lb)   LMP  (LMP Unknown)   SpO2 93%   BMI 23.74 kg/m    PHYSICAL EXAM    Constitutional: Well developed, Well nourished, NAD  HENT: Normocephalic, Atraumatic, Bilateral external ears normal, Oropharynx normal, mucous membranes moist, Nose normal. Neck-  Normal range of motion, No tenderness, Supple, No stridor.   Eyes: PERRL, EOMI, Conjunctiva normal, No discharge.   Respiratory: Normal breath sounds, No respiratory distress, No wheezing, Speaks full sentences easily. No cough.   Cardiovascular: Normal heart rate, Regular " rhythm, No murmurs Chest wall nontender.    GI: Left lower quadrant abdominal pain to palpation.  : No cva tenderness    Musculoskeletal: 2+ DP pulses. No edema. No cyanosis. Good range of motion in all major joints. No tenderness to palpation. No tenderness of the CTLS spine.   Integument: Warm, Dry, No erythema, No rash. No petechiae.   Neurologic: Alert & oriented x 3, Normal motor function, Normal sensory function, No focal deficits noted.   Psychiatric: Affect normal, Judgment normal, Mood normal. Cooperative.        LAB:  All pertinent labs reviewed and interpreted.  Results for orders placed or performed during the hospital encounter of 10/21/24   CT Abdomen Pelvis w Contrast    Impression    IMPRESSION:   1.  Obstructing 3 mm left ureterovesical junction stone with mild left-sided hydroureteronephrosis and severe perinephric and periureteral edema which could reflect superimposed infection. Recommend laboratory correlation.  2.  Enlarging large hiatal hernia.  3.  Additional chronic findings as described above.   Comprehensive metabolic panel   Result Value Ref Range    Sodium 136 135 - 145 mmol/L    Potassium 4.1 3.4 - 5.3 mmol/L    Carbon Dioxide (CO2) 20 (L) 22 - 29 mmol/L    Anion Gap 16 (H) 7 - 15 mmol/L    Urea Nitrogen 12.4 8.0 - 23.0 mg/dL    Creatinine 0.67 0.51 - 0.95 mg/dL    GFR Estimate 89 >60 mL/min/1.73m2    Calcium 9.6 8.8 - 10.4 mg/dL    Chloride 100 98 - 107 mmol/L    Glucose 182 (H) 70 - 99 mg/dL    Alkaline Phosphatase 112 40 - 150 U/L    AST 26 0 - 45 U/L    ALT 17 0 - 50 U/L    Protein Total 7.4 6.4 - 8.3 g/dL    Albumin 4.3 3.5 - 5.2 g/dL    Bilirubin Total 0.8 <=1.2 mg/dL   UA with Microscopic reflex to Culture    Specimen: Urine, Clean Catch   Result Value Ref Range    Color Urine Light Yellow Colorless, Straw, Light Yellow, Yellow    Appearance Urine Clear Clear    Glucose Urine 150 (A) Negative mg/dL    Bilirubin Urine Negative Negative    Ketones Urine Negative Negative mg/dL     Specific Gravity Urine 1.016 1.001 - 1.030    Blood Urine Negative Negative    pH Urine 7.0 5.0 - 7.0    Protein Albumin Urine 10 (A) Negative mg/dL    Urobilinogen Urine <2.0 <2.0 mg/dL    Nitrite Urine Negative Negative    Leukocyte Esterase Urine Negative Negative    Mucus Urine Present (A) None Seen /LPF    RBC Urine 1 <=2 /HPF    WBC Urine 1 <=5 /HPF    Squamous Epithelials Urine <1 <=1 /HPF    Hyaline Casts Urine 1 <=2 /LPF   Result Value Ref Range    Lipase 37 13 - 60 U/L   Lactic acid whole blood with 1x repeat in 2 hr when >2   Result Value Ref Range    Lactic Acid, Initial 2.2 (H) 0.7 - 2.0 mmol/L   CBC with platelets and differential   Result Value Ref Range    WBC Count 8.5 4.0 - 11.0 10e3/uL    RBC Count 5.51 (H) 3.80 - 5.20 10e6/uL    Hemoglobin 14.8 11.7 - 15.7 g/dL    Hematocrit 48.6 (H) 35.0 - 47.0 %    MCV 88 78 - 100 fL    MCH 26.9 26.5 - 33.0 pg    MCHC 30.5 (L) 31.5 - 36.5 g/dL    RDW 14.7 10.0 - 15.0 %    Platelet Count 222 150 - 450 10e3/uL    % Neutrophils 86 %    % Lymphocytes 9 %    % Monocytes 4 %    % Eosinophils 0 %    % Basophils 1 %    % Immature Granulocytes 1 %    NRBCs per 100 WBC 0 <1 /100    Absolute Neutrophils 7.3 1.6 - 8.3 10e3/uL    Absolute Lymphocytes 0.7 (L) 0.8 - 5.3 10e3/uL    Absolute Monocytes 0.4 0.0 - 1.3 10e3/uL    Absolute Eosinophils 0.0 0.0 - 0.7 10e3/uL    Absolute Basophils 0.0 0.0 - 0.2 10e3/uL    Absolute Immature Granulocytes 0.0 <=0.4 10e3/uL    Absolute NRBCs 0.0 10e3/uL   Extra Blue Top Tube   Result Value Ref Range    Hold Specimen JIC    Lactic acid whole blood   Result Value Ref Range    Lactic Acid 1.3 0.7 - 2.0 mmol/L   Result Value Ref Range    CRP Inflammation <3.00 <5.00 mg/L       RADIOLOGY:  Reviewed all pertinent imaging. Please see official radiology report.  CT Abdomen Pelvis w Contrast   Final Result   IMPRESSION:    1.  Obstructing 3 mm left ureterovesical junction stone with mild left-sided hydroureteronephrosis and severe perinephric and  Yes periureteral edema which could reflect superimposed infection. Recommend laboratory correlation.   2.  Enlarging large hiatal hernia.   3.  Additional chronic findings as described above.          I, Akira Diana, am serving as a scribe to document services personally performed by Aditya Acevedo MD based on my observation and the provider's statements to me. I, Aditya Acevedo MD, attest that Akira Diana is acting in a scribe capacity, has observed my performance of the services and has documented them in accordance with my direction.    Aditya Acevedo MD  [facility] EMERGENCY ROOM  [address] [id number] [phone]       Aditya Acevedo MD  10/21/24 0451

## 2024-10-22 ENCOUNTER — PATIENT OUTREACH (OUTPATIENT)
Dept: INTERNAL MEDICINE | Facility: CLINIC | Age: 79
End: 2024-10-22
Payer: MEDICARE

## 2024-10-22 NOTE — TELEPHONE ENCOUNTER
Transitions of Care Outreach  Chief Complaint   Patient presents with    Hospital F/U       Most Recent Admission Date: 10/21/2024   Most Recent Admission Diagnosis:      Most Recent Discharge Date: 10/21/2024   Most Recent Discharge Diagnosis: Ureteral stone - N20.1     Transitions of Care Assessment    Discharge Assessment  How are you doing now that you are home?: No pain or nausea. She thinks she already passed the kidney stone. She is feeling really good.  How are your symptoms? (Red Flag symptoms escalate to triage hotline per guidelines): Improved  Do you know how to contact your clinic care team if you have future questions or changes to your health status? : Yes  Does the patient have their discharge instructions? : Yes  Does the patient have questions regarding their discharge instructions? : No  Were you started on any new medications or were there changes to any of your previous medications? : Yes  Does the patient have all of their medications?: Yes  Do you have questions regarding any of your medications? : No  Do you have all of your needed medical supplies or equipment (DME)?  (i.e. oxygen tank, CPAP, cane, etc.): Yes    Follow up Plan     Discharge Follow-Up  Discharge follow up appointment scheduled in alignment with recommended follow up timeframe or Transitions of Risk Category? (Low = within 30 days; Moderate= within 14 days; High= within 7 days): No  Patient's follow up appointment not scheduled: Patient declined scheduling support. Education on the importance of transitions of care follow up. Provided scheduling phone number.    Future Appointments   Date Time Provider Department Center   2/10/2025 10:30 AM [facility]   2/10/2025 11:00 AM [facility] 2 [facility]   2/13/2025  2:00 PM Patsy Spencer MD [facility]   2/17/2025 11:30 AM Stephen Beck MD [facility]       Outpatient Plan as outlined on AVS reviewed with patient.    For any  urgent concerns, please contact our 24 hour nurse triage line: [phone] ([phone])

## 2024-11-12 ENCOUNTER — VIRTUAL VISIT (OUTPATIENT)
Dept: INTERNAL MEDICINE | Facility: CLINIC | Age: 79
End: 2024-11-12
Payer: MEDICARE

## 2024-11-12 DIAGNOSIS — N20.0 NEPHROLITHIASIS: Primary | ICD-10-CM

## 2024-11-12 PROCEDURE — 99442 PR PHYSICIAN TELEPHONE EVALUATION 11-20 MIN: CPT | Mod: 93 | Performed by: INTERNAL MEDICINE

## 2024-11-12 NOTE — PROGRESS NOTES
Oscar Zhao is a 79 year oldwho is being evaluated via a billable telephone visit.       What phone number would you like to be contacted at? [phone]      Assessment & Plan  (N20.0) Nephrolithiasis  (primary encounter diagnosis)  Comment: Episode of nephrolithiasis.  Recent CT scan showing obstructing stone.  Discussed dietary changes.  Most stones are calcium oxalate especially oxalate is the culprit.  Less so calcium.  Plan: Advised patient to decrease oxalate intake.  She had been using nuts and tea in her diet extensively.    Dyspepsia.  Famotidine may be beneficial OTC 20 mg twice daily with food to enhance absorption.    Constipation.  Irritable bowel syndrome.  Suggest fluids exercise and Metamucil 1 heaping tablespoon daily.  We had a good discussion.         15 minutes spent on the date of the encounter doing chart review, patient visit and documentation I spoke with the patient directly on the phone she was in her home I was here at the [facility].  We spoke on the phone for 11 minutes.       Subjective  History as noted above.  Feeling better although she did receive the COVID-vaccine and the flu shot yesterday and woke up with marked aching in the joints and muscles.  Suggest fluids rest and arthritis strength Tylenol 2 tablets 3 times a day for the next 2 or 3 days to help with symptoms.  Patient was reassured.     Review of Systems   No blood in stool or urine denies melena medication list reviewed reconciled in the chart.  No longer on Protonix.  Wonder if Protonix can cause kidney stones.  Wonders if Pepcid or famotidine can cause kidney stones.  See above discussion about calcium oxalate especially at night oxalate as the culprit.  Dietary restriction of tea and nuts especially cashew seem to be in order.       Jak Jones MD    The longitudinal plan of care for the diagnosis(es)/condition(s) as documented were addressed during this visit. Due to the added complexity in care, I  will continue to support Maritza in the subsequent management and with ongoing continuity of care.

## 2024-11-20 ENCOUNTER — HOSPITAL ENCOUNTER (EMERGENCY)
Facility: CLINIC | Age: 79
Discharge: HOME | End: 2024-11-20
Attending: EMERGENCY MEDICINE
Payer: MEDICARE

## 2024-11-20 ENCOUNTER — APPOINTMENT (OUTPATIENT)
Dept: CT IMAGING | Facility: CLINIC | Age: 79
End: 2024-11-20
Attending: EMERGENCY MEDICINE
Payer: MEDICARE

## 2024-11-20 ENCOUNTER — NURSE TRIAGE (OUTPATIENT)
Dept: ONCOLOGY | Facility: HOSPITAL | Age: 79
End: 2024-11-20
Payer: MEDICARE

## 2024-11-20 VITALS
RESPIRATION RATE: 21 BRPM | WEIGHT: 135 LBS | BODY MASS INDEX: 23.92 KG/M2 | OXYGEN SATURATION: 98 % | DIASTOLIC BLOOD PRESSURE: 71 MMHG | TEMPERATURE: 97.4 F | HEART RATE: 69 BPM | HEIGHT: 63 IN | SYSTOLIC BLOOD PRESSURE: 124 MMHG

## 2024-11-20 DIAGNOSIS — K44.9 HIATAL HERNIA: ICD-10-CM

## 2024-11-20 LAB
ANION GAP SERPL CALCULATED.3IONS-SCNC: 8 MMOL/L (ref 7–15)
ATRIAL RATE - MUSE: 69 BPM
BASOPHILS # BLD AUTO: 0 10E3/UL (ref 0–0.2)
BASOPHILS NFR BLD AUTO: 1 %
BUN SERPL-MCNC: 11 MG/DL (ref 8–23)
CALCIUM SERPL-MCNC: 9.5 MG/DL (ref 8.8–10.4)
CHLORIDE SERPL-SCNC: 105 MMOL/L (ref 98–107)
CREAT SERPL-MCNC: 0.59 MG/DL (ref 0.51–0.95)
DIASTOLIC BLOOD PRESSURE - MUSE: NORMAL MMHG
EGFRCR SERPLBLD CKD-EPI 2021: >90 ML/MIN/1.73M2
EOSINOPHIL # BLD AUTO: 0.2 10E3/UL (ref 0–0.7)
EOSINOPHIL NFR BLD AUTO: 3 %
ERYTHROCYTE [DISTWIDTH] IN BLOOD BY AUTOMATED COUNT: 15.5 % (ref 10–15)
FLUAV RNA SPEC QL NAA+PROBE: NEGATIVE
FLUBV RNA RESP QL NAA+PROBE: NEGATIVE
GLUCOSE SERPL-MCNC: 124 MG/DL (ref 70–99)
HCO3 SERPL-SCNC: 24 MMOL/L (ref 22–29)
HCT VFR BLD AUTO: 43.4 % (ref 35–47)
HGB BLD-MCNC: 13 G/DL (ref 11.7–15.7)
HOLD SPECIMEN: NORMAL
IMM GRANULOCYTES # BLD: 0 10E3/UL
IMM GRANULOCYTES NFR BLD: 0 %
INTERPRETATION ECG - MUSE: NORMAL
LYMPHOCYTES # BLD AUTO: 1.3 10E3/UL (ref 0.8–5.3)
LYMPHOCYTES NFR BLD AUTO: 19 %
MCH RBC QN AUTO: 25 PG (ref 26.5–33)
MCHC RBC AUTO-ENTMCNC: 30 G/DL (ref 31.5–36.5)
MCV RBC AUTO: 83 FL (ref 78–100)
MONOCYTES # BLD AUTO: 0.4 10E3/UL (ref 0–1.3)
MONOCYTES NFR BLD AUTO: 6 %
NEUTROPHILS # BLD AUTO: 5.1 10E3/UL (ref 1.6–8.3)
NEUTROPHILS NFR BLD AUTO: 71 %
NRBC # BLD AUTO: 0 10E3/UL
NRBC BLD AUTO-RTO: 0 /100
NT-PROBNP SERPL-MCNC: 220 PG/ML (ref 0–1800)
P AXIS - MUSE: 15 DEGREES
PLATELET # BLD AUTO: 257 10E3/UL (ref 150–450)
POTASSIUM SERPL-SCNC: 4.3 MMOL/L (ref 3.4–5.3)
PR INTERVAL - MUSE: 186 MS
QRS DURATION - MUSE: 68 MS
QT - MUSE: 374 MS
QTC - MUSE: 400 MS
R AXIS - MUSE: 21 DEGREES
RBC # BLD AUTO: 5.21 10E6/UL (ref 3.8–5.2)
RSV RNA SPEC NAA+PROBE: NEGATIVE
SARS-COV-2 RNA RESP QL NAA+PROBE: NEGATIVE
SODIUM SERPL-SCNC: 137 MMOL/L (ref 135–145)
SYSTOLIC BLOOD PRESSURE - MUSE: NORMAL MMHG
T AXIS - MUSE: 46 DEGREES
TROPONIN T SERPL HS-MCNC: <6 NG/L
VENTRICULAR RATE- MUSE: 69 BPM
WBC # BLD AUTO: 7.1 10E3/UL (ref 4–11)

## 2024-11-20 PROCEDURE — 93005 ELECTROCARDIOGRAM TRACING: CPT | Performed by: EMERGENCY MEDICINE

## 2024-11-20 PROCEDURE — 87637 SARSCOV2&INF A&B&RSV AMP PRB: CPT | Performed by: EMERGENCY MEDICINE

## 2024-11-20 PROCEDURE — 71275 CT ANGIOGRAPHY CHEST: CPT | Mod: MF

## 2024-11-20 PROCEDURE — 80048 BASIC METABOLIC PNL TOTAL CA: CPT | Performed by: EMERGENCY MEDICINE

## 2024-11-20 PROCEDURE — 85025 COMPLETE CBC W/AUTO DIFF WBC: CPT | Performed by: EMERGENCY MEDICINE

## 2024-11-20 PROCEDURE — 36415 COLL VENOUS BLD VENIPUNCTURE: CPT | Performed by: EMERGENCY MEDICINE

## 2024-11-20 PROCEDURE — 84484 ASSAY OF TROPONIN QUANT: CPT | Performed by: EMERGENCY MEDICINE

## 2024-11-20 PROCEDURE — 83880 ASSAY OF NATRIURETIC PEPTIDE: CPT | Performed by: EMERGENCY MEDICINE

## 2024-11-20 PROCEDURE — G1010 CDSM STANSON: HCPCS

## 2024-11-20 PROCEDURE — 99285 EMERGENCY DEPT VISIT HI MDM: CPT | Mod: 25

## 2024-11-20 PROCEDURE — 250N000011 HC RX IP 250 OP 636: Performed by: EMERGENCY MEDICINE

## 2024-11-20 RX ORDER — IOPAMIDOL 755 MG/ML
90 INJECTION, SOLUTION INTRAVASCULAR ONCE
Status: COMPLETED | OUTPATIENT
Start: 2024-11-20 | End: 2024-11-20

## 2024-11-20 RX ADMIN — IOPAMIDOL 90 ML: 755 INJECTION, SOLUTION INTRAVENOUS at 12:13

## 2024-11-20 ASSESSMENT — ENCOUNTER SYMPTOMS
SHORTNESS OF BREATH: 1
SORE THROAT: 0
COUGH: 0
FEVER: 0
CHEST TIGHTNESS: 1

## 2024-11-20 ASSESSMENT — ACTIVITIES OF DAILY LIVING (ADL)
ADLS_ACUITY_SCORE: 0

## 2024-11-20 NOTE — ED TRIAGE NOTES
Pt c/o SOB x1 day. States she can't get a deep breath and coughs when she does try to breathe deeply. Denies CP or calf pain. Endorses hx of blood clots ~9 months ago. States she had DVT post hip surgery. Was on xarelto for 6 months but is not currently on thinners.     Triage Assessment (Adult)       Row Name 11/20/24 1024          Triage Assessment    Airway WDL WDL        Respiratory WDL    Respiratory WDL X;rhythm/pattern     Rhythm/Pattern, Respiratory tachypneic;shortness of breath        Skin Circulation/Temperature WDL    Skin Circulation/Temperature WDL WDL        Cardiac WDL    Cardiac WDL WDL        Peripheral/Neurovascular WDL    Peripheral Neurovascular WDL WDL        Cognitive/Neuro/Behavioral WDL    Cognitive/Neuro/Behavioral WDL WDL        [place] Coma Scale    Best Eye Response 4-->(E4) spontaneous     Best Motor Response 6-->(M6) obeys commands     Best Verbal Response 5-->(V5) oriented     [place] Coma Scale Score 15

## 2024-11-20 NOTE — ED PROVIDER NOTES
EMERGENCY DEPARTMENT ENCOUNTER      NAME: Oscar Zhao  AGE: 79 year old female  YOB: 1945  MRN: [id number]  EVALUATION DATE & TIME: 11/20/2024 11:06 AM    PCP: Jak Jones    ED PROVIDER: Aditya Acevedo M.D.    Chief Complaint   Patient presents with    Shortness of Breath     FINAL IMPRESSION:  1. Hiatal hernia        ED COURSE & MEDICAL DECISION MAKING:    Pertinent Labs & Imaging studies reviewed. (See chart for details)  79 year old female presents to the Emergency Department for evaluation of shortness of breath.  Patient has history known hiatal hernia, previous pulmonary emboli secondary to surgery back in February 2024 no longer on anticoagulation presents emergency department with increased shortness of breath.  On examination patient noted abnormal vital signs.  Did not appear to be in acute distress.  Breath sounds normal to auscultation.  Initial ECG at arrival nonischemic.  Recommended cardiac workup including troponin with plan for to go directly CTA imaging given history of pulmonary embolism I think patient is high enough risk that CTA imaging is indicated for further assessment.  Will plan to reassess after laboratory testing and CTA.    1:45 PM  Troponin was nonischemic.  ECG nonischemic.  Viral studies negative.  BNP normal.  Nonischemic.  CTA performed demonstrating progression of patient's large hiatal hernia with some compressive atelectasis to the left lower lung given the size of the hernia.  Relative to her previous diagnosis of pulmonary embolism there is no acute pulmonary embolism identified on her CT pulmonary angiogram.  Taking in the context of her negative cardiac workup negative thromboembolic workup I think the overall history is most consistent with progression of her hiatal hernia now with some compressive atelectasis to the left lung that is manifesting as some increased shortness of breath with activity.  I recommended to the patient that we get her in  for follow-up with the surgical team for considerations of surgical intervention in light of the symptomatology.  She periodically takes famotidine for GERD and previous ulcers I recommended she schedule that daily.  The overall plan of care is for discharge home I placed referral for general surgery I counseled the patient on the need for close follow-up we discussed the treatment plan follow-up plan and return precautions prior to discharge she was comfortable the plan of care.    11:26 AM I met with the patient, obtained history, performed an initial exam, and discussed options and plan for diagnostics and treatment here in the ED.  1:40 PM We discussed plans for discharge including supportive cares, symptomatic treatment, outpatient follow up, and reasons to return to the emergency department.    Medical Decision Making  Obtained supplemental history:Supplemental history obtained?: No  Reviewed external records: External records reviewed?: Documented in chart and Outpatient Record: 9/17/2024 [facility] in [place]  Visit  Care impacted by chronic illness:Hypertension and Other: Anemia, chronic fatigue  Did you consider but not order tests?: Work up considered but not performed and documented in chart, if applicable  Did you interpret images independently?: Independent interpretation of ECG and images noted in documentation, when applicable.  Consultation discussion with other provider:Did you involve another provider (consultant, , pharmacy, etc.)?: No  Discharge. No recommendations on prescription strength medication(s). I considered admission, but discharged the patient after share decision making conversation.    MIPS: CT Pulmonary Angiogram:Patient is moderate to high risk for PE.      At the conclusion of the encounter I discussed the results of all of the tests and the disposition. The questions were answered. The patient or family acknowledged understanding and was agreeable with the care  plan.       MEDICATIONS GIVEN IN THE EMERGENCY:  Medications   iopamidol (ISOVUE-370) solution 90 mL (90 mLs Intravenous $Given 11/20/24 1213)       NEW PRESCRIPTIONS STARTED AT TODAY'S ER VISIT  New Prescriptions    No medications on file     Modified Medications    No medications on file       =================================================================    HPI    Patient information was obtained from: the patient    Use of : N/A        Oscar Zhao is a 79 year old female with a pertinent history of hypertension, acute DVT of proximal vein of right lower extremity, anemia and chronic fatigue who presents to this ED by walk-in for evaluation of shortness of breath.     The patient has had exertional shortness of breath for one day. She was exercising at the gym yesterday (11/19) when she became more winded than usual. She also endorses some chest tightness.    She otherwise denies having chest pain, fever, cough, sore throat or leg pain or swelling.    The patient was diagnosed with a blood clot in February of 2024. It was believed that her blood clot was provoked by surgery. She was on a blood thinner for six months up until August of 2024.  She is not currently on blood thinners.    Per Chart Review, the patient was seen on 09/17/2024 at [facility] in [place] for follow-up on:  Type 2 diabetes: Stable. She had a blood sugar of either 120 or 102 mg/dL at her prior primary care visit. Serum calcium level was slightly elevated to 10.6. A repeat was ordered and she had a serum calcium level of 10.4. Hemoglobin A1c of 5.4. Her hydroxyzine and thiamine were refilled.  Hypertension: She had a blood pressure of 116/84. She was continued on amlodipine.  Pulmonary embolus: The patient had been seen by hematology oncology. She was off of Xarelto. She had previously been taking Xarelto 20 mg daily for six months. There was no recurrence of DVT or PE and she was to remain off of  "Xarelto.    Per Chart Review, the patient was admitted to [facility] from 02/08/2024-02/12/2024 with bilateral PE and RLE DVT. Bilateral PE and RLE DVT were found incidentally. CT CAP showed lobar and segmental PE on the right and segmental and subsegmental PE on the left; there was no evidence of right heart strain. The patient was started on heparin, which was continued until the night prior to her EGD/colonoscopy. EGD/colonoscopy was performed due to hemoglobin of 6.9 on admission and history of gastric ulcer and anal cancer in remission. The patient was transfused with two units of packed red blood cells and her hemoglobin remained stable at 9-10 afterwards. After EGD/colonoscopy, the patient was transitioned to Xarelto. She was instructed to follow-up with hematology to discuss blood clotting risk factors and further workup.       REVIEW OF SYSTEMS   Review of Systems   Constitutional:  Negative for fever.   HENT:  Negative for sore throat.    Respiratory:  Positive for chest tightness and shortness of breath. Negative for cough.    Cardiovascular:  Negative for chest pain and leg swelling.   All other systems reviewed and are negative.       PHYSICAL EXAM    /71   Pulse 69   Temp 97.4  F (36.3  C) (Oral)   Resp 21   Ht 1.6 m (5' 3\")   Wt 61.2 kg (135 lb)   LMP  (LMP Unknown)   SpO2 98%   BMI 23.91 kg/m    PHYSICAL EXAM    Constitutional: Well developed, Well nourished, NAD  HENT: Normocephalic, Atraumatic, Bilateral external ears normal, Oropharynx normal, mucous membranes moist, Nose normal. Neck-  Normal range of motion, No tenderness, Supple, No stridor.   Eyes: PERRL, EOMI, Conjunctiva normal, No discharge.   Respiratory: Normal breath sounds, No respiratory distress, No wheezing, Speaks full sentences easily. No cough.   Cardiovascular: Normal heart rate, Regular rhythm, No murmurs Chest wall nontender.    GI:  Soft, No tenderness, No masses, No flank tenderness. No rebound or " guarding.  : No cva tenderness    Musculoskeletal: 2+ DP pulses. No edema. No cyanosis. Good range of motion in all major joints. No tenderness to palpation. No tenderness of the CTLS spine.   Integument: Warm, Dry, No erythema, No rash. No petechiae.   Neurologic: Alert & oriented x 3, Normal motor function, Normal sensory function, No focal deficits noted.   Psychiatric: Affect normal, Judgment normal, Mood normal. Cooperative.     LAB:  All pertinent labs reviewed and interpreted.  Results for orders placed or performed during the hospital encounter of 11/20/24   CT Chest Pulmonary Embolism w Contrast    Impression    IMPRESSION:    1.  No acute pulmonary embolism. Most of the pulmonary artery filling defects present in February 2024 have resolved with only one residual nonocclusive subsegmental defect in a subsegmental branch of the posterior basal right lower lobe.  2.  Large hiatal hernia with nearly intrathoracic stomach.  3.  Mass effect from #2 on the medial left lower lobe, unchanged. No acute lung, airway, or pleural inflammatory process.   Result Value Ref Range    Troponin T, High Sensitivity <6 <=14 ng/L   Basic metabolic panel   Result Value Ref Range    Sodium 137 135 - 145 mmol/L    Potassium 4.3 3.4 - 5.3 mmol/L    Chloride 105 98 - 107 mmol/L    Carbon Dioxide (CO2) 24 22 - 29 mmol/L    Anion Gap 8 7 - 15 mmol/L    Urea Nitrogen 11.0 8.0 - 23.0 mg/dL    Creatinine 0.59 0.51 - 0.95 mg/dL    GFR Estimate >90 >60 mL/min/1.73m2    Calcium 9.5 8.8 - 10.4 mg/dL    Glucose 124 (H) 70 - 99 mg/dL   Influenza A/B, RSV and SARS-CoV2 PCR (COVID-19) Nasopharyngeal    Specimen: Nasopharyngeal; Swab   Result Value Ref Range    Influenza A PCR Negative Negative    Influenza B PCR Negative Negative    RSV PCR Negative Negative    SARS CoV2 PCR Negative Negative   Extra Blue Top Tube   Result Value Ref Range    Hold Specimen JIC    CBC with platelets and differential   Result Value Ref Range    WBC Count 7.1 4.0  - 11.0 10e3/uL    RBC Count 5.21 (H) 3.80 - 5.20 10e6/uL    Hemoglobin 13.0 11.7 - 15.7 g/dL    Hematocrit 43.4 35.0 - 47.0 %    MCV 83 78 - 100 fL    MCH 25.0 (L) 26.5 - 33.0 pg    MCHC 30.0 (L) 31.5 - 36.5 g/dL    RDW 15.5 (H) 10.0 - 15.0 %    Platelet Count 257 150 - 450 10e3/uL    % Neutrophils 71 %    % Lymphocytes 19 %    % Monocytes 6 %    % Eosinophils 3 %    % Basophils 1 %    % Immature Granulocytes 0 %    NRBCs per 100 WBC 0 <1 /100    Absolute Neutrophils 5.1 1.6 - 8.3 10e3/uL    Absolute Lymphocytes 1.3 0.8 - 5.3 10e3/uL    Absolute Monocytes 0.4 0.0 - 1.3 10e3/uL    Absolute Eosinophils 0.2 0.0 - 0.7 10e3/uL    Absolute Basophils 0.0 0.0 - 0.2 10e3/uL    Absolute Immature Granulocytes 0.0 <=0.4 10e3/uL    Absolute NRBCs 0.0 10e3/uL   Nt probnp inpatient (BNP)   Result Value Ref Range    N terminal Pro BNP Inpatient 220 0 - 1,800 pg/mL   ECG 12-LEAD WITH MUSE (LHE)   Result Value Ref Range    Systolic Blood Pressure  mmHg    Diastolic Blood Pressure  mmHg    Ventricular Rate 69 BPM    Atrial Rate 69 BPM    UT Interval 186 ms    QRS Duration 68 ms     ms    QTc 400 ms    P Axis 15 degrees    R AXIS 21 degrees    T Axis 46 degrees    Interpretation ECG       Sinus rhythm  Low voltage QRS  Cannot rule out Anterior infarct , age undetermined  Abnormal ECG  When compared with ECG of 07-Apr-2024 13:27,  Minimal criteria for Anterior infarct are now Present  Confirmed by SEE ED PROVIDER NOTE FOR, ECG INTERPRETATION ([id number]),  Suzy Mathews ([id number]) on 11/20/2024 11:43:33 AM         RADIOLOGY:  Reviewed all pertinent imaging. Please see official radiology report.  CT Chest Pulmonary Embolism w Contrast   Final Result   IMPRESSION:      1.  No acute pulmonary embolism. Most of the pulmonary artery filling defects present in February 2024 have resolved with only one residual nonocclusive subsegmental defect in a subsegmental branch of the posterior basal right lower lobe.   2.  Large hiatal hernia with  nearly intrathoracic stomach.   3.  Mass effect from #2 on the medial left lower lobe, unchanged. No acute lung, airway, or pleural inflammatory process.          EKG:    Performed at: 1025  EKG Interpretation    Independently interpreted by me    Rhythm: normal sinus   Rate: normal  Axis: normal  Ectopy: none  Conduction: normal  ST Segments: no acute change  T Waves: no acute change  Q Waves: none    Clinical Impression: no acute changes and normal EKG    I have independently reviewed and interpreted the EKG(s) documented above.         I, Kay Blair, am serving as a scribe to document services personally performed by Aditya Acevedo M.D. based on my observation and the provider's statements to me. I, Aditya Acevedo M.D., attest that Kay Blair is acting in a scribe capacity, has observed my performance of the services and has documented them in accordance with my direction.    Aditya Acevedo M.D.  [facility] EMERGENCY ROOM  [address] [id number] [phone]         Aditya Acevedo MD  11/20/24 2477

## 2024-11-20 NOTE — DISCHARGE INSTRUCTIONS
As we discussed I think your symptomatology is secondary to the progression of your hiatal hernia now compressing the lower portion of your lung and impairing her ability to fully inflate the lung.  I recommend taking your famotidine daily as a prophylaxis for ulcers.  I recommend close follow-up with surgery I placed a referral expect phone call tomorrow.  If you have escalation to your symptoms please return to the emergency department for repeat assessment.

## 2024-11-20 NOTE — TELEPHONE ENCOUNTER
"Patient calls with concerns regarding her breathing. She reports that she noticed some difficulty breathing when out walking a week ago. She reports that she has difficulty taking a deep breath, and has increased shortness of breath with exertion. She has had some times where she feels difficulty breathing at rest. Patient denies any chest pain. Denies any recent illness, cough or fever. Patient does feel that she is a little more tired than normal. Patient had a PE diagnosed in February, she went off blood thinners in August.     Patient is instructed to go the ED for evaluation of symptoms. She verbalizes understanding and denies further questions at this time.    Luz Marina Holder RN    Reason for Disposition   MODERATE difficulty breathing (e.g., speaks in phrases, SOB even at rest, pulse 100-120) of new-onset or worse than normal   Any history of prior \"blood clot\" in leg or lungs    Additional Information   Negative: SEVERE difficulty breathing (e.g., struggling for each breath, speaks in single words, pulse > 120)   Negative: Breathing stopped and hasn't returned   Negative: Choking on something   Negative: Bluish (or gray) lips or face   Negative: Difficult to awaken or acting confused (e.g., disoriented, slurred speech)   Negative: Passed out (i.e., fainted, collapsed and was not responding)   Negative: Wheezing started suddenly after medicine, an allergic food, or bee sting   Negative: Stridor (harsh sound while breathing in)   Negative: Slow, shallow and weak breathing   Negative: Sounds like a life-threatening emergency to the triager   Negative: Chest pain   Negative: Wheezing (high pitched whistling sound) and previous asthma attacks or use of asthma medicines   Negative: Difficulty breathing and within 14 days of COVID-19 Exposure   Negative: Difficulty breathing and only present when coughing   Negative: Difficulty breathing and only from stuffy nose   Negative: Difficulty breathing and only from " stuffy nose or runny nose from common cold   Negative: Oxygen level (e.g., pulse oximetry) 90% or lower   Negative: Wheezing can be heard across the room   Negative: Drooling or spitting out saliva (because can't swallow)    Protocols used: Breathing Difficulty-A-OH

## 2024-11-21 ENCOUNTER — PATIENT OUTREACH (OUTPATIENT)
Dept: CARE COORDINATION | Facility: CLINIC | Age: 79
End: 2024-11-21
Payer: MEDICARE

## 2024-11-21 NOTE — PROGRESS NOTES
Clinic Care Coordination Contact  Community Health Worker Initial Outreach    CHW Initial Information Gathering:  Referral Source: ED Follow-Up  Current living arrangement:: Not Assessed  CHW Additional Questions  If ED/Hospital discharge, follow-up appointment scheduled as recommended?: Yes  Medication changes made following ED/Hospital discharge?: No  MyChart active?: Yes  Patient sent Social Drivers of Health questionnaire?: No    Spoke to patient. Maritza shares she is doing really good. Reports no new or worsening symptoms. Expressed gratitude for Cotton and the care she has received. All questions were answered. No additional support is needed at this time.     Patient accepts CC: No, declined. Patient will be sent Care Coordination introduction letter for future reference.    Patsy Madrid [facility]  Care Coordination

## 2024-11-21 NOTE — LETTER
Oscar Zhoa  [address] [address]    Dear Oscar Zhao,      I am a team member within the [facility] Care Resource Center with [facility]. I recently spoke to you to ensure you were doing well following a recent visit within our health system. I also wanted to take this chance to introduce Clinic Care Coordination.     Below is a description of Clinic Care Coordination and how this team can further assist you:       The Clinic Care Coordination team is made up of a Registered Nurse, , Financial Resource Worker, and a Community Health Worker who understand and can help navigate the health care system. The goal of clinic care coordination is to help you manage your health, improve access to care, and achieve optimal health outcomes. They work alongside your provider to assist you in determining your health and social needs, obtain health care and community resources, and provide you with necessary information and education. Clinic Care Coordination can work with you through any barriers and develop a care plan that helps coordinate and strengthen the relationship between you and your care team.    If you wish to connect with the Clinic Care Coordination Team, please let your [facility] Primary Care Provider or Clinic Care Team know and they can place a referral. The Clinic Care Coordination team will then reach out by phone to further support you.    We are focused on providing you with the highest-quality healthcare experience possible.    Sincerely,   Your care team with [facility]     For any urgent concerns, please contact our 24 hour nurse triage line: [phone] ([phone])         Patsy Madrid, TRICIA  [facility]  Care Coordination

## 2024-11-26 NOTE — PROGRESS NOTES
GENERAL SURGICAL CONSULTATION    I was requested by Jak Jones to consult on this pt to evaluate them for a large hiatal hernia    HPI:  This is a 79 year old female here today with some shortness of breath.  When this was worked up, it was found that she had a large Hiatal Hernia with most of her stomach up in her chest.  She has known about her Hiatal Hernia for at least 20 years.  It was just a month ago that she became symptomatic with shortness of breath.      She has a history of R leg DVT 2024 with Pulmonary Emboli, in association with having a Right Hip Surgery.  She had been on Xerelto for the 6 months to follow.  She stopped her anticoagulant in 2024    She is status post Anal Cancer in , and is now in a disease free state.        Allergies:Kiwi, Adalimumab, Aspirin, Brimonidine-timolol [brimonidine tartrate-timolol], Levaquin [levofloxacin], and Tetracyclines & related    Past Medical History:   Diagnosis Date    Anemia, unspecified type 2024    Arthritis     Basal cell carcinoma of anterior chest     Breast cyst     History of anesthesia complications     HLA B27 (HLA B27 positive)     Hypertension     Osteoporosis 2011    Peripheral neuropathy 2018    PONV (postoperative nausea and vomiting)     Scleritis, bilateral     Left .  Treated with corticosteroids,, methotrexate and Humira.  .  Rituximab.    Squamous cell carcinoma of skin of chest     Steroid induced glaucoma, both eyes        Past Surgical History:   Procedure Laterality Date    ARTHROPLASTY REVISION HIP Right 2023    Procedure: RIGHT HIP REVISION TOTAL HIP ARTHROPLASTY;  Surgeon: Bill Bernal DO;  Location: [facility] Main OR    ARTHROPLASTY REVISION HIP Right 2023    Procedure: RIGHT REVISION TOTAL HIP ARTHROPLASTY HEAD LINER EXCHANGE;  Surgeon: Bill Bernal DO;  Location: [facility] Main OR    BREAST CYST EXCISION Right     benign     SECTION      CLOSED  REDUCTION HIP Right 03/26/2019    Procedure: CLOSED REDUCTION, HIP;  Surgeon: Jak Ruiz DO;  Location: [facility];  Service: Orthopedics    CLOSED REDUCTION HIP Right 8/30/2023    Procedure: CLOSED REDUCTION, HIP RIGHT;  Surgeon: Aditya Pedroza MD;  Location: [facility] OR    CLOSED REDUCTION HIP Right 11/4/2023    Procedure: CLOSED REDUCTION, HIP RIGHT;  Surgeon: Jak Allen MD;  Location: [facility] OR    COLONOSCOPY  09/07/2011    COLONOSCOPY  09/27/2005    COLONOSCOPY N/A 2/12/2024    Procedure: COLONOSCOPY;  Surgeon: Chi Figueroa MD;  Location: [facility]    COLONOSCOPY W/ BIOPSIES AND POLYPECTOMY  02/18/2021    16 to 20 mm polyp:tubular adenoma in the ascending colon.  Ulcerated mass in the anal canal: Moderately differentiated squamous cell cancer.    ESOPHAGOSCOPY, GASTROSCOPY, DUODENOSCOPY (EGD), COMBINED  05/05/2017    Large hiatal hernia.  Reactive gastropathy with mucosal erosion.  H. pylori negative.    ESOPHAGOSCOPY, GASTROSCOPY, DUODENOSCOPY (EGD), COMBINED N/A 2/12/2024    Procedure: ESOPHAGOGASTRODUODENOSCOPY with biopsies;  Surgeon: Chi Figueroa MD;  Location: [facility] OR    HERNIORRHAPHY FEMORAL Left 9/7/2021    Procedure: LEFT FEMORAL HERNIA REPAIR;  Surgeon: Sidney Murray MD;  Location: [facility]    HYSTERECTOMY TOTAL ABDOMINAL, BILATERAL SALPINGO-OOPHORECTOMY, COMBINED  1996    IR CHEST PORT PLACEMENT > 5 YRS OF AGE  3/15/2021    IR PORT PLACEMENT >5 YEARS  03/15/2021    Right IJ port-a-cath.    IR PORT REMOVAL RIGHT  7/14/2021    LAPAROSCOPIC APPENDECTOMY  04/28/2011    PHACOEMULSIFICATION CLEAR CORNEA WITH STANDARD INTRAOCULAR LENS IMPLANT Right 08/30/2017    PHACOEMULSIFICATION CLEAR CORNEA WITH STANDARD INTRAOCULAR LENS IMPLANT Left 09/13/2017    TOTAL HIP ARTHROPLASTY Right 08/18/2015    Procedure: HIP TOTAL ARTHROPLASTY, RIGHT;  Surgeon: Star Du MD;  Location: [facility];  Service:     [facility]  "TOTAL KNEE ARTHROPLASTY Left 03/02/2017    Procedure: LEFT TOTAL KNEE ARTHROPLASTY;  Surgeon: Star Du MD;  Location: [facility] OR;  Service: Orthopedics       CURRENT MEDS:  Current Outpatient Medications   Medication Sig Dispense Refill    amLODIPine (NORVASC) 5 MG tablet TAKE 2 TABLETS(10 MG) BY MOUTH DAILY 180 tablet 11    EPINEPHrine (ANY BX GENERIC EQUIV) 0.3 MG/0.3ML injection 2-pack Inject 0.3 mLs (0.3 mg) into the muscle as needed for anaphylaxis May repeat one time in 5-15 minutes if response to initial dose is inadequate. 2 each 0    ferrous sulfate (FE TABS) 325 (65 Fe) MG EC tablet Take 325 mg by mouth every other day      hydrOXYzine HCl (ATARAX) 10 MG tablet Take 1 tablet (10 mg) by mouth 3 times daily as needed for itching. 90 tablet 11    LOTEMAX 0.5 % ophthalmic suspension Place 1 drop into both eyes every 48 hours  4    metoprolol succinate ER (TOPROL XL) 25 MG 24 hr tablet Take 1 tablet (25 mg) by mouth daily 30 tablet 0    pantoprazole (PROTONIX) 40 MG EC tablet Take 1 tablet (40 mg) by mouth daily 90 tablet 0    timolol maleate (TIMOPTIC) 0.5 % ophthalmic solution Place 1 drop into both eyes daily      vitamin C (ASCORBIC ACID) 250 MG tablet Take 250 mg by mouth every 48 hours         Family History   Problem Relation Age of Onset    Alzheimer Disease Mother     Heart Disease Father     Pancreatic Cancer Brother 72.00    No Known Problems Daughter     Anesthesia Reaction No family hx of      Family history is not pertinent to this patients Chief Complaint.     reports that she has never smoked. She has never been exposed to tobacco smoke. She has never used smokeless tobacco. She reports current alcohol use of about 4.0 standard drinks of alcohol per week. She reports that she does not use drugs.    Review of Systems -   10 point Review of systems is negative except for; as mentioned above in HPI and PMHx    /68   Ht 1.6 m (5' 3\")   Wt 62.1 kg (136 lb 12.8 oz)   LMP  " (LMP Unknown)   BMI 24.23 kg/m  .    EXAM:  GENERAL: Well developed female  HEENT: EOMI, Anicteric Sclera, Moist Mucous Membranes,  In Mouth the pt does not have redness or bleeding gums  CARDIOVASCULAR: RRR w/out murmur   CHEST/LUNG: Clear to Auscultation  ABDOMEN:  Non tender to palpation, +BS  MUSCULOSKELETAL:  No deformities with good range of motion in all extremities  NEURO: She is ambulatory with good strength in both legs.  HEME/LYMPH: No Cervical Adenopathy or tenderness.     IMAGES:  I evaluated these images myself and she has a large hiatal hernia with most of the stomach up in the chest that predominantly pushes off to the left side.    EXAM: CT CHEST PULMONARY EMBOLISM W CONTRAST  LOCATION: [facility]  DATE: 11/20/2024     INDICATION: History of PE, no longer anticoagulated, shortness of breath and chest tightness  COMPARISON: CT of the chest with contrast 2/28/2024  TECHNIQUE: CT chest pulmonary angiogram during arterial phase injection of IV contrast. Multiplanar reformats and MIP reconstructions were performed. Dose reduction techniques were used.   CONTRAST: Isovue  370 90mL     FINDINGS:  ANGIOGRAM CHEST: Main pulmonary artery is normal in size. The large majority of low-attenuation filling defects in the pulmonary arteries present to 8/20/2024 have resolved. There is a small residual defect within a subsegmental branch in the posterior   basal right lower lobe (series 6, image 191). Thoracic aorta is negative for dissection.     LUNGS AND PLEURA: Passive atelectasis in the medial left lower lobe due to mass effect from mediastinal contents, unchanged. No new airspace or interstitial opacities. Central airways are patent and normal caliber. No pleural effusion.     MEDIASTINUM: Large hiatal hernia with 80% of the stomach intrathoracic. Associated foreshortening of the esophagus. No esophageal wall thickening. Cardiac chambers are normal in size. No pericardial effusion.  No enlarged mediastinal or hilar lymph nodes.     CORONARY ARTERY CALCIFICATION: None.     UPPER ABDOMEN: Normal.     MUSCULOSKELETAL: There are diffuse thoracic spine degenerative osteophytes with discogenic sclerosis at a few levels. No vertebral compression deformities. No aggressive or destructive bone lesions.                                                                      IMPRESSION:     1.  No acute pulmonary embolism. Most of the pulmonary artery filling defects present in February 2024 have resolved with only one residual nonocclusive subsegmental defect in a subsegmental branch of the posterior basal right lower lobe.  2.  Large hiatal hernia with nearly intrathoracic stomach.  3.  Mass effect from #2 on the medial left lower lobe, unchanged. No acute lung, airway, or pleural inflammatory process.    Assessment/Plan:  79-year-old woman who has a profound hiatal versus paraesophageal hiatal hernia, with most of her stomach up in her chest.  This patient started having symptoms of atypical chest pain.  It appears that she would benefit from having her hiatal hernia repaired.    Robotic repair of this patient's paraesophageal hiatal hernia with reduction of her intrathoracic stomach.      Santiago Hinojosa MD  [facility]  [phone]

## 2024-11-27 ENCOUNTER — OFFICE VISIT (OUTPATIENT)
Dept: SURGERY | Facility: CLINIC | Age: 79
End: 2024-11-27
Payer: MEDICARE

## 2024-11-27 VITALS
HEIGHT: 63 IN | SYSTOLIC BLOOD PRESSURE: 110 MMHG | BODY MASS INDEX: 24.24 KG/M2 | WEIGHT: 136.8 LBS | DIASTOLIC BLOOD PRESSURE: 68 MMHG

## 2024-11-27 DIAGNOSIS — K44.9 PARAESOPHAGEAL HIATAL HERNIA: Primary | ICD-10-CM

## 2024-11-27 DIAGNOSIS — I82.411 ACUTE DEEP VEIN THROMBOSIS (DVT) OF FEMORAL VEIN OF RIGHT LOWER EXTREMITY (H): ICD-10-CM

## 2024-11-27 DIAGNOSIS — I26.99 BILATERAL PULMONARY EMBOLISM (H): ICD-10-CM

## 2024-11-27 DIAGNOSIS — K44.9 HIATAL HERNIA: ICD-10-CM

## 2024-11-27 PROCEDURE — 99204 OFFICE O/P NEW MOD 45 MIN: CPT | Performed by: SURGERY

## 2024-11-27 RX ORDER — DORZOLAMIDE HYDROCHLORIDE AND TIMOLOL MALEATE 20; 5 MG/ML; MG/ML
1 SOLUTION/ DROPS OPHTHALMIC 2 TIMES DAILY
COMMUNITY
Start: 2024-11-21

## 2024-11-27 RX ORDER — PREDNISOLONE ACETATE 10 MG/ML
1 SUSPENSION/ DROPS OPHTHALMIC EVERY 6 HOURS PRN
COMMUNITY
Start: 2024-11-21

## 2024-11-27 RX ORDER — ACETAMINOPHEN 325 MG/1
975 TABLET ORAL ONCE
OUTPATIENT
Start: 2024-11-27 | End: 2024-11-27

## 2024-11-27 NOTE — LETTER
11/27/2024      Oscar Zhao  [address]  [place] [address]      Dear Colleague,    Thank you for referring your patient, Oscar Zhao, to the [facility]. Please see a copy of my visit note below.    GENERAL SURGICAL CONSULTATION    I was requested by Jak Jones to consult on this pt to evaluate them for a large hiatal hernia    HPI:  This is a 79 year old female here today with some shortness of breath.  When this was worked up, it was found that she had a large Hiatal Hernia with most of her stomach up in her chest.  She has known about her Hiatal Hernia for at least 20 years.  It was just a month ago that she became symptomatic with shortness of breath.      She has a history of R leg DVT 2/8/2024 with Pulmonary Emboli, in association with having a Right Hip Surgery.  She had been on Xerelto for the 6 months to follow.  She stopped her anticoagulant in August 2024    She is status post Anal Cancer in 2021, and is now in a disease free state.        Allergies:Kiwi, Adalimumab, Aspirin, Brimonidine-timolol [brimonidine tartrate-timolol], Levaquin [levofloxacin], and Tetracyclines & related    Past Medical History:   Diagnosis Date     Anemia, unspecified type 2/8/2024     Arthritis      Basal cell carcinoma of anterior chest      Breast cyst      History of anesthesia complications      HLA B27 (HLA B27 positive)      Hypertension      Osteoporosis 07/19/2011     Peripheral neuropathy 09/12/2018     PONV (postoperative nausea and vomiting)      Scleritis, bilateral     Left 2010.  Treated with corticosteroids,, methotrexate and Humira.  2015.  Rituximab.     Squamous cell carcinoma of skin of chest      Steroid induced glaucoma, both eyes        Past Surgical History:   Procedure Laterality Date     ARTHROPLASTY REVISION HIP Right 9/21/2023    Procedure: RIGHT HIP REVISION TOTAL HIP ARTHROPLASTY;  Surgeon: Bill Bernal DO;  Location:  [facility] OR     ARTHROPLASTY REVISION HIP Right 2023    Procedure: RIGHT REVISION TOTAL HIP ARTHROPLASTY HEAD LINER EXCHANGE;  Surgeon: Bill Bernal DO;  Location: [facility] OR     BREAST CYST EXCISION Right     benign      SECTION  1989     CLOSED REDUCTION HIP Right 2019    Procedure: CLOSED REDUCTION, HIP;  Surgeon: Jak Ruiz DO;  Location: [facility] OR;  Service: Orthopedics     CLOSED REDUCTION HIP Right 2023    Procedure: CLOSED REDUCTION, HIP RIGHT;  Surgeon: Aditya Pedroza MD;  Location: [facility] Main OR     CLOSED REDUCTION HIP Right 2023    Procedure: CLOSED REDUCTION, HIP RIGHT;  Surgeon: Jak Allen MD;  Location: [facility] OR     COLONOSCOPY  2011     COLONOSCOPY  2005     COLONOSCOPY N/A 2024    Procedure: COLONOSCOPY;  Surgeon: Chi Figueroa MD;  Location: [facility]     COLONOSCOPY W/ BIOPSIES AND POLYPECTOMY  2021    16 to 20 mm polyp:tubular adenoma in the ascending colon.  Ulcerated mass in the anal canal: Moderately differentiated squamous cell cancer.     ESOPHAGOSCOPY, GASTROSCOPY, DUODENOSCOPY (EGD), COMBINED  2017    Large hiatal hernia.  Reactive gastropathy with mucosal erosion.  H. pylori negative.     ESOPHAGOSCOPY, GASTROSCOPY, DUODENOSCOPY (EGD), COMBINED N/A 2024    Procedure: ESOPHAGOGASTRODUODENOSCOPY with biopsies;  Surgeon: Chi Figueroa MD;  Location: [facility] OR     HERNIORRHAPHY FEMORAL Left 2021    Procedure: LEFT FEMORAL HERNIA REPAIR;  Surgeon: Sidney Murray MD;  Location: [facility]     HYSTERECTOMY TOTAL ABDOMINAL, BILATERAL SALPINGO-OOPHORECTOMY, COMBINED       IR CHEST PORT PLACEMENT > 5 YRS OF AGE  3/15/2021     IR PORT PLACEMENT >5 YEARS  03/15/2021    Right IJ port-a-cath.     IR PORT REMOVAL RIGHT  2021     LAPAROSCOPIC APPENDECTOMY  2011     PHACOEMULSIFICATION CLEAR CORNEA WITH STANDARD  INTRAOCULAR LENS IMPLANT Right 08/30/2017     PHACOEMULSIFICATION CLEAR CORNEA WITH STANDARD INTRAOCULAR LENS IMPLANT Left 09/13/2017     TOTAL HIP ARTHROPLASTY Right 08/18/2015    Procedure: HIP TOTAL ARTHROPLASTY, RIGHT;  Surgeon: Star Du MD;  Location: [facility] OR;  Service:      [facility] TOTAL KNEE ARTHROPLASTY Left 03/02/2017    Procedure: LEFT TOTAL KNEE ARTHROPLASTY;  Surgeon: Star Du MD;  Location: [facility] OR;  Service: Orthopedics       CURRENT MEDS:  Current Outpatient Medications   Medication Sig Dispense Refill     amLODIPine (NORVASC) 5 MG tablet TAKE 2 TABLETS(10 MG) BY MOUTH DAILY 180 tablet 11     EPINEPHrine (ANY BX GENERIC EQUIV) 0.3 MG/0.3ML injection 2-pack Inject 0.3 mLs (0.3 mg) into the muscle as needed for anaphylaxis May repeat one time in 5-15 minutes if response to initial dose is inadequate. 2 each 0     ferrous sulfate (FE TABS) 325 (65 Fe) MG EC tablet Take 325 mg by mouth every other day       hydrOXYzine HCl (ATARAX) 10 MG tablet Take 1 tablet (10 mg) by mouth 3 times daily as needed for itching. 90 tablet 11     LOTEMAX 0.5 % ophthalmic suspension Place 1 drop into both eyes every 48 hours  4     metoprolol succinate ER (TOPROL XL) 25 MG 24 hr tablet Take 1 tablet (25 mg) by mouth daily 30 tablet 0     pantoprazole (PROTONIX) 40 MG EC tablet Take 1 tablet (40 mg) by mouth daily 90 tablet 0     timolol maleate (TIMOPTIC) 0.5 % ophthalmic solution Place 1 drop into both eyes daily       vitamin C (ASCORBIC ACID) 250 MG tablet Take 250 mg by mouth every 48 hours         Family History   Problem Relation Age of Onset     Alzheimer Disease Mother      Heart Disease Father      Pancreatic Cancer Brother 72.00     No Known Problems Daughter      Anesthesia Reaction No family hx of      Family history is not pertinent to this patients Chief Complaint.     reports that she has never smoked. She has never been exposed to tobacco smoke. She has never used  "smokeless tobacco. She reports current alcohol use of about 4.0 standard drinks of alcohol per week. She reports that she does not use drugs.    Review of Systems -   10 point Review of systems is negative except for; as mentioned above in HPI and PMHx    /68   Ht 1.6 m (5' 3\")   Wt 62.1 kg (136 lb 12.8 oz)   LMP  (LMP Unknown)   BMI 24.23 kg/m  .    EXAM:  GENERAL: Well developed female  HEENT: EOMI, Anicteric Sclera, Moist Mucous Membranes,  In Mouth the pt does not have redness or bleeding gums  CARDIOVASCULAR: RRR w/out murmur   CHEST/LUNG: Clear to Auscultation  ABDOMEN:  Non tender to palpation, +BS  MUSCULOSKELETAL:  No deformities with good range of motion in all extremities  NEURO: She is ambulatory with good strength in both legs.  HEME/LYMPH: No Cervical Adenopathy or tenderness.     IMAGES:  I evaluated these images myself and she has a large hiatal hernia with most of the stomach up in the chest that predominantly pushes off to the left side.    EXAM: CT CHEST PULMONARY EMBOLISM W CONTRAST  LOCATION: [facility]  DATE: 11/20/2024     INDICATION: History of PE, no longer anticoagulated, shortness of breath and chest tightness  COMPARISON: CT of the chest with contrast 2/28/2024  TECHNIQUE: CT chest pulmonary angiogram during arterial phase injection of IV contrast. Multiplanar reformats and MIP reconstructions were performed. Dose reduction techniques were used.   CONTRAST: Isovue  370 90mL     FINDINGS:  ANGIOGRAM CHEST: Main pulmonary artery is normal in size. The large majority of low-attenuation filling defects in the pulmonary arteries present to 8/20/2024 have resolved. There is a small residual defect within a subsegmental branch in the posterior   basal right lower lobe (series 6, image 191). Thoracic aorta is negative for dissection.     LUNGS AND PLEURA: Passive atelectasis in the medial left lower lobe due to mass effect from mediastinal contents, unchanged. " No new airspace or interstitial opacities. Central airways are patent and normal caliber. No pleural effusion.     MEDIASTINUM: Large hiatal hernia with 80% of the stomach intrathoracic. Associated foreshortening of the esophagus. No esophageal wall thickening. Cardiac chambers are normal in size. No pericardial effusion. No enlarged mediastinal or hilar lymph nodes.     CORONARY ARTERY CALCIFICATION: None.     UPPER ABDOMEN: Normal.     MUSCULOSKELETAL: There are diffuse thoracic spine degenerative osteophytes with discogenic sclerosis at a few levels. No vertebral compression deformities. No aggressive or destructive bone lesions.                                                                      IMPRESSION:     1.  No acute pulmonary embolism. Most of the pulmonary artery filling defects present in February 2024 have resolved with only one residual nonocclusive subsegmental defect in a subsegmental branch of the posterior basal right lower lobe.  2.  Large hiatal hernia with nearly intrathoracic stomach.  3.  Mass effect from #2 on the medial left lower lobe, unchanged. No acute lung, airway, or pleural inflammatory process.    Assessment/Plan:  79-year-old woman who has a profound hiatal versus paraesophageal hiatal hernia, with most of her stomach up in her chest.  This patient started having symptoms of atypical chest pain.  It appears that she would benefit from having her hiatal hernia repaired.    Robotic repair of this patient's paraesophageal hiatal hernia with reduction of her intrathoracic stomach.      Santiago Hinojosa MD  [facility]  [phone]       Again, thank you for allowing me to participate in the care of your patient.        Sincerely,        Santiago Hinojosa MD

## 2024-12-03 ENCOUNTER — PATIENT OUTREACH (OUTPATIENT)
Dept: CARE COORDINATION | Facility: CLINIC | Age: 79
End: 2024-12-03
Payer: MEDICARE

## 2024-12-03 NOTE — PROGRESS NOTES
[facility]  Medicare ACO Reach Population - Proactive Outreach  Unable to Reach    Background: Patient outreach conducted proactively to support health maintenance initiatives within [facility]'s Medicare ACO Reach Population.     Outreach attempted x 1.  Left message on patient's voicemail briefly explaining this is a proactive outreach from [facility].. Patient encouraged to call the [facility] scheduling team at [phone] with any scheduling needs or questions, or they can conveniently schedule via [employer].    Plan:  Care Coordinator will try to reach patient again in 1-2 business days.    Yoanna Monreal RN  [facility]

## 2024-12-04 NOTE — PROGRESS NOTES
[facility]  Medicare ACO Reach Population - Proactive Outreach  Unable to Reach    Background: Patient outreach conducted proactively to support health maintenance initiatives within [facility]'s Medicare ACO Reach Population.     Outreach attempted x 2.  Left message on patient's voicemail briefly explaining this is a proactive outreach from [facility].. Patient encouraged to call the [facility] scheduling team at [phone] with any scheduling needs or questions, or they can conveniently schedule via [employer].    Plan:  Care Coordinator will do no further outreaches at this time.    Yoanna Monreal, AFSHIN  [facility]

## 2025-01-07 ENCOUNTER — PATIENT OUTREACH (OUTPATIENT)
Dept: CARE COORDINATION | Facility: CLINIC | Age: 80
End: 2025-01-07
Payer: MEDICARE

## 2025-01-09 ENCOUNTER — E-VISIT (OUTPATIENT)
Dept: INTERNAL MEDICINE | Facility: CLINIC | Age: 80
End: 2025-01-09
Payer: MEDICARE

## 2025-01-09 DIAGNOSIS — J01.90 ACUTE BACTERIAL SINUSITIS: Primary | ICD-10-CM

## 2025-01-09 DIAGNOSIS — B96.89 ACUTE BACTERIAL SINUSITIS: Primary | ICD-10-CM

## 2025-01-12 ENCOUNTER — HEALTH MAINTENANCE LETTER (OUTPATIENT)
Age: 80
End: 2025-01-12

## 2025-01-20 ENCOUNTER — TELEPHONE (OUTPATIENT)
Dept: SURGERY | Facility: CLINIC | Age: 80
End: 2025-01-20
Payer: MEDICARE

## 2025-01-20 NOTE — TELEPHONE ENCOUNTER
Pt has concerns of ongoing symptoms of sob related to hiatal hernia. Reassured her that the discomfort she was feeling is not likely anything to be alarmed by, and that she should take it easy until Paola reaches back out to her tomorrow regarding getting her in sooner for her surgery. Encouraged smaller meals, not lying down after eating, avoiding fattier foods, and adjusting her sleeping position to help with the pressure her stomach is putting on her lungs. She was agreeable with the plan and had no other concerns. Told her to reach out if anything else comes up in the mean time before Paola reaches back out to her.     Kimberly Marshall RN  [facility]  General Surgery  [address]  [address] [place] [address]  Office:[phone]  Employed by [employer]

## 2025-01-20 NOTE — TELEPHONE ENCOUNTER
I called Maritza, letting her know that we need to reschedule her surgery with Dr. Hinojosa due to a scheduling conflict on Dr. Hinojosa's schedule. She's now rescheduled for surgery with him on 03.27.25 at [facility]. I let her know I will keep her on a cancellation list and call immediately if/when I have an earlier date for her. She is in agreement with the plan. I will send an updated confirmation letter to her MyChart.

## 2025-02-10 ENCOUNTER — APPOINTMENT (OUTPATIENT)
Dept: ULTRASOUND IMAGING | Facility: CLINIC | Age: 80
End: 2025-02-10
Attending: EMERGENCY MEDICINE
Payer: MEDICARE

## 2025-02-10 ENCOUNTER — HOSPITAL ENCOUNTER (OUTPATIENT)
Dept: CT IMAGING | Facility: CLINIC | Age: 80
Discharge: HOME | End: 2025-02-10
Attending: INTERNAL MEDICINE | Admitting: INTERNAL MEDICINE
Payer: MEDICARE

## 2025-02-10 ENCOUNTER — TELEPHONE (OUTPATIENT)
Dept: ONCOLOGY | Facility: HOSPITAL | Age: 80
End: 2025-02-10
Payer: MEDICARE

## 2025-02-10 ENCOUNTER — HOSPITAL ENCOUNTER (EMERGENCY)
Facility: CLINIC | Age: 80
Discharge: HOME | End: 2025-02-10
Attending: EMERGENCY MEDICINE | Admitting: EMERGENCY MEDICINE
Payer: MEDICARE

## 2025-02-10 VITALS
BODY MASS INDEX: 23.74 KG/M2 | HEART RATE: 75 BPM | WEIGHT: 134 LBS | OXYGEN SATURATION: 100 % | TEMPERATURE: 98.2 F | HEIGHT: 63 IN | DIASTOLIC BLOOD PRESSURE: 94 MMHG | SYSTOLIC BLOOD PRESSURE: 153 MMHG | RESPIRATION RATE: 18 BRPM

## 2025-02-10 DIAGNOSIS — I26.99 ACUTE PULMONARY EMBOLISM WITHOUT ACUTE COR PULMONALE, UNSPECIFIED PULMONARY EMBOLISM TYPE (H): ICD-10-CM

## 2025-02-10 DIAGNOSIS — D50.0 IRON DEFICIENCY ANEMIA DUE TO CHRONIC BLOOD LOSS: ICD-10-CM

## 2025-02-10 DIAGNOSIS — C21.1 MALIGNANT NEOPLASM OF ANAL CANAL (H): ICD-10-CM

## 2025-02-10 LAB
ANION GAP SERPL CALCULATED.3IONS-SCNC: 10 MMOL/L (ref 7–15)
ATRIAL RATE - MUSE: 76 BPM
BASOPHILS # BLD AUTO: 0.1 10E3/UL (ref 0–0.2)
BASOPHILS NFR BLD AUTO: 1 %
BUN SERPL-MCNC: 12.5 MG/DL (ref 8–23)
CALCIUM SERPL-MCNC: 9.7 MG/DL (ref 8.8–10.4)
CHLORIDE SERPL-SCNC: 101 MMOL/L (ref 98–107)
CREAT SERPL-MCNC: 0.57 MG/DL (ref 0.51–0.95)
DIASTOLIC BLOOD PRESSURE - MUSE: NORMAL MMHG
EGFRCR SERPLBLD CKD-EPI 2021: >90 ML/MIN/1.73M2
EOSINOPHIL # BLD AUTO: 0.3 10E3/UL (ref 0–0.7)
EOSINOPHIL NFR BLD AUTO: 3 %
ERYTHROCYTE [DISTWIDTH] IN BLOOD BY AUTOMATED COUNT: 18.6 % (ref 10–15)
GLUCOSE SERPL-MCNC: 96 MG/DL (ref 70–99)
HCO3 SERPL-SCNC: 24 MMOL/L (ref 22–29)
HCT VFR BLD AUTO: 41.8 % (ref 35–47)
HGB BLD-MCNC: 11.8 G/DL (ref 11.7–15.7)
IMM GRANULOCYTES # BLD: 0.1 10E3/UL
IMM GRANULOCYTES NFR BLD: 1 %
INTERPRETATION ECG - MUSE: NORMAL
LYMPHOCYTES # BLD AUTO: 1 10E3/UL (ref 0.8–5.3)
LYMPHOCYTES NFR BLD AUTO: 12 %
MCH RBC QN AUTO: 21.2 PG (ref 26.5–33)
MCHC RBC AUTO-ENTMCNC: 28.2 G/DL (ref 31.5–36.5)
MCV RBC AUTO: 75 FL (ref 78–100)
MONOCYTES # BLD AUTO: 0.6 10E3/UL (ref 0–1.3)
MONOCYTES NFR BLD AUTO: 7 %
NEUTROPHILS # BLD AUTO: 6.2 10E3/UL (ref 1.6–8.3)
NEUTROPHILS NFR BLD AUTO: 76 %
NRBC # BLD AUTO: 0 10E3/UL
NRBC BLD AUTO-RTO: 0 /100
NT-PROBNP SERPL-MCNC: 227 PG/ML (ref 0–1800)
P AXIS - MUSE: 23 DEGREES
PLATELET # BLD AUTO: 222 10E3/UL (ref 150–450)
POTASSIUM SERPL-SCNC: 4.4 MMOL/L (ref 3.4–5.3)
PR INTERVAL - MUSE: 194 MS
QRS DURATION - MUSE: 70 MS
QT - MUSE: 358 MS
QTC - MUSE: 402 MS
R AXIS - MUSE: 48 DEGREES
RBC # BLD AUTO: 5.57 10E6/UL (ref 3.8–5.2)
SODIUM SERPL-SCNC: 135 MMOL/L (ref 135–145)
SYSTOLIC BLOOD PRESSURE - MUSE: NORMAL MMHG
T AXIS - MUSE: 72 DEGREES
TROPONIN T SERPL HS-MCNC: <6 NG/L
VENTRICULAR RATE- MUSE: 76 BPM
WBC # BLD AUTO: 8.1 10E3/UL (ref 4–11)

## 2025-02-10 PROCEDURE — 36415 COLL VENOUS BLD VENIPUNCTURE: CPT | Performed by: EMERGENCY MEDICINE

## 2025-02-10 PROCEDURE — 80048 BASIC METABOLIC PNL TOTAL CA: CPT | Performed by: EMERGENCY MEDICINE

## 2025-02-10 PROCEDURE — 93005 ELECTROCARDIOGRAM TRACING: CPT | Performed by: EMERGENCY MEDICINE

## 2025-02-10 PROCEDURE — 71260 CT THORAX DX C+: CPT

## 2025-02-10 PROCEDURE — 99285 EMERGENCY DEPT VISIT HI MDM: CPT | Mod: 25

## 2025-02-10 PROCEDURE — 250N000011 HC RX IP 250 OP 636: Performed by: INTERNAL MEDICINE

## 2025-02-10 PROCEDURE — 85004 AUTOMATED DIFF WBC COUNT: CPT | Performed by: EMERGENCY MEDICINE

## 2025-02-10 PROCEDURE — 82374 ASSAY BLOOD CARBON DIOXIDE: CPT | Performed by: EMERGENCY MEDICINE

## 2025-02-10 PROCEDURE — 93970 EXTREMITY STUDY: CPT

## 2025-02-10 PROCEDURE — 84484 ASSAY OF TROPONIN QUANT: CPT | Performed by: EMERGENCY MEDICINE

## 2025-02-10 PROCEDURE — 83880 ASSAY OF NATRIURETIC PEPTIDE: CPT | Performed by: EMERGENCY MEDICINE

## 2025-02-10 PROCEDURE — 74177 CT ABD & PELVIS W/CONTRAST: CPT

## 2025-02-10 PROCEDURE — 250N000013 HC RX MED GY IP 250 OP 250 PS 637: Performed by: EMERGENCY MEDICINE

## 2025-02-10 RX ORDER — APIXABAN 5 MG (74)
KIT ORAL
Qty: 74 EACH | Refills: 0 | Status: SHIPPED | OUTPATIENT
Start: 2025-02-10 | End: 2025-03-12

## 2025-02-10 RX ORDER — IOPAMIDOL 755 MG/ML
90 INJECTION, SOLUTION INTRAVASCULAR ONCE
Status: COMPLETED | OUTPATIENT
Start: 2025-02-10 | End: 2025-02-10

## 2025-02-10 RX ADMIN — APIXABAN 10 MG: 5 TABLET, FILM COATED ORAL at 14:43

## 2025-02-10 RX ADMIN — IOPAMIDOL 90 ML: 755 INJECTION, SOLUTION INTRAVENOUS at 10:34

## 2025-02-10 ASSESSMENT — ACTIVITIES OF DAILY LIVING (ADL)
ADLS_ACUITY_SCORE: 58

## 2025-02-10 NOTE — DISCHARGE INSTRUCTIONS
You were seen in the emergency department for evaluation of a blood clot in the lungs.  Your CT shows a recurrent pulmonary embolism and your ultrasound does show a DVT of the left leg.  We are going to restart of blood thinner called Eliquis.  We did discuss things with the on-call provider for your hematology/oncology clinic.  Please follow-up with them next week as you have planned.  Continue on the twice a day Eliquis dosing and discuss long-term anticoagulation plan with your clinic.  If you have severe worsening symptoms like severe lightheadedness or trouble breathing at rest we should reevaluate in the emergency department right away.

## 2025-02-10 NOTE — ED TRIAGE NOTES
PT sent from CT with known PE. Pt has history of Pes. Not currently on a blood thinner. Endorses dyspnea on exertion. Denies any chest pain     Triage Assessment (Adult)       Row Name 02/10/25 1121          Triage Assessment    Airway WDL WDL        Respiratory WDL    Respiratory WDL X;rhythm/pattern     Rhythm/Pattern, Respiratory shortness of breath;dyspnea upon exertion        Skin Circulation/Temperature WDL    Skin Circulation/Temperature WDL WDL        Cardiac WDL    Cardiac WDL WDL        Peripheral/Neurovascular WDL    Peripheral Neurovascular WDL WDL        Cognitive/Neuro/Behavioral WDL    Cognitive/Neuro/Behavioral WDL WDL

## 2025-02-10 NOTE — TELEPHONE ENCOUNTER
I received a phone call today from Afsaneh in CT.  She is calling to report that Maritza just had a CT scan that was ordered by Dr. Beck and was found to have bilateral pulmonary emboli.  She is calling to see if the patient should go to the emergency room.  Per Afsaneh, Maritza does report some shortness of breath with exertion.  However, she feels like this could be related to her hiatal hernia.  Per Dr. Mcbride, the patient should go to the emergency room for an evaluation.  I called CT back and spoke to Afsaneh and she will send patient to the emergency room.    Sadia Long RN on 2/10/2025 at 11:06 AM

## 2025-02-10 NOTE — ED PROVIDER NOTES
EMERGENCY DEPARTMENT ENCOUNTER      NAME: Oscar Zhao  AGE: 80 year old female  YOB: 1945  MRN: [id number]  EVALUATION DATE & TIME: 2/10/2025 11:22 AM    PCP: Jak Jones    ED PROVIDER: Santana Norris M.D.      Chief Complaint   Patient presents with    Known PE         FINAL IMPRESSION:  1. Acute pulmonary embolism without acute cor pulmonale, unspecified pulmonary embolism type (H)          ED COURSE & MEDICAL DECISION MAKIN year old female presents to the Emergency Department for evaluation of abnormal imaging.  Patient with a history of anal cancer in remission and also a DVT/PE a couple of years ago.  Presenting after surveillance imaging of her chest abdomen pelvis today showed a bilateral pulmonary embolism.  On review of systems the patient reports several weeks of shortness of breath but no other acute or immediate complaints.  Sitting in bed she is vitally stable with normal blood pressure, heart rate, and oximetry.  Her imaging was reviewed which shows bilateral moderate pulmonary emboli.  EKG and lab testing including troponin and BNP were obtained and looks stable.  DVT ultrasound does show some residual clot in the left lower extremity.  Case was reviewed with Dr. Mcbride, on-call hematology oncology for [place] where the patient is established.  Given that patient is minimally symptomatic and this was essentially an incidental finding today I think she is appropriate for outpatient management.  We discussed initiation of Eliquis dosing and patient was given a first dose here in the emergency department.  She has upcoming hematology follow-up in 1 week and can review any ongoing concerns at that time.  We discussed return precautions for worsening shortness of breath, lightheadedness, severe chest pain, or other immediate concern.  Patient was discharged in stable condition.    11:33 AM I met with the patient, obtained history, performed an initial exam, and discussed  options and plan for diagnostics and treatment here in the ED.  1:48 PM I spoke with Dr. Mcbride, Hematology.    At the conclusion of the encounter I discussed the results of all of the tests and the disposition. The questions were answered. The patient or family acknowledged understanding and was agreeable with the care plan.       Medical Decision Making  Obtained supplemental history:Supplemental history obtained?: No  Reviewed external records: External records reviewed?: Documented in chart  Care impacted by chronic illness:Documented in Chart  Did you consider but not order tests?: Work up considered but not performed and documented in chart, if applicable  Did you interpret images independently?: Independent interpretation of ECG and images noted in documentation, when applicable.  Consultation discussion with other provider:Did you involve another provider (consultant, , pharmacy, etc.)?: I discussed the care with another health care provider, see documentation for details.  Admit.    MIPS: Not Applicable          MEDICATIONS GIVEN IN THE EMERGENCY:  Medications   apixaban ANTICOAGULANT (ELIQUIS) tablet 10 mg (10 mg Oral $Given 2/10/25 1443)       NEW PRESCRIPTIONS STARTED AT TODAY'S ER VISIT  Discharge Medication List as of 2/10/2025  2:50 PM        START taking these medications    Details   Apixaban Starter Pack (ELIQUIS DVT/PE STARTER PACK) 5 MG TBPK Take 10 mg by mouth 2 times daily for 7 days, THEN 5 mg 2 times daily for 23 days., Disp-74 each, R-0, E-Prescribe                =================================================================    HPI    Patient information was obtained from: Patient    Use of : N/A       Oscar Zhao is a 80 year old female with a pertinent history of  basal cell carcinoma of anterior chest, bilateral pulmonary emboli, DVT in right lower extremity, hypertension, osteoporosis, diaphragmatic hernia, anorectal disorder, s/p failure of right total hip arthroplasty,  who presents to this ED via walk-in for evaluation of shortness of breath.    Patient reports she went to have a CT scan done for surveillance with her history of skin cancer earlier today. She later received a call from her oncologist to come to the ED for bilateral pulmonary emboli found on her CT scan. Patient endorses shortness of breath with exertion since November, when she was diagnosed with bilateral pulmonary emboli, thought to be from her hip surgery. She was taking blood thinners for 6 months. She currently has shortness of breath but relates that to her hernia which she surgery for it in 2 weeks. However, she does note that a couple days ago, she rolled onto her left side and the left hip has been sore since. Patient denies fevers, cough, lightheadedness, syncope, and any other symptoms.    Per chart review, patient had a telephone visit for CT results on 2/10/25. Triage nurse answered call from [place] in CT. Reported that Dr. Beck found bilateral pulmonary emboli on CT scan from today. Patient was told to go down to the ED.      REVIEW OF SYSTEMS   All systems reviewed and negative except as noted in HPI.    PAST MEDICAL HISTORY:  Past Medical History:   Diagnosis Date    Anemia, unspecified type 2/8/2024    Arthritis     Basal cell carcinoma of anterior chest     Breast cyst     History of anesthesia complications     HLA B27 (HLA B27 positive)     Hypertension     Osteoporosis 07/19/2011    Peripheral neuropathy 09/12/2018    PONV (postoperative nausea and vomiting)     Scleritis, bilateral     Left 2010.  Treated with corticosteroids,, methotrexate and Humira.  2015.  Rituximab.    Squamous cell carcinoma of skin of chest     Steroid induced glaucoma, both eyes        PAST SURGICAL HISTORY:  Past Surgical History:   Procedure Laterality Date    ARTHROPLASTY REVISION HIP Right 9/21/2023    Procedure: RIGHT HIP REVISION TOTAL HIP ARTHROPLASTY;  Surgeon: Bill Bernal DO;  Location: [facility]     ARTHROPLASTY REVISION HIP Right 2023    Procedure: RIGHT REVISION TOTAL HIP ARTHROPLASTY HEAD LINER EXCHANGE;  Surgeon: Bill Bernal DO;  Location: [facility] OR    BREAST CYST EXCISION Right     benign     SECTION  1989    CLOSED REDUCTION HIP Right 2019    Procedure: CLOSED REDUCTION, HIP;  Surgeon: Jak Ruiz DO;  Location: [facility] OR;  Service: Orthopedics    CLOSED REDUCTION HIP Right 2023    Procedure: CLOSED REDUCTION, HIP RIGHT;  Surgeon: Aditya Pedroza MD;  Location: [facility] Main OR    CLOSED REDUCTION HIP Right 2023    Procedure: CLOSED REDUCTION, HIP RIGHT;  Surgeon: Jak Allen MD;  Location: [facility] OR    COLONOSCOPY  2011    COLONOSCOPY  2005    COLONOSCOPY N/A 2024    Procedure: COLONOSCOPY;  Surgeon: Chi Figueroa MD;  Location: [facility]    COLONOSCOPY W/ BIOPSIES AND POLYPECTOMY  2021    16 to 20 mm polyp:tubular adenoma in the ascending colon.  Ulcerated mass in the anal canal: Moderately differentiated squamous cell cancer.    ESOPHAGOSCOPY, GASTROSCOPY, DUODENOSCOPY (EGD), COMBINED  2017    Large hiatal hernia.  Reactive gastropathy with mucosal erosion.  H. pylori negative.    ESOPHAGOSCOPY, GASTROSCOPY, DUODENOSCOPY (EGD), COMBINED N/A 2024    Procedure: ESOPHAGOGASTRODUODENOSCOPY with biopsies;  Surgeon: Chi Figueroa MD;  Location: [facility] OR    HERNIORRHAPHY FEMORAL Left 2021    Procedure: LEFT FEMORAL HERNIA REPAIR;  Surgeon: Sidney Murray MD;  [facility]    HYSTERECTOMY TOTAL ABDOMINAL, BILATERAL SALPINGO-OOPHORECTOMY, COMBINED      IR CHEST PORT PLACEMENT > 5 YRS OF AGE  3/15/2021    IR PORT PLACEMENT >5 YEARS  03/15/2021    Right IJ port-a-cath.    IR PORT REMOVAL RIGHT  2021    LAPAROSCOPIC APPENDECTOMY  2011    PHACOEMULSIFICATION CLEAR CORNEA WITH STANDARD INTRAOCULAR LENS IMPLANT Right 2017     PHACOEMULSIFICATION CLEAR CORNEA WITH STANDARD INTRAOCULAR LENS IMPLANT Left 09/13/2017    TOTAL HIP ARTHROPLASTY Right 08/18/2015    Procedure: HIP TOTAL ARTHROPLASTY, RIGHT;  Surgeon: Star Du MD;  Location: [facility] Main OR;  Service:     [facility] TOTAL KNEE ARTHROPLASTY Left 03/02/2017    Procedure: LEFT TOTAL KNEE ARTHROPLASTY;  Surgeon: Star Du MD;  Location: [facility] Main OR;  Service: Orthopedics           CURRENT MEDICATIONS:    No current facility-administered medications for this encounter.     Current Outpatient Medications   Medication Sig Dispense Refill    Apixaban Starter Pack (ELIQUIS DVT/PE STARTER PACK) 5 MG TBPK Take 10 mg by mouth 2 times daily for 7 days, THEN 5 mg 2 times daily for 23 days. 74 each 0    amLODIPine (NORVASC) 5 MG tablet TAKE 2 TABLETS(10 MG) BY MOUTH DAILY 180 tablet 11    EPINEPHrine (ANY BX GENERIC EQUIV) 0.3 MG/0.3ML injection 2-pack Inject 0.3 mLs (0.3 mg) into the muscle as needed for anaphylaxis May repeat one time in 5-15 minutes if response to initial dose is inadequate. 2 each 0    hydrOXYzine HCl (ATARAX) 10 MG tablet Take 1 tablet (10 mg) by mouth 3 times daily as needed for itching. 90 tablet 11    LOTEMAX 0.5 % ophthalmic suspension Place 1 drop into both eyes every 48 hours  4    metoprolol succinate ER (TOPROL XL) 25 MG 24 hr tablet Take 1 tablet (25 mg) by mouth daily 30 tablet 0    timolol maleate (TIMOPTIC) 0.5 % ophthalmic solution Place 1 drop into both eyes daily           ALLERGIES:  Allergies   Allergen Reactions    Kiwi Hives    Adalimumab Muscle Pain (Myalgia)    Aspirin      Had Bleeding ulcers after previous 2 surgeries due to aspirin    Brimonidine-Timolol [Brimonidine Tartrate-Timolol] Unknown     Redness itching in eyes    Levaquin [Levofloxacin] Unknown     Skin burn and couldn't get out bed for 5 days    Tetracyclines & Related Hives       FAMILY HISTORY:  Family History   Problem Relation Age of Onset    Alzheimer Disease  Mother     Heart Disease Father     Pancreatic Cancer Brother 72.00    No Known Problems Daughter     Anesthesia Reaction No family hx of        SOCIAL HISTORY:   Social History     Socioeconomic History    Marital status:     Number of children: 1   Tobacco Use    Smoking status: Never     Passive exposure: Never    Smokeless tobacco: Never   Vaping Use    Vaping status: Never Used   Substance and Sexual Activity    Alcohol use: Yes     Alcohol/week: 4.0 standard drinks of alcohol     Types: 4 Standard drinks or equivalent per week     Comment: 3-4/wk    Drug use: No    Sexual activity: Never     Social Drivers of Health     Financial Resource Strain: Low Risk  (10/2/2023)    Financial Resource Strain     Within the past 12 months, have you or your family members you live with been unable to get utilities (heat, electricity) when it was really needed?: No   Food Insecurity: Low Risk  (10/2/2023)    Food Insecurity     Within the past 12 months, did you worry that your food would run out before you got money to buy more?: No     Within the past 12 months, did the food you bought just not last and you didn t have money to get more?: No   Transportation Needs: Low Risk  (10/2/2023)    Transportation Needs     Within the past 12 months, has lack of transportation kept you from medical appointments, getting your medicines, non-medical meetings or appointments, work, or from getting things that you need?: No    Received from [facility], [facility]    Social Connections   Interpersonal Safety: Low Risk  (2/6/2025)    Interpersonal Safety     Do you feel physically and emotionally safe where you currently live?: Yes     Within the past 12 months, have you been hit, slapped, kicked or otherwise physically hurt by someone?: No     Within the past 12 months, have you been humiliated or emotionally abused in other ways by your partner or ex-partner?: No  "  Housing Stability: Low Risk  (10/2/2023)    Housing Stability     Do you have housing? : Yes     Are you worried about losing your housing?: No       VITALS:  BP (!) 153/94   Pulse 75   Temp 98.2  F (36.8  C) (Temporal)   Resp 18   Ht 1.6 m (5' 3\")   Wt 60.8 kg (134 lb)   LMP  (LMP Unknown)   SpO2 100%   BMI 23.74 kg/m      PHYSICAL EXAM    Constitutional: Well developed, Well nourished, NAD.  HENT: Normocephalic, Atraumatic. Neck Supple.  Eyes: EOMI, Conjunctiva normal.  Respiratory: Breathing comfortably on room air. Speaks full sentences easily. Lungs clear to ascultation.  Cardiovascular: Normal heart rate, Regular rhythm. No peripheral edema.  Abdomen: Soft, nontender  Musculoskeletal: Good range of motion in all major joints. No major deformities noted.  Integument: Warm, Dry.  Neurologic: Alert & awake, Normal motor function, Normal sensory function, No focal deficits noted.   Psychiatric: Cooperative. Affect appropriate.     LAB:  All pertinent labs reviewed and interpreted.  Labs Ordered and Resulted from Time of ED Arrival to Time of ED Departure   CBC WITH PLATELETS AND DIFFERENTIAL - Abnormal       Result Value    WBC Count 8.1      RBC Count 5.57 (*)     Hemoglobin 11.8      Hematocrit 41.8      MCV 75 (*)     MCH 21.2 (*)     MCHC 28.2 (*)     RDW 18.6 (*)     Platelet Count 222      % Neutrophils 76      % Lymphocytes 12      % Monocytes 7      % Eosinophils 3      % Basophils 1      % Immature Granulocytes 1      NRBCs per 100 WBC 0      Absolute Neutrophils 6.2      Absolute Lymphocytes 1.0      Absolute Monocytes 0.6      Absolute Eosinophils 0.3      Absolute Basophils 0.1      Absolute Immature Granulocytes 0.1      Absolute NRBCs 0.0     BASIC METABOLIC PANEL - Normal    Sodium 135      Potassium 4.4      Chloride 101      Carbon Dioxide (CO2) 24      Anion Gap 10      Urea Nitrogen 12.5      Creatinine 0.57      GFR Estimate >90      Calcium 9.7      Glucose 96     TROPONIN T, HIGH " SENSITIVITY - Normal    Troponin T, High Sensitivity <6     NT PROBNP INPATIENT - Normal    N terminal Pro BNP Inpatient 227         RADIOLOGY:  Reviewed all pertinent imaging. Please see official radiology report.  US Lower Extremity Venous Duplex Bilateral   Final Result   IMPRESSION:   1.  No right leg DVT.   2.  Left pelvic and thigh DVT.   3.  Report called to Dr. Norris at 1:15 p.m.          EKG:    Performed at: 02/10/2025 1134    Impression: Sinus rhythm. Low voltage QRS. Cannot rule out anterior infarct (cited on or before 11/20/24).     Rate: 76 BPM  Rhythm: Sinus rhythm  Axis: 48  DE Interval: 194 ms  QRS Interval: 70 ms  QTc Interval: 402 ms  ST Changes: None  Comparison: When compared with ECG of 11/20/24, No significant change was found.    I have independently reviewed and interpreted the EKG(s) documented above.    I, Jazzy Rodriguez, am serving as a scribe to document services personally performed by Dr. Santana Norris, based on my observation and the provider's statements to me. I, Santana Norris MD attest that Jazzy Rodriguez is acting in a scribe capacity, has observed my performance of the services and has documented them in accordance with my direction.    Santana Norris M.D.  Emergency Medicine  [facility] EMERGENCY ROOM  [address] [id number] [phone]  Dept: [phone]       Santana Norris MD  02/11/25 0767

## 2025-02-11 ENCOUNTER — TELEPHONE (OUTPATIENT)
Dept: SURGERY | Facility: CLINIC | Age: 80
End: 2025-02-11
Payer: MEDICARE

## 2025-02-11 NOTE — TELEPHONE ENCOUNTER
Patient has Robotic Laparoscopic Nissen Fundoplication surgery scheduled with SHAMIR on 2/26/25. Received a VM that patient was recently in the ED and found to have a pulmonary embolism.   Talked to patient today and she stated that they started her on a Eliquis Starter Pack which means she will need to take this for the next 30 days. Will let SHAMIR know but patient will likely have to reschedule her surgery.    Corrine Hugo RN  [facility]  General Surgery  [address]  [address] [place] [id number]  Office:[phone]  Employed by [facility]

## 2025-02-12 ENCOUNTER — TELEPHONE (OUTPATIENT)
Dept: ONCOLOGY | Facility: HOSPITAL | Age: 80
End: 2025-02-12
Payer: MEDICARE

## 2025-02-12 ENCOUNTER — TELEPHONE (OUTPATIENT)
Dept: SURGERY | Facility: CLINIC | Age: 80
End: 2025-02-12
Payer: MEDICARE

## 2025-02-12 NOTE — TELEPHONE ENCOUNTER
"Maritza calls today to report that she had a CT scan recently and it showed an incidental finding of \"Acute appearing multifocal pulmonary emboli involving the right upper and lower lobes, and left upper and lower lobes.\" Per CT report. She was sent to the ED and is minimally asymptomatic. She was placed on Eliquis, and given a dose in the ED.  [facility] did not have the Eliquis in stock, and she thinks it will be available today. She does have concerns about the cost of this medication as she was on Xarelto last year so similar finding and she reported it cost her $600/month. She does not know what the price of the Eliquis will be. She wants a cost comparison, and would like to choose the less expensive option. I explained that this would need to be communicated with her pharmacy, however, it is important that she get on an anticoagulant asap. She expressed understanding.   She is going to call [facility] now and check on the status of the Eliquis and start is regardless of price.  She has an follow up with Dr. Beck on 2/17 and will further discuss medication options and the details about her care and tx morning forward with additional new Pes.   She will call back to triage in the meantime with any concerns, but reports feeling \"better\" and less anxious after talking this situation through.   Poonam Diez RN    "

## 2025-02-12 NOTE — TELEPHONE ENCOUNTER
I spoke with Maritza this morning and rescheduled her surgery, per Dr. Hinojosa. The new date for surgery is 06.04.25 at [facility]. We rescheduled the pre and post op appointments. We went over details and I let her know I will send an updated confirmation letter to her MyChart. She's in agreement with the plan.     I also scheduled a follow up for April with Dr. Hinojosa in clinic, at his request.

## 2025-02-13 ENCOUNTER — OFFICE VISIT (OUTPATIENT)
Dept: RADIATION ONCOLOGY | Facility: CLINIC | Age: 80
End: 2025-02-13
Attending: INTERNAL MEDICINE
Payer: MEDICARE

## 2025-02-13 VITALS
SYSTOLIC BLOOD PRESSURE: 124 MMHG | DIASTOLIC BLOOD PRESSURE: 79 MMHG | BODY MASS INDEX: 24.29 KG/M2 | TEMPERATURE: 98.2 F | WEIGHT: 137.1 LBS | OXYGEN SATURATION: 97 % | RESPIRATION RATE: 16 BRPM | HEART RATE: 78 BPM

## 2025-02-13 DIAGNOSIS — C21.1 MALIGNANT NEOPLASM OF ANAL CANAL (H): Primary | ICD-10-CM

## 2025-02-13 PROCEDURE — G0463 HOSPITAL OUTPT CLINIC VISIT: HCPCS | Performed by: STUDENT IN AN ORGANIZED HEALTH CARE EDUCATION/TRAINING PROGRAM

## 2025-02-13 ASSESSMENT — PAIN SCALES - GENERAL: PAINLEVEL_OUTOF10: NO PAIN (0)

## 2025-02-13 NOTE — PROGRESS NOTES
"Oncology Rooming Note    February 13, 2025 2:22 PM   Oscar Zhao is a 80 year old female who presents for:    Chief Complaint   Patient presents with    Oncology Clinic Visit     Follow up with Dr. Spencer     Initial Vitals: /79 (BP Location: Right arm, Patient Position: Sitting, Cuff Size: Adult Regular)   Pulse 78   Temp 98.2  F (36.8  C) (Oral)   Resp 16   Wt 62.2 kg (137 lb 1.6 oz)   LMP  (LMP Unknown)   SpO2 97%   BMI 24.29 kg/m   Estimated body mass index is 24.29 kg/m  as calculated from the following:    Height as of 2/10/25: 1.6 m (5' 3\").    Weight as of this encounter: 62.2 kg (137 lb 1.6 oz). Body surface area is 1.66 meters squared.  No Pain (0) Comment: Data Unavailable   No LMP recorded (lmp unknown). Patient is postmenopausal.  Allergies reviewed: Yes  Medications reviewed: Yes    Medications: Medication refills not needed today.  Pharmacy name entered into [employer]:    [id number] [place], [place] - [address] [place] [address]  AT [id number] [address]    Frailty Screening:   Is the patient here for a new oncology consult visit in cancer care? 2. No    PHQ9:  Did this patient require a PHQ9?: No      Clinical concerns: Patient here ambulatory for follow-up status post radiation for her anal cancer.  Patient states she has been doing well from a cancer standpoint and has no symptoms or side effects from that at this time.  Patient had a recent CT scan and was sent to the ER for bilateral pulmonary embolus.  Patient states she has a follow-up with Dr. Beck in medical oncology on Monday next week to follow-up on her blood clots.  Patient was scheduled to have hiatal hernia surgery but this been canceled due to blood clots.  Plan return to clinic for follow-up as directed by provider.   Dr. Spencer was notified.      Ofelia Perez RN              "

## 2025-02-13 NOTE — LETTER
"2/13/2025      Oscar Zhao  [address]  [place] [address]      Dear Colleague,    Thank you for referring your patient, Oscar Zhao, to the [facility] RADIATION ONCOLOGY [place]. Please see a copy of my visit note below.    Oncology Rooming Note    February 13, 2025 2:22 PM   Oscar Zhao is a 80 year old female who presents for:    Chief Complaint   Patient presents with     Oncology Clinic Visit     Follow up with Dr. Spencer     Initial Vitals: /79 (BP Location: Right arm, Patient Position: Sitting, Cuff Size: Adult Regular)   Pulse 78   Temp 98.2  F (36.8  C) (Oral)   Resp 16   Wt 62.2 kg (137 lb 1.6 oz)   LMP  (LMP Unknown)   SpO2 97%   BMI 24.29 kg/m   Estimated body mass index is 24.29 kg/m  as calculated from the following:    Height as of 2/10/25: 1.6 m (5' 3\").    Weight as of this encounter: 62.2 kg (137 lb 1.6 oz). Body surface area is 1.66 meters squared.  No Pain (0) Comment: Data Unavailable   No LMP recorded (lmp unknown). Patient is postmenopausal.  Allergies reviewed: Yes  Medications reviewed: Yes    Medications: Medication refills not needed today.  Pharmacy name entered into [employer]:    [facility] #[id number] [address]  AT [id number]    Frailty Screening:   Is the patient here for a new oncology consult visit in cancer care? 2. No    PHQ9:  Did this patient require a PHQ9?: No      Clinical concerns: Patient here ambulatory for follow-up status post radiation for her anal cancer.  Patient states she has been doing well from a cancer standpoint and has no symptoms or side effects from that at this time.  Patient had a recent CT scan and was sent to the ER for bilateral pulmonary embolus.  Patient states she has a follow-up with Dr. Beck in medical oncology on Monday next week to follow-up on her blood clots.  Patient was scheduled to have hiatal hernia " surgery but this been canceled due to blood clots.  Plan return to clinic for follow-up as directed by provider.   Dr. Spencer was notified.      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 Zhao is a 80 year old female who underwent colonoscopy following presentation with anemia.  2/18/2021 colonoscopy: Ascending colon polyp 16 to 20 mm status post complete polypectomy.  She was noted to have an ulcerated mass in it anus, biopsy, Pathology:  Invasive moderately differentiated squamous cell carcinoma     2/23/2021 CT chest abdomen pelvis which noted incidental 4 mm right upper lobe nodule for which follow-up was recommended.    MRI of the pelvis noted a 25 x 18 x 15 mm mucosal thickening centered in the left posterior laterally anorectal junction there were no abnormal or suspicious lymph nodes or visible involvement of the levator ani, puborectalis, or external sphincter musculature.     3/22/2021-4/29/2021 concurrent chemoradiation therapy: 5400 and 4500 cGy in 27 fractions to the primary and pelvic lymph nodes respectively with concurrent mitomycin + 5-FU     Complete response on anoscopy and follow-up restaging CTs and MRIs  She had right hip surgery for recurrent dislocation  2/8/2024 colonoscopy: Normal, repeat colonoscopy in 3 years  2/8/2024 CT CAP: extensive DVT  and bilateral pulmonary emboli.    Anticoagulation from February 2024 to August 2024     10/21/2024 CT AP:  Obstructing 3 mm left ureterovesical junction stone with mild left-sided hydroureteronephrosis and severe perinephric and periureteral edema   11/20/2024 CT chest:  No acute pulmonary embolism. Most of the pulmonary artery filling defects present in February 2024 have resolved with only one residual nonocclusive subsegmental defect in a subsegmental branch of the posterior basal right lower lobe.     2/10/2025 CT CAP: Acute multifocal pulmonary emboli involving right and left upper and lower lobes.  Stable right upper lobe 3 mm subpleural nodule.  No metastases seen.    She returns to clinic today for routine follow-up visit.  Overall she is doing well.  No bladder or bowel issues.  Denies any concern of vaginal dryness.  She continues follow-up with colorectal surgery.  She is disappointed about having recurrent pulmonary emboli primarily because it will delay her surgery for her hiatal hernia which is quite symptomatic and bothersome.  She is not really noted any symptoms related to development of additional pulmonary emboli but has some dyspnea with exertion.    Medications were reviewed and are up to date on EPIC.    The following portions of the patient's history were reviewed and updated as appropriate: allergies, current medications, past family history, past medical history, past social history, past surgical history and problem list.    Review of Systems:      General  Constitutional  Constitutional (WDL): Exceptions to WDL  Fatigue: Fatigue relieved by rest  EENT  Eye Disorders  Eye Disorder (WDL): Assessment not pertinent to visit  Ear Disorders  Ear Disorder (WDL): Assessment not pertinent to visit  Respiratory  Respiratory  Respiratory (WDL): Exceptions to WDL  Dyspnea: Shortness of breath with moderate exertion (d/t hiatal hernia)  Cardiovascular  Cardiovascular  Cardiovascular (WDL): All  cardiovascular elements are within defined limits (no pacemaker)  Gastrointestinal  Gastrointestinal  Gastrointestinal (WDL): Exceptions to WDL  Diarrhea: Increase of less than 4 stools per day over baseline OR mild increase in ostomy output compared to baseline (occassionally)  Musculoskeletal  Musculoskeletal and Connective Tissue Disorders  Musculoskeletal & Connective (WDL): All musculoskeletal & connective elements are within defined limits  Integumentary  Integumentary  Integumentary (WDL): All integumentary elements are within defined limits  Neurological  Neurosensory  Neurosensory (WDL): All neurosensory elements are within defined limits  Genitourinary/Reproductive  Genitourinary  Genitourinary (WDL): All genitourinary elements are within defined limits  Lymphatic  Lymph System Disorders  Lymph (WDL): All lymph elements are within defined limits  Pain  Pain Score: No Pain (0)  AUA Assessment                                                              Accompanied by  Accompanied By: self only    Objective:        PHYSICAL EXAMINATION:    /79 (BP Location: Right arm, Patient Position: Sitting, Cuff Size: Adult Regular)   Pulse 78   Temp 98.2  F (36.8  C) (Oral)   Resp 16   Wt 62.2 kg (137 lb 1.6 oz)   LMP  (LMP Unknown)   SpO2 97%   BMI 24.29 kg/m      Gen: Alert, in NAD pleasant interactive, well nourished  Eyes: EOMI, sclera anicteric  HENT     Head: NC/AT  Musculoskeletal: Normal muscle bulk and tone  Skin: Normal tone and turgor, warm, dry, intact  Neurologic: A/Ox3, face symmetric, speech fluent, no focal motor deficits. Gait normal and unaided  Psychiatric: Appropriate mood and affect      Imaging: Imaging results 6 weeks:US Lower Extremity Venous Duplex Bilateral    Result Date: 2/10/2025  EXAM: US LOWER EXTREMITY VENOUS DUPLEX BILATERAL LOCATION: [facility] DATE: 2/10/2025 INDICATION: Acute pulmonary emboli, evaluate for remaining lower extremity clot burden  COMPARISON: None. TECHNIQUE: Venous Duplex ultrasound of bilateral lower extremities with and without compression, augmentation and duplex. Color flow and spectral Doppler with waveform analysis performed. FINDINGS: Exam includes the common femoral, femoral, popliteal veins as well as segmentally visualized deep calf veins and greater saphenous vein. RIGHT: No deep vein thrombosis. No superficial thrombophlebitis. No popliteal cyst. LEFT: There is DVT in the external iliac, common femoral, and proximal thigh femoral vein. No superficial thrombophlebitis. No popliteal cyst.     IMPRESSION: 1.  No right leg DVT. 2.  Left pelvic and thigh DVT. 3.  Report called to Dr. Norris at 1:15 p.m.    CT Chest/Abdomen/Pelvis w Contrast    Result Date: 2/10/2025  EXAM: CT CHEST/ABDOMEN/PELVIS W CONTRAST LOCATION: [facility] DATE: 2/10/2025 INDICATION:  Iron deficiency anemia due to chronic blood loss, Malignant neoplasm of anal canal (H) COMPARISON: CT 11/20/2024, 2/8/2024 TECHNIQUE: CT scan of the chest, abdomen, and pelvis was performed following injection of IV contrast. Multiplanar reformats were obtained. Dose reduction techniques were used. CONTRAST: Isovue 370 90ml FINDINGS: LUNGS AND PLEURA: Acute appearing multifocal pulmonary emboli involving the right upper and lower lobes, and left upper and lower lobes. The anterior right upper lobe subpleural 3 mm nodule (4, 120) appears stable. No additional nodule. No effusion. MEDIASTINUM/AXILLAE: Stable right inferior thyroid 10 mm fluid density nodule should be benign. No adenopathy. Large hiatal hernia containing approximately half of the stomach. CORONARY ARTERY CALCIFICATION: None. HEPATOBILIARY: No suspicious lesion. Several gallstones. PANCREAS: Normal. SPLEEN: Normal. ADRENAL GLANDS: Normal. KIDNEYS/BLADDER: No significant mass, stone, or hydronephrosis. BOWEL: No obstruction or inflammatory change. LYMPH NODES: Normal. VASCULATURE: Retroaortic  left renal vein. PELVIC ORGANS: Hysterectomy. No pelvic mass. No ascites. MUSCULOSKELETAL: No metastases seen. Right total hip prosthesis. Multifocal thoracic and lumbar disc degeneration. Lower lumbar facet arthropathy with grade 1 L5 on S1 anterolisthesis.     IMPRESSION: 1.  No evidence malignant progression. 2.  Moderate volume acute bilateral pulmonary emboli. 3.  Gallstones. 4.  Large hiatal hernia. 5.  The patient was sent to the emergency room for pulmonary embolism management.      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     Visit Dx:    (C21.1) Malignant neoplasm of anal canal (H)  (primary encounter diagnosis)       Cancer Staging   Malignant neoplasm of anal canal (H)  Staging form: Anus, AJCC 8th Edition  - Clinical stage from 2/20/2021: Stage IIA (cT2, cN0, cM0) - Signed by Patsy Spencer MD on 1/13/2022      Assessment & Plan:   1.  Follow-up with heme-onc as planned  2.  Follow-up with colorectal surgery as planned  3.  Colonoscopy 3 yrs (2027)  4.  Dicussed in absence of new symptoms, anticipate restaging images around 5 yr kee  5.  Return to clinic as needed    Total time of this visit, including time spent face-to-face with patient and or via video/audio, and also in preparing for today's visit for MDM and documentation. Medical decision-making included consideration and possible diagnoses, management options, complex record review, review of diagnostic tests and information, consideration and discussion of significant complications based on comorbidities, discussion with providers involved in the care of the patient.     30 Minutes spent.     This note has been dictated using voice recognition software and as a result may contain minor grammatical errors and unintended word substitutions.      Pain Management Plan:  N/A        Patsy Spencer MD  Department of Radiation Oncology   [facility] Radiation Oncology  Tel: [phone]  Page: [phone]    [facility]  [address] [place] [address]     [place] [id number] [facility]   [place] [address]    CC:  Patient Care Team:  Jak Jones MD as PCP - General  Jak Jones MD as Assigned PCP  Tomasa Gonzalez RN as Specialty Care Coordinator (Hematology & Oncology)  Royce Perez MD as Assigned Heart and Vascular Provider  Kaylie Mohr MD as MD (Allergy & Immunology)  Kaylie Mohr MD as Assigned Allergy Provider  Erin Barajas NP as Assigned Cancer Care Provider  Kalee Wilson, RN as Specialty Care Coordinator (Hematology & Oncology)  Santiago Hinojosa MD as Assigned Surgical Provider      Again, thank you for allowing me to participate in the care of your patient.        Sincerely,        Patsy Spencer MD    Electronically signed

## 2025-02-13 NOTE — PROGRESS NOTES
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 Zhao is a 80 year old female who underwent colonoscopy following presentation with anemia.  2/18/2021 colonoscopy: Ascending colon polyp 16 to 20 mm status post complete polypectomy.  She was noted to have an ulcerated mass in it anus, biopsy, Pathology:  Invasive moderately differentiated squamous cell carcinoma     2/23/2021 CT chest abdomen pelvis which noted incidental 4 mm right upper lobe nodule for which follow-up was recommended.    MRI of the pelvis noted a 25 x 18 x 15 mm mucosal thickening centered in the left posterior laterally anorectal junction there were no abnormal or suspicious lymph nodes or visible involvement of the levator ani, puborectalis, or external sphincter musculature.     3/22/2021-4/29/2021 concurrent chemoradiation therapy: 5400 and 4500 cGy in 27 fractions to the primary and pelvic lymph nodes respectively with concurrent mitomycin + 5-FU     Complete response on anoscopy and follow-up restaging CTs and MRIs  She had right hip surgery for recurrent dislocation  2/8/2024 colonoscopy: Normal, repeat colonoscopy in 3 years  2/8/2024 CT CAP: extensive DVT and bilateral pulmonary emboli.    Anticoagulation from February 2024 to August 2024     10/21/2024 CT AP:  Obstructing 3 mm left ureterovesical junction stone with mild  left-sided hydroureteronephrosis and severe perinephric and periureteral edema   11/20/2024 CT chest:  No acute pulmonary embolism. Most of the pulmonary artery filling defects present in February 2024 have resolved with only one residual nonocclusive subsegmental defect in a subsegmental branch of the posterior basal right lower lobe.     2/10/2025 CT CAP: Acute multifocal pulmonary emboli involving right and left upper and lower lobes.  Stable right upper lobe 3 mm subpleural nodule.  No metastases seen.    She returns to clinic today for routine follow-up visit.  Overall she is doing well.  No bladder or bowel issues.  Denies any concern of vaginal dryness.  She continues follow-up with colorectal surgery.  She is disappointed about having recurrent pulmonary emboli primarily because it will delay her surgery for her hiatal hernia which is quite symptomatic and bothersome.  She is not really noted any symptoms related to development of additional pulmonary emboli but has some dyspnea with exertion.    Medications were reviewed and are up to date on EPIC.    The following portions of the patient's history were reviewed and updated as appropriate: allergies, current medications, past family history, past medical history, past social history, past surgical history and problem list.    Review of Systems:      General  Constitutional  Constitutional (WDL): Exceptions to WDL  Fatigue: Fatigue relieved by rest  EENT  Eye Disorders  Eye Disorder (WDL): Assessment not pertinent to visit  Ear Disorders  Ear Disorder (WDL): Assessment not pertinent to visit  Respiratory  Respiratory  Respiratory (WDL): Exceptions to WDL  Dyspnea: Shortness of breath with moderate exertion (d/t hiatal hernia)  Cardiovascular  Cardiovascular  Cardiovascular (WDL): All cardiovascular elements are within defined limits (no pacemaker)  Gastrointestinal  Gastrointestinal  Gastrointestinal (WDL): Exceptions to WDL  Diarrhea: Increase of less than  4 stools per day over baseline OR mild increase in ostomy output compared to baseline (occassionally)  Musculoskeletal  Musculoskeletal and Connective Tissue Disorders  Musculoskeletal & Connective (WDL): All musculoskeletal & connective elements are within defined limits  Integumentary  Integumentary  Integumentary (WDL): All integumentary elements are within defined limits  Neurological  Neurosensory  Neurosensory (WDL): All neurosensory elements are within defined limits  Genitourinary/Reproductive  Genitourinary  Genitourinary (WDL): All genitourinary elements are within defined limits  Lymphatic  Lymph System Disorders  Lymph (WDL): All lymph elements are within defined limits  Pain  Pain Score: No Pain (0)  AUA Assessment                                                              Accompanied by  Accompanied By: self only    Objective:        PHYSICAL EXAMINATION:    /79 (BP Location: Right arm, Patient Position: Sitting, Cuff Size: Adult Regular)   Pulse 78   Temp 98.2  F (36.8  C) (Oral)   Resp 16   Wt 62.2 kg (137 lb 1.6 oz)   LMP  (LMP Unknown)   SpO2 97%   BMI 24.29 kg/m      Gen: Alert, in NAD pleasant interactive, well nourished  Eyes: EOMI, sclera anicteric  HENT     Head: NC/AT  Musculoskeletal: Normal muscle bulk and tone  Skin: Normal tone and turgor, warm, dry, intact  Neurologic: A/Ox3, face symmetric, speech fluent, no focal motor deficits. Gait normal and unaided  Psychiatric: Appropriate mood and affect      Imaging: Imaging results 6 weeks:US Lower Extremity Venous Duplex Bilateral    Result Date: 2/10/2025  EXAM: US LOWER EXTREMITY VENOUS DUPLEX BILATERAL LOCATION: [facility] DATE: 2/10/2025 INDICATION: Acute pulmonary emboli, evaluate for remaining lower extremity clot burden COMPARISON: None. TECHNIQUE: Venous Duplex ultrasound of bilateral lower extremities with and without compression, augmentation and duplex. Color flow and spectral Doppler with  waveform analysis performed. FINDINGS: Exam includes the common femoral, femoral, popliteal veins as well as segmentally visualized deep calf veins and greater saphenous vein. RIGHT: No deep vein thrombosis. No superficial thrombophlebitis. No popliteal cyst. LEFT: There is DVT in the external iliac, common femoral, and proximal thigh femoral vein. No superficial thrombophlebitis. No popliteal cyst.     IMPRESSION: 1.  No right leg DVT. 2.  Left pelvic and thigh DVT. 3.  Report called to Dr. Norris at 1:15 p.m.    CT Chest/Abdomen/Pelvis w Contrast    Result Date: 2/10/2025  EXAM: CT CHEST/ABDOMEN/PELVIS W CONTRAST LOCATION: [facility] DATE: 2/10/2025 INDICATION:  Iron deficiency anemia due to chronic blood loss, Malignant neoplasm of anal canal (H) COMPARISON: CT 11/20/2024, 2/8/2024 TECHNIQUE: CT scan of the chest, abdomen, and pelvis was performed following injection of IV contrast. Multiplanar reformats were obtained. Dose reduction techniques were used. CONTRAST: Isovue 370 90ml FINDINGS: LUNGS AND PLEURA: Acute appearing multifocal pulmonary emboli involving the right upper and lower lobes, and left upper and lower lobes. The anterior right upper lobe subpleural 3 mm nodule (4, 120) appears stable. No additional nodule. No effusion. MEDIASTINUM/AXILLAE: Stable right inferior thyroid 10 mm fluid density nodule should be benign. No adenopathy. Large hiatal hernia containing approximately half of the stomach. CORONARY ARTERY CALCIFICATION: None. HEPATOBILIARY: No suspicious lesion. Several gallstones. PANCREAS: Normal. SPLEEN: Normal. ADRENAL GLANDS: Normal. KIDNEYS/BLADDER: No significant mass, stone, or hydronephrosis. BOWEL: No obstruction or inflammatory change. LYMPH NODES: Normal. VASCULATURE: Retroaortic left renal vein. PELVIC ORGANS: Hysterectomy. No pelvic mass. No ascites. MUSCULOSKELETAL: No metastases seen. Right total hip prosthesis. Multifocal thoracic and lumbar disc  degeneration. Lower lumbar facet arthropathy with grade 1 L5 on S1 anterolisthesis.     IMPRESSION: 1.  No evidence malignant progression. 2.  Moderate volume acute bilateral pulmonary emboli. 3.  Gallstones. 4.  Large hiatal hernia. 5.  The patient was sent to the emergency room for pulmonary embolism management.      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     Visit Dx:    (C21.1) Malignant neoplasm of anal canal (H)  (primary encounter diagnosis)       Cancer Staging   Malignant neoplasm of anal canal (H)  Staging form: Anus, AJCC 8th Edition  - Clinical stage from 2/20/2021: Stage IIA (cT2, cN0, cM0) - Signed by Patsy Spencer MD on 1/13/2022      Assessment & Plan:   1.  Follow-up with heme-onc as planned  2.  Follow-up with colorectal surgery as planned  3.  Colonoscopy 3 yrs (2027)  4.  Dicussed in absence of new symptoms, anticipate restaging images around 5 yr kee  5.  Return to clinic as needed    Total time of this visit, including time spent face-to-face with patient and or via video/audio, and also in preparing for today's visit for MDM and documentation. Medical decision-making included consideration and possible diagnoses, management options, complex record review, review of diagnostic tests and information, consideration and discussion of significant complications based on comorbidities, discussion with providers involved in the care of the patient.     30 Minutes spent.     This note has been dictated using voice recognition software and as a result may contain minor grammatical errors and unintended word substitutions.      Pain Management Plan: N/A        Patsy Spencer MD  Department of Radiation Oncology   [facility] Radiation Oncology  Tel: [phone]  Page: [phone]    [facility]  [address] [place] [address]     [facility]   [address] [facility] [place] [address]    CC:  Patient Care Team:  Jak Jones MD as PCP - General  Jak Jones MD as Assigned PCP  Tomasa Gonzalez, RN as Specialty Care Coordinator (Hematology & Oncology)  Royce Perez MD as Assigned Heart and Vascular Provider  Kaylie Mohr MD as MD (Allergy & Immunology)  Kaylie Mohr MD as Assigned Allergy Provider  Erin Barajas NP as Assigned Cancer Care Provider  Kalee Wilson, RN as Specialty Care Coordinator (Hematology & Oncology)  Santiago Hinojosa MD as Assigned Surgical Provider

## 2025-02-17 ENCOUNTER — ONCOLOGY VISIT (OUTPATIENT)
Dept: ONCOLOGY | Facility: HOSPITAL | Age: 80
End: 2025-02-17
Attending: INTERNAL MEDICINE
Payer: MEDICARE

## 2025-02-17 VITALS
RESPIRATION RATE: 16 BRPM | DIASTOLIC BLOOD PRESSURE: 85 MMHG | BODY MASS INDEX: 24.1 KG/M2 | WEIGHT: 136 LBS | SYSTOLIC BLOOD PRESSURE: 134 MMHG | OXYGEN SATURATION: 100 % | HEART RATE: 80 BPM | TEMPERATURE: 98.2 F | HEIGHT: 63 IN

## 2025-02-17 DIAGNOSIS — D50.0 IRON DEFICIENCY ANEMIA DUE TO CHRONIC BLOOD LOSS: Primary | ICD-10-CM

## 2025-02-17 DIAGNOSIS — Z85.048 HISTORY OF RECTAL OR ANAL CANCER: ICD-10-CM

## 2025-02-17 DIAGNOSIS — I26.99 ACUTE PULMONARY EMBOLISM WITHOUT ACUTE COR PULMONALE, UNSPECIFIED PULMONARY EMBOLISM TYPE (H): ICD-10-CM

## 2025-02-17 PROCEDURE — G0463 HOSPITAL OUTPT CLINIC VISIT: HCPCS | Performed by: INTERNAL MEDICINE

## 2025-02-17 PROCEDURE — 99214 OFFICE O/P EST MOD 30 MIN: CPT | Performed by: INTERNAL MEDICINE

## 2025-02-17 ASSESSMENT — PAIN SCALES - GENERAL: PAINLEVEL_OUTOF10: NO PAIN (0)

## 2025-02-17 NOTE — PROGRESS NOTES
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  Patient started on lifelong anticoagulation      History of Present Illness    Ms. Oscar Zhao is heRe in follow-up.  She is here in follow-up.  She had staging scans done.  She was found to have bilateral pulmonary embolus and a repeat ultrasound showed DVT in the left leg.  She otherwise feels okay she has no other concerns her appetite and energy level continues to be fine.    Review of systems  A comprehensive 12 point review of system was done that was negative except what is mentioned in the history of present illness    Past History    Past Medical History:   Diagnosis Date    Anemia, unspecified type 2/8/2024    Arthritis     Basal cell carcinoma of anterior chest     Breast cyst     History of anesthesia complications     HLA B27 (HLA B27 positive)     Hypertension     Osteoporosis 07/19/2011    Peripheral neuropathy 09/12/2018    PONV (postoperative nausea and vomiting)     Scleritis, bilateral     Left 2010.  Treated with corticosteroids,, methotrexate and Humira.  2015.  Rituximab.    Squamous cell carcinoma of skin of chest     Steroid induced glaucoma, both eyes        Past Surgical History:   Procedure Laterality Date    ARTHROPLASTY  REVISION HIP Right 2023    Procedure: RIGHT HIP REVISION TOTAL HIP ARTHROPLASTY;  Surgeon: Bill Bernal DO;  Location: [facility] OR    ARTHROPLASTY REVISION HIP Right 2023    Procedure: RIGHT REVISION TOTAL HIP ARTHROPLASTY HEAD LINER EXCHANGE;  Surgeon: Bill Bernal DO;  Location: [facility] OR    BREAST CYST EXCISION Right 1981    benign     SECTION  1989    CLOSED REDUCTION HIP Right 2019    Procedure: CLOSED REDUCTION, HIP;  Surgeon: Jak Ruiz DO;  Location: [facility] OR;  Service: Orthopedics    CLOSED REDUCTION HIP Right 2023    Procedure: CLOSED REDUCTION, HIP RIGHT;  Surgeon: Aditya Pedroza MD;  Location: [facility] OR    CLOSED REDUCTION HIP Right 2023    Procedure: CLOSED REDUCTION, HIP RIGHT;  Surgeon: Jak Allen MD;  Location: [facility] OR    COLONOSCOPY  2011    COLONOSCOPY  2005    COLONOSCOPY N/A 2024    Procedure: COLONOSCOPY;  Surgeon: Chi Figueroa MD;  Location: [facility]    COLONOSCOPY W/ BIOPSIES AND POLYPECTOMY  2021    16 to 20 mm polyp:tubular adenoma in the ascending colon.  Ulcerated mass in the anal canal: Moderately differentiated squamous cell cancer.    ESOPHAGOSCOPY, GASTROSCOPY, DUODENOSCOPY (EGD), COMBINED  2017    Large hiatal hernia.  Reactive gastropathy with mucosal erosion.  H. pylori negative.    ESOPHAGOSCOPY, GASTROSCOPY, DUODENOSCOPY (EGD), COMBINED N/A 2024    Procedure: ESOPHAGOGASTRODUODENOSCOPY with biopsies;  Surgeon: Chi Figueroa MD;  Location: [facility] OR    HERNIORRHAPHY FEMORAL Left 2021    Procedure: LEFT FEMORAL HERNIA REPAIR;  Surgeon: Sidney Murray MD;  [place] [facility] [place]    HYSTERECTOMY TOTAL ABDOMINAL, BILATERAL SALPINGO-OOPHORECTOMY, COMBINED      IR CHEST PORT PLACEMENT > 5 YRS OF AGE  3/15/2021    IR PORT PLACEMENT >5 YEARS  03/15/2021    Right IJ port-a-cath.    IR PORT REMOVAL RIGHT   "7/14/2021    LAPAROSCOPIC APPENDECTOMY  04/28/2011    PHACOEMULSIFICATION CLEAR CORNEA WITH STANDARD INTRAOCULAR LENS IMPLANT Right 08/30/2017    PHACOEMULSIFICATION CLEAR CORNEA WITH STANDARD INTRAOCULAR LENS IMPLANT Left 09/13/2017    TOTAL HIP ARTHROPLASTY Right 08/18/2015    Procedure: HIP TOTAL ARTHROPLASTY, RIGHT;  Surgeon: Star uD MD;  Location: [facility];  Service:     [facility] TOTAL KNEE ARTHROPLASTY Left 03/02/2017    Procedure: LEFT TOTAL KNEE ARTHROPLASTY;  Surgeon: Star Du MD;  Location: [facility] OR;  Service: Orthopedics       Physical Exam    /85 (BP Location: Right arm, Patient Position: Sitting, Cuff Size: Adult Regular)   Pulse 80   Temp 98.2  F (36.8  C) (Tympanic)   Resp 16   Ht 1.6 m (5' 3\")   Wt 61.7 kg (136 lb)   LMP  (LMP Unknown)   SpO2 100%   BMI 24.09 kg/m      General: alert, awake, not in acute distress  HEENT: Head: Normal, normocephalic, atraumatic.  Eye: Normal external eye, conjunctiva, lids cornea, JAVIER.  Nose: Normal external nose, mucus membranes and septum.  Pharynx: Normal buccal mucosa. Normal pharynx.  Neck / Thyroid: Supple, no masses, nodes, nodules or enlargement.  Lymphatics: No abnormally enlarged lymph nodes.  Chest: Normal chest wall and respirations. Clear to auscultation.  No crackles or rhonchi's  Heart: S1 S2 RRR, no murmur.   Abdomen: abdomen is soft without significant tenderness, masses, organomegaly or guarding  Extremities: normal strength, tone, and muscle mass  Skin: normal. no rash or abnormalities  CNS: non focal.    Lab Results    Recent Results (from the past 240 hours)   ECG 12-LEAD WITH MUSE (L[facility])    Collection Time: 02/10/25 11:34 AM   Result Value Ref Range    Systolic Blood Pressure  mmHg    Diastolic Blood Pressure  mmHg    Ventricular Rate 76 BPM    Atrial Rate 76 BPM    ME Interval 194 ms    QRS Duration 70 ms     ms    QTc 402 ms    P Axis 23 degrees    R AXIS 48 degrees    T Axis 72 degrees    " Interpretation ECG       Sinus rhythm  Low voltage QRS  Cannot rule out Anterior infarct (cited on or before 20-Nov-2024)  Abnormal ECG  When compared with ECG of 20-Nov-2024 10:25,  No significant change was found  Confirmed by SEE ED PROVIDER NOTE FOR, ECG INTERPRETATION ([id number]),  RICKI WAITE ([id number]) on 2/10/2025 12:32:21 PM     Basic metabolic panel    Collection Time: 02/10/25 11:43 AM   Result Value Ref Range    Sodium 135 135 - 145 mmol/L    Potassium 4.4 3.4 - 5.3 mmol/L    Chloride 101 98 - 107 mmol/L    Carbon Dioxide (CO2) 24 22 - 29 mmol/L    Anion Gap 10 7 - 15 mmol/L    Urea Nitrogen 12.5 8.0 - 23.0 mg/dL    Creatinine 0.57 0.51 - 0.95 mg/dL    GFR Estimate >90 >60 mL/min/1.73m2    Calcium 9.7 8.8 - 10.4 mg/dL    Glucose 96 70 - 99 mg/dL   Troponin T, High Sensitivity    Collection Time: 02/10/25 11:43 AM   Result Value Ref Range    Troponin T, High Sensitivity <6 <=14 ng/L   Nt probnp inpatient    Collection Time: 02/10/25 11:43 AM   Result Value Ref Range    N terminal Pro BNP Inpatient 227 0 - 1,800 pg/mL   CBC with platelets and differential    Collection Time: 02/10/25 11:43 AM   Result Value Ref Range    WBC Count 8.1 4.0 - 11.0 10e3/uL    RBC Count 5.57 (H) 3.80 - 5.20 10e6/uL    Hemoglobin 11.8 11.7 - 15.7 g/dL    Hematocrit 41.8 35.0 - 47.0 %    MCV 75 (L) 78 - 100 fL    MCH 21.2 (L) 26.5 - 33.0 pg    MCHC 28.2 (L) 31.5 - 36.5 g/dL    RDW 18.6 (H) 10.0 - 15.0 %    Platelet Count 222 150 - 450 10e3/uL    % Neutrophils 76 %    % Lymphocytes 12 %    % Monocytes 7 %    % Eosinophils 3 %    % Basophils 1 %    % Immature Granulocytes 1 %    NRBCs per 100 WBC 0 <1 /100    Absolute Neutrophils 6.2 1.6 - 8.3 10e3/uL    Absolute Lymphocytes 1.0 0.8 - 5.3 10e3/uL    Absolute Monocytes 0.6 0.0 - 1.3 10e3/uL    Absolute Eosinophils 0.3 0.0 - 0.7 10e3/uL    Absolute Basophils 0.1 0.0 - 0.2 10e3/uL    Absolute Immature Granulocytes 0.1 <=0.4 10e3/uL    Absolute NRBCs 0.0 10e3/uL          Imaging    US Lower Extremity Venous Duplex Bilateral    Result Date: 2/10/2025  EXAM: US LOWER EXTREMITY VENOUS DUPLEX BILATERAL LOCATION: [facility] DATE: 2/10/2025 INDICATION: Acute pulmonary emboli, evaluate for remaining lower extremity clot burden COMPARISON: None. TECHNIQUE: Venous Duplex ultrasound of bilateral lower extremities with and without compression, augmentation and duplex. Color flow and spectral Doppler with waveform analysis performed. FINDINGS: Exam includes the common femoral, femoral, popliteal veins as well as segmentally visualized deep calf veins and greater saphenous vein. RIGHT: No deep vein thrombosis. No superficial thrombophlebitis. No popliteal cyst. LEFT: There is DVT in the external iliac, common femoral, and proximal thigh femoral vein. No superficial thrombophlebitis. No popliteal cyst.     IMPRESSION: 1.  No right leg DVT. 2.  Left pelvic and thigh DVT. 3.  Report called to Dr. Norris at 1:15 p.m.    CT Chest/Abdomen/Pelvis w Contrast    Result Date: 2/10/2025  EXAM: CT CHEST/ABDOMEN/PELVIS W CONTRAST LOCATION: [facility] DATE: 2/10/2025 INDICATION:  Iron deficiency anemia due to chronic blood loss, Malignant neoplasm of anal canal (H) COMPARISON: CT 11/20/2024, 2/8/2024 TECHNIQUE: CT scan of the chest, abdomen, and pelvis was performed following injection of IV contrast. Multiplanar reformats were obtained. Dose reduction techniques were used. CONTRAST: Isovue 370 90ml FINDINGS: LUNGS AND PLEURA: Acute appearing multifocal pulmonary emboli involving the right upper and lower lobes, and left upper and lower lobes. The anterior right upper lobe subpleural 3 mm nodule (4, 120) appears stable. No additional nodule. No effusion. MEDIASTINUM/AXILLAE: Stable right inferior thyroid 10 mm fluid density nodule should be benign. No adenopathy. Large hiatal hernia containing approximately half of the stomach. CORONARY ARTERY  CALCIFICATION: None. HEPATOBILIARY: No suspicious lesion. Several gallstones. PANCREAS: Normal. SPLEEN: Normal. ADRENAL GLANDS: Normal. KIDNEYS/BLADDER: No significant mass, stone, or hydronephrosis. BOWEL: No obstruction or inflammatory change. LYMPH NODES: Normal. VASCULATURE: Retroaortic left renal vein. PELVIC ORGANS: Hysterectomy. No pelvic mass. No ascites. MUSCULOSKELETAL: No metastases seen. Right total hip prosthesis. Multifocal thoracic and lumbar disc degeneration. Lower lumbar facet arthropathy with grade 1 L5 on S1 anterolisthesis.     IMPRESSION: 1.  No evidence malignant progression. 2.  Moderate volume acute bilateral pulmonary emboli. 3.  Gallstones. 4.  Large hiatal hernia. 5.  The patient was sent to the emergency room for pulmonary embolism management.         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  She had staging CT scans done and they do not show any evidence of recurrence and she continues to be in remission.  She is 4 years out from her original diagnosis.  I will continue 6 monthly follow-up with CT scans until we complete a total of 5 years    Pulmonary embolism and DVT  Patient has history of PE and DVT that was diagnosed incidentally on a CT scan in February 2024.  It was thought at that time that this was secondary to hip replacement surgery and she was taken off of anticoagulation.  However on her restaging CT scan she was found to have incidental PEs and ultrasound showed a DVT in the left leg.  She has not been restarted on anticoagulation and she will need to be on anticoagulation long-term.        Signed by: Camila Beck MD        CC: Jak Jones MD

## 2025-02-17 NOTE — LETTER
"2/17/2025      Oscar Zhao  [address] [address]      Dear Colleague,    Thank you for referring your patient, Oscar Zhao, to the [facility]. Please see a copy of my visit note below.    Oncology Rooming Note    February 17, 2025 12:51 PM   Oscar Zhao is a 80 year old female who presents for:    Chief Complaint   Patient presents with     Hematology     Anemia      Initial Vitals: /85 (BP Location: Right arm, Patient Position: Sitting, Cuff Size: Adult Regular)   Pulse 80   Temp 98.2  F (36.8  C) (Tympanic)   Resp 16   Ht 1.6 m (5' 3\")   Wt 61.7 kg (136 lb)   LMP  (LMP Unknown)   SpO2 100%   BMI 24.09 kg/m   Estimated body mass index is 24.09 kg/m  as calculated from the following:    Height as of this encounter: 1.6 m (5' 3\").    Weight as of this encounter: 61.7 kg (136 lb). Body surface area is 1.66 meters squared.  No Pain (0) Comment: Data Unavailable   No LMP recorded (lmp unknown). Patient is postmenopausal.  Allergies reviewed: Yes  Medications reviewed: Yes    Medications: Medication refills not needed today.  Pharmacy name entered into [employer]:    [id number] [address] [facility]  AT [id number] - [address]    Frailty Screening:   Is the patient here for a new oncology consult visit in cancer care? 2. 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  Patient started on lifelong anticoagulation      History of Present Illness    Ms. Oscar Zhao is heRe in follow-up.  She is here in follow-up.  She had staging scans done.  She was found to have bilateral pulmonary embolus and a repeat ultrasound showed DVT in the left leg.  She otherwise feels okay she has no other concerns her appetite and energy level continues to be fine.    Review of systems  A comprehensive 12 point review of system was done that was negative except what is mentioned in the history of present illness    Past History    Past Medical History:   Diagnosis Date     Anemia, unspecified type 2024     Arthritis      Basal cell carcinoma of anterior chest      Breast cyst      History of anesthesia complications      HLA B27 (HLA B27 positive)      Hypertension      Osteoporosis 2011     Peripheral neuropathy 2018     PONV (postoperative nausea and vomiting)      Scleritis, bilateral     Left .  Treated with corticosteroids,, methotrexate and Humira.  .  Rituximab.     Squamous cell carcinoma of skin of chest      Steroid induced glaucoma, both eyes        Past Surgical History:   Procedure Laterality Date     ARTHROPLASTY REVISION HIP Right 2023    Procedure: RIGHT HIP REVISION TOTAL HIP ARTHROPLASTY;  Surgeon: Bill Bernal DO;  Location: [facility] Main OR     ARTHROPLASTY REVISION HIP Right 2023    Procedure: RIGHT REVISION TOTAL HIP ARTHROPLASTY HEAD LINER EXCHANGE;  Surgeon: Bill Bernal DO;  Location: [facility] Main OR     BREAST CYST EXCISION Right     benign      SECTION        CLOSED REDUCTION HIP Right 03/26/2019    Procedure: CLOSED REDUCTION, HIP;  Surgeon: Jak Ruiz DO;  Location: [facility];  Service: Orthopedics     CLOSED REDUCTION HIP Right 8/30/2023    Procedure: CLOSED REDUCTION, HIP RIGHT;  Surgeon: Aditya Pedroza MD;  Location: [facility] OR     CLOSED REDUCTION HIP Right 11/4/2023    Procedure: CLOSED REDUCTION, HIP RIGHT;  Surgeon: Jak Allen MD;  Location: [facility] OR     COLONOSCOPY  09/07/2011     COLONOSCOPY  09/27/2005     COLONOSCOPY N/A 2/12/2024    Procedure: COLONOSCOPY;  Surgeon: Chi Figueroa MD;  Location: [facility]     COLONOSCOPY W/ BIOPSIES AND POLYPECTOMY  02/18/2021    16 to 20 mm polyp:tubular adenoma in the ascending colon.  Ulcerated mass in the anal canal: Moderately differentiated squamous cell cancer.     ESOPHAGOSCOPY, GASTROSCOPY, DUODENOSCOPY (EGD), COMBINED  05/05/2017    Large hiatal hernia.  Reactive gastropathy with mucosal erosion.  H. pylori negative.     ESOPHAGOSCOPY, GASTROSCOPY, DUODENOSCOPY (EGD), COMBINED N/A 2/12/2024    Procedure: ESOPHAGOGASTRODUODENOSCOPY with biopsies;  Surgeon: Chi Figueroa MD;  Location: [facility] OR     HERNIORRHAPHY FEMORAL Left 9/7/2021    Procedure: LEFT FEMORAL HERNIA REPAIR;  Surgeon: Sidney Murray MD;  Location: [facility]     HYSTERECTOMY TOTAL ABDOMINAL, BILATERAL SALPINGO-OOPHORECTOMY, COMBINED  1996     IR CHEST PORT PLACEMENT > 5 YRS OF AGE  3/15/2021     IR PORT PLACEMENT >5 YEARS  03/15/2021    Right IJ port-a-cath.     IR PORT REMOVAL RIGHT  7/14/2021     LAPAROSCOPIC APPENDECTOMY  04/28/2011     PHACOEMULSIFICATION CLEAR CORNEA WITH STANDARD INTRAOCULAR LENS IMPLANT Right 08/30/2017     PHACOEMULSIFICATION CLEAR CORNEA WITH STANDARD INTRAOCULAR LENS IMPLANT Left 09/13/2017     TOTAL HIP ARTHROPLASTY Right 08/18/2015    Procedure: HIP TOTAL ARTHROPLASTY, RIGHT;  Surgeon: Star Du MD;  Location: [facility]  "Main OR;  Service:      [facility] TOTAL KNEE ARTHROPLASTY Left 03/02/2017    Procedure: LEFT TOTAL KNEE ARTHROPLASTY;  Surgeon: Star Du MD;  Location: [facility] Main OR;  Service: Orthopedics       Physical Exam    /85 (BP Location: Right arm, Patient Position: Sitting, Cuff Size: Adult Regular)   Pulse 80   Temp 98.2  F (36.8  C) (Tympanic)   Resp 16   Ht 1.6 m (5' 3\")   Wt 61.7 kg (136 lb)   LMP  (LMP Unknown)   SpO2 100%   BMI 24.09 kg/m      General: alert, awake, not in acute distress  HEENT: Head: Normal, normocephalic, atraumatic.  Eye: Normal external eye, conjunctiva, lids cornea, JAVIER.  Nose: Normal external nose, mucus membranes and septum.  Pharynx: Normal buccal mucosa. Normal pharynx.  Neck / Thyroid: Supple, no masses, nodes, nodules or enlargement.  Lymphatics: No abnormally enlarged lymph nodes.  Chest: Normal chest wall and respirations. Clear to auscultation.  No crackles or rhonchi's  Heart: S1 S2 RRR, no murmur.   Abdomen: abdomen is soft without significant tenderness, masses, organomegaly or guarding  Extremities: normal strength, tone, and muscle mass  Skin: normal. no rash or abnormalities  CNS: non focal.    Lab Results    Recent Results (from the past 240 hours)   ECG 12-LEAD WITH MUSE (E)    Collection Time: 02/10/25 11:34 AM   Result Value Ref Range    Systolic Blood Pressure  mmHg    Diastolic Blood Pressure  mmHg    Ventricular Rate 76 BPM    Atrial Rate 76 BPM    GA Interval 194 ms    QRS Duration 70 ms     ms    QTc 402 ms    P Axis 23 degrees    R AXIS 48 degrees    T Axis 72 degrees    Interpretation ECG       Sinus rhythm  Low voltage QRS  Cannot rule out Anterior infarct (cited on or before 20-Nov-2024)  Abnormal ECG  When compared with ECG of 20-Nov-2024 10:25,  No significant change was found  Confirmed by SEE ED PROVIDER NOTE FOR, ECG INTERPRETATION ([id number]),  RICKI WAITE ([id number]) on 2/10/2025 12:32:21 PM     Basic metabolic panel    " Collection Time: 02/10/25 11:43 AM   Result Value Ref Range    Sodium 135 135 - 145 mmol/L    Potassium 4.4 3.4 - 5.3 mmol/L    Chloride 101 98 - 107 mmol/L    Carbon Dioxide (CO2) 24 22 - 29 mmol/L    Anion Gap 10 7 - 15 mmol/L    Urea Nitrogen 12.5 8.0 - 23.0 mg/dL    Creatinine 0.57 0.51 - 0.95 mg/dL    GFR Estimate >90 >60 mL/min/1.73m2    Calcium 9.7 8.8 - 10.4 mg/dL    Glucose 96 70 - 99 mg/dL   Troponin T, High Sensitivity    Collection Time: 02/10/25 11:43 AM   Result Value Ref Range    Troponin T, High Sensitivity <6 <=14 ng/L   Nt probnp inpatient    Collection Time: 02/10/25 11:43 AM   Result Value Ref Range    N terminal Pro BNP Inpatient 227 0 - 1,800 pg/mL   CBC with platelets and differential    Collection Time: 02/10/25 11:43 AM   Result Value Ref Range    WBC Count 8.1 4.0 - 11.0 10e3/uL    RBC Count 5.57 (H) 3.80 - 5.20 10e6/uL    Hemoglobin 11.8 11.7 - 15.7 g/dL    Hematocrit 41.8 35.0 - 47.0 %    MCV 75 (L) 78 - 100 fL    MCH 21.2 (L) 26.5 - 33.0 pg    MCHC 28.2 (L) 31.5 - 36.5 g/dL    RDW 18.6 (H) 10.0 - 15.0 %    Platelet Count 222 150 - 450 10e3/uL    % Neutrophils 76 %    % Lymphocytes 12 %    % Monocytes 7 %    % Eosinophils 3 %    % Basophils 1 %    % Immature Granulocytes 1 %    NRBCs per 100 WBC 0 <1 /100    Absolute Neutrophils 6.2 1.6 - 8.3 10e3/uL    Absolute Lymphocytes 1.0 0.8 - 5.3 10e3/uL    Absolute Monocytes 0.6 0.0 - 1.3 10e3/uL    Absolute Eosinophils 0.3 0.0 - 0.7 10e3/uL    Absolute Basophils 0.1 0.0 - 0.2 10e3/uL    Absolute Immature Granulocytes 0.1 <=0.4 10e3/uL    Absolute NRBCs 0.0 10e3/uL         Imaging    US Lower Extremity Venous Duplex Bilateral    Result Date: 2/10/2025  EXAM: US LOWER EXTREMITY VENOUS DUPLEX BILATERAL LOCATION: [facility] DATE: 2/10/2025 INDICATION: Acute pulmonary emboli, evaluate for remaining lower extremity clot burden COMPARISON: None. TECHNIQUE: Venous Duplex ultrasound of bilateral lower extremities with and  without compression, augmentation and duplex. Color flow and spectral Doppler with waveform analysis performed. FINDINGS: Exam includes the common femoral, femoral, popliteal veins as well as segmentally visualized deep calf veins and greater saphenous vein. RIGHT: No deep vein thrombosis. No superficial thrombophlebitis. No popliteal cyst. LEFT: There is DVT in the external iliac, common femoral, and proximal thigh femoral vein. No superficial thrombophlebitis. No popliteal cyst.     IMPRESSION: 1.  No right leg DVT. 2.  Left pelvic and thigh DVT. 3.  Report called to Dr. Norris at 1:15 p.m.    CT Chest/Abdomen/Pelvis w Contrast    Result Date: 2/10/2025  EXAM: CT CHEST/ABDOMEN/PELVIS W CONTRAST LOCATION: [facility] DATE: 2/10/2025 INDICATION:  Iron deficiency anemia due to chronic blood loss, Malignant neoplasm of anal canal (H) COMPARISON: CT 11/20/2024, 2/8/2024 TECHNIQUE: CT scan of the chest, abdomen, and pelvis was performed following injection of IV contrast. Multiplanar reformats were obtained. Dose reduction techniques were used. CONTRAST: Isovue 370 90ml FINDINGS: LUNGS AND PLEURA: Acute appearing multifocal pulmonary emboli involving the right upper and lower lobes, and left upper and lower lobes. The anterior right upper lobe subpleural 3 mm nodule (4, 120) appears stable. No additional nodule. No effusion. MEDIASTINUM/AXILLAE: Stable right inferior thyroid 10 mm fluid density nodule should be benign. No adenopathy. Large hiatal hernia containing approximately half of the stomach. CORONARY ARTERY CALCIFICATION: None. HEPATOBILIARY: No suspicious lesion. Several gallstones. PANCREAS: Normal. SPLEEN: Normal. ADRENAL GLANDS: Normal. KIDNEYS/BLADDER: No significant mass, stone, or hydronephrosis. BOWEL: No obstruction or inflammatory change. LYMPH NODES: Normal. VASCULATURE: Retroaortic left renal vein. PELVIC ORGANS: Hysterectomy. No pelvic mass. No ascites. MUSCULOSKELETAL: No  metastases seen. Right total hip prosthesis. Multifocal thoracic and lumbar disc degeneration. Lower lumbar facet arthropathy with grade 1 L5 on S1 anterolisthesis.     IMPRESSION: 1.  No evidence malignant progression. 2.  Moderate volume acute bilateral pulmonary emboli. 3.  Gallstones. 4.  Large hiatal hernia. 5.  The patient was sent to the emergency room for pulmonary embolism management.         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  She had staging CT scans done and they do not show any evidence of recurrence and she continues to be in remission.  She is 4 years out from her original diagnosis.  I will continue 6 monthly follow-up with CT scans until we complete a total of 5 years    Pulmonary embolism and DVT  Patient has history of PE and DVT that was diagnosed incidentally on a CT scan in February 2024.  It was thought at that time that this was secondary to hip replacement surgery and she was taken off of anticoagulation.  However on her restaging CT scan she was found to have incidental PEs and ultrasound showed a DVT in the left leg.  She has not been restarted on anticoagulation and she will need to be on anticoagulation long-term.        Signed by: Camila Beck MD        CC: Jak Jones MD       Again, thank you for allowing me to participate in the care of your patient.        Sincerely,        Camila Beck MD    Electronically signed

## 2025-02-17 NOTE — PROGRESS NOTES
"Oncology Rooming Note    February 17, 2025 12:51 PM   Oscar Zhao is a 80 year old female who presents for:    Chief Complaint   Patient presents with    Hematology     Anemia      Initial Vitals: /85 (BP Location: Right arm, Patient Position: Sitting, Cuff Size: Adult Regular)   Pulse 80   Temp 98.2  F (36.8  C) (Tympanic)   Resp 16   Ht 1.6 m (5' 3\")   Wt 61.7 kg (136 lb)   LMP  (LMP Unknown)   SpO2 100%   BMI 24.09 kg/m   Estimated body mass index is 24.09 kg/m  as calculated from the following:    Height as of this encounter: 1.6 m (5' 3\").    Weight as of this encounter: 61.7 kg (136 lb). Body surface area is 1.66 meters squared.  No Pain (0) Comment: Data Unavailable   No LMP recorded (lmp unknown). Patient is postmenopausal.  Allergies reviewed: Yes  Medications reviewed: Yes    Medications: Medication refills not needed today.  Pharmacy name entered into [facility]:    [id number] [place], [place] - [address] [facility] [address]  AT [id number] [address]    Frailty Screening:   Is the patient here for a new oncology consult visit in cancer care? 2. No    PHQ9:  Did this patient require a PHQ9?: No      Clinical concerns:  6 month labs and CT results       Bree Alexandra"

## 2025-02-18 ENCOUNTER — DOCUMENTATION ONLY (OUTPATIENT)
Dept: ANTICOAGULATION | Facility: CLINIC | Age: 80
End: 2025-02-18
Payer: MEDICARE

## 2025-02-18 NOTE — PROGRESS NOTES
Anticoagulant Therapeutic Duplication    Duplicate orders identified: same medication but different dose, form, frequency or route    Duplicate orders appropriate-has starter pack order and ongoing therapy order     Active anticoagulant: apixaban (Eliquis)    Plan made per [facility] anticoagulation protocol.    Bonnie Victoria RN  2/18/2025

## 2025-03-10 ENCOUNTER — OFFICE VISIT (OUTPATIENT)
Dept: URGENT CARE | Facility: URGENT CARE | Age: 80
End: 2025-03-10
Payer: MEDICARE

## 2025-03-10 VITALS
HEART RATE: 72 BPM | OXYGEN SATURATION: 99 % | SYSTOLIC BLOOD PRESSURE: 118 MMHG | WEIGHT: 134 LBS | RESPIRATION RATE: 16 BRPM | TEMPERATURE: 97.7 F | BODY MASS INDEX: 23.74 KG/M2 | DIASTOLIC BLOOD PRESSURE: 76 MMHG

## 2025-03-10 DIAGNOSIS — J01.10 ACUTE NON-RECURRENT FRONTAL SINUSITIS: Primary | ICD-10-CM

## 2025-03-10 PROCEDURE — 3074F SYST BP LT 130 MM HG: CPT | Performed by: PHYSICIAN ASSISTANT

## 2025-03-10 PROCEDURE — 3078F DIAST BP <80 MM HG: CPT | Performed by: PHYSICIAN ASSISTANT

## 2025-03-10 PROCEDURE — 99214 OFFICE O/P EST MOD 30 MIN: CPT | Performed by: PHYSICIAN ASSISTANT

## 2025-03-10 NOTE — PATIENT INSTRUCTIONS
1.  Take antibiotic according to bottle instructions with food to avoid stomach upset.   2.  Tylenol as needed for pain. Take up to 1000 mg every 6 hours for pain relief.   3.  Follow-up if you not having any improvement in your symptoms over the next 5 days.

## 2025-03-10 NOTE — PROGRESS NOTES
[facility]  [address] [address]  Phone: [phone]  Fax: [phone]    Patient:  Oscar Zhao, Date of birth 1945  Date of Visit:  03/10/2025  Referring Provider No ref. provider found    Patient presents with:  Sinus Problem: Since last week, runny nose  Headache  Eye Problem        ICD-10-CM    1. Acute non-recurrent frontal sinusitis  J01.10 amoxicillin-clavulanate (AUGMENTIN) 875-125 MG tablet          Patient Instructions   1.  Take antibiotic according to bottle instructions with food to avoid stomach upset.   2.  Tylenol as needed for pain. Take up to 1000 mg every 6 hours for pain relief.   3.  Follow-up if you not having any improvement in your symptoms over the next 5 days.      Assessment & Plan      Acute non-recurrent frontal sinusitis:  - Unilateral head pain and plugged ears suggest a sinus infection. No signs of systemic symptoms or other headache types like cluster headaches.  - Prescribe Augmentin to be taken with food twice daily for seven days. Recommend warm compresses for potential stye. Advise against NSAIDs due to peptic ulcer history; suggest higher doses of Tylenol for pain management.  - Risks and side effects: Augmentin may cause stomach upset, typically diarrhea. Doxycycline can cause sun sensitivity and interaction with heavy metal supplements, but not chosen in this case.           History of Present Illness     Pertinent history obtain from: patient    Maritza Zhao, a 80-year-old female, reports experiencing symptoms consistent with a sinus infection. She describes a stabbing pain just above her left eye and head, which began last week and has recurred over the past few days. She notes that the pain sometimes subsides but then returns. She has experienced similar sinus infections in the past, including one last fall and another three years ago. She does not have a stuffed-up nose but experiences a runny nose and  occasional earaches, with her ears feeling plugged. She denies any history of migraine disorders or vision changes and states that headaches are unusual for her, occurring primarily with sinus infections. She has taken ibuprofen, which alleviates the pain, but typically avoids it due to a history of bleeding ulcers from aspirin use. She also took Tylenol, which was ineffective. She denies any fever, systemic symptoms, or seasonal allergies, except for a significant allergy to kiwi. She mentions a hiatal hernia affecting her breathing, unrelated to her current symptoms, and a history of blood clots that delayed a planned surgery.    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, subsequent encounter  2023-08: Dislocation of internal right hip prosthesis, initial   encounter  2022-10: Chronic fatigue  2021-04: Perianal dermatitis  2021-04: Prevention of chemotherapy-induced neutropenia  2021-03: Nausea  2021-03: Acute hyponatremia  2021-03: Encounter for antineoplastic chemotherapy  2021-02: Malignant neoplasm of anal canal (H)  2019-03: Dislocation of internal right hip prosthesis  2019-03: Hip dislocation, right (H)  2016-08: Bilateral scleritis  Essential hypertension  Scleritis, bilateral  Mucositis due to chemotherapy      Past Medical History:   Diagnosis Date    Anemia, unspecified type 2/8/2024    Arthritis     Basal cell carcinoma of anterior chest     Breast cyst     History of anesthesia complications     HLA B27 (HLA B27 positive)     Hypertension     Osteoporosis 07/19/2011    Peripheral neuropathy 09/12/2018    PONV (postoperative nausea and vomiting)     Scleritis, bilateral     Left 2010.  Treated with corticosteroids,, methotrexate and Humira.  2015.   Rituximab.    Squamous cell carcinoma of skin of chest     Steroid induced glaucoma, both eyes        Social History     Tobacco Use    Smoking status: Never     Passive exposure: Never    Smokeless tobacco: Never   Substance Use Topics    Alcohol use: Yes     Alcohol/week: 4.0 standard drinks of alcohol     Types: 4 Standard drinks or equivalent per week     Comment: 3-4/wk       Physical Exam     Physical Exam  Vitals and nursing note reviewed.   Constitutional:       General: She is not in acute distress.     Appearance: She is not toxic-appearing or diaphoretic.   HENT:      Head: Normocephalic and atraumatic.      Right Ear: Tympanic membrane, ear canal and external ear normal.      Left Ear: Tympanic membrane, ear canal and external ear normal.      Mouth/Throat:      Mouth: Mucous membranes are moist.      Pharynx: No oropharyngeal exudate or posterior oropharyngeal erythema.   Eyes:      Extraocular Movements: Extraocular movements intact.      Conjunctiva/sclera: Conjunctivae normal.      Pupils: Pupils are equal, round, and reactive to light.   Cardiovascular:      Rate and Rhythm: Normal rate and regular rhythm.   Pulmonary:      Effort: Pulmonary effort is normal. No respiratory distress.      Breath sounds: Normal breath sounds. No stridor. No wheezing, rhonchi or rales.   Neurological:      Mental Status: She is alert.   Psychiatric:         Mood and Affect: Mood normal.         Behavior: Behavior normal.         Thought Content: Thought content normal.         Judgment: Judgment normal.         Vital signs:  /76   Pulse 72   Temp 97.7  F (36.5  C)   Resp 16   Wt 60.8 kg (134 lb)   LMP  (LMP Unknown)   SpO2 99%   BMI 23.74 kg/m                   Consent was obtained from the patient to use an AI documentation tool in the creation of  this note

## 2025-03-17 ENCOUNTER — TRANSFERRED RECORDS (OUTPATIENT)
Dept: MULTI SPECIALTY CLINIC | Facility: CLINIC | Age: 80
End: 2025-03-17
Payer: MEDICARE

## 2025-03-17 LAB — RETINOPATHY: NORMAL

## 2025-04-15 ENCOUNTER — HOSPITAL ENCOUNTER (OUTPATIENT)
Dept: MAMMOGRAPHY | Facility: CLINIC | Age: 80
Discharge: HOME | End: 2025-04-15
Attending: INTERNAL MEDICINE | Admitting: INTERNAL MEDICINE
Payer: MEDICARE

## 2025-04-15 DIAGNOSIS — Z12.31 VISIT FOR SCREENING MAMMOGRAM: ICD-10-CM

## 2025-04-15 PROCEDURE — 77067 SCR MAMMO BI INCL CAD: CPT

## 2025-04-15 PROCEDURE — 77063 BREAST TOMOSYNTHESIS BI: CPT

## 2025-04-22 NOTE — PROGRESS NOTES
GENERAL SURGERY,   PARAESOPHAGEAL HIATAL HERNIA.     I was requested by Jak Jones to consult on this pt to evaluate them for a large hiatal hernia     HPI:  This is a 79 year old female here today with some shortness of breath.  When this was worked up, it was found that she had a large Hiatal Hernia with most of her stomach up in her chest.  She has known about her Hiatal Hernia for at least 20 years.  It was just a month ago that she became symptomatic with shortness of breath.      She has a history of R leg DVT 2/8/2024 with Pulmonary Emboli, in association with having a Right Hip Surgery.  She had been on Xerelto for the 6 months to follow.  She stopped her anticoagulant in August 2024    She is status post Anal Cancer in 2021, and is now in a disease free state.         Allergies:Kiwi, Adalimumab, Aspirin, Brimonidine-timolol [brimonidine tartrate-timolol], Levaquin [levofloxacin], and Tetracyclines & related     Past Medical History        Past Medical History:   Diagnosis Date    Anemia, unspecified type 2/8/2024    Arthritis      Basal cell carcinoma of anterior chest      Breast cyst      History of anesthesia complications      HLA B27 (HLA B27 positive)      Hypertension      Osteoporosis 07/19/2011    Peripheral neuropathy 09/12/2018    PONV (postoperative nausea and vomiting)      Scleritis, bilateral       Left 2010.  Treated with corticosteroids,, methotrexate and Humira.  2015.  Rituximab.    Squamous cell carcinoma of skin of chest      Steroid induced glaucoma, both eyes              Past Surgical History         Past Surgical History:   Procedure Laterality Date    ARTHROPLASTY REVISION HIP Right 9/21/2023     Procedure: RIGHT HIP REVISION TOTAL HIP ARTHROPLASTY;  Surgeon: Bill Bernal DO;  Location: [facility] Main OR    ARTHROPLASTY REVISION HIP Right 11/13/2023     Procedure: RIGHT REVISION TOTAL HIP ARTHROPLASTY HEAD LINER EXCHANGE;  Surgeon: Bill Bernal DO;  Location:  [facility] OR    BREAST CYST EXCISION Right      benign     SECTION   1989    CLOSED REDUCTION HIP Right 2019     Procedure: CLOSED REDUCTION, HIP;  Surgeon: Jak Ruiz DO;  Location: [facility] OR;  Service: Orthopedics    CLOSED REDUCTION HIP Right 2023     Procedure: CLOSED REDUCTION, HIP RIGHT;  Surgeon: Aditya Pedroza MD;  Location: [facility] OR    CLOSED REDUCTION HIP Right 2023     Procedure: CLOSED REDUCTION, HIP RIGHT;  Surgeon: Jak Allen MD;  Location: [facility] OR    COLONOSCOPY   2011    COLONOSCOPY   2005    COLONOSCOPY N/A 2024     Procedure: COLONOSCOPY;  Surgeon: Chi Figueroa MD;  Location: [facility] OR    COLONOSCOPY W/ BIOPSIES AND POLYPECTOMY   2021     16 to 20 mm polyp:tubular adenoma in the ascending colon.  Ulcerated mass in the anal canal: Moderately differentiated squamous cell cancer.    ESOPHAGOSCOPY, GASTROSCOPY, DUODENOSCOPY (EGD), COMBINED   2017     Large hiatal hernia.  Reactive gastropathy with mucosal erosion.  H. pylori negative.    ESOPHAGOSCOPY, GASTROSCOPY, DUODENOSCOPY (EGD), COMBINED N/A 2024     Procedure: ESOPHAGOGASTRODUODENOSCOPY with biopsies;  Surgeon: Chi Figueroa MD;  Location: [facility] OR    HERNIORRHAPHY FEMORAL Left 2021     Procedure: LEFT FEMORAL HERNIA REPAIR;  Surgeon: Sidney Murray MD;  Location: [facility]    HYSTERECTOMY TOTAL ABDOMINAL, BILATERAL SALPINGO-OOPHORECTOMY, COMBINED       IR CHEST PORT PLACEMENT > 5 YRS OF AGE   3/15/2021    IR PORT PLACEMENT >5 YEARS   03/15/2021     Right IJ port-a-cath.    IR PORT REMOVAL RIGHT   2021    LAPAROSCOPIC APPENDECTOMY   2011    PHACOEMULSIFICATION CLEAR CORNEA WITH STANDARD INTRAOCULAR LENS IMPLANT Right 2017    PHACOEMULSIFICATION CLEAR CORNEA WITH STANDARD INTRAOCULAR LENS IMPLANT Left 2017    TOTAL HIP ARTHROPLASTY Right 2015      Procedure: HIP TOTAL ARTHROPLASTY, RIGHT;  Surgeon: Star Du MD;  Location: [facility] Main OR;  Service:     [facility] TOTAL KNEE ARTHROPLASTY Left 03/02/2017     Procedure: LEFT TOTAL KNEE ARTHROPLASTY;  Surgeon: Star Du MD;  Location: [facility] Main OR;  Service: Orthopedics            CURRENT MEDS:  Current Outpatient Prescriptions          Current Outpatient Medications   Medication Sig Dispense Refill    amLODIPine (NORVASC) 5 MG tablet TAKE 2 TABLETS(10 MG) BY MOUTH DAILY 180 tablet 11    EPINEPHrine (ANY BX GENERIC EQUIV) 0.3 MG/0.3ML injection 2-pack Inject 0.3 mLs (0.3 mg) into the muscle as needed for anaphylaxis May repeat one time in 5-15 minutes if response to initial dose is inadequate. 2 each 0    ferrous sulfate (FE TABS) 325 (65 Fe) MG EC tablet Take 325 mg by mouth every other day        hydrOXYzine HCl (ATARAX) 10 MG tablet Take 1 tablet (10 mg) by mouth 3 times daily as needed for itching. 90 tablet 11    LOTEMAX 0.5 % ophthalmic suspension Place 1 drop into both eyes every 48 hours   4    metoprolol succinate ER (TOPROL XL) 25 MG 24 hr tablet Take 1 tablet (25 mg) by mouth daily 30 tablet 0    pantoprazole (PROTONIX) 40 MG EC tablet Take 1 tablet (40 mg) by mouth daily 90 tablet 0    timolol maleate (TIMOPTIC) 0.5 % ophthalmic solution Place 1 drop into both eyes daily        vitamin C (ASCORBIC ACID) 250 MG tablet Take 250 mg by mouth every 48 hours                Family History         Family History   Problem Relation Age of Onset    Alzheimer Disease Mother      Heart Disease Father      Pancreatic Cancer Brother 72.00    No Known Problems Daughter      Anesthesia Reaction No family hx of           Family history is not pertinent to this patients Chief Complaint.      reports that she has never smoked. She has never been exposed to tobacco smoke. She has never used smokeless tobacco. She reports current alcohol use of about 4.0 standard drinks of alcohol per week. She reports  "that she does not use drugs.     Review of Systems -   10 point Review of systems is negative except for; as mentioned above in HPI and PMHx     /68   Ht 1.6 m (5' 3\")   Wt 62.1 kg (136 lb 12.8 oz)   LMP  (LMP Unknown)   BMI 24.23 kg/m  .     EXAM:  GENERAL: Well developed female  HEENT: EOMI, Anicteric Sclera, Moist Mucous Membranes,  In Mouth the pt does not have redness or bleeding gums  CARDIOVASCULAR: RRR w/out murmur   CHEST/LUNG: Clear to Auscultation  ABDOMEN:  Non tender to palpation, +BS  MUSCULOSKELETAL:  No deformities with good range of motion in all extremities  NEURO: She is ambulatory with good strength in both legs.  HEME/LYMPH: No Cervical Adenopathy or tenderness.      IMAGES:  I evaluated these images myself and she has a large hiatal hernia with most of the stomach up in the chest that predominantly pushes off to the left side.     EXAM: CT CHEST PULMONARY EMBOLISM W CONTRAST  LOCATION: [facility]  DATE: 11/20/2024     INDICATION: History of PE, no longer anticoagulated, shortness of breath and chest tightness  COMPARISON: CT of the chest with contrast 2/28/2024  TECHNIQUE: CT chest pulmonary angiogram during arterial phase injection of IV contrast. Multiplanar reformats and MIP reconstructions were performed. Dose reduction techniques were used.   CONTRAST: Isovue  370 90mL     FINDINGS:  ANGIOGRAM CHEST: Main pulmonary artery is normal in size. The large majority of low-attenuation filling defects in the pulmonary arteries present to 8/20/2024 have resolved. There is a small residual defect within a subsegmental branch in the posterior   basal right lower lobe (series 6, image 191). Thoracic aorta is negative for dissection.     LUNGS AND PLEURA: Passive atelectasis in the medial left lower lobe due to mass effect from mediastinal contents, unchanged. No new airspace or interstitial opacities. Central airways are patent and normal caliber. No pleural " effusion.     MEDIASTINUM: Large hiatal hernia with 80% of the stomach intrathoracic. Associated foreshortening of the esophagus. No esophageal wall thickening. Cardiac chambers are normal in size. No pericardial effusion. No enlarged mediastinal or hilar lymph nodes.     CORONARY ARTERY CALCIFICATION: None.     UPPER ABDOMEN: Normal.     MUSCULOSKELETAL: There are diffuse thoracic spine degenerative osteophytes with discogenic sclerosis at a few levels. No vertebral compression deformities. No aggressive or destructive bone lesions.                                                                      IMPRESSION:     1.  No acute pulmonary embolism. Most of the pulmonary artery filling defects present in February 2024 have resolved with only one residual nonocclusive subsegmental defect in a subsegmental branch of the posterior basal right lower lobe.  2.  Large hiatal hernia with nearly intrathoracic stomach.  3.  Mass effect from #2 on the medial left lower lobe, unchanged. No acute lung, airway, or pleural inflammatory process.               Assessment/Plan:  79-year-old woman who has a profound hiatal versus paraesophageal hiatal hernia, with most of her stomach up in her chest.  This patient started having symptoms of atypical chest pain.  It appears that she would benefit from having her hiatal hernia repaired.     Robotic repair of this patient's paraesophageal hiatal hernia with reduction of her intrathoracic stomach.        Santiago Hinojosa MD  [facility]  [phone]

## 2025-04-23 ENCOUNTER — OFFICE VISIT (OUTPATIENT)
Dept: SURGERY | Facility: CLINIC | Age: 80
End: 2025-04-23
Payer: MEDICARE

## 2025-04-23 VITALS
DIASTOLIC BLOOD PRESSURE: 82 MMHG | WEIGHT: 137 LBS | SYSTOLIC BLOOD PRESSURE: 118 MMHG | HEIGHT: 63 IN | BODY MASS INDEX: 24.27 KG/M2

## 2025-04-23 DIAGNOSIS — K44.9 PARAESOPHAGEAL HIATAL HERNIA: Primary | ICD-10-CM

## 2025-04-23 PROCEDURE — 99214 OFFICE O/P EST MOD 30 MIN: CPT | Performed by: SURGERY

## 2025-04-23 PROCEDURE — 3079F DIAST BP 80-89 MM HG: CPT | Performed by: SURGERY

## 2025-04-23 PROCEDURE — 3074F SYST BP LT 130 MM HG: CPT | Performed by: SURGERY

## 2025-04-23 NOTE — LETTER
4/23/2025      Oscar Zhao  [address]  [place] [address]      Dear Colleague,    Thank you for referring your patient, Oscar Zhao, to the [facility] [place]. Please see a copy of my visit note below.    GENERAL SURGERY,   PARAESOPHAGEAL HIATAL HERNIA.     I was requested by Jak Jones to consult on this pt to evaluate them for a large hiatal hernia     HPI:  This is a 79 year old female here today with some shortness of breath.  When this was worked up, it was found that she had a large Hiatal Hernia with most of her stomach up in her chest.  She has known about her Hiatal Hernia for at least 20 years.  It was just a month ago that she became symptomatic with shortness of breath.      She has a history of R leg DVT 2/8/2024 with Pulmonary Emboli, in association with having a Right Hip Surgery.  She had been on Xerelto for the 6 months to follow.  She stopped her anticoagulant in August 2024    She is status post Anal Cancer in 2021, and is now in a disease free state.         Allergies:Kiwi, Adalimumab, Aspirin, Brimonidine-timolol [brimonidine tartrate-timolol], Levaquin [levofloxacin], and Tetracyclines & related     Past Medical History        Past Medical History:   Diagnosis Date     Anemia, unspecified type 2/8/2024     Arthritis       Basal cell carcinoma of anterior chest       Breast cyst       History of anesthesia complications       HLA B27 (HLA B27 positive)       Hypertension       Osteoporosis 07/19/2011     Peripheral neuropathy 09/12/2018     PONV (postoperative nausea and vomiting)       Scleritis, bilateral       Left 2010.  Treated with corticosteroids,, methotrexate and Humira.  2015.  Rituximab.     Squamous cell carcinoma of skin of chest       Steroid induced glaucoma, both eyes              Past Surgical History         Past Surgical History:   Procedure Laterality Date     ARTHROPLASTY REVISION HIP Right 9/21/2023      Procedure: RIGHT HIP REVISION TOTAL HIP ARTHROPLASTY;  Surgeon: Bill Bernal DO;  Location: [facility] OR     ARTHROPLASTY REVISION HIP Right 2023     Procedure: RIGHT REVISION TOTAL HIP ARTHROPLASTY HEAD LINER EXCHANGE;  Surgeon: Bill Bernal DO;  Location: [facility] OR     BREAST CYST EXCISION Right 1981     benign      SECTION   1989     CLOSED REDUCTION HIP Right 2019     Procedure: CLOSED REDUCTION, HIP;  Surgeon: Jak Ruiz DO;  Location: [facility] OR;  Service: Orthopedics     CLOSED REDUCTION HIP Right 2023     Procedure: CLOSED REDUCTION, HIP RIGHT;  Surgeon: Aditya Pedroza MD;  Location: [facility] OR     CLOSED REDUCTION HIP Right 2023     Procedure: CLOSED REDUCTION, HIP RIGHT;  Surgeon: Jak Allen MD;  Location: [facility] OR     COLONOSCOPY   2011     COLONOSCOPY   2005     COLONOSCOPY N/A 2024     Procedure: COLONOSCOPY;  Surgeon: Chi Fgiueroa MD;  Location: [facility]     COLONOSCOPY W/ BIOPSIES AND POLYPECTOMY   2021     16 to 20 mm polyp:tubular adenoma in the ascending colon.  Ulcerated mass in the anal canal: Moderately differentiated squamous cell cancer.     ESOPHAGOSCOPY, GASTROSCOPY, DUODENOSCOPY (EGD), COMBINED   2017     Large hiatal hernia.  Reactive gastropathy with mucosal erosion.  H. pylori negative.     ESOPHAGOSCOPY, GASTROSCOPY, DUODENOSCOPY (EGD), COMBINED N/A 2024     Procedure: ESOPHAGOGASTRODUODENOSCOPY with biopsies;  Surgeon: Chi Figueroa MD;  Location: [facility] OR     HERNIORRHAPHY FEMORAL Left 2021     Procedure: LEFT FEMORAL HERNIA REPAIR;  Surgeon: Sidney Murray MD;  Location: [facility] OR     HYSTERECTOMY TOTAL ABDOMINAL, BILATERAL SALPINGO-OOPHORECTOMY, COMBINED        IR CHEST PORT PLACEMENT > 5 YRS OF AGE   3/15/2021     IR PORT PLACEMENT >5 YEARS   03/15/2021     Right IJ port-a-cath.     IR PORT REMOVAL  RIGHT   7/14/2021     LAPAROSCOPIC APPENDECTOMY   04/28/2011     PHACOEMULSIFICATION CLEAR CORNEA WITH STANDARD INTRAOCULAR LENS IMPLANT Right 08/30/2017     PHACOEMULSIFICATION CLEAR CORNEA WITH STANDARD INTRAOCULAR LENS IMPLANT Left 09/13/2017     TOTAL HIP ARTHROPLASTY Right 08/18/2015     Procedure: HIP TOTAL ARTHROPLASTY, RIGHT;  Surgeon: Star Du MD;  Location: [facility];  Service:      [facility] TOTAL KNEE ARTHROPLASTY Left 03/02/2017     Procedure: LEFT TOTAL KNEE ARTHROPLASTY;  Surgeon: Star Du MD;  Location: [facility] Main OR;  Service: Orthopedics            CURRENT MEDS:  Current Outpatient Prescriptions          Current Outpatient Medications   Medication Sig Dispense Refill     amLODIPine (NORVASC) 5 MG tablet TAKE 2 TABLETS(10 MG) BY MOUTH DAILY 180 tablet 11     EPINEPHrine (ANY BX GENERIC EQUIV) 0.3 MG/0.3ML injection 2-pack Inject 0.3 mLs (0.3 mg) into the muscle as needed for anaphylaxis May repeat one time in 5-15 minutes if response to initial dose is inadequate. 2 each 0     ferrous sulfate (FE TABS) 325 (65 Fe) MG EC tablet Take 325 mg by mouth every other day         hydrOXYzine HCl (ATARAX) 10 MG tablet Take 1 tablet (10 mg) by mouth 3 times daily as needed for itching. 90 tablet 11     LOTEMAX 0.5 % ophthalmic suspension Place 1 drop into both eyes every 48 hours   4     metoprolol succinate ER (TOPROL XL) 25 MG 24 hr tablet Take 1 tablet (25 mg) by mouth daily 30 tablet 0     pantoprazole (PROTONIX) 40 MG EC tablet Take 1 tablet (40 mg) by mouth daily 90 tablet 0     timolol maleate (TIMOPTIC) 0.5 % ophthalmic solution Place 1 drop into both eyes daily         vitamin C (ASCORBIC ACID) 250 MG tablet Take 250 mg by mouth every 48 hours                Family History         Family History   Problem Relation Age of Onset     Alzheimer Disease Mother       Heart Disease Father       Pancreatic Cancer Brother 72.00     No Known Problems Daughter       Anesthesia Reaction  "No family hx of           Family history is not pertinent to this patients Chief Complaint.      reports that she has never smoked. She has never been exposed to tobacco smoke. She has never used smokeless tobacco. She reports current alcohol use of about 4.0 standard drinks of alcohol per week. She reports that she does not use drugs.     Review of Systems -   10 point Review of systems is negative except for; as mentioned above in HPI and PMHx     /68   Ht 1.6 m (5' 3\")   Wt 62.1 kg (136 lb 12.8 oz)   LMP  (LMP Unknown)   BMI 24.23 kg/m  .     EXAM:  GENERAL: Well developed female  HEENT: EOMI, Anicteric Sclera, Moist Mucous Membranes,  In Mouth the pt does not have redness or bleeding gums  CARDIOVASCULAR: RRR w/out murmur   CHEST/LUNG: Clear to Auscultation  ABDOMEN:  Non tender to palpation, +BS  MUSCULOSKELETAL:  No deformities with good range of motion in all extremities  NEURO: She is ambulatory with good strength in both legs.  HEME/LYMPH: No Cervical Adenopathy or tenderness.      IMAGES:  I evaluated these images myself and she has a large hiatal hernia with most of the stomach up in the chest that predominantly pushes off to the left side.     EXAM: CT CHEST PULMONARY EMBOLISM W CONTRAST  LOCATION: [facility]  DATE: 11/20/2024     INDICATION: History of PE, no longer anticoagulated, shortness of breath and chest tightness  COMPARISON: CT of the chest with contrast 2/28/2024  TECHNIQUE: CT chest pulmonary angiogram during arterial phase injection of IV contrast. Multiplanar reformats and MIP reconstructions were performed. Dose reduction techniques were used.   CONTRAST: Isovue  370 90mL     FINDINGS:  ANGIOGRAM CHEST: Main pulmonary artery is normal in size. The large majority of low-attenuation filling defects in the pulmonary arteries present to 8/20/2024 have resolved. There is a small residual defect within a subsegmental branch in the posterior   basal right lower " lobe (series 6, image 191). Thoracic aorta is negative for dissection.     LUNGS AND PLEURA: Passive atelectasis in the medial left lower lobe due to mass effect from mediastinal contents, unchanged. No new airspace or interstitial opacities. Central airways are patent and normal caliber. No pleural effusion.     MEDIASTINUM: Large hiatal hernia with 80% of the stomach intrathoracic. Associated foreshortening of the esophagus. No esophageal wall thickening. Cardiac chambers are normal in size. No pericardial effusion. No enlarged mediastinal or hilar lymph nodes.     CORONARY ARTERY CALCIFICATION: None.     UPPER ABDOMEN: Normal.     MUSCULOSKELETAL: There are diffuse thoracic spine degenerative osteophytes with discogenic sclerosis at a few levels. No vertebral compression deformities. No aggressive or destructive bone lesions.                                                                      IMPRESSION:     1.  No acute pulmonary embolism. Most of the pulmonary artery filling defects present in February 2024 have resolved with only one residual nonocclusive subsegmental defect in a subsegmental branch of the posterior basal right lower lobe.  2.  Large hiatal hernia with nearly intrathoracic stomach.  3.  Mass effect from #2 on the medial left lower lobe, unchanged. No acute lung, airway, or pleural inflammatory process.               Assessment/Plan:  79-year-old woman who has a profound hiatal versus paraesophageal hiatal hernia, with most of her stomach up in her chest.  This patient started having symptoms of atypical chest pain.  It appears that she would benefit from having her hiatal hernia repaired.     Robotic repair of this patient's paraesophageal hiatal hernia with reduction of her intrathoracic stomach.        Santiago Hinojosa MD  [facility]  [phone]      Again, thank you for allowing me to participate in the care of your patient.        Sincerely,        Santiago Hinojosa,  MD    Electronically signed

## 2025-05-12 ENCOUNTER — OFFICE VISIT (OUTPATIENT)
Dept: INTERNAL MEDICINE | Facility: CLINIC | Age: 80
End: 2025-05-12
Payer: MEDICARE

## 2025-05-12 VITALS
BODY MASS INDEX: 23.92 KG/M2 | TEMPERATURE: 97.8 F | SYSTOLIC BLOOD PRESSURE: 116 MMHG | RESPIRATION RATE: 16 BRPM | DIASTOLIC BLOOD PRESSURE: 72 MMHG | OXYGEN SATURATION: 98 % | WEIGHT: 135 LBS | HEIGHT: 63 IN | HEART RATE: 72 BPM

## 2025-05-12 DIAGNOSIS — K44.9 HIATAL HERNIA: ICD-10-CM

## 2025-05-12 DIAGNOSIS — Z01.818 PREOPERATIVE EXAMINATION: Primary | ICD-10-CM

## 2025-05-12 DIAGNOSIS — I10 ESSENTIAL HYPERTENSION: ICD-10-CM

## 2025-05-12 LAB
ERYTHROCYTE [DISTWIDTH] IN BLOOD BY AUTOMATED COUNT: 18.8 % (ref 10–15)
HCT VFR BLD AUTO: 37.3 % (ref 35–47)
HGB BLD-MCNC: 10.5 G/DL (ref 11.7–15.7)
MCH RBC QN AUTO: 20.5 PG (ref 26.5–33)
MCHC RBC AUTO-ENTMCNC: 28.2 G/DL (ref 31.5–36.5)
MCV RBC AUTO: 73 FL (ref 78–100)
PLATELET # BLD AUTO: 265 10E3/UL (ref 150–450)
RBC # BLD AUTO: 5.12 10E6/UL (ref 3.8–5.2)
WBC # BLD AUTO: 7.2 10E3/UL (ref 4–11)

## 2025-05-12 PROCEDURE — G2211 COMPLEX E/M VISIT ADD ON: HCPCS | Performed by: INTERNAL MEDICINE

## 2025-05-12 PROCEDURE — 36415 COLL VENOUS BLD VENIPUNCTURE: CPT | Performed by: INTERNAL MEDICINE

## 2025-05-12 PROCEDURE — 80053 COMPREHEN METABOLIC PANEL: CPT | Performed by: INTERNAL MEDICINE

## 2025-05-12 PROCEDURE — 3074F SYST BP LT 130 MM HG: CPT | Performed by: INTERNAL MEDICINE

## 2025-05-12 PROCEDURE — 85027 COMPLETE CBC AUTOMATED: CPT | Performed by: INTERNAL MEDICINE

## 2025-05-12 PROCEDURE — 3078F DIAST BP <80 MM HG: CPT | Performed by: INTERNAL MEDICINE

## 2025-05-12 PROCEDURE — 99213 OFFICE O/P EST LOW 20 MIN: CPT | Performed by: INTERNAL MEDICINE

## 2025-05-12 NOTE — PROGRESS NOTES
Preoperative Evaluation  [address] [address]  Phone: [phone]  Fax: [phone]  Primary Provider: Jak Jones MD  Pre-op Performing Provider: Jak Jones MD  May 12, 2025   {Provider  Link to PREOP SmartSet  REQUIRED to apply standard patient instructions and medication directions to the AVS :971065}  {ROOMER review and update patient entered surgical information if needed :388599}        5/12/2025   Surgical Information   What procedure is being done? FUNDOPLICATION, NISSEN, ROBOT-ASSISTED, LAPAROSCOPIC    Facility or Hospital where procedure/surgery will be performed: [facility]     Who is doing the procedure / surgery? [email or web address]   Date of surgery / procedure: 6/4/2025    Time of surgery / procedure:   7:30 AM    Where do you plan to recover after surgery? at home with family     Fax number for surgical facility: Note does not need to be faxed, will be available electronically in Epic.    {Provider Charting Preference for Preop :081156}    Chinmay Salas is a 80 year old, presenting for the following:  Pre-Op Exam (FUNDOPLICATION, NISSEN, ROBOT-ASSISTED, LAPAROSCOPI)          5/12/2025    10:06 AM   Additional Questions   Roomed by Liss Lynch   Accompanied by N/A     HPI: ***          5/12/2025   Pre-Op Questionnaire   Have you ever had a heart attack or stroke? (!) YES ***   Have you ever had surgery on your heart or blood vessels, such as a stent placement, a coronary artery bypass, or surgery on an artery in your head, neck, heart, or legs? No   Do you have chest pain with activity? No   Do you have a history of heart failure? No   Do you currently have a cold, bronchitis or symptoms of other infection? No   Do you have a cough, shortness of breath, or wheezing? (!) YES ***   Do you or anyone in your family have previous history of blood clots? (!) YES ***   Do you or does anyone in your  family have a serious bleeding problem such as prolonged bleeding following surgeries or cuts? No   Have you ever had problems with anemia or been told to take iron pills? No   Have you had any abnormal blood loss such as black, tarry or bloody stools, or abnormal vaginal bleeding? No   Have you ever had a blood transfusion? (!) YES   Have you ever had a transfusion reaction? No   Are you willing to have a blood transfusion if it is medically needed before, during, or after your surgery? Yes   Have you or any of your relatives ever had problems with anesthesia? No   Do you have sleep apnea, excessive snoring or daytime drowsiness? No   Do you have any artifical heart valves or other implanted medical devices like a pacemaker, defibrillator, or continuous glucose monitor? No   Do you have artificial joints? (!) YES   Are you allergic to latex? No     Advance Care Planning  {The storyboard will display whether the patient has ACP docs on file. Hover over the Code section in the storyboard to access the ACP documents. :056274}  {(AWV REQUIRED) Advance Care Planning Reviewed:747138}    Preoperative Review of   {Mnpmpreport:421044}  {Review MNPMP for all patients per [facility] MNPMP Profile:115461}    {Chronic problem details (Optional) :363463}    Patient Active Problem List    Diagnosis Date Noted    Bilateral pulmonary embolism (H) 02/08/2024     Priority: Medium    Acute deep vein thrombosis (DVT) of proximal vein of right lower extremity (H) 02/08/2024     Priority: Medium    Anemia, unspecified type 02/08/2024     Priority: Medium    Closed dislocation of right hip, initial encounter (H) 11/04/2023     Priority: Medium    Failure of right total hip arthroplasty, 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  Treated with corticosteroids,, methotrexate and Humira.  2015.    Rituximab.        Malignant neoplasm of anal canal (H) 2021     Priority: Medium    Essential hypertension      Priority: Medium      Past Medical History:   Diagnosis Date    Anemia, unspecified type 2024    Arthritis     Basal cell carcinoma of anterior chest     Breast cyst     History of anesthesia complications     HLA B27 (HLA B27 positive)     Hypertension     Osteoporosis 2011    Peripheral neuropathy 2018    PONV (postoperative nausea and vomiting)     Scleritis, bilateral     Left .  Treated with corticosteroids,, methotrexate and Humira.  2015.  Rituximab.    Squamous cell carcinoma of skin of chest     Steroid induced glaucoma, both eyes      Past Surgical History:   Procedure Laterality Date    ARTHROPLASTY REVISION HIP Right 2023    Procedure: RIGHT HIP REVISION TOTAL HIP ARTHROPLASTY;  Surgeon: Bill Bernal DO;  Location: [facility] OR    ARTHROPLASTY REVISION HIP Right 2023    Procedure: RIGHT REVISION TOTAL HIP ARTHROPLASTY HEAD LINER EXCHANGE;  Surgeon: Bill Bernal DO;  Location: [facility] OR    BREAST CYST EXCISION Right     benign     SECTION      CLOSED REDUCTION HIP Right 2019    Procedure: CLOSED REDUCTION, HIP;  Surgeon: Jak Ruiz DO;  Location: [facility] OR;  Service: Orthopedics    CLOSED REDUCTION HIP Right 2023    Procedure: CLOSED REDUCTION, HIP RIGHT;  Surgeon: Aditya Pedroza MD;  Location: [facility] OR    CLOSED REDUCTION HIP Right 2023    Procedure: CLOSED REDUCTION, HIP RIGHT;  Surgeon: Jak Allen MD;  Location: [facility] OR    COLONOSCOPY  2011    COLONOSCOPY  2005    COLONOSCOPY N/A 2024    Procedure: COLONOSCOPY;  Surgeon: Chi Figueroa MD;  Location: [facility] OR    COLONOSCOPY W/ BIOPSIES AND POLYPECTOMY  2021    16 to 20 mm polyp:tubular adenoma in  the ascending colon.  Ulcerated mass in the anal canal: Moderately differentiated squamous cell cancer.    ESOPHAGOSCOPY, GASTROSCOPY, DUODENOSCOPY (EGD), COMBINED  05/05/2017    Large hiatal hernia.  Reactive gastropathy with mucosal erosion.  H. pylori negative.    ESOPHAGOSCOPY, GASTROSCOPY, DUODENOSCOPY (EGD), COMBINED N/A 2/12/2024    Procedure: ESOPHAGOGASTRODUODENOSCOPY with biopsies;  Surgeon: Chi Figueroa MD;  Location: [facility] OR    HERNIORRHAPHY FEMORAL Left 9/7/2021    Procedure: LEFT FEMORAL HERNIA REPAIR;  Surgeon: Sidney Murray MD;  Location: [facility]    HYSTERECTOMY TOTAL ABDOMINAL, BILATERAL SALPINGO-OOPHORECTOMY, COMBINED  1996    IR CHEST PORT PLACEMENT > 5 YRS OF AGE  3/15/2021    IR PORT PLACEMENT >5 YEARS  03/15/2021    Right IJ port-a-cath.    IR PORT REMOVAL RIGHT  7/14/2021    LAPAROSCOPIC APPENDECTOMY  04/28/2011    PHACOEMULSIFICATION CLEAR CORNEA WITH STANDARD INTRAOCULAR LENS IMPLANT Right 08/30/2017    PHACOEMULSIFICATION CLEAR CORNEA WITH STANDARD INTRAOCULAR LENS IMPLANT Left 09/13/2017    TOTAL HIP ARTHROPLASTY Right 08/18/2015    Procedure: HIP TOTAL ARTHROPLASTY, RIGHT;  Surgeon: Star Du MD;  Location: [facility];  Service:     RUST TOTAL KNEE ARTHROPLASTY Left 03/02/2017    Procedure: LEFT TOTAL KNEE ARTHROPLASTY;  Surgeon: Star Du MD;  Location: [facility] OR;  Service: Orthopedics     Current Outpatient Medications   Medication Sig Dispense Refill    amLODIPine (NORVASC) 5 MG tablet TAKE 2 TABLETS(10 MG) BY MOUTH DAILY 180 tablet 11    apixaban ANTICOAGULANT (ELIQUIS) 5 MG tablet Take 1 tablet (5 mg) by mouth 2 times daily. 60 tablet 2    EPINEPHrine (ANY BX GENERIC EQUIV) 0.3 MG/0.3ML injection 2-pack Inject 0.3 mLs (0.3 mg) into the muscle as needed for anaphylaxis May repeat one time in 5-15 minutes if response to initial dose is inadequate. 2 each 0    hydrOXYzine HCl (ATARAX) 10 MG tablet Take 1 tablet (10 mg)  "by mouth 3 times daily as needed for itching. 90 tablet 11    LOTEMAX 0.5 % ophthalmic suspension Place 1 drop into both eyes every 48 hours  4    metoprolol succinate ER (TOPROL XL) 25 MG 24 hr tablet Take 1 tablet (25 mg) by mouth daily 30 tablet 0    timolol maleate (TIMOPTIC) 0.5 % ophthalmic solution Place 1 drop into both eyes daily         Allergies   Allergen Reactions    Kiwi Hives    Adalimumab Muscle Pain (Myalgia)    Aspirin      Had Bleeding ulcers after previous 2 surgeries due to aspirin    Brimonidine-Timolol [Brimonidine Tartrate-Timolol] Unknown     Redness itching in eyes    Levaquin [Levofloxacin] Unknown     Skin burn and couldn't get out bed for 5 days    Tetracyclines & Related Hives        Social History     Tobacco Use    Smoking status: Never     Passive exposure: Never    Smokeless tobacco: Never   Substance Use Topics    Alcohol use: Yes     Alcohol/week: 4.0 standard drinks of alcohol     Types: 4 Standard drinks or equivalent per week     Comment: 3-4/wk     {FAMILY HISTORY (Optional):982033726}  History   Drug Use No           {ROS Picklists (Optional):878196}    Objective    /72   Pulse 72   Temp 97.8  F (36.6  C) (Oral)   Resp 16   Ht 1.6 m (5' 3\")   Wt 61.2 kg (135 lb)   LMP  (LMP Unknown)   SpO2 98%   BMI 23.91 kg/m     Estimated body mass index is 23.91 kg/m  as calculated from the following:    Height as of this encounter: 1.6 m (5' 3\").    Weight as of this encounter: 61.2 kg (135 lb).  Physical Exam  {Exam List :333138}    Recent Labs   Lab Test 02/10/25  1143 25  1124 10/21/24  1037 24  0929   HGB 11.8 11.9   < >  --     180   < >  --     135   < >  --    POTASSIUM 4.4 4.8   < >  --    CR 0.57 0.59   < >  --    A1C  --  5.6  --  5.4    < > = values in this interval not displayed.        Diagnostics  {LABS:507780}   {EK}    Revised Cardiac Risk Index (RCRI)  The patient has the following serious cardiovascular risks for " perioperative complications:  {PREOP REVISED CARDIAC RISK INDEX (RCRI) :214276}     RCRI Interpretation: {REVISED CARDIAC RISK INTERPRETATION :107672}         Signed Electronically by: Jak Jones MD  A copy of this evaluation report is provided to the requesting physician.    {Provider Resources  Preop [facility] Preop Guidelines  Revised Cardiac Risk Index :879370}   {Email feedback regarding this note to [email or web address]   :422080}

## 2025-05-12 NOTE — PROGRESS NOTES
Pre-Op Note:  Today's date: May 12, 2025    Oscar Zhao is a 80 year old female who presents for a preoperative evaluation.    Surgical Information:  Surgery/Procedure: Preoperative examination for this 80-year-old female in anticipation of fundoplication Nissen robot assisted for large hiatal hernia intrathoracic with pulmonary compromise and progressive shortness of breath with activity.  Surgery planned for [facility] June 4, 2025 with Dr. Santiago Hinojosa.  Surgery Location: [facility]  Surgeon: Dr. Santiago Hinojosa  Surgery Date: June 4, 2025  Fax number for surgical facility: No    Subjective:   80-year-old female with progressive shortness of breath due to a large intrathoracic hiatal hernia with impingement on lung and restriction of lung and inflation with symptoms of progressive shortness of breath with activity no PND no leg edema.  Non-smoker.  Robot-assisted Nissen fundoplication is planned laparoscopically.  With Dr. Santiago garcias June 4, 2025    ROS:   14 point negative except as outlined above.    Medications:     Current Outpatient Medications:     amLODIPine (NORVASC) 5 MG tablet, TAKE 2 TABLETS(10 MG) BY MOUTH DAILY, Disp: 180 tablet, Rfl: 11    apixaban ANTICOAGULANT (ELIQUIS) 5 MG tablet, Take 1 tablet (5 mg) by mouth 2 times daily., Disp: 60 tablet, Rfl: 2    EPINEPHrine (ANY BX GENERIC EQUIV) 0.3 MG/0.3ML injection 2-pack, Inject 0.3 mLs (0.3 mg) into the muscle as needed for anaphylaxis May repeat one time in 5-15 minutes if response to initial dose is inadequate., Disp: 2 each, Rfl: 0    hydrOXYzine HCl (ATARAX) 10 MG tablet, Take 1 tablet (10 mg) by mouth 3 times daily as needed for itching., Disp: 90 tablet, Rfl: 11    LOTEMAX 0.5 % ophthalmic suspension, Place 1 drop into both eyes every 48 hours, Disp: , Rfl: 4    metoprolol succinate ER (TOPROL XL) 25 MG 24 hr tablet, Take 1 tablet (25 mg) by mouth daily, Disp: 30 tablet, Rfl: 0    timolol maleate (TIMOPTIC) 0.5 % ophthalmic  solution, Place 1 drop into both eyes daily, Disp: , Rfl:      Allergies:  Allergies   Allergen Reactions    Kiwi Hives    Adalimumab Muscle Pain (Myalgia)    Aspirin      Had Bleeding ulcers after previous 2 surgeries due to aspirin    Brimonidine-Timolol [Brimonidine Tartrate-Timolol] Unknown     Redness itching in eyes    Levaquin [Levofloxacin] Unknown     Skin burn and couldn't get out bed for 5 days    Tetracyclines & Related Hives       Immunizations:   Immunization History   Administered Date(s) Administered    COVID-19 12+ (Pfizer) 10/30/2023, 11/11/2024    COVID-19 Bivalent 12+ (Pfizer) 10/11/2022    COVID-19 MONOVALENT 12+ (Pfizer) 02/08/2021, 03/01/2021, 09/27/2021    COVID-19 Monovalent 12+ (Pfizer 2022) 04/28/2022    DT (PEDS <7y) 12/23/1998    Flu, Unspecified 12/08/2009, 11/02/2011    Influenza (H1N1) 01/11/2010    Influenza (High Dose) Trivalent,PF (Fluzone) 10/15/2015, 11/28/2016, 11/17/2017, 11/05/2018, 10/29/2019, 11/11/2024    Influenza (IIV3) PF 11/03/2005, 11/29/2007, 10/28/2008, 10/01/2010, 11/02/2011, 11/09/2012, 11/13/2013, 10/02/2014    Influenza (prior to 2024) 12/08/2009    Influenza Vaccine 65+ (Fluzone HD) 11/02/2020, 09/14/2021, 11/07/2022, 12/02/2023    Influenza Vaccine, 6+MO IM (QUADRIVALENT W/PRESERVATIVES) 11/09/2012, 10/02/2014    Pneumo Conj 13-V (2010&after) 10/15/2015, 12/30/2015    Pneumococcal 20 valent Conjugate (Prevnar 20) 09/25/2024    Pneumococcal 23 valent 03/06/2009    RSV (Abrysvo) 01/05/2024    TDAP (Adacel,Boostrix) 09/13/2006, 05/18/2012, 09/25/2024    Td (Adult), Adsorbed 06/30/2006    Td,adult,historic,unspecified 06/30/2006    Zoster recombinant adjuvanted (Shingrix) 09/25/2024, 02/06/2025    Zoster vaccine, live 08/27/2009     Immunizations reviewed and up-to-date.    Health Maintenance:   Immunizations vaccines reviewed and up-to-date.    Past Medical History:    Hypertension and history of non-smoker no excess alcohol allergies listed include levofloxacin  tetracycline and brimonidine timolol.  Aspirin also listed other drug allergies listed in the chart.    Status post right total hip arthroplasty and left total knee arthroplasty.  Bilateral oophorectomy has been done for benign disease appendectomy and hernia repair right groin also noted.  No anesthetic complications.  With any prior surgery.    History of a polyserositis HLA-B27 diagnosed iritis autoimmune process currently without systemic steroid use previously seen by rheumatology as well as ophthalmology.  Prednisone eyedrops on board.  Past Medical History:   Diagnosis Date    Anemia, unspecified type 2024    Arthritis     Basal cell carcinoma of anterior chest     Breast cyst     History of anesthesia complications     HLA B27 (HLA B27 positive)     Hypertension     Osteoporosis 2011    Peripheral neuropathy 2018    PONV (postoperative nausea and vomiting)     Scleritis, bilateral     Left .  Treated with corticosteroids,, methotrexate and Humira.  .  Rituximab.    Squamous cell carcinoma of skin of chest     Steroid induced glaucoma, both eyes         Past Surgical History:   Past Surgical History:   Procedure Laterality Date    ARTHROPLASTY REVISION HIP Right 2023    Procedure: RIGHT HIP REVISION TOTAL HIP ARTHROPLASTY;  Surgeon: Bill Bernal DO;  Location: [facility] OR    ARTHROPLASTY REVISION HIP Right 2023    Procedure: RIGHT REVISION TOTAL HIP ARTHROPLASTY HEAD LINER EXCHANGE;  Surgeon: Bill Bernal DO;  Location: [facility] OR    BREAST CYST EXCISION Right     benign     SECTION      CLOSED REDUCTION HIP Right 2019    Procedure: CLOSED REDUCTION, HIP;  Surgeon: Jak Ruiz DO;  Location: [facility];  Service: Orthopedics    CLOSED REDUCTION HIP Right 2023    Procedure: CLOSED REDUCTION, HIP RIGHT;  Surgeon: Aditya Pedroza MD;  Location: [facility] OR    CLOSED REDUCTION HIP Right 2023    Procedure:  CLOSED REDUCTION, HIP RIGHT;  Surgeon: Jak Allen MD;  Location: [facility]    COLONOSCOPY  2011    COLONOSCOPY  2005    COLONOSCOPY N/A 2024    Procedure: COLONOSCOPY;  Surgeon: Chi Figueroa MD;  Location: [facility]    COLONOSCOPY W/ BIOPSIES AND POLYPECTOMY  2021    16 to 20 mm polyp:tubular adenoma in the ascending colon.  Ulcerated mass in the anal canal: Moderately differentiated squamous cell cancer.    ESOPHAGOSCOPY, GASTROSCOPY, DUODENOSCOPY (EGD), COMBINED  2017    Large hiatal hernia.  Reactive gastropathy with mucosal erosion.  H. pylori negative.    ESOPHAGOSCOPY, GASTROSCOPY, DUODENOSCOPY (EGD), COMBINED N/A 2024    Procedure: ESOPHAGOGASTRODUODENOSCOPY with biopsies;  Surgeon: Chi Figueroa MD;  Location: [facility]    HERNIORRHAPHY FEMORAL Left 2021    Procedure: LEFT FEMORAL HERNIA REPAIR;  Surgeon: Sidney Murray MD;  Location: [facility]    HYSTERECTOMY TOTAL ABDOMINAL, BILATERAL SALPINGO-OOPHORECTOMY, COMBINED      IR CHEST PORT PLACEMENT > 5 YRS OF AGE  3/15/2021    IR PORT PLACEMENT >5 YEARS  03/15/2021    Right IJ port-a-cath.    IR PORT REMOVAL RIGHT  2021    LAPAROSCOPIC APPENDECTOMY  2011    PHACOEMULSIFICATION CLEAR CORNEA WITH STANDARD INTRAOCULAR LENS IMPLANT Right 2017    PHACOEMULSIFICATION CLEAR CORNEA WITH STANDARD INTRAOCULAR LENS IMPLANT Left 2017    TOTAL HIP ARTHROPLASTY Right 2015    Procedure: HIP TOTAL ARTHROPLASTY, RIGHT;  Surgeon: Star Du MD;  Location: [facility];  Service:     [facility] TOTAL KNEE ARTHROPLASTY Left 2017    Procedure: LEFT TOTAL KNEE ARTHROPLASTY;  Surgeon: Star Du MD;  Location: [facility];  Service: Orthopedics        Family History:    Mother  93 the complications of Alzheimer's dementia.    Mother  93 heart disease.    in his later 60s of a cardiac dysrhythmia.   "Suddenly.  1 daughter well unmarried no grandchildren.  Family History   Problem Relation Age of Onset    Alzheimer Disease Mother     Heart Disease Father     Pancreatic Cancer Brother 72.00    No Known Problems Daughter     Anesthesia Reaction No family hx of          Exam:  Vitals:/72   Pulse 72   Temp 97.8  F (36.6  C) (Oral)   Resp 16   Ht 1.6 m (5' 3\")   Wt 61.2 kg (135 lb)   LMP  (LMP Unknown)   SpO2 98%   BMI 23.91 kg/m    Easily conversant good spirited athletic and build former dancer.  Former golfer.  Skin negative lymph negative neuro negative chest clear heart tones normal neck veins nondistended no thyromegaly no thyroid nodules no carotid bruits no lymphadenopathy appreciated lymph bearing areas the abdomen was totally benign no masses nontender no liver spleen tip palpable bowel sounds present hypoactive extremities are free of edema cyanosis or clubbing she appeared well she is highly intelligent articulate and appears younger than stated age.    Assessment and Plan:  (Z01.818) Preoperative examination  (primary encounter diagnosis)  Comment: Medically acceptable risk for anticipated surgery injury as outlined above at [facility] June 4, 2025.  Plan: CBC with platelets, Comprehensive metabolic         panel        No history of anesthetic complications with any prior surgery as outlined above.    (I10) Essential hypertension  Comment: Well-controlled today 118/82.  Plan: Same meds and cares salt avoidance and diet recommended.    (K44.9) Hiatal hernia  Comment: Large hiatal hernia noted with intrathoracic displacement of the stomach and impingement on pulmonary insufflation.  Needs surgical intervention for repair as outlined above.  Shortness of breath with exercise noted by patient.  Plan: Surgery planned and patient is medically acceptable risk for anticipated surgery as outlined above.        Jak Jones MD    Internal Medicine    The longitudinal plan of care for the " diagnosis(es)/condition(s) as documented were addressed during this visit. Due to the added complexity in care, I will continue to support Maritza in the subsequent management and with ongoing continuity of care.

## 2025-05-13 ENCOUNTER — RESULTS FOLLOW-UP (OUTPATIENT)
Dept: INTERNAL MEDICINE | Facility: CLINIC | Age: 80
End: 2025-05-13

## 2025-05-13 LAB
ALBUMIN SERPL BCG-MCNC: 4.1 G/DL (ref 3.5–5.2)
ALP SERPL-CCNC: 101 U/L (ref 40–150)
ALT SERPL W P-5'-P-CCNC: 17 U/L (ref 0–50)
ANION GAP SERPL CALCULATED.3IONS-SCNC: 8 MMOL/L (ref 7–15)
AST SERPL W P-5'-P-CCNC: 24 U/L (ref 0–45)
BILIRUB SERPL-MCNC: 0.6 MG/DL
BUN SERPL-MCNC: 11.3 MG/DL (ref 8–23)
CALCIUM SERPL-MCNC: 9.4 MG/DL (ref 8.8–10.4)
CHLORIDE SERPL-SCNC: 103 MMOL/L (ref 98–107)
CREAT SERPL-MCNC: 0.58 MG/DL (ref 0.51–0.95)
EGFRCR SERPLBLD CKD-EPI 2021: >90 ML/MIN/1.73M2
GLUCOSE SERPL-MCNC: 91 MG/DL (ref 70–99)
HCO3 SERPL-SCNC: 24 MMOL/L (ref 22–29)
POTASSIUM SERPL-SCNC: 4.9 MMOL/L (ref 3.4–5.3)
PROT SERPL-MCNC: 6.7 G/DL (ref 6.4–8.3)
SODIUM SERPL-SCNC: 135 MMOL/L (ref 135–145)

## 2025-05-14 DIAGNOSIS — I26.99 ACUTE PULMONARY EMBOLISM WITHOUT ACUTE COR PULMONALE, UNSPECIFIED PULMONARY EMBOLISM TYPE (H): ICD-10-CM

## 2025-05-14 NOTE — TELEPHONE ENCOUNTER
Last Written Prescription Date:  2/17/25  Last Fill Quantity: 60,  # refills: 2   Last office visit provider:  2/17/25 with Dr. Beck     Requested Prescriptions   Pending Prescriptions Disp Refills    ELIQUIS ANTICOAGULANT 5 MG tablet [Pharmacy Med Name: ELIQUIS 5MG TABLETS] 60 tablet 2     Sig: TAKE 1 TABLET(5 MG) BY MOUTH TWICE DAILY       Anticoagulant Agents Failed - 5/14/2025  8:12 AM        Failed - Recent (6 mo) or future (90 days) visit within the authorizing provider's specialty        Passed - Normal Platelets on file in past 12 months     Recent Labs   Lab Test 05/12/25  1033                  Passed - Medication is active on med list and the sig matches. RN to manually verify dose and sig if red X/fail.     If the protocol passes (green check), you do not need to verify med dose and sig.    A prescription matches if they are the same clinical intention.    For Example: once daily and every morning are the same.    The protocol can not identify upper and lower case letters as matching and will fail.     For Example: Take 1 tablet (50 mg) by mouth daily     TAKE 1 TABLET (50 MG) BY MOUTH DAILY    For all fails (red x), verify dose and sig.    If the refill does match what is on file, the RN can still proceed to approve the refill request.       If they do not match, route to the appropriate provider.             Passed - Creatinine Clearance greater than 50 ml/min on file in past 12 mos     No lab results found.          Passed - Weight is greater than 60 kg for the past year     Wt Readings from Last 3 Encounters:   05/12/25 61.2 kg (135 lb)   04/23/25 62.1 kg (137 lb)   03/10/25 60.8 kg (134 lb)             Passed - GFR on file in the past 12 months and most recent GFR is normal        Passed - Medication indicated for associated diagnosis     Medication is associated with one or more of the following diagnoses:  Atrial fibrillation  Deep venous thrombosis  Heparin-induced  thrombocytopenia  Pulmonary embolism  Venous thromboembolism  H/O: Pulmonary embolus  Atrial flutter  Coronary artery finding  Transient cerebral ischemia  Percutaneous transluminal coronary angioplasty  Stable angina  Intermittent claudication  Arteriosclerotic vascular disease          Passed - Patient is 18 years of age or older        Passed - No active pregnancy on record        Passed - No positive pregnancy test within past 12 months             Luz Marina Holder RN 05/14/25 8:12 AM

## 2025-05-17 ENCOUNTER — HEALTH MAINTENANCE LETTER (OUTPATIENT)
Age: 80
End: 2025-05-17

## 2025-06-03 ENCOUNTER — ANESTHESIA EVENT (OUTPATIENT)
Dept: SURGERY | Facility: CLINIC | Age: 80
End: 2025-06-03
Payer: MEDICARE

## 2025-06-04 ENCOUNTER — ANESTHESIA (OUTPATIENT)
Dept: SURGERY | Facility: CLINIC | Age: 80
End: 2025-06-04
Payer: MEDICARE

## 2025-06-04 ENCOUNTER — HOSPITAL ENCOUNTER (INPATIENT)
Facility: CLINIC | Age: 80
LOS: 1 days | Discharge: HOME | End: 2025-06-05
Attending: SURGERY | Admitting: SURGERY
Payer: MEDICARE

## 2025-06-04 DIAGNOSIS — Z98.890 HISTORY OF REPAIR OF HIATAL HERNIA: Primary | ICD-10-CM

## 2025-06-04 DIAGNOSIS — Z87.19 HISTORY OF REPAIR OF HIATAL HERNIA: Primary | ICD-10-CM

## 2025-06-04 PROBLEM — K21.9 HIATAL HERNIA WITH GERD: Status: ACTIVE | Noted: 2025-06-04

## 2025-06-04 PROBLEM — K44.9 HIATAL HERNIA WITH GERD: Status: ACTIVE | Noted: 2025-06-04

## 2025-06-04 LAB — GLUCOSE BLDC GLUCOMTR-MCNC: 111 MG/DL (ref 70–99)

## 2025-06-04 PROCEDURE — 250N000025 HC SEVOFLURANE, PER MIN: Performed by: SURGERY

## 2025-06-04 PROCEDURE — 250N000009 HC RX 250: Performed by: SURGERY

## 2025-06-04 PROCEDURE — 272N000001 HC OR GENERAL SUPPLY STERILE: Performed by: SURGERY

## 2025-06-04 PROCEDURE — C1781 MESH (IMPLANTABLE): HCPCS | Performed by: SURGERY

## 2025-06-04 PROCEDURE — 360N000080 HC SURGERY LEVEL 7, PER MIN: Performed by: SURGERY

## 2025-06-04 PROCEDURE — 0BUT4KZ SUPPLEMENT DIAPHRAGM WITH NONAUTOLOGOUS TISSUE SUBSTITUTE, PERCUTANEOUS ENDOSCOPIC APPROACH: ICD-10-PCS | Performed by: SURGERY

## 2025-06-04 PROCEDURE — 710N000010 HC RECOVERY PHASE 1, LEVEL 2, PER MIN: Performed by: SURGERY

## 2025-06-04 PROCEDURE — 258N000003 HC RX IP 258 OP 636: Performed by: SURGERY

## 2025-06-04 PROCEDURE — 258N000003 HC RX IP 258 OP 636: Performed by: ANESTHESIOLOGY

## 2025-06-04 PROCEDURE — 120N000001 HC R&B MED SURG/OB

## 2025-06-04 PROCEDURE — 88302 TISSUE EXAM BY PATHOLOGIST: CPT | Mod: TC | Performed by: SURGERY

## 2025-06-04 PROCEDURE — 0DU54KZ SUPPLEMENT ESOPHAGUS WITH NONAUTOLOGOUS TISSUE SUBSTITUTE, PERCUTANEOUS ENDOSCOPIC APPROACH: ICD-10-PCS | Performed by: SURGERY

## 2025-06-04 PROCEDURE — P9045 ALBUMIN (HUMAN), 5%, 250 ML: HCPCS

## 2025-06-04 PROCEDURE — 250N000013 HC RX MED GY IP 250 OP 250 PS 637: Performed by: SURGERY

## 2025-06-04 PROCEDURE — 0DV44ZZ RESTRICTION OF ESOPHAGOGASTRIC JUNCTION, PERCUTANEOUS ENDOSCOPIC APPROACH: ICD-10-PCS | Performed by: SURGERY

## 2025-06-04 PROCEDURE — 999N000141 HC STATISTIC PRE-PROCEDURE NURSING ASSESSMENT: Performed by: SURGERY

## 2025-06-04 PROCEDURE — 258N000003 HC RX IP 258 OP 636

## 2025-06-04 PROCEDURE — 43282 LAP PARAESOPH HER RPR W/MESH: CPT | Performed by: SURGERY

## 2025-06-04 PROCEDURE — 250N000011 HC RX IP 250 OP 636: Performed by: SURGERY

## 2025-06-04 PROCEDURE — 8E0W4CZ ROBOTIC ASSISTED PROCEDURE OF TRUNK REGION, PERCUTANEOUS ENDOSCOPIC APPROACH: ICD-10-PCS | Performed by: SURGERY

## 2025-06-04 PROCEDURE — 250N000009 HC RX 250: Performed by: STUDENT IN AN ORGANIZED HEALTH CARE EDUCATION/TRAINING PROGRAM

## 2025-06-04 PROCEDURE — 370N000017 HC ANESTHESIA TECHNICAL FEE, PER MIN: Performed by: SURGERY

## 2025-06-04 PROCEDURE — 250N000011 HC RX IP 250 OP 636: Mod: JZ | Performed by: SURGERY

## 2025-06-04 PROCEDURE — 250N000011 HC RX IP 250 OP 636: Mod: JZ | Performed by: STUDENT IN AN ORGANIZED HEALTH CARE EDUCATION/TRAINING PROGRAM

## 2025-06-04 PROCEDURE — 250N000011 HC RX IP 250 OP 636: Mod: JZ

## 2025-06-04 PROCEDURE — 250N000009 HC RX 250

## 2025-06-04 PROCEDURE — S2900 ROBOTIC SURGICAL SYSTEM: HCPCS | Performed by: SURGERY

## 2025-06-04 DEVICE — BIO-A TISSUE REINFORCEMENT 7CMX10CM
Type: IMPLANTABLE DEVICE | Site: ABDOMEN | Status: FUNCTIONAL
Brand: GORE BIO-A TISSUE REINFORCEMENT

## 2025-06-04 RX ORDER — FLUMAZENIL 0.1 MG/ML
0.2 INJECTION, SOLUTION INTRAVENOUS
Status: DISCONTINUED | OUTPATIENT
Start: 2025-06-04 | End: 2025-06-04 | Stop reason: HOSPADM

## 2025-06-04 RX ORDER — HYDROMORPHONE HCL IN WATER/PF 6 MG/30 ML
0.4 PATIENT CONTROLLED ANALGESIA SYRINGE INTRAVENOUS EVERY 5 MIN PRN
Status: DISCONTINUED | OUTPATIENT
Start: 2025-06-04 | End: 2025-06-04 | Stop reason: HOSPADM

## 2025-06-04 RX ORDER — FENTANYL CITRATE 50 UG/ML
50 INJECTION, SOLUTION INTRAMUSCULAR; INTRAVENOUS EVERY 5 MIN PRN
Status: DISCONTINUED | OUTPATIENT
Start: 2025-06-04 | End: 2025-06-04 | Stop reason: HOSPADM

## 2025-06-04 RX ORDER — DEXAMETHASONE SODIUM PHOSPHATE 4 MG/ML
INJECTION, SOLUTION INTRA-ARTICULAR; INTRALESIONAL; INTRAMUSCULAR; INTRAVENOUS; SOFT TISSUE PRN
Status: DISCONTINUED | OUTPATIENT
Start: 2025-06-04 | End: 2025-06-04

## 2025-06-04 RX ORDER — HYDROMORPHONE HYDROCHLORIDE 2 MG/1
2 TABLET ORAL EVERY 4 HOURS PRN
Status: DISCONTINUED | OUTPATIENT
Start: 2025-06-04 | End: 2025-06-05 | Stop reason: HOSPADM

## 2025-06-04 RX ORDER — METOPROLOL SUCCINATE 25 MG/1
50 TABLET, EXTENDED RELEASE ORAL DAILY
Status: DISCONTINUED | OUTPATIENT
Start: 2025-06-05 | End: 2025-06-05 | Stop reason: HOSPADM

## 2025-06-04 RX ORDER — CEFAZOLIN SODIUM/WATER 2 G/20 ML
2 SYRINGE (ML) INTRAVENOUS
Status: COMPLETED | OUTPATIENT
Start: 2025-06-04 | End: 2025-06-04

## 2025-06-04 RX ORDER — AMLODIPINE BESYLATE 5 MG/1
5 TABLET ORAL DAILY
Status: ON HOLD | COMMUNITY
End: 2025-06-05

## 2025-06-04 RX ORDER — ACETAMINOPHEN 325 MG/1
975 TABLET ORAL ONCE
Status: COMPLETED | OUTPATIENT
Start: 2025-06-04 | End: 2025-06-04

## 2025-06-04 RX ORDER — CEFAZOLIN SODIUM/WATER 2 G/20 ML
2 SYRINGE (ML) INTRAVENOUS SEE ADMIN INSTRUCTIONS
Status: DISCONTINUED | OUTPATIENT
Start: 2025-06-04 | End: 2025-06-04 | Stop reason: HOSPADM

## 2025-06-04 RX ORDER — EPHEDRINE SULFATE 50 MG/ML
INJECTION, SOLUTION INTRAMUSCULAR; INTRAVENOUS; SUBCUTANEOUS PRN
Status: DISCONTINUED | OUTPATIENT
Start: 2025-06-04 | End: 2025-06-04

## 2025-06-04 RX ORDER — ONDANSETRON 4 MG/1
4 TABLET, ORALLY DISINTEGRATING ORAL EVERY 30 MIN PRN
Status: DISCONTINUED | OUTPATIENT
Start: 2025-06-04 | End: 2025-06-04 | Stop reason: HOSPADM

## 2025-06-04 RX ORDER — DEXAMETHASONE SODIUM PHOSPHATE 4 MG/ML
4 INJECTION, SOLUTION INTRA-ARTICULAR; INTRALESIONAL; INTRAMUSCULAR; INTRAVENOUS; SOFT TISSUE
Status: DISCONTINUED | OUTPATIENT
Start: 2025-06-04 | End: 2025-06-04 | Stop reason: HOSPADM

## 2025-06-04 RX ORDER — NALOXONE HYDROCHLORIDE 0.4 MG/ML
0.4 INJECTION, SOLUTION INTRAMUSCULAR; INTRAVENOUS; SUBCUTANEOUS
Status: DISCONTINUED | OUTPATIENT
Start: 2025-06-04 | End: 2025-06-05 | Stop reason: HOSPADM

## 2025-06-04 RX ORDER — LIDOCAINE 40 MG/G
CREAM TOPICAL
Status: DISCONTINUED | OUTPATIENT
Start: 2025-06-04 | End: 2025-06-04 | Stop reason: HOSPADM

## 2025-06-04 RX ORDER — OXYCODONE HYDROCHLORIDE 5 MG/1
10 TABLET ORAL
Refills: 0 | Status: CANCELLED | OUTPATIENT
Start: 2025-06-04

## 2025-06-04 RX ORDER — SCOPOLAMINE 1 MG/3D
1 PATCH, EXTENDED RELEASE TRANSDERMAL
Status: DISCONTINUED | OUTPATIENT
Start: 2025-06-04 | End: 2025-06-04 | Stop reason: HOSPADM

## 2025-06-04 RX ORDER — KETOROLAC TROMETHAMINE 15 MG/ML
15 INJECTION, SOLUTION INTRAMUSCULAR; INTRAVENOUS EVERY 6 HOURS PRN
Status: DISCONTINUED | OUTPATIENT
Start: 2025-06-04 | End: 2025-06-05 | Stop reason: HOSPADM

## 2025-06-04 RX ORDER — BISACODYL 10 MG
10 SUPPOSITORY, RECTAL RECTAL DAILY PRN
Status: DISCONTINUED | OUTPATIENT
Start: 2025-06-07 | End: 2025-06-05 | Stop reason: HOSPADM

## 2025-06-04 RX ORDER — ONDANSETRON 2 MG/ML
INJECTION INTRAMUSCULAR; INTRAVENOUS PRN
Status: DISCONTINUED | OUTPATIENT
Start: 2025-06-04 | End: 2025-06-04

## 2025-06-04 RX ORDER — NALOXONE HYDROCHLORIDE 0.4 MG/ML
0.1 INJECTION, SOLUTION INTRAMUSCULAR; INTRAVENOUS; SUBCUTANEOUS
Status: DISCONTINUED | OUTPATIENT
Start: 2025-06-04 | End: 2025-06-04 | Stop reason: HOSPADM

## 2025-06-04 RX ORDER — FENTANYL CITRATE 50 UG/ML
100 INJECTION, SOLUTION INTRAMUSCULAR; INTRAVENOUS
Status: DISCONTINUED | OUTPATIENT
Start: 2025-06-04 | End: 2025-06-04 | Stop reason: HOSPADM

## 2025-06-04 RX ORDER — LIDOCAINE HYDROCHLORIDE 10 MG/ML
INJECTION, SOLUTION INFILTRATION; PERINEURAL PRN
Status: DISCONTINUED | OUTPATIENT
Start: 2025-06-04 | End: 2025-06-04

## 2025-06-04 RX ORDER — AMOXICILLIN 250 MG
1 CAPSULE ORAL 2 TIMES DAILY
Status: DISCONTINUED | OUTPATIENT
Start: 2025-06-04 | End: 2025-06-05 | Stop reason: HOSPADM

## 2025-06-04 RX ORDER — AMLODIPINE BESYLATE 5 MG/1
5 TABLET ORAL DAILY
Status: DISCONTINUED | OUTPATIENT
Start: 2025-06-05 | End: 2025-06-05 | Stop reason: HOSPADM

## 2025-06-04 RX ORDER — NALOXONE HYDROCHLORIDE 0.4 MG/ML
0.2 INJECTION, SOLUTION INTRAMUSCULAR; INTRAVENOUS; SUBCUTANEOUS
Status: DISCONTINUED | OUTPATIENT
Start: 2025-06-04 | End: 2025-06-05 | Stop reason: HOSPADM

## 2025-06-04 RX ORDER — LABETALOL HYDROCHLORIDE 5 MG/ML
10 INJECTION, SOLUTION INTRAVENOUS
Status: DISCONTINUED | OUTPATIENT
Start: 2025-06-04 | End: 2025-06-04 | Stop reason: HOSPADM

## 2025-06-04 RX ORDER — SODIUM CHLORIDE, SODIUM LACTATE, POTASSIUM CHLORIDE, CALCIUM CHLORIDE 600; 310; 30; 20 MG/100ML; MG/100ML; MG/100ML; MG/100ML
INJECTION, SOLUTION INTRAVENOUS CONTINUOUS
Status: DISCONTINUED | OUTPATIENT
Start: 2025-06-04 | End: 2025-06-04 | Stop reason: HOSPADM

## 2025-06-04 RX ORDER — LOTEPREDNOL ETABONATE 5 MG/ML
1 SUSPENSION/ DROPS OPHTHALMIC EVERY OTHER DAY
Status: DISCONTINUED | OUTPATIENT
Start: 2025-06-05 | End: 2025-06-05 | Stop reason: HOSPADM

## 2025-06-04 RX ORDER — HYDROMORPHONE HYDROCHLORIDE 1 MG/ML
0.5 INJECTION, SOLUTION INTRAMUSCULAR; INTRAVENOUS; SUBCUTANEOUS
Status: DISCONTINUED | OUTPATIENT
Start: 2025-06-04 | End: 2025-06-05 | Stop reason: HOSPADM

## 2025-06-04 RX ORDER — NALOXONE HYDROCHLORIDE 0.4 MG/ML
0.1 INJECTION, SOLUTION INTRAMUSCULAR; INTRAVENOUS; SUBCUTANEOUS
Status: CANCELLED | OUTPATIENT
Start: 2025-06-04

## 2025-06-04 RX ORDER — POLYETHYLENE GLYCOL 3350 17 G/17G
17 POWDER, FOR SOLUTION ORAL DAILY
Status: DISCONTINUED | OUTPATIENT
Start: 2025-06-05 | End: 2025-06-05 | Stop reason: HOSPADM

## 2025-06-04 RX ORDER — ONDANSETRON 2 MG/ML
4 INJECTION INTRAMUSCULAR; INTRAVENOUS EVERY 30 MIN PRN
Status: DISCONTINUED | OUTPATIENT
Start: 2025-06-04 | End: 2025-06-04 | Stop reason: HOSPADM

## 2025-06-04 RX ORDER — OXYCODONE HYDROCHLORIDE 5 MG/1
5 TABLET ORAL
Refills: 0 | Status: CANCELLED | OUTPATIENT
Start: 2025-06-04

## 2025-06-04 RX ORDER — HYDROMORPHONE HCL IN WATER/PF 6 MG/30 ML
0.2 PATIENT CONTROLLED ANALGESIA SYRINGE INTRAVENOUS EVERY 5 MIN PRN
Status: DISCONTINUED | OUTPATIENT
Start: 2025-06-04 | End: 2025-06-04 | Stop reason: HOSPADM

## 2025-06-04 RX ORDER — HYDROXYZINE HYDROCHLORIDE 10 MG/1
10 TABLET, FILM COATED ORAL 3 TIMES DAILY PRN
Status: DISCONTINUED | OUTPATIENT
Start: 2025-06-04 | End: 2025-06-05 | Stop reason: HOSPADM

## 2025-06-04 RX ORDER — BUPIVACAINE HYDROCHLORIDE AND EPINEPHRINE 2.5; 5 MG/ML; UG/ML
INJECTION, SOLUTION EPIDURAL; INFILTRATION; INTRACAUDAL; PERINEURAL PRN
Status: DISCONTINUED | OUTPATIENT
Start: 2025-06-04 | End: 2025-06-04 | Stop reason: HOSPADM

## 2025-06-04 RX ORDER — METOPROLOL SUCCINATE 25 MG/1
50 TABLET, EXTENDED RELEASE ORAL DAILY
Status: ON HOLD | COMMUNITY
End: 2025-06-05

## 2025-06-04 RX ORDER — HYDROMORPHONE HYDROCHLORIDE 4 MG/1
4 TABLET ORAL EVERY 4 HOURS PRN
Status: DISCONTINUED | OUTPATIENT
Start: 2025-06-04 | End: 2025-06-05 | Stop reason: HOSPADM

## 2025-06-04 RX ORDER — FENTANYL CITRATE 50 UG/ML
INJECTION, SOLUTION INTRAMUSCULAR; INTRAVENOUS PRN
Status: DISCONTINUED | OUTPATIENT
Start: 2025-06-04 | End: 2025-06-04

## 2025-06-04 RX ORDER — PROPOFOL 10 MG/ML
INJECTION, EMULSION INTRAVENOUS CONTINUOUS PRN
Status: DISCONTINUED | OUTPATIENT
Start: 2025-06-04 | End: 2025-06-04

## 2025-06-04 RX ORDER — FENTANYL CITRATE 50 UG/ML
25 INJECTION, SOLUTION INTRAMUSCULAR; INTRAVENOUS EVERY 5 MIN PRN
Status: DISCONTINUED | OUTPATIENT
Start: 2025-06-04 | End: 2025-06-04 | Stop reason: HOSPADM

## 2025-06-04 RX ORDER — ACETAMINOPHEN 325 MG/1
975 TABLET ORAL EVERY 8 HOURS
Status: DISCONTINUED | OUTPATIENT
Start: 2025-06-04 | End: 2025-06-05 | Stop reason: HOSPADM

## 2025-06-04 RX ORDER — FAMOTIDINE 20 MG/1
20 TABLET, FILM COATED ORAL 2 TIMES DAILY
Status: DISCONTINUED | OUTPATIENT
Start: 2025-06-04 | End: 2025-06-05 | Stop reason: HOSPADM

## 2025-06-04 RX ORDER — PROPOFOL 10 MG/ML
INJECTION, EMULSION INTRAVENOUS PRN
Status: DISCONTINUED | OUTPATIENT
Start: 2025-06-04 | End: 2025-06-04

## 2025-06-04 RX ORDER — LIDOCAINE 40 MG/G
CREAM TOPICAL
Status: DISCONTINUED | OUTPATIENT
Start: 2025-06-04 | End: 2025-06-05 | Stop reason: HOSPADM

## 2025-06-04 RX ORDER — HALOPERIDOL 5 MG/ML
1 INJECTION INTRAMUSCULAR
Status: DISCONTINUED | OUTPATIENT
Start: 2025-06-04 | End: 2025-06-04 | Stop reason: HOSPADM

## 2025-06-04 RX ORDER — FENTANYL CITRATE 50 UG/ML
100 INJECTION, SOLUTION INTRAMUSCULAR; INTRAVENOUS ONCE
Status: DISCONTINUED | OUTPATIENT
Start: 2025-06-04 | End: 2025-06-04 | Stop reason: HOSPADM

## 2025-06-04 RX ORDER — HYDROMORPHONE HCL IN WATER/PF 6 MG/30 ML
0.2 PATIENT CONTROLLED ANALGESIA SYRINGE INTRAVENOUS
Status: DISCONTINUED | OUTPATIENT
Start: 2025-06-04 | End: 2025-06-05 | Stop reason: HOSPADM

## 2025-06-04 RX ORDER — DEXAMETHASONE SODIUM PHOSPHATE 4 MG/ML
4 INJECTION, SOLUTION INTRA-ARTICULAR; INTRALESIONAL; INTRAMUSCULAR; INTRAVENOUS; SOFT TISSUE
Status: CANCELLED | OUTPATIENT
Start: 2025-06-04

## 2025-06-04 RX ORDER — HYDRALAZINE HYDROCHLORIDE 20 MG/ML
2.5-5 INJECTION INTRAMUSCULAR; INTRAVENOUS EVERY 10 MIN PRN
Status: DISCONTINUED | OUTPATIENT
Start: 2025-06-04 | End: 2025-06-04 | Stop reason: HOSPADM

## 2025-06-04 RX ORDER — TIMOLOL MALEATE 5 MG/ML
1 SOLUTION/ DROPS OPHTHALMIC DAILY
Status: DISCONTINUED | OUTPATIENT
Start: 2025-06-05 | End: 2025-06-05 | Stop reason: HOSPADM

## 2025-06-04 RX ORDER — ONDANSETRON 2 MG/ML
4 INJECTION INTRAMUSCULAR; INTRAVENOUS EVERY 30 MIN PRN
Status: CANCELLED | OUTPATIENT
Start: 2025-06-04

## 2025-06-04 RX ORDER — ONDANSETRON 4 MG/1
4 TABLET, ORALLY DISINTEGRATING ORAL EVERY 30 MIN PRN
Status: CANCELLED | OUTPATIENT
Start: 2025-06-04

## 2025-06-04 RX ORDER — DEXTROSE MONOHYDRATE, SODIUM CHLORIDE, AND POTASSIUM CHLORIDE 50; 1.49; 4.5 G/1000ML; G/1000ML; G/1000ML
INJECTION, SOLUTION INTRAVENOUS CONTINUOUS
Status: DISCONTINUED | OUTPATIENT
Start: 2025-06-04 | End: 2025-06-05 | Stop reason: HOSPADM

## 2025-06-04 RX ADMIN — DEXMEDETOMIDINE HYDROCHLORIDE 4 MCG: 100 INJECTION, SOLUTION INTRAVENOUS at 09:53

## 2025-06-04 RX ADMIN — ROCURONIUM BROMIDE 40 MG: 10 INJECTION, SOLUTION INTRAVENOUS at 07:45

## 2025-06-04 RX ADMIN — Medication 2 G: at 07:32

## 2025-06-04 RX ADMIN — PROPOFOL 60 MCG/KG/MIN: 10 INJECTION, EMULSION INTRAVENOUS at 07:40

## 2025-06-04 RX ADMIN — Medication 200 MG: at 11:51

## 2025-06-04 RX ADMIN — Medication 5 MG: at 11:05

## 2025-06-04 RX ADMIN — DEXMEDETOMIDINE HYDROCHLORIDE 4 MCG: 100 INJECTION, SOLUTION INTRAVENOUS at 11:54

## 2025-06-04 RX ADMIN — PHENYLEPHRINE HYDROCHLORIDE 0.5 MCG/KG/MIN: 10 INJECTION INTRAVENOUS at 09:30

## 2025-06-04 RX ADMIN — Medication 2 G: at 11:35

## 2025-06-04 RX ADMIN — ROCURONIUM BROMIDE 10 MG: 10 INJECTION, SOLUTION INTRAVENOUS at 08:47

## 2025-06-04 RX ADMIN — DEXMEDETOMIDINE HYDROCHLORIDE 4 MCG: 100 INJECTION, SOLUTION INTRAVENOUS at 10:30

## 2025-06-04 RX ADMIN — PHENYLEPHRINE HYDROCHLORIDE 100 MCG: 10 INJECTION INTRAVENOUS at 08:31

## 2025-06-04 RX ADMIN — PHENYLEPHRINE HYDROCHLORIDE 100 MCG: 10 INJECTION INTRAVENOUS at 09:34

## 2025-06-04 RX ADMIN — PHENYLEPHRINE HYDROCHLORIDE 150 MCG: 10 INJECTION INTRAVENOUS at 08:51

## 2025-06-04 RX ADMIN — PHENYLEPHRINE HYDROCHLORIDE 150 MCG: 10 INJECTION INTRAVENOUS at 09:04

## 2025-06-04 RX ADMIN — SENNOSIDES AND DOCUSATE SODIUM 1 TABLET: 50; 8.6 TABLET ORAL at 20:19

## 2025-06-04 RX ADMIN — LIDOCAINE HYDROCHLORIDE 5 ML: 10 INJECTION, SOLUTION INFILTRATION; PERINEURAL at 07:34

## 2025-06-04 RX ADMIN — SCOPOLAMINE 1 PATCH: 1.5 PATCH, EXTENDED RELEASE TRANSDERMAL at 07:18

## 2025-06-04 RX ADMIN — ACETAMINOPHEN 975 MG: 325 TABLET ORAL at 06:27

## 2025-06-04 RX ADMIN — ROCURONIUM BROMIDE 10 MG: 10 INJECTION, SOLUTION INTRAVENOUS at 10:30

## 2025-06-04 RX ADMIN — ROCURONIUM BROMIDE 10 MG: 10 INJECTION, SOLUTION INTRAVENOUS at 11:05

## 2025-06-04 RX ADMIN — DEXMEDETOMIDINE HYDROCHLORIDE 4 MCG: 100 INJECTION, SOLUTION INTRAVENOUS at 09:50

## 2025-06-04 RX ADMIN — FENTANYL CITRATE 50 MCG: 50 INJECTION INTRAMUSCULAR; INTRAVENOUS at 08:05

## 2025-06-04 RX ADMIN — FENTANYL CITRATE 25 MCG: 50 INJECTION INTRAMUSCULAR; INTRAVENOUS at 07:34

## 2025-06-04 RX ADMIN — Medication 5 MG: at 09:04

## 2025-06-04 RX ADMIN — SODIUM CHLORIDE, SODIUM LACTATE, POTASSIUM CHLORIDE, AND CALCIUM CHLORIDE: .6; .31; .03; .02 INJECTION, SOLUTION INTRAVENOUS at 06:49

## 2025-06-04 RX ADMIN — PROPOFOL 20 MG: 10 INJECTION, EMULSION INTRAVENOUS at 11:54

## 2025-06-04 RX ADMIN — PHENYLEPHRINE HYDROCHLORIDE 100 MCG: 10 INJECTION INTRAVENOUS at 08:36

## 2025-06-04 RX ADMIN — FENTANYL CITRATE 50 MCG: 50 INJECTION INTRAMUSCULAR; INTRAVENOUS at 07:32

## 2025-06-04 RX ADMIN — ALBUMIN (HUMAN): 12.5 SOLUTION INTRAVENOUS at 09:04

## 2025-06-04 RX ADMIN — Medication 100 MG: at 07:34

## 2025-06-04 RX ADMIN — PHENYLEPHRINE HYDROCHLORIDE 100 MCG: 10 INJECTION INTRAVENOUS at 12:13

## 2025-06-04 RX ADMIN — FENTANYL CITRATE 25 MCG: 50 INJECTION INTRAMUSCULAR; INTRAVENOUS at 13:14

## 2025-06-04 RX ADMIN — SODIUM CHLORIDE, SODIUM LACTATE, POTASSIUM CHLORIDE, AND CALCIUM CHLORIDE: .6; .31; .03; .02 INJECTION, SOLUTION INTRAVENOUS at 09:51

## 2025-06-04 RX ADMIN — FAMOTIDINE 20 MG: 20 TABLET, FILM COATED ORAL at 20:19

## 2025-06-04 RX ADMIN — PHENYLEPHRINE HYDROCHLORIDE 100 MCG: 10 INJECTION INTRAVENOUS at 10:52

## 2025-06-04 RX ADMIN — FENTANYL CITRATE 25 MCG: 50 INJECTION INTRAMUSCULAR; INTRAVENOUS at 09:49

## 2025-06-04 RX ADMIN — ACETAMINOPHEN 975 MG: 325 TABLET ORAL at 22:08

## 2025-06-04 RX ADMIN — ROCURONIUM BROMIDE 10 MG: 10 INJECTION, SOLUTION INTRAVENOUS at 09:49

## 2025-06-04 RX ADMIN — HYDROMORPHONE HYDROCHLORIDE 0.3 MG: 1 INJECTION, SOLUTION INTRAMUSCULAR; INTRAVENOUS; SUBCUTANEOUS at 11:27

## 2025-06-04 RX ADMIN — DEXAMETHASONE SODIUM PHOSPHATE 4 MG: 4 INJECTION, SOLUTION INTRA-ARTICULAR; INTRALESIONAL; INTRAMUSCULAR; INTRAVENOUS; SOFT TISSUE at 07:34

## 2025-06-04 RX ADMIN — KETOROLAC TROMETHAMINE 15 MG: 15 INJECTION, SOLUTION INTRAMUSCULAR; INTRAVENOUS at 17:19

## 2025-06-04 RX ADMIN — ACETAMINOPHEN 975 MG: 325 TABLET ORAL at 15:25

## 2025-06-04 RX ADMIN — PROPOFOL 100 MG: 10 INJECTION, EMULSION INTRAVENOUS at 07:34

## 2025-06-04 RX ADMIN — FENTANYL CITRATE 25 MCG: 50 INJECTION INTRAMUSCULAR; INTRAVENOUS at 11:54

## 2025-06-04 RX ADMIN — Medication 2.5 MG: at 09:26

## 2025-06-04 RX ADMIN — DEXTROSE, SODIUM CHLORIDE, AND POTASSIUM CHLORIDE: 5; .45; .15 INJECTION INTRAVENOUS at 15:26

## 2025-06-04 RX ADMIN — FENTANYL CITRATE 25 MCG: 50 INJECTION INTRAMUSCULAR; INTRAVENOUS at 07:59

## 2025-06-04 RX ADMIN — PHENYLEPHRINE HYDROCHLORIDE 100 MCG: 10 INJECTION INTRAVENOUS at 07:52

## 2025-06-04 RX ADMIN — PHENYLEPHRINE HYDROCHLORIDE 100 MCG: 10 INJECTION INTRAVENOUS at 10:01

## 2025-06-04 RX ADMIN — ONDANSETRON 4 MG: 2 INJECTION INTRAMUSCULAR; INTRAVENOUS at 11:28

## 2025-06-04 ASSESSMENT — ACTIVITIES OF DAILY LIVING (ADL)
CHANGE_IN_FUNCTIONAL_STATUS_SINCE_ONSET_OF_CURRENT_ILLNESS/INJURY: NO
ADLS_ACUITY_SCORE: 36
DOING_ERRANDS_INDEPENDENTLY_DIFFICULTY: NO
ADLS_ACUITY_SCORE: 38
ADLS_ACUITY_SCORE: 38
FALL_HISTORY_WITHIN_LAST_SIX_MONTHS: NO
DIFFICULTY_EATING/SWALLOWING: NO
TOILETING_ISSUES: NO
ADLS_ACUITY_SCORE: 38
ADLS_ACUITY_SCORE: 38
ADLS_ACUITY_SCORE: 49
ADLS_ACUITY_SCORE: 38
ADLS_ACUITY_SCORE: 38
VISION_MANAGEMENT: READING
ADLS_ACUITY_SCORE: 49
DIFFICULTY_COMMUNICATING: NO
WALKING_OR_CLIMBING_STAIRS_DIFFICULTY: NO
ADLS_ACUITY_SCORE: 38
ADLS_ACUITY_SCORE: 36
DRESSING/BATHING_DIFFICULTY: NO
ADLS_ACUITY_SCORE: 39
WEAR_GLASSES_OR_BLIND: YES
ADLS_ACUITY_SCORE: 38
ADLS_ACUITY_SCORE: 36
ADLS_ACUITY_SCORE: 39
CONCENTRATING,_REMEMBERING_OR_MAKING_DECISIONS_DIFFICULTY: NO
ADLS_ACUITY_SCORE: 36

## 2025-06-04 ASSESSMENT — ENCOUNTER SYMPTOMS: SEIZURES: 0

## 2025-06-04 NOTE — PROVIDER NOTIFICATION
MD paged regarding new chest pain radiating from shoulders and to clarify diet orders.     MD called back- placing new orders for pain control and okay to drink clears for today, no carbonation. Will reeval for diet tomorrow.

## 2025-06-04 NOTE — ANESTHESIA CARE TRANSFER NOTE
Patient: Oscar Zhao    Procedure: Procedure(s):  FUNDOPLICATION, NISSEN, ROBOT-ASSISTED, LAPAROSCOPIC; AND HIATAL HERNIA REPAIR WITH MESH       Diagnosis: Hiatal hernia [K44.9]  Paraesophageal hiatal hernia [K44.9]  Diagnosis Additional Information: No value filed.    Anesthesia Type:   General     Note:    Oropharynx: oropharynx clear of all foreign objects and spontaneously breathing  Level of Consciousness: drowsy  Oxygen Supplementation: face mask  Level of Supplemental Oxygen (L/min / FiO2): 6  Independent Airway: airway patency satisfactory and stable  Dentition: dentition unchanged  Vital Signs Stable: post-procedure vital signs reviewed and stable  Report to RN Given: handoff report given  Patient transferred to: PACU    Handoff Report: Identifed the Patient, Identified the Reponsible Provider, Reviewed the pertinent medical history, Discussed the surgical course, Reviewed Intra-OP anesthesia mangement and issues during anesthesia, Set expectations for post-procedure period and Allowed opportunity for questions and acknowledgement of understanding      Vitals:  Vitals Value Taken Time   BP 95/60 06/04/25 12:21   Temp 36.2  C (97.16  F) 06/04/25 12:25   Pulse 82 06/04/25 12:25   Resp 16 06/04/25 12:25   SpO2 92 % 06/04/25 12:25   Vitals shown include unfiled device data.    Electronically Signed By: PARTH Rosales CRNA  June 4, 2025  12:26 PM

## 2025-06-04 NOTE — ANESTHESIA POSTPROCEDURE EVALUATION
Patient: Oscar Zhao    Procedure: Procedure(s):  FUNDOPLICATION, NISSEN, ROBOT-ASSISTED, LAPAROSCOPIC; AND HIATAL HERNIA REPAIR WITH MESH       Anesthesia Type:  General    Note:  Disposition: Outpatient   Postop Pain Control: Uneventful            Sign Out: Well controlled pain   PONV: No   Neuro/Psych: Uneventful            Sign Out: Acceptable/Baseline neuro status   Airway/Respiratory: Uneventful            Sign Out: Acceptable/Baseline resp. status   CV/Hemodynamics: Uneventful            Sign Out: Acceptable CV status; No obvious hypovolemia; No obvious fluid overload   Other NRE: NONE   DID A NON-ROUTINE EVENT OCCUR? No           Last vitals:  Vitals Value Taken Time   /72 06/04/25 13:20   Temp 35.9  C (96.62  F) 06/04/25 13:21   Pulse 69 06/04/25 13:21   Resp 11 06/04/25 13:21   SpO2 92 % 06/04/25 13:21   Vitals shown include unfiled device data.    Electronically Signed By: Ailyn Cortes MD  June 4, 2025  1:22 PM

## 2025-06-04 NOTE — OP NOTE
Operative Note    Name:  Oscar Zhao  Location: [facility] Main OR  Procedure Date:  6/4/2025  PCP:  Jak Jones    Surgery Performed:  Procedure/Surgery Information   Procedure: Procedure(s):  FUNDOPLICATION, NISSEN, ROBOT-ASSISTED, LAPAROSCOPIC; AND HIATAL HERNIA REPAIR WITH MESH   Surgeon(s): Surgeons and Role:     * Santiago Hinojosa MD - Primary   Specimens: ID Type Source Tests Collected by Time Destination   1 : Hiatal Hernia Sac Tissue Hernia Sac SURGICAL PATHOLOGY EXAM Santiago Hinojosa MD 6/4/2025  9:33 AM                Pre-Procedure Diagnosis:  Hiatal hernia [K44.9]  Paraesophageal hiatal hernia [K44.9]    Post-Procedure Diagnosis:    Hiatal hernia [K44.9]  Paraesophageal hiatal hernia [K44.9]    Anesthesia:  General     Past Medical History:   Diagnosis Date    Anemia, unspecified type 02/08/2024    Arthritis     Basal cell carcinoma of anterior chest     Breast cyst     Hiatal hernia     Hiatal hernia with GERD 6/4/2025    History of anesthesia complications     HLA B27 (HLA B27 positive)     Hypertension     Osteoporosis 07/19/2011    Peripheral neuropathy 09/12/2018    PONV (postoperative nausea and vomiting)     Scleritis, bilateral     Left 2010.  Treated with corticosteroids,, methotrexate and Humira.  2015.  Rituximab.    Squamous cell carcinoma of skin of chest     Steroid induced glaucoma, both eyes     Thrombosis      Findings:  Prominent hiatal hernia with half of her stomach up in her chest      Operative Report:    Patient brought to the operating room placed in supineposition and given general endotracheal anesthesia.  She was sterilely prepped and draped in the usual surgical fashion.      A 5 millimeter trocar was advanced into the left lower aspect of the umbilicus under direct visualization of the Optiview trocar system and a pneumoperitoneum is brought up to 15 mmHg.  Another 5 mm trochars placed in the subxiphoid position under direct visualization.  A robotic 8  mm trocar was placed on the right lateral side and the initial 5 mm trocar was changed up to an 8 mm trocar along with 2 other 8 mm trochars along the supraumbilical aspect of the abdomen all under direct visualization.    We removed the subxiphoid trocar and advance a Nathansonliver retractor.  That liver retractor was used to suspend the left lobe of the liver up against the diaphragm to expose the upper portion of the stomach and esophageal hiatus.    The bed was rotated into a reverse Trendelenburg position.  The robot was docked the camera was brought into the port just to the left of the umbilicus and the targeting process was undertaken I brought in a vessel sealer device a Ahsan grasper and a fenestrated bipolar grasper.  The gastrohepatic ligament was taken down on the medial aspect of the stomach exposing the right britany.  The hernia sac to the medial aspect of the right britany was taken down with the vessel sealer and a plane was developed between the right britany and the hernia sac.  That hernia sac was dissected right up into the mediastinum and the perimeter of the hernia sac was dissected away from the crura working from the right all way around to the left.  The hernia sac was mostly reduced from the mediastinum.  The stomach was retracted towards the patient's right side and the gastro Eladia ligament was taken down with the vessel sealer.  The tissues between the left britany and the overlying stomach were taken down with the vessel sealer and a window was made between the crura and the overlying stomach and gastroesophageal junction.  A Penrose drain was put around this area and that Penrose drain was used for retraction of the esophagus to further dissect up into the mediastinum and free the esophagus from surrounding structures.  The vagus nerve was identified on the posterior aspect for sure and care was taken to stay away from the vagus nerve.  With the dissection up into the mediastinum the esophagus  mobilized down into the intra-abdominal cavity.     The crura was drawn together with a running 0-0 nonabsorbable V-Loc suture.  This closure was mostly from the bottom up towards the esophagus but there is also closure from the top down.  With this closure the right crura started to tear.  I did get approximation of tissue above and below the esophagus but it was not full-thickness on the right side as the muscle fibers started to split.  I then did an onlay of Bio-A mesh which lays down over the top of the crural closure and the arms of the mesh come out on the left lateral side of the esophagus.  The mesh was sutured to the crura all around the esophageal hiatus with a running 3-0 absorbable V-Loc suture.  The mesh was then also secured to the underlying diaphragm and crura with a running 3 oh V-Loc suture going all around the perimeter of the mesh.  This provided good mesh closure or coverage of the right crura which had some split of the muscle fibers.  At the completion of this mesh placement, the mesh went circumferentially around the esophagus and covering the underlying crura..       A brought a posterior wrap of fundus around the lower esophagus.   I sutured the fundal wrap around the anterior right aspect aspect of the esophagus with interrupted 2-0 Ethibond sutures as the top stitch and then I placed 2 separate 2-0 Vicryl sutures on either side below for a 3 suture pexy of the toupee wrap both of the right side of the esophagus and the left side of the esophagus.  A final 2-0 Vicryl suture was used to secure the posterior aspect of the wrap down to the Bio-A mesh and the underlying crura to ensure the wrap does not progress up into the esophageal hiatus.  These pexy sutures of the fundal wrap down to the underlying mesh and crura were done both on the right and the left.  The Penrose drain was removed.  The operative field was found to be clean and dry.  I removed the Janey liver retractor.      All  incisions were closed with 4-0 subcuticular Monocryl sutures.  The wounds are dressed with Telfa and Tegaderm     Estimated Blood Loss:   5 cc       Drains:        Implants:  Implant Name Type Inv. Item Serial No.  Lot No. LRB No. Used Action   MESH BIO-A TISSUE REINFORCEMENT [id number] - [id number] Mesh MESH BIO-A TISSUE REINFORCEMENT [id number] [id number] [employer]  N/A 1 Implanted       Complications:    None    Santiago Hinojosa MD     Date: 6/4/2025  Time: 12:30 PM

## 2025-06-04 NOTE — PHARMACY-ADMISSION MEDICATION HISTORY
Pharmacist Admission Medication History    Admission medication history is complete. The information provided in this note is only as accurate as the sources available at the time of the update.    Information Source(s): Patient and CareEverywhere/SureScripts via in-person    Pertinent Information: Patient has eye drops available for inpatient use.    Allergies reviewed with patient and updates made in EHR: yes    Medication History Completed By: Garima Golden PharmD 6/4/2025 7:18 AM    PTA Med List   Medication Sig Last Dose/Taking    amLODIPine (NORVASC) 5 MG tablet Take 5 mg by mouth daily. 6/4/2025 Morning    apixaban ANTICOAGULANT (ELIQUIS) 5 MG tablet Take 1 tablet (5 mg) by mouth 2 times daily. (Patient taking differently: Take 5 mg by mouth 2 times daily. LD will be 5/31 prior to surgery) 5/31/2025    EPINEPHrine (ANY BX GENERIC EQUIV) 0.3 MG/0.3ML injection 2-pack Inject 0.3 mLs (0.3 mg) into the muscle as needed for anaphylaxis May repeat one time in 5-15 minutes if response to initial dose is inadequate. Unknown    hydrOXYzine HCl (ATARAX) 10 MG tablet Take 1 tablet (10 mg) by mouth 3 times daily as needed for itching. Unknown    LOTEMAX 0.5 % ophthalmic suspension Place 1 drop into both eyes every other day. 6/3/2025    metoprolol succinate ER (TOPROL XL) 25 MG 24 hr tablet Take 50 mg by mouth daily. 6/4/2025 at  5:00 AM    timolol maleate (TIMOPTIC) 0.5 % ophthalmic solution Place 1 drop into both eyes daily 6/4/2025 Morning

## 2025-06-04 NOTE — ANESTHESIA PROCEDURE NOTES
Airway       Patient location during procedure: OR       Procedure Start/Stop Times: 6/4/2025 7:38 AM  Staff -        CRNA: Debi Dickerson APRN CRNA       Performed By: CRNA  Consent for Airway        Urgency: elective  Indications and Patient Condition       Indications for airway management: gilma-procedural       Induction type:RSI       Mask difficulty assessment: 0 - not attempted    Final Airway Details       Final airway type: endotracheal airway       Successful airway: ETT - single  Endotracheal Airway Details        ETT size (mm): 7.0       Cuffed: yes       Cuff volume (mL): 7       Successful intubation technique: direct laryngoscopy       DL Blade Type: [place] 2       Grade View of Cords: 1       Adjucts: stylet       Position: Right       Measured from: lips       Secured at (cm): 22       Bite block used: Soft    Post intubation assessment        Placement verified by: capnometry, equal breath sounds and chest rise        Number of attempts at approach: 1       Number of other approaches attempted: 0       Secured with: tape       Ease of procedure: easy       Dentition: Intact and Unchanged       Dental guard used and removed. Dental Guard Type: Standard White.    Medication(s) Administered   Medication Administration Time: 6/4/2025 7:38 AM

## 2025-06-04 NOTE — PLAN OF CARE
Problem: Adult Inpatient Plan of Care  Goal: Optimal Comfort and Wellbeing  Outcome: Progressing   Goal Outcome Evaluation:    Patient up to the unit this afternoon. Weened down to 1L NC O2 Sats above 95%. Cardiac monitoring running NSR. Tolerating clear diet. Bowel sounds hypoactive, not passing gas at this time. Voiding spontaneously. PIV running NS+D5+KCL @75. VSS and afebrile. Toradol and tylenol given for shoulder/ chest pain. MD aware of pain.

## 2025-06-04 NOTE — ANESTHESIA PREPROCEDURE EVALUATION
Anesthesia Pre-Procedure Evaluation    Patient: Oscar Zhao   MRN: [id number] : 1945          Procedure : Procedure(s):  FUNDOPLICATION, NISSEN, ROBOT-ASSISTED, LAPAROSCOPIC         Past Medical History:   Diagnosis Date    Anemia, unspecified type 2024    Arthritis     Basal cell carcinoma of anterior chest     Breast cyst     Hiatal hernia     History of anesthesia complications     HLA B27 (HLA B27 positive)     Hypertension     Osteoporosis 2011    Peripheral neuropathy 2018    PONV (postoperative nausea and vomiting)     Scleritis, bilateral     Left .  Treated with corticosteroids,, methotrexate and Humira.  .  Rituximab.    Squamous cell carcinoma of skin of chest     Steroid induced glaucoma, both eyes     Thrombosis       Past Surgical History:   Procedure Laterality Date    ARTHROPLASTY REVISION HIP Right 2023    Procedure: RIGHT HIP REVISION TOTAL HIP ARTHROPLASTY;  Surgeon: Bill Bernal DO;  Location: [facility] Main OR    ARTHROPLASTY REVISION HIP Right 2023    Procedure: RIGHT REVISION TOTAL HIP ARTHROPLASTY HEAD LINER EXCHANGE;  Surgeon: Bill Bernal DO;  Location: [facility] Main OR    BREAST CYST EXCISION Right     benign     SECTION      CLOSED REDUCTION HIP Right 2019    Procedure: CLOSED REDUCTION, HIP;  Surgeon: Jak Ruiz DO;  Location: [facility] Main OR;  Service: Orthopedics    CLOSED REDUCTION HIP Right 2023    Procedure: CLOSED REDUCTION, HIP RIGHT;  Surgeon: Aditya Pedroza MD;  Location: [facility] Main OR    CLOSED REDUCTION HIP Right 2023    Procedure: CLOSED REDUCTION, HIP RIGHT;  Surgeon: Jak Allen MD;  Location: [facility] OR    COLONOSCOPY  2011    COLONOSCOPY  2005    COLONOSCOPY N/A 2024    Procedure: COLONOSCOPY;  Surgeon: Chi Figueroa MD;  Location: [facility] Main OR    COLONOSCOPY W/ BIOPSIES AND POLYPECTOMY  2021    16 to 20 mm  polyp:tubular adenoma in the ascending colon.  Ulcerated mass in the anal canal: Moderately differentiated squamous cell cancer.    ESOPHAGOSCOPY, GASTROSCOPY, DUODENOSCOPY (EGD), COMBINED  05/05/2017    Large hiatal hernia.  Reactive gastropathy with mucosal erosion.  H. pylori negative.    ESOPHAGOSCOPY, GASTROSCOPY, DUODENOSCOPY (EGD), COMBINED N/A 2/12/2024    Procedure: ESOPHAGOGASTRODUODENOSCOPY with biopsies;  Surgeon: Chi Figueroa MD;  Location: [facility] OR    HERNIORRHAPHY FEMORAL Left 9/7/2021    Procedure: LEFT FEMORAL HERNIA REPAIR;  Surgeon: Sidney Murray MD;  Location: [facility] OR    HYSTERECTOMY TOTAL ABDOMINAL, BILATERAL SALPINGO-OOPHORECTOMY, COMBINED  1996    IR CHEST PORT PLACEMENT > 5 YRS OF AGE  3/15/2021    IR PORT PLACEMENT >5 YEARS  03/15/2021    Right IJ port-a-cath.    IR PORT REMOVAL RIGHT  7/14/2021    LAPAROSCOPIC APPENDECTOMY  04/28/2011    PHACOEMULSIFICATION CLEAR CORNEA WITH STANDARD INTRAOCULAR LENS IMPLANT Right 08/30/2017    PHACOEMULSIFICATION CLEAR CORNEA WITH STANDARD INTRAOCULAR LENS IMPLANT Left 09/13/2017    TOTAL HIP ARTHROPLASTY Right 08/18/2015    Procedure: HIP TOTAL ARTHROPLASTY, RIGHT;  Surgeon: Star Du MD;  Location: [facility];  Service:     [facility] TOTAL KNEE ARTHROPLASTY Left 03/02/2017    Procedure: LEFT TOTAL KNEE ARTHROPLASTY;  Surgeon: Star Du MD;  Location: [facility] OR;  Service: Orthopedics      Allergies   Allergen Reactions    Kiwi Hives    Adalimumab Muscle Pain (Myalgia)    Aspirin      Had Bleeding ulcers after previous 2 surgeries due to aspirin    Brimonidine-Timolol [Brimonidine Tartrate-Timolol] Unknown     Redness itching in eyes    Levaquin [Levofloxacin] Unknown     Skin burn and couldn't get out bed for 5 days    Tetracyclines & Related Hives      Social History     Tobacco Use    Smoking status: Never     Passive exposure: Never    Smokeless tobacco: Never   Substance Use Topics     Alcohol use: Yes     Alcohol/week: 4.0 standard drinks of alcohol     Types: 4 Standard drinks or equivalent per week     Comment: 3-4/wk      Wt Readings from Last 1 Encounters:   06/04/25 60.4 kg (133 lb 1.6 oz)        Anesthesia Evaluation   Pt has had prior anesthetic.     No history of anesthetic complications       ROS/MED HX  ENT/Pulmonary:    (-) asthma and sleep apnea   Neurologic:    (-) no seizures and no CVA   Cardiovascular:     (+)  hypertension- -   -  - -   Taking blood thinners (apixiban for hx of blood clots 2 years ago)        MARTIN (2/2 hiatal hernia).                           METS/Exercise Tolerance: >4 METS    Hematologic:     (+) History of blood clots,    pt is anticoagulated,           Musculoskeletal:   (+)  arthritis,             GI/Hepatic:     (+)      hiatal hernia,              Renal/Genitourinary:       Endo:    (-) Type II DM   Psychiatric/Substance Use:       Infectious Disease:       Malignancy:       Other:              Physical Exam  Airway  Mallampati: I  TM distance: >3 FB  Neck ROM: full  Mouth opening: >= 4 cm    Cardiovascular   Rhythm: regular  Rate: normal rate     Dental   (+) Minor Abnormalities - some fillings, tiny chips      Pulmonary Breath sounds clear to auscultation        Neurological - normal exam  She appears awake, alert and oriented x3.    Other Findings       OUTSIDE LABS:  CBC:   Lab Results   Component Value Date    WBC 7.2 05/12/2025    WBC 8.1 02/10/2025    HGB 10.5 (L) 05/12/2025    HGB 11.8 02/10/2025    HCT 37.3 05/12/2025    HCT 41.8 02/10/2025     05/12/2025     02/10/2025     BMP:   Lab Results   Component Value Date     05/12/2025     02/10/2025    POTASSIUM 4.9 05/12/2025    POTASSIUM 4.4 02/10/2025    CHLORIDE 103 05/12/2025    CHLORIDE 101 02/10/2025    CO2 24 05/12/2025    CO2 24 02/10/2025    BUN 11.3 05/12/2025    BUN 12.5 02/10/2025    CR 0.58 05/12/2025    CR 0.57 02/10/2025     (H) 06/04/2025    GLC 91  "05/12/2025     COAGS:   Lab Results   Component Value Date    PTT 26 02/08/2024    INR 1.08 02/08/2024     POC: No results found for: \"BGM\", \"HCG\", \"HCGS\"  HEPATIC:   Lab Results   Component Value Date    ALBUMIN 4.1 05/12/2025    PROTTOTAL 6.7 05/12/2025    ALT 17 05/12/2025    AST 24 05/12/2025    ALKPHOS 101 05/12/2025    BILITOTAL 0.6 05/12/2025     OTHER:   Lab Results   Component Value Date    LACT 1.3 10/21/2024    A1C 5.6 02/06/2025    MICHEL 9.4 05/12/2025    PHOS 3.1 04/08/2021    MAG 1.7 (L) 04/07/2021    LIPASE 37 10/21/2024    TSH 0.74 10/02/2023    CRP 4.0 (H) 04/07/2021    SED 6 02/09/2024       Anesthesia Plan    ASA Status:  3      NPO Status: NPO Appropriate   Anesthesia Type: General.  Airway: oral.  Induction: intravenous.  Maintenance: Balanced.   Techniques and Equipment:       - Monitoring Plan: standard ASA monitoring, train of four monitoring     Consents    Anesthesia Plan(s) and associated risks, benefits, and realistic alternatives discussed. Questions answered and patient/representative(s) expressed understanding.     - Discussed: anesthesiologist     - Discussed with:  Patient        - Pt is DNR/DNI Status: no DNR     Blood Consent:      - Discussed with: patient.     - Consented: consented to blood products     Postoperative Care    Pain management: non-narcotic analgesics, plan for postoperative opioid use, peripheral nerve block, multimodal analgesia.     Comments:    Other Comments: GA with ETT, RSI for hiatal hernia, scopolamine patch, decadron/zofran/propofol. Surgeon states he will do TAP blocks.               Ailyn Cortes MD    I have reviewed the pertinent notes and labs in the chart from the past 30 days and (re)examined the patient.  Any updates or changes from those notes are reflected in this note.    Clinically Significant Risk Factors Present on Admission                # Drug Induced Coagulation Defect: home medication list includes an anticoagulant medication    # " Hypertension: Noted on problem list               # Financial/Environmental Concerns:

## 2025-06-04 NOTE — H&P
HISTORY AND PHYSICAL UPDATE:    I have assessed the patient and evaluated the chart and and verify the patient's clinical status has not changed since our last documentation.  The patient is ready to move forward with the planned surgery.  Lungs: Clear to auscultation  Heart: Regular rate and rhythm    HPI:  This is a 79 year old female here today with some shortness of breath.  When this was worked up, it was found that she had a large Hiatal Hernia with most of her stomach up in her chest.  She has known about her Hiatal Hernia for at least 20 years.  It was just a month ago that she became symptomatic with shortness of breath.      She has a history of R leg DVT 2/8/2024 with Pulmonary Emboli, in association with having a Right Hip Surgery.  She had been on Xerelto for the 6 months to follow.  She stopped her anticoagulant in August 2024    She is status post Anal Cancer in 2021, and is now in a disease free state.         Allergies:Kiwi, Adalimumab, Aspirin, Brimonidine-timolol [brimonidine tartrate-timolol], Levaquin [levofloxacin], and Tetracyclines & related     Past Medical History           Past Medical History:   Diagnosis Date    Anemia, unspecified type 2/8/2024    Arthritis      Basal cell carcinoma of anterior chest      Breast cyst      History of anesthesia complications      HLA B27 (HLA B27 positive)      Hypertension      Osteoporosis 07/19/2011    Peripheral neuropathy 09/12/2018    PONV (postoperative nausea and vomiting)      Scleritis, bilateral       Left 2010.  Treated with corticosteroids,, methotrexate and Humira.  2015.  Rituximab.    Squamous cell carcinoma of skin of chest      Steroid induced glaucoma, both eyes              Past Surgical History             Past Surgical History:   Procedure Laterality Date    ARTHROPLASTY REVISION HIP Right 9/21/2023     Procedure: RIGHT HIP REVISION TOTAL HIP ARTHROPLASTY;  Surgeon: Bill Bernal DO;  Location: [facility] Main OR    ARTHROPLASTY  REVISION HIP Right 2023     Procedure: RIGHT REVISION TOTAL HIP ARTHROPLASTY HEAD LINER EXCHANGE;  Surgeon: Bill Bernal DO;  Location: [facility] OR    BREAST CYST EXCISION Right      benign     SECTION   1989    CLOSED REDUCTION HIP Right 2019     Procedure: CLOSED REDUCTION, HIP;  Surgeon: Jak Ruiz DO;  Location: [facility] OR;  Service: Orthopedics    CLOSED REDUCTION HIP Right 2023     Procedure: CLOSED REDUCTION, HIP RIGHT;  Surgeon: Aditya Pedroza MD;  Location: [facility] Main OR    CLOSED REDUCTION HIP Right 2023     Procedure: CLOSED REDUCTION, HIP RIGHT;  Surgeon: Jak Allen MD;  Location: [facility] OR    COLONOSCOPY   2011    COLONOSCOPY   2005    COLONOSCOPY N/A 2024     Procedure: COLONOSCOPY;  Surgeon: Chi Figueroa MD;  Location: [facility]    COLONOSCOPY W/ BIOPSIES AND POLYPECTOMY   2021     16 to 20 mm polyp:tubular adenoma in the ascending colon.  Ulcerated mass in the anal canal: Moderately differentiated squamous cell cancer.    ESOPHAGOSCOPY, GASTROSCOPY, DUODENOSCOPY (EGD), COMBINED   2017     Large hiatal hernia.  Reactive gastropathy with mucosal erosion.  H. pylori negative.    ESOPHAGOSCOPY, GASTROSCOPY, DUODENOSCOPY (EGD), COMBINED N/A 2024     Procedure: ESOPHAGOGASTRODUODENOSCOPY with biopsies;  Surgeon: Chi Figueroa MD;  Location: [facility] OR    HERNIORRHAPHY FEMORAL Left 2021     Procedure: LEFT FEMORAL HERNIA REPAIR;  Surgeon: Sidney Murray MD;  [place] [facility]    HYSTERECTOMY TOTAL ABDOMINAL, BILATERAL SALPINGO-OOPHORECTOMY, COMBINED       IR CHEST PORT PLACEMENT > 5 YRS OF AGE   3/15/2021    IR PORT PLACEMENT >5 YEARS   03/15/2021     Right IJ port-a-cath.    IR PORT REMOVAL RIGHT   2021    LAPAROSCOPIC APPENDECTOMY   2011    PHACOEMULSIFICATION CLEAR CORNEA WITH STANDARD INTRAOCULAR LENS IMPLANT Right  08/30/2017    PHACOEMULSIFICATION CLEAR CORNEA WITH STANDARD INTRAOCULAR LENS IMPLANT Left 09/13/2017    TOTAL HIP ARTHROPLASTY Right 08/18/2015     Procedure: HIP TOTAL ARTHROPLASTY, RIGHT;  Surgeon: Star Du MD;  Location: [facility];  Service:     [facility] TOTAL KNEE ARTHROPLASTY Left 03/02/2017     Procedure: LEFT TOTAL KNEE ARTHROPLASTY;  Surgeon: Star Du MD;  Location: [facility] Main OR;  Service: Orthopedics            CURRENT MEDS:  Current Outpatient Prescriptions               Current Outpatient Medications   Medication Sig Dispense Refill    amLODIPine (NORVASC) 5 MG tablet TAKE 2 TABLETS(10 MG) BY MOUTH DAILY 180 tablet 11    EPINEPHrine (ANY BX GENERIC EQUIV) 0.3 MG/0.3ML injection 2-pack Inject 0.3 mLs (0.3 mg) into the muscle as needed for anaphylaxis May repeat one time in 5-15 minutes if response to initial dose is inadequate. 2 each 0    ferrous sulfate (FE TABS) 325 (65 Fe) MG EC tablet Take 325 mg by mouth every other day        hydrOXYzine HCl (ATARAX) 10 MG tablet Take 1 tablet (10 mg) by mouth 3 times daily as needed for itching. 90 tablet 11    LOTEMAX 0.5 % ophthalmic suspension Place 1 drop into both eyes every 48 hours   4    metoprolol succinate ER (TOPROL XL) 25 MG 24 hr tablet Take 1 tablet (25 mg) by mouth daily 30 tablet 0    pantoprazole (PROTONIX) 40 MG EC tablet Take 1 tablet (40 mg) by mouth daily 90 tablet 0    timolol maleate (TIMOPTIC) 0.5 % ophthalmic solution Place 1 drop into both eyes daily        vitamin C (ASCORBIC ACID) 250 MG tablet Take 250 mg by mouth every 48 hours                Family History             Family History   Problem Relation Age of Onset    Alzheimer Disease Mother      Heart Disease Father      Pancreatic Cancer Brother 72.00    No Known Problems Daughter      Anesthesia Reaction No family hx of           Family history is not pertinent to this patients Chief Complaint.      reports that she has never smoked. She has never been  "exposed to tobacco smoke. She has never used smokeless tobacco. She reports current alcohol use of about 4.0 standard drinks of alcohol per week. She reports that she does not use drugs.     Review of Systems -   10 point Review of systems is negative except for; as mentioned above in HPI and PMHx    BP (!) 141/77   Pulse 63   Temp 98.3  F (36.8  C) (Temporal)   Resp 16   Ht 1.6 m (5' 3\")   Wt 60.4 kg (133 lb 1.6 oz)   LMP  (LMP Unknown)   SpO2 100%   BMI 23.58 kg/m        EXAM:  GENERAL: Well developed female  HEENT: EOMI, Anicteric Sclera, Moist Mucous Membranes,  In Mouth the pt does not have redness or bleeding gums  CARDIOVASCULAR: RRR w/out murmur   CHEST/LUNG: Clear to Auscultation  ABDOMEN:  Non tender to palpation, +BS  MUSCULOSKELETAL:  No deformities with good range of motion in all extremities  NEURO: She is ambulatory with good strength in both legs.  HEME/LYMPH: No Cervical Adenopathy or tenderness.      IMAGES:  I evaluated these images myself and she has a large hiatal hernia with most of the stomach up in the chest that predominantly pushes off to the left side.     EXAM: CT CHEST PULMONARY EMBOLISM W CONTRAST  LOCATION: [facility]  DATE: 11/20/2024     INDICATION: History of PE, no longer anticoagulated, shortness of breath and chest tightness  COMPARISON: CT of the chest with contrast 2/28/2024  TECHNIQUE: CT chest pulmonary angiogram during arterial phase injection of IV contrast. Multiplanar reformats and MIP reconstructions were performed. Dose reduction techniques were used.   CONTRAST: Isovue  370 90mL     FINDINGS:  ANGIOGRAM CHEST: Main pulmonary artery is normal in size. The large majority of low-attenuation filling defects in the pulmonary arteries present to 8/20/2024 have resolved. There is a small residual defect within a subsegmental branch in the posterior   basal right lower lobe (series 6, image 191). Thoracic aorta is negative for dissection.   "   LUNGS AND PLEURA: Passive atelectasis in the medial left lower lobe due to mass effect from mediastinal contents, unchanged. No new airspace or interstitial opacities. Central airways are patent and normal caliber. No pleural effusion.     MEDIASTINUM: Large hiatal hernia with 80% of the stomach intrathoracic. Associated foreshortening of the esophagus. No esophageal wall thickening. Cardiac chambers are normal in size. No pericardial effusion. No enlarged mediastinal or hilar lymph nodes.     CORONARY ARTERY CALCIFICATION: None.     UPPER ABDOMEN: Normal.     MUSCULOSKELETAL: There are diffuse thoracic spine degenerative osteophytes with discogenic sclerosis at a few levels. No vertebral compression deformities. No aggressive or destructive bone lesions.                                                                      IMPRESSION:     1.  No acute pulmonary embolism. Most of the pulmonary artery filling defects present in February 2024 have resolved with only one residual nonocclusive subsegmental defect in a subsegmental branch of the posterior basal right lower lobe.  2.  Large hiatal hernia with nearly intrathoracic stomach.  3.  Mass effect from #2 on the medial left lower lobe, unchanged. No acute lung, airway, or pleural inflammatory process.                   Assessment/Plan:  79-year-old woman who has a profound hiatal versus paraesophageal hiatal hernia, with most of her stomach up in her chest.  This patient started having symptoms of atypical chest pain.  It appears that she would benefit from having her hiatal hernia repaired.     Robotic repair of this patient's paraesophageal hiatal hernia with reduction of her intrathoracic stomach.    Santiago Hinojosa  [facility]; surgeons  [phone]

## 2025-06-05 VITALS
SYSTOLIC BLOOD PRESSURE: 143 MMHG | HEART RATE: 74 BPM | DIASTOLIC BLOOD PRESSURE: 83 MMHG | OXYGEN SATURATION: 94 % | BODY MASS INDEX: 24.77 KG/M2 | RESPIRATION RATE: 18 BRPM | TEMPERATURE: 98.6 F | HEIGHT: 63 IN | WEIGHT: 139.8 LBS

## 2025-06-05 LAB
ERYTHROCYTE [DISTWIDTH] IN BLOOD BY AUTOMATED COUNT: 20.1 % (ref 10–15)
GLUCOSE BLDC GLUCOMTR-MCNC: 113 MG/DL (ref 70–99)
HCT VFR BLD AUTO: 32.3 % (ref 35–47)
HGB BLD-MCNC: 9.1 G/DL (ref 11.7–15.7)
HOLD SPECIMEN: NORMAL
MCH RBC QN AUTO: 21 PG (ref 26.5–33)
MCHC RBC AUTO-ENTMCNC: 28.2 G/DL (ref 31.5–36.5)
MCV RBC AUTO: 74 FL (ref 78–100)
PLATELET # BLD AUTO: 147 10E3/UL (ref 150–450)
RBC # BLD AUTO: 4.34 10E6/UL (ref 3.8–5.2)
WBC # BLD AUTO: 7 10E3/UL (ref 4–11)

## 2025-06-05 PROCEDURE — 36415 COLL VENOUS BLD VENIPUNCTURE: CPT | Performed by: SURGERY

## 2025-06-05 PROCEDURE — 250N000013 HC RX MED GY IP 250 OP 250 PS 637: Performed by: SURGERY

## 2025-06-05 PROCEDURE — 85027 COMPLETE CBC AUTOMATED: CPT | Performed by: SURGERY

## 2025-06-05 PROCEDURE — 258N000003 HC RX IP 258 OP 636: Performed by: SURGERY

## 2025-06-05 PROCEDURE — 99238 HOSP IP/OBS DSCHRG MGMT 30/<: CPT | Mod: 24

## 2025-06-05 PROCEDURE — 250N000011 HC RX IP 250 OP 636: Mod: JZ | Performed by: SURGERY

## 2025-06-05 RX ORDER — ACETAMINOPHEN 325 MG/1
650-975 TABLET ORAL EVERY 6 HOURS PRN
COMMUNITY
Start: 2025-06-05 | End: 2025-06-08

## 2025-06-05 RX ORDER — AMOXICILLIN 250 MG
1 CAPSULE ORAL 2 TIMES DAILY PRN
COMMUNITY
Start: 2025-06-05

## 2025-06-05 RX ORDER — AMLODIPINE BESYLATE 5 MG/1
5 TABLET ORAL DAILY
COMMUNITY
Start: 2025-06-06

## 2025-06-05 RX ORDER — POLYETHYLENE GLYCOL 3350 17 G/17G
1 POWDER, FOR SOLUTION ORAL DAILY PRN
COMMUNITY
Start: 2025-06-05

## 2025-06-05 RX ORDER — IBUPROFEN 200 MG
400-600 TABLET ORAL EVERY 6 HOURS PRN
COMMUNITY
Start: 2025-06-05 | End: 2025-06-05

## 2025-06-05 RX ORDER — METOPROLOL SUCCINATE 25 MG/1
50 TABLET, EXTENDED RELEASE ORAL DAILY
COMMUNITY
Start: 2025-06-06

## 2025-06-05 RX ADMIN — SENNOSIDES AND DOCUSATE SODIUM 1 TABLET: 50; 8.6 TABLET ORAL at 08:50

## 2025-06-05 RX ADMIN — POLYETHYLENE GLYCOL 3350 17 G: 17 POWDER, FOR SOLUTION ORAL at 08:50

## 2025-06-05 RX ADMIN — LOTEPREDNOL ETABONATE 1 DROP: 5 SUSPENSION/ DROPS OPHTHALMIC at 08:52

## 2025-06-05 RX ADMIN — KETOROLAC TROMETHAMINE 15 MG: 15 INJECTION, SOLUTION INTRAMUSCULAR; INTRAVENOUS at 00:09

## 2025-06-05 RX ADMIN — ACETAMINOPHEN 975 MG: 325 TABLET ORAL at 06:32

## 2025-06-05 RX ADMIN — DEXTROSE, SODIUM CHLORIDE, AND POTASSIUM CHLORIDE: 5; .45; .15 INJECTION INTRAVENOUS at 06:17

## 2025-06-05 RX ADMIN — ACETAMINOPHEN 975 MG: 325 TABLET ORAL at 14:43

## 2025-06-05 RX ADMIN — TIMOLOL MALEATE 1 DROP: 5 SOLUTION/ DROPS OPHTHALMIC at 08:51

## 2025-06-05 RX ADMIN — FAMOTIDINE 20 MG: 20 TABLET, FILM COATED ORAL at 08:50

## 2025-06-05 ASSESSMENT — ACTIVITIES OF DAILY LIVING (ADL)
ADLS_ACUITY_SCORE: 36

## 2025-06-05 NOTE — PROGRESS NOTES
General Surgery Progress Note    Patient is feeling well and no longer requiring oxygen at rest. She is satting 93% on room air currently. Previously was down to 90% with ambulation. Feeling better with ambulation as far as breathing and pain, has some soreness over upper abdomen otherwise no pain.     Feels comfortable with discharging with Tylenol at home, does not want any stronger medication. Encouraged ice over incisions as needed. Encouraged her to call our clinic should she need further medications.    Her daughter will be staying with her at her [facility] and is available to pick her up today, coming from [place].     Discussed with patient and she is comfortable with discharging to home with her daughter. Plan for discharging this afternoon.    VIVIAN Hagan [facility] General Surgery

## 2025-06-05 NOTE — PLAN OF CARE
Problem: Adult Inpatient Plan of Care  Goal: Optimal Comfort and Wellbeing  Outcome: Progressing  Goal Outcome Evaluation:    Pt alert and oriented x4, report minimal abdominal pain, managed appropriately with schedule and prn medications, IVF infusing, pt tolerating clear, telemetry sinus bradycardia, pt ambulating with SBA, encouraged activity as tolerated, discharge pending.

## 2025-06-05 NOTE — PROGRESS NOTES
Patient walked in the hallway this afternoon on RA with O2 Sats ranging from 88%-97%. Does report some shortness of breath at the end of the walk due to deconditioning. Stable on RA with O2 Sats above 92% following walk. Will walk again later today to see if tolerating activity better.

## 2025-06-05 NOTE — PROGRESS NOTES
General Surgery Progress Note:    Hospital Day # 1    ASSESSMENT:  1. History of repair of hiatal hernia        Oscar Zhao is a 80 year old female who is s/p robot assisted laparoscopic hiatal hernia repair with mesh and nissen fundoplication on 6/4/25. Postoperatively with shoulder pain now improved and no narcotic use postop. Tolerating clear liquid diet. Remains on 1L oxygen, has clearing cough with clear phlegm this am (per patient chronic post nasal drip). Will continue to monitor, if doing well potential for discharge later today pending course    PLAN:  -Clear liquid diet avoid caffeine and carbonation  -Encourage ambulation and activity  -Wean oxygen as able until 92% or above on room air  -Multimodal pain management  -Continue abdominal dressings, change if soiled. Can remove and leave open to air 48 hours postop  -Gave diet handout for home  -Will further discuss with surgeon and monitor patient for timing of discharge, at earliest later today      SUBJECTIVE:   Oscar Zhao was seen on rounds. States she is doing alright, tolerating pain level. Had shoulder pain yesterday was relatively severe but this morning has improved. No nausea or vomiting, no fever, chills. Is passing some flatus no BM. Today pain is mostly with deeper breaths, better with ambulation. Describes pain in her lungs with deep inspiration. Has chronic post nasal drip which causes a cough in am, not worse now; does have dry mouth and throat. Voiding appropriately.     VITALS RANGE:  Temp:  [96.6  F (35.9  C)-98.6  F (37  C)] 98.5  F (36.9  C)  Pulse:  [61-83] 61  Resp:  [11-21] 18  BP: ()/(59-79) 108/74  SpO2:  [90 %-99 %] 93 %    PHYSICAL EXAM:  General: patient seen resting in bed in no acute distress  Resp: no increased work of breathing, breathing comfortably on 1L oxygen; occasional clearing of throat while in room  Abdomen: generally soft and nontender with palpation, incisions clean, dry, and dressings  intact  Extremities: No edema or cyanosis visualized on exam, no obvious deformities    06/04 0700 - 06/05 0659  In: 2003 [I.V.:1753]  Out: 1000 [Urine:1000]    Admission on 06/04/2025   Component Date Value    GLUCOSE BY METER POCT 06/04/2025 111 (H)     WBC Count 06/05/2025 7.0     RBC Count 06/05/2025 4.34     Hemoglobin 06/05/2025 9.1 (L)     Hematocrit 06/05/2025 32.3 (L)     MCV 06/05/2025 74 (L)     MCH 06/05/2025 21.0 (L)     MCHC 06/05/2025 28.2 (L)     RDW 06/05/2025 20.1 (H)     Platelet Count 06/05/2025 147 (L)     GLUCOSE BY METER POCT 06/05/2025 113 (H)     Hold Specimen 06/05/2025 CADEN         VIVIAN Hagan [facility] Surgery  [address] [place] [address] [id number] [place] [address] [phone]

## 2025-06-05 NOTE — PROGRESS NOTES
General Surgery Progress Note    POST OP DAY  # 1 s/p Lap Hiatal Hernia Repair with Toupet    Subjective:   Pt is feeling pretty well.  She had chest and L shoulder pain yesterday that is much better today. She is drinking without any dysphagia.    Vitals:    06/05/25 0607 06/05/25 0830 06/05/25 0832 06/05/25 0833   BP:  108/74     BP Location:  Right arm     Patient Position:  Semi-Prasad's     Pulse:       Resp:  18     Temp:  98.5  F (36.9  C)     TempSrc:  Oral     SpO2:  96% (!) 90% 93%   Weight: 63.4 kg (139 lb 12.8 oz)      Height:           Physical Exam:  Lungs:  CTA  CV:       RRR  Ab:       Soft, + BS,     Admission on 06/04/2025   Component Date Value    GLUCOSE BY METER POCT 06/04/2025 111 (H)     WBC Count 06/05/2025 7.0     RBC Count 06/05/2025 4.34     Hemoglobin 06/05/2025 9.1 (L)     Hematocrit 06/05/2025 32.3 (L)     MCV 06/05/2025 74 (L)     MCH 06/05/2025 21.0 (L)     MCHC 06/05/2025 28.2 (L)     RDW 06/05/2025 20.1 (H)     Platelet Count 06/05/2025 147 (L)     GLUCOSE BY METER POCT 06/05/2025 113 (H)     Hold Specimen 06/05/2025 [facility]        Assessment:  Pt is progressing well.    Plan: Discharge today or tomorrow.  She can go home today if she continues to improve and she is able to get off her supplemental O2.      Santiago Hinojosa MD  [facility] Surgeons  [phone]

## 2025-06-05 NOTE — DISCHARGE SUMMARY
Discharge Provider: Debi Beavers PA-C  Admission Date: 6/4/2025  Discharge Date: June 5, 2025     Primary Diagnosis at Discharge:   Patient Active Problem List   Diagnosis    Essential hypertension    Malignant neoplasm of anal canal (H)    Scleritis, bilateral    Chronic fatigue    Dislocation of internal right hip prosthesis, initial encounter    Failure of right total hip arthroplasty, subsequent encounter    Closed dislocation of right hip, initial encounter (H)    Bilateral pulmonary embolism (H)    Acute deep vein thrombosis (DVT) of proximal vein of right lower extremity (H)    Anemia, unspecified type    Hiatal hernia with GERD      Disposition: Home or Self Care  Condition at Discharge: Stable     Surgery: Robot-assisted laparoscopic paraesophageal hernia repair with biologic mesh and Nissen fundoplication    Hospital Summary:   Oscar Zhao was admitted for paraesophageal hernia repair and underwent an uncomplicated Robot-assisted laparoscopic paraesophageal hernia repair with biologic mesh and Nissen fundoplication.  Following a brief recovery in the PACU, She was transferred to the Med/Surg floor for the remainder of Her stay.  Postoperatively she required oxygen up to 1 L POD 1, with ambulation and continued incentive spirometry was able to wean off of oxygen late morning and was saturating above 92% on room air prior to discharge.  Her diet was advanced to clear liquids without issue. On POD # 1 She was discharged home tolerating a liquid diet as planned, ambulating without assistance, and pain controlled with oral analgesics.        Discussion with patient over pain management and she would not like any pain prescriptions going home    Pathology/ Cultures:  No pathology/cultures were obtained during this admission    Discharge Medications:      Medication List        PAUSE taking these medications      apixaban ANTICOAGULANT 5 MG tablet  Wait to take this until: June 6, 2025 Morning  Commonly  known as: ELIQUIS  5 mg, Oral, 2 TIMES DAILY  What changed: additional instructions            Started      acetaminophen 325 MG tablet  Commonly known as: TYLENOL  650-975 mg, Oral, EVERY 6 HOURS PRN     polyethylene glycol 17 GM/Dose powder  Commonly known as: MIRALAX  1 Capful, Oral, DAILY PRN     senna-docusate 8.6-50 MG tablet  Commonly known as: SENOKOT-S/PERICOLACE  1 tablet, Oral, 2 TIMES DAILY PRN              Exam:  General appearance: patient seen resting in chair in no acute distress  Resp: no respiratory distress or increased work of breathing, saturating 93% on room air  Abdomen: soft, non-tender, non-distended upon palpation. Incisions clean/dry/intact and covered with surgical bandage  Extremities: warm and well-perfused.    Discharge Instructions:  Follow up appointment with Primary Care Physician: Jak Jones  Follow up appointment with Dr. Hinojosa in: 2-3 weeks as scheduled      If you would like a scheduled follow up appointment in clinic, please call us at [phone] to schedule an appointment at your convenience.     Post operative instructions:   Diet: Clear liquid / Full liquid diet to continue upon discharge. Please follow the handout that was given to you prior to discharging from the hospital    Activity: You should continue to be active at home including walking frequently.  If possible limit the amount of time spent in bed.    Restrictions: You should avoid lifting anything more than 20 pounds or strenuous physical activity for a minimum of 3 weeks.  This is to help reduce the risk of hernia formation at your port sites.  Walking does not count as strenuous physical activity.  You are safe to walk up and down stairs.  Following 2 weeks you may resume all normal physical activity.    Wound / drain care: You may remove your outer dressings after a period of 48 hours.     Bathing: You may shower after 48 hours from surgery. It is ok to get your incisions wet, but avoid rubbing  them. Avoid soaking in bath tubs, or swimming in lakes, pools, or streams for 4 weeks following surgery.        VIVIAN Hagan [facility] Surgery  [address] [place] [address] [phone]

## 2025-06-05 NOTE — PROGRESS NOTES
Patient discharged home via ride from family. AVS reviewed with patient bedside, patient agreeable. All questions encouraged and answered. Tolerating PO tylenol for pain control today. Went on multiple walks on RA and tolerating well.

## 2025-06-05 NOTE — DISCHARGE INSTRUCTIONS
From your General Surgery Team:  You were seen by [facility] general surgery.  If you do not have an appointment and would like to schedule a follow up appointment with general surgery in clinic, please call us at [phone] to schedule an appointment at your convenience.      Please continue to follow the diet handout as provided closely until your follow-up appointment and further instruction from your surgeon.    Pain management can be done with Tylenol 650-975 milligrams every 6-8 hours as needed.  If you are taking ibuprofen, we recommend 400 to 600 mg every 6 hours as needed.  If you have a history of gastric ulcers or GI bleeding, limit your use of ibuprofen and take the lowest dose amount as possible.  We recommend also taking with food and stopping immediately if you have any increased reflux or have bloody or black stools.

## 2025-06-05 NOTE — PLAN OF CARE
"  Problem: Delirium  Goal: Improved Sleep  Outcome: Progressing     Problem: Adult Inpatient Plan of Care  Goal: Optimal Comfort and Wellbeing  Outcome: Progressing     Goal Outcome Evaluation:  /76 (BP Location: Right arm)   Pulse 61   Temp 96.8  F (36  C) (Oral)   Resp 16   Ht 1.6 m (5' 3\")   Wt 63.4 kg (139 lb 12.8 oz)   LMP  (LMP Unknown)   SpO2 (!) 91%   BMI 24.76 kg/m    Pt is a/ox4, intermittently confused/forgetful. Pt is calm and cooperative. Pt is able to make needs known. Pt rated pain 5/10, managed with MAR, p[t preferred toradol and acetaminophen, refused any narcotic category meds. Pt is on 1L o2, stating 92-93%.                           "

## 2025-06-11 ENCOUNTER — TELEPHONE (OUTPATIENT)
Dept: ONCOLOGY | Facility: HOSPITAL | Age: 80
End: 2025-06-11
Payer: MEDICARE

## 2025-06-11 PROCEDURE — 88302 TISSUE EXAM BY PATHOLOGIST: CPT | Mod: 26 | Performed by: PATHOLOGY

## 2025-06-11 RX ORDER — APIXABAN 5 MG/1
5 TABLET, FILM COATED ORAL 2 TIMES DAILY
Qty: 60 TABLET | Refills: 2 | Status: SHIPPED | OUTPATIENT
Start: 2025-06-11

## 2025-06-11 NOTE — TELEPHONE ENCOUNTER
Patient calls requesting a refill of Eliquis. She reports that she has 4 days left of medication.    Message sent to Dr. Beck. Refill request was sent to him on 5/14. Dr. Beck approved medication.    Luz Marina Holder RN

## 2025-06-13 NOTE — UTILIZATION REVIEW
Medicare Post-Discharge Review      Admission Status; Secondary Review Determination       As part of the [facility] Utilization review plan, a self-audit is done on Medicare inpatient admission with less than 2 midnights stay. The 2014 IPPS Final Rule allows outpatient billing in the event that a hospital determines that an inpatient admission was not medically necessary under utilization review process.      (x) Outpatient status would be Appropriate- Short Stay- Post discharge review.     RATIONALE FOR DETERMINATION   Oscar Zhao is a 80 year old female who is s/p robot assisted laparoscopic hiatal hernia repair with mesh and nissen fundoplication on 6/4/25. Postop day one she was doing well and discharged.  Procedure is not on the CMS Inpatient procedure list.  She does not meet inpatient based on a one night stay.    Patient was admitted and discharge after one night stay. Record was sent by  for  Medicare short stay review by Medical Director. Based on the  severity of illness, intensity of service provided, expected LOS and risk for adverse outcome make the care appropriate for further outpatient/observation; however, doesn't meet criteria for hospital inpatient admission.           The information on this document is developed by the utilization review team in order for the business office to ensure compliance.  This only denotes the appropriateness of proper admission status and does not reflect the quality of care rendered.         The definitions of Inpatient Status and Observation Status used in making the determination above are those provided in the CMS Coverage Manual, Chapter 1 and Chapter 6, section 70.4.      Sincerely,     Justina Astudillo MD  Utilization Review  Medical Director  [facility].

## 2025-06-17 NOTE — PROGRESS NOTES
HPI: Pt is here for follow up of a robotic hiatal hernia repair with toupee fundoplication.  She is doing well.  Pain is well controlled.  No difficulties with the surgical wound/wounds.  she is eating well and denies fever and chills.         LMP  (LMP Unknown)     EXAM:  GENERAL:Appears well  ABDOMEN:  Soft, +BS  SURGICAL WOUNDS:  Incisions healing well, no enduration or drainage.          Assessment/Plan:  Doing well after surgery and should follow up as needed.    Santiago Hinojosa MD ,MD  [facility]: Department of Surgery

## 2025-06-18 ENCOUNTER — OFFICE VISIT (OUTPATIENT)
Dept: SURGERY | Facility: CLINIC | Age: 80
End: 2025-06-18
Payer: MEDICARE

## 2025-06-18 VITALS — BODY MASS INDEX: 23.21 KG/M2 | WEIGHT: 131 LBS | SYSTOLIC BLOOD PRESSURE: 130 MMHG | DIASTOLIC BLOOD PRESSURE: 78 MMHG

## 2025-06-18 DIAGNOSIS — K44.9 PARAESOPHAGEAL HIATAL HERNIA: Primary | ICD-10-CM

## 2025-07-19 ENCOUNTER — APPOINTMENT (OUTPATIENT)
Dept: CT IMAGING | Facility: CLINIC | Age: 80
End: 2025-07-19
Attending: EMERGENCY MEDICINE
Payer: MEDICARE

## 2025-07-19 ENCOUNTER — HOSPITAL ENCOUNTER (OUTPATIENT)
Facility: CLINIC | Age: 80
Setting detail: OBSERVATION
Discharge: HOME HEALTH | End: 2025-07-21
Attending: EMERGENCY MEDICINE | Admitting: STUDENT IN AN ORGANIZED HEALTH CARE EDUCATION/TRAINING PROGRAM
Payer: MEDICARE

## 2025-07-19 ENCOUNTER — APPOINTMENT (OUTPATIENT)
Dept: GENERAL RADIOLOGY | Facility: CLINIC | Age: 80
End: 2025-07-19
Attending: EMERGENCY MEDICINE
Payer: MEDICARE

## 2025-07-19 ENCOUNTER — APPOINTMENT (OUTPATIENT)
Dept: CT IMAGING | Facility: CLINIC | Age: 80
End: 2025-07-19
Payer: MEDICARE

## 2025-07-19 DIAGNOSIS — S73.004A CLOSED DISLOCATION OF RIGHT HIP, INITIAL ENCOUNTER (H): Primary | ICD-10-CM

## 2025-07-19 DIAGNOSIS — Z96.649 PERIPROSTHETIC FRACTURE OF HIP, INITIAL ENCOUNTER: ICD-10-CM

## 2025-07-19 DIAGNOSIS — M97.8XXA PERIPROSTHETIC FRACTURE OF HIP, INITIAL ENCOUNTER: ICD-10-CM

## 2025-07-19 LAB
ANION GAP SERPL CALCULATED.3IONS-SCNC: 8 MMOL/L (ref 7–15)
BASOPHILS # BLD AUTO: 0.1 10E3/UL (ref 0–0.2)
BASOPHILS NFR BLD AUTO: 1 %
BUN SERPL-MCNC: 10.4 MG/DL (ref 8–23)
CALCIUM SERPL-MCNC: 9.3 MG/DL (ref 8.8–10.4)
CHLORIDE SERPL-SCNC: 100 MMOL/L (ref 98–107)
CREAT SERPL-MCNC: 0.52 MG/DL (ref 0.51–0.95)
EGFRCR SERPLBLD CKD-EPI 2021: >90 ML/MIN/1.73M2
EOSINOPHIL # BLD AUTO: 0.3 10E3/UL (ref 0–0.7)
EOSINOPHIL NFR BLD AUTO: 4 %
ERYTHROCYTE [DISTWIDTH] IN BLOOD BY AUTOMATED COUNT: 20 % (ref 10–15)
GLUCOSE SERPL-MCNC: 124 MG/DL (ref 70–99)
HCO3 SERPL-SCNC: 21 MMOL/L (ref 22–29)
HCT VFR BLD AUTO: 39.2 % (ref 35–47)
HGB BLD-MCNC: 10.9 G/DL (ref 11.7–15.7)
IMM GRANULOCYTES # BLD: 0.1 10E3/UL
IMM GRANULOCYTES NFR BLD: 1 %
INR PPP: 1.57 (ref 0.85–1.15)
LYMPHOCYTES # BLD AUTO: 0.8 10E3/UL (ref 0.8–5.3)
LYMPHOCYTES NFR BLD AUTO: 12 %
MCH RBC QN AUTO: 20.9 PG (ref 26.5–33)
MCHC RBC AUTO-ENTMCNC: 27.8 G/DL (ref 31.5–36.5)
MCV RBC AUTO: 75 FL (ref 78–100)
MONOCYTES # BLD AUTO: 0.5 10E3/UL (ref 0–1.3)
MONOCYTES NFR BLD AUTO: 7 %
NEUTROPHILS # BLD AUTO: 5.6 10E3/UL (ref 1.6–8.3)
NEUTROPHILS NFR BLD AUTO: 77 %
NRBC # BLD AUTO: 0 10E3/UL
NRBC BLD AUTO-RTO: 0 /100
PLATELET # BLD AUTO: 253 10E3/UL (ref 150–450)
POTASSIUM SERPL-SCNC: 3.9 MMOL/L (ref 3.4–5.3)
PROTHROMBIN TIME: 18.5 SECONDS (ref 11.8–14.8)
RBC # BLD AUTO: 5.22 10E6/UL (ref 3.8–5.2)
SODIUM SERPL-SCNC: 129 MMOL/L (ref 135–145)
WBC # BLD AUTO: 7.3 10E3/UL (ref 4–11)

## 2025-07-19 PROCEDURE — 99207 PR APP CREDIT; MD BILLING SHARED VISIT: CPT | Mod: FS | Performed by: STUDENT IN AN ORGANIZED HEALTH CARE EDUCATION/TRAINING PROGRAM

## 2025-07-19 PROCEDURE — G0378 HOSPITAL OBSERVATION PER HR: HCPCS

## 2025-07-19 PROCEDURE — 73700 CT LOWER EXTREMITY W/O DYE: CPT | Mod: RT

## 2025-07-19 PROCEDURE — 36415 COLL VENOUS BLD VENIPUNCTURE: CPT | Performed by: EMERGENCY MEDICINE

## 2025-07-19 PROCEDURE — 72125 CT NECK SPINE W/O DYE: CPT | Mod: 26 | Performed by: RADIOLOGY

## 2025-07-19 PROCEDURE — 72170 X-RAY EXAM OF PELVIS: CPT

## 2025-07-19 PROCEDURE — 73552 X-RAY EXAM OF FEMUR 2/>: CPT | Mod: 26 | Performed by: RADIOLOGY

## 2025-07-19 PROCEDURE — 99285 EMERGENCY DEPT VISIT HI MDM: CPT | Mod: 25 | Performed by: EMERGENCY MEDICINE

## 2025-07-19 PROCEDURE — 73552 X-RAY EXAM OF FEMUR 2/>: CPT | Mod: RT

## 2025-07-19 PROCEDURE — 250N000013 HC RX MED GY IP 250 OP 250 PS 637: Performed by: PHYSICIAN ASSISTANT

## 2025-07-19 PROCEDURE — 72170 X-RAY EXAM OF PELVIS: CPT | Mod: 26 | Performed by: RADIOLOGY

## 2025-07-19 PROCEDURE — 85610 PROTHROMBIN TIME: CPT | Performed by: EMERGENCY MEDICINE

## 2025-07-19 PROCEDURE — 73700 CT LOWER EXTREMITY W/O DYE: CPT | Mod: 26 | Performed by: RADIOLOGY

## 2025-07-19 PROCEDURE — 72125 CT NECK SPINE W/O DYE: CPT

## 2025-07-19 PROCEDURE — 99223 1ST HOSP IP/OBS HIGH 75: CPT | Mod: FS | Performed by: PHYSICIAN ASSISTANT

## 2025-07-19 PROCEDURE — 70450 CT HEAD/BRAIN W/O DYE: CPT

## 2025-07-19 PROCEDURE — 85004 AUTOMATED DIFF WBC COUNT: CPT | Performed by: EMERGENCY MEDICINE

## 2025-07-19 PROCEDURE — 80048 BASIC METABOLIC PNL TOTAL CA: CPT | Performed by: EMERGENCY MEDICINE

## 2025-07-19 PROCEDURE — 70450 CT HEAD/BRAIN W/O DYE: CPT | Mod: 26 | Performed by: RADIOLOGY

## 2025-07-19 RX ORDER — HYDROXYZINE HYDROCHLORIDE 10 MG/1
10 TABLET, FILM COATED ORAL 3 TIMES DAILY PRN
Status: DISCONTINUED | OUTPATIENT
Start: 2025-07-19 | End: 2025-07-21 | Stop reason: HOSPADM

## 2025-07-19 RX ORDER — CALCIUM CARBONATE 500(1250)
500 TABLET ORAL 2 TIMES DAILY WITH MEALS
Status: DISCONTINUED | OUTPATIENT
Start: 2025-07-19 | End: 2025-07-21 | Stop reason: HOSPADM

## 2025-07-19 RX ORDER — ACETAMINOPHEN 325 MG/1
975 TABLET ORAL 3 TIMES DAILY
Status: DISCONTINUED | OUTPATIENT
Start: 2025-07-19 | End: 2025-07-20

## 2025-07-19 RX ORDER — VITAMIN B COMPLEX
25 TABLET ORAL DAILY
Status: DISCONTINUED | OUTPATIENT
Start: 2025-07-19 | End: 2025-07-21 | Stop reason: HOSPADM

## 2025-07-19 RX ORDER — PROCHLORPERAZINE MALEATE 5 MG/1
5 TABLET ORAL EVERY 6 HOURS PRN
Status: DISCONTINUED | OUTPATIENT
Start: 2025-07-19 | End: 2025-07-21 | Stop reason: HOSPADM

## 2025-07-19 RX ORDER — LOTEPREDNOL ETABONATE 5 MG/ML
1 SUSPENSION/ DROPS OPHTHALMIC EVERY OTHER DAY
Status: DISCONTINUED | OUTPATIENT
Start: 2025-07-19 | End: 2025-07-21 | Stop reason: HOSPADM

## 2025-07-19 RX ORDER — TIMOLOL MALEATE 5 MG/ML
1 SOLUTION/ DROPS OPHTHALMIC DAILY
Status: DISCONTINUED | OUTPATIENT
Start: 2025-07-19 | End: 2025-07-21 | Stop reason: HOSPADM

## 2025-07-19 RX ORDER — OXYCODONE HYDROCHLORIDE 5 MG/1
5 TABLET ORAL EVERY 4 HOURS PRN
Refills: 0 | Status: DISCONTINUED | OUTPATIENT
Start: 2025-07-19 | End: 2025-07-21 | Stop reason: HOSPADM

## 2025-07-19 RX ORDER — AMOXICILLIN 250 MG
1 CAPSULE ORAL 2 TIMES DAILY PRN
Status: DISCONTINUED | OUTPATIENT
Start: 2025-07-19 | End: 2025-07-21 | Stop reason: HOSPADM

## 2025-07-19 RX ORDER — ACETAMINOPHEN 325 MG/1
650 TABLET ORAL EVERY 4 HOURS PRN
Status: DISCONTINUED | OUTPATIENT
Start: 2025-07-19 | End: 2025-07-19

## 2025-07-19 RX ORDER — HYDROMORPHONE HCL IN WATER/PF 6 MG/30 ML
0.4 PATIENT CONTROLLED ANALGESIA SYRINGE INTRAVENOUS
Status: DISCONTINUED | OUTPATIENT
Start: 2025-07-19 | End: 2025-07-21 | Stop reason: HOSPADM

## 2025-07-19 RX ORDER — ACETAMINOPHEN 325 MG/1
325 TABLET ORAL EVERY 6 HOURS PRN
Status: DISCONTINUED | OUTPATIENT
Start: 2025-07-19 | End: 2025-07-20

## 2025-07-19 RX ORDER — AMOXICILLIN 250 MG
2 CAPSULE ORAL 2 TIMES DAILY PRN
Status: DISCONTINUED | OUTPATIENT
Start: 2025-07-19 | End: 2025-07-21 | Stop reason: HOSPADM

## 2025-07-19 RX ORDER — POLYETHYLENE GLYCOL 3350 17 G/17G
17 POWDER, FOR SOLUTION ORAL DAILY
Status: DISCONTINUED | OUTPATIENT
Start: 2025-07-20 | End: 2025-07-21 | Stop reason: HOSPADM

## 2025-07-19 RX ORDER — AMLODIPINE BESYLATE 5 MG/1
5 TABLET ORAL DAILY
Status: DISCONTINUED | OUTPATIENT
Start: 2025-07-19 | End: 2025-07-21 | Stop reason: HOSPADM

## 2025-07-19 RX ORDER — BISACODYL 10 MG
10 SUPPOSITORY, RECTAL RECTAL DAILY PRN
Status: DISCONTINUED | OUTPATIENT
Start: 2025-07-19 | End: 2025-07-21 | Stop reason: HOSPADM

## 2025-07-19 RX ORDER — METHOCARBAMOL 500 MG/1
500 TABLET, FILM COATED ORAL 4 TIMES DAILY PRN
Status: DISCONTINUED | OUTPATIENT
Start: 2025-07-19 | End: 2025-07-21 | Stop reason: HOSPADM

## 2025-07-19 RX ORDER — METOPROLOL SUCCINATE 50 MG/1
50 TABLET, EXTENDED RELEASE ORAL DAILY
Status: DISCONTINUED | OUTPATIENT
Start: 2025-07-20 | End: 2025-07-21 | Stop reason: HOSPADM

## 2025-07-19 RX ORDER — ONDANSETRON 2 MG/ML
4 INJECTION INTRAMUSCULAR; INTRAVENOUS EVERY 6 HOURS PRN
Status: DISCONTINUED | OUTPATIENT
Start: 2025-07-19 | End: 2025-07-21 | Stop reason: HOSPADM

## 2025-07-19 RX ORDER — HYDROMORPHONE HCL IN WATER/PF 6 MG/30 ML
0.2 PATIENT CONTROLLED ANALGESIA SYRINGE INTRAVENOUS
Status: DISCONTINUED | OUTPATIENT
Start: 2025-07-19 | End: 2025-07-21 | Stop reason: HOSPADM

## 2025-07-19 RX ORDER — ONDANSETRON 4 MG/1
4 TABLET, ORALLY DISINTEGRATING ORAL EVERY 6 HOURS PRN
Status: DISCONTINUED | OUTPATIENT
Start: 2025-07-19 | End: 2025-07-21 | Stop reason: HOSPADM

## 2025-07-19 RX ADMIN — ACETAMINOPHEN 975 MG: 325 TABLET ORAL at 20:20

## 2025-07-19 RX ADMIN — APIXABAN 5 MG: 5 TABLET, FILM COATED ORAL at 20:20

## 2025-07-19 RX ADMIN — ACETAMINOPHEN 975 MG: 325 TABLET ORAL at 16:31

## 2025-07-19 ASSESSMENT — ACTIVITIES OF DAILY LIVING (ADL)
ADLS_ACUITY_SCORE: 59
ADLS_ACUITY_SCORE: 66
ADLS_ACUITY_SCORE: 59
ADLS_ACUITY_SCORE: 73
ADLS_ACUITY_SCORE: 59

## 2025-07-19 ASSESSMENT — COLUMBIA-SUICIDE SEVERITY RATING SCALE - C-SSRS
2. HAVE YOU ACTUALLY HAD ANY THOUGHTS OF KILLING YOURSELF IN THE PAST MONTH?: NO
6. HAVE YOU EVER DONE ANYTHING, STARTED TO DO ANYTHING, OR PREPARED TO DO ANYTHING TO END YOUR LIFE?: NO
1. IN THE PAST MONTH, HAVE YOU WISHED YOU WERE DEAD OR WISHED YOU COULD GO TO SLEEP AND NOT WAKE UP?: NO

## 2025-07-19 NOTE — CONSULTS
Orthopaedic Surgery Consultation    Oscar Zhao MRN# [id number]   Age: 80 year old YOB: 1945     Date of Admission:  7/19/2025    Reason for consult: R hip fracture       Requesting physician: Guzman Gant MD         Assessment and Plan:   Assessment:  79 yo female with a R Jericho AG periprosthetic femur fracture. She has a past surgical history notable for R total hip arthroplasty in 2015, followed by many dislocations and two follow up procedures for liner replacements. Based on the symptoms of pain when moving the proximal RLE, tenderness to palpation over the R greater trochanter, and initial XR imaging done in the ED, it is clear that this patient has a periprosthetic fracture of her right femur.  CT scan demonstrates a Jericho AG type fracture pattern which is likely stable.  In comparing x-rays to previous x-rays from 2023, the stem also appears to be well-fixed.  There is very mild subsidence of the greater trochanter fragment thus, due to these findings, she is indicated for a trial of nonoperative management.  She may be weightbearing as tolerated with strict abduction restriction.  No abduction whatsoever.  If she is able to mobilize, she is okay to discharge from orthopedic perspective.  She should plan to follow-up closely with her index surgeon within the next week for further evaluation and management.  If she is unable to mobilize and requires admission, orthopedics will plan to follow in-house.    Plan:  -Trial of weight bearing in the RLE   - If able to mobilize, she may follow-up closely with her index surgeon   - If unable to mobilize, admit to medicine, ortho to follow    - May consider surgical intervention if failure of non-operative management  -Activity: As tolerated  -Weight bearing status: As tolerated. Can use walker/crutches to help stability  -Antibiotics: None  -Diet: Okay for diet per Ortho  -DVT prophylaxis: Okay to continue Eliquis  -Pain management: Per  ED  -Imaging: Complete   -Labs: complete  -Follow-up: Pending non-operative discharge, 1-2 weeks with index surgeon at [facility]  -Disposition: Okay to discharge per Ortho pending road test          History of Present Illness:   Patient was seen and examined by me. History, PMH, Meds, SH, complete ROS (10 organ systems) and PE reviewed with patient and prior medical records.      79 yo female who presents with a right hip pain after a GLF today. Unable to WB after the fall due to pain. PMHx is significant for HTN, and she has previously had blood clots which is managed with Eliquis. She has an extensive surgical hx on her right hip, with an initial hip arthroplasty occurring in 2015. She then had a posterior dislocation in 2019 treated conservatively. She second and third dislocations of this hip in 1/2023 and 8/2023, respectively. She underwent a head and liner exchange in 9/2023, but dislocated again in 11/2023. After this, a constrained liner was put in place in 11/2023 and her hip has been stable since. She denies any previous infections or other surgeries on the right hip aside from those above.    Today, patient presented because she tripped over the sidewalk while walking to [employer] and fell directly on her R hip. After the incident, she states she didn't try to get up because she was in pain and didn't feel safe trying to walk on the leg. Initially, she endorses that she was able to bend her leg while she was sitting after her fall, but since arrival to the ED she has been unable to do that due to increased pain in the RLE. She has continued to have pain in the RLE upon any movement in the proximal RLE, but minimal pain at rest. She denies any numbness or tingling in the RLE.     At baseline, patient is an independent ambulator with no use of assistive devices. Lives alone. No antecedent hip pain.  She has a daughter who lives nearby.      11/13/23  R Revision YAMILET Head Liner Exchange  1.  Promimic  [place] constrained polyethylene liner, +4 neutral, 48/28 mm  2.  Biolox delta TS revision ceramic femoral head, 28/+12 mm    11/4/23  Closed Reduction R Hip    9/21/23  Revision YAMILET, Head Lider Exchange  1.  DePuy [place] ALTRX acetabular liner, +4 neutral, 48/32 mm  2.  DePuy CoCr femoral head, 32/+13 mm    8/18/15  R YAMILET  FIBERWIRE #2 W/NEEDLE            AR-[id number] - [id number] 93-SOFT TISSUE ATTACH DEV [id number]  ARTHREX [id number] Right 1 Implanted   ELIMINATOR HOLE APEX - 1246- - [id number] 29-OTHER HIP COMPONENT 98146  DEPUY [id number] Right 1 Implanted   LINER ACETABULAR ALTRX NEUTRAL [id number] - [id number] 18-ACETABULAR LINER 73609  DEPUY [id number] Right 1 Implanted   STEM FEMORAL COXA VARA COLLARED SZ 12 - [id number] 11-FEMUR UNCEMENTED HIP 47120  DEPUY [id number] Right 1 Implanted   LINER ACETABULAR ALTRX +4 10DEG [id number] - [id number] 18-ACETABULAR LINER 63891  DEPUY [id number] Right 1 Implanted   HEAD CERAMIC DELTA 12/14 TAPER 32 - [id number] 15-FEMORAL HEAD CERAMIC 84048   DEPUY [id number] Right 1 Implanted                Past Medical History:     Past Medical History:   Diagnosis Date    Anemia, unspecified type 02/08/2024    Arthritis     Basal cell carcinoma of anterior chest     Breast cyst     Hiatal hernia     Hiatal hernia with GERD 6/4/2025    History of anesthesia complications     HLA B27 (HLA B27 positive)     Hypertension     Osteoporosis 07/19/2011    Peripheral neuropathy 09/12/2018    PONV (postoperative nausea and vomiting)     Scleritis, bilateral     Left 2010.  Treated with corticosteroids,, methotrexate and Humira.  2015.  Rituximab.    Squamous cell carcinoma of skin of chest     Steroid induced glaucoma, both eyes     Thrombosis              Past Surgical History:     Past Surgical History:   Procedure Laterality Date    ARTHROPLASTY REVISION HIP Right 9/21/2023    Procedure: RIGHT HIP REVISION TOTAL HIP ARTHROPLASTY;  Surgeon: Bill Bernal DO;  Location: [facility] OR    ARTHROPLASTY REVISION HIP Right  2023    Procedure: RIGHT REVISION TOTAL HIP ARTHROPLASTY HEAD LINER EXCHANGE;  Surgeon: Bill Bernal DO;  Location: [facility] OR    BREAST CYST EXCISION Right 1981    benign     SECTION  1989    CLOSED REDUCTION HIP Right 2019    Procedure: CLOSED REDUCTION, HIP;  Surgeon: Jak Ruiz DO;  Location: [facility] OR;  Service: Orthopedics    CLOSED REDUCTION HIP Right 2023    Procedure: CLOSED REDUCTION, HIP RIGHT;  Surgeon: Aditya Pedroza MD;  Location: [facility] OR    CLOSED REDUCTION HIP Right 2023    Procedure: CLOSED REDUCTION, HIP RIGHT;  Surgeon: Jak Allen MD;  Location: [facility] OR    COLONOSCOPY  2011    COLONOSCOPY  2005    COLONOSCOPY N/A 2024    Procedure: COLONOSCOPY;  Surgeon: Chi Figueroa MD;  Location: [facility]    COLONOSCOPY W/ BIOPSIES AND POLYPECTOMY  2021    16 to 20 mm polyp:tubular adenoma in the ascending colon.  Ulcerated mass in the anal canal: Moderately differentiated squamous cell cancer.    ESOPHAGOSCOPY, GASTROSCOPY, DUODENOSCOPY (EGD), COMBINED  2017    Large hiatal hernia.  Reactive gastropathy with mucosal erosion.  H. pylori negative.    ESOPHAGOSCOPY, GASTROSCOPY, DUODENOSCOPY (EGD), COMBINED N/A 2024    Procedure: ESOPHAGOGASTRODUODENOSCOPY with biopsies;  Surgeon: Chi Figueroa MD;  Location: [facility] OR    HERNIORRHAPHY FEMORAL Left 2021    Procedure: LEFT FEMORAL HERNIA REPAIR;  Surgeon: Sidney Murray MD;  Location: [facility]    HYSTERECTOMY TOTAL ABDOMINAL, BILATERAL SALPINGO-OOPHORECTOMY, COMBINED      IR CHEST PORT PLACEMENT > 5 YRS OF AGE  3/15/2021    IR PORT PLACEMENT >5 YEARS  03/15/2021    Right IJ port-a-cath.    IR PORT REMOVAL RIGHT  2021    LAPAROSCOPIC APPENDECTOMY  2011    LAPAROSCOPIC NISSEN FUNDOPLICATION N/A 2025    Procedure: FUNDOPLICATION, NISSEN, ROBOT-ASSISTED, LAPAROSCOPIC; AND  HIATAL HERNIA REPAIR WITH MESH;  Surgeon: Santiago Hinojosa MD;  Location: [facility] OR    PHACOEMULSIFICATION CLEAR CORNEA WITH STANDARD INTRAOCULAR LENS IMPLANT Right 08/30/2017    PHACOEMULSIFICATION CLEAR CORNEA WITH STANDARD INTRAOCULAR LENS IMPLANT Left 09/13/2017    TOTAL HIP ARTHROPLASTY Right 08/18/2015    Procedure: HIP TOTAL ARTHROPLASTY, RIGHT;  Surgeon: Star Du MD;  Location: [facility] OR;  Service:     [facility] TOTAL KNEE ARTHROPLASTY Left 03/02/2017    Procedure: LEFT TOTAL KNEE ARTHROPLASTY;  Surgeon: Star Du MD;  Location: [facility] OR;  Service: Orthopedics             Social History:     Social History     Socioeconomic History    Marital status:     Number of children: 1   Tobacco Use    Smoking status: Never     Passive exposure: Never    Smokeless tobacco: Never   Vaping Use    Vaping status: Never Used   Substance and Sexual Activity    Alcohol use: Yes     Alcohol/week: 4.0 standard drinks of alcohol     Types: 4 Standard drinks or equivalent per week     Comment: 3-4/wk    Drug use: No    Sexual activity: Never     Social Drivers of Health     Financial Resource Strain: Low Risk  (6/4/2025)    Financial Resource Strain     Within the past 12 months, have you or your family members you live with been unable to get utilities (heat, electricity) when it was really needed?: No   Food Insecurity: Low Risk  (6/4/2025)    Food Insecurity     Within the past 12 months, did you worry that your food would run out before you got money to buy more?: No     Within the past 12 months, did the food you bought just not last and you didn t have money to get more?: No   Transportation Needs: Low Risk  (6/4/2025)    Transportation Needs     Within the past 12 months, has lack of transportation kept you from medical appointments, getting your medicines, non-medical meetings or appointments, work, or from getting things that you need?: No    [facility]    Social Connections   Interpersonal Safety: Low Risk  (6/4/2025)    Interpersonal Safety     Do you feel physically and emotionally safe where you currently live?: Yes     Within the past 12 months, have you been hit, slapped, kicked or otherwise physically hurt by someone?: No     Within the past 12 months, have you been humiliated or emotionally abused in other ways by your partner or ex-partner?: No   Housing Stability: Low Risk  (6/4/2025)    Housing Stability     Do you have housing? : Yes     Are you worried about losing your housing?: No     Smoking: nonsmoker  EtOH: Few glasses of wine weekly  Drug use: denies  Living situation: lives in a townhouse          Family History:     Family History   Problem Relation Age of Onset    Alzheimer Disease Mother     Heart Disease Father     Pancreatic Cancer Brother 72.00    No Known Problems Daughter     Anesthesia Reaction No family hx of               Medications:     No current facility-administered medications for this encounter.     Current Outpatient Medications   Medication Sig Dispense Refill    amLODIPine (NORVASC) 5 MG tablet Take 1 tablet (5 mg) by mouth daily.      ELIQUIS ANTICOAGULANT 5 MG tablet TAKE 1 TABLET(5 MG) BY MOUTH TWICE DAILY 60 tablet 2    EPINEPHrine (ANY BX GENERIC EQUIV) 0.3 MG/0.3ML injection 2-pack Inject 0.3 mLs (0.3 mg) into the muscle as needed for anaphylaxis May repeat one time in 5-15 minutes if response to initial dose is inadequate. 2 each 0    hydrOXYzine HCl (ATARAX) 10 MG tablet Take 1 tablet (10 mg) by mouth 3 times daily as needed for itching. 90 tablet 11    LOTEMAX 0.5 % ophthalmic suspension Place 1 drop into both eyes every other day.  4    metoprolol succinate ER (TOPROL XL) 25 MG 24 hr tablet Take 2 tablets (50 mg) by mouth daily.      polyethylene glycol (MIRALAX) 17 GM/Dose powder Take 17 g (1 Capful) by mouth daily as needed for constipation.      senna-docusate  (SENOKOT-S/PERICOLACE) 8.6-50 MG tablet Take 1 tablet by mouth 2 times daily as needed for constipation.      timolol maleate (TIMOPTIC) 0.5 % ophthalmic solution Place 1 drop into both eyes daily               Allergies:      Allergies   Allergen Reactions    Kiwi Hives    Adalimumab Muscle Pain (Myalgia)    Aspirin      Had Bleeding ulcers after previous 2 surgeries due to aspirin    Brimonidine-Timolol [Brimonidine Tartrate-Timolol] Unknown     Redness itching in eyes    Levaquin [Levofloxacin] Unknown     Skin burn and couldn't get out bed for 5 days    Tetracyclines & Related Hives            Review of Systems:   A comprehensive 10 point review of systems (constitutional, ENT, cardiac, peripheral vascular, respiratory, GI, , Musculoskeletal, skin, Neurological) was performed and found to be negative except as described in this note.           Physical Exam:   COMPLETE EXAMINATION:   VITAL SIGNS: BP (!) 152/90   Pulse 69   Temp 98.8  F (37.1  C) (Oral)   Resp 16   LMP  (LMP Unknown)   SpO2 100%   GENERAL:  No acute distress, calm and cooperative.  RESP: Non labored breathing  SKIN: Grossly normal   LYMPHATIC:  Grossly normal. No edema.  NEURO:  Grossly normal   VASCULAR: Satisfactory perfusion of RLE  MUSCULOSKELETAL:       Right Lower Extremity  - Skin intact, small abrasion over the R patella from the fall. mild swelling noted over the greater trochanter  - 5/5 TA, gastroc/soleus, wiggles toes. Unable to SLR due to pain in the proximal femur  - SILT to superficial peroneal, deep peroneal, saphenous, sural, and tibial nerve distributions  - Foot warm and well perfused  - Tenderness to palpation over the greater trochanter  - Minimal pain with axial loading            Data:   All pertinent laboratory data reviewed    Recent Labs   Lab Test 07/19/25  1103 06/05/25  0720 05/12/25  1033 02/09/24  0716 02/09/24  0208 09/21/23  0727 09/12/23  0814 08/30/23  0609 06/20/23  0913 04/08/21  0252 04/07/21  0525    HGB 10.9* 9.1* 10.5*   < > 9.4*   < >  --    < > 13.5   < > 9.5*   SED  --   --   --   --  6  --  7  --  2  --   --    CRP  --   --   --   --   --   --   --   --   --   --  4.0*   WBC 7.3 7.0 7.2   < > 6.4   < >  --    < > 4.7   < > 1.1*    < > = values in this interval not displayed.     Recent Labs   Lab Test 23  1544 23  1039   FNEU 41 51   FCOL Red* Red*   FAPR Hazy* Cloudy*   FWBC 856 611       ImaginV hip/femur XRs reviewed and notable for right periprosthetic femur fracture with mild displacement. Remainder of femur visualized and without appreciable fractures. Patient is s/p right hip total arthroplasty    CT scan of RLE was notable for right Mayetta AG periprosthetic femur fracture. Aside from the periprosthetic fx, no other acute fx can be seen elsewhere. Pt is s/p R hip arthroplasty in 2015.    This consultation has been discussed with Dr. Garcia, Attending Physician.    Mariam Cowart, MS4    I was present with the medical student who participated in the service and in the documentation of the note. I have verified the history and personally performed the physical exam and medical decision making. I agree with the assessment and plan of care as documented in the note.    Noble Teresa  Orthopaedic Surgery Resident  [facility]  2025     Pager Number: [phone]

## 2025-07-19 NOTE — H&P
[facility]    History and Physical - Hospitalist Service, GOLD TEAM        Date of Admission:  7/19/2025    Assessment & Plan      Oscar Zhao is a 80 year old female with PMHx of HLA B27 positive, HTN, DVT/PE on Eliquis, Malignant neoplasm of anal canal, bilateral scleritis, Hiatal hernia and GERD s/p laparoscopic paraesophageal repair (6/4/2025), osteoporosis, Hx of closed dislocation of R hip s/p R hip total arthroplasty c/b multiple dislocations, who presented to the ED after a mechanical fall after tripping on side walk with head strike and R hip pain found to have periprosthetic R hip fracture, with orthopedic evaluation in the ED recommending non-operative management, now admitted on 7/19/2025, for pain control and physical therapy evaluation.     # Periprosthetic femur fracture  # Mechanical fall   # Hx of R YAMILET c/b multiple dislocation   # Osteoporosis   Presented with R hip pain after mechanical fall with head strike. CT head and C spine negative for acute process. Xray R hip with periprosthetic fracture. CT R hip with acute mild displacement and comminuted periprosthetic fracture of the proximal right femur with moderately sized intramuscular hematoma along the proximal thigh. Orthopedic surgery evaluated in the ED, felt fracture was stable with low likelihood of implant becoming loose, recommended trial of non-operative management. Attempted ambulation in the ED, but pain not controlled. Admitted to observation for pain management and PT evaluation. Rodriguez cathter placed in the ED initially with concern for immobility with fracture.   - Orthopedic surgery consulted, appreciate recs  - WBAT  - PT/OT consults  - Multimodal Pain management: Ice PRN. Tylenol 975 mg TID, Robaxin 500 mg QID PRN, oxycodone 5 mg Q4H PRN, IV dilaudid 0.2-0.4 mg Q2H PRN   - DVT ppx: Continue home Eliquis as noted below   - Bowel regimen   - Needs follow up with orthopedic surgery in  1-2 weeks. If failure in non-operative management, orthopedic may consider surgical repair in the future.   - Osteopenia on imaging and hx of osteoporosis. Bone health: Start Vit D and calcium supplement. Would recommend endocrine follow up for bone health   - Remove arango cathter. Can use purwick PRN    # Hyponatremia  Na 129. Baseline Na normal. Reports eating and drinking normally. Appears euvolemic.    - Trend BMP in AM  - If Na dropping will obtain urine studies and work up further     ------ Chronic stable conditions ------     # Hx of DVT/PE: Hx of recurrent VTE. Hx of R femoral DVT and bilateral PE on 2/2024 thought to be provoke after hip surgery s/p AC, and then L external iliac, common femoral, and proximal thigh femoral vein DVT in 2/10/2025 while off AC. Eliquis was resumed and continued long term with recurrent VTE.   - Continue Eliquis   - Trend CBC with hematoma as noted above.     # HTN: Continue home amlodipine 5 mg daily and metoprolol XL 50 mg daily with holding parameters. Patient states she might be taking a lower dose of one medication, but will clarify with daughter about dose in the AM.     # Hx of hiatal hernia, GERD, s/p laparoscopic paraesophageal repair (6/4/2025): Healed well. Continue regular diet.     # Scleritis/Iritis: HLA B27 positive. L 2010 s/p treatment with corticosteroids, methotrexate, humira, and rituximab but no longer taking these. Previously followed with ophthalmology and rheumatology. Hold home timolol and Lotemax eye drops as patient would like to bring in home medications.     # Remote hx of anal cancer: Dx 2/2021 S/p treatment with radiation and chemotherapy, following scans without recurrence. Last seen by oncology on 2/17/2025. Continue follow up as scheduled.            Diet:  Regular diet   DVT Prophylaxis: DOAC  Arango Catheter: PRESENT, indication:    Lines: None     Cardiac Monitoring: None  Code Status:  FULL CODE, confirmed with patient     Clinically  Significant Risk Factors Present on Admission         # Hyponatremia: Lowest Na = 129 mmol/L in last 2 days, will monitor as appropriate        # Drug Induced Coagulation Defect: home medication list includes an anticoagulant medication    # Hypertension: Noted on problem list      # Anemia: based on hgb <11           # Financial/Environmental Concerns:           Disposition Plan     Medically Ready for Discharge: Anticipated Tomorrow pending labs, PT evaluation and pain management          The patient's care was discussed with the Attending Physician, Dr. Star Davis, Bedside Nurse, and Patient.    Clara Schwartz PA-C  Hospitalist Service, [facility]  Securely message with Vocera (more info)  Text page via [facility] Paging/Directory   See signed in provider for up to date coverage information    ______________________________________________________________________    Chief Complaint   R hip pain after mechanical fall    History is obtained from the patient and chart review     History of Present Illness   Oscar Zhao is a 80 year old female with PMHx of HLA B27 positive, HTN, DVT/PE on Eliquis, Malignant neoplasm of anal canal, bilateral scleritis, Hiatal hernia and GERD s/p laparoscopic paraesophageal repair (6/4/2025), osteoporosis, Hx of closed dislocation of R hip s/p R hip total arthroplasty c/b multiple dislocations, who presented to the ED after a mechanical fall after tripping on side walk with head strike and R hip pain found to have periprosthetic R hip fracture.     Patient reports going to [employer] today and while trying to grab something from her car, her foot tripped on some uneven sidewalk, falling to her right side and hitting her head. Denies any LOC. Denies any prodromal symptoms, including CP, SOB, dizziness, lightheadedness. Reports having pain in her R hip and anterior thigh, only with movement. Also reports pain on palpation of her  right thigh. Currently denies any pain at rest. Denies any numbness or tingling in her leg. States prior to this she was in her normal state of health. She at baseline walks 2 miles a day. Recovered well from her esophageal surgery, now eating a normal diet. She lives alone with a cat and a dog that she co-cares with daughter, who lives nearby. Patient reports taking her Eliquis, last dose was this morning. States she is taking amlodipine and metoprolol for her blood pressure but not sure of the dose, told by her PCP to reduce dose recently of one of those. Patient will confirm dose in the AM.       Past Medical History    Past Medical History:   Diagnosis Date    Anemia, unspecified type 2024    Arthritis     Basal cell carcinoma of anterior chest     Breast cyst     Hiatal hernia     Hiatal hernia with GERD 2025    History of anesthesia complications     HLA B27 (HLA B27 positive)     Hypertension     Osteoporosis 2011    Peripheral neuropathy 2018    PONV (postoperative nausea and vomiting)     Scleritis, bilateral     Left .  Treated with corticosteroids,, methotrexate and Humira.  .  Rituximab.    Squamous cell carcinoma of skin of chest     Steroid induced glaucoma, both eyes     Thrombosis        Past Surgical History   Past Surgical History:   Procedure Laterality Date    ARTHROPLASTY REVISION HIP Right 2023    Procedure: RIGHT HIP REVISION TOTAL HIP ARTHROPLASTY;  Surgeon: Bill Bernal DO;  Location: [facility] OR    ARTHROPLASTY REVISION HIP Right 2023    Procedure: RIGHT REVISION TOTAL HIP ARTHROPLASTY HEAD LINER EXCHANGE;  Surgeon: Bill Bernal DO;  Location: [facility] OR    BREAST CYST EXCISION Right     benign     SECTION      CLOSED REDUCTION HIP Right 2019    Procedure: CLOSED REDUCTION, HIP;  Surgeon: Jak Ruiz DO;  Location: [facility];  Service: Orthopedics    CLOSED REDUCTION HIP Right 2023     Procedure: CLOSED REDUCTION, HIP RIGHT;  Surgeon: Aditya Pedroza MD;  Location: [facility] OR    CLOSED REDUCTION HIP Right 11/4/2023    Procedure: CLOSED REDUCTION, HIP RIGHT;  Surgeon: Jak Allen MD;  Location: [facility] OR    COLONOSCOPY  09/07/2011    COLONOSCOPY  09/27/2005    COLONOSCOPY N/A 2/12/2024    Procedure: COLONOSCOPY;  Surgeon: Chi Figueroa MD;  Location: [facility] OR    COLONOSCOPY W/ BIOPSIES AND POLYPECTOMY  02/18/2021    16 to 20 mm polyp:tubular adenoma in the ascending colon.  Ulcerated mass in the anal canal: Moderately differentiated squamous cell cancer.    ESOPHAGOSCOPY, GASTROSCOPY, DUODENOSCOPY (EGD), COMBINED  05/05/2017    Large hiatal hernia.  Reactive gastropathy with mucosal erosion.  H. pylori negative.    ESOPHAGOSCOPY, GASTROSCOPY, DUODENOSCOPY (EGD), COMBINED N/A 2/12/2024    Procedure: ESOPHAGOGASTRODUODENOSCOPY with biopsies;  Surgeon: Chi Figueroa MD;  Location: [facility] OR    HERNIORRHAPHY FEMORAL Left 9/7/2021    Procedure: LEFT FEMORAL HERNIA REPAIR;  Surgeon: Sidney Murray MD;  [facility] [place]    HYSTERECTOMY TOTAL ABDOMINAL, BILATERAL SALPINGO-OOPHORECTOMY, COMBINED  1996    IR CHEST PORT PLACEMENT > 5 YRS OF AGE  3/15/2021    IR PORT PLACEMENT >5 YEARS  03/15/2021    Right IJ port-a-cath.    IR PORT REMOVAL RIGHT  7/14/2021    LAPAROSCOPIC APPENDECTOMY  04/28/2011    LAPAROSCOPIC NISSEN FUNDOPLICATION N/A 6/4/2025    Procedure: FUNDOPLICATION, NISSEN, ROBOT-ASSISTED, LAPAROSCOPIC; AND HIATAL HERNIA REPAIR WITH MESH;  Surgeon: Santiago Hinojosa MD;  Location: [facility] OR    PHACOEMULSIFICATION CLEAR CORNEA WITH STANDARD INTRAOCULAR LENS IMPLANT Right 08/30/2017    PHACOEMULSIFICATION CLEAR CORNEA WITH STANDARD INTRAOCULAR LENS IMPLANT Left 09/13/2017    TOTAL HIP ARTHROPLASTY Right 08/18/2015    Procedure: HIP TOTAL ARTHROPLASTY, RIGHT;  Surgeon: Star Du MD;  Location: [facility]  OR;  Service:     [facility] TOTAL KNEE ARTHROPLASTY Left 03/02/2017    Procedure: LEFT TOTAL KNEE ARTHROPLASTY;  Surgeon: Star Du MD;  Location: [facility] OR;  Service: Orthopedics       Prior to Admission Medications   Prior to Admission Medications   Prescriptions Last Dose Informant Patient Reported? Taking?   ELIQUIS ANTICOAGULANT 5 MG tablet   No No   Sig: TAKE 1 TABLET(5 MG) BY MOUTH TWICE DAILY   EPINEPHrine (ANY BX GENERIC EQUIV) 0.3 MG/0.3ML injection 2-pack   No No   Sig: Inject 0.3 mLs (0.3 mg) into the muscle as needed for anaphylaxis May repeat one time in 5-15 minutes if response to initial dose is inadequate.   LOTEMAX 0.5 % ophthalmic suspension   Yes No   Sig: Place 1 drop into both eyes every other day.   amLODIPine (NORVASC) 5 MG tablet   Yes No   Sig: Take 1 tablet (5 mg) by mouth daily.   hydrOXYzine HCl (ATARAX) 10 MG tablet   No No   Sig: Take 1 tablet (10 mg) by mouth 3 times daily as needed for itching.   metoprolol succinate ER (TOPROL XL) 25 MG 24 hr tablet   Yes No   Sig: Take 2 tablets (50 mg) by mouth daily.   polyethylene glycol (MIRALAX) 17 GM/Dose powder   No No   Sig: Take 17 g (1 Capful) by mouth daily as needed for constipation.   senna-docusate (SENOKOT-S/PERICOLACE) 8.6-50 MG tablet   No No   Sig: Take 1 tablet by mouth 2 times daily as needed for constipation.   timolol maleate (TIMOPTIC) 0.5 % ophthalmic solution   Yes No   Sig: Place 1 drop into both eyes daily      Facility-Administered Medications: None         Physical Exam   Vital Signs: Temp: 98.8  F (37.1  C) Temp src: Oral BP: (!) 152/90 Pulse: 69   Resp: 16 SpO2: 100 % O2 Device: None (Room air)    Weight: 0 lbs 0 oz  GENERAL: Alert and awake. Answering questions appropriately. NAD. Pleasant and conversational   HEENT: Anicteric sclera. Mucous membranes moist   CARDIOVASCULAR: RRR. S1, S2. No murmurs, rubs, or gallops.   RESPIRATORY: Effort normal on RA. Clear to anterior auscultation bilaterally, no  rales, rhonchi or wheezes  GI: Abdomen soft, non-tender abdomen without rebound or guarding, normoactive bowel sounds present  MUSCULOSKELETAL: TTP over R hip and R anterior thigh. Edema over R hip and anterior thigh, but no overlying skin changes. Strength of ankles with plantar and dorsiflexion 5/5 bilaterally. Sensation intact. Pedal pulses +2.   EXTREMITIES: No peripheral edema.  NEUROLOGICAL: CN II-XII grossly intact.   SKIN: Intact. Warm and dry. No jaundice. Superficial abrasion over anterior R ankle approximately 1.5 cm and R knee 2 cm in size.      Medical Decision Making       75 MINUTES SPENT BY ME on the date of service doing chart review, history, exam, documentation & further activities per the note.      Data   ------------------------- PAST 24 HR DATA REVIEWED -----------------------------------------------    I have personally reviewed the following data over the past 24 hrs:    7.3  \   10.9 (L)   / 253     129 (L) 100 10.4 /  124 (H)   3.9 21 (L) 0.52 \     INR:  1.57 (H) PTT:  N/A   D-dimer:  N/A Fibrinogen:  N/A       Imaging results reviewed over the past 24 hrs:   Recent Results (from the past 24 hours)   CT Head w/o Contrast    Narrative    EXAM: CT HEAD W/O CONTRAST  LOCATION: [facility]  DATE: 7/19/2025    INDICATION: head injury  COMPARISON: None.  TECHNIQUE: Routine CT Head without IV contrast. Multiplanar reformats. Dose reduction techniques were used.    FINDINGS:  INTRACRANIAL CONTENTS: No intracranial hemorrhage, extraaxial collection, or mass effect.  No CT evidence of acute infarct. Mild presumed chronic small vessel ischemic changes. Mild to moderate generalized cerebral volume loss. Normal ventricles and   sulci.     VISUALIZED ORBITS/SINUSES/MASTOIDS: No intraorbital abnormality. No paranasal sinus mucosal disease. No middle ear or mastoid effusion.    BONES/SOFT TISSUES: No acute abnormality. Bilateral temporomandibular joint  degeneration.      Impression    IMPRESSION:  1.  No CT evidence for acute intracranial process.  2.  Brain atrophy and presumed chronic microvascular ischemic changes as above.     CT Cervical Spine w/o Contrast    Narrative    EXAM: CT CERVICAL SPINE W/O CONTRAST  LOCATION: [facility]  DATE: 7/19/2025    INDICATION: fall, head injury  COMPARISON: None.  TECHNIQUE: Routine CT Cervical Spine without IV contrast. Multiplanar reformats. Dose reduction techniques were used.    FINDINGS:  VERTEBRA: Diffuse osteopenia. Normal vertebral body heights. Grade 1 anterolisthesis at C2-3, C7-T1 and T2-3. Bony fusion C3-C5 and at C6-7. No fracture or posttraumatic subluxation.     CANAL/FORAMINA: No high-grade spinal canal stenosis. Multilevel varying degrees of neural foraminal stenosis    PARASPINAL: No extraspinal abnormality.      Impression    IMPRESSION:  1.  No fracture or posttraumatic subluxation.  2.  No high-grade spinal canal stenosis. Multilevel varying degrees of neural foraminal stenosis.     XR Femur Right 2 Views    Narrative    EXAM: XR FEMUR RIGHT 2 VIEWS, XR PELVIS 1/2 VIEWS  LOCATION: [facility]  DATE: 7/19/2025    INDICATION: Fall, pain  COMPARISON: 8/30/2024      Impression    IMPRESSION: Status post right total hip arthroplasty. Acute periprosthetic fracture of the proximal right femur with up to 1 cm displacement. Normal joint alignment. Chronic ossification adjacent to the right superior acetabulum. Osteopenia.   XR Pelvis 1/2 Views    Narrative    EXAM: XR FEMUR RIGHT 2 VIEWS, XR PELVIS 1/2 VIEWS  LOCATION: [facility]  DATE: 7/19/2025    INDICATION: Fall, pain  COMPARISON: 8/30/2024      Impression    IMPRESSION: Status post right total hip arthroplasty. Acute periprosthetic fracture of the proximal right femur with up to 1 cm displacement. Normal joint alignment.  Chronic ossification adjacent to the right superior acetabulum. Osteopenia.   CT Hip Right w/o Contrast    Narrative    EXAM: CT HIP RIGHT WITHOUT CONTRAST  LOCATION: [facility]  DATE: 07/19/2025    INDICATION: Known right periprosthetic hip fracture.  COMPARISON: Same-day radiograph, CT of the abdomen/pelvis dated 10/21/2025.  TECHNIQUE: Noncontrast. Axial, sagittal and coronal thin-section reconstruction. Dose reduction techniques were used.     FINDINGS:     Again seen are postoperative changes related to a left total hip replacement. There is no dislocation of the implant.    There is a mildly displaced and comminuted right proximal femoral oblique/spiral periprosthetic fracture involving the proximal femoral shaft where there is up to 9 mm of displacement along the anterior cortex. The fracture extends proximally into the   inferior aspect of the greater trochanter. There is associated moderate well-defined intramuscular expansion along the anterior proximal muscles of the thigh in keeping with poorly defined blood products/hematoma formation.    The bones are markedly demineralized. No evidence of an acute displaced acetabular fracture. Right pubic ring appears to be intact without displaced fractures identified. There is a well-corticated bone fragment along the right AIIS in this region which   is chronic.    Intrapelvic contents show no free fluid. Prior hysterectomy. There is a Rodriguez catheter within a decompressed bladder with a small amount of vesicular gas. Small fat-containing ventral midline hernia.      Impression    IMPRESSION:  1.  Right total hip replacement.  2.  Acute mildly displaced and comminuted right proximal femoral periprosthetic fracture as described.  3.  Moderate-sized intramuscular hematoma formation along the anterior proximal thigh.  4.  Bone demineralization.

## 2025-07-19 NOTE — ED TRIAGE NOTES
BIBA after fall from standing height. Hit head on concrete sidewalk, denies loss of consciousness. Patient IS on blood thinners (eloquis), previous bilateral PE. No external hemorrhaging noted.    Pmhx includes multiple right hip dislocations.  Current pain below right hip, 10/10 when moving. Abrasion on right, lateral eyebrow. Abrasion on right ankle.

## 2025-07-20 ENCOUNTER — APPOINTMENT (OUTPATIENT)
Dept: OCCUPATIONAL THERAPY | Facility: CLINIC | Age: 80
End: 2025-07-20
Attending: PHYSICIAN ASSISTANT
Payer: MEDICARE

## 2025-07-20 LAB
ANION GAP SERPL CALCULATED.3IONS-SCNC: 11 MMOL/L (ref 7–15)
BUN SERPL-MCNC: 9 MG/DL (ref 8–23)
CALCIUM SERPL-MCNC: 9.1 MG/DL (ref 8.8–10.4)
CHLORIDE SERPL-SCNC: 101 MMOL/L (ref 98–107)
CREAT SERPL-MCNC: 0.52 MG/DL (ref 0.51–0.95)
EGFRCR SERPLBLD CKD-EPI 2021: >90 ML/MIN/1.73M2
ERYTHROCYTE [DISTWIDTH] IN BLOOD BY AUTOMATED COUNT: 19.8 % (ref 10–15)
GLUCOSE SERPL-MCNC: 130 MG/DL (ref 70–99)
HCO3 SERPL-SCNC: 21 MMOL/L (ref 22–29)
HCT VFR BLD AUTO: 35.2 % (ref 35–47)
HGB BLD-MCNC: 10 G/DL (ref 11.7–15.7)
MCH RBC QN AUTO: 21.2 PG (ref 26.5–33)
MCHC RBC AUTO-ENTMCNC: 28.4 G/DL (ref 31.5–36.5)
MCV RBC AUTO: 75 FL (ref 78–100)
PLATELET # BLD AUTO: 221 10E3/UL (ref 150–450)
POTASSIUM SERPL-SCNC: 4.1 MMOL/L (ref 3.4–5.3)
RBC # BLD AUTO: 4.71 10E6/UL (ref 3.8–5.2)
SODIUM SERPL-SCNC: 133 MMOL/L (ref 135–145)
WBC # BLD AUTO: 7.8 10E3/UL (ref 4–11)

## 2025-07-20 PROCEDURE — 85018 HEMOGLOBIN: CPT | Performed by: PHYSICIAN ASSISTANT

## 2025-07-20 PROCEDURE — G0378 HOSPITAL OBSERVATION PER HR: HCPCS

## 2025-07-20 PROCEDURE — 97535 SELF CARE MNGMENT TRAINING: CPT | Mod: GO

## 2025-07-20 PROCEDURE — 36415 COLL VENOUS BLD VENIPUNCTURE: CPT | Performed by: PHYSICIAN ASSISTANT

## 2025-07-20 PROCEDURE — 999N000147 HC STATISTIC PT IP EVAL DEFER: Performed by: PHYSICAL THERAPIST

## 2025-07-20 PROCEDURE — 250N000013 HC RX MED GY IP 250 OP 250 PS 637: Performed by: PHYSICIAN ASSISTANT

## 2025-07-20 PROCEDURE — 97165 OT EVAL LOW COMPLEX 30 MIN: CPT | Mod: GO

## 2025-07-20 PROCEDURE — 250N000013 HC RX MED GY IP 250 OP 250 PS 637: Performed by: STUDENT IN AN ORGANIZED HEALTH CARE EDUCATION/TRAINING PROGRAM

## 2025-07-20 PROCEDURE — 80048 BASIC METABOLIC PNL TOTAL CA: CPT | Performed by: PHYSICIAN ASSISTANT

## 2025-07-20 PROCEDURE — 99233 SBSQ HOSP IP/OBS HIGH 50: CPT | Performed by: STUDENT IN AN ORGANIZED HEALTH CARE EDUCATION/TRAINING PROGRAM

## 2025-07-20 RX ORDER — ACETAMINOPHEN 325 MG/1
975 TABLET ORAL EVERY 8 HOURS PRN
Status: DISCONTINUED | OUTPATIENT
Start: 2025-07-20 | End: 2025-07-21 | Stop reason: HOSPADM

## 2025-07-20 RX ADMIN — OXYCODONE HYDROCHLORIDE 5 MG: 5 TABLET ORAL at 09:03

## 2025-07-20 RX ADMIN — ACETAMINOPHEN 975 MG: 325 TABLET ORAL at 08:40

## 2025-07-20 RX ADMIN — APIXABAN 5 MG: 5 TABLET, FILM COATED ORAL at 20:49

## 2025-07-20 RX ADMIN — OXYCODONE HYDROCHLORIDE 5 MG: 5 TABLET ORAL at 14:18

## 2025-07-20 RX ADMIN — APIXABAN 5 MG: 5 TABLET, FILM COATED ORAL at 08:40

## 2025-07-20 RX ADMIN — ACETAMINOPHEN 975 MG: 325 TABLET, FILM COATED ORAL at 16:43

## 2025-07-20 RX ADMIN — ACETAMINOPHEN 325 MG: 325 TABLET ORAL at 02:31

## 2025-07-20 RX ADMIN — OXYCODONE HYDROCHLORIDE 5 MG: 5 TABLET ORAL at 20:49

## 2025-07-20 ASSESSMENT — ACTIVITIES OF DAILY LIVING (ADL)
ADLS_ACUITY_SCORE: 64
ADLS_ACUITY_SCORE: 68
ADLS_ACUITY_SCORE: 64
ADLS_ACUITY_SCORE: 64
ADLS_ACUITY_SCORE: 68
ADLS_ACUITY_SCORE: 73
ADLS_ACUITY_SCORE: 68
ADLS_ACUITY_SCORE: 64
ADLS_ACUITY_SCORE: 68
ADLS_ACUITY_SCORE: 64
PREVIOUS_RESPONSIBILITIES: MEAL PREP;LAUNDRY;HOUSEKEEPING;SHOPPING;YARDWORK;MEDICATION MANAGEMENT;FINANCES;DRIVING
ADLS_ACUITY_SCORE: 68
ADLS_ACUITY_SCORE: 64
ADLS_ACUITY_SCORE: 64
ADLS_ACUITY_SCORE: 68

## 2025-07-20 NOTE — PROGRESS NOTES
Orthopaedic Surgery Progress Note 07/20/2025    Subjective  No acute events overnight.  Pain well controlled. Patient is concerned because she is only receiving Tylenol q6hr and she is experiencing increased pain as the dose wears off. The timing of her medications was explained to her, which she then understood and agreed with. Denies new numbness, tingling, or weakness. States she feels as though she is able to move her right leg better today.  Tolerating diet without nausea or vomiting.  Has a arango catheter in until she is able to ambulate.  Passing flatus, no BM.   Denies fevers, chills, chest pain, SOB.  Not yet ambulating, but is open to working with PT. She is concerned about her future discharge plan because she does not feel comfortable with her current status of not being able to walk without pain    Objective  Temp: 97.6  F (36.4  C) Temp src: Oral BP: 108/70 Pulse: 65   Resp: 16 SpO2: 97 % O2 Device: None (Room air)      Exam:  Gen: No acute distress, resting comfortably in bed.  Resp: Non-labored breathing  Cardio: Regular rate via peripheral pulse  : Arango catheter in place  MSK:    RLE:  - No dressings present  - Thigh still swollen but less so than yesterday  - Tenderness to palpation over the thigh but decreased from yesterday  - Fires Quad, TA, GSC, wiggles toes. Able to lift leg approximately 4 inches off the bed  - SILT femoral/tibial/sural/saphenous/DP/SP nerves  - PT/DP pulses 2+, foot wwp  - No pain on axial loading      Recent Labs   Lab 07/19/25  1103   WBC 7.3   HGB 10.9*            Assessment: Oscar Zhao is a 80 year old female with a right [employer] [employer] periprosthetic femur fracture after experiencing a GLF. She was admitted on 7/19 due to her inability to mobilize. Overall, the patient's pain has been improving and is under better control. Patient has still been unable to ambulate, but does believe she has better ROM in her RLE. Our hope for today is that she is able to work  with PT on ambulation. Should she be able to mobilize, she is okay to discharge from an orthopedic perspective. In unable to mobilize, we will continue to follow her while in-patient.    Today:  - Pain control  - PT/OT    Plan:  Medicine primary  Activity: As tolerated  Weight bearing status: As tolerated with strict abduction restriction. Can use walker/crutches to help with stability  Antibiotics: None  Diet: Okay for diet per ortho  DVT prophylaxis: Okay to continue on home Eliquis  Bracing/Splinting: None  Pain management: Per primary  X-rays: Complete  Labs: Complete  Follow-up: Pending non-op discharge, 1-2 weeks with index surgeon at [facility]    Disposition: Anticipate discharge today or tomorrow, pending patient's ability to ambulate independently and the absence of need for surgical intervention    Mariam Cowart, MS4    I was present with the medical student who participated in the service and in the documentation of the note. I have verified the history and personally performed the physical exam and medical decision making. I agree with the assessment and plan of care as documented in the note.    Noble Teresa MD  Orthopaedic Surgery Resident  [facility]  [phone]    Please page me at [phone] with any questions/concerns. If there is no response, if it is a weekend, or if it is during normal workday hours, then please page the orthopaedic surgery resident on call.     Future Appointments   Date Time Provider Department [place]   7/20/2025  1:45 PM Magi Apodaca, OT [facility]OT [place]   7/20/2025  3:15 PM Debi Cano, PT [facility] [place]   8/18/2025 10:20 AM [facility] CT 2 [facility]   8/25/2025  9:30 AM [facility]   8/25/2025 10:00 AM Stephen Beck MD [facility]   9/15/2025 11:00 AM Jak Jones MD [facility]

## 2025-07-20 NOTE — PLAN OF CARE
BP 93/70 (BP Location: Right arm)   Pulse 77   Temp 98.2  F (36.8  C) (Oral)   Resp 16   LMP  (LMP Unknown)   SpO2 99%   Patient's vitals signs stable. On room air, sats stable. Denies SOB,chest pain, dizziness, numbness/tingling. Reports minimal pain when not moving. Pain controlled with scheduled tylenol and PRN oxycodone. Tolerating regular diet well,thin liquids. Denies nausea/vomiting. Rodriguez removed. Voiding spontaneously with no difficulty. BM x1 today. PIV Left arm saline locked. Pt up with assist of using a walker, ambulated to the bathroom tolerated well. Alert and oriented x4. Able to make needs known. Possible discharge tomorrow. Continue with POC.

## 2025-07-20 NOTE — PLAN OF CARE
Assumed cares: 1900 to 2330.  Patient refused to assess non-weight bearing status. Refused to turn to remove hover mat. Requesting only tylenol for pain. Requested to attempt ambulating tomorrow. Rodriguez kept in place.

## 2025-07-20 NOTE — PLAN OF CARE
Goal Outcome Evaluation:      Plan of Care Reviewed With: patient    Overall Patient Progress: improving    VS: VSS. Denies CP/SOB. A/O x4.   O2: >90% on RA    Output: Voiding adequate amounts w/o pain or difficulty    Last BM: 7/19   Activity: Up with assist of 1, GB and walker. Tolerating well.    Pain: Pain in RLE, managing with PRN tylenol   CMS: Intact    Diet: Regular, tolerating well.    LDA: PIV SL   Plan: Anticipated discharge home tomorrow   Additional Info: Daughter at bedside tonight, pt eager to discharge home tomorrow.

## 2025-07-20 NOTE — PROGRESS NOTES
"[facility]                                                                                   OUTPATIENT OCCUPATIONAL THERAPY    PLAN OF TREATMENT FOR OUTPATIENT REHABILITATION   Patient's Last Name, First Name, Oscar Farley YOB: 1945   Provider's Name   [facility]   Medical Record No.  [id number]     Onset Date: 07/19/25 Start of Care Date: 07/20/25     Medical Diagnosis:  femur fx               OT Diagnosis:  Decreased ADL IND and funcitonal mobility Certification Dates:  From: 07/20/25  To: 07/20/25     See note for plan of treatment, functional goals, and certification details.    I CERTIFY THE NEED FOR THESE SERVICES FURNISHED UNDER        THIS PLAN OF TREATMENT AND WHILE UNDER MY CARE (Physician co-signature of this document indicates review and certification of the therapy plan).                          07/20/25 0900   Appointment Info   Signing Clinician's Name / Credentials (OT) Magi Apodaca OTR/L   Rehab Comments (OT) RLE WBAT, strict ABD   Quick Adds   Quick Adds Certification   Living Environment   People in Home alone   Current Living Arrangements   ([facility])   Home Accessibility no concerns   Transportation Anticipated family or friend will provide   [facility]. Walk-in shower w/ built in bench. Pt does [facility] an additional \"[facility]\" [facility] that has stairs to access but she would not need to do this upon d/c.   Self-Care   Usual Activity Tolerance good   Current Activity Tolerance moderate   Equipment Currently Used at Home none   Fall history within last six months yes   Number of times patient has fallen within last six months 1   Activity/Exercise/Self-Care Comment Pt does not currently use any equipment at home but does own items from previous surgeries (hip kit). Pt reports IND w/ ADLs at baseline although performs \"modified\" d/t " previous hip dislocations. IND w/ amb at baseline.   Instrumental Activities of Daily Living (IADL)   Previous Responsibilities meal prep;laundry;housekeeping;shopping;yardwork;medication management;finances;driving   General Information   Onset of Illness/Injury or Date of Surgery 07/19/25   Referring Physician Clara Schwartz PA-C   Additional Occupational Profile Info/Pertinent History of Current Problem Oscar Zhao is a 80 year old female with a right [employer] periprosthetic femur fracture after experiencing a GLF. She was admitted on 7/19 due to her inability to mobilize. Overall, the patient's pain has been improving and is under better control. Patient has still been unable to ambulate, but does believe she has better ROM in her RLE. Our hope for today is that she is able to work with PT on ambulation. Should she be able to mobilize, she is okay to discharge from an orthopedic perspective. In unable to mobilize, we will continue to follow her while in-patient.   Existing Precautions/Restrictions fall;no active hip ABD   Right Lower Extremity (Weight-bearing Status) weight-bearing as tolerated (WBAT)   Cognitive Status Examination   Orientation Status orientation to person, place and time   Affect/Mental Status (Cognitive) WFL   Follows Commands WFL   Sensory   Sensory Quick Adds sensation intact   Pain Assessment   Patient Currently in Pain No   Posture   Posture not impaired   Range of Motion Comprehensive   Comment, General Range of Motion BUE WFL, RLE decreased ROM although functional   Strength Comprehensive (MMT)   Comment, General Manual Muscle Testing (MMT) Assessment Decreased strength RLE   Coordination   Upper Extremity Coordination No deficits were identified   Bed Mobility   Bed Mobility supine-sit;sit-supine   Supine-Sit [place] (Bed Mobility) verbal cues;modified independence;set up   Sit-Supine [place] (Bed Mobility) modified independence;set up;verbal cues   Transfers    Transfers sit-stand transfer;toilet transfer   Sit-Stand Transfer   Sit-Stand [place] (Transfers) contact guard;verbal cues   Toilet Transfer   Type (Toilet Transfer) stand pivot/stand step   [place] Level (Toilet Transfer) supervision;verbal cues   Assistive Device (Toilet Transfer) grab bars/safety frame   Activities of Daily Living   BADL Assessment/Intervention lower body dressing;toileting   Lower Body Dressing Assessment/Training   [place] Level (Lower Body Dressing) don;minimum assist (75% patient effort)   Assistive Devices (Lower Body Dressing) reacher   Position (Lower Body Dressing) edge of bed sitting   Toileting   [place] Level (Toileting) perform perineal hygiene;independent   Assistive Devices (Toileting) grab bar, toilet   Clinical Impression   Criteria for Skilled Therapeutic Interventions Met (OT) Yes, treatment indicated   OT Diagnosis Decreased ADL IND and funcitonal mobility   OT Problem List-Impairments impacting ADL problems related to;activity tolerance impaired;mobility;pain;strength;other (see comments)  (abd precaution)   Assessment of Occupational Performance 3-5 Performance Deficits   Identified Performance Deficits LB dressing, tranfsfers, bed mobility, stairs   Planned Therapy Interventions (OT) ADL retraining;bed mobility training;transfer training;progressive activity/exercise   Clinical Decision Making Complexity (OT) problem focused assessment/low complexity   Risk & Benefits of therapy have been explained evaluation/treatment results reviewed;care plan/treatment goals reviewed;risks/benefits reviewed;current/potential barriers reviewed;participants voiced agreement with care plan;participants included;patient   OT Total Evaluation Time   OT Eval, Low Complexity Minutes (96473) 10   Therapy Certification   Start of Care Date 07/20/25   Certification date from 07/20/25   Certification date to 07/20/25   Medical Diagnosis femur fx   OT Goals   Therapy Frequency  (OT) One time eval and treatment   OT Predicted Duration/Target Date for Goal Attainment 07/20/25   OT Goals Lower Body Dressing;Bed Mobility;Toilet Transfer/Toileting   OT: Lower Body Dressing Modified independent;using adaptive equipment   OT: Bed Mobility Modified independent;supine to/from sitting;rolling;within precautions   OT: Toilet Transfer/Toileting Modified independent;toilet transfer;cleaning and garment management;within precautions   Self-Care/Home Management   Self-Care/Home Mgmt/ADL, Compensatory, Meal Prep Minutes (19995) 45   Symptoms Noted During/After Treatment (Meal Preparation/Planning Training) fatigue   Treatment Detail/Skilled Intervention Pt greeted supine in bed and agreeable to therapy session. Focused on progression of functional mobility and ADL IND. Edu on strict hip abd precaution and RLE WBAT w/ pt verbalizing understanding. Edu on getting OOB on L side and into bed on R side to maintain precautions. W/ cuing for technique and advancement of RLE, pt was mod I for supine > sit and sit > supine. Also edu on use of leg  w/ pt reporting she will trial prn at home if too difficult to get her RLE onto the bed. CGA w/ cuing for STS and cuing for keeping RLE out in front. Heavy use of BUE for STS and cued for step-to gait paterrn w/ use of FWW. W/ increased time, pt w was able to amb to/from BR w/ FWW and step-to pattern. Pt w/ heavy reliance on toilet seat and GB for toilet transfer. Edu on toilet safety from for home w/ pt declining any space and that she already has a RTS. Also edu pt on benefits of a shower chair w/ handles d/t built-in benches often being too low and w/o GB. Pt declined need for more AE although agreeable to take first shower at home when daughter was present to ensure success. Reviewed OT goals and current rec for use of FWW. Pt in agreement to complete OP PT upon d/c from hospital.   OT Discharge Planning   OT Plan DC OT   OT Discharge Recommendation (DC Rec)  home with assist;other (see comments)  (OP PT)   OT Rationale for DC Rec Pt mobilzing mod I-SBA during OT session w/ use of FWW. Pt will have daugher A for initial 2 days upon d/c to A prn. Rec follow up w/ OP PT to progress RLE strength and progression of stairs.   OT Brief overview of current status SBA-mod I w/ FWW   OT Total Distance Amb During Session (feet) 50   Total Session Time   Timed Code Treatment Minutes 45   Total Session Time (sum of timed and untimed services) 55

## 2025-07-20 NOTE — PROGRESS NOTES
[facility]    Medicine Progress Note - Hospitalist Service, GOLD TEAM 18    Date of Admission:  7/19/2025    Assessment & Plan    Oscar Zhao is a 80 year old female with PMHx of HLA B27 positive, HTN, DVT/PE on Eliquis, Malignant neoplasm of anal canal, bilateral scleritis, Hiatal hernia and GERD s/p laparoscopic paraesophageal repair (6/4/2025), osteoporosis, Hx of closed dislocation of R hip s/p R hip total arthroplasty c/b multiple dislocations, who presented to the ED after a mechanical fall after tripping on side walk with head strike and R hip pain found to have periprosthetic R hip fracture, with orthopedic evaluation in the ED recommending non-operative management, now admitted on 7/19/2025, for pain control and physical therapy evaluation.     Interval changes 7/20  - Denies pain at rest.  - Working with therapies, pending recommendations.  - Schedule pain medication acetaminophen, as needed IV and oral narcotics.  - Likely discharge in next 24 to 48 hours pending improvement of pain and recommendations from therapies.    # Periprosthetic femur fracture  # Mechanical fall   # Hx of R YAMILET c/b multiple dislocation   # Osteoporosis   Presented with R hip pain after mechanical fall with head strike. CT head and C spine negative for acute process. Xray R hip with periprosthetic fracture. CT R hip with acute mild displacement and comminuted periprosthetic fracture of the proximal right femur with moderately sized intramuscular hematoma along the proximal thigh. Orthopedic surgery evaluated in the ED, felt fracture was stable with low likelihood of implant becoming loose, recommended trial of non-operative management. Attempted ambulation in the ED, but pain not controlled. Admitted to observation for pain management and PT evaluation. Rodriguez cathter placed in the ED initially with concern for immobility with fracture.   - Orthopedic surgery consulted, appreciate  recs  Plan:  Medicine primary  Activity: As tolerated  Weight bearing status: As tolerated with strict abduction restriction. Can use walker/crutches to help with stability  Antibiotics: None  Diet: Okay for diet per ortho  DVT prophylaxis: Okay to continue on home Eliquis  Bracing/Splinting: None  Pain management: Per primary  X-rays: Complete  Labs: Complete  Follow-up: Pending non-op discharge, 1-2 weeks with index surgeon at [facility]     Disposition: Anticipate discharge today or tomorrow, pending patient's ability to ambulate independently and the absence of need for surgical intervention  -- Osteopenia on imaging and hx of osteoporosis.  -- Will check vitamin D levels  -- Start Vit D and calcium supplement. Would recommend endocrine follow up for bone health   -- Remove arango cathter. Can use purwick PRN     # Hyponatremia  Na 129. Baseline Na normal. Reports eating and drinking normally. Appears euvolemic.    - Trend BMP in AM  - If Na dropping will obtain urine studies and work up further      ------ Chronic stable conditions ------      # Hx of DVT/PE: Hx of recurrent VTE. Hx of R femoral DVT and bilateral PE on 2/2024 thought to be provoke after hip surgery s/p AC, and then L external iliac, common femoral, and proximal thigh femoral vein DVT in 2/10/2025 while off AC. Eliquis was resumed and continued long term with recurrent VTE.   - Continue Eliquis   - Trend CBC with hematoma as noted above.      # HTN: Continue home amlodipine 5 mg daily and metoprolol XL 50 mg daily with holding parameters. Patient states she might be taking a lower dose of one medication, but will clarify with daughter about dose in the AM.     # Hx of hiatal hernia, GERD, s/p laparoscopic paraesophageal repair (6/4/2025): Healed well. Continue regular diet.      # Scleritis/Iritis: HLA B27 positive. L 2010 s/p treatment with corticosteroids, methotrexate, humira, and rituximab but no longer taking these. Previously  followed with ophthalmology and rheumatology. Hold home timolol and Lotemax eye drops as patient would like to bring in home medications.      # Remote hx of anal cancer: Dx 2/2021 S/p treatment with radiation and chemotherapy, following scans without recurrence. Last seen by oncology on 2/17/2025. Continue follow up as scheduled.               Observation Goals: -diagnostic tests and consults completed and resulted, -vital signs normal or at patient baseline, -adequate pain control on oral analgesics, -safe disposition plan has been identified, Nurse to notify provider when observation goals have been met and patient is ready for discharge.  Diet: Regular Diet Adult    DVT Prophylaxis: DOAC  Rodriguez Catheter: PRESENT, indication:    Lines: None     Cardiac Monitoring: None  Code Status: Full Code      Clinically Significant Risk Factors Present on Admission         # Hyponatremia: Lowest Na = 129 mmol/L in last 2 days, will monitor as appropriate        # Drug Induced Coagulation Defect: home medication list includes an anticoagulant medication    # Hypertension: Noted on problem list      # Anemia: based on hgb <11           # Financial/Environmental Concerns:           Social Drivers of Health     [facility] [employer] [facility]    Social Connections          Disposition Plan     Medically Ready for Discharge: Anticipated Tomorrow             Cedric Cardenas MD  Hospitalist Service, [facility] 18  [facility]  Securely message with [employer] (more info)  Text page via [employer] Paging/Directory   See signed in provider for up to date coverage information  ______________________________________________________________________    Interval History   No acute events overnight.  Patient endorsing pain on ambulation.  But has no pain on my exam while she is in bed.  Patient frustrated over time and her pain medication.  Otherwise no other complaints  including headache lightheadedness dizziness or chest pain    Physical Exam   Vital Signs: Temp: 98.4  F (36.9  C) Temp src: Oral BP: 120/67 Pulse: 64   Resp: 16 SpO2: 99 % O2 Device: None (Room air)    Weight: 0 lbs 0 oz    General Appearance: Appears comfortable.  Respiratory: Without wheezes rhonchi or rales.  CTA  Cardiovascular: RRR, without murmurs      Medical Decision Making       59 MINUTES SPENT BY ME on the date of service doing chart review, history, exam, documentation & further activities per the note.      Data   ------------------------- PAST 24 HR DATA REVIEWED -----------------------------------------------

## 2025-07-20 NOTE — PLAN OF CARE
Physical Therapy: Orders received. Chart reviewed and discussed with care team.? Physical Therapy not indicated due to pt mobilizing with SBA to mod I.  Per OT pt has support from daughter at home initially and has demonstrated safe mobility during OT evaluation.  Pt does not have any stairs that she has to do within her home.  Pt in agreement with deferring inpatient PT evaluation and following up with OP PT.? Defer discharge recommendations to OT.? Will complete orders.

## 2025-07-20 NOTE — PHARMACY-ADMISSION MEDICATION HISTORY
Pharmacist Admission Medication History    Admission medication history is complete. The information provided in this note is only as accurate as the sources available at the time of the update.    Information Source(s): Patient and CareEverywhere/SureScripts via phone    Pertinent Information: n/a    Changes made to PTA medication list:  Added: None  Deleted: None  Changed: None    Allergies reviewed with patient and updates made in EHR: yes    Medication History Completed By: Justina Mott [facility] 7/20/2025 4:40 PM    PTA Med List   Medication Sig Last Dose/Taking    amLODIPine (NORVASC) 5 MG tablet Take 1 tablet (5 mg) by mouth daily. Taking    ELIQUIS ANTICOAGULANT 5 MG tablet TAKE 1 TABLET(5 MG) BY MOUTH TWICE DAILY Taking    LOTEMAX 0.5 % ophthalmic suspension Place 1 drop into both eyes every other day. Taking    metoprolol succinate ER (TOPROL XL) 25 MG 24 hr tablet Take 2 tablets (50 mg) by mouth daily. Taking    timolol maleate (TIMOPTIC) 0.5 % ophthalmic solution Place 1 drop into both eyes daily Taking

## 2025-07-20 NOTE — PLAN OF CARE
Goal Outcome Evaluation:      Plan of Care Reviewed With: patient    Overall Patient Progress: no changeOverall Patient Progress: no change       VS: Temp: 97.8  F (36.6  C) Temp src: Oral BP: 108/70 Pulse: 65   Resp: 16 SpO2: 99 % O2 Device: None (Room air)     O2: Room air, denies SOB and chest pain    Output: Continent, arango catheter in place until able to ambulate   Last BM: 7/19   Activity: Not OOB this shift, WBAT   Repositioning side to side in bed   Skin: Abrasion on R knee    Pain: Managed with PRN Tylenol, declined ice packs    CMS: A&Ox4, denies N/T   Dressing: None    Diet: Regular    LDA: L PIV SL    Equipment: Personal belongings   Plan: Pending PT/OT and pain control   Additional Info:

## 2025-07-21 VITALS
DIASTOLIC BLOOD PRESSURE: 82 MMHG | HEART RATE: 76 BPM | TEMPERATURE: 98.1 F | RESPIRATION RATE: 18 BRPM | SYSTOLIC BLOOD PRESSURE: 131 MMHG | OXYGEN SATURATION: 96 %

## 2025-07-21 PROCEDURE — 99239 HOSP IP/OBS DSCHRG MGMT >30: CPT | Performed by: STUDENT IN AN ORGANIZED HEALTH CARE EDUCATION/TRAINING PROGRAM

## 2025-07-21 PROCEDURE — G0378 HOSPITAL OBSERVATION PER HR: HCPCS

## 2025-07-21 PROCEDURE — 250N000013 HC RX MED GY IP 250 OP 250 PS 637: Performed by: PHYSICIAN ASSISTANT

## 2025-07-21 PROCEDURE — 250N000013 HC RX MED GY IP 250 OP 250 PS 637: Performed by: STUDENT IN AN ORGANIZED HEALTH CARE EDUCATION/TRAINING PROGRAM

## 2025-07-21 RX ORDER — VITAMIN B COMPLEX
25 TABLET ORAL DAILY
Qty: 30 TABLET | Refills: 0 | Status: SHIPPED | OUTPATIENT
Start: 2025-07-22 | End: 2025-07-21

## 2025-07-21 RX ORDER — OXYCODONE HYDROCHLORIDE 5 MG/1
5 TABLET ORAL EVERY 4 HOURS PRN
Qty: 12 TABLET | Refills: 0 | Status: SHIPPED | OUTPATIENT
Start: 2025-07-21

## 2025-07-21 RX ORDER — METHOCARBAMOL 500 MG/1
500 TABLET, FILM COATED ORAL 4 TIMES DAILY PRN
Qty: 30 TABLET | Refills: 0 | Status: SHIPPED | OUTPATIENT
Start: 2025-07-21 | End: 2025-07-21

## 2025-07-21 RX ORDER — ACETAMINOPHEN 325 MG/1
975 TABLET ORAL EVERY 8 HOURS PRN
Qty: 30 TABLET | Refills: 0 | Status: SHIPPED | OUTPATIENT
Start: 2025-07-21 | End: 2025-07-21

## 2025-07-21 RX ORDER — METHOCARBAMOL 500 MG/1
500 TABLET, FILM COATED ORAL 4 TIMES DAILY PRN
Qty: 30 TABLET | Refills: 0 | Status: SHIPPED | OUTPATIENT
Start: 2025-07-21

## 2025-07-21 RX ORDER — ACETAMINOPHEN 325 MG/1
975 TABLET ORAL EVERY 8 HOURS PRN
Qty: 30 TABLET | Refills: 0 | Status: SHIPPED | OUTPATIENT
Start: 2025-07-21

## 2025-07-21 RX ORDER — VITAMIN B COMPLEX
25 TABLET ORAL DAILY
Qty: 30 TABLET | Refills: 0 | Status: SHIPPED | OUTPATIENT
Start: 2025-07-22

## 2025-07-21 RX ADMIN — ACETAMINOPHEN 975 MG: 325 TABLET, FILM COATED ORAL at 09:13

## 2025-07-21 RX ADMIN — OXYCODONE HYDROCHLORIDE 5 MG: 5 TABLET ORAL at 05:33

## 2025-07-21 RX ADMIN — ACETAMINOPHEN 975 MG: 325 TABLET, FILM COATED ORAL at 00:53

## 2025-07-21 RX ADMIN — METHOCARBAMOL 500 MG: 500 TABLET ORAL at 05:33

## 2025-07-21 RX ADMIN — APIXABAN 5 MG: 5 TABLET, FILM COATED ORAL at 07:59

## 2025-07-21 ASSESSMENT — ACTIVITIES OF DAILY LIVING (ADL)
ADLS_ACUITY_SCORE: 64

## 2025-07-21 NOTE — DISCHARGE SUMMARY
[facility]  Hospitalist Discharge Summary      Date of Admission:  7/19/2025  Date of Discharge:  7/21/2025  Discharging Provider: Cedric Cardenas MD  Discharge Service: Hospitalist Service, GOLD TEAM 18    Discharge Diagnoses   # Periprosthetic femur fracture  # Mechanical fall   # Hx of R YAMILET c/b multiple dislocation   # Osteoporosis   # Hx of DVT/PE   Clinically Significant Risk Factors          Follow-ups Needed After Discharge   Follow-up Appointments       Hospital Follow-up with Existing Primary Care Provider (PCP)          Schedule Primary Care visit within: 7 Days               Unresulted Labs Ordered in the Past 30 Days of this Admission       No orders found from 6/19/2025 to 7/20/2025.          Discharge Disposition   Discharged to home  Condition at discharge: Stable    Hospital Course   Oscar Zhao is a 80 year old female with PMHx of HLA B27 positive, HTN, DVT/PE on Eliquis, Malignant neoplasm of anal canal, bilateral scleritis, Hiatal hernia and GERD s/p laparoscopic paraesophageal repair (6/4/2025), osteoporosis, Hx of closed dislocation of R hip s/p R hip total arthroplasty c/b multiple dislocations, who presented to the ED after a mechanical fall after tripping on side walk with head strike and R hip pain found to have periprosthetic R hip fracture, with orthopedic evaluation in the ED recommending non-operative management, now admitted on 7/19/2025, for pain control and physical therapy evaluation.  During hospitalization patient was evaluated by orthopedic surgery who felt there was no acute surgical intervention needed, they recommended physical therapy and pain control.  On discharge they recommended her to follow-up with her orthopedic surgeon.  She was seen by physical therapy and cleared therapies she will be discharged with referral to outpatient physical therapy.    Interval changes 7/20  - Denies pain at rest.  - Working with therapies,  pending recommendations.  - Schedule pain medication acetaminophen, as needed IV and oral narcotics.  - Likely discharge in next 24 to 48 hours pending improvement of pain and recommendations from therapies.    # Periprosthetic femur fracture  # Mechanical fall   # Hx of R YAMILET c/b multiple dislocation   # Osteoporosis   Presented with R hip pain after mechanical fall with head strike. CT head and C spine negative for acute process. Xray R hip with periprosthetic fracture. CT R hip with acute mild displacement and comminuted periprosthetic fracture of the proximal right femur with moderately sized intramuscular hematoma along the proximal thigh. Orthopedic surgery evaluated in the ED, felt fracture was stable with low likelihood of implant becoming loose, recommended trial of non-operative management. Attempted ambulation in the ED, but pain not controlled. Admitted to observation for pain management and PT evaluation. Arango cathter placed in the ED initially with concern for immobility with fracture.   - Orthopedic surgery consulted, appreciate recs  Plan:  Medicine primary  Activity: As tolerated  Weight bearing status: As tolerated with strict abduction restriction. Can use walker/crutches to help with stability  Antibiotics: None  Diet: Okay for diet per ortho  DVT prophylaxis: Okay to continue on home Eliquis  Bracing/Splinting: None  Pain management: Per primary  X-rays: Complete  Labs: Complete  Follow-up: Pending non-op discharge, 1-2 weeks with index surgeon at [facility]     Disposition: Anticipate discharge today or tomorrow, pending patient's ability to ambulate independently and the absence of need for surgical intervention  -- Osteopenia on imaging and hx of osteoporosis.  -- Will check vitamin D levels  -- Start Vit D and calcium supplement. Would recommend endocrine follow up for bone health   -- Remove arango cathter. Can use purwick PRN     # Hyponatremia  Na 129. Baseline Na normal. Reports  eating and drinking normally. Appears euvolemic.    - Trend BMP in AM  - If Na dropping will obtain urine studies and work up further      ------ Chronic stable conditions ------      # Hx of DVT/PE: Hx of recurrent VTE. Hx of R femoral DVT and bilateral PE on 2/2024 thought to be provoke after hip surgery s/p AC, and then L external iliac, common femoral, and proximal thigh femoral vein DVT in 2/10/2025 while off AC. Eliquis was resumed and continued long term with recurrent VTE.   - Continue Eliquis   - Trend CBC with hematoma as noted above.      # HTN: Continue home amlodipine 5 mg daily and metoprolol XL 50 mg daily with holding parameters. Patient states she might be taking a lower dose of one medication, but will clarify with daughter about dose in the AM.     # Hx of hiatal hernia, GERD, s/p laparoscopic paraesophageal repair (6/4/2025): Healed well. Continue regular diet.      # Scleritis/Iritis: HLA B27 positive. L 2010 s/p treatment with corticosteroids, methotrexate, humira, and rituximab but no longer taking these. Previously followed with ophthalmology and rheumatology. Hold home timolol and Lotemax eye drops as patient would like to bring in home medications.      # Remote hx of anal cancer: Dx 2/2021 S/p treatment with radiation and chemotherapy, following scans without recurrence. Last seen by oncology on 2/17/2025. Continue follow up as scheduled.          Consultations This Hospital Stay   ORTHOPAEDIC SURGERY ADULT/PEDS IP CONSULT  PHYSICAL THERAPY ADULT IP CONSULT  OCCUPATIONAL THERAPY ADULT IP CONSULT  CARE MANAGEMENT / SOCIAL WORK IP CONSULT    Code Status   Full Code    Time Spent on this Encounter   I, Cedric Cardenas MD, personally saw the patient today and spent greater than 30 minutes discharging this patient.       Cedric Cardenas MD  [facility] MED SURG ORTHOPEDIC  [address] [address]  Phone: [phone]  Fax:  [phone]  ______________________________________________________________________    Physical Exam   Vital Signs: Temp: 98.1  F (36.7  C) Temp src: Oral BP: 131/82 Pulse: 76   Resp: 18 SpO2: 96 % O2 Device: None (Room air)    Weight: 0 lbs 0 oz  General Appearance: Appears comfortable.  Respiratory: Without wheezes rhonchi or rales.  CTA  Cardiovascular: RRR, without murmurs  GI: Soft, nontender, plus BS  Skin: Without obvious bleeding, bruising or excoriations         Primary Care Physician   Jak Jones    Discharge Orders      Physical Therapy  Referral      Physical Therapy  Referral      Reason for your hospital stay    Right hip pain     Activity    Your activity upon discharge: activity as tolerated     Diet    Follow this diet upon discharge: Current Diet:Orders Placed This Encounter      Regular Diet Adult     Hospital Follow-up with Existing Primary Care Provider (PCP)            Significant Results and Procedures   Results for orders placed or performed during the hospital encounter of 07/19/25   CT Head w/o Contrast    Narrative    EXAM: CT HEAD W/O CONTRAST  LOCATION: [facility]  DATE: 7/19/2025    INDICATION: head injury  COMPARISON: None.  TECHNIQUE: Routine CT Head without IV contrast. Multiplanar reformats. Dose reduction techniques were used.    FINDINGS:  INTRACRANIAL CONTENTS: No intracranial hemorrhage, extraaxial collection, or mass effect.  No CT evidence of acute infarct. Mild presumed chronic small vessel ischemic changes. Mild to moderate generalized cerebral volume loss. Normal ventricles and   sulci.     VISUALIZED ORBITS/SINUSES/MASTOIDS: No intraorbital abnormality. No paranasal sinus mucosal disease. No middle ear or mastoid effusion.    BONES/SOFT TISSUES: No acute abnormality. Bilateral temporomandibular joint degeneration.      Impression    IMPRESSION:  1.  No CT evidence for acute intracranial process.  2.   Brain atrophy and presumed chronic microvascular ischemic changes as above.     CT Cervical Spine w/o Contrast    Narrative    EXAM: CT CERVICAL SPINE W/O CONTRAST  LOCATION: [facility]  DATE: 7/19/2025    INDICATION: fall, head injury  COMPARISON: None.  TECHNIQUE: Routine CT Cervical Spine without IV contrast. Multiplanar reformats. Dose reduction techniques were used.    FINDINGS:  VERTEBRA: Diffuse osteopenia. Normal vertebral body heights. Grade 1 anterolisthesis at C2-3, C7-T1 and T2-3. Bony fusion C3-C5 and at C6-7. No fracture or posttraumatic subluxation.     CANAL/FORAMINA: No high-grade spinal canal stenosis. Multilevel varying degrees of neural foraminal stenosis    PARASPINAL: No extraspinal abnormality.      Impression    IMPRESSION:  1.  No fracture or posttraumatic subluxation.  2.  No high-grade spinal canal stenosis. Multilevel varying degrees of neural foraminal stenosis.     XR Femur Right 2 Views    Narrative    EXAM: XR FEMUR RIGHT 2 VIEWS, XR PELVIS 1/2 VIEWS  LOCATION: [facility]  DATE: 7/19/2025    INDICATION: Fall, pain  COMPARISON: 8/30/2024      Impression    IMPRESSION: Status post right total hip arthroplasty. Acute periprosthetic fracture of the proximal right femur with up to 1 cm displacement. Normal joint alignment. Chronic ossification adjacent to the right superior acetabulum. Osteopenia.   XR Pelvis 1/2 Views    Narrative    EXAM: XR FEMUR RIGHT 2 VIEWS, XR PELVIS 1/2 VIEWS  LOCATION: [facility]  DATE: 7/19/2025    INDICATION: Fall, pain  COMPARISON: 8/30/2024      Impression    IMPRESSION: Status post right total hip arthroplasty. Acute periprosthetic fracture of the proximal right femur with up to 1 cm displacement. Normal joint alignment. Chronic ossification adjacent to the right superior acetabulum. Osteopenia.   CT Hip Right w/o Contrast     Narrative    EXAM: CT HIP RIGHT WITHOUT CONTRAST  LOCATION: [facility]  DATE: 07/19/2025    INDICATION: Known right periprosthetic hip fracture.  COMPARISON: Same-day radiograph, CT of the abdomen/pelvis dated 10/21/2025.  TECHNIQUE: Noncontrast. Axial, sagittal and coronal thin-section reconstruction. Dose reduction techniques were used.     FINDINGS:     Again seen are postoperative changes related to a left total hip replacement. There is no dislocation of the implant.    There is a mildly displaced and comminuted right proximal femoral oblique/spiral periprosthetic fracture involving the proximal femoral shaft where there is up to 9 mm of displacement along the anterior cortex. The fracture extends proximally into the   inferior aspect of the greater trochanter. There is associated moderate well-defined intramuscular expansion along the anterior proximal muscles of the thigh in keeping with poorly defined blood products/hematoma formation.    The bones are markedly demineralized. No evidence of an acute displaced acetabular fracture. Right pubic ring appears to be intact without displaced fractures identified. There is a well-corticated bone fragment along the right AIIS in this region which   is chronic.    Intrapelvic contents show no free fluid. Prior hysterectomy. There is a Rodriguez catheter within a decompressed bladder with a small amount of vesicular gas. Small fat-containing ventral midline hernia.      Impression    IMPRESSION:  1.  Right total hip replacement.  2.  Acute mildly displaced and comminuted right proximal femoral periprosthetic fracture as described.  3.  Moderate-sized intramuscular hematoma formation along the anterior proximal thigh.  4.  Bone demineralization.           Discharge Medications      Review of your medicines        START taking        Dose / Directions   acetaminophen 325 MG tablet  Commonly known as: TYLENOL  Used for: Closed  dislocation of right hip, initial encounter (H)      Dose: 975 mg  Take 3 tablets (975 mg) by mouth every 8 hours as needed for mild pain or fever.  Quantity: 30 tablet  Refills: 0     methocarbamol 500 MG tablet  Commonly known as: ROBAXIN  Used for: Closed dislocation of right hip, initial encounter (H)      Dose: 500 mg  Take 1 tablet (500 mg) by mouth 4 times daily as needed for muscle spasms.  Quantity: 30 tablet  Refills: 0     oxyCODONE 5 MG tablet  Commonly known as: ROXICODONE  Used for: Closed dislocation of right hip, initial encounter (H)      Dose: 5 mg  Take 1 tablet (5 mg) by mouth every 4 hours as needed for severe pain.  Quantity: 12 tablet  Refills: 0     Vitamin D3 25 mcg (1000 units) tablet  Commonly known as: CHOLECALCIFEROL  Used for: Closed dislocation of right hip, initial encounter (H)      Dose: 25 mcg  Start taking on: July 22, 2025  Take 1 tablet (25 mcg) by mouth daily.  Quantity: 30 tablet  Refills: 0            CONTINUE these medicines which have NOT CHANGED        Dose / Directions   amLODIPine 5 MG tablet  Commonly known as: NORVASC      Dose: 5 mg  Take 1 tablet (5 mg) by mouth daily.  Refills: 0     ELIQUIS ANTICOAGULANT 5 MG tablet  Used for: Acute pulmonary embolism without acute cor pulmonale, unspecified pulmonary embolism type (H)  Generic drug: apixaban ANTICOAGULANT      Dose: 5 mg  TAKE 1 TABLET(5 MG) BY MOUTH TWICE DAILY  Quantity: 60 tablet  Refills: 2     EPINEPHrine 0.3 MG/0.3ML injection 2-pack  Commonly known as: ANY BX GENERIC EQUIV      Dose: 0.3 mg  Inject 0.3 mLs (0.3 mg) into the muscle as needed for anaphylaxis May repeat one time in 5-15 minutes if response to initial dose is inadequate.  Quantity: 2 each  Refills: 0     hydrOXYzine HCl 10 MG tablet  Commonly known as: ATARAX  Used for: Type 2 diabetes mellitus with complication, without long-term current use of insulin (H)      Dose: 10 mg  Take 1 tablet (10 mg) by mouth 3 times daily as needed for  itching.  Quantity: 90 tablet  Refills: 11     Lotemax 0.5 % ophthalmic suspension  Generic drug: loteprednol      Dose: 1 drop  Place 1 drop into both eyes every other day.  Refills: 4     metoprolol succinate ER 25 MG 24 hr tablet  Commonly known as: TOPROL XL      Dose: 50 mg  Take 2 tablets (50 mg) by mouth daily.  Refills: 0     polyethylene glycol 17 GM/Dose powder  Commonly known as: MIRALAX  Used for: History of repair of hiatal hernia      Dose: 1 Capful  Take 17 g (1 Capful) by mouth daily as needed for constipation.  Refills: 0     senna-docusate 8.6-50 MG tablet  Commonly known as: SENOKOT-S/PERICOLACE  Used for: History of repair of hiatal hernia      Dose: 1 tablet  Take 1 tablet by mouth 2 times daily as needed for constipation.  Refills: 0     timolol maleate 0.5 % ophthalmic solution  Commonly known as: TIMOPTIC      Dose: 1 drop  Place 1 drop into both eyes daily  Refills: 0               Where to get your medicines        These medications were sent to [id number] - [place], [place] - [facility] [address] [place] [address] [address] [facility] [address]      Phone: [phone]   acetaminophen 325 MG tablet  methocarbamol 500 MG tablet  Vitamin D3 25 mcg (1000 units) tablet       Some of these will need a paper prescription and others can be bought over the counter. Ask your nurse if you have questions.    Bring a paper prescription for each of these medications  oxyCODONE 5 MG tablet       Allergies   Allergies   Allergen Reactions    Kiwi Hives    Adalimumab Muscle Pain (Myalgia)    Aspirin      Had Bleeding ulcers after previous 2 surgeries due to aspirin    Brimonidine-Timolol [Brimonidine Tartrate-Timolol] Unknown     Redness itching in eyes    Levaquin [Levofloxacin] Unknown     Skin burn and couldn't get out bed for 5 days    Tetracyclines & Related Hives

## 2025-07-21 NOTE — PROGRESS NOTES
VS: /82   Pulse 76   Temp 98.1  F (36.7  C)   Resp 18   LMP  (LMP Unknown)   SpO2 96%     O2: Room air saturations 96%.    Output: Pt denies any issues with output   Last BM: 7/21/2025 reports patient   Activity: Up with standby assist walker and gait belt   Skin: Bruising on right eye took photo and added it to media tab   Pain: Pt is controlling pain with Tylenol and occasional Oxycodone. Pt seems to have a very good understanding of plan of cares for when she discharges to home.    CMS: Intact   Dressing: NA   Diet: Regular. Good appetite   LDA:    Equipment: Walker, gait belt   Plan: Pt is hoping to discharge back to her home today.    Additional Info: Pt lives alone in her own home in [place]. If she discharges today Pt discharged to home today. Her daughter transported her by car. She will be having her scripts filled at her own [facility]. She was sent with hard copy for her Oxycodone to have at her own pharmacy in [place]. Pt was given discharge instructions about plan of cares for discharge. All her belongingness were returned to her prior to discharge. She was escorted to front door in wheelchair to be discharged to home.

## 2025-07-21 NOTE — PLAN OF CARE
Patient A/Ox4, very talkative lady. Directs own cares. VSS. Denies CP, SOB, dizziness/LH. LSCTA. +fl/BS. Voiding well in bathroom. CMS intact, felt as though her R leg was getting a bit swollen. Tolerating regular diet without NV. Activity level is good, pt sleeping between cares. Pain rated as comfortably managed throughout shift, taking PRN tylenol and oxycodone occasionally. Likely discharge this morning - pt wants to take a shower and have her meds in order early. Patient has demonstrated ability to call appropriately. Patient is resting with call light within reach. Will continue to monitor.

## 2025-07-23 ENCOUNTER — PATIENT OUTREACH (OUTPATIENT)
Dept: CARE COORDINATION | Facility: CLINIC | Age: 80
End: 2025-07-23
Payer: MEDICARE

## 2025-07-23 NOTE — PROGRESS NOTES
[facility]  Medicare ACO Reach Population - Proactive Outreach  Unable to Reach    Background: Patient outreach conducted proactively to support health maintenance initiatives within [facility]'s Medicare ACO Reach Population.     Outreach attempted x 1.  Left message on patient's voicemail briefly explaining this is a proactive outreach from [facility].. Patient encouraged to call the [facility] scheduling team at [phone] with any scheduling needs or questions, or they can conveniently schedule via [employer].    Plan:  Care Coordinator will try to reach patient again in 1-2 business days.    Yoanna Monreal RN  [facility]

## 2025-07-23 NOTE — PROGRESS NOTES
Connected Care Resource Center:   [facility] Resource Center Contact  [facility]/Voicemail     Clinical Data: Post-Discharge Outreach     Outreach attempted x 2.  Left message on patient's voicemail, providing [facility]'s central phone number of [phone] ([phone]) for questions/concerns and/or to schedule an appt with an [facility] provider, if they do not have a PCP.      Plan:  [facility] will do no further outreaches at this time.       JOHN Rodriguez  Connected Care Resource [place], [facility]    *Connected Care Resource Team does NOT follow patient ongoing. Referrals are identified based on internal discharge reports and the outreach is to ensure patient has an understanding of their discharge instructions.

## 2025-07-27 NOTE — PROGRESS NOTES
[facility] Medicare ACO Reach Population - Proactive Outreach    Background: Patient outreach conducted proactively to support health maintenance initiatives within [facility]'s Medicare ACO Reach Population.     [facility] also attempted to reach patient for hospital follow-up on 7/22/25 and 7/23/25.  Since 3 attempts have been made to reach patient post discharge, no further call will be made.     Plan:  Care Coordinator will do no further outreaches at this time.    Yoanna Monreal RN  [facility]

## 2025-08-09 ENCOUNTER — HEALTH MAINTENANCE LETTER (OUTPATIENT)
Age: 80
End: 2025-08-09

## 2025-08-14 ENCOUNTER — TELEPHONE (OUTPATIENT)
Dept: ONCOLOGY | Facility: HOSPITAL | Age: 80
End: 2025-08-14
Payer: MEDICARE

## 2025-08-14 DIAGNOSIS — I26.99 ACUTE PULMONARY EMBOLISM WITHOUT ACUTE COR PULMONALE, UNSPECIFIED PULMONARY EMBOLISM TYPE (H): ICD-10-CM

## 2025-08-14 RX ORDER — APIXABAN 5 MG/1
5 TABLET, FILM COATED ORAL 2 TIMES DAILY
Qty: 60 TABLET | Refills: 2 | Status: SHIPPED | OUTPATIENT
Start: 2025-08-14

## 2025-08-18 ENCOUNTER — HOSPITAL ENCOUNTER (OUTPATIENT)
Dept: CT IMAGING | Facility: CLINIC | Age: 80
Discharge: HOME | End: 2025-08-18
Attending: INTERNAL MEDICINE | Admitting: INTERNAL MEDICINE
Payer: MEDICARE

## 2025-08-18 DIAGNOSIS — D50.0 IRON DEFICIENCY ANEMIA DUE TO CHRONIC BLOOD LOSS: ICD-10-CM

## 2025-08-18 DIAGNOSIS — Z85.048 HISTORY OF RECTAL OR ANAL CANCER: ICD-10-CM

## 2025-08-18 DIAGNOSIS — I26.99 ACUTE PULMONARY EMBOLISM WITHOUT ACUTE COR PULMONALE, UNSPECIFIED PULMONARY EMBOLISM TYPE (H): ICD-10-CM

## 2025-08-18 PROCEDURE — 74177 CT ABD & PELVIS W/CONTRAST: CPT

## 2025-08-18 PROCEDURE — 250N000011 HC RX IP 250 OP 636: Performed by: INTERNAL MEDICINE

## 2025-08-18 RX ORDER — IOPAMIDOL 755 MG/ML
90 INJECTION, SOLUTION INTRAVASCULAR ONCE
Status: COMPLETED | OUTPATIENT
Start: 2025-08-18 | End: 2025-08-18

## 2025-08-18 RX ADMIN — IOPAMIDOL 90 ML: 755 INJECTION, SOLUTION INTRAVENOUS at 10:00

## 2025-08-26 ENCOUNTER — ONCOLOGY VISIT (OUTPATIENT)
Dept: ONCOLOGY | Facility: HOSPITAL | Age: 80
End: 2025-08-26
Attending: INTERNAL MEDICINE
Payer: MEDICARE

## 2025-08-26 ENCOUNTER — LAB (OUTPATIENT)
Dept: INFUSION THERAPY | Facility: HOSPITAL | Age: 80
End: 2025-08-26
Attending: INTERNAL MEDICINE
Payer: MEDICARE

## 2025-08-26 VITALS
RESPIRATION RATE: 16 BRPM | SYSTOLIC BLOOD PRESSURE: 134 MMHG | DIASTOLIC BLOOD PRESSURE: 79 MMHG | BODY MASS INDEX: 22.68 KG/M2 | WEIGHT: 128 LBS | TEMPERATURE: 97.9 F | OXYGEN SATURATION: 99 % | HEART RATE: 79 BPM | HEIGHT: 63 IN

## 2025-08-26 DIAGNOSIS — D50.0 IRON DEFICIENCY ANEMIA DUE TO CHRONIC BLOOD LOSS: ICD-10-CM

## 2025-08-26 DIAGNOSIS — I26.99 ACUTE PULMONARY EMBOLISM WITHOUT ACUTE COR PULMONALE, UNSPECIFIED PULMONARY EMBOLISM TYPE (H): ICD-10-CM

## 2025-08-26 DIAGNOSIS — Z85.048 HISTORY OF RECTAL OR ANAL CANCER: ICD-10-CM

## 2025-08-26 DIAGNOSIS — D50.0 IRON DEFICIENCY ANEMIA DUE TO CHRONIC BLOOD LOSS: Primary | ICD-10-CM

## 2025-08-26 LAB
ALBUMIN SERPL BCG-MCNC: 4.2 G/DL (ref 3.5–5.2)
ALP SERPL-CCNC: 116 U/L (ref 40–150)
ALT SERPL W P-5'-P-CCNC: 12 U/L (ref 0–50)
ANION GAP SERPL CALCULATED.3IONS-SCNC: 10 MMOL/L (ref 7–15)
AST SERPL W P-5'-P-CCNC: 20 U/L (ref 0–45)
BASOPHILS # BLD AUTO: 0.05 10E3/UL (ref 0–0.2)
BASOPHILS NFR BLD AUTO: 0.5 %
BILIRUB SERPL-MCNC: 0.8 MG/DL
BUN SERPL-MCNC: 10.4 MG/DL (ref 8–23)
CALCIUM SERPL-MCNC: 10.1 MG/DL (ref 8.8–10.4)
CHLORIDE SERPL-SCNC: 102 MMOL/L (ref 98–107)
CREAT SERPL-MCNC: 0.52 MG/DL (ref 0.51–0.95)
EGFRCR SERPLBLD CKD-EPI 2021: >90 ML/MIN/1.73M2
EOSINOPHIL # BLD AUTO: 0.26 10E3/UL (ref 0–0.7)
EOSINOPHIL NFR BLD AUTO: 2.8 %
ERYTHROCYTE [DISTWIDTH] IN BLOOD BY AUTOMATED COUNT: 19.3 % (ref 10–15)
GLUCOSE SERPL-MCNC: 107 MG/DL (ref 70–99)
HCO3 SERPL-SCNC: 24 MMOL/L (ref 22–29)
HCT VFR BLD AUTO: 40.6 % (ref 35–47)
HGB BLD-MCNC: 11.3 G/DL (ref 11.7–15.7)
IMM GRANULOCYTES # BLD: 0.04 10E3/UL
IMM GRANULOCYTES NFR BLD: 0.4 %
LDH SERPL L TO P-CCNC: 192 U/L (ref 0–250)
LYMPHOCYTES # BLD AUTO: 1.23 10E3/UL (ref 0.8–5.3)
LYMPHOCYTES NFR BLD AUTO: 13.5 %
MCH RBC QN AUTO: 21.3 PG (ref 26.5–33)
MCHC RBC AUTO-ENTMCNC: 27.8 G/DL (ref 31.5–36.5)
MCV RBC AUTO: 76.5 FL (ref 78–100)
MONOCYTES # BLD AUTO: 0.56 10E3/UL (ref 0–1.3)
MONOCYTES NFR BLD AUTO: 6.1 %
NEUTROPHILS # BLD AUTO: 7 10E3/UL (ref 1.6–8.3)
NEUTROPHILS NFR BLD AUTO: 76.7 %
NRBC # BLD AUTO: <0.03 10E3/UL
NRBC BLD AUTO-RTO: 0 /100
PLATELET # BLD AUTO: 292 10E3/UL (ref 150–450)
POTASSIUM SERPL-SCNC: 4.1 MMOL/L (ref 3.4–5.3)
PROT SERPL-MCNC: 7.1 G/DL (ref 6.4–8.3)
RBC # BLD AUTO: 5.31 10E6/UL (ref 3.8–5.2)
SODIUM SERPL-SCNC: 136 MMOL/L (ref 135–145)
WBC # BLD AUTO: 9.14 10E3/UL (ref 4–11)

## 2025-08-26 PROCEDURE — G0463 HOSPITAL OUTPT CLINIC VISIT: HCPCS | Performed by: INTERNAL MEDICINE

## 2025-08-26 PROCEDURE — 83615 LACTATE (LD) (LDH) ENZYME: CPT

## 2025-08-26 PROCEDURE — 85004 AUTOMATED DIFF WBC COUNT: CPT

## 2025-08-26 PROCEDURE — 99214 OFFICE O/P EST MOD 30 MIN: CPT | Performed by: INTERNAL MEDICINE

## 2025-08-26 PROCEDURE — 82310 ASSAY OF CALCIUM: CPT

## 2025-08-26 PROCEDURE — 36415 COLL VENOUS BLD VENIPUNCTURE: CPT

## 2025-08-26 ASSESSMENT — PAIN SCALES - GENERAL: PAINLEVEL_OUTOF10: MODERATE PAIN (4)

## 2025-08-30 ENCOUNTER — HEALTH MAINTENANCE LETTER (OUTPATIENT)
Age: 80
End: 2025-08-30

## (undated) DEVICE — SUCTION MANIFOLD NEPTUNE 2 SYS 4 PORT 0702-020-000

## (undated) DEVICE — SU STRATAFIX PDS PLUS 1 CT-1 18" SXPP1A404

## (undated) DEVICE — SUTURE SILK 3-0 TIES 30IN SA84H

## (undated) DEVICE — SOL WATER IRRIG 1000ML BOTTLE 2F7114

## (undated) DEVICE — ESU PENCIL SMOKE EVAC W/ROCKER SWITCH 0703-047-000

## (undated) DEVICE — SU ETHIBOND 5 V-37 4X30" MB66G

## (undated) DEVICE — SOL NACL 0.9% IRRIG 1000ML BOTTLE 2F7124

## (undated) DEVICE — GOWN IMPERVIOUS BREATHABLE SMART LG 89015

## (undated) DEVICE — ANTIFOG SOLUTION SEE SHARP 150M TROCAR SWABS 30978 (COI)

## (undated) DEVICE — ANTIFOG SOLUTION W/FOAM PAD 31142527

## (undated) DEVICE — GLOVE BIOGEL PI ULTRATOUCH G SZ 7.5 42175

## (undated) DEVICE — PREP CHLORAPREP 26ML TINTED HI-LITE ORANGE 930815

## (undated) DEVICE — CUSTOM PACK TOTAL HIP SOP5BTHHEA

## (undated) DEVICE — A3 SUPPLIES- SEE NURSING INFO PAGE

## (undated) DEVICE — SUTURE PASSOR W/GUIDE RSG-14F-4-WG

## (undated) DEVICE — GLOVE BIOGEL PI SZ 8.5 40885

## (undated) DEVICE — SU STRATAFIX PDS PLUS 0 CT-1 30CM SXPP1B450

## (undated) DEVICE — DEVICE CLOSURE V-LOC 0 GS-21 6IN VLOCN0306

## (undated) DEVICE — ESU ELEC BLADE 6" COATED E1450-6

## (undated) DEVICE — SU VICRYL+ 4-0 18IN P-3 UND VCP494G

## (undated) DEVICE — GLOVE BIOGEL PI SZ 8.0 40880

## (undated) DEVICE — SUCTION MANIFOLD NEPTUNE 2 SYS 1 PORT 702-025-000

## (undated) DEVICE — DRAPE IOBAN INCISE 23X17" 6650EZ

## (undated) DEVICE — SU ETHIBOND 2-0 SH 30" X833H

## (undated) DEVICE — SU VICRYL+ 0 27IN CT-1 UND VCP260H

## (undated) DEVICE — DRAPE SHEET REV FOLD 3/4 9349

## (undated) DEVICE — GOWN XXL 9575

## (undated) DEVICE — SUCTION SLEEVE NEPTUNE 2 165MM 0703-005-165

## (undated) DEVICE — DRSG AQUACEL AG HYDROFIBER  3.5X10" 422605

## (undated) DEVICE — BONE CLEANING TIP INTERPULSE  0210-010-000

## (undated) DEVICE — DAVINCI XI DRAPE COLUMN 470341

## (undated) DEVICE — Device

## (undated) DEVICE — MITT PRE-OP 7 L X 5 1/2 W 5177M1

## (undated) DEVICE — HOLDER LIMB VELCRO OR 0814-1533

## (undated) DEVICE — FORCEP BIOPSY 2.3MM DISP COATED 000388

## (undated) DEVICE — STPL SKIN 35W 6.9MM  PXW35

## (undated) DEVICE — TUBING SUCTION MEDI-VAC 1/4"X20' N620A

## (undated) DEVICE — ENDO TROCAR FIRST ENTRY KII FIOS Z-THRD 05X100MM CTF03

## (undated) DEVICE — DECANTER VIAL 2006S

## (undated) DEVICE — DAVINCI XI DRAPE ARM 470015

## (undated) DEVICE — DRAPE TRANSVERSE LAPAROTOMY 120X100X72" 89281

## (undated) DEVICE — DRESSING PRIMAPORE 4 X 3-1/8 66000317

## (undated) DEVICE — GLOVE BIOGEL PI INDICATOR 8.0 LF 41680

## (undated) DEVICE — DRAIN PENROSE 3/8"X12" LATEX 30414-038

## (undated) DEVICE — GLOVE UNDER INDICATOR PI SZ 8.5 LF 41685

## (undated) DEVICE — PSTNR KIT NUBLUE FM CHST PD BD STRAP TRND LF TP3000E-S-NB

## (undated) DEVICE — PAD HIP POSITIONING MCGUIRE 707-CPM

## (undated) DEVICE — CUSTOM PACK LAP CHOLE SBA5BLCHEA

## (undated) DEVICE — ADH SKIN CLOSURE PREMIERPRO EXOFIN 1.0ML 3470

## (undated) DEVICE — PLATE GROUNDING ADULT W/CORD 9165L

## (undated) DEVICE — BLADE CLIPPER PIVOT PURPLE DISP 9660

## (undated) DEVICE — SU DERMABOND ADVANCED .7ML DNX12

## (undated) DEVICE — SU PROLENE 2-0 V-7 36" 8977H

## (undated) DEVICE — SUTURE VICRYL+ 2-0 27IN SH UND VCP417H

## (undated) DEVICE — SYR 50ML SLIP TIP W/O NDL 309654

## (undated) DEVICE — SYR 10ML LL W/O NDL 302995

## (undated) DEVICE — DRSG TELFA 3X4" 1050

## (undated) DEVICE — GOWN IMPERVIOUS BREATHABLE SMART XLG 89045

## (undated) DEVICE — DRSG AQUACEL AG 3.5X13.75" HYDROFIBER 412012

## (undated) DEVICE — TUBE PENROSE 3/8 X 12 STRL LTX 0912020

## (undated) DEVICE — ELECTRODE PATIENT RETURN ADULT L10 FT 2 PLATE CORD 0855C

## (undated) DEVICE — SUCTION IRR SYSTEM W/O TIP INTERPULSE HANDPIECE 0210-100-000

## (undated) DEVICE — DAVINCI XI SEAL UNIVERSAL 5-12MM 470500

## (undated) DEVICE — DAVINCI XI HANDPIECE ESU VESSEL SEALER 8MM EXT 480422

## (undated) DEVICE — CUSTOM PACK GEN MAJOR SBA5BGMHEA

## (undated) DEVICE — SYR 50ML CATH TIP W/O NDL 309620

## (undated) DEVICE — GLOVE BIOGEL PI ULTRATOUCH G SZ 8.0 42180

## (undated) DEVICE — SOLUTION IRRIG 2B7127 .9NS 3000ML BAG

## (undated) DEVICE — DAVINCI XI OBTURATOR BLADELESS 8MM 470359

## (undated) DEVICE — SU WND CLOSURE VLOC 90 3-0 15CML VLOCM0604

## (undated) DEVICE — BLADE KNIFE SURG 11 371111

## (undated) DEVICE — LUBRICANT INST ELECTROLUBE EL101

## (undated) DEVICE — DAVINCI XI REDUCER 8-12MM 470381

## (undated) DEVICE — DRSG TEGADERM 2 3/8X2 3/4" 1624W

## (undated) DEVICE — SU MONOCRYL+ 4-0 18IN PS2 UND MCP496G

## (undated) DEVICE — IMMOBILIZER KNEE UNIV WRAP 20 0814-2650

## (undated) DEVICE — SU VICRYL+ 3-0 27IN SH UND VCP416H

## (undated) DEVICE — TUBING SMOKE EVAC PNEUMOCLEAR HIGH FLOW 0620050250

## (undated) DEVICE — SU PROLENE 2-0 SH 30" 8833H

## (undated) DEVICE — SU SILK 3-0 SH 30" K832H

## (undated) RX ORDER — ATROPINE SULFATE 0.1 MG/ML
INJECTION INTRAVENOUS
Status: DISPENSED
Start: 2025-06-04

## (undated) RX ORDER — LIDOCAINE HYDROCHLORIDE AND EPINEPHRINE 10; 10 MG/ML; UG/ML
INJECTION, SOLUTION INFILTRATION; PERINEURAL
Status: DISPENSED
Start: 2021-07-14

## (undated) RX ORDER — LIDOCAINE HYDROCHLORIDE 10 MG/ML
INJECTION, SOLUTION EPIDURAL; INFILTRATION; INTRACAUDAL; PERINEURAL
Status: DISPENSED
Start: 2023-08-30

## (undated) RX ORDER — ALBUTEROL SULFATE 90 UG/1
INHALANT RESPIRATORY (INHALATION)
Status: DISPENSED
Start: 2025-06-04

## (undated) RX ORDER — FENTANYL CITRATE 50 UG/ML
INJECTION, SOLUTION INTRAMUSCULAR; INTRAVENOUS
Status: DISPENSED
Start: 2025-06-04

## (undated) RX ORDER — CEFAZOLIN SODIUM 1 G/3ML
INJECTION, POWDER, FOR SOLUTION INTRAMUSCULAR; INTRAVENOUS
Status: DISPENSED
Start: 2025-06-04

## (undated) RX ORDER — GLYCOPYRROLATE 0.2 MG/ML
INJECTION, SOLUTION INTRAMUSCULAR; INTRAVENOUS
Status: DISPENSED
Start: 2023-08-30

## (undated) RX ORDER — ONDANSETRON 2 MG/ML
INJECTION INTRAMUSCULAR; INTRAVENOUS
Status: DISPENSED
Start: 2023-08-30

## (undated) RX ORDER — KETOROLAC TROMETHAMINE 30 MG/ML
INJECTION, SOLUTION INTRAMUSCULAR; INTRAVENOUS
Status: DISPENSED
Start: 2023-11-04

## (undated) RX ORDER — FENTANYL CITRATE 50 UG/ML
INJECTION, SOLUTION INTRAMUSCULAR; INTRAVENOUS
Status: DISPENSED
Start: 2023-08-30

## (undated) RX ORDER — PROPOFOL 10 MG/ML
INJECTION, EMULSION INTRAVENOUS
Status: DISPENSED
Start: 2024-02-12

## (undated) RX ORDER — FENTANYL CITRATE-0.9 % NACL/PF 10 MCG/ML
PLASTIC BAG, INJECTION (ML) INTRAVENOUS
Status: DISPENSED
Start: 2025-06-04

## (undated) RX ORDER — EPHEDRINE SULFATE 50 MG/ML
INJECTION, SOLUTION INTRAMUSCULAR; INTRAVENOUS; SUBCUTANEOUS
Status: DISPENSED
Start: 2025-06-04

## (undated) RX ORDER — PROPOFOL 10 MG/ML
INJECTION, EMULSION INTRAVENOUS
Status: DISPENSED
Start: 2025-06-04

## (undated) RX ORDER — LIDOCAINE HYDROCHLORIDE 10 MG/ML
INJECTION, SOLUTION EPIDURAL; INFILTRATION; INTRACAUDAL; PERINEURAL
Status: DISPENSED
Start: 2023-11-04

## (undated) RX ORDER — FENTANYL CITRATE 50 UG/ML
INJECTION, SOLUTION INTRAMUSCULAR; INTRAVENOUS
Status: DISPENSED
Start: 2023-09-21

## (undated) RX ORDER — DEXAMETHASONE SODIUM PHOSPHATE 10 MG/ML
INJECTION, EMULSION INTRAMUSCULAR; INTRAVENOUS
Status: DISPENSED
Start: 2023-08-30

## (undated) RX ORDER — FENTANYL CITRATE-0.9 % NACL/PF 10 MCG/ML
PLASTIC BAG, INJECTION (ML) INTRAVENOUS
Status: DISPENSED
Start: 2023-09-21

## (undated) RX ORDER — LIDOCAINE HYDROCHLORIDE 10 MG/ML
INJECTION, SOLUTION EPIDURAL; INFILTRATION; INTRACAUDAL; PERINEURAL
Status: DISPENSED
Start: 2024-02-12

## (undated) RX ORDER — ONDANSETRON 2 MG/ML
INJECTION INTRAMUSCULAR; INTRAVENOUS
Status: DISPENSED
Start: 2023-11-04

## (undated) RX ORDER — FENTANYL CITRATE 50 UG/ML
INJECTION, SOLUTION INTRAMUSCULAR; INTRAVENOUS
Status: DISPENSED
Start: 2023-11-04

## (undated) RX ORDER — FENTANYL CITRATE 50 UG/ML
INJECTION, SOLUTION INTRAMUSCULAR; INTRAVENOUS
Status: DISPENSED
Start: 2021-07-14

## (undated) RX ORDER — ONDANSETRON 2 MG/ML
INJECTION INTRAMUSCULAR; INTRAVENOUS
Status: DISPENSED
Start: 2023-09-21

## (undated) RX ORDER — LIDOCAINE HYDROCHLORIDE 10 MG/ML
INJECTION, SOLUTION INFILTRATION; PERINEURAL
Status: DISPENSED
Start: 2021-07-14

## (undated) RX ORDER — PROPOFOL 10 MG/ML
INJECTION, EMULSION INTRAVENOUS
Status: DISPENSED
Start: 2023-08-30

## (undated) RX ORDER — DEXAMETHASONE SODIUM PHOSPHATE 4 MG/ML
INJECTION, SOLUTION INTRA-ARTICULAR; INTRALESIONAL; INTRAMUSCULAR; INTRAVENOUS; SOFT TISSUE
Status: DISPENSED
Start: 2025-06-04

## (undated) RX ORDER — PROPOFOL 10 MG/ML
INJECTION, EMULSION INTRAVENOUS
Status: DISPENSED
Start: 2023-11-04

## (undated) RX ORDER — DEXAMETHASONE SODIUM PHOSPHATE 10 MG/ML
INJECTION, EMULSION INTRAMUSCULAR; INTRAVENOUS
Status: DISPENSED
Start: 2023-11-04

## (undated) RX ORDER — ONDANSETRON 2 MG/ML
INJECTION INTRAMUSCULAR; INTRAVENOUS
Status: DISPENSED
Start: 2025-06-04

## (undated) RX ORDER — FENTANYL CITRATE 50 UG/ML
INJECTION, SOLUTION INTRAMUSCULAR; INTRAVENOUS
Status: DISPENSED
Start: 2023-11-13

## (undated) RX ORDER — LIDOCAINE HYDROCHLORIDE 10 MG/ML
INJECTION, SOLUTION EPIDURAL; INFILTRATION; INTRACAUDAL; PERINEURAL
Status: DISPENSED
Start: 2025-06-04

## (undated) RX ORDER — PROPOFOL 10 MG/ML
INJECTION, EMULSION INTRAVENOUS
Status: DISPENSED
Start: 2023-09-21

## (undated) RX ORDER — LIDOCAINE HYDROCHLORIDE 10 MG/ML
INJECTION, SOLUTION EPIDURAL; INFILTRATION; INTRACAUDAL; PERINEURAL
Status: DISPENSED
Start: 2023-09-21